# Patient Record
Sex: MALE | Race: BLACK OR AFRICAN AMERICAN | NOT HISPANIC OR LATINO | URBAN - METROPOLITAN AREA
[De-identification: names, ages, dates, MRNs, and addresses within clinical notes are randomized per-mention and may not be internally consistent; named-entity substitution may affect disease eponyms.]

---

## 2017-03-06 ENCOUNTER — EMERGENCY (EMERGENCY)
Facility: HOSPITAL | Age: 47
LOS: 0 days | Discharge: HOME | End: 2017-03-06
Admitting: GENERAL PRACTICE

## 2017-06-27 DIAGNOSIS — Z79.891 LONG TERM (CURRENT) USE OF OPIATE ANALGESIC: ICD-10-CM

## 2017-06-27 DIAGNOSIS — F10.129 ALCOHOL ABUSE WITH INTOXICATION, UNSPECIFIED: ICD-10-CM

## 2017-06-27 DIAGNOSIS — Z88.0 ALLERGY STATUS TO PENICILLIN: ICD-10-CM

## 2017-10-19 ENCOUNTER — EMERGENCY (EMERGENCY)
Facility: HOSPITAL | Age: 47
LOS: 0 days | Discharge: HOME | End: 2017-10-20
Admitting: GENERAL PRACTICE

## 2017-10-19 DIAGNOSIS — A41.9 SEPSIS, UNSPECIFIED ORGANISM: ICD-10-CM

## 2017-10-19 DIAGNOSIS — Z88.0 ALLERGY STATUS TO PENICILLIN: ICD-10-CM

## 2017-10-19 DIAGNOSIS — G35 MULTIPLE SCLEROSIS: ICD-10-CM

## 2017-10-19 DIAGNOSIS — Z79.899 OTHER LONG TERM (CURRENT) DRUG THERAPY: ICD-10-CM

## 2017-10-19 DIAGNOSIS — R53.1 WEAKNESS: ICD-10-CM

## 2019-08-01 ENCOUNTER — OUTPATIENT (OUTPATIENT)
Dept: OUTPATIENT SERVICES | Facility: HOSPITAL | Age: 49
LOS: 1 days | End: 2019-08-01
Payer: MEDICAID

## 2019-08-04 ENCOUNTER — INPATIENT (INPATIENT)
Facility: HOSPITAL | Age: 49
LOS: 2 days | Discharge: HOME | End: 2019-08-07
Attending: INTERNAL MEDICINE | Admitting: INTERNAL MEDICINE
Payer: MEDICAID

## 2019-08-04 VITALS
RESPIRATION RATE: 20 BRPM | HEART RATE: 82 BPM | WEIGHT: 160.06 LBS | HEIGHT: 77 IN | DIASTOLIC BLOOD PRESSURE: 61 MMHG | SYSTOLIC BLOOD PRESSURE: 100 MMHG | TEMPERATURE: 98 F | OXYGEN SATURATION: 98 %

## 2019-08-04 LAB
ANION GAP SERPL CALC-SCNC: 15 MMOL/L — HIGH (ref 7–14)
BASOPHILS # BLD AUTO: 0.04 K/UL — SIGNIFICANT CHANGE UP (ref 0–0.2)
BASOPHILS NFR BLD AUTO: 0.3 % — SIGNIFICANT CHANGE UP (ref 0–1)
BUN SERPL-MCNC: 19 MG/DL — SIGNIFICANT CHANGE UP (ref 10–20)
CALCIUM SERPL-MCNC: 9.8 MG/DL — SIGNIFICANT CHANGE UP (ref 8.5–10.1)
CHLORIDE SERPL-SCNC: 100 MMOL/L — SIGNIFICANT CHANGE UP (ref 98–110)
CO2 SERPL-SCNC: 22 MMOL/L — SIGNIFICANT CHANGE UP (ref 17–32)
CREAT SERPL-MCNC: 1.1 MG/DL — SIGNIFICANT CHANGE UP (ref 0.7–1.5)
EOSINOPHIL # BLD AUTO: 0.02 K/UL — SIGNIFICANT CHANGE UP (ref 0–0.7)
EOSINOPHIL NFR BLD AUTO: 0.2 % — SIGNIFICANT CHANGE UP (ref 0–8)
ETHANOL SERPL-MCNC: <10 MG/DL — SIGNIFICANT CHANGE UP
GLUCOSE SERPL-MCNC: 94 MG/DL — SIGNIFICANT CHANGE UP (ref 70–99)
HCT VFR BLD CALC: 43.6 % — SIGNIFICANT CHANGE UP (ref 42–52)
HGB BLD-MCNC: 14.9 G/DL — SIGNIFICANT CHANGE UP (ref 14–18)
IMM GRANULOCYTES NFR BLD AUTO: 0.3 % — SIGNIFICANT CHANGE UP (ref 0.1–0.3)
LYMPHOCYTES # BLD AUTO: 1.71 K/UL — SIGNIFICANT CHANGE UP (ref 1.2–3.4)
LYMPHOCYTES # BLD AUTO: 14.5 % — LOW (ref 20.5–51.1)
MCHC RBC-ENTMCNC: 29.4 PG — SIGNIFICANT CHANGE UP (ref 27–31)
MCHC RBC-ENTMCNC: 34.2 G/DL — SIGNIFICANT CHANGE UP (ref 32–37)
MCV RBC AUTO: 86.2 FL — SIGNIFICANT CHANGE UP (ref 80–94)
MONOCYTES # BLD AUTO: 1.11 K/UL — HIGH (ref 0.1–0.6)
MONOCYTES NFR BLD AUTO: 9.4 % — HIGH (ref 1.7–9.3)
NEUTROPHILS # BLD AUTO: 8.89 K/UL — HIGH (ref 1.4–6.5)
NEUTROPHILS NFR BLD AUTO: 75.3 % — HIGH (ref 42.2–75.2)
NRBC # BLD: 0 /100 WBCS — SIGNIFICANT CHANGE UP (ref 0–0)
PLATELET # BLD AUTO: 236 K/UL — SIGNIFICANT CHANGE UP (ref 130–400)
POTASSIUM SERPL-MCNC: 4.6 MMOL/L — SIGNIFICANT CHANGE UP (ref 3.5–5)
POTASSIUM SERPL-SCNC: 4.6 MMOL/L — SIGNIFICANT CHANGE UP (ref 3.5–5)
RBC # BLD: 5.06 M/UL — SIGNIFICANT CHANGE UP (ref 4.7–6.1)
RBC # FLD: 13.6 % — SIGNIFICANT CHANGE UP (ref 11.5–14.5)
SODIUM SERPL-SCNC: 137 MMOL/L — SIGNIFICANT CHANGE UP (ref 135–146)
TROPONIN T SERPL-MCNC: <0.01 NG/ML — SIGNIFICANT CHANGE UP
WBC # BLD: 11.8 K/UL — HIGH (ref 4.8–10.8)
WBC # FLD AUTO: 11.8 K/UL — HIGH (ref 4.8–10.8)

## 2019-08-04 PROCEDURE — 99285 EMERGENCY DEPT VISIT HI MDM: CPT

## 2019-08-04 PROCEDURE — 71045 X-RAY EXAM CHEST 1 VIEW: CPT | Mod: 26

## 2019-08-04 PROCEDURE — 99223 1ST HOSP IP/OBS HIGH 75: CPT

## 2019-08-04 PROCEDURE — 93010 ELECTROCARDIOGRAM REPORT: CPT

## 2019-08-04 RX ORDER — HYDRALAZINE HCL 50 MG
10 TABLET ORAL ONCE
Refills: 0 | Status: COMPLETED | OUTPATIENT
Start: 2019-08-04 | End: 2019-08-04

## 2019-08-04 RX ORDER — ZOLPIDEM TARTRATE 10 MG/1
5 TABLET ORAL AT BEDTIME
Refills: 0 | Status: DISCONTINUED | OUTPATIENT
Start: 2019-08-04 | End: 2019-08-07

## 2019-08-04 RX ORDER — OXYCODONE HYDROCHLORIDE 5 MG/1
80 TABLET ORAL EVERY 12 HOURS
Refills: 0 | Status: DISCONTINUED | OUTPATIENT
Start: 2019-08-04 | End: 2019-08-07

## 2019-08-04 RX ORDER — ROPINIROLE 8 MG/1
2 TABLET, FILM COATED, EXTENDED RELEASE ORAL DAILY
Refills: 0 | Status: DISCONTINUED | OUTPATIENT
Start: 2019-08-04 | End: 2019-08-07

## 2019-08-04 RX ORDER — ENOXAPARIN SODIUM 100 MG/ML
40 INJECTION SUBCUTANEOUS DAILY
Refills: 0 | Status: DISCONTINUED | OUTPATIENT
Start: 2019-08-04 | End: 2019-08-07

## 2019-08-04 RX ORDER — HYDRALAZINE HCL 50 MG
25 TABLET ORAL ONCE
Refills: 0 | Status: COMPLETED | OUTPATIENT
Start: 2019-08-04 | End: 2019-08-04

## 2019-08-04 RX ORDER — OXYCODONE HYDROCHLORIDE 5 MG/1
20 TABLET ORAL EVERY 6 HOURS
Refills: 0 | Status: DISCONTINUED | OUTPATIENT
Start: 2019-08-04 | End: 2019-08-07

## 2019-08-04 RX ORDER — CHLORHEXIDINE GLUCONATE 213 G/1000ML
1 SOLUTION TOPICAL
Refills: 0 | Status: DISCONTINUED | OUTPATIENT
Start: 2019-08-04 | End: 2019-08-07

## 2019-08-04 RX ORDER — NICOTINE POLACRILEX 2 MG
1 GUM BUCCAL DAILY
Refills: 0 | Status: DISCONTINUED | OUTPATIENT
Start: 2019-08-04 | End: 2019-08-07

## 2019-08-04 RX ADMIN — ZOLPIDEM TARTRATE 5 MILLIGRAM(S): 10 TABLET ORAL at 23:29

## 2019-08-04 RX ADMIN — ENOXAPARIN SODIUM 40 MILLIGRAM(S): 100 INJECTION SUBCUTANEOUS at 21:35

## 2019-08-04 RX ADMIN — OXYCODONE HYDROCHLORIDE 20 MILLIGRAM(S): 5 TABLET ORAL at 21:35

## 2019-08-04 RX ADMIN — ZOLPIDEM TARTRATE 5 MILLIGRAM(S): 10 TABLET ORAL at 21:34

## 2019-08-04 RX ADMIN — ROPINIROLE 2 MILLIGRAM(S): 8 TABLET, FILM COATED, EXTENDED RELEASE ORAL at 21:34

## 2019-08-04 RX ADMIN — OXYCODONE HYDROCHLORIDE 80 MILLIGRAM(S): 5 TABLET ORAL at 17:39

## 2019-08-04 RX ADMIN — Medication 10 MILLIGRAM(S): at 21:34

## 2019-08-04 RX ADMIN — Medication 25 MILLIGRAM(S): at 17:39

## 2019-08-04 NOTE — ED PROVIDER NOTE - PROGRESS NOTE DETAILS
Called on call service for neurology Dr. [facility] for consult, awaiting call back from on call doctor Called Dr. Arias of Dr. Bledso[facility], recommends admit patient for w/u of exacerbation with MRI with and w/o contrast, but r/o infection first before giving steroids Called Dr. Arias of Dr. Bledso[facility] group, recommends admit patient for w/u of exacerbation with MRI with and w/o contrast, but r/o infection first before giving steroids, recommends against steroids if Tysabri within 1 week, but patient says last dose of Tysabri was 4 weeks ago Called Dr. Arias of Dr. Bledso[facility] group, recommends admit patient for w/u of exacerbation with MRI with and w/o contrast, but r/o infection first before giving steroids, get utox, recommends against steroids if Tysabri within 1 week, but patient says last dose of Tysabri was 4 weeks ago Patient sleepy, mumblin, still unstable gain, will admit for w/u of MS exacerbation Patient sleepy, mumbling, still unstable gain, will admit for w/u of MS exacerbation

## 2019-08-04 NOTE — H&P ADULT - NSICDXPASTMEDICALHX_GEN_ALL_CORE_FT
PAST MEDICAL HISTORY:  Multiple sclerosis PAST MEDICAL HISTORY:  Chronic back pain     Hypertension     Multiple sclerosis

## 2019-08-04 NOTE — ED PROVIDER NOTE - DIAGNOSTIC INTERPRETATION
Adequate: hears normal conversation without difficulty Chest xray shows no focal consolidation, no effusion, no pneumothorax, normal heart size

## 2019-08-04 NOTE — ED PROVIDER NOTE - ATTENDING CONTRIBUTION TO CARE
50 yo male BIBA with a PMHx of progressive-relapsing multiple sclerosis complaining of weakness today. patient denies cp/sob, no n/v/d, no loc, no drug use. case discussed with neurologist Dr. bjorn gates, recommended to admit the patient for further eval and to not give steroids in the ER    CONSTITUTIONAL: Well-developed; well-nourished; in no acute distress. Sitting up and providing appropriate history and physical examination  SKIN: skin exam is warm and dry, no acute rash.  HEAD: Normocephalic; atraumatic.  EYES: PERRL, 3 mm bilateral, no nystagmus, EOM intact; conjunctiva and sclera clear.  ENT: No nasal discharge; airway clear.  NECK: Supple; non tender. + full passive ROM in all directions. No JVD  CARD: S1, S2 normal; no murmurs, gallops, or rubs. Regular rate and rhythm. + Symmetric Strong Pulses  RESP: No wheezes, rales or rhonchi. Good air movement bilaterally  ABD: soft; non-distended; non-tender. No Rebound, No Guarding, No signs of peritonitis, No CVA tenderness. No pulsatile abdominal mass. + Strong and Symmetric Pulses  EXT: Normal ROM. No clubbing, cyanosis or edema. Dp and Pt Pulses intact. Cap refill less than 3 seconds  NEURO: as per wife pateint is at his baseline neurologic status, patient walks with a cane and unsteady gait at baseline, CN 2-12 intact, normal finger to nose, + worsening LLE weakness over a few days, no other sensory or motor deficits, Alert, oriented, grossly unremarkable. GCS 15. NIH 0  PSYCH: Cooperative, appropriate.

## 2019-08-04 NOTE — H&P ADULT - NSHPPHYSICALEXAM_GEN_ALL_CORE
Vital Signs Last 24 Hrs  T(C): 35.8 (04 Aug 2019 08:31), Max: 36.6 (04 Aug 2019 01:53)  T(F): 96.4 (04 Aug 2019 08:31), Max: 97.8 (04 Aug 2019 01:53)  HR: 50 (04 Aug 2019 08:31) (50 - 82)  BP: 186/114 (04 Aug 2019 08:31) (100/61 - 186/114)  BP(mean): --  RR: 16 (04 Aug 2019 08:31) (16 - 20)  SpO2: 99% (04 Aug 2019 08:31) (98% - 99%)    POCT Blood Glucose.: 101 mg/dL (04 Aug 2019 02:57)    Physical Exam:    -     General :     -      HEENT:    -      Cardiac:    -      Pulm:    -      GI:    -      Musculoskeletal:    -      Neuro: Vital Signs Last 24 Hrs  T(C): 35.8 (04 Aug 2019 08:31), Max: 36.6 (04 Aug 2019 01:53)  T(F): 96.4 (04 Aug 2019 08:31), Max: 97.8 (04 Aug 2019 01:53)  HR: 50 (04 Aug 2019 08:31) (50 - 82)  BP: 186/114 (04 Aug 2019 08:31) (100/61 - 186/114)  BP(mean): --  RR: 16 (04 Aug 2019 08:31) (16 - 20)  SpO2: 99% (04 Aug 2019 08:31) (98% - 99%)    POCT Blood Glucose.: 101 mg/dL (04 Aug 2019 02:57)    Physical Exam:    -     General : alert, awake and in no acute distress    -      HEENT: normocephalic    -      Cardiac: RRR    -      Pulm: CTA B/L    -      GI: abdomen soft    -      Musculoskeletal: no lower extremity edema    -      Neuro: AAOx3; motor strength 5/5 in bilateral upper and lower extremity, sensory intact Vital Signs Last 24 Hrs  T(C): 35.8 (04 Aug 2019 08:31), Max: 36.6 (04 Aug 2019 01:53)  T(F): 96.4 (04 Aug 2019 08:31), Max: 97.8 (04 Aug 2019 01:53)  HR: 50 (04 Aug 2019 08:31) (50 - 82)  BP: 186/114 (04 Aug 2019 08:31) (100/61 - 186/114)  BP(mean): --  RR: 16 (04 Aug 2019 08:31) (16 - 20)  SpO2: 99% (04 Aug 2019 08:31) (98% - 99%)    POCT Blood Glucose.: 101 mg/dL (04 Aug 2019 02:57)    PHYSICAL EXAM:  GENERAL: NAD, speaks in full sentences, no signs of respiratory distress, thin  HEAD:  Atraumatic, Normocephalic  EYES: conjunctiva and sclera clear  NECK: Supple, No JVD  CHEST/LUNG: Clear to auscultation bilaterally; No wheeze; No crackles; No accessory muscles used  HEART: Regular rate and rhythm; No murmurs;   ABDOMEN: Soft, Nontender, Nondistended; Bowel sounds present; No guarding  EXTREMITIES: No cyanosis or edema  PSYCH: AAOx3  NEUROLOGY: strength 4/5 b/l LE, walks unsteadily with a cane  SKIN: No rashes or lesions

## 2019-08-04 NOTE — ED PROVIDER NOTE - PHYSICAL EXAMINATION
CONSTITUTIONAL: Well-developed; well-nourished; in no acute distress.   SKIN: warm, dry  HEAD: Normocephalic; atraumatic.  EYES: no conjunctival injection. PERRLA. Pupils mildly dilated.  ENT: No nasal discharge; airway clear.  NECK: Supple; non tender.  CARD: S1, S2 normal; no murmurs, gallops, or rubs. Regular rate and rhythm. 2+ radial and DP pulses b/l.  RESP: No wheezes, rales or rhonchi.  ABD: soft ntnd  EXT: Normal ROM.  No clubbing, cyanosis or edema.   LYMPH: No acute cervical adenopathy.  NEURO: Alert, oriented, grossly unremarkable. CN 2 to 12 intact. Dysmetria with right hand on finger to nose test. Rapid alternating movement normal, heel to shin test normal. Gait unstable with cane on right hand.  PSYCH: Cooperative, appropriate. CONSTITUTIONAL: Well-developed; well-nourished; in no acute distress.   SKIN: warm, dry  HEAD: Normocephalic; atraumatic.  EYES: no conjunctival injection. PERRLA. Pupils mildly dilated.  ENT: No nasal discharge; airway clear.  NECK: Supple; non tender.  CARD: S1, S2 normal; no murmurs, gallops, or rubs. Regular rate and rhythm. 2+ radial and DP pulses b/l.  RESP: No wheezes, rales or rhonchi.  ABD: soft ntnd  EXT: Normal ROM.  No clubbing, cyanosis or edema.   LYMPH: No acute cervical adenopathy.  NEURO: Alert, oriented, grossly unremarkable. CN 2 to 12 intact. Dysmetria with right hand on finger to nose test. Rapid alternating movement normal, heel to shin test normal. Gait unstable with cane on right hand. Normal sensation. 5/5 strength in upper extremities b/l. 3/5 strength in right hip flexion, 4/5 strength in right knee extension, 4/5 strength in right ankle dorsiflexion and plantarflexion. 5/5 strength in left hip flexion, 5/5 strength in left knee extension, 5/5 strength in left ankle dorsiflexion and plantarflexion  PSYCH: Cooperative, appropriate. CONSTITUTIONAL: Well-developed; well-nourished; in no acute distress.   SKIN: warm, dry  HEAD: Normocephalic; atraumatic.  EYES: no conjunctival injection. PERRLA. Pupils mildly dilated.  ENT: No nasal discharge; airway clear.  NECK: Supple; non tender.  CARD: S1, S2 normal; no murmurs, gallops, or rubs. Regular rate and rhythm. 2+ radial and DP pulses b/l.  RESP: No wheezes, rales or rhonchi.  ABD: soft ntnd  EXT: Normal ROM.  No clubbing, cyanosis or edema.   LYMPH: No acute cervical adenopathy.  NEURO: Alert, oriented, grossly unremarkable. CN 2 to 12 intact. Dysmetria with right hand on finger to nose test. Rapid alternating movement normal, heel to shin test normal. Gait unstable with cane on right hand. Normal sensation. 5/5 strength in upper extremities b/l. 3/5 strength in right hip flexion, 4/5 strength in right knee extension, 4/5 strength in right ankle dorsiflexion and plantarflexion. 5/5 strength in left hip flexion, 5/5 strength in left knee extension, 5/5 strength in left ankle dorsiflexion and plantarflexion  PSYCH: Cooperative. Persevering speech about wife and kids and about life being a struggle with MS. CONSTITUTIONAL: Well-developed; well-nourished; in no acute distress.   SKIN: warm, dry  HEAD: Normocephalic; atraumatic.  EYES: no conjunctival injection. PERRLA. Pupils mildly dilated.  ENT: No nasal discharge; airway clear.  NECK: Supple; non tender.  CARD: S1, S2 normal; no murmurs, gallops, or rubs. Regular rate and rhythm. 2+ radial and DP pulses b/l.  RESP: No wheezes, rales or rhonchi.  ABD: soft ntnd  EXT: Normal ROM.  No clubbing, cyanosis or edema.   LYMPH: No acute cervical adenopathy.  NEURO: Alert, oriented, but at times sleepy and mumbling speech. CN 2 to 12 intact. Dysmetria with right hand on finger to nose test. Rapid alternating movement normal, heel to shin test normal. Gait unstable with cane on right hand. Normal sensation. 5/5 strength in upper extremities b/l. 3/5 strength in right hip flexion, 4/5 strength in right knee extension, 4/5 strength in right ankle dorsiflexion and plantarflexion. 5/5 strength in left hip flexion, 5/5 strength in left knee extension, 5/5 strength in left ankle dorsiflexion and plantarflexion  PSYCH: Cooperative. Persevering speech about wife and kids and about life being a struggle with MS. CONSTITUTIONAL: Well-developed; well-nourished; in no acute distress.   SKIN: warm, dry  HEAD: Normocephalic; atraumatic.  EYES: no conjunctival injection. PERRLA. Pupils mildly dilated.  ENT: No nasal discharge; airway clear. Oral mucosa dry.  NECK: Supple; non tender.  CARD: S1, S2 normal; no murmurs, gallops, or rubs. Regular rate and rhythm. 2+ radial and DP pulses b/l.  RESP: No wheezes, rales or rhonchi.  ABD: soft ntnd  EXT: Normal ROM.  No clubbing, cyanosis or edema.   LYMPH: No acute cervical adenopathy.  NEURO: Alert, oriented, but at times sleepy and mumbling speech. CN 2 to 12 intact. Dysmetria with right hand on finger to nose test. Rapid alternating movement normal, heel to shin test normal. Gait unstable with cane on right hand. Normal sensation. 5/5 strength in upper extremities b/l. 3/5 strength in right hip flexion, 4/5 strength in right knee extension, 4/5 strength in right ankle dorsiflexion and plantarflexion. 5/5 strength in left hip flexion, 5/5 strength in left knee extension, 5/5 strength in left ankle dorsiflexion and plantarflexion  PSYCH: Cooperative. Persevering speech about wife and kids and about life being a struggle with MS. CONSTITUTIONAL: Well-developed; well-nourished; in no acute distress.   SKIN: warm, dry  HEAD: Normocephalic; atraumatic.  EYES: no conjunctival injection. PERRLA. Pupils mildly dilated.  ENT: No nasal discharge; airway clear. Oral mucosa dry.  NECK: Supple; non tender.  CARD: S1, S2 normal; no murmurs, gallops, or rubs. Regular rate and rhythm. 2+ radial and DP pulses b/l.  RESP: No wheezes, rales or rhonchi.  ABD: soft ntnd  EXT: Normal ROM.  No clubbing, cyanosis or edema.   LYMPH: No acute cervical adenopathy.  NEURO: Alert, oriented, but at times sleepy and mumbling speech. CN 2 to 12 intact. Dysmetria with right hand on finger to nose test. Rapid alternating movement normal, heel to shin test normal. Gait unstable with cane on right hand. Normal sensation. 5/5 strength in upper extremities b/l. 3/5 strength in left hip flexion, 4/5 strength in left knee extension, 4/5 strength in left ankle dorsiflexion and plantarflexion. 5/5 strength in right hip flexion, 5/5 strength in right knee extension, 5/5 strength in right ankle dorsiflexion and plantarflexion  PSYCH: Cooperative. Persevering speech about wife and kids and about life being a struggle with MS.

## 2019-08-04 NOTE — ED PROVIDER NOTE - CLINICAL SUMMARY MEDICAL DECISION MAKING FREE TEXT BOX
I personally evaluated the patient. I reviewed the Resident’s or Physician Assistant’s note (as assigned above), and agree with the findings and plan except as documented in my note. patient neurologist will come to see him in the hospital. Patient well appearing on admission

## 2019-08-04 NOTE — CONSULT NOTE ADULT - SUBJECTIVE AND OBJECTIVE BOX
Neurology consult    KEVIN [id number]    HPI:  The patient is a 49-year-old male with a PMH of relapse-remitting multiple sclerosis and chronic back pain who presented with a 1-day history of lower extremity weakness and gait imbalance.  He reports having a history of lower extremity weakness and he uses a cane to ambulate at baseline.  However, he noticed increased weakness yesterday.  Otherwise, he denied having fever, chills, headache, dizziness, lightheadedness, blurry vision, eye pain, sensory changes, bowel or bladder dysfunction.  The patient states his last MS flare was about 4-5 years ago.  He reports not routinely following-up with (04 Aug 2019 14:38)  Pt was seeing Dr. Bledsoe but for the past 1-2 yrs is non-compliant with f/u visits. Was on Tysabri, last treatment 1-2 yrs ago. Also is followed by pain mngnt, get pain killers, now is concerned about his pain meds - ran out.  Also has RLS -on Neurontin and Requip.    MEDICATIONS    chlorhexidine 4% Liquid 1 Application(s) Topical <User Schedule>      PAST MEDICAL & SURGICAL HISTORY:  Multiple sclerosis       Family history: No history of dementia, strokes, or seizures   FAMILY HISTORY:  non-contributory    SOCIAL HISTORY:  denies IVDA, ETOH, admits smoking cigarets.     Allergies    penicillin (Unknown)            Height (cm): 195.58 (08-04 @ 01:53)  Weight (kg): 72.6 (08-04 @ 01:53)  BMI (kg/m2): 19 (08-04 @ 01:53)    Vital Signs Last 24 Hrs  T(C): 35.8 (04 Aug 2019 08:31), Max: 36.6 (04 Aug 2019 01:53)  T(F): 96.4 (04 Aug 2019 08:31), Max: 97.8 (04 Aug 2019 01:53)  HR: 50 (04 Aug 2019 08:31) (50 - 82)  BP: 186/114 (04 Aug 2019 08:31) (100/61 - 186/114)  BP(mean): --  RR: 16 (04 Aug 2019 08:31) (16 - 20)  SpO2: 99% (04 Aug 2019 08:31) (98% - 99%)    WEAKNESS  ^WEAKNESS  MEWS Score  Multiple sclerosis  Weakness  WEAKNESS  90+  Multiple sclerosis      REVIEW OF SYSTEMS:    Constitutional: No fever, chills, fatigue, weakness  Eyes: no eye pain, visual disturbances, or discharge  ENT:  No difficulty hearing, tinnitus, vertigo; No sinus or throat pain  Neck: No pain or stiffness  Respiratory: No cough, dyspnea, wheezing   Cardiovascular: No chest pain, palpitations,   Gastrointestinal: No abdominal or epigastric pain. No nausea, vomiting  No diarrhea or constipation.   Genitourinary: No dysuria, frequency, hematuria or incontinence  Neurological: as per HPI  Psychiatric: No depression, anxiety, mood swings or difficulty sleeping  Musculoskeletal: as per HPI  Skin: No itching, burning, rashes or lesions   Lymph Nodes: No enlarged glands  Endocrine: No heat or cold intolerance; No hair loss, No h/o diabetes or thyroid dysfunction  Allergy and Immunologic: No hives or eczema    PHYSICAL EXAMINATION:  General:  Well-developed, well nourished, in no acute distress.  Eyes: Conjunctiva and sclera clear.  Cardiovascular: Regular rate and rhythm; S1 and S2 Normal; No murmurs, gallops or rubs.  Neurologic:  - Mental Status:  Alert, awake, oriented to person, place, and time; Speech is fluent with intact naming, repetition, and comprehension.  - Cranial Nerves II-XII:  PERRLA, EOMI, no nystagmus, face simmetrical, hearing intact to finger rub bl, tongue midline  - Motor:  Strength is 5/5 both arms, 4/5 RT leg, 4-/5 lt leg, DTRs 3+ all, toes equivocal bl.  There is no pronator drift.  Normal muscle bulk and  spastic tone in legs.  - Sensory:  Intact to light touch  sense throughout.  - Coordination:  Finger-nose-finger  with dysmetria bl, and heel-knee-shin ataxia bl.    - Gait:  ataxic, spastic          LABS:  CBC Full  -  ( 04 Aug 2019 02:36 )  WBC Count : 11.80 K/uL  RBC Count : 5.06 M/uL  Hemoglobin : 14.9 g/dL  Hematocrit : 43.6 %  Platelet Count - Automated : 236 K/uL  Mean Cell Volume : 86.2 fL  Mean Cell Hemoglobin : 29.4 pg  Mean Cell Hemoglobin Concentration : 34.2 g/dL  Auto Neutrophil # : 8.89 K/uL  Auto Lymphocyte # : 1.71 K/uL  Auto Monocyte # : 1.11 K/uL  Auto Eosinophil # : 0.02 K/uL  Auto Basophil # : 0.04 K/uL  Auto Neutrophil % : 75.3 %  Auto Lymphocyte % : 14.5 %  Auto Monocyte % : 9.4 %  Auto Eosinophil % : 0.2 %  Auto Basophil % : 0.3 %      08-04    137  |  100  |  19  ----------------------------<  94  4.6   |  22  |  1.1    Ca    9.8      04 Aug 2019 02:36

## 2019-08-04 NOTE — H&P ADULT - ASSESSMENT
A 49-year-old male with a PMH of HTN, relapse-remitting multiple sclerosis, and chronic back pain who presented with a 1-day history of lower extremity weakness and gait imbalance.     1. Lower extremity weakness and gait imbalance  - r/o MS flare  - check MRI brain and L-spine w/wo contrast  - neurology consult pending  - ambulate with assistance  - PT/rehab consult pending    2. Stage II HTN  - monitor BP and start anti-hypertensive medications if SBP remains >/= 140 mm Hg or DBP >/= 90 mm Hg    3. Chronic back pain  - patient's opioid medications verified with I-STOP  - continue Oxycodone IR 20 mg PO QID and Oxycontin ER 80 mg PO q12H    4. DVT prophylaxis  - LMWH daily    5. Disposition  - anticipate discharge home once medically stable A 49-year-old male with a PMH of HTN, relapse-remitting multiple sclerosis, and chronic back pain who presented with a 1-day history of lower extremity weakness and gait imbalance.     1. Lower extremity weakness and gait imbalance  - r/o MS flare  - check MRI brain and L-spine w/wo contrast  - neurology consult pending  - continue Ropinirole 2 mg daily  - ambulate with assistance  - PT/rehab consult pending    2. Stage II HTN  - monitor BP and start anti-hypertensive medications if SBP remains >/= 140 mm Hg or DBP >/= 90 mm Hg    3. Chronic back pain  - patient's opioid medications were verified with I-STOP  - continue Oxycodone IR 20 mg PO q6H PRN and Oxycontin ER 80 mg PO q12H    4. DVT prophylaxis  - LMWH daily    5. Disposition  - anticipate discharge home once medically stable    **The patient cannot recall all of his home medications; his pharmacy is currently closed; please call in AM to verify his medications to complete med. rec.** A 49-year-old male with a PMH of HTN, relapse-remitting multiple sclerosis, and chronic back pain who presented with a 1-day history of lower extremity weakness and gait imbalance.     1. Lower extremity weakness and gait imbalance  - r/o MS flare  - MRI brain, C-spine. and T-spine w/wo contrast ordered  - neurology consult pending  - continue Ropinirole 2 mg daily  - ambulate with assistance  - PT/rehab consult pending    2. Stage II HTN  - monitor BP and start anti-hypertensive medications if SBP remains >/= 140 mm Hg or DBP >/= 90 mm Hg    3. Chronic back pain  - patient's opioid medications were verified with I-STOP  - continue Oxycodone IR 20 mg PO q6H PRN and Oxycontin ER 80 mg PO q12H    4. DVT prophylaxis  - LMWH daily    5. Disposition  - anticipate discharge home once medically stable    **The patient cannot recall all of his home medications; his pharmacy is currently closed; please call in AM to verify his medications to complete med. rec.** A 49-year-old male with a PMH of HTN, relapse-remitting multiple sclerosis, and chronic back pain who presented with a 1-day history of lower extremity weakness and gait imbalance.     1. Lower extremity weakness and gait imbalance  - r/o MS flare versus worsening sciatica  - MRI brain, C-spine. and T-spine w/wo contrast ordered  - neurology consult appreciated - if MRI c/w MS flare, consider solumedrol x5 days  - continue Ropinirole 2 mg daily  - ambulate with assistance  - PT/rehab consult pending    2. Stage II HTN  - monitor BP and start anti-hypertensive medications if SBP remains >/= 140 mm Hg or DBP >/= 90 mm Hg    3. Chronic back pain  - patient's opioid medications were verified with I-STOP  - continue Oxycodone IR 20 mg PO q6H PRN and Oxycontin ER 80 mg PO q12H    4. DVT prophylaxis  - LMWH daily    5. Disposition  - anticipate discharge home once medically stable    **The patient cannot recall all of his home medications; his pharmacy is currently closed; please call in AM to verify his medications to complete med. rec.**

## 2019-08-04 NOTE — ED ADULT NURSE NOTE - NSIMPLEMENTINTERV_GEN_ALL_ED
Implemented All Fall with Harm Risk Interventions:  [place] to call system. Call bell, personal items and telephone within reach. Instruct patient to call for assistance. Room bathroom lighting operational. Non-slip footwear when patient is off stretcher. Physically safe environment: no spills, clutter or unnecessary equipment. Stretcher in lowest position, wheels locked, appropriate side rails in place. Provide visual cue, wrist band, yellow gown, etc. Monitor gait and stability. Monitor for mental status changes and reorient to person, place, and time. Review medications for side effects contributing to fall risk. Reinforce activity limits and safety measures with patient and family. Provide visual clues: red socks.

## 2019-08-04 NOTE — H&P ADULT - ATTENDING COMMENTS
Patient is seen and examined independently at bedside. I agree with the resident's note, history and plan as above. Corrections made where appropriate    All labs, radiologic studies, vitals, orders and medications list reviewed.     #Progress Note Handoff  Pending (specify):   MRI, PT, symptomatic improvement  Family discussion: Plan of care discussed with patient, aware and agreeable   Disposition:  home

## 2019-08-04 NOTE — ED ADULT NURSE NOTE - CHPI ED NUR SYMPTOMS NEG
no fever/no tingling/no decreased eating/drinking/no dizziness/no nausea/no pain/no chills/no vomiting

## 2019-08-04 NOTE — H&P ADULT - NSHPOUTPATIENTPROVIDERS_GEN_ALL_CORE
Neurologist: Dr. Bledsoe Neurologist: Dr. Bledsoe  Pain Management: [facility]  Pharmacy: [facility] ([phone])

## 2019-08-04 NOTE — ED ADULT NURSE NOTE - OBJECTIVE STATEMENT
patient complaints of weakness with a PMH of MS. Patient ambulates with an unsteady gait at baseline.

## 2019-08-04 NOTE — ED ADULT NURSE REASSESSMENT NOTE - NS ED NURSE REASSESS COMMENT FT1
pt sleeping, responds to verbal stimuli but goes back to sleep. VS obtained. Awaiting orders from admitting MD.

## 2019-08-04 NOTE — H&P ADULT - NSHPLABSRESULTS_GEN_ALL_CORE
Labs:                        14.9   11.80 )-----------( 236      ( 04 Aug 2019 02:36 )             43.6             08-04    137  |  100  |  19  ----------------------------<  94  4.6   |  22  |  1.1    Ca    9.8      04 Aug 2019 02:36    CARDIAC MARKERS ( 04 Aug 2019 02:36 )  x     / <0.01 ng/mL / x     / x     / x

## 2019-08-04 NOTE — CONSULT NOTE ADULT - ASSESSMENT
A 48 yo man with h/o MS for many yrs, RLS, non-compliant with f/u neuro visits and treatment.  - worsening og pain and gait - r/o exacerbation of SPMS  - h/o RLS    Plan:  - MRI brain C/T spine w/o and with gado  - UDS  - cont Requip and Gabapentin - please verify the dose with pt's pharmacy  - if MRI is c/w acute MS lesions - consider course of IV Solumedrol 1gm q24hrs x 5 days  - please call us for any questions  Please change the service to medicine - hospitalist

## 2019-08-04 NOTE — ED PROVIDER NOTE - OBJECTIVE STATEMENT
The patient is a 50 yo male with a PMHx of progressive-relapsing multiple sclerosis complaining of weakness today. The patient is a 50 yo male BIBA with a PMHx of progressive-relapsing multiple sclerosis complaining of weakness today. Chronically, patient has weakness in both legs which make walking difficult, but he is able to ambulate with a cane. He came today because his friends thought that he was walking more off-balanced than normal. He called to go to ED to be evaluated. He is now controlling his MS with monthly doses of Tysabri. He sees neurologist Dr. Bledsoe. He denies any change in sensation, facial droop, change in vision, trouble speaking, or trouble swallowing. He denies fever, chills, chest pain, palpitations, shortness of breath, abdominal, nausea, or vomiting. The patient is a 50 yo male BIBA with a PMHx of progressive-relapsing multiple sclerosis complaining of weakness today. Chronically, patient has weakness in both legs which make walking difficult, but he is able to ambulate with a cane. He came today because his friends thought that he was walking more off-balanced than normal. He called to go to ED to be evaluated. He is now controlling his MS with monthly doses of Tysabri. He sees neurologist Dr. Bledsoe. He denies any change in sensation, facial droop, change in vision, trouble speaking, or trouble swallowing. He denies fever, chills, chest pain, palpitations, shortness of breath, abdominal, nausea, or vomiting. He has had hospitalizations before for multiple sclerosis exacerbations. The patient is a 48 yo male BIBA with a PMHx of progressive-relapsing multiple sclerosis complaining of weakness today. Chronically, patient has weakness in both legs which make walking difficult, but he is able to ambulate with a cane. He came today because his friends thought that he was walking more off-balanced than normal. He called to go to ED to be evaluated. He is now controlling his MS with monthly doses of Tysabri. He sees neurologist Dr. Bledsoe. He denies any change in sensation, facial droop, change in vision, trouble speaking, or trouble swallowing. He denies fever, chills, chest pain, palpitations, shortness of breath, abdominal pain, nausea, or vomiting. He has had hospitalizations before for multiple sclerosis exacerbations.

## 2019-08-04 NOTE — ED PROVIDER NOTE - NS ED ROS FT
Review of Systems:  •	CONSTITUTIONAL - No fever, No diaphoresis, No weight change  •	SKIN - No rash  •	HEMATOLOGIC - No abnormal bleeding or bruising  •	EYES - No eye pain, No blurred vision  •	ENT - No change in hearing, No sore throat, No neck pain, No rhinorrhea, No ear pain  •	RESPIRATORY - No shortness of breath, No cough  •	CARDIAC -No chest pain, No palpitations  •	GI - No abdominal pain, No nausea, No vomiting, No diarrhea, No constipation, No bright red blood per rectum or melena. No flank pain  •                 - No dysuria, frequency, hematuria.   •	ENDO - No polydypsia, No polyuria, No heat/cold intolerance  •	MUSCULOSKELETAL - No joint paint, No swelling, No back pain  •	NEUROLOGIC - No numbness, No headache, No dizziness, + leg weakness  All other systems negative, unless specified in HPI

## 2019-08-04 NOTE — H&P ADULT - HISTORY OF PRESENT ILLNESS
The patient is a 49-year-old male with a PMH of multiple sclerosis The patient is a 49-year-old male with a PMH of relapse-remitting multiple sclerosis and chronic back pain who presented with a 1-day history of lower extremity weakness and gait imbalance.  He reports having a history of lower extremity weakness and he uses a cane to ambulate at baseline.  However, he noticed increased weakness yesterday.  Otherwise, he denied having fever, chills, headache, dizziness, lightheadedness, blurry vision, eye pain, sensory changes, bowel or bladder dysfunction.  The patient states his last MS flare was about 4-5 years ago.  He reports not routinely following-up with The patient is a 49-year-old male with a PMH of HTN, relapse-remitting multiple sclerosis, and chronic back pain who presented with a 1-day history of lower extremity weakness and gait imbalance.  He reports having a history of lower extremity weakness and he uses a cane to ambulate at baseline.  However, he noticed increased weakness yesterday.  Otherwise, he denied having fever, chills, headache, dizziness, lightheadedness, blurry vision, eye pain, sensory changes, bowel or bladder dysfunction.  The patient states his last MS flare was about 4-5 years ago.  He reports not routinely followed-up with neurology and has not been taking Natalizumab in several years. The patient is a 49-year-old male with a PMH of HTN, relapse-remitting multiple sclerosis, and chronic back pain who presented with a 1-day history of lower extremity weakness and gait imbalance.  He reports having a history of lower extremity weakness and he uses a cane to ambulate at baseline.  However, he noticed increased weakness yesterday.  Otherwise, he denied having fever, chills, headache, dizziness, lightheadedness, blurry vision, eye pain, sensory changes, bowel or bladder dysfunction.  The patient states his last MS flare was about 4-5 years ago.  He reports not routinely followed-up with neurology and has not been taking Natalizumab in several years.    Last seen by Dr Bledsoe 2-3 years ago at which time he got the Natalizumab.

## 2019-08-05 LAB
ALBUMIN SERPL ELPH-MCNC: 4.8 G/DL — SIGNIFICANT CHANGE UP (ref 3.5–5.2)
ALP SERPL-CCNC: 78 U/L — SIGNIFICANT CHANGE UP (ref 30–115)
ALT FLD-CCNC: 13 U/L — SIGNIFICANT CHANGE UP (ref 0–41)
ANION GAP SERPL CALC-SCNC: 8 MMOL/L — SIGNIFICANT CHANGE UP (ref 7–14)
AST SERPL-CCNC: 18 U/L — SIGNIFICANT CHANGE UP (ref 0–41)
BASOPHILS # BLD AUTO: 0.04 K/UL — SIGNIFICANT CHANGE UP (ref 0–0.2)
BASOPHILS NFR BLD AUTO: 0.5 % — SIGNIFICANT CHANGE UP (ref 0–1)
BILIRUB SERPL-MCNC: 0.4 MG/DL — SIGNIFICANT CHANGE UP (ref 0.2–1.2)
BUN SERPL-MCNC: 29 MG/DL — HIGH (ref 10–20)
CALCIUM SERPL-MCNC: 9.6 MG/DL — SIGNIFICANT CHANGE UP (ref 8.5–10.1)
CHLORIDE SERPL-SCNC: 101 MMOL/L — SIGNIFICANT CHANGE UP (ref 98–110)
CO2 SERPL-SCNC: 32 MMOL/L — SIGNIFICANT CHANGE UP (ref 17–32)
CREAT SERPL-MCNC: 0.8 MG/DL — SIGNIFICANT CHANGE UP (ref 0.7–1.5)
EOSINOPHIL # BLD AUTO: 0.1 K/UL — SIGNIFICANT CHANGE UP (ref 0–0.7)
EOSINOPHIL NFR BLD AUTO: 1.1 % — SIGNIFICANT CHANGE UP (ref 0–8)
GLUCOSE SERPL-MCNC: 78 MG/DL — SIGNIFICANT CHANGE UP (ref 70–99)
HCT VFR BLD CALC: 44.8 % — SIGNIFICANT CHANGE UP (ref 42–52)
HGB BLD-MCNC: 15.1 G/DL — SIGNIFICANT CHANGE UP (ref 14–18)
IMM GRANULOCYTES NFR BLD AUTO: 0.5 % — HIGH (ref 0.1–0.3)
LACTATE SERPL-SCNC: 0.8 MMOL/L — SIGNIFICANT CHANGE UP (ref 0.5–2.2)
LYMPHOCYTES # BLD AUTO: 2.63 K/UL — SIGNIFICANT CHANGE UP (ref 1.2–3.4)
LYMPHOCYTES # BLD AUTO: 30.1 % — SIGNIFICANT CHANGE UP (ref 20.5–51.1)
MCHC RBC-ENTMCNC: 29.3 PG — SIGNIFICANT CHANGE UP (ref 27–31)
MCHC RBC-ENTMCNC: 33.7 G/DL — SIGNIFICANT CHANGE UP (ref 32–37)
MCV RBC AUTO: 86.8 FL — SIGNIFICANT CHANGE UP (ref 80–94)
MONOCYTES # BLD AUTO: 0.8 K/UL — HIGH (ref 0.1–0.6)
MONOCYTES NFR BLD AUTO: 9.2 % — SIGNIFICANT CHANGE UP (ref 1.7–9.3)
NEUTROPHILS # BLD AUTO: 5.12 K/UL — SIGNIFICANT CHANGE UP (ref 1.4–6.5)
NEUTROPHILS NFR BLD AUTO: 58.6 % — SIGNIFICANT CHANGE UP (ref 42.2–75.2)
NRBC # BLD: 0 /100 WBCS — SIGNIFICANT CHANGE UP (ref 0–0)
PHOSPHATE SERPL-MCNC: 3.6 MG/DL — SIGNIFICANT CHANGE UP (ref 2.1–4.9)
PLATELET # BLD AUTO: 263 K/UL — SIGNIFICANT CHANGE UP (ref 130–400)
POTASSIUM SERPL-MCNC: 4.1 MMOL/L — SIGNIFICANT CHANGE UP (ref 3.5–5)
POTASSIUM SERPL-SCNC: 4.1 MMOL/L — SIGNIFICANT CHANGE UP (ref 3.5–5)
PROT SERPL-MCNC: 7.3 G/DL — SIGNIFICANT CHANGE UP (ref 6–8)
RBC # BLD: 5.16 M/UL — SIGNIFICANT CHANGE UP (ref 4.7–6.1)
RBC # FLD: 13.7 % — SIGNIFICANT CHANGE UP (ref 11.5–14.5)
SODIUM SERPL-SCNC: 141 MMOL/L — SIGNIFICANT CHANGE UP (ref 135–146)
WBC # BLD: 8.73 K/UL — SIGNIFICANT CHANGE UP (ref 4.8–10.8)
WBC # FLD AUTO: 8.73 K/UL — SIGNIFICANT CHANGE UP (ref 4.8–10.8)

## 2019-08-05 PROCEDURE — 99233 SBSQ HOSP IP/OBS HIGH 50: CPT

## 2019-08-05 PROCEDURE — 70553 MRI BRAIN STEM W/O & W/DYE: CPT | Mod: 26

## 2019-08-05 RX ORDER — AMLODIPINE BESYLATE 2.5 MG/1
5 TABLET ORAL DAILY
Refills: 0 | Status: DISCONTINUED | OUTPATIENT
Start: 2019-08-05 | End: 2019-08-06

## 2019-08-05 RX ORDER — HYDRALAZINE HCL 50 MG
10 TABLET ORAL EVERY 8 HOURS
Refills: 0 | Status: DISCONTINUED | OUTPATIENT
Start: 2019-08-05 | End: 2019-08-06

## 2019-08-05 RX ORDER — HYDRALAZINE HCL 50 MG
25 TABLET ORAL ONCE
Refills: 0 | Status: COMPLETED | OUTPATIENT
Start: 2019-08-05 | End: 2019-08-05

## 2019-08-05 RX ORDER — DOCUSATE SODIUM 100 MG
100 CAPSULE ORAL
Refills: 0 | Status: DISCONTINUED | OUTPATIENT
Start: 2019-08-05 | End: 2019-08-07

## 2019-08-05 RX ORDER — SENNA PLUS 8.6 MG/1
2 TABLET ORAL AT BEDTIME
Refills: 0 | Status: DISCONTINUED | OUTPATIENT
Start: 2019-08-05 | End: 2019-08-07

## 2019-08-05 RX ORDER — LISINOPRIL 2.5 MG/1
10 TABLET ORAL
Refills: 0 | Status: DISCONTINUED | OUTPATIENT
Start: 2019-08-05 | End: 2019-08-07

## 2019-08-05 RX ORDER — HYDRALAZINE HCL 50 MG
25 TABLET ORAL DAILY
Refills: 0 | Status: DISCONTINUED | OUTPATIENT
Start: 2019-08-05 | End: 2019-08-05

## 2019-08-05 RX ADMIN — SENNA PLUS 2 TABLET(S): 8.6 TABLET ORAL at 21:10

## 2019-08-05 RX ADMIN — OXYCODONE HYDROCHLORIDE 80 MILLIGRAM(S): 5 TABLET ORAL at 20:14

## 2019-08-05 RX ADMIN — Medication 1 PATCH: at 06:36

## 2019-08-05 RX ADMIN — OXYCODONE HYDROCHLORIDE 20 MILLIGRAM(S): 5 TABLET ORAL at 11:40

## 2019-08-05 RX ADMIN — Medication 100 MILLIGRAM(S): at 18:25

## 2019-08-05 RX ADMIN — Medication 25 MILLIGRAM(S): at 18:25

## 2019-08-05 RX ADMIN — OXYCODONE HYDROCHLORIDE 20 MILLIGRAM(S): 5 TABLET ORAL at 21:11

## 2019-08-05 RX ADMIN — Medication 25 MILLIGRAM(S): at 23:30

## 2019-08-05 RX ADMIN — ROPINIROLE 2 MILLIGRAM(S): 8 TABLET, FILM COATED, EXTENDED RELEASE ORAL at 11:37

## 2019-08-05 RX ADMIN — OXYCODONE HYDROCHLORIDE 20 MILLIGRAM(S): 5 TABLET ORAL at 04:19

## 2019-08-05 RX ADMIN — Medication 10 MILLIGRAM(S): at 23:28

## 2019-08-05 RX ADMIN — AMLODIPINE BESYLATE 5 MILLIGRAM(S): 2.5 TABLET ORAL at 14:04

## 2019-08-05 RX ADMIN — CHLORHEXIDINE GLUCONATE 1 APPLICATION(S): 213 SOLUTION TOPICAL at 05:44

## 2019-08-05 RX ADMIN — Medication 25 MILLIGRAM(S): at 11:53

## 2019-08-05 RX ADMIN — Medication 25 MILLIGRAM(S): at 23:16

## 2019-08-05 RX ADMIN — ZOLPIDEM TARTRATE 5 MILLIGRAM(S): 10 TABLET ORAL at 21:39

## 2019-08-05 RX ADMIN — OXYCODONE HYDROCHLORIDE 80 MILLIGRAM(S): 5 TABLET ORAL at 05:44

## 2019-08-05 NOTE — CONSULT NOTE ADULT - ASSESSMENT
IMPRESSION: Rehab of ms    PRECAUTIONS: [  ] Cardiac  [  ] Respiratory  [  ] Seizures [  ] Contact Isolation  [  ] Droplet Isolation  [  ] Other    Weight Bearing Status:     RECOMMENDATION:    Out of Bed to Chair     DVT/Decubiti Prophylaxis    REHAB PLAN:     [ x  ] Bedside P/T 3-5 times a week   [   ]   Bedside O/T  2-3 times a week             [   ] No Rehab Therapy Indicated                   [   ]  Speech Therapy   Conditioning/ROM                                    ADL  Bed Mobility                                               Conditioning/ROM  Transfers                                                     Bed Mobility  Sitting /Standing Balance                         Transfers                                        Gait Training                                               Sitting/Standing Balance  Stair Training [   ]Applicable                    Home equipment Eval                                                                        Splinting  [   ] Only      GOALS:   ADL   [   ]   Independent                    Transfers  [ x  ] Independent                          Ambulation  [ x  ] Independent     [  x  ] With device                            [   ]  CG                                                         [   ]  CG                                                                  [   ] CG                            [    ] Min A                                                   [   ] Min A                                                              [   ] Min  A          DISCHARGE PLAN:   [   ]  Good candidate for Intensive Rehabilitation/Hospital based                                             Will tolerate 3hrs Intensive Rehab Daily                                       [ x   ]  Short Term Rehab in Skilled Nursing Facility                          vs             [ x   ]  Home with Outpatient or VN services                                         [    ]  Possible Candidate for Intensive Hospital based Rehab

## 2019-08-05 NOTE — MEDICAL STUDENT PROGRESS NOTE(EDUCATION) - NS MD HP STUD ASPLAN ASSES FT
A 49-year-old male with a PMH of HTN, multiple sclerosis, and chronic back pain who presented to ED after a fall due to 1-day history of lower extremity weakness and gait imbalance. Patient was admitted for lower extremity weakness.

## 2019-08-05 NOTE — PHYSICAL THERAPY INITIAL EVALUATION ADULT - SPECIFY REASON(S)
[id number] pm. patient received sitting at EOB, NAD. BP RT UE = 189/115, BP LT UE = 184/114. RN made aware. will follow up for PT IE when BP is optimal.

## 2019-08-05 NOTE — MEDICAL STUDENT PROGRESS NOTE(EDUCATION) - NS MD HP STUD ASPLAN PLAN FT
#Lower extremity weakness and gait imbalance  - r/o MS flare versus worsening sciatica  - Neuro consult: MRI brain, C-spine. and T-spine w/wo contrast: pending (to exclude new enhancing demyelinating lesions  - Missed Tysabri (natalizumab) - last one in May 2019 (suppose to be qmonth).     If there are new lesions - start 1 g IV Solumedrol qd x 5 days for MS exacerbation     Otherwise - neurologically OK to d/c w/ PT recommendations to follow w/ Dr. Bledsoe as outpt for Tysabri  - follow w/ pain management as outpt  - continue Ropinirole 2 mg daily  - ambulate with assistance  - PT/rehab consult pending      #Stage II HTN  - monitor BP and start anti-hypertensive medications if SBP remains >/= 140 mm Hg or DBP >/= 90 mm Hg    # Chronic back pain  - patient's opioid medications were verified with [facility]  - continue Oxycodone IR 20 mg PO q6H PRN and Oxycontin ER 80 mg PO q12H    # DVT prophylaxis  - LMWH daily    # Disposition  - anticipate discharge home once medically stable    **The patient cannot recall all of his home medications; his pharmacy is currently closed; please call in AM to verify his medications to complete med. rec.** #Lower extremity weakness and gait imbalance  - r/o MS flare versus worsening sciatica  - Neuro consult: MRI brain, C-spine. and T-spine w/wo contrast: pending (to exclude new enhancing demyelinating lesions  - Missed Tysabri (natalizumab) - last one in May 2019 (suppose to be qmonth).     If there are new lesions - start 1 g IV Solumedrol qd x 5 days for MS exacerbation     Otherwise - neurologically OK to d/c w/ PT recommendations to follow w/ Dr. Bldesoe as outpt for Tysabri  - follow w/ pain management as outpt  - continue Ropinirole 2 mg daily  - ambulate with assistance  - PT/rehab consult pending      #Stage II HTN  -Blood pressure was higher today with low heart rates, pt started on norvasc and hydralazine  - monitor BP and start anti-hypertensive medications if SBP remains >/= 140 mm Hg or DBP >/= 90 mm Hg    # Chronic back pain  - patient's opioid medications were verified with [facility]  - continue Oxycodone IR 20 mg PO q6H PRN and Oxycontin ER 80 mg PO q12H    # DVT prophylaxis  - LMWH daily    # Disposition  - anticipate discharge home once medically stable    **The patient cannot recall all of his home medications; his pharmacy is currently closed; please call in AM to verify his medications to complete med. rec.**

## 2019-08-05 NOTE — PROGRESS NOTE ADULT - ASSESSMENT
Pt seen, examined.  States that LE weakness is better as well as LBP.  Awake, alert, speech fluent, follows commands. EOMI. Motor 5-/5 x 2 UE, 4/5 x 2 LE. DTR brisk. Gait - unsteady, cautious. Uses a cane .    A/P.   Hx SPMS w/ LE weakness. On Tysabri   Hx chronic back pain/ on painkillers. Sees pain management      Worsening of LE weakness/gait unsteadiness x 1-2 days.  Missed Tysabri - last one in May 2019 (suppose to be qmonth.  - follow Brain MRI, C/T spine MRI (all w/ and w/o patrice) to exclude new (enhancing) demyelinating lesions     If there are new lesions - start 1 g IV Solumedrol qd x 5 days for MS exacerbation     Otherwise - neurologically OK to d/c w/ PT recommendations to follow w/ Dr. Bledsoe as outpt for Tysabri  - follow w/ pain management as outpt  Please, call if any questions

## 2019-08-05 NOTE — PROGRESS NOTE ADULT - SUBJECTIVE AND OBJECTIVE BOX
Patient is a 49y old  Male who presents with a chief complaint of Lower extremity weakness (05 Aug 2019 16:33)    INTERVAL HPI/OVERNIGHT EVENTS: no complaints  ROS: Denies CP, SOB, AP, new weakness  All other systems reviewed and are within normal limits except for the complaints above.  HPI:  The patient is a 49-year-old male with a PMH of HTN, relapse-remitting multiple sclerosis, and chronic back pain who presented with a 1-day history of lower extremity weakness and gait imbalance.  He reports having a history of lower extremity weakness and he uses a cane to ambulate at baseline.  However, he noticed increased weakness yesterday.  Otherwise, he denied having fever, chills, headache, dizziness, lightheadedness, blurry vision, eye pain, sensory changes, bowel or bladder dysfunction.  The patient states his last MS flare was about 4-5 years ago.  He reports not routinely followed-up with neurology and has not been taking Natalizumab in several years.    Last seen by Dr Bledsoe 2-3 years ago at which time he got the Natalizumab. (04 Aug 2019 14:38)    WEAKNESS  ^WEAKNESS  No pertinent family history in first degree relatives  Handoff  MEWS Score  Hypertension  Chronic back pain  Multiple sclerosis  Weakness  WEAKNESS  90+  Multiple sclerosis    MEDICATIONS  (STANDING):  amLODIPine   Tablet 5 milliGRAM(s) Oral daily  chlorhexidine 4% Liquid 1 Application(s) Topical <User Schedule>  docusate sodium 100 milliGRAM(s) Oral two times a day  enoxaparin Injectable 40 milliGRAM(s) SubCutaneous daily  hydrALAZINE 25 milliGRAM(s) Oral once  nicotine -  14 mG/24Hr(s) Patch 1 patch Transdermal daily  oxyCODONE  ER Tablet 80 milliGRAM(s) Oral every 12 hours  rOPINIRole 2 milliGRAM(s) Oral daily  senna 2 Tablet(s) Oral at bedtime    MEDICATIONS  (PRN):  hydrALAZINE 10 milliGRAM(s) Oral every 8 hours PRN Systolic blood pressure > 170  oxyCODONE    IR 20 milliGRAM(s) Oral every 6 hours PRN Severe Pain (7 - 10)  zolpidem 5 milliGRAM(s) Oral at bedtime PRN Insomnia  zolpidem 5 milliGRAM(s) Oral at bedtime PRN Insomnia    Home Medications:  oxyCODONE 20 mg oral tablet: 1 tab(s) orally every 6 hours, As Needed (04 Aug 2019 15:34)  OxyCONTIN 80 mg oral tablet, extended release: 1 tab(s) orally every 12 hours (04 Aug 2019 15:34)  zolpidem 10 mg oral tablet: 1 tab(s) orally once a day (at bedtime) (04 Aug 2019 15:34)    Vital Signs Last 24 Hrs  T(C): 36.1 (05 Aug 2019 14:19), Max: 36.4 (04 Aug 2019 19:08)  T(F): 96.9 (05 Aug 2019 14:19), Max: 97.5 (04 Aug 2019 19:08)  HR: 60 (05 Aug 2019 14:24) (45 - 72)  BP: 173/108 (05 Aug 2019 14:24) (158/112 - 192/105)  BP(mean): --  RR: 18 (05 Aug 2019 14:24) (18 - 19)  SpO2: 95% (04 Aug 2019 19:08) (95% - 95%)  CAPILLARY BLOOD GLUCOSE        General: NAD, AAO3, tall, thin male  HEENT: PERRLA, EOM  CV: S1 S2  Resp: decreased breath sounds at bases  GI: NT/ND/S +BS  MS: no clubbing/cyanosis/edema, 2+ pulses b/l  Neuro: 4/5 LE, 5/5 UE 2+reflexes thruout, wide based gait    LABS:                        15.1   8.73  )-----------( 263      ( 05 Aug 2019 05:34 )             44.8     08-05    141  |  101  |  29<H>  ----------------------------<  78  4.1   |  32  |  0.8    Ca    9.6      05 Aug 2019 05:34  Phos  3.6     08-05    TPro  7.3  /  Alb  4.8  /  TBili  0.4  /  DBili  x   /  AST  18  /  ALT  13  /  AlkPhos  78  08-05    LIVER FUNCTIONS - ( 05 Aug 2019 05:34 )  Alb: 4.8 g/dL / Pro: 7.3 g/dL / ALK PHOS: 78 U/L / ALT: 13 U/L / AST: 18 U/L / GGT: x           CARDIAC MARKERS ( 04 Aug 2019 02:36 )  x     / <0.01 ng/mL / x     / x     / x                      Consultant(s) Notes Reviewed:  [x ] YES  [ ] NO  Care Discussed with Consultants/Other Providers/ Housestaff [ x] YES  [ ] NO  Radiology personally reviewed.

## 2019-08-05 NOTE — PROGRESS NOTE ADULT - SUBJECTIVE AND OBJECTIVE BOX
Neurology consult    KEVIN MEDRANO49yMale    HPI:  The patient is a 49-year-old male with a PMH of HTN, relapse-remitting multiple sclerosis, and chronic back pain who presented with a 1-day history of lower extremity weakness and gait imbalance.  He reports having a history of lower extremity weakness and he uses a cane to ambulate at baseline.  However, he noticed increased weakness yesterday.  Otherwise, he denied having fever, chills, headache, dizziness, lightheadedness, blurry vision, eye pain, sensory changes, bowel or bladder dysfunction.  The patient states his last MS flare was about 4-5 years ago.  He reports not routinely followed-up with neurology and has not been taking Natalizumab in several years.    Last seen by Dr Bledsoe 2-3 years ago at which time he got the Natalizumab. (04 Aug 2019 14:38)          MEDICATIONS    chlorhexidine 4% Liquid 1 Application(s) Topical <User Schedule>  enoxaparin Injectable 40 milliGRAM(s) SubCutaneous daily  nicotine -  14 mG/24Hr(s) Patch 1 patch Transdermal daily  oxyCODONE    IR 20 milliGRAM(s) Oral every 6 hours PRN  oxyCODONE  ER Tablet 80 milliGRAM(s) Oral every 12 hours  rOPINIRole 2 milliGRAM(s) Oral daily  zolpidem 5 milliGRAM(s) Oral at bedtime PRN  zolpidem 5 milliGRAM(s) Oral at bedtime PRN      PAST MEDICAL & SURGICAL HISTORY:  Hypertension  Chronic back pain  Multiple sclerosis       Family history: No history of dementia, strokes, or seizures   FAMILY HISTORY:  No pertinent family history in first degree relatives    SOCIAL HISTORY --     Allergies    penicillin (Unknown)    Intolerances        Height (cm): 195.6 (08-04 @ 19:08)  Weight (kg): 77.111 (08-04 @ 19:08)  BMI (kg/m2): 20.2 (08-04 @ 19:08)    Vital Signs Last 24 Hrs  T(C): 35.9 (05 Aug 2019 06:17), Max: 36.4 (04 Aug 2019 16:07)  T(F): 96.6 (05 Aug 2019 06:17), Max: 97.6 (04 Aug 2019 16:07)  HR: 54 (05 Aug 2019 06:17) (54 - 72)  BP: 160/90 (05 Aug 2019 06:17) (158/112 - 176/109)  BP(mean): --  RR: 18 (05 Aug 2019 06:17) (18 - 19)  SpO2: 95% (04 Aug 2019 19:08) (95% - 99%)    WEAKNESS  ^WEAKNESS  No pertinent family history in first degree relatives  Handoff  MEWS Score  Hypertension  Chronic back pain  Multiple sclerosis  Weakness  WEAKNESS  90+  Multiple sclerosis      LABS:  CBC Full  -  ( 05 Aug 2019 05:34 )  WBC Count : 8.73 K/uL  RBC Count : 5.16 M/uL  Hemoglobin : 15.1 g/dL  Hematocrit : 44.8 %  Platelet Count - Automated : 263 K/uL      08-05    141  |  101  |  29<H>  ----------------------------<  78  4.1   |  32  |  0.8    Ca    9.6      05 Aug 2019 05:34  Phos  3.6     08-05    TPro  7.3  /  Alb  4.8  /  TBili  0.4  /  DBili  x   /  AST  18  /  ALT  13  /  AlkPhos  78  08-05

## 2019-08-05 NOTE — MEDICAL STUDENT PROGRESS NOTE(EDUCATION) - SUBJECTIVE AND OBJECTIVE BOX
KEVIN MEDRANO  49y  Male      Patient is a 49y old male with a PMH of HTN, multiple sclerosis, and chronic back pain who presented to the ED after a fall due to lower extremity weakness and gait imbalance (05 Aug 2019 09:35). He reports having a history of lower extremity weakness and uses a cane to ambulate. He noticed increased weakness yesterday after a long walk (longer walk than normal).  Patient states that when he got to his apartment building after the walk he fell to his knees. His neighbor insisted on calling 911 to take patient to ED and patient agreed because he and his wife were arguing and needed space from her.  Patient stated this has happened to him before when he over-exerts himself. He admits to back pain and an episode of vomiting last night after being admitted. Patient also admits to bowel and bladder dysfunction but the severity of dysfunction has not changed.  He denies weight loss, fever, chills, headaches, eye pain, and sensory changes. The patient states his last MS flare was about 4-5 years ago.  He reports not routinely followed-up with neurology and has not been taking Natalizumab in several years.    Last seen by Dr Bledsoe 2-3 years ago at which time he got the Natalizumab. (04 Aug 2019 14:38)      INTERVAL HPI/OVERNIGHT EVENTS:    This morning, patient stated he did not have lower extremity weakness (more than usual) and that his main complaint is lower back pain. L3-L4 spinous processes were tender to touch and did not radiate. Patient was able to get up from bed and walk to bathroom and back without any issues.  Lower extremity exam was normal, however patient said it was a little harder to extend lower leg against resistance.    REVIEW OF SYSTEMS:  CONSTITUTIONAL: No fever, weight loss, or fatigue  EYES: No eye pain, visual disturbances, or discharge  ENMT:  No difficulty hearing, tinnitus; No sinus or throat pain  NECK: No pain or stiffness  RESPIRATORY: No cough, wheezing, chills; No shortness of breath  CARDIOVASCULAR: No chest pain, palpitations, dizziness, or leg swelling  GASTROINTESTINAL: No abdominal or epigastric pain. Admits to one episode of vomiting   GENITOURINARY: admits to difficulty urinating  NEUROLOGICAL: No headaches, loss of strength, numbness  MUSCULOSKELETAL: Admits to back pain; denies extremity pain,     Vital Signs Last 24 Hrs  T(C): 35.9 (05 Aug 2019 06:17), Max: 36.4 (04 Aug 2019 16:07)  T(F): 96.6 (05 Aug 2019 06:17), Max: 97.6 (04 Aug 2019 16:07)  HR: 54 (05 Aug 2019 06:17) (54 - 72)  BP: 160/90 (05 Aug 2019 06:17) (158/112 - 176/109)  BP(mean): --  RR: 18 (05 Aug 2019 06:17) (18 - 19)  SpO2: 95% (04 Aug 2019 19:08) (95% - 99%)    PHYSICAL EXAM:  GENERAL: NAD, well-groomed, well-developed  HEAD:  Atraumatic, Normocephalic  EYES: conjunctiva and sclera clear  ENMT: Moist mucous membranes, Good dentition, No lesions  NECK: Supple, No JVD, Normal thyroid  NERVOUS SYSTEM:  Alert & Oriented X3, Good concentration; Motor Strength 5/5 B/L upper and lower extremities; DTRs 2+ intact and symmetric  CHEST/LUNG: Clear to auscultation bilaterally; No rales, rhonchi, wheezing, or rubs  HEART: Regular rate and rhythm; No murmurs, rubs, or gallops  ABDOMEN: Soft, Nontender, Nondistended; Bowel sounds present  EXTREMITIES:  2+ Peripheral Pulses, No clubbing, cyanosis, or edema    Consultant(s) Notes Reviewed:  [x ] YES  [ ] NO  Care Discussed with Consultants/Other Providers [ x] YES  [ ] NO    LABS:                        15.1   8.73  )-----------( 263      ( 05 Aug 2019 05:34 )             44.8     08-05    141  |  101  |  29<H>  ----------------------------<  78  4.1   |  32  |  0.8    Ca    9.6      05 Aug 2019 05:34  Phos  3.6     08-05    TPro  7.3  /  Alb  4.8  /  TBili  0.4  /  DBili  x   /  AST  18  /  ALT  13  /  AlkPhos  78  08-05          CAPILLARY BLOOD GLUCOSE          RADIOLOGY & ADDITIONAL TESTS:    Imaging Personally Reviewed:  [ ] YES  [ ] NO KEVIN MEDRANO  49y  Male      Patient is a 49y old male with a PMH of HTN, multiple sclerosis, and chronic back pain who presented to the ED after a fall due to lower extremity weakness and gait imbalance (05 Aug 2019 09:35). He reports having a history of lower extremity weakness and uses a cane to ambulate. He noticed increased weakness yesterday after a long walk (longer walk than normal).  Patient states that when he got to his apartment building after the walk he fell to his knees. His neighbor insisted on calling 911 to take patient to ED and patient agreed because he and his wife were arguing and needed space from her.  Patient stated that he's fallen due to LE weakness before when he over-exerts himself. He admits to back pain and an episode of vomiting last night after being admitted. Patient also admits to bowel and bladder dysfunction but the severity of dysfunction has not changed.  He denies weight loss, fever, chills, headaches, eye pain, and sensory changes. The patient states his last MS flare was about 4-5 years ago.  He reports not routinely followed-up with neurology and has not been taking Natalizumab in several years.    Last seen by Dr Bledsoe 2-3 years ago at which time he got the Natalizumab. (04 Aug 2019 14:38)      INTERVAL HPI/OVERNIGHT EVENTS:    This morning, patient stated he did not have lower extremity weakness (more than usual) and that his main complaint is lower back pain. L3-L4 spinous processes were tender to touch and did not radiate. Patient was able to get up from bed and walk to bathroom and back without any issues.  Lower extremity exam was normal, however patient said it was a little harder to extend lower leg against resistance.    REVIEW OF SYSTEMS:  CONSTITUTIONAL: No fever, weight loss, or fatigue  EYES: No eye pain, visual disturbances, or discharge  ENMT:  No difficulty hearing, tinnitus; No sinus or throat pain  NECK: No pain or stiffness  RESPIRATORY: No cough, wheezing, chills; No shortness of breath  CARDIOVASCULAR: No chest pain, palpitations, dizziness, or leg swelling  GASTROINTESTINAL: No abdominal or epigastric pain. Admits to one episode of vomiting   GENITOURINARY: admits to difficulty urinating  NEUROLOGICAL: No headaches, loss of strength, numbness  MUSCULOSKELETAL: Admits to back pain; denies extremity pain,     Vital Signs Last 24 Hrs  T(C): 35.9 (05 Aug 2019 06:17), Max: 36.4 (04 Aug 2019 16:07)  T(F): 96.6 (05 Aug 2019 06:17), Max: 97.6 (04 Aug 2019 16:07)  HR: 54 (05 Aug 2019 06:17) (54 - 72)  BP: 160/90 (05 Aug 2019 06:17) (158/112 - 176/109)  BP(mean): --  RR: 18 (05 Aug 2019 06:17) (18 - 19)  SpO2: 95% (04 Aug 2019 19:08) (95% - 99%)    PHYSICAL EXAM:  GENERAL: NAD, well-groomed, well-developed  HEAD:  Atraumatic, Normocephalic  EYES: conjunctiva and sclera clear  ENMT: Moist mucous membranes, Good dentition, No lesions  NECK: Supple, No JVD, Normal thyroid  NERVOUS SYSTEM:  Alert & Oriented X3, Good concentration; Motor Strength 5/5 B/L upper and lower extremities; DTRs 2+ intact and symmetric  CHEST/LUNG: Clear to auscultation bilaterally; No rales, rhonchi, wheezing, or rubs  HEART: Regular rate and rhythm; No murmurs, rubs, or gallops  ABDOMEN: Soft, Nontender, Nondistended; Bowel sounds present  EXTREMITIES:  2+ Peripheral Pulses, No clubbing, cyanosis, or edema    Consultant(s) Notes Reviewed:  [x ] YES  [ ] NO  Care Discussed with Consultants/Other Providers [ x] YES  [ ] NO    LABS:                        15.1   8.73  )-----------( 263      ( 05 Aug 2019 05:34 )             44.8     08-05    141  |  101  |  29<H>  ----------------------------<  78  4.1   |  32  |  0.8    Ca    9.6      05 Aug 2019 05:34  Phos  3.6     08-05    TPro  7.3  /  Alb  4.8  /  TBili  0.4  /  DBili  x   /  AST  18  /  ALT  13  /  AlkPhos  78  08-05          CAPILLARY BLOOD GLUCOSE          RADIOLOGY & ADDITIONAL TESTS:    Imaging Personally Reviewed:  [ ] YES  [ ] NO KEVIN MEDRANO  49y  Male      Patient is a 49y old male with a PMH of HTN, multiple sclerosis, and chronic back pain who presented to the ED after a fall due to lower extremity weakness and gait imbalance (05 Aug 2019 09:35). He reports having a history of lower extremity weakness and uses a cane to ambulate. He noticed increased weakness yesterday after a long walk (longer walk than normal).  Patient states that when he got to his apartment building after the walk he fell to his knees. His neighbor insisted on calling 911 to take patient to ED and patient agreed because he and his wife were arguing and needed space from her.  Patient stated that he's fallen due to LE weakness before when he over-exerts himself. He admits to back pain and an episode of vomiting last night after being admitted. Patient also admits to bowel and bladder dysfunction but the severity of dysfunction has not changed.  He denies weight loss, fever, chills, headaches, eye pain, and sensory changes. The patient states his last MS flare was about 4-5 years ago.  He reports not routinely followed-up with neurology and has not been taking Natalizumab in several years.    Last seen by Dr Bledsoe 2-3 years ago at which time he got the Natalizumab. (04 Aug 2019 14:38)      INTERVAL HPI/OVERNIGHT EVENTS:    This morning, patient stated he did not have lower extremity weakness (more than usual) and that his main complaint is lower back pain. L3-L4 spinous processes were tender to touch and did not radiate. Patient was able to get up from bed and walk to bathroom and back without any issues.  Lower extremity exam was normal, however patient said it was a little harder to extend lower leg against resistance.    REVIEW OF SYSTEMS:  CONSTITUTIONAL: No fever, weight loss, or fatigue  EYES: No eye pain, visual disturbances, or discharge  ENMT:  No difficulty hearing, tinnitus; No sinus or throat pain  NECK: No pain or stiffness  RESPIRATORY: No cough, wheezing, chills; No shortness of breath  CARDIOVASCULAR: No chest pain, palpitations, dizziness, or leg swelling  GASTROINTESTINAL: No abdominal or epigastric pain. Admits to one episode of vomiting   GENITOURINARY: admits to difficulty urinating  NEUROLOGICAL: No headaches, loss of strength, numbness  MUSCULOSKELETAL: Admits to back pain; denies extremity pain,     Past Medical History  HTN  Multiple Sclerosis 2000    Hospitalizations  Tonsillectomy  MS flare up 4-5 years ago    Surgeries  Tonsillectomy      Family history  no significant family history    Medications  Gabapentin  Ambien  Ropinerole  Tizanidine    Allergies  penicillin- unsure of what happens    Social history  Diet: no restrictions; avoids eating seafood  Exercise: walks   Drugs: occasional marijuana  Alcohol: none  Tobacco: 33 pack year history   Occupation: none  Sexual: , two children    Vital Signs Last 24 Hrs  T(C): 35.9 (05 Aug 2019 06:17), Max: 36.4 (04 Aug 2019 16:07)  T(F): 96.6 (05 Aug 2019 06:17), Max: 97.6 (04 Aug 2019 16:07)  HR: 54 (05 Aug 2019 06:17) (54 - 72)  BP: 160/90 (05 Aug 2019 06:17) (158/112 - 176/109)  BP(mean): --  RR: 18 (05 Aug 2019 06:17) (18 - 19)  SpO2: 95% (04 Aug 2019 19:08) (95% - 99%)    PHYSICAL EXAM:  GENERAL: NAD, well-groomed, well-developed  HEAD:  Atraumatic, Normocephalic  EYES: conjunctiva and sclera clear  ENMT: Moist mucous membranes, Good dentition, No lesions  NECK: Supple, No JVD, Normal thyroid  NERVOUS SYSTEM:  Alert & Oriented X3, Good concentration; Motor Strength 5/5 B/L upper and lower extremities; DTRs 2+ intact and symmetric  CHEST/LUNG: Clear to auscultation bilaterally; No rales, rhonchi, wheezing, or rubs  HEART: Regular rate and rhythm; No murmurs, rubs, or gallops  ABDOMEN: Soft, Nontender, Nondistended; Bowel sounds present  EXTREMITIES:  2+ Peripheral Pulses, No clubbing, cyanosis, or edema    Consultant(s) Notes Reviewed:  [x ] YES  [ ] NO  Care Discussed with Consultants/Other Providers [ x] YES  [ ] NO    LABS:                        15.1   8.73  )-----------( 263      ( 05 Aug 2019 05:34 )             44.8     08-05    141  |  101  |  29<H>  ----------------------------<  78  4.1   |  32  |  0.8    Ca    9.6      05 Aug 2019 05:34  Phos  3.6     08-05    TPro  7.3  /  Alb  4.8  /  TBili  0.4  /  DBili  x   /  AST  18  /  ALT  13  /  AlkPhos  78  08-05          CAPILLARY BLOOD GLUCOSE          RADIOLOGY & ADDITIONAL TESTS:    MRI of head with and without contrast: pending  MRI of cervical and thoracic with and without contrast: pending  Imaging Personally Reviewed:  [ ] YES  [ ] NO KEVIN MEDRANO  49y  Male      Patient is a 49y old male with a PMH of HTN, multiple sclerosis, and chronic back pain who presented to the ED after a fall due to lower extremity weakness and gait imbalance (05 Aug 2019 09:35). He reports having a history of lower extremity weakness and uses a cane to ambulate. He noticed increased weakness yesterday after a long walk (longer walk than normal).  Patient states that when he got to his apartment building after the walk he fell to his knees. His neighbor insisted on calling 911 to take patient to ED and patient agreed because he and his wife were arguing and needed space from her.  Patient stated that he's fallen due to LE weakness before when he over-exerts himself. He admits to back pain and an episode of vomiting last night after being admitted. Patient also admits to bowel and bladder dysfunction but the severity of dysfunction has not changed.  He denies weight loss, fever, chills, headaches, eye pain, and sensory changes. The patient states his last MS flare was about 4-5 years ago.  He reports his last dose of Natalizumab was May 2019.           INTERVAL HPI/OVERNIGHT EVENTS:    This morning, patient stated he did not have lower extremity weakness (more than usual) and that his main complaint is lower back pain. L3-L4 spinous processes were tender to touch and did not radiate. Patient was able to get up from bed and walk to bathroom and back without any issues.  Lower extremity exam was normal, however patient said it was a little harder to extend lower leg against resistance.    REVIEW OF SYSTEMS:  CONSTITUTIONAL: No fever, weight loss, or fatigue  EYES: No eye pain, visual disturbances, or discharge  ENMT:  No difficulty hearing, tinnitus; No sinus or throat pain  NECK: No pain or stiffness  RESPIRATORY: No cough, wheezing, chills; No shortness of breath  CARDIOVASCULAR: No chest pain, palpitations, dizziness, or leg swelling  GASTROINTESTINAL: No abdominal or epigastric pain. Admits to one episode of vomiting   GENITOURINARY: admits to difficulty urinating  NEUROLOGICAL: No headaches, loss of strength, numbness  MUSCULOSKELETAL: Admits to back pain; denies extremity pain,     Past Medical History  HTN  Multiple Sclerosis 2000    Hospitalizations  Tonsillectomy  MS flare up 4-5 years ago    Surgeries  Tonsillectomy    Family history  no significant family history    Medications  doxapin: 10 mg, 1 at bedtime  oxycontin: 80 mg, 2x day  Oxycodon: 20 mg q6 PRN  tizanidine 4 mg 3x day  Ambien 10 mg, 1 at bedtime  Ropinerole 2 mg, 2x day  Gabapentin 600 mg 3xday    Allergies  penicillin- unsure of what happens    Social history  Diet: no restrictions; avoids eating seafood  Exercise: walks   Drugs: occasional marijuana  Alcohol: none  Tobacco: 33 pack year history   Occupation: none  Sexual: , two children    Vital Signs Last 24 Hrs  T(C): 35.9 (05 Aug 2019 06:17), Max: 36.4 (04 Aug 2019 16:07)  T(F): 96.6 (05 Aug 2019 06:17), Max: 97.6 (04 Aug 2019 16:07)  HR: 54 (05 Aug 2019 06:17) (54 - 72)  BP: 160/90 (05 Aug 2019 06:17) (158/112 - 176/109)  BP(mean): --  RR: 18 (05 Aug 2019 06:17) (18 - 19)  SpO2: 95% (04 Aug 2019 19:08) (95% - 99%)    PHYSICAL EXAM:  GENERAL: NAD, well-groomed, well-developed  HEAD:  Atraumatic, Normocephalic  EYES: conjunctiva and sclera clear  ENMT: Moist mucous membranes, Good dentition, No lesions  NECK: Supple, No JVD, Normal thyroid  NERVOUS SYSTEM:  Alert & Oriented X3, Good concentration; Motor Strength 5/5 B/L upper and lower extremities; DTRs 2+ intact and symmetric  CHEST/LUNG: Clear to auscultation bilaterally; No rales, rhonchi, wheezing, or rubs  HEART: Regular rate and rhythm; No murmurs, rubs, or gallops  ABDOMEN: Soft, Nontender, Nondistended; Bowel sounds present  EXTREMITIES:  2+ Peripheral Pulses, No clubbing, cyanosis, or edema    Consultant(s) Notes Reviewed:  [x ] YES  [ ] NO  Care Discussed with Consultants/Other Providers [ x] YES  [ ] NO    LABS:                        15.1   8.73  )-----------( 263      ( 05 Aug 2019 05:34 )             44.8     08-05    141  |  101  |  29<H>  ----------------------------<  78  4.1   |  32  |  0.8    Ca    9.6      05 Aug 2019 05:34  Phos  3.6     08-05    TPro  7.3  /  Alb  4.8  /  TBili  0.4  /  DBili  x   /  AST  18  /  ALT  13  /  AlkPhos  78  08-05          CAPILLARY BLOOD GLUCOSE          RADIOLOGY & ADDITIONAL TESTS:    MRI of head with and without contrast: pending  MRI of cervical and thoracic with and without contrast: pending  Imaging Personally Reviewed:  [ ] YES  [ ] NO KEVIN MEDRANO  49y  Male      Patient is a 49y old male with a PMH of HTN, multiple sclerosis, and chronic back pain who presented to the ED after a fall due to lower extremity weakness and gait imbalance (05 Aug 2019 09:35). He reports having a history of lower extremity weakness and uses a cane to ambulate. He noticed increased weakness yesterday after a long walk (longer walk than normal).  Patient states that when he got to his apartment building after the walk he fell to his knees. His neighbor insisted on calling 911 to take patient to ED and patient agreed because he and his wife were arguing and needed space from her.  Patient stated that he's fallen due to LE weakness before when he over-exerts himself. He admits to back pain and an episode of vomiting last night after being admitted. Patient also admits to bowel and bladder dysfunction but the severity of dysfunction has not changed.  He denies weight loss, fever, chills, headaches, eye pain, and sensory changes. The patient states his last MS flare was about 4-5 years ago.  He reports his last dose of Natalizumab was May 2019.           INTERVAL HPI/OVERNIGHT EVENTS:    This morning, patient stated he did not have lower extremity weakness (more than usual) and that his main complaint is lower back pain. L3-L4 spinous processes were tender to touch and did not radiate. Patient was able to get up from bed and walk to bathroom and back without any issues.  Lower extremity exam was normal, however patient said it was a little harder to extend lower leg against resistance.    REVIEW OF SYSTEMS:  CONSTITUTIONAL: No fever, weight loss, or fatigue  EYES: No eye pain, visual disturbances, or discharge  ENMT:  No difficulty hearing, tinnitus; No sinus or throat pain  NECK: No pain or stiffness  RESPIRATORY: No cough, wheezing, chills; No shortness of breath  CARDIOVASCULAR: No chest pain, palpitations, dizziness, or leg swelling  GASTROINTESTINAL: No abdominal or epigastric pain. Admits to one episode of vomiting   GENITOURINARY: admits to difficulty urinating  NEUROLOGICAL: No headaches, loss of strength, numbness  MUSCULOSKELETAL: Admits to back pain; denies extremity pain,     Past Medical History  HTN  Multiple Sclerosis 2000    Hospitalizations  Tonsillectomy  MS flare up 4-5 years ago    Surgeries  Tonsillectomy    Family history  no significant family history    Medications  doxepin: 10 mg, 1 at bedtime  oxycontin: 80 mg, 2x day  Oxycodon: 20 mg q6 PRN  tizanidine 4 mg 3x day  Ambien 10 mg, 1 at bedtime  Ropinerole 2 mg, 2x day  Gabapentin 600 mg 3xday    Allergies  penicillin- unsure of what happens    Social history  Diet: no restrictions; avoids eating seafood  Exercise: walks   Drugs: occasional marijuana  Alcohol: none  Tobacco: 33 pack year history   Occupation: none  Sexual: , two children    Vital Signs Last 24 Hrs  T(C): 35.9 (05 Aug 2019 06:17), Max: 36.4 (04 Aug 2019 16:07)  T(F): 96.6 (05 Aug 2019 06:17), Max: 97.6 (04 Aug 2019 16:07)  HR: 54 (05 Aug 2019 06:17) (54 - 72)  BP: 160/90 (05 Aug 2019 06:17) (158/112 - 176/109)  BP(mean): --  RR: 18 (05 Aug 2019 06:17) (18 - 19)  SpO2: 95% (04 Aug 2019 19:08) (95% - 99%)    PHYSICAL EXAM:  GENERAL: NAD, well-groomed, well-developed  HEAD:  Atraumatic, Normocephalic  EYES: conjunctiva and sclera clear  ENMT: Moist mucous membranes, Good dentition, No lesions  NECK: Supple, No JVD, Normal thyroid  NERVOUS SYSTEM:  Alert & Oriented X3, Good concentration; Motor Strength 5/5 B/L upper and lower extremities; DTRs 2+ intact and symmetric  CHEST/LUNG: Clear to auscultation bilaterally; No rales, rhonchi, wheezing, or rubs  HEART: Regular rate and rhythm; No murmurs, rubs, or gallops  ABDOMEN: Soft, Nontender, Nondistended; Bowel sounds present  EXTREMITIES:  2+ Peripheral Pulses, No clubbing, cyanosis, or edema    Consultant(s) Notes Reviewed:  [x ] YES  [ ] NO  Care Discussed with Consultants/Other Providers [ x] YES  [ ] NO    LABS:                        15.1   8.73  )-----------( 263      ( 05 Aug 2019 05:34 )             44.8     08-05    141  |  101  |  29<H>  ----------------------------<  78  4.1   |  32  |  0.8    Ca    9.6      05 Aug 2019 05:34  Phos  3.6     08-05    TPro  7.3  /  Alb  4.8  /  TBili  0.4  /  DBili  x   /  AST  18  /  ALT  13  /  AlkPhos  78  08-05          CAPILLARY BLOOD GLUCOSE          RADIOLOGY & ADDITIONAL TESTS:    MRI of head with and without contrast: pending  MRI of cervical and thoracic with and without contrast: pending  Imaging Personally Reviewed:  [ ] YES  [ ] NO

## 2019-08-05 NOTE — PROGRESS NOTE ADULT - ASSESSMENT
A 49-year-old male with a PMH of HTN, relapse-remitting multiple sclerosis, and chronic back pain who presented with a 1-day history of lower extremity weakness and gait imbalance.     1. Lower extremity weakness and gait imbalance  - r/o MS flare versus worsening sciatica  - MRI brain, C-spine. and T-spine w/wo contrast ordered  - neurology consult appreciated - if MRI c/w MS flare, consider solumedrol x5 days  - continue Ropinirole 2 mg daily  - ambulate with assistance  - PT/rehab consult pending    2. Stage II HTN  - monitor BP and start anti-hypertensive medications     3. Chronic back pain  - patient's opioid medications were verified with [facility]  - continue Oxycodone IR 20 mg PO q6H PRN and Oxycontin ER 80 mg PO q12H    4. DVT prophylaxis  - LMWH daily      #Progress Note Handoff  Pending (specify):   MRI, PT, symptomatic improvement  Family discussion: Plan of care discussed with patient, aware and agreeable   Disposition:  home .

## 2019-08-05 NOTE — CONSULT NOTE ADULT - SUBJECTIVE AND OBJECTIVE BOX
HPI:  The patient is a 49-year-old male with a PMH of HTN, relapse-remitting multiple sclerosis, and chronic back pain who presented with a 1-day history of lower extremity weakness and gait imbalance.  He reports having a history of lower extremity weakness and he uses a cane to ambulate at baseline.  However, he noticed increased weakness yesterday.  Otherwise, he denied having fever, chills, headache, dizziness, lightheadedness, blurry vision, eye pain, sensory changes, bowel or bladder dysfunction.  The patient states his last MS flare was about 4-5 years ago.  He reports not routinely followed-up with neurology and has not been taking Natalizumab in several years.    Last seen by Dr Bledsoe 2-3 years ago at which time he got the Natalizumab. (04 Aug 2019 14:38)      PAST MEDICAL & SURGICAL HISTORY:  Hypertension  Chronic back pain  Multiple sclerosis      Hospital Course:    TODAY'S SUBJECTIVE & REVIEW OF SYMPTOMS:     Constitutional WNL   Cardio WNL   Resp WNL   GI WNL  Heme WNL  Endo WNL  Skin WNL  MSK Weakness  Neuro WNL  Cognitive WNL  Psych WNL      MEDICATIONS  (STANDING):  amLODIPine   Tablet 5 milliGRAM(s) Oral daily  chlorhexidine 4% Liquid 1 Application(s) Topical <User Schedule>  docusate sodium 100 milliGRAM(s) Oral two times a day  enoxaparin Injectable 40 milliGRAM(s) SubCutaneous daily  nicotine -  14 mG/24Hr(s) Patch 1 patch Transdermal daily  oxyCODONE  ER Tablet 80 milliGRAM(s) Oral every 12 hours  rOPINIRole 2 milliGRAM(s) Oral daily  senna 2 Tablet(s) Oral at bedtime    MEDICATIONS  (PRN):  hydrALAZINE 10 milliGRAM(s) Oral every 8 hours PRN Systolic blood pressure > 170  oxyCODONE    IR 20 milliGRAM(s) Oral every 6 hours PRN Severe Pain (7 - 10)  zolpidem 5 milliGRAM(s) Oral at bedtime PRN Insomnia  zolpidem 5 milliGRAM(s) Oral at bedtime PRN Insomnia      FAMILY HISTORY:  No pertinent family history in first degree relatives      Allergies    penicillin (Unknown)    Intolerances        SOCIAL HISTORY:    [  ] Etoh  [  ] Smoking  [  ] Substance abuse     Home Environment:  [  ] Home Alone  [x  ] Lives with Family  [  ] Home Health Aid    Dwelling:  [  ] Apartment  [x  ] Private House  [  ] Adult Home  [  ] Skilled Nursing Facility      [  ] Short Term  [  ] Long Term  [  ] Stairs       Elevator [  ]    FUNCTIONAL STATUS PTA: (Check all that apply)  Ambulation: [x   ]Independent    [  ] Dependent     [  ] Non-Ambulatory  Assistive Device: [ x ] SA Cane  [  ]  Q Cane  [  ] Walker  [  ]  Wheelchair  ADL : [x  ] Independent  [  ]  Dependent       Vital Signs Last 24 Hrs  T(C): 36.1 (05 Aug 2019 14:19), Max: 36.4 (04 Aug 2019 19:08)  T(F): 96.9 (05 Aug 2019 14:19), Max: 97.5 (04 Aug 2019 19:08)  HR: 60 (05 Aug 2019 14:24) (45 - 72)  BP: 173/108 (05 Aug 2019 14:24) (158/112 - 192/105)  BP(mean): --  RR: 18 (05 Aug 2019 14:24) (18 - 19)  SpO2: 95% (04 Aug 2019 19:08) (95% - 95%)      PHYSICAL EXAM: Alert & Oriented X3  GENERAL: NAD, well-groomed, well-developed  HEAD:  Atraumatic, Normocephalic  CHEST/LUNG: Clear   HEART: S1S2+  ABDOMEN: Soft, Nontender  EXTREMITIES:  no calf tenderness    NERVOUS SYSTEM:  Cranial Nerves 2-12 intact [  ] Abnormal  [  ]  ROM: WFL all extremities [x  ]  Abnormal [  ]  Motor Strength: WFL all extremities  [  ]  Abnormal [ x ]4/5 aixa le  Sensation: intact to light touch [  ] Abnormal [  ]  Reflexes: Symmetric [  ]  Abnormal [  ]    FUNCTIONAL STATUS:  Bed Mobility: Independent [  ]  Supervision [ x ]  Needs Assistance [  ]  N/A [  ]  Transfers: Independent [  ]  Supervision [x  ]  Needs Assistance [  ]  N/A [  ]   Ambulation: Independent [  ]  Supervision [  ]  Needs Assistance [  ]  N/A [  ]  ADL: Independent [  ] Requires Assistance [  ] N/A [  ]      LABS:                        15.1   8.73  )-----------( 263      ( 05 Aug 2019 05:34 )             44.8     08-05    141  |  101  |  29<H>  ----------------------------<  78  4.1   |  32  |  0.8    Ca    9.6      05 Aug 2019 05:34  Phos  3.6     08-05    TPro  7.3  /  Alb  4.8  /  TBili  0.4  /  DBili  x   /  AST  18  /  ALT  13  /  AlkPhos  78  08-05          RADIOLOGY & ADDITIONAL STUDIES:    Assesment:

## 2019-08-06 LAB
AMPHET UR-MCNC: NEGATIVE — SIGNIFICANT CHANGE UP
ANION GAP SERPL CALC-SCNC: 6 MMOL/L — LOW (ref 7–14)
APPEARANCE UR: ABNORMAL
BARBITURATES UR SCN-MCNC: NEGATIVE — SIGNIFICANT CHANGE UP
BASOPHILS # BLD AUTO: 0.03 K/UL — SIGNIFICANT CHANGE UP (ref 0–0.2)
BASOPHILS NFR BLD AUTO: 0.4 % — SIGNIFICANT CHANGE UP (ref 0–1)
BENZODIAZ UR-MCNC: NEGATIVE — SIGNIFICANT CHANGE UP
BILIRUB UR-MCNC: NEGATIVE — SIGNIFICANT CHANGE UP
BUN SERPL-MCNC: 16 MG/DL — SIGNIFICANT CHANGE UP (ref 10–20)
CALCIUM SERPL-MCNC: 9.1 MG/DL — SIGNIFICANT CHANGE UP (ref 8.5–10.1)
CHLORIDE SERPL-SCNC: 100 MMOL/L — SIGNIFICANT CHANGE UP (ref 98–110)
CO2 SERPL-SCNC: 33 MMOL/L — HIGH (ref 17–32)
COCAINE METAB.OTHER UR-MCNC: POSITIVE
COLOR SPEC: YELLOW — SIGNIFICANT CHANGE UP
CREAT SERPL-MCNC: 0.9 MG/DL — SIGNIFICANT CHANGE UP (ref 0.7–1.5)
DIFF PNL FLD: NEGATIVE — SIGNIFICANT CHANGE UP
EOSINOPHIL # BLD AUTO: 0.1 K/UL — SIGNIFICANT CHANGE UP (ref 0–0.7)
EOSINOPHIL NFR BLD AUTO: 1.2 % — SIGNIFICANT CHANGE UP (ref 0–8)
GLUCOSE SERPL-MCNC: 112 MG/DL — HIGH (ref 70–99)
GLUCOSE UR QL: NEGATIVE — SIGNIFICANT CHANGE UP
HCT VFR BLD CALC: 41.4 % — LOW (ref 42–52)
HGB BLD-MCNC: 14 G/DL — SIGNIFICANT CHANGE UP (ref 14–18)
IMM GRANULOCYTES NFR BLD AUTO: 0.4 % — HIGH (ref 0.1–0.3)
KETONES UR-MCNC: NEGATIVE — SIGNIFICANT CHANGE UP
LEUKOCYTE ESTERASE UR-ACNC: NEGATIVE — SIGNIFICANT CHANGE UP
LYMPHOCYTES # BLD AUTO: 1.91 K/UL — SIGNIFICANT CHANGE UP (ref 1.2–3.4)
LYMPHOCYTES # BLD AUTO: 22.4 % — SIGNIFICANT CHANGE UP (ref 20.5–51.1)
MCHC RBC-ENTMCNC: 29.4 PG — SIGNIFICANT CHANGE UP (ref 27–31)
MCHC RBC-ENTMCNC: 33.8 G/DL — SIGNIFICANT CHANGE UP (ref 32–37)
MCV RBC AUTO: 87 FL — SIGNIFICANT CHANGE UP (ref 80–94)
METHADONE UR-MCNC: NEGATIVE — SIGNIFICANT CHANGE UP
MONOCYTES # BLD AUTO: 0.96 K/UL — HIGH (ref 0.1–0.6)
MONOCYTES NFR BLD AUTO: 11.3 % — HIGH (ref 1.7–9.3)
NEUTROPHILS # BLD AUTO: 5.48 K/UL — SIGNIFICANT CHANGE UP (ref 1.4–6.5)
NEUTROPHILS NFR BLD AUTO: 64.3 % — SIGNIFICANT CHANGE UP (ref 42.2–75.2)
NITRITE UR-MCNC: NEGATIVE — SIGNIFICANT CHANGE UP
NRBC # BLD: 0 /100 WBCS — SIGNIFICANT CHANGE UP (ref 0–0)
OPIATES UR-MCNC: NEGATIVE — SIGNIFICANT CHANGE UP
PCP SPEC-MCNC: SIGNIFICANT CHANGE UP
PH UR: 7.5 — SIGNIFICANT CHANGE UP (ref 5–8)
PLATELET # BLD AUTO: 227 K/UL — SIGNIFICANT CHANGE UP (ref 130–400)
POTASSIUM SERPL-MCNC: 4.4 MMOL/L — SIGNIFICANT CHANGE UP (ref 3.5–5)
POTASSIUM SERPL-SCNC: 4.4 MMOL/L — SIGNIFICANT CHANGE UP (ref 3.5–5)
PROPOXYPHENE QUALITATIVE URINE RESULT: NEGATIVE — SIGNIFICANT CHANGE UP
PROT UR-MCNC: 30
RBC # BLD: 4.76 M/UL — SIGNIFICANT CHANGE UP (ref 4.7–6.1)
RBC # FLD: 13.7 % — SIGNIFICANT CHANGE UP (ref 11.5–14.5)
SODIUM SERPL-SCNC: 139 MMOL/L — SIGNIFICANT CHANGE UP (ref 135–146)
SP GR SPEC: >=1.03 — SIGNIFICANT CHANGE UP (ref 1.01–1.03)
UROBILINOGEN FLD QL: 1 (ref 0.2–0.2)
WBC # BLD: 8.51 K/UL — SIGNIFICANT CHANGE UP (ref 4.8–10.8)
WBC # FLD AUTO: 8.51 K/UL — SIGNIFICANT CHANGE UP (ref 4.8–10.8)

## 2019-08-06 PROCEDURE — 99233 SBSQ HOSP IP/OBS HIGH 50: CPT

## 2019-08-06 PROCEDURE — 72156 MRI NECK SPINE W/O & W/DYE: CPT | Mod: 26

## 2019-08-06 PROCEDURE — 72157 MRI CHEST SPINE W/O & W/DYE: CPT | Mod: 26

## 2019-08-06 RX ORDER — HYDRALAZINE HCL 50 MG
25 TABLET ORAL EVERY 8 HOURS
Refills: 0 | Status: DISCONTINUED | OUTPATIENT
Start: 2019-08-06 | End: 2019-08-07

## 2019-08-06 RX ORDER — NIFEDIPINE 30 MG
30 TABLET, EXTENDED RELEASE 24 HR ORAL DAILY
Refills: 0 | Status: DISCONTINUED | OUTPATIENT
Start: 2019-08-06 | End: 2019-08-07

## 2019-08-06 RX ORDER — KETOROLAC TROMETHAMINE 30 MG/ML
15 SYRINGE (ML) INJECTION ONCE
Refills: 0 | Status: DISCONTINUED | OUTPATIENT
Start: 2019-08-06 | End: 2019-08-06

## 2019-08-06 RX ORDER — AMLODIPINE BESYLATE 2.5 MG/1
5 TABLET ORAL ONCE
Refills: 0 | Status: COMPLETED | OUTPATIENT
Start: 2019-08-06 | End: 2019-08-06

## 2019-08-06 RX ORDER — AMLODIPINE BESYLATE 2.5 MG/1
10 TABLET ORAL DAILY
Refills: 0 | Status: DISCONTINUED | OUTPATIENT
Start: 2019-08-06 | End: 2019-08-06

## 2019-08-06 RX ADMIN — OXYCODONE HYDROCHLORIDE 20 MILLIGRAM(S): 5 TABLET ORAL at 08:08

## 2019-08-06 RX ADMIN — Medication 25 MILLIGRAM(S): at 05:22

## 2019-08-06 RX ADMIN — Medication 30 MILLIGRAM(S): at 11:19

## 2019-08-06 RX ADMIN — Medication 1 PATCH: at 11:20

## 2019-08-06 RX ADMIN — AMLODIPINE BESYLATE 5 MILLIGRAM(S): 2.5 TABLET ORAL at 01:12

## 2019-08-06 RX ADMIN — Medication 25 MILLIGRAM(S): at 13:52

## 2019-08-06 RX ADMIN — OXYCODONE HYDROCHLORIDE 80 MILLIGRAM(S): 5 TABLET ORAL at 05:22

## 2019-08-06 RX ADMIN — OXYCODONE HYDROCHLORIDE 80 MILLIGRAM(S): 5 TABLET ORAL at 17:37

## 2019-08-06 RX ADMIN — ZOLPIDEM TARTRATE 5 MILLIGRAM(S): 10 TABLET ORAL at 21:17

## 2019-08-06 RX ADMIN — OXYCODONE HYDROCHLORIDE 80 MILLIGRAM(S): 5 TABLET ORAL at 18:27

## 2019-08-06 RX ADMIN — SENNA PLUS 2 TABLET(S): 8.6 TABLET ORAL at 21:18

## 2019-08-06 RX ADMIN — ROPINIROLE 2 MILLIGRAM(S): 8 TABLET, FILM COATED, EXTENDED RELEASE ORAL at 11:20

## 2019-08-06 RX ADMIN — LISINOPRIL 10 MILLIGRAM(S): 2.5 TABLET ORAL at 03:36

## 2019-08-06 RX ADMIN — Medication 15 MILLIGRAM(S): at 15:33

## 2019-08-06 RX ADMIN — AMLODIPINE BESYLATE 10 MILLIGRAM(S): 2.5 TABLET ORAL at 05:21

## 2019-08-06 RX ADMIN — OXYCODONE HYDROCHLORIDE 80 MILLIGRAM(S): 5 TABLET ORAL at 06:29

## 2019-08-06 RX ADMIN — OXYCODONE HYDROCHLORIDE 20 MILLIGRAM(S): 5 TABLET ORAL at 21:24

## 2019-08-06 RX ADMIN — Medication 15 MILLIGRAM(S): at 17:00

## 2019-08-06 RX ADMIN — ENOXAPARIN SODIUM 40 MILLIGRAM(S): 100 INJECTION SUBCUTANEOUS at 11:19

## 2019-08-06 RX ADMIN — OXYCODONE HYDROCHLORIDE 20 MILLIGRAM(S): 5 TABLET ORAL at 08:55

## 2019-08-06 RX ADMIN — Medication 100 MILLIGRAM(S): at 17:37

## 2019-08-06 RX ADMIN — Medication 1 PATCH: at 06:28

## 2019-08-06 RX ADMIN — LISINOPRIL 10 MILLIGRAM(S): 2.5 TABLET ORAL at 17:37

## 2019-08-06 RX ADMIN — Medication 25 MILLIGRAM(S): at 21:18

## 2019-08-06 RX ADMIN — OXYCODONE HYDROCHLORIDE 20 MILLIGRAM(S): 5 TABLET ORAL at 13:52

## 2019-08-06 RX ADMIN — Medication 100 MILLIGRAM(S): at 05:22

## 2019-08-06 RX ADMIN — Medication 1 PATCH: at 18:26

## 2019-08-06 RX ADMIN — CHLORHEXIDINE GLUCONATE 1 APPLICATION(S): 213 SOLUTION TOPICAL at 05:21

## 2019-08-06 RX ADMIN — OXYCODONE HYDROCHLORIDE 20 MILLIGRAM(S): 5 TABLET ORAL at 23:19

## 2019-08-06 RX ADMIN — OXYCODONE HYDROCHLORIDE 20 MILLIGRAM(S): 5 TABLET ORAL at 15:27

## 2019-08-06 NOTE — PHYSICAL THERAPY INITIAL EVALUATION ADULT - GENERAL OBSERVATIONS, REHAB EVAL
11:10-11:31 Pt encountered standing at in hallway in NAD with st. cane Pt requesting to ambulate outside to smoke.

## 2019-08-06 NOTE — PHYSICAL THERAPY INITIAL EVALUATION ADULT - GAIT DEVIATIONS NOTED, PT EVAL
unsteady at times varying step lengths, B knees flexed (pt reports baseline for knee flexion )   PT offered pt a RW for improved safety, but pt declined  reporting it does not allow him to use his BUE in case of fall

## 2019-08-06 NOTE — MEDICAL STUDENT PROGRESS NOTE(EDUCATION) - NS MD HP STUD ASPLAN PLAN FT
#Lower extremity weakness and gait imbalance  - r/o MS flare versus worsening sciatica  - Neuro consult: MRI brain: No enhancing lesions to suggest active demyelination.  - C-spine. and T-spine w/wo contrast: pending   - Missed Tysabri (natalizumab) - last one in May 2019 (suppose to be qmonth).     If there are new lesions - start 1 g IV Solumedrol qd x 5 days for MS exacerbation     Otherwise - neurologically OK to d/c w/ PT recommendations to follow w/ Dr. Bledsoe as outpt for Tysabri  - follow w/ pain management as outpt  - continue Ropinirole 2 mg daily  - ambulate with assistance  - f/u on PT/rehab consult:     #Stage II HTN  -Blood pressure was higher today with low heart rates, pt started on norvasc and hydralazine  - started patient on procardia XL, 30 mg  - monitor BP and start anti-hypertensive medications if SBP remains >/= 140 mm Hg or DBP >/= 90 mm Hg    # Chronic back pain  - patient's opioid medications were verified with I-STOP  - continue Oxycodone IR 20 mg PO q6H PRN and Oxycontin ER 80 mg PO q12H    # DVT prophylaxis  - LMWH daily    # Disposition  - anticipate discharge home once medically stable    Pharmacy: [facility]:  [phone] #Lower extremity weakness and gait imbalance  - r/o MS flare versus worsening sciatica  - Neuro consult: MRI brain: No enhancing lesions to suggest active demyelination.  - C-spine. and T-spine w/wo contrast: pending   - Missed Tysabri (natalizumab) - last one in May 2019 (suppose to be qmonth).     If there are new lesions - start 1 g IV Solumedrol qd x 5 days for MS exacerbation     Otherwise - neurologically OK to d/c w/ PT recommendations to follow w/ Dr. Bledsoe as outpt for Tysabri  - follow w/ pain management as outpt  - continue Ropinirole 2 mg daily  - ambulate with assistance  - f/u on PT/rehab consult:     #Stage II HTN  -Blood pressure was higher today with low heart rates, pt started on norvasc and hydralazine  - d/c amlodipine and started patient on procardia XL, 30 mg  - monitor BP and start anti-hypertensive medications if SBP remains >/= 140 mm Hg or DBP >/= 90 mm Hg    # Chronic back pain  - patient's opioid medications were verified with I-STOP  - continue Oxycodone IR 20 mg PO q6H PRN and Oxycontin ER 80 mg PO q12H    # DVT prophylaxis  - LMWH daily    # Disposition  - anticipate discharge home once medically stable    Pharmacy: [facility]:  [phone] #Lower extremity weakness and gait imbalance  - r/o MS flare versus worsening sciatica  - Neuro consult: MRI brain: No enhancing lesions to suggest active demyelination.  - C-spine. and T-spine w/wo contrast: pending   - Missed Tysabri (natalizumab) - last one in May 2019 (suppose to be qmonth).     If there are new lesions - start 1 g IV Solumedrol qd x 5 days for MS exacerbation     Otherwise - neurologically OK to d/c w/ PT recommendations to follow w/ Dr. Bledsoe as outpt for Tysabri  - follow w/ pain management as outpt  - continue Ropinirole 2 mg daily  - ambulate with assistance  - f/u on PT/rehab consult:     #Stage II HTN  -Blood pressure was higher today with low heart rates, pt started on norvasc and hydralazine  - d/c amlodipine and started patient on procardia XL (nifedipine), 30 mg  - monitor BP and start anti-hypertensive medications if SBP remains >/= 140 mm Hg or DBP >/= 90 mm Hg    # Chronic back pain  - patient's opioid medications were verified with I-STOP  - continue Oxycodone IR 20 mg PO q6H PRN and Oxycontin ER 80 mg PO q12H    # DVT prophylaxis  - LMWH daily    # Disposition  - anticipate discharge home once medically stable    Pharmacy: [facility]:  [phone]

## 2019-08-06 NOTE — MEDICAL STUDENT PROGRESS NOTE(EDUCATION) - SUBJECTIVE AND OBJECTIVE BOX
KEVIN MEDRANO  49y  Male      Patient is a 49y old male with a PMH of HTN, multiple sclerosis, and chronic back pain who presented to the ED after a fall due to lower extremity weakness and gait imbalance (05 Aug 2019 09:35). He reports having a history of lower extremity weakness and uses a cane to ambulate. He noticed increased weakness yesterday after a long walk (longer walk than normal).  Patient states that when he got to his apartment building after the walk he fell to his knees. His neighbor insisted on calling 911 to take patient to ED and patient agreed because he and his wife were arguing and needed space from her.  Patient stated that he's fallen due to LE weakness before when he over-exerts himself. He admits to back pain and an episode of vomiting last night after being admitted. Patient also admits to bowel and bladder dysfunction but the severity of dysfunction has not changed.  He denies weight loss, fever, chills, headaches, eye pain, and sensory changes. The patient states his last MS flare was about 4-5 years ago.  He reports his last dose of Natalizumab was May 2019.         INTERVAL HPI/OVERNIGHT EVENTS:  none    REVIEW OF SYSTEMS:  CONSTITUTIONAL: No fever, weight loss, or fatigue  EYES: No eye pain, visual disturbances, or discharge  ENMT:  No difficulty hearing, tinnitus; No sinus or throat pain  NECK: No pain or stiffness  RESPIRATORY: No cough, wheezing, chills; No shortness of breath  CARDIOVASCULAR: No chest pain, palpitations, dizziness, or leg swelling  GASTROINTESTINAL: No abdominal or epigastric pain. Admits to one episode of vomiting   GENITOURINARY: admits to difficulty urinating  NEUROLOGICAL: No headaches, loss of strength, numbness  MUSCULOSKELETAL: Admits to back pain; denies extremity pain,     Past Medical History  HTN  Multiple Sclerosis 2000    Hospitalizations  Tonsillectomy  MS flare up 4-5 years ago ([id number])    Surgeries  Tonsillectomy    Family history  no significant family history    Medications  doxepin: 10 mg, 1 at bedtime  oxycontin: 80 mg, 2x day  Oxycodon: 20 mg q6 PRN  tizanidine 4 mg 3x day  Ambien 10 mg, 1 at bedtime  Ropinerole 2 mg, 2x day  Gabapentin 600 mg 3xday    Allergies  penicillin- unsure of what happens    Social history  Diet: no restrictions; avoids eating seafood  Exercise: walks   Drugs: occasional marijuana  Alcohol: none  Tobacco: 33 pack year history   Occupation: none  Sexual: , two children      Vital Signs Last 24 Hrs  T(C): 36.1 (06 Aug 2019 05:07), Max: 36.1 (05 Aug 2019 14:19)  T(F): 96.9 (06 Aug 2019 05:07), Max: 96.9 (05 Aug 2019 14:19)  HR: 60 (06 Aug 2019 05:07) (45 - 65)  BP: 173/93 (06 Aug 2019 05:07) (173/93 - 195/110)  BP(mean): --  RR: 18 (06 Aug 2019 05:07) (18 - 18)  SpO2: --    PHYSICAL EXAM:  GENERAL: NAD, well-groomed, well-developed  HEAD:  Atraumatic, Normocephalic  EYES: conjunctiva and sclera clear  ENMT: Moist mucous membranes, Good dentition, No lesions  NECK: Supple, No JVD, Normal thyroid  NERVOUS SYSTEM:  Alert & Oriented X3, Good concentration; Motor Strength 5/5 B/L upper and lower extremities; DTRs 2+ intact and symmetric  CHEST/LUNG: Clear to auscultation bilaterally; No rales, rhonchi, wheezing, or rubs  HEART: Regular rate and rhythm; No murmurs, rubs, or gallops  ABDOMEN: Soft, Nontender, Nondistended; Bowel sounds present  EXTREMITIES:  2+ Peripheral Pulses, No clubbing, cyanosis, or edema    Consultant(s) Notes Reviewed:  [x ] YES  [ ] NO  Care Discussed with Consultants/Other Providers [ x] YES  [ ] NO    LABS:                        14.0   8.51  )-----------( 227      ( 06 Aug 2019 06:26 )             41.4     08-06    139  |  100  |  16  ----------------------------<  112<H>  4.4   |  33<H>  |  0.9    Ca    9.1      06 Aug 2019 06:26  Phos  3.6     08-05    TPro  7.3  /  Alb  4.8  /  TBili  0.4  /  DBili  x   /  AST  18  /  ALT  13  /  AlkPhos  78  08-05      Urinalysis Basic - ( 05 Aug 2019 22:40 )    Color: Yellow / Appearance: Cloudy / SG: >=1.030 / pH: x  Gluc: x / Ketone: Negative  / Bili: Negative / Urobili: 1.0   Blood: x / Protein: 30 / Nitrite: Negative   Leuk Esterase: Negative / RBC: x / WBC x   Sq Epi: x / Non Sq Epi: x / Bacteria: x        CAPILLARY BLOOD GLUCOSE          RADIOLOGY & ADDITIONAL TESTS:    MRI of head with and without contrast:   -Extensive white matter signal abnormality which is more pronounced than  on the prior study of 2016 compatible with given history of multiple sclerosis.  -No enhancing lesions to suggest active demyelination.  -Mild to moderate parenchymal volume loss which is not significantly changed.    MRI of cervical and thoracic with and without contrast: pending  Imaging Personally Reviewed:  [ ] YES  [ ] NO

## 2019-08-06 NOTE — PROGRESS NOTE ADULT - ASSESSMENT
A 49-year-old male with a PMH of HTN, relapse-remitting multiple sclerosis, and chronic back pain who presented with a 1-day history of lower extremity weakness and gait imbalance.     1. Lower extremity weakness and gait imbalance  - r/o MS flare versus worsening sciatica  - MRI brain done - no active lesions  -MRI C-spine. and T-spine w/wo contrast ordered (pt did not tolerate MRIs in 1 sitting)  - neurology consult appreciated - if MRI c/w MS flare, consider solumedrol x5 days  - continue Ropinirole 2 mg daily  - ambulate with assistance  - PT/rehab consult pending    2. Uncontrolled HTN  - BP meds adjusted    3. Chronic back pain  - patient's opioid medications were verified with I-STOP  - continue Oxycodone IR 20 mg PO q6H PRN and Oxycontin ER 80 mg PO q12H    -UTS with cocaine  -pt counselled    4. DVT prophylaxis  - LMWH daily      #Progress Note Handoff  Pending (specify):   MRIs, PT, symptomatic improvement  Family discussion: Plan of care discussed with patient, aware and agreeable   Disposition:  home .

## 2019-08-06 NOTE — PROGRESS NOTE ADULT - SUBJECTIVE AND OBJECTIVE BOX
Pt seen, examined.  Legs weakness, pain improving.  Awake, alert, speech fluent, follows commands. Motor 4/5 x 2 UE, 4-/5 x 2 LE. Positive Babinski b/l. Gait - ataxic    A/P. Worsening of LE pain, weakness. Hx SPMS. On Tysabri - missed in June and July 2019.  - follow Brain MRI, C/T spine MRIs - all w/ and w/o patrice r/o new demyelin. lesions  - if yes - get 1 g IV Solumedrol qd x 5 days for MS exacerbation  - otherwise - neurologically OK to be d/c if cleared by rehab for gait safety  - follow w/ Dr. Bledsoe (Neuro ) as outpt  Please, call if any questuions

## 2019-08-06 NOTE — PROGRESS NOTE ADULT - SUBJECTIVE AND OBJECTIVE BOX
Patient is a 49y old  Male who presents with a chief complaint of Lower extremity weakness (05 Aug 2019 16:33)    INTERVAL HPI/OVERNIGHT EVENTS: no complaints    HPI:  The patient is a 49-year-old male with a PMH of HTN, relapse-remitting multiple sclerosis, and chronic back pain who presented with a 1-day history of lower extremity weakness and gait imbalance.  He reports having a history of lower extremity weakness and he uses a cane to ambulate at baseline.  However, he noticed increased weakness yesterday.  Otherwise, he denied having fever, chills, headache, dizziness, lightheadedness, blurry vision, eye pain, sensory changes, bowel or bladder dysfunction.  The patient states his last MS flare was about 4-5 years ago.  He reports not routinely followed-up with neurology and has not been taking Natalizumab in several years.    Last seen by Dr Bledsoe 2-3 years ago at which time he got the Natalizumab. (04 Aug 2019 14:38)    WEAKNESS  ^WEAKNESS  No pertinent family history in first degree relatives  Handoff  MEWS Score  Hypertension  Chronic back pain  Multiple sclerosis  Weakness  WEAKNESS  90+  Multiple sclerosis    General: NAD, AAO3, tall, thin male  HEENT: PERRLA, EOM  CV: S1 S2  Resp: decreased breath sounds at bases  GI: NT/ND/S +BS  MS: no clubbing/cyanosis/edema, 2+ pulses b/l  Neuro: 4/5 LE, 5/5 UE 2+reflexes thruout, wide based gait        ROS: Denies CP, SOB, AP  All other systems reviewed and are within normal limits except for the complaints above.    MEDICATIONS  (STANDING):  chlorhexidine 4% Liquid 1 Application(s) Topical <User Schedule>  docusate sodium 100 milliGRAM(s) Oral two times a day  enoxaparin Injectable 40 milliGRAM(s) SubCutaneous daily  hydrALAZINE 25 milliGRAM(s) Oral every 8 hours  lisinopril 10 milliGRAM(s) Oral two times a day  nicotine -  14 mG/24Hr(s) Patch 1 patch Transdermal daily  NIFEdipine XL 30 milliGRAM(s) Oral daily  oxyCODONE  ER Tablet 80 milliGRAM(s) Oral every 12 hours  rOPINIRole 2 milliGRAM(s) Oral daily  senna 2 Tablet(s) Oral at bedtime    MEDICATIONS  (PRN):  oxyCODONE    IR 20 milliGRAM(s) Oral every 6 hours PRN Severe Pain (7 - 10)  zolpidem 5 milliGRAM(s) Oral at bedtime PRN Insomnia  zolpidem 5 milliGRAM(s) Oral at bedtime PRN Insomnia    Home Medications:  oxyCODONE 20 mg oral tablet: 1 tab(s) orally every 6 hours, As Needed (04 Aug 2019 15:34)  OxyCONTIN 80 mg oral tablet, extended release: 1 tab(s) orally every 12 hours (04 Aug 2019 15:34)  zolpidem 10 mg oral tablet: 1 tab(s) orally once a day (at bedtime) (04 Aug 2019 15:34)    Vital Signs Last 24 Hrs  T(C): 36.1 (06 Aug 2019 05:07), Max: 36.1 (05 Aug 2019 14:19)  T(F): 96.9 (06 Aug 2019 05:07), Max: 96.9 (05 Aug 2019 14:19)  HR: 60 (06 Aug 2019 05:07) (56 - 65)  BP: 173/93 (06 Aug 2019 05:07) (173/93 - 195/110)  BP(mean): --  RR: 18 (06 Aug 2019 05:07) (18 - 18)  SpO2: --  CAPILLARY BLOOD GLUCOSE        LABS:                        14.0   8.51  )-----------( 227      ( 06 Aug 2019 06:26 )             41.4     08-06    139  |  100  |  16  ----------------------------<  112<H>  4.4   |  33<H>  |  0.9    Ca    9.1      06 Aug 2019 06:26  Phos  3.6     08-05    TPro  7.3  /  Alb  4.8  /  TBili  0.4  /  DBili  x   /  AST  18  /  ALT  13  /  AlkPhos  78  08-05    LIVER FUNCTIONS - ( 05 Aug 2019 05:34 )  Alb: 4.8 g/dL / Pro: 7.3 g/dL / ALK PHOS: 78 U/L / ALT: 13 U/L / AST: 18 U/L / GGT: x                 Urinalysis Basic - ( 05 Aug 2019 22:40 )    Color: Yellow / Appearance: Cloudy / SG: >=1.030 / pH: x  Gluc: x / Ketone: Negative  / Bili: Negative / Urobili: 1.0   Blood: x / Protein: 30 / Nitrite: Negative   Leuk Esterase: Negative / RBC: x / WBC x   Sq Epi: x / Non Sq Epi: x / Bacteria: x              Consultant(s) Notes Reviewed:  [x ] YES  [ ] NO  Care Discussed with Consultants/Other Providers/ Housestaff [ x] YES  [ ] NO  Radiology personally reviewed.

## 2019-08-07 VITALS
SYSTOLIC BLOOD PRESSURE: 149 MMHG | RESPIRATION RATE: 18 BRPM | DIASTOLIC BLOOD PRESSURE: 101 MMHG | HEART RATE: 78 BPM | TEMPERATURE: 97 F

## 2019-08-07 LAB
CULTURE RESULTS: SIGNIFICANT CHANGE UP
SPECIMEN SOURCE: SIGNIFICANT CHANGE UP

## 2019-08-07 PROCEDURE — 99238 HOSP IP/OBS DSCHRG MGMT 30/<: CPT

## 2019-08-07 RX ORDER — LISINOPRIL 2.5 MG/1
1 TABLET ORAL
Qty: 60 | Refills: 0
Start: 2019-08-07 | End: 2019-09-05

## 2019-08-07 RX ORDER — NIFEDIPINE 30 MG
1 TABLET, EXTENDED RELEASE 24 HR ORAL
Qty: 30 | Refills: 0
Start: 2019-08-07 | End: 2019-09-05

## 2019-08-07 RX ORDER — HYDRALAZINE HCL 50 MG
1 TABLET ORAL
Qty: 90 | Refills: 0
Start: 2019-08-07 | End: 2019-09-05

## 2019-08-07 RX ORDER — ROPINIROLE 8 MG/1
1 TABLET, FILM COATED, EXTENDED RELEASE ORAL
Qty: 30 | Refills: 0
Start: 2019-08-07 | End: 2019-09-05

## 2019-08-07 RX ADMIN — OXYCODONE HYDROCHLORIDE 80 MILLIGRAM(S): 5 TABLET ORAL at 06:48

## 2019-08-07 RX ADMIN — LISINOPRIL 10 MILLIGRAM(S): 2.5 TABLET ORAL at 17:36

## 2019-08-07 RX ADMIN — OXYCODONE HYDROCHLORIDE 80 MILLIGRAM(S): 5 TABLET ORAL at 17:36

## 2019-08-07 RX ADMIN — Medication 30 MILLIGRAM(S): at 06:42

## 2019-08-07 RX ADMIN — Medication 1 PATCH: at 05:51

## 2019-08-07 RX ADMIN — LISINOPRIL 10 MILLIGRAM(S): 2.5 TABLET ORAL at 05:54

## 2019-08-07 RX ADMIN — OXYCODONE HYDROCHLORIDE 20 MILLIGRAM(S): 5 TABLET ORAL at 14:12

## 2019-08-07 RX ADMIN — Medication 25 MILLIGRAM(S): at 14:12

## 2019-08-07 RX ADMIN — OXYCODONE HYDROCHLORIDE 80 MILLIGRAM(S): 5 TABLET ORAL at 05:54

## 2019-08-07 RX ADMIN — Medication 100 MILLIGRAM(S): at 05:54

## 2019-08-07 RX ADMIN — OXYCODONE HYDROCHLORIDE 20 MILLIGRAM(S): 5 TABLET ORAL at 14:30

## 2019-08-07 RX ADMIN — CHLORHEXIDINE GLUCONATE 1 APPLICATION(S): 213 SOLUTION TOPICAL at 05:55

## 2019-08-07 RX ADMIN — OXYCODONE HYDROCHLORIDE 20 MILLIGRAM(S): 5 TABLET ORAL at 08:20

## 2019-08-07 RX ADMIN — Medication 25 MILLIGRAM(S): at 05:54

## 2019-08-07 RX ADMIN — OXYCODONE HYDROCHLORIDE 20 MILLIGRAM(S): 5 TABLET ORAL at 20:12

## 2019-08-07 RX ADMIN — OXYCODONE HYDROCHLORIDE 20 MILLIGRAM(S): 5 TABLET ORAL at 07:57

## 2019-08-07 RX ADMIN — ENOXAPARIN SODIUM 40 MILLIGRAM(S): 100 INJECTION SUBCUTANEOUS at 11:41

## 2019-08-07 RX ADMIN — ROPINIROLE 2 MILLIGRAM(S): 8 TABLET, FILM COATED, EXTENDED RELEASE ORAL at 11:39

## 2019-08-07 NOTE — DISCHARGE NOTE PROVIDER - HOSPITAL COURSE
The patient is a 49-year-old male with a PMH of HTN, relapse-remitting multiple sclerosis, and chronic back pain who presented with a 1-day history of lower extremity weakness and gait imbalance.  He reported having a history of lower extremity weakness and he uses a cane to ambulate at baseline.  However, he noticed increased weakness yesterday.  Otherwise, he denied having fever, chills, headache, dizziness, lightheadedness, blurry vision, eye pain, sensory changes, bowel or bladder dysfunction.  The patient states his last MS flare was about 4-5 years ago.  He reports not routinely followed-up with neurology and has not been taking Natalizumab in several years. Last seen by Dr Bledsoe 2-3 years ago at which time he got the Natalizumab. Workup was done to rule out MS flare but no new enhancing lesions were seen on MR head and spine. Patient is to be discharged medically and needs to follow up with neurology outpatient to manage his MS.

## 2019-08-07 NOTE — DISCHARGE NOTE NURSING/CASE MANAGEMENT/SOCIAL WORK - NSDCDPATPORTLINK_GEN_ALL_CORE
You can access the [facility], offered by [facility], by registering with the following website: [email or web address]

## 2019-08-07 NOTE — DISCHARGE NOTE PROVIDER - NSDCFUADDAPPT_GEN_ALL_CORE_FT
Please call and make an appointment with Dr. Bledsoe within a week and follow up with her regarding management of your MS.

## 2019-08-07 NOTE — DISCHARGE NOTE PROVIDER - NSDCCPCAREPLAN_GEN_ALL_CORE_FT
PRINCIPAL DISCHARGE DIAGNOSIS  Diagnosis: Weakness  Assessment and Plan of Treatment: You presented with lower extremity weakness and instability for one day. You were admitted to rule out flare of MS. The imaging done for brain and spinal cord did not reveal any new flare of MS. Please follow up with a neurologist in clinic to manage your Multiple sclerosis.      SECONDARY DISCHARGE DIAGNOSES  Diagnosis: HTN (hypertension)  Assessment and Plan of Treatment: Your blood pressures have been higher on this admission. Please monitor your blood pressures and log them and follow up with your PMD.

## 2019-08-07 NOTE — DISCHARGE NOTE PROVIDER - CARE PROVIDER_API CALL
Larry Bledsoe)  Neurology  [address] [place] [address]  Phone: [phone]  Fax: [phone]  Follow Up Time: 2 weeks

## 2019-08-07 NOTE — CHART NOTE - NSCHARTNOTEFT_GEN_A_CORE
<<<RESIDENT DISCHARGE NOTE>>>     KEVIN MEDRANO  MRN-[id number]    VITAL SIGNS:  T(F): 97.1 (08-07-19 @ 14:00), Max: 97.1 (08-07-19 @ 14:00)  HR: 78 (08-07-19 @ 14:00)  BP: 149/101 (08-07-19 @ 14:00)  SpO2: --      PHYSICAL EXAMINATION:  General: NAD  Head & Neck: NCAT  Pulmonary: CTA b/l  Cardiovascular: +s1s2 RRR  Gastrointestinal/Abdomen & Pelvis: soft, NT/ND (+) bs  Neurologic/Motor: FROM x 4 5/5    TEST RESULTS:                        14.0   8.51  )-----------( 227      ( 06 Aug 2019 06:26 )             41.4       08-06    139  |  100  |  16  ----------------------------<  112<H>  4.4   |  33<H>  |  0.9    Ca    9.1      06 Aug 2019 06:26        FINAL DISCHARGE INTERVIEW:  Resident(s) Present: (Name: Amilcar RN Present: (Name: MICA)    DISCHARGE MEDICATION RECONCILIATION  reviewed with Attending (Name: Madeleine)    DISPOSITION:   [ x ] Home,    [  ] Home with Visiting Nursing Services,   [    ]  SNF/ NH,    [   ] Acute Rehab (4A),   [   ] Other (Specify:)

## 2019-08-10 LAB
CULTURE RESULTS: SIGNIFICANT CHANGE UP
SPECIMEN SOURCE: SIGNIFICANT CHANGE UP

## 2019-08-13 DIAGNOSIS — M54.9 DORSALGIA, UNSPECIFIED: ICD-10-CM

## 2019-08-13 DIAGNOSIS — Z88.0 ALLERGY STATUS TO PENICILLIN: ICD-10-CM

## 2019-08-13 DIAGNOSIS — R29.898 OTHER SYMPTOMS AND SIGNS INVOLVING THE MUSCULOSKELETAL SYSTEM: ICD-10-CM

## 2019-08-13 DIAGNOSIS — I10 ESSENTIAL (PRIMARY) HYPERTENSION: ICD-10-CM

## 2019-08-13 DIAGNOSIS — G35 MULTIPLE SCLEROSIS: ICD-10-CM

## 2019-08-20 DIAGNOSIS — Z71.89 OTHER SPECIFIED COUNSELING: ICD-10-CM

## 2019-09-27 ENCOUNTER — INPATIENT (INPATIENT)
Facility: HOSPITAL | Age: 49
LOS: 5 days | Discharge: HOME HEALTH | End: 2019-10-03
Attending: STUDENT IN AN ORGANIZED HEALTH CARE EDUCATION/TRAINING PROGRAM | Admitting: STUDENT IN AN ORGANIZED HEALTH CARE EDUCATION/TRAINING PROGRAM
Payer: MEDICAID

## 2019-09-27 VITALS
SYSTOLIC BLOOD PRESSURE: 136 MMHG | RESPIRATION RATE: 18 BRPM | OXYGEN SATURATION: 95 % | HEART RATE: 67 BPM | WEIGHT: 199.96 LBS | TEMPERATURE: 99 F | DIASTOLIC BLOOD PRESSURE: 89 MMHG

## 2019-09-27 LAB
ALBUMIN SERPL ELPH-MCNC: 4.8 G/DL — SIGNIFICANT CHANGE UP (ref 3.5–5.2)
ALP SERPL-CCNC: 106 U/L — SIGNIFICANT CHANGE UP (ref 30–115)
ALT FLD-CCNC: 15 U/L — SIGNIFICANT CHANGE UP (ref 0–41)
ANION GAP SERPL CALC-SCNC: 16 MMOL/L — HIGH (ref 7–14)
AST SERPL-CCNC: 14 U/L — SIGNIFICANT CHANGE UP (ref 0–41)
BASOPHILS # BLD AUTO: 0.04 K/UL — SIGNIFICANT CHANGE UP (ref 0–0.2)
BASOPHILS NFR BLD AUTO: 0.2 % — SIGNIFICANT CHANGE UP (ref 0–1)
BILIRUB SERPL-MCNC: 0.4 MG/DL — SIGNIFICANT CHANGE UP (ref 0.2–1.2)
BUN SERPL-MCNC: 18 MG/DL — SIGNIFICANT CHANGE UP (ref 10–20)
CALCIUM SERPL-MCNC: 9.9 MG/DL — SIGNIFICANT CHANGE UP (ref 8.5–10.1)
CHLORIDE SERPL-SCNC: 97 MMOL/L — LOW (ref 98–110)
CO2 SERPL-SCNC: 25 MMOL/L — SIGNIFICANT CHANGE UP (ref 17–32)
CREAT SERPL-MCNC: 1 MG/DL — SIGNIFICANT CHANGE UP (ref 0.7–1.5)
EOSINOPHIL # BLD AUTO: 0.04 K/UL — SIGNIFICANT CHANGE UP (ref 0–0.7)
EOSINOPHIL NFR BLD AUTO: 0.2 % — SIGNIFICANT CHANGE UP (ref 0–8)
GLUCOSE SERPL-MCNC: 78 MG/DL — SIGNIFICANT CHANGE UP (ref 70–99)
HCT VFR BLD CALC: 47.8 % — SIGNIFICANT CHANGE UP (ref 42–52)
HGB BLD-MCNC: 16.6 G/DL — SIGNIFICANT CHANGE UP (ref 14–18)
IMM GRANULOCYTES NFR BLD AUTO: 0.5 % — HIGH (ref 0.1–0.3)
LYMPHOCYTES # BLD AUTO: 12.3 % — LOW (ref 20.5–51.1)
LYMPHOCYTES # BLD AUTO: 2.25 K/UL — SIGNIFICANT CHANGE UP (ref 1.2–3.4)
MAGNESIUM SERPL-MCNC: 2.2 MG/DL — SIGNIFICANT CHANGE UP (ref 1.8–2.4)
MCHC RBC-ENTMCNC: 30 PG — SIGNIFICANT CHANGE UP (ref 27–31)
MCHC RBC-ENTMCNC: 34.7 G/DL — SIGNIFICANT CHANGE UP (ref 32–37)
MCV RBC AUTO: 86.4 FL — SIGNIFICANT CHANGE UP (ref 80–94)
MONOCYTES # BLD AUTO: 1.23 K/UL — HIGH (ref 0.1–0.6)
MONOCYTES NFR BLD AUTO: 6.7 % — SIGNIFICANT CHANGE UP (ref 1.7–9.3)
NEUTROPHILS # BLD AUTO: 14.65 K/UL — HIGH (ref 1.4–6.5)
NEUTROPHILS NFR BLD AUTO: 80.1 % — HIGH (ref 42.2–75.2)
NRBC # BLD: 0 /100 WBCS — SIGNIFICANT CHANGE UP (ref 0–0)
PLATELET # BLD AUTO: 325 K/UL — SIGNIFICANT CHANGE UP (ref 130–400)
POTASSIUM SERPL-MCNC: 3.8 MMOL/L — SIGNIFICANT CHANGE UP (ref 3.5–5)
POTASSIUM SERPL-SCNC: 3.8 MMOL/L — SIGNIFICANT CHANGE UP (ref 3.5–5)
PROT SERPL-MCNC: 7.8 G/DL — SIGNIFICANT CHANGE UP (ref 6–8)
RBC # BLD: 5.53 M/UL — SIGNIFICANT CHANGE UP (ref 4.7–6.1)
RBC # FLD: 13.2 % — SIGNIFICANT CHANGE UP (ref 11.5–14.5)
SODIUM SERPL-SCNC: 138 MMOL/L — SIGNIFICANT CHANGE UP (ref 135–146)
WBC # BLD: 18.31 K/UL — HIGH (ref 4.8–10.8)
WBC # FLD AUTO: 18.31 K/UL — HIGH (ref 4.8–10.8)

## 2019-09-27 PROCEDURE — 99285 EMERGENCY DEPT VISIT HI MDM: CPT

## 2019-09-27 PROCEDURE — 93010 ELECTROCARDIOGRAM REPORT: CPT

## 2019-09-27 NOTE — ED PROVIDER NOTE - OBJECTIVE STATEMENT
49y M pmh MS presenting with worsening weakness in his lower extremities. Pt says acute on chronic worsening weakness x2 weeks. Pt is supposed to follow care with Dr. Ladi Marroquin. Supposed to see him once a month for natalizumab treatment. Has not seen doctor or received treatment in the past 4 months. Pt endorsing that weakness is so bad he is unable to walk. No back pain. Chronic urinary incontinence. No fecal incontinence. No saddle anesthesia. No numbness/tingling. No difficulty breathing. No headache. 49y M pmh MS presenting with worsening weakness in his lower extremities. Pt says acute on chronic worsening weakness x2 weeks. Pt is supposed to follow care with Dr. Ladi Marroquin. Supposed to see him once a month for natalizumab treatment. Has not seen doctor or received treatment in the past 4 months. Pt endorsing that weakness is so bad he is unable to walk. No back pain. Chronic urinary incontinence. No fecal incontinence. No saddle anesthesia. No numbness/tingling. No difficulty breathing. No headache. No chest pain/SON. No abdominal pain. No IV drug use. No hx of malignancy.

## 2019-09-27 NOTE — ED PROVIDER NOTE - ATTENDING CONTRIBUTION TO CARE
pt with hx of MS, non complaint with meds and tx, c/o progressively worsening LE weakness, bl.  unable to walk at this point.  no incont, numbness, ha, neck pain. chornic back pain. no ab pain, cp sob. no fever.   pt in nad, comfortable, ent nml, neck sup, ctab, rrr, ab soft, tn, nd. spine nt. b/l LE weakness, can minimally resist gravity for a very brief moment. nml pulses, cap refill, snsation.  will get labs, dw neuro.

## 2019-09-27 NOTE — ED ADULT NURSE NOTE - OBJECTIVE STATEMENT
PT BIBA. pt c/o weakness and unable to ambulate. pt verbalized he has a hx of MS and has not been able to ambulate. also states he has missed a few of his neuro appts.

## 2019-09-27 NOTE — ED PROVIDER NOTE - PHYSICAL EXAMINATION
CONSTITUTIONAL: Well-developed; well-nourished; in no acute distress.   SKIN: warm, dry  HEAD: Normocephalic; atraumatic.  EYES: no conjunctival injection.   ENT: No nasal discharge; airway clear.  NECK: Supple; non tender.  CARD: RRR  RESP: CTA  ABD: soft ntnd  LYMPH: No acute cervical adenopathy.  NEURO: Strength is 5/5 both arms, 4-/5 RT leg, 3/5 lt leg, DTRs 3+ all, toes equivocal bl.  There is no pronator drift.  Normal muscle bulk and  spastic tone in legs.  PSYCH: Cooperative, appropriate.

## 2019-09-27 NOTE — ED PROVIDER NOTE - NS ED ROS FT
Eyes:  No visual changes, eye pain or discharge.  ENMT:  No hearing changes, pain, no sore throat or runny nose, no difficulty swallowing  Cardiac:  No chest pain, SOB or edema. No chest pain with exertion.  Respiratory:  No cough or respiratory distress. No hemoptysis. No history of asthma or RAD.  GI:  No nausea, vomiting, diarrhea or abdominal pain.  :  No dysuria, frequency or burning.  MS:  No myalgia, muscle weakness, joint pain or back pain.  Neuro:  +LE weakness  Skin:  No skin rash.   Endocrine: No history of thyroid disease or diabetes.

## 2019-09-28 PROBLEM — I10 ESSENTIAL (PRIMARY) HYPERTENSION: Chronic | Status: ACTIVE | Noted: 2019-08-04

## 2019-09-28 PROBLEM — M54.9 DORSALGIA, UNSPECIFIED: Chronic | Status: ACTIVE | Noted: 2019-08-04

## 2019-09-28 PROBLEM — G35 MULTIPLE SCLEROSIS: Chronic | Status: ACTIVE | Noted: 2019-08-04

## 2019-09-28 LAB
ALBUMIN SERPL ELPH-MCNC: 4.2 G/DL — SIGNIFICANT CHANGE UP (ref 3.5–5.2)
ALP SERPL-CCNC: 91 U/L — SIGNIFICANT CHANGE UP (ref 30–115)
ALT FLD-CCNC: 12 U/L — SIGNIFICANT CHANGE UP (ref 0–41)
ANION GAP SERPL CALC-SCNC: 14 MMOL/L — SIGNIFICANT CHANGE UP (ref 7–14)
APPEARANCE UR: ABNORMAL
AST SERPL-CCNC: 11 U/L — SIGNIFICANT CHANGE UP (ref 0–41)
BACTERIA # UR AUTO: ABNORMAL
BASOPHILS # BLD AUTO: 0.03 K/UL — SIGNIFICANT CHANGE UP (ref 0–0.2)
BASOPHILS NFR BLD AUTO: 0.2 % — SIGNIFICANT CHANGE UP (ref 0–1)
BILIRUB SERPL-MCNC: 0.8 MG/DL — SIGNIFICANT CHANGE UP (ref 0.2–1.2)
BILIRUB UR-MCNC: NEGATIVE — SIGNIFICANT CHANGE UP
BUN SERPL-MCNC: 18 MG/DL — SIGNIFICANT CHANGE UP (ref 10–20)
CALCIUM SERPL-MCNC: 9.2 MG/DL — SIGNIFICANT CHANGE UP (ref 8.5–10.1)
CHLORIDE SERPL-SCNC: 102 MMOL/L — SIGNIFICANT CHANGE UP (ref 98–110)
CK SERPL-CCNC: 62 U/L — SIGNIFICANT CHANGE UP (ref 0–225)
CO2 SERPL-SCNC: 21 MMOL/L — SIGNIFICANT CHANGE UP (ref 17–32)
COLOR SPEC: YELLOW — SIGNIFICANT CHANGE UP
CREAT SERPL-MCNC: 0.9 MG/DL — SIGNIFICANT CHANGE UP (ref 0.7–1.5)
DIFF PNL FLD: ABNORMAL
EOSINOPHIL # BLD AUTO: 0.04 K/UL — SIGNIFICANT CHANGE UP (ref 0–0.7)
EOSINOPHIL NFR BLD AUTO: 0.3 % — SIGNIFICANT CHANGE UP (ref 0–8)
EPI CELLS # UR: 3 /HPF — SIGNIFICANT CHANGE UP (ref 0–5)
GLUCOSE SERPL-MCNC: 107 MG/DL — HIGH (ref 70–99)
GLUCOSE UR QL: ABNORMAL
HCT VFR BLD CALC: 44.4 % — SIGNIFICANT CHANGE UP (ref 42–52)
HGB BLD-MCNC: 15.4 G/DL — SIGNIFICANT CHANGE UP (ref 14–18)
HYALINE CASTS # UR AUTO: 0 /LPF — SIGNIFICANT CHANGE UP (ref 0–7)
IMM GRANULOCYTES NFR BLD AUTO: 0.4 % — HIGH (ref 0.1–0.3)
KETONES UR-MCNC: SIGNIFICANT CHANGE UP
LEUKOCYTE ESTERASE UR-ACNC: NEGATIVE — SIGNIFICANT CHANGE UP
LYMPHOCYTES # BLD AUTO: 1.48 K/UL — SIGNIFICANT CHANGE UP (ref 1.2–3.4)
LYMPHOCYTES # BLD AUTO: 11.4 % — LOW (ref 20.5–51.1)
MAGNESIUM SERPL-MCNC: 2 MG/DL — SIGNIFICANT CHANGE UP (ref 1.8–2.4)
MCHC RBC-ENTMCNC: 29.5 PG — SIGNIFICANT CHANGE UP (ref 27–31)
MCHC RBC-ENTMCNC: 34.7 G/DL — SIGNIFICANT CHANGE UP (ref 32–37)
MCV RBC AUTO: 85.1 FL — SIGNIFICANT CHANGE UP (ref 80–94)
MONOCYTES # BLD AUTO: 1.06 K/UL — HIGH (ref 0.1–0.6)
MONOCYTES NFR BLD AUTO: 8.1 % — SIGNIFICANT CHANGE UP (ref 1.7–9.3)
NEUTROPHILS # BLD AUTO: 10.35 K/UL — HIGH (ref 1.4–6.5)
NEUTROPHILS NFR BLD AUTO: 79.6 % — HIGH (ref 42.2–75.2)
NITRITE UR-MCNC: NEGATIVE — SIGNIFICANT CHANGE UP
NRBC # BLD: 0 /100 WBCS — SIGNIFICANT CHANGE UP (ref 0–0)
PH UR: 6.5 — SIGNIFICANT CHANGE UP (ref 5–8)
PLATELET # BLD AUTO: 284 K/UL — SIGNIFICANT CHANGE UP (ref 130–400)
POTASSIUM SERPL-MCNC: 3.7 MMOL/L — SIGNIFICANT CHANGE UP (ref 3.5–5)
POTASSIUM SERPL-SCNC: 3.7 MMOL/L — SIGNIFICANT CHANGE UP (ref 3.5–5)
PROT SERPL-MCNC: 6.9 G/DL — SIGNIFICANT CHANGE UP (ref 6–8)
PROT UR-MCNC: ABNORMAL
RBC # BLD: 5.22 M/UL — SIGNIFICANT CHANGE UP (ref 4.7–6.1)
RBC # FLD: 13.2 % — SIGNIFICANT CHANGE UP (ref 11.5–14.5)
RBC CASTS # UR COMP ASSIST: 448 /HPF — HIGH (ref 0–4)
SODIUM SERPL-SCNC: 137 MMOL/L — SIGNIFICANT CHANGE UP (ref 135–146)
SP GR SPEC: 1.04 — HIGH (ref 1.01–1.02)
UROBILINOGEN FLD QL: ABNORMAL
WBC # BLD: 13.01 K/UL — HIGH (ref 4.8–10.8)
WBC # FLD AUTO: 13.01 K/UL — HIGH (ref 4.8–10.8)
WBC UR QL: 3 /HPF — SIGNIFICANT CHANGE UP (ref 0–5)

## 2019-09-28 PROCEDURE — 99223 1ST HOSP IP/OBS HIGH 75: CPT

## 2019-09-28 PROCEDURE — 71045 X-RAY EXAM CHEST 1 VIEW: CPT | Mod: 26

## 2019-09-28 RX ORDER — ZOLPIDEM TARTRATE 10 MG/1
5 TABLET ORAL AT BEDTIME
Refills: 0 | Status: DISCONTINUED | OUTPATIENT
Start: 2019-09-28 | End: 2019-10-03

## 2019-09-28 RX ORDER — ENOXAPARIN SODIUM 100 MG/ML
40 INJECTION SUBCUTANEOUS DAILY
Refills: 0 | Status: DISCONTINUED | OUTPATIENT
Start: 2019-09-28 | End: 2019-10-03

## 2019-09-28 RX ORDER — OXYCODONE HYDROCHLORIDE 5 MG/1
20 TABLET ORAL EVERY 6 HOURS
Refills: 0 | Status: DISCONTINUED | OUTPATIENT
Start: 2019-09-28 | End: 2019-10-03

## 2019-09-28 RX ORDER — LISINOPRIL 2.5 MG/1
10 TABLET ORAL DAILY
Refills: 0 | Status: DISCONTINUED | OUTPATIENT
Start: 2019-09-28 | End: 2019-10-03

## 2019-09-28 RX ORDER — GABAPENTIN 400 MG/1
600 CAPSULE ORAL EVERY 8 HOURS
Refills: 0 | Status: DISCONTINUED | OUTPATIENT
Start: 2019-09-28 | End: 2019-10-03

## 2019-09-28 RX ORDER — NIFEDIPINE 30 MG
30 TABLET, EXTENDED RELEASE 24 HR ORAL DAILY
Refills: 0 | Status: DISCONTINUED | OUTPATIENT
Start: 2019-09-28 | End: 2019-10-03

## 2019-09-28 RX ORDER — ROPINIROLE 8 MG/1
2 TABLET, FILM COATED, EXTENDED RELEASE ORAL DAILY
Refills: 0 | Status: DISCONTINUED | OUTPATIENT
Start: 2019-09-28 | End: 2019-09-28

## 2019-09-28 RX ORDER — PANTOPRAZOLE SODIUM 20 MG/1
40 TABLET, DELAYED RELEASE ORAL
Refills: 0 | Status: DISCONTINUED | OUTPATIENT
Start: 2019-09-28 | End: 2019-10-03

## 2019-09-28 RX ORDER — ROPINIROLE 8 MG/1
2 TABLET, FILM COATED, EXTENDED RELEASE ORAL
Refills: 0 | Status: DISCONTINUED | OUTPATIENT
Start: 2019-09-28 | End: 2019-10-03

## 2019-09-28 RX ORDER — OXYCODONE HYDROCHLORIDE 5 MG/1
80 TABLET ORAL EVERY 12 HOURS
Refills: 0 | Status: DISCONTINUED | OUTPATIENT
Start: 2019-09-28 | End: 2019-10-03

## 2019-09-28 RX ADMIN — OXYCODONE HYDROCHLORIDE 80 MILLIGRAM(S): 5 TABLET ORAL at 18:15

## 2019-09-28 RX ADMIN — Medication 50 MILLIGRAM(S): at 06:01

## 2019-09-28 RX ADMIN — ENOXAPARIN SODIUM 40 MILLIGRAM(S): 100 INJECTION SUBCUTANEOUS at 11:58

## 2019-09-28 RX ADMIN — Medication 30 MILLIGRAM(S): at 06:01

## 2019-09-28 RX ADMIN — OXYCODONE HYDROCHLORIDE 20 MILLIGRAM(S): 5 TABLET ORAL at 14:13

## 2019-09-28 RX ADMIN — GABAPENTIN 600 MILLIGRAM(S): 400 CAPSULE ORAL at 22:23

## 2019-09-28 RX ADMIN — ROPINIROLE 2 MILLIGRAM(S): 8 TABLET, FILM COATED, EXTENDED RELEASE ORAL at 11:58

## 2019-09-28 RX ADMIN — LISINOPRIL 10 MILLIGRAM(S): 2.5 TABLET ORAL at 06:01

## 2019-09-28 RX ADMIN — GABAPENTIN 600 MILLIGRAM(S): 400 CAPSULE ORAL at 14:12

## 2019-09-28 RX ADMIN — ROPINIROLE 2 MILLIGRAM(S): 8 TABLET, FILM COATED, EXTENDED RELEASE ORAL at 18:15

## 2019-09-28 RX ADMIN — PANTOPRAZOLE SODIUM 40 MILLIGRAM(S): 20 TABLET, DELAYED RELEASE ORAL at 06:01

## 2019-09-28 RX ADMIN — ZOLPIDEM TARTRATE 5 MILLIGRAM(S): 10 TABLET ORAL at 22:24

## 2019-09-28 NOTE — CONSULT NOTE ADULT - SUBJECTIVE AND OBJECTIVE BOX
Neurology consult    KEVIN MEDRANO49yMale    HPI:  49 year old male patient with past medical history of relapsing remitting MS, states that his last flare was around 2 months ago, HTN and chronic lower back pain  presented for lower extremity weakness.   The patient states that his weakness started 3 days ago and has been getting worse and has been having increasing trouble ambulating, he also notes some numbness of the lower extremities, he denies fever / chills, urinary incontinence or retention, denies symptoms at the upper extremities, headache, vision changes or diplopia.   The patient follows with Dr. Bledsoe and per the records was on Natalizumab in the past. The patient was admitted in August for lower extremity weakness and gait imbalance, MRI of the head, cervical and thoracic spine was done in August and showed extensive white matter signal abnormality, no active demyelination. The patient was discharged and told to follow up with Dr. Bledsoe   The patient is being admitted to rule out MS flare (28 Sep 2019 02:41)  Pt was seeing Dr. Bledsoe but for the past 1-2 yrs is non-compliant with f/u visits. Was on Tysabri, last treatment 1-2 yrs ago. Also is followed by pain mgt.  Also has RLS -on Neurontin and Requip  Last adm for to r/o MS exacerbation was in Aug 2019, MRI did not show acute demyelinating lesions last adm.      MEDICATIONS    enoxaparin Injectable 40 milliGRAM(s) SubCutaneous daily  gabapentin 600 milliGRAM(s) Oral every 8 hours  lisinopril 10 milliGRAM(s) Oral daily  methylPREDNISolone sodium succinate IVPB 1000 milliGRAM(s) IV Intermittent daily  NIFEdipine XL 30 milliGRAM(s) Oral daily  oxyCODONE    IR 20 milliGRAM(s) Oral every 6 hours PRN  oxyCODONE  ER Tablet 80 milliGRAM(s) Oral every 12 hours  pantoprazole    Tablet 40 milliGRAM(s) Oral before breakfast  rOPINIRole 2 milliGRAM(s) Oral two times a day  zolpidem 5 milliGRAM(s) Oral at bedtime PRN  zolpidem 5 milliGRAM(s) Oral at bedtime PRN      PAST MEDICAL & SURGICAL HISTORY:  Hypertension  Chronic back pain  Multiple sclerosis  No significant past surgical history       Family history: No history of dementia, strokes, or seizures   FAMILY HISTORY:  No pertinent family history in first degree relatives    SOCIAL HISTORY --     Allergies    penicillin (Unknown)    Intolerances        Height (cm): 195.6 ( @ 17:06)  Weight (kg): 90.7 ( @ 21:16)  BMI (kg/m2): 23.7 ( @ 17:06)    Vital Signs Last 24 Hrs  T(C): 35.4 (28 Sep 2019 17:06), Max: 37.2 (27 Sep 2019 21:16)  T(F): 95.7 (28 Sep 2019 17:06), Max: 98.9 (27 Sep 2019 21:16)  HR: 86 (28 Sep 2019 17:06) (67 - 86)  BP: 127/86 (28 Sep 2019 17:06) (127/86 - 165/99)  BP(mean): --  RR: 18 (28 Sep 2019 17:06) (18 - 18)  SpO2: 98% (28 Sep 2019 15:26) (95% - 98%)    MULTIPLE SCLEROSIS  ^MULTIPLE SCLEROSIS  No pertinent family history in first degree relatives  MEWS Score  Hypertension  Chronic back pain  Multiple sclerosis  Multiple sclerosis  No significant past surgical history  PAIN AND WEAKNESS FROM MS  51      REVIEW OF SYSTEMS:    Constitutional: No fever, chills, fatigue, weakness  Eyes: no eye pain, visual disturbances, or discharge  ENT:  No difficulty hearing, tinnitus, vertigo; No sinus or throat pain  Neck: No pain or stiffness  Respiratory: No cough, dyspnea, wheezing   Cardiovascular: No chest pain, palpitations,   Gastrointestinal: No abdominal or epigastric pain. No nausea, vomiting  No diarrhea or constipation.   Genitourinary: No dysuria, frequency, hematuria or incontinence  Neurological: as per HPI  Psychiatric: No depression, anxiety, mood swings or difficulty sleeping  Musculoskeletal: No joint pain or swelling; No muscle, back or extremity pain  Skin: No itching, burning, rashes or lesions   Lymph Nodes: No enlarged glands  Endocrine: No heat or cold intolerance; No hair loss, No h/o diabetes or thyroid dysfunction  Allergy and Immunologic: No hives or eczema     PHYSICAL EXAMINATION:  General:  Well-developed, well nourished, in no acute distress.  Eyes: Conjunctiva and sclera clear.  Cardiovascular: Regular rate and rhythm; S1 and S2 Normal; No murmurs, gallops or rubs.  Neurologic:  - Mental Status:  Alert, awake, oriented to person, place, and time; Speech is fluent with intact naming, repetition, and comprehension.  - Cranial Nerves II-XII:  PERRLA, EOMI, no nystagmus, face simmetrical, hearing intact to finger rub bl, tongue midline  - Motor:  Strength is 5/5 both arms, 4-/5 RT leg, 3/5 lt leg, DTRs 3+ all, toes equivocal bl.  There is no pronator drift.  Normal muscle bulk and  spastic tone in legs.  - Sensory:  Intact to light touch  sense throughout.  - Coordination:  Finger-nose-finger  with dysmetria bl, and heel-knee-shin ataxia bl.    - Gait:  very unsteady         LABS:  CBC Full  -  ( 28 Sep 2019 07:12 )  WBC Count : 13.01 K/uL  RBC Count : 5.22 M/uL  Hemoglobin : 15.4 g/dL  Hematocrit : 44.4 %  Platelet Count - Automated : 284 K/uL  Mean Cell Volume : 85.1 fL  Mean Cell Hemoglobin : 29.5 pg  Mean Cell Hemoglobin Concentration : 34.7 g/dL  Auto Neutrophil # : 10.35 K/uL  Auto Lymphocyte # : 1.48 K/uL  Auto Monocyte # : 1.06 K/uL  Auto Eosinophil # : 0.04 K/uL  Auto Basophil # : 0.03 K/uL  Auto Neutrophil % : 79.6 %  Auto Lymphocyte % : 11.4 %  Auto Monocyte % : 8.1 %  Auto Eosinophil % : 0.3 %  Auto Basophil % : 0.2 %    Urinalysis Basic - ( 28 Sep 2019 13:14 )    Color: Yellow / Appearance: Slightly Turbid / S.040 / pH: x  Gluc: x / Ketone: Trace  / Bili: Negative / Urobili: >12 mg/dL   Blood: x / Protein: 100 mg/dL / Nitrite: Negative   Leuk Esterase: Negative / RBC: 448 /HPF / WBC 3 /HPF   Sq Epi: x / Non Sq Epi: 3 /HPF / Bacteria: Occasional          137  |  102  |  18  ----------------------------<  107<H>  3.7   |  21  |  0.9    Ca    9.2      28 Sep 2019 07:12  Mg     2.0         TPro  6.9  /  Alb  4.2  /  TBili  0.8  /  DBili  x   /  AST  11  /  ALT  12  /  AlkPhos  91      Hemoglobin A1C:     LIVER FUNCTIONS - ( 28 Sep 2019 07:12 )  Alb: 4.2 g/dL / Pro: 6.9 g/dL / ALK PHOS: 91 U/L / ALT: 12 U/L / AST: 11 U/L / GGT: x

## 2019-09-28 NOTE — H&P ADULT - ASSESSMENT
49 year old male patient with past medical history of relapsing remitting MS, states that his last flare was around 2 months ago, HTN and chronic lower back pain  presented for lower extremity weakness.     # Bilateral lower extremity weakness in the setting of relapsing remitting MS   - Recent admission in August for same complaints, MR head cervical and thoracic spine showed white matter signal abnormalities but no findings of active demyelination   - Will repeat MR head cervical thoracic and lumbar spine to rule out active flare   - Will start patient on Solumedrol 1g daily for 3 days pending neuro recommendations   - F/U with neuro Dr. Bledsoe   - PT rehab evaluation, ambulate with assistance, fall precautions     # HTN   - Continue Nifedipine and Lisinopril     # Chronic lower back pain   - Percocet q6h PRN     # Leucocytosis   - No evidence of active infection   - Recent steroid use ? patient unable to verify his home medication     # Miscellaneous   - DVT prophylaxis : Lovenox   - GI prophylaxis : Protonix   - Ambulate with assistance   - DASH TLC diet   - Full code   - Disposition unknown at this time     Patient was unable to verify his home medication regimen. Please verify with [facility] in AM 49 year old male patient with past medical history of relapsing remitting MS, states that his last flare was around 2 months ago, HTN and chronic lower back pain  presented for lower extremity weakness.     # Bilateral lower extremity weakness in the setting of relapsing remitting MS   - Recent admission in August for same complaints, MR head cervical and thoracic spine showed white matter signal abnormalities but no findings of active demyelination   - Will repeat MR head cervical thoracic and lumbar spine to rule out active flare   - Will start patient on Solumedrol 1g daily for 3 days pending neuro recommendations   - F/U with neuro Dr. Bledsoe   - PT rehab evaluation, ambulate with assistance, fall precautions     # HTN   - Continue Nifedipine and Lisinopril     # Chronic lower back pain   -oxycodone/oxycontin prn - home doses.     # Leucocytosis likely  2/2 steroids.   - No evidence of active infection   - Recent steroid use ? patient unable to verify his home medication     # Miscellaneous   - DVT prophylaxis : Lovenox   - GI prophylaxis : Protonix   - Ambulate with assistance   - DASH TLC diet   - Full code   - Disposition unknown at this time     Patient was unable to verify his home medication regimen. Please verify with [facility] in AM

## 2019-09-28 NOTE — H&P ADULT - NSHPREVIEWOFSYSTEMS_GEN_ALL_CORE
CONSTITUTIONAL: No weakness, no fevers or chills, no recent weight changes, no loss of appetite   EYES/ENT: No visual changes;  No diplopia   RESPIRATORY: No cough, no dyspnea   CARDIOVASCULAR: No chest pain no palpitations  GASTROINTESTINAL: No abdominal pain no vomiting no diarrhea   GENITOURINARY: No dysuria no hematuria no incontinence   NEUROLOGICAL: See HPI

## 2019-09-28 NOTE — H&P ADULT - HISTORY OF PRESENT ILLNESS
49 year old male patient with past medical history of relapsing remitting MS, states that his last flare was around 2 months ago, HTN and chronic lower back pain  presented for lower extremity weakness.   The patient states that his weakness started 3 days ago and has been getting worse and has been having increasing trouble ambulating, he also notes some numbness of the lower extremities, he denies fever / chills, urinary incontinence or retention, denies symptoms at the upper extremities, headache, vision changes or diplopia.   The patient follows with Dr. Bledsoe and per the records was on Natalizumab in the past. The patient was admitted in August for lower extremity weakness and gait imbalance, MRI of the head, cervical and thoracic spine was done in August and showed extensive white matter signal abnormality, no active demyelination. The patient was discharged and told to follow up with Dr. Bledsoe   The patient is being admitted to rule out MS flare

## 2019-09-28 NOTE — PROVIDER CONTACT NOTE (OTHER) - ASSESSMENT
PT assessed A&ox3, pt states he started urinating blood. pt denies any pain or burning upon urination. PT asymptomatic.

## 2019-09-28 NOTE — CONSULT NOTE ADULT - ASSESSMENT
49 year old male patient with past medical history of relapsing remitting MS, states that his last flare was around 2 months ago, HTN and chronic lower back pain  presented for worsening of lower extremities weakness.   - possible SPMS exacerbation  - chr LBP  - h/o RLS, HTN    Plan:  - please change service to IM/hospitalist  - cont Solumedrol 1000mg IVPB q24hrs x 3 days  - MRI brain w/o/w gado  - cont gabapentin at current dose  - cont Requip at current dose  - PT/rehab eval  - soc services eval  - please call us for any questions 49 year old male patient with past medical history of relapsing remitting MS, states that his last flare was around 2 months ago, HTN and chronic lower back pain  presented for worsening of lower extremities weakness.   - possible SPMS exacerbation, r/o infectio (e.g. UTI) - has elevated WBC - 13  - chr LBP  - h/o RLS, HTN    Plan:  - UA+UCx  - hold IV Solumedrol until infection is r/o  - MRI brain w/o/w gado  - cont gabapentin at current dose  - cont Requip at current dose  - PT/rehab eval  - soc services eval  - please call us for any questions

## 2019-09-28 NOTE — H&P ADULT - NSHPPHYSICALEXAM_GEN_ALL_CORE
GENERAL: NAD, lying in bed comfortably  HEAD:  Atraumatic, Normocephalic  CHEST/LUNG: Clear to auscultation bilaterally; No rales, rhonchi, wheezing, or rubs. Unlabored respirations  HEART: Regular rate and rhythm  ABDOMEN: Bowel sounds present; Soft, Nontender, Nondistended.   EXTREMITIES:  + Peripheral Pulses, no edema  NERVOUS SYSTEM:  Alert & Oriented X3, speech clear. Vision intact, CN intact bilaterally, tactile sensation and motor strength intact at the extremities, mild weakness of the bilateral lower extremities

## 2019-09-28 NOTE — H&P ADULT - NSHPSOCIALHISTORY_GEN_ALL_CORE
Patient denies smoking alcohol use and illicit substance use   He lives at home and uses a cane for ambulation

## 2019-09-28 NOTE — H&P ADULT - ATTENDING COMMENTS
I saw and examined the patient independently. I agree with above history, physical exam and plan of care which I have reviewed and edited where appropriate with following additions.    patient c/o back pain and requesting his oxycodone/oxycontin pain meds. reports he is unable to ambulate due to LE weakness.     LE weakness possible MS flare up:   continue IV solumedrol.   fu neurology for further recommendations.   fu MRI  spine ordered.   PT eval   fall risk precautions.     chronic back pain :  c/w oxycodone/oxycontin - home doses/ confirmed with pharmacy.    HTN:   c/w current meds.     GI/DVT ppx.

## 2019-09-29 LAB
ANION GAP SERPL CALC-SCNC: 11 MMOL/L — SIGNIFICANT CHANGE UP (ref 7–14)
BASOPHILS # BLD AUTO: 0.02 K/UL — SIGNIFICANT CHANGE UP (ref 0–0.2)
BASOPHILS NFR BLD AUTO: 0.1 % — SIGNIFICANT CHANGE UP (ref 0–1)
BUN SERPL-MCNC: 24 MG/DL — HIGH (ref 10–20)
CALCIUM SERPL-MCNC: 8.7 MG/DL — SIGNIFICANT CHANGE UP (ref 8.5–10.1)
CHLORIDE SERPL-SCNC: 105 MMOL/L — SIGNIFICANT CHANGE UP (ref 98–110)
CO2 SERPL-SCNC: 23 MMOL/L — SIGNIFICANT CHANGE UP (ref 17–32)
CREAT SERPL-MCNC: 0.7 MG/DL — SIGNIFICANT CHANGE UP (ref 0.7–1.5)
EOSINOPHIL # BLD AUTO: 0.04 K/UL — SIGNIFICANT CHANGE UP (ref 0–0.7)
EOSINOPHIL NFR BLD AUTO: 0.3 % — SIGNIFICANT CHANGE UP (ref 0–8)
GLUCOSE SERPL-MCNC: 115 MG/DL — HIGH (ref 70–99)
HCT VFR BLD CALC: 39.9 % — LOW (ref 42–52)
HGB BLD-MCNC: 13.7 G/DL — LOW (ref 14–18)
IMM GRANULOCYTES NFR BLD AUTO: 0.5 % — HIGH (ref 0.1–0.3)
LYMPHOCYTES # BLD AUTO: 1.86 K/UL — SIGNIFICANT CHANGE UP (ref 1.2–3.4)
LYMPHOCYTES # BLD AUTO: 12.4 % — LOW (ref 20.5–51.1)
MCHC RBC-ENTMCNC: 29.8 PG — SIGNIFICANT CHANGE UP (ref 27–31)
MCHC RBC-ENTMCNC: 34.3 G/DL — SIGNIFICANT CHANGE UP (ref 32–37)
MCV RBC AUTO: 86.7 FL — SIGNIFICANT CHANGE UP (ref 80–94)
MONOCYTES # BLD AUTO: 0.97 K/UL — HIGH (ref 0.1–0.6)
MONOCYTES NFR BLD AUTO: 6.5 % — SIGNIFICANT CHANGE UP (ref 1.7–9.3)
NEUTROPHILS # BLD AUTO: 12.06 K/UL — HIGH (ref 1.4–6.5)
NEUTROPHILS NFR BLD AUTO: 80.2 % — HIGH (ref 42.2–75.2)
NRBC # BLD: 0 /100 WBCS — SIGNIFICANT CHANGE UP (ref 0–0)
PLATELET # BLD AUTO: 264 K/UL — SIGNIFICANT CHANGE UP (ref 130–400)
POTASSIUM SERPL-MCNC: 4.2 MMOL/L — SIGNIFICANT CHANGE UP (ref 3.5–5)
POTASSIUM SERPL-SCNC: 4.2 MMOL/L — SIGNIFICANT CHANGE UP (ref 3.5–5)
RBC # BLD: 4.6 M/UL — LOW (ref 4.7–6.1)
RBC # FLD: 13.4 % — SIGNIFICANT CHANGE UP (ref 11.5–14.5)
SODIUM SERPL-SCNC: 139 MMOL/L — SIGNIFICANT CHANGE UP (ref 135–146)
WBC # BLD: 15.03 K/UL — HIGH (ref 4.8–10.8)
WBC # FLD AUTO: 15.03 K/UL — HIGH (ref 4.8–10.8)

## 2019-09-29 PROCEDURE — 72156 MRI NECK SPINE W/O & W/DYE: CPT | Mod: 26

## 2019-09-29 PROCEDURE — 99233 SBSQ HOSP IP/OBS HIGH 50: CPT

## 2019-09-29 PROCEDURE — 70553 MRI BRAIN STEM W/O & W/DYE: CPT | Mod: 26

## 2019-09-29 RX ADMIN — OXYCODONE HYDROCHLORIDE 20 MILLIGRAM(S): 5 TABLET ORAL at 14:54

## 2019-09-29 RX ADMIN — GABAPENTIN 600 MILLIGRAM(S): 400 CAPSULE ORAL at 13:34

## 2019-09-29 RX ADMIN — GABAPENTIN 600 MILLIGRAM(S): 400 CAPSULE ORAL at 06:30

## 2019-09-29 RX ADMIN — OXYCODONE HYDROCHLORIDE 80 MILLIGRAM(S): 5 TABLET ORAL at 19:20

## 2019-09-29 RX ADMIN — ENOXAPARIN SODIUM 40 MILLIGRAM(S): 100 INJECTION SUBCUTANEOUS at 13:35

## 2019-09-29 RX ADMIN — ROPINIROLE 2 MILLIGRAM(S): 8 TABLET, FILM COATED, EXTENDED RELEASE ORAL at 18:25

## 2019-09-29 RX ADMIN — PANTOPRAZOLE SODIUM 40 MILLIGRAM(S): 20 TABLET, DELAYED RELEASE ORAL at 06:31

## 2019-09-29 RX ADMIN — ROPINIROLE 2 MILLIGRAM(S): 8 TABLET, FILM COATED, EXTENDED RELEASE ORAL at 06:30

## 2019-09-29 RX ADMIN — Medication 30 MILLIGRAM(S): at 06:31

## 2019-09-29 RX ADMIN — OXYCODONE HYDROCHLORIDE 80 MILLIGRAM(S): 5 TABLET ORAL at 18:24

## 2019-09-29 RX ADMIN — OXYCODONE HYDROCHLORIDE 20 MILLIGRAM(S): 5 TABLET ORAL at 07:18

## 2019-09-29 RX ADMIN — OXYCODONE HYDROCHLORIDE 20 MILLIGRAM(S): 5 TABLET ORAL at 06:30

## 2019-09-29 RX ADMIN — GABAPENTIN 600 MILLIGRAM(S): 400 CAPSULE ORAL at 21:15

## 2019-09-29 RX ADMIN — OXYCODONE HYDROCHLORIDE 20 MILLIGRAM(S): 5 TABLET ORAL at 13:30

## 2019-09-29 RX ADMIN — LISINOPRIL 10 MILLIGRAM(S): 2.5 TABLET ORAL at 06:31

## 2019-09-29 RX ADMIN — ZOLPIDEM TARTRATE 5 MILLIGRAM(S): 10 TABLET ORAL at 00:49

## 2019-09-29 NOTE — PROGRESS NOTE ADULT - SUBJECTIVE AND OBJECTIVE BOX
Progress Note:  Provider Speciality                            Hospitalist      KEVIN MEDRANO [id number] 49y Male     CHIEF PRESENTING COMPLAINT:  Patient is a 49y old  Male who presents with a chief complaint of Bilateral lower extremity weakness (28 Sep 2019 17:50)        SUBJECTIVE:  Patient was seen and examined at bedside.   Patient seen after coming back from MRI , reports no new complaints/ has been asking for ambien in the morning as well as night as per nursing.   No significant overnight events reported.     HISTORY OF PRESENTING ILLNESS:  HPI:  49 year old male patient with past medical history of relapsing remitting MS, states that his last flare was around 2 months ago, HTN and chronic lower back pain  presented for lower extremity weakness.   The patient states that his weakness started 3 days ago and has been getting worse and has been having increasing trouble ambulating, he also notes some numbness of the lower extremities, he denies fever / chills, urinary incontinence or retention, denies symptoms at the upper extremities, headache, vision changes or diplopia.   The patient follows with Dr. Bledsoe and per the records was on Natalizumab in the past. The patient was admitted in August for lower extremity weakness and gait imbalance, MRI of the head, cervical and thoracic spine was done in August and showed extensive white matter signal abnormality, no active demyelination. The patient was discharged and told to follow up with Dr. Bledsoe   The patient is being admitted to rule out MS flare (28 Sep 2019 02:41)        REVIEW OF SYSTEMS:    At least 10 systems were reviewed in ROS. All systems reviewed  are within normal limits except for the complaints as described in Subjective.    PAST MEDICAL & SURGICAL HISTORY:  PAST MEDICAL & SURGICAL HISTORY:  Hypertension  Chronic back pain  Multiple sclerosis  No significant past surgical history          VITAL SIGNS:  Vital Signs Last 24 Hrs  T(C): 35.9 (29 Sep 2019 07:24), Max: 37 (28 Sep 2019 15:26)  T(F): 96.6 (29 Sep 2019 07:24), Max: 98.6 (28 Sep 2019 15:26)  HR: 64 (29 Sep 2019 07:24) (64 - 97)  BP: 118/76 (29 Sep 2019 07:24) (118/76 - 132/81)  BP(mean): --  RR: 18 (29 Sep 2019 07:24) (18 - 18)  SpO2: 98% (28 Sep 2019 15:26) (98% - 98%)          PHYSICAL EXAMINATION:    General: AAOx 3 young male, Not in acute distress  HEENT:   EOMI, NO JVD, atraumatic  Heart: S1+S2 audible, no murmur  Lungs: bilateral  fair air entry, no wheezing, no crepitations.  Abdomen: Soft, non-tender, non-distended   CNS: AAOx3, no no focal deficits noted.   Extremities:  No edema            CONSULTS:  Consultant(s) Notes Reviewed by me.   Care Discussed with Consultants/Other Providers where required.        MEDICATIONS:  MEDICATIONS  (STANDING):  enoxaparin Injectable 40 milliGRAM(s) SubCutaneous daily  gabapentin 600 milliGRAM(s) Oral every 8 hours  lisinopril 10 milliGRAM(s) Oral daily  NIFEdipine XL 30 milliGRAM(s) Oral daily  oxyCODONE  ER Tablet 80 milliGRAM(s) Oral every 12 hours  pantoprazole    Tablet 40 milliGRAM(s) Oral before breakfast  rOPINIRole 2 milliGRAM(s) Oral two times a day    MEDICATIONS  (PRN):  oxyCODONE    IR 20 milliGRAM(s) Oral every 6 hours PRN Severe Pain (7 - 10)  zolpidem 5 milliGRAM(s) Oral at bedtime PRN Insomnia  zolpidem 5 milliGRAM(s) Oral at bedtime PRN Insomnia      LABOROTORY DATA/MICROBIOLOGY/I & O's:                        15.4   13.01 )-----------( 284      ( 28 Sep 2019 07:12 )             44.4         137  |  102  |  18  ----------------------------<  107<H>  3.7   |  21  |  0.9    Ca    9.2      28 Sep 2019 07:12  Mg     2.0         TPro  6.9  /  Alb  4.2  /  TBili  0.8  /  DBili  x   /  AST  11  /  ALT  12  /  AlkPhos  91        Urinalysis Basic - ( 28 Sep 2019 13:14 )    Color: Yellow / Appearance: Slightly Turbid / S.040 / pH: x  Gluc: x / Ketone: Trace  / Bili: Negative / Urobili: >12 mg/dL   Blood: x / Protein: 100 mg/dL / Nitrite: Negative   Leuk Esterase: Negative / RBC: 448 /HPF / WBC 3 /HPF   Sq Epi: x / Non Sq Epi: 3 /HPF / Bacteria: Occasional      CAPILLARY BLOOD GLUCOSE            Urinalysis Basic - ( 28 Sep 2019 13:14 )    Color: Yellow / Appearance: Slightly Turbid / S.040 / pH: x  Gluc: x / Ketone: Trace  / Bili: Negative / Urobili: >12 mg/dL   Blood: x / Protein: 100 mg/dL / Nitrite: Negative   Leuk Esterase: Negative / RBC: 448 /HPF / WBC 3 /HPF   Sq Epi: x / Non Sq Epi: 3 /HPF / Bacteria: Occasional            19 @ 07:01  -  19 @ 07:00  --------------------------------------------------------  IN: 360 mL / OUT: 0 mL / NET: 360 mL    19 @ 07:19 @ 14:54  --------------------------------------------------------  IN: 420 mL / OUT: 0 mL / NET: 420 mL      Assessment/ Plan:  49 year old male patient with past medical history of relapsing remitting MS, states that his last flare was around 2 months ago, HTN and chronic lower back pain  presented for lower extremity weakness.     # Bilateral lower extremity weakness unclear etiology  possibly MS flare up /rule out infectious etiology:   - Recent admission in August for same complaints, MR head cervical and thoracic spine showed white matter signal abnormalities but no findings of active demyelination   -neuro consult appreciated.   -steroids dced by neuro until infectious etiology ruled out.   - fu spinal MRI/ MRI brain compatible with demyelination consistent with MS.   -fu neurology today to restart steroids based on MRI brain results.   -wbc trending down/ fu today's labs and am labs.   - PT rehab evaluation, ambulate with assistance, fall precautions     Leukocytosis could be due to steroids/ rule out infectious etiology:   fu wbc with today's labs and am labs.   trend daily.   send BCX.     # HTN   - Continue Nifedipine and Lisinopril     # Chronic lower back pain   -oxycodone/oxycontin prn - home doses.     dvt ppx.    PT eval         Progress note handoff:   pending: MRI spine/ neuro fu/ PT eval   disposition: unknown at this time   discussion: patient.

## 2019-09-30 LAB
ANION GAP SERPL CALC-SCNC: 12 MMOL/L — SIGNIFICANT CHANGE UP (ref 7–14)
BASOPHILS # BLD AUTO: 0.04 K/UL — SIGNIFICANT CHANGE UP (ref 0–0.2)
BASOPHILS NFR BLD AUTO: 0.3 % — SIGNIFICANT CHANGE UP (ref 0–1)
BUN SERPL-MCNC: 21 MG/DL — HIGH (ref 10–20)
CALCIUM SERPL-MCNC: 8.8 MG/DL — SIGNIFICANT CHANGE UP (ref 8.5–10.1)
CHLORIDE SERPL-SCNC: 98 MMOL/L — SIGNIFICANT CHANGE UP (ref 98–110)
CO2 SERPL-SCNC: 27 MMOL/L — SIGNIFICANT CHANGE UP (ref 17–32)
CREAT SERPL-MCNC: 0.9 MG/DL — SIGNIFICANT CHANGE UP (ref 0.7–1.5)
EOSINOPHIL # BLD AUTO: 0.04 K/UL — SIGNIFICANT CHANGE UP (ref 0–0.7)
EOSINOPHIL NFR BLD AUTO: 0.3 % — SIGNIFICANT CHANGE UP (ref 0–8)
GLUCOSE BLDC GLUCOMTR-MCNC: 125 MG/DL — HIGH (ref 70–99)
GLUCOSE BLDC GLUCOMTR-MCNC: 210 MG/DL — HIGH (ref 70–99)
GLUCOSE SERPL-MCNC: 83 MG/DL — SIGNIFICANT CHANGE UP (ref 70–99)
HCT VFR BLD CALC: 43.2 % — SIGNIFICANT CHANGE UP (ref 42–52)
HGB BLD-MCNC: 13.7 G/DL — LOW (ref 14–18)
IMM GRANULOCYTES NFR BLD AUTO: 0.6 % — HIGH (ref 0.1–0.3)
LYMPHOCYTES # BLD AUTO: 17.3 % — LOW (ref 20.5–51.1)
LYMPHOCYTES # BLD AUTO: 2.43 K/UL — SIGNIFICANT CHANGE UP (ref 1.2–3.4)
MCHC RBC-ENTMCNC: 28.5 PG — SIGNIFICANT CHANGE UP (ref 27–31)
MCHC RBC-ENTMCNC: 31.7 G/DL — LOW (ref 32–37)
MCV RBC AUTO: 89.8 FL — SIGNIFICANT CHANGE UP (ref 80–94)
MONOCYTES # BLD AUTO: 1.05 K/UL — HIGH (ref 0.1–0.6)
MONOCYTES NFR BLD AUTO: 7.5 % — SIGNIFICANT CHANGE UP (ref 1.7–9.3)
NEUTROPHILS # BLD AUTO: 10.42 K/UL — HIGH (ref 1.4–6.5)
NEUTROPHILS NFR BLD AUTO: 74 % — SIGNIFICANT CHANGE UP (ref 42.2–75.2)
NRBC # BLD: 0 /100 WBCS — SIGNIFICANT CHANGE UP (ref 0–0)
PLATELET # BLD AUTO: 311 K/UL — SIGNIFICANT CHANGE UP (ref 130–400)
POTASSIUM SERPL-MCNC: 4 MMOL/L — SIGNIFICANT CHANGE UP (ref 3.5–5)
POTASSIUM SERPL-SCNC: 4 MMOL/L — SIGNIFICANT CHANGE UP (ref 3.5–5)
RBC # BLD: 4.81 M/UL — SIGNIFICANT CHANGE UP (ref 4.7–6.1)
RBC # FLD: 13.7 % — SIGNIFICANT CHANGE UP (ref 11.5–14.5)
SODIUM SERPL-SCNC: 137 MMOL/L — SIGNIFICANT CHANGE UP (ref 135–146)
WBC # BLD: 14.06 K/UL — HIGH (ref 4.8–10.8)
WBC # FLD AUTO: 14.06 K/UL — HIGH (ref 4.8–10.8)

## 2019-09-30 PROCEDURE — 72157 MRI CHEST SPINE W/O & W/DYE: CPT | Mod: 26

## 2019-09-30 PROCEDURE — 99233 SBSQ HOSP IP/OBS HIGH 50: CPT

## 2019-09-30 PROCEDURE — 72158 MRI LUMBAR SPINE W/O & W/DYE: CPT | Mod: 26

## 2019-09-30 RX ADMIN — OXYCODONE HYDROCHLORIDE 80 MILLIGRAM(S): 5 TABLET ORAL at 17:28

## 2019-09-30 RX ADMIN — ZOLPIDEM TARTRATE 5 MILLIGRAM(S): 10 TABLET ORAL at 22:57

## 2019-09-30 RX ADMIN — Medication 30 MILLIGRAM(S): at 05:23

## 2019-09-30 RX ADMIN — OXYCODONE HYDROCHLORIDE 80 MILLIGRAM(S): 5 TABLET ORAL at 05:48

## 2019-09-30 RX ADMIN — GABAPENTIN 600 MILLIGRAM(S): 400 CAPSULE ORAL at 21:40

## 2019-09-30 RX ADMIN — LISINOPRIL 10 MILLIGRAM(S): 2.5 TABLET ORAL at 05:23

## 2019-09-30 RX ADMIN — PANTOPRAZOLE SODIUM 40 MILLIGRAM(S): 20 TABLET, DELAYED RELEASE ORAL at 05:23

## 2019-09-30 RX ADMIN — OXYCODONE HYDROCHLORIDE 80 MILLIGRAM(S): 5 TABLET ORAL at 05:47

## 2019-09-30 RX ADMIN — OXYCODONE HYDROCHLORIDE 20 MILLIGRAM(S): 5 TABLET ORAL at 18:51

## 2019-09-30 RX ADMIN — Medication 50 MILLIGRAM(S): at 13:16

## 2019-09-30 RX ADMIN — ENOXAPARIN SODIUM 40 MILLIGRAM(S): 100 INJECTION SUBCUTANEOUS at 13:17

## 2019-09-30 RX ADMIN — GABAPENTIN 600 MILLIGRAM(S): 400 CAPSULE ORAL at 13:17

## 2019-09-30 RX ADMIN — GABAPENTIN 600 MILLIGRAM(S): 400 CAPSULE ORAL at 05:23

## 2019-09-30 RX ADMIN — ROPINIROLE 2 MILLIGRAM(S): 8 TABLET, FILM COATED, EXTENDED RELEASE ORAL at 05:23

## 2019-09-30 RX ADMIN — ROPINIROLE 2 MILLIGRAM(S): 8 TABLET, FILM COATED, EXTENDED RELEASE ORAL at 17:57

## 2019-09-30 NOTE — PHYSICAL THERAPY INITIAL EVALUATION ADULT - MANUAL MUSCLE TESTING RESULTS, REHAB EVAL
Bilateral hip flexion 3-/5. RLE knee extension 3/5, LLE knee extension 3-/5, bilateral knee flexion 3+/5, bilateral ankle dorsiflexion and plantarflexion 3/5.

## 2019-09-30 NOTE — PHYSICAL THERAPY INITIAL EVALUATION ADULT - PHYSICAL ASSIST/NONPHYSICAL ASSIST: GAIT, REHAB EVAL
Cues for safety, posture, RW use, and proper gait mechanics./1 person assist/verbal cues/nonverbal cues (demo/gestures)

## 2019-09-30 NOTE — CONSULT NOTE ADULT - SUBJECTIVE AND OBJECTIVE BOX
HPI:  49 year old male patient with past medical history of relapsing remitting MS, states that his last flare was around 2 months ago, HTN and chronic lower back pain  presented for lower extremity weakness.   The patient states that his weakness started 3 days ago and has been getting worse and has been having increasing trouble ambulating, he also notes some numbness of the lower extremities, he denies fever / chills, urinary incontinence or retention, denies symptoms at the upper extremities, headache, vision changes or diplopia.   The patient follows with Dr. Bledsoe and per the records was on Natalizumab in the past. The patient was admitted in August for lower extremity weakness and gait imbalance, MRI of the head, cervical and thoracic spine was done in August and showed extensive white matter signal abnormality, no active demyelination. The patient was discharged and told to follow up with Dr. Bledsoe   The patient is being admitted to rule out MS flare (28 Sep 2019 02:41)      PAST MEDICAL & SURGICAL HISTORY:  Hypertension  Chronic back pain  Multiple sclerosis  No significant past surgical history      Hospital Course:  He is being treated for an MS exac. he is making some partial improvement with corticosteroids. he amb 10 ft with a walker with min assist.  TODAY'S SUBJECTIVE & REVIEW OF SYMPTOMS:     Constitutional WNL   Cardio WNL   Resp WNL   GI WNL  Heme WNL  Endo WNL  Skin WNL  MSK WNL  Neuro WNL  Cognitive WNL  Psych WNL      MEDICATIONS  (STANDING):  enoxaparin Injectable 40 milliGRAM(s) SubCutaneous daily  gabapentin 600 milliGRAM(s) Oral every 8 hours  lisinopril 10 milliGRAM(s) Oral daily  methylPREDNISolone sodium succinate IVPB 1000 milliGRAM(s) IV Intermittent daily  NIFEdipine XL 30 milliGRAM(s) Oral daily  oxyCODONE  ER Tablet 80 milliGRAM(s) Oral every 12 hours  pantoprazole    Tablet 40 milliGRAM(s) Oral before breakfast  rOPINIRole 2 milliGRAM(s) Oral two times a day    MEDICATIONS  (PRN):  oxyCODONE    IR 20 milliGRAM(s) Oral every 6 hours PRN Severe Pain (7 - 10)  zolpidem 5 milliGRAM(s) Oral at bedtime PRN Insomnia  zolpidem 5 milliGRAM(s) Oral at bedtime PRN Insomnia      FAMILY HISTORY:  No pertinent family history in first degree relatives      Allergies    penicillin (Unknown)    Intolerances        SOCIAL HISTORY:    [  ] Etoh  [  ] Smoking  [  ] Substance abuse     Home Environment:  [  ] Home Alone  [ x ] Lives with Family  [  ] Home Health Aid    Dwelling:  [  ] Apartment  [  ] Private House  [  ] Adult Home  [  ] Skilled Nursing Facility      [  ] Short Term  [  ] Long Term  [  ] Stairs       Elevator [  ]    FUNCTIONAL STATUS PTA: (Check all that apply)  Ambulation: [x   ]Independent    [  ] Dependent     [  ] Non-Ambulatory  Assistive Device: [x  ] SA Cane  [  ]  Q Cane  [  ] Walker  [  ]  Wheelchair  ADL : [  ] Independent  [  ]  Dependent       Vital Signs Last 24 Hrs  T(C): 36.2 (30 Sep 2019 07:28), Max: 37.4 (29 Sep 2019 23:00)  T(F): 97.2 (30 Sep 2019 07:28), Max: 99.4 (29 Sep 2019 23:00)  HR: 80 (30 Sep 2019 07:28) (80 - 95)  BP: 143/96 (30 Sep 2019 07:28) (130/75 - 151/90)  BP(mean): --  RR: 18 (30 Sep 2019 07:28) (18 - 18)  SpO2: --      PHYSICAL EXAM: Alert & Oriented X3  GENERAL: NAD, well-groomed, well-developed  HEAD:  Atraumatic, Normocephalic  EYES: EOMI, PERRLA, conjunctiva and sclera clear  NECK: Supple, No JVD, Normal thyroid  CHEST/LUNG: Clear to percussion bilaterally; No rales, rhonchi, wheezing, or rubs  HEART: Regular rate and rhythm; No murmurs, rubs, or gallops  ABDOMEN: Soft, Nontender, Nondistended; Bowel sounds present  EXTREMITIES:  2+ Peripheral Pulses, No clubbing, cyanosis, or edema    NERVOUS SYSTEM:  Cranial Nerves 2-12 intact [  ] Abnormal  [  ]  ROM: WFL all extremities [  ]  Abnormal [  ]  Motor Strength: WFL all extremities  [  ]  Abnormal [x  ]  5-/5 LE's  Sensation: intact to light touch [  ] Abnormal [  ]  Reflexes: Symmetric [  ]  Abnormal [  ]    FUNCTIONAL STATUS:  Bed Mobility: Independent [  ]  Supervision [  ]  Needs Assistance [  ]  N/A [  ]  Transfers: Independent [  ]  Supervision [  ]  Needs Assistance [  ]  N/A [  ]   Ambulation: Independent [  ]  Supervision [  ]  Needs Assistance [  ]  N/A [  ]  ADL: Independent [  ] Requires Assistance [  ] N/A [  ]      LABS:                        13.7   14.06 )-----------( 311      ( 30 Sep 2019 07:54 )             43.2     09-30    137  |  98  |  21<H>  ----------------------------<  83  4.0   |  27  |  0.9    Ca    8.8      30 Sep 2019 07:54            RADIOLOGY & ADDITIONAL STUDIES:    Assesment:

## 2019-09-30 NOTE — PROGRESS NOTE ADULT - ASSESSMENT
Pt seen, examined.  C/o LE weakness w/ difficulty ambulating x about 1 week. Denies legs pain, denies LBP.    Awake, alert, speech fluent, follows commands. EOMI. Motor 4/5 x 2 UE, 4-/5 RLE, 3-/5 LLE.   DTR +2. Right toe - mute, left toe - questionable upgoing.    Brain MRI - new (enhancing ) WM lesions in right frontal, left postcentral gyrus, right ant. temporal, b/l corona radiata  C-spine MRI - no new lesions  UDS - positive for cocaine  U/C - neg.    A/P. 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   Non compliant w/ treatment - last Tysabri dose 1-2 years ago  Worsening of legs weakness/ difficulty ambulating.  New lesions on Brain MRI     MS exacerbation  - 1 g IV Solumedrol qd x 5 days;      check Glu, give PPI while on IV steroids  - follow B/c but unlikely infection  - awaiting MRI T/L spine  - follow PT recommendations - ? candidate for acute rehab  - follow w/ his Neurologist Dr. Bledsoe after d/c for outpatient treatment of MS (possibly resuming of Tysabri or up to Dr. Bledsoe)

## 2019-09-30 NOTE — PROGRESS NOTE ADULT - ASSESSMENT
49 year old male patient with past medical history of relapsing remitting MS, states that his last flare was around 2 months ago, HTN and chronic lower back pain  presented for lower extremity weakness and admitted 3 days ago for ruling out MS flare.     # Bilateral lower extremity weakness in the setting of relapsing remitting MS   - Recent admission in August for same complaints, MR head cervical and thoracic spine showed white matter signal abnormalities but no findings of active demyelination   - Will repeat MR head cervical thoracic and lumbar spine to rule out active flare   - UA showing +RBCS+ UCx pemding  - hold IV Solumedrol until infection is r/o    - MRI brain w/o/w gado  1.  Extensive white matter signal abnormality compatible the patient's   clinical history of multiple sclerosis. Interval development of multiple   new enhancing and nonenhancing lesions since the prior exam dated   8/5/2019.   2.  New enhancing lesions noted in the right posterior frontal   subcortical region, the right anterior frontal region, the left   postcentral gyrus, the right anterior temporal lobe, and small lesions in   bilateral corona radiata. These are compatible with active demyelination.  3.  New nonenhancing lesions noted within the left dorsal carlene and the   right basal ganglia.  4.  Redemonstrated parenchymal atrophy, and corpus callosum signal   abnormality and volume loss.  - cont gabapentin and requip at current dose as per neuro  - F/U with neuro Dr. Bledsoe     # HTN   - Continue Nifedipine and Lisinopril     # Chronic lower back pain   -oxycodone/oxycontin prn   - home doses.     # Leucocytosis likely  2/2 steroids.   - No evidence of active infection, f/u blood culture  - Recent steroid use   -f/u am labs    # Miscellaneous   - DVT prophylaxis : Lovenox   - GI prophylaxis : Protonix   - Ambulate with assistance   - DASH TLC diet   - Full code 49 year old male patient with past medical history of relapsing remitting MS, states that his last flare was around 2 months ago, HTN and chronic lower back pain  presented for lower extremity weakness and admitted 3 days ago for ruling out MS flare. Patient seen and examined today, he is mildly alert and oriented to time, place and person. He does not have any active complains other than lower extremity weakness  motor in origin.    # Bilateral lower extremity weakness in the setting of relapsing remitting MS   - Recent admission in August for same complaints, MR head cervical and thoracic spine showed white matter signal abnormalities but no findings of active demyelination   - UA showing +RBCS+ UCx pending  - holding IV Solumedrol until infection is r/o    - MRI brain w/o/w gado  1.  Extensive white matter signal abnormality compatible the patient's   clinical history of multiple sclerosis. Interval development of multiple   new enhancing and nonenhancing lesions since the prior exam dated   8/5/2019.   2.  New enhancing lesions noted in the right posterior frontal   subcortical region, the right anterior frontal region, the left   postcentral gyrus, the right anterior temporal lobe, and small lesions in   bilateral corona radiata. These are compatible with active demyelination.  3.  New nonenhancing lesions noted within the left dorsal carlene and the   right basal ganglia.  4.  Redemonstrated parenchymal atrophy, and corpus callosum signal   abnormality and volume loss.    - cont gabapentin and requip at current dose as per neuro  - F/U with neuro Dr. Bledsoe     # HTN   - Continue Nifedipine and Lisinopril     # Chronic lower back pain   -oxycodone/oxycontin prn   - home doses.     # Leucocytosis likely  2/2 steroids.   - No evidence of active infection, f/u blood culture  - Recent steroid use   -14k from 15k    # Miscellaneous   - DVT prophylaxis : Lovenox   - GI prophylaxis : Protonix   - Ambulate with assistance   - DASH TLC diet   - Full code 49 year old male patient with past medical history of relapsing remitting MS, states that his last flare was around 2 months ago, HTN and chronic lower back pain  presented for lower extremity weakness and admitted 3 days ago for ruling out MS flare. Patient seen and examined today, he is mildly alert and oriented to time, place and person. He does not have any active complains other than lower extremity weakness  motor in origin.    # Bilateral lower extremity weakness in the setting of relapsing remitting MS   - Recent admission in August for same complaints, MR head cervical and thoracic spine showed white matter signal abnormalities but no findings of active demyelination   - UA showing +RBCS+ UCx pending  - started IV Solumedrol for 4 days 1g IVPB with PPI and FS monitoring    - MRI brain w/o/w gado  1.  Extensive white matter signal abnormality compatible the patient's   clinical history of multiple sclerosis. Interval development of multiple   new enhancing and nonenhancing lesions since the prior exam dated   8/5/2019.   2.  New enhancing lesions noted in the right posterior frontal   subcortical region, the right anterior frontal region, the left   postcentral gyrus, the right anterior temporal lobe, and small lesions in   bilateral corona radiata. These are compatible with active demyelination.  3.  New nonenhancing lesions noted within the left dorsal carlene and the   right basal ganglia.  4.  Redemonstrated parenchymal atrophy, and corpus callosum signal   abnormality and volume loss.  - cont gabapentin and requip at current dose as per neuro  - F/U with neuro Dr. Bledsoe     # HTN   - Continue Nifedipine and Lisinopril     # Chronic lower back pain   -oxycodone/oxycontin prn   - home doses.     # Leucocytosis likely  2/2 steroids.   - No evidence of active infection, f/u blood culture  - Recent steroid use   -14k from 15k    # Miscellaneous   - DVT prophylaxis : Lovenox   - GI prophylaxis : Protonix   - Ambulate with assistance   - DASH TLC diet   - Full code

## 2019-09-30 NOTE — PROGRESS NOTE ADULT - SUBJECTIVE AND OBJECTIVE BOX
Neurology consult    KEVIN [id number]    HPI:  49 year old male patient with past medical history of relapsing remitting MS, states that his last flare was around 2 months ago, HTN and chronic lower back pain  presented for lower extremity weakness.   The patient states that his weakness started 3 days ago and has been getting worse and has been having increasing trouble ambulating, he also notes some numbness of the lower extremities, he denies fever / chills, urinary incontinence or retention, denies symptoms at the upper extremities, headache, vision changes or diplopia.   The patient follows with Dr. Bledsoe and per the records was on Natalizumab in the past. The patient was admitted in August for lower extremity weakness and gait imbalance, MRI of the head, cervical and thoracic spine was done in August and showed extensive white matter signal abnormality, no active demyelination. The patient was discharged and told to follow up with Dr. Bledsoe   The patient is being admitted to rule out MS flare (28 Sep 2019 02:41)          MEDICATIONS    enoxaparin Injectable 40 milliGRAM(s) SubCutaneous daily  gabapentin 600 milliGRAM(s) Oral every 8 hours  lisinopril 10 milliGRAM(s) Oral daily  NIFEdipine XL 30 milliGRAM(s) Oral daily  oxyCODONE    IR 20 milliGRAM(s) Oral every 6 hours PRN  oxyCODONE  ER Tablet 80 milliGRAM(s) Oral every 12 hours  pantoprazole    Tablet 40 milliGRAM(s) Oral before breakfast  rOPINIRole 2 milliGRAM(s) Oral two times a day  zolpidem 5 milliGRAM(s) Oral at bedtime PRN  zolpidem 5 milliGRAM(s) Oral at bedtime PRN      PAST MEDICAL & SURGICAL HISTORY:  Hypertension  Chronic back pain  Multiple sclerosis  No significant past surgical history       Family history: No history of dementia, strokes, or seizures   FAMILY HISTORY:  No pertinent family history in first degree relatives    SOCIAL HISTORY --     Allergies    penicillin (Unknown)    Intolerances            Vital Signs Last 24 Hrs  T(C): 36.2 (30 Sep 2019 07:28), Max: 37.4 (29 Sep 2019 23:00)  T(F): 97.2 (30 Sep 2019 07:28), Max: 99.4 (29 Sep 2019 23:00)  HR: 80 (30 Sep 2019 07:28) (80 - 95)  BP: 143/96 (30 Sep 2019 07:28) (130/75 - 151/90)  BP(mean): --  RR: 18 (30 Sep 2019 07:28) (18 - 18)  SpO2: --    MULTIPLE SCLEROSIS  ^MULTIPLE SCLEROSIS  No pertinent family history in first degree relatives  Handoff  MEWS Score  Hypertension  Chronic back pain  Multiple sclerosis  Multiple sclerosis  No significant past surgical history  PAIN AND WEAKNESS FROM MS  51      LABS:  CBC Full  -  ( 30 Sep 2019 07:54 )  WBC Count : 14.06 K/uL  RBC Count : 4.81 M/uL  Hemoglobin : 13.7 g/dL  Hematocrit : 43.2 %  Platelet Count - Automated : 311 K/uL      Urinalysis Basic - ( 28 Sep 2019 13:14 )    Color: Yellow / Appearance: Slightly Turbid / S.040 / pH: x  Gluc: x / Ketone: Trace  / Bili: Negative / Urobili: >12 mg/dL   Blood: x / Protein: 100 mg/dL / Nitrite: Negative   Leuk Esterase: Negative / RBC: 448 /HPF / WBC 3 /HPF   Sq Epi: x / Non Sq Epi: 3 /HPF / Bacteria: Occasional          137  |  98  |  21<H>  ----------------------------<  83  4.0   |  27  |  0.9    Ca    8.8      30 Sep 2019 07:54

## 2019-09-30 NOTE — PHYSICAL THERAPY INITIAL EVALUATION ADULT - IMPAIRMENTS CONTRIBUTING TO GAIT DEVIATIONS, PT EVAL
Pt with flexed forward gait and increased hip and knee flexion bilaterally, increased VCs provided to improve posture. Pt educated on RW use and safety., pt able to return demonstration./decreased strength/impaired postural control/impaired balance

## 2019-09-30 NOTE — PROGRESS NOTE ADULT - SUBJECTIVE AND OBJECTIVE BOX
49 year old male patient with past medical history of relapsing remitting MS, is last flare was around 2 months ago, HTN and chronic lower back pain  presented for lower extremity weakness.   The patient states that his weakness started 3 days ago and has been getting worse and has been having increasing trouble ambulating, he also notes some numbness of the lower extremities, he denies fever / chills, urinary incontinence or retention, denies symptoms at the upper extremities, headache, vision changes or diplopia.   The patient follows with Dr. Bledsoe and per the records was on Natalizumab in the past. The patient was admitted in August for lower extremity weakness and gait imbalance, MRI of the head, cervical and thoracic spine was done in August and showed extensive white matter signal abnormality, no active demyelination. The patient was discharged and told to follow up with Dr. Bledsoe   The patient is being admitted to rule out MS flare.    PAST MEDICAL & SURGICAL HISTORY:  Hypertension  Chronic back pain  Multiple sclerosis  No significant past surgical history        OVERNIGHT EVENTS: none reported    SUBJECTIVE / INTERVAL HPI: Patient seen and examined at bedside.     VITAL SIGNS:  Vital Signs Last 24 Hrs  T(C): 37.4 (29 Sep 2019 23:00), Max: 37.4 (29 Sep 2019 23:00)  T(F): 99.4 (29 Sep 2019 23:00), Max: 99.4 (29 Sep 2019 23:00)  HR: 87 (30 Sep 2019 05:00) (64 - 95)  BP: 143/96 (30 Sep 2019 05:00) (118/76 - 151/90)  BP(mean): --  RR: 18 (29 Sep 2019 23:00) (18 - 18)  SpO2: --    PHYSICAL EXAM:    General: WDWN  HEENT: NC/AT; PERRL, anicteric sclera; MMM  Neck: supple  Cardiovascular: +S1/S2, RRR  Respiratory: CTA B/L; no W/R/R  Gastrointestinal: soft, NT/ND; +BSx4  Extremities: WWP; no edema, clubbing or cyanosis  Vascular: 2+ radial, DP/PT pulses B/L  Neurological: AAOx3; no focal deficits    MEDICATIONS:  MEDICATIONS  (STANDING):  enoxaparin Injectable 40 milliGRAM(s) SubCutaneous daily  gabapentin 600 milliGRAM(s) Oral every 8 hours  lisinopril 10 milliGRAM(s) Oral daily  NIFEdipine XL 30 milliGRAM(s) Oral daily  oxyCODONE  ER Tablet 80 milliGRAM(s) Oral every 12 hours  pantoprazole    Tablet 40 milliGRAM(s) Oral before breakfast  rOPINIRole 2 milliGRAM(s) Oral two times a day    MEDICATIONS  (PRN):  oxyCODONE    IR 20 milliGRAM(s) Oral every 6 hours PRN Severe Pain (7 - 10)  zolpidem 5 milliGRAM(s) Oral at bedtime PRN Insomnia  zolpidem 5 milliGRAM(s) Oral at bedtime PRN Insomnia      ALLERGIES:  Allergies    penicillin (Unknown)    Intolerances        LABS:                        13.7   15.03 )-----------( 264      ( 29 Sep 2019 18:40 )             39.9         139  |  105  |  24<H>  ----------------------------<  115<H>  4.2   |  23  |  0.7    Ca    8.7      29 Sep 2019 18:40  Mg     2.0         TPro  6.9  /  Alb  4.2  /  TBili  0.8  /  DBili  x   /  AST  11  /  ALT  12  /  AlkPhos  91        Urinalysis Basic - ( 28 Sep 2019 13:14 )    Color: Yellow / Appearance: Slightly Turbid / S.040 / pH: x  Gluc: x / Ketone: Trace  / Bili: Negative / Urobili: >12 mg/dL   Blood: x / Protein: 100 mg/dL / Nitrite: Negative   Leuk Esterase: Negative / RBC: 448 /HPF / WBC 3 /HPF   Sq Epi: x / Non Sq Epi: 3 /HPF / Bacteria: Occasional      CAPILLARY BLOOD GLUCOSE          RADIOLOGY & ADDITIONAL TESTS: Reviewed. 49 year old male patient with past medical history of relapsing remitting MS, is last flare was around 2 months ago, HTN and chronic lower back pain  presented for lower extremity weakness.   The patient states that his weakness started 3 days ago and has been getting worse and has been having increasing trouble ambulating, he also notes some numbness of the lower extremities, he denies fever / chills, urinary incontinence or retention, denies symptoms at the upper extremities, headache, vision changes or diplopia.   The patient follows with Dr. lBedsoe and per the records was on Natalizumab in the past. The patient was admitted in August for lower extremity weakness and gait imbalance, MRI of the head, cervical and thoracic spine was done in August and showed extensive white matter signal abnormality, no active demyelination. The patient was discharged and told to follow up with Dr. Bledsoe   The patient is being admitted to rule out MS flare.    PAST MEDICAL & SURGICAL HISTORY:  Hypertension  Chronic back pain  Multiple sclerosis  No significant past surgical history        OVERNIGHT EVENTS: none reported    SUBJECTIVE / INTERVAL HPI: Patient seen and examined at bedside.     VITAL SIGNS:  Vital Signs Last 24 Hrs  T(C): 37.4 (29 Sep 2019 23:00), Max: 37.4 (29 Sep 2019 23:00)  T(F): 99.4 (29 Sep 2019 23:00), Max: 99.4 (29 Sep 2019 23:00)  HR: 87 (30 Sep 2019 05:00) (64 - 95)  BP: 143/96 (30 Sep 2019 05:00) (118/76 - 151/90)  BP(mean): --  RR: 18 (29 Sep 2019 23:00) (18 - 18)  SpO2: --    PHYSICAL EXAM:    General: WDWN  HEENT: NC/AT; PERRL, anicteric sclera; MMM  Neck: supple  Cardiovascular: +S1/S2, RRR  Respiratory: CTA B/L; no W/R/R  Gastrointestinal: soft, NT/ND; +BSx4  Extremities: Weakness b/l LE; no edema, clubbing or cyanosis  Vascular: 2+ radial, DP/PT pulses B/L  Neurological: AAOx3; no focal deficits    MEDICATIONS:  MEDICATIONS  (STANDING):  enoxaparin Injectable 40 milliGRAM(s) SubCutaneous daily  gabapentin 600 milliGRAM(s) Oral every 8 hours  lisinopril 10 milliGRAM(s) Oral daily  NIFEdipine XL 30 milliGRAM(s) Oral daily  oxyCODONE  ER Tablet 80 milliGRAM(s) Oral every 12 hours  pantoprazole    Tablet 40 milliGRAM(s) Oral before breakfast  rOPINIRole 2 milliGRAM(s) Oral two times a day    MEDICATIONS  (PRN):  oxyCODONE    IR 20 milliGRAM(s) Oral every 6 hours PRN Severe Pain (7 - 10)  zolpidem 5 milliGRAM(s) Oral at bedtime PRN Insomnia  zolpidem 5 milliGRAM(s) Oral at bedtime PRN Insomnia      ALLERGIES:  Allergies    penicillin (Unknown)    Intolerances        LABS:                        13.7   15.03 )-----------( 264      ( 29 Sep 2019 18:40 )             39.9         139  |  105  |  24<H>  ----------------------------<  115<H>  4.2   |  23  |  0.7    Ca    8.7      29 Sep 2019 18:40  Mg     2.0         TPro  6.9  /  Alb  4.2  /  TBili  0.8  /  DBili  x   /  AST  11  /  ALT  12  /  AlkPhos  91        Urinalysis Basic - ( 28 Sep 2019 13:14 )    Color: Yellow / Appearance: Slightly Turbid / S.040 / pH: x  Gluc: x / Ketone: Trace  / Bili: Negative / Urobili: >12 mg/dL   Blood: x / Protein: 100 mg/dL / Nitrite: Negative   Leuk Esterase: Negative / RBC: 448 /HPF / WBC 3 /HPF   Sq Epi: x / Non Sq Epi: 3 /HPF / Bacteria: Occasional      CAPILLARY BLOOD GLUCOSE          RADIOLOGY & ADDITIONAL TESTS: Reviewed.

## 2019-09-30 NOTE — PHYSICAL THERAPY INITIAL EVALUATION ADULT - PHYSICAL ASSIST/NONPHYSICAL ASSIST: STAND/SIT, REHAB EVAL
1 person assist/verbal cues/Chair. Cues for safety, sequencing, and posture./nonverbal cues (demo/gestures)

## 2019-09-30 NOTE — PHYSICAL THERAPY INITIAL EVALUATION ADULT - PLANNED THERAPY INTERVENTIONS, PT EVAL
balance training/bed mobility training/strengthening/transfer training/postural re-education/gait training

## 2019-09-30 NOTE — PROGRESS NOTE ADULT - ATTENDING COMMENTS
I saw and examined the patient independently. I agree with above history, physical exam and plan of care which I have reviewed and edited where appropriate with following additions.     patient sitting up in chair, denies new complaints today.     B/L LE weakness likely MS flare up(secondary progressive  MS)  MRI brain noted with new demylenation lesions.   Neuro fu appreciated.   started back on IV steroids- continue for 4 days.   PPI with steroids.   PT / physiatry eval     leukocytosis likely steroid induced/ low suspicion for infectious etiology:   trend wbc daily.   fu BCX.   c/w steroids for MS flare up.    HTN:   c/w current meds.   IF BP elevated with steroids/ titrate up procardia as needed.     dvt ppx.     Progress note Handoff:   Pending: IV steroids completion/ physiatry consult for discharge disposition  Discussion: patient   Disposition: unknown at this time.

## 2019-09-30 NOTE — PHYSICAL THERAPY INITIAL EVALUATION ADULT - IMPAIRMENTS FOUND, PT EVAL
gait, locomotion, and balance/aerobic capacity/endurance/muscle strength/poor safety awareness/posture

## 2019-09-30 NOTE — CONSULT NOTE ADULT - ASSESSMENT
IMPRESSION: Rehab of    MS exac    PRECAUTIONS: [  ] Cardiac  [  ] Respiratory  [  ] Seizures [  ] Contact Isolation  [  ] Droplet Isolation  [  ] Other    Weight Bearing Status:     RECOMMENDATION:    Out of Bed to Chair     DVT/Decubiti Prophylaxis    REHAB PLAN:     [ xx  ] Bedside P/T 3-5 times a week   [xx   ]   Bedside O/T  2-3 times a week             [   ] No Rehab Therapy Indicated                   [   ]  Speech Therapy   Conditioning/ROM                                    ADL  Bed Mobility                                               Conditioning/ROM  Transfers                                                     Bed Mobility  Sitting /Standing Balance                         Transfers                                        Gait Training                                               Sitting/Standing Balance  Stair Training [   ]Applicable                    Home equipment Eval                                                                        Splinting  [   ] Only      GOALS:   ADL   [ x  ]   Independent                    Transfers  [ x  ] Independent                          Ambulation  [x   ] Independent     [  x ] With device                            [   ]  CG                                                         [   ]  CG                                                                  [   ] CG                            [    ] Min A                                                   [   ] Min A                                                              [   ] Min  A          DISCHARGE PLAN:   [ xx  ]  Good candidate for Intensive Rehabilitation/Hospital based-4A [facility]                                             Will tolerate 3hrs Intensive Rehab Daily                                       [    ]  Short Term Rehab in Skilled Nursing Facility                                       [    ]  Home with Outpatient or VN services                                         [    ]  Possible Candidate for Intensive Hospital based Rehab

## 2019-09-30 NOTE — PHYSICAL THERAPY INITIAL EVALUATION ADULT - GAIT DEVIATIONS NOTED, PT EVAL
decreased zoila/decreased step length/increased time in double stance/foot drag/decreased stride length

## 2019-10-01 LAB
ANION GAP SERPL CALC-SCNC: 13 MMOL/L — SIGNIFICANT CHANGE UP (ref 7–14)
ANION GAP SERPL CALC-SCNC: 7 MMOL/L — SIGNIFICANT CHANGE UP (ref 7–14)
BASOPHILS # BLD AUTO: 0.02 K/UL — SIGNIFICANT CHANGE UP (ref 0–0.2)
BASOPHILS NFR BLD AUTO: 0.2 % — SIGNIFICANT CHANGE UP (ref 0–1)
BUN SERPL-MCNC: 14 MG/DL — SIGNIFICANT CHANGE UP (ref 10–20)
BUN SERPL-MCNC: 16 MG/DL — SIGNIFICANT CHANGE UP (ref 10–20)
CALCIUM SERPL-MCNC: 9.3 MG/DL — SIGNIFICANT CHANGE UP (ref 8.5–10.1)
CALCIUM SERPL-MCNC: 9.7 MG/DL — SIGNIFICANT CHANGE UP (ref 8.5–10.1)
CHLORIDE SERPL-SCNC: 97 MMOL/L — LOW (ref 98–110)
CHLORIDE SERPL-SCNC: 99 MMOL/L — SIGNIFICANT CHANGE UP (ref 98–110)
CO2 SERPL-SCNC: 27 MMOL/L — SIGNIFICANT CHANGE UP (ref 17–32)
CO2 SERPL-SCNC: 30 MMOL/L — SIGNIFICANT CHANGE UP (ref 17–32)
CREAT SERPL-MCNC: 0.7 MG/DL — SIGNIFICANT CHANGE UP (ref 0.7–1.5)
CREAT SERPL-MCNC: 0.7 MG/DL — SIGNIFICANT CHANGE UP (ref 0.7–1.5)
EOSINOPHIL # BLD AUTO: 0 K/UL — SIGNIFICANT CHANGE UP (ref 0–0.7)
EOSINOPHIL NFR BLD AUTO: 0 % — SIGNIFICANT CHANGE UP (ref 0–8)
GLUCOSE BLDC GLUCOMTR-MCNC: 125 MG/DL — HIGH (ref 70–99)
GLUCOSE BLDC GLUCOMTR-MCNC: 144 MG/DL — HIGH (ref 70–99)
GLUCOSE BLDC GLUCOMTR-MCNC: 184 MG/DL — HIGH (ref 70–99)
GLUCOSE BLDC GLUCOMTR-MCNC: 193 MG/DL — HIGH (ref 70–99)
GLUCOSE SERPL-MCNC: 149 MG/DL — HIGH (ref 70–99)
GLUCOSE SERPL-MCNC: 211 MG/DL — HIGH (ref 70–99)
HCT VFR BLD CALC: 37.1 % — LOW (ref 42–52)
HGB BLD-MCNC: 12.5 G/DL — LOW (ref 14–18)
IMM GRANULOCYTES NFR BLD AUTO: 0.6 % — HIGH (ref 0.1–0.3)
LYMPHOCYTES # BLD AUTO: 0.78 K/UL — LOW (ref 1.2–3.4)
LYMPHOCYTES # BLD AUTO: 6.7 % — LOW (ref 20.5–51.1)
MCHC RBC-ENTMCNC: 29.3 PG — SIGNIFICANT CHANGE UP (ref 27–31)
MCHC RBC-ENTMCNC: 33.7 G/DL — SIGNIFICANT CHANGE UP (ref 32–37)
MCV RBC AUTO: 87.1 FL — SIGNIFICANT CHANGE UP (ref 80–94)
MONOCYTES # BLD AUTO: 0.74 K/UL — HIGH (ref 0.1–0.6)
MONOCYTES NFR BLD AUTO: 6.4 % — SIGNIFICANT CHANGE UP (ref 1.7–9.3)
NEUTROPHILS # BLD AUTO: 9.96 K/UL — HIGH (ref 1.4–6.5)
NEUTROPHILS NFR BLD AUTO: 86.1 % — HIGH (ref 42.2–75.2)
NRBC # BLD: 0 /100 WBCS — SIGNIFICANT CHANGE UP (ref 0–0)
PLATELET # BLD AUTO: 303 K/UL — SIGNIFICANT CHANGE UP (ref 130–400)
POTASSIUM SERPL-MCNC: 5.2 MMOL/L — HIGH (ref 3.5–5)
POTASSIUM SERPL-MCNC: 5.3 MMOL/L — HIGH (ref 3.5–5)
POTASSIUM SERPL-SCNC: 5.2 MMOL/L — HIGH (ref 3.5–5)
POTASSIUM SERPL-SCNC: 5.3 MMOL/L — HIGH (ref 3.5–5)
RBC # BLD: 4.26 M/UL — LOW (ref 4.7–6.1)
RBC # FLD: 13 % — SIGNIFICANT CHANGE UP (ref 11.5–14.5)
SODIUM SERPL-SCNC: 136 MMOL/L — SIGNIFICANT CHANGE UP (ref 135–146)
SODIUM SERPL-SCNC: 137 MMOL/L — SIGNIFICANT CHANGE UP (ref 135–146)
WBC # BLD: 11.57 K/UL — HIGH (ref 4.8–10.8)
WBC # FLD AUTO: 11.57 K/UL — HIGH (ref 4.8–10.8)

## 2019-10-01 PROCEDURE — 99233 SBSQ HOSP IP/OBS HIGH 50: CPT

## 2019-10-01 RX ADMIN — Medication 12.5 MILLIGRAM(S): at 06:33

## 2019-10-01 RX ADMIN — PANTOPRAZOLE SODIUM 40 MILLIGRAM(S): 20 TABLET, DELAYED RELEASE ORAL at 06:34

## 2019-10-01 RX ADMIN — ROPINIROLE 2 MILLIGRAM(S): 8 TABLET, FILM COATED, EXTENDED RELEASE ORAL at 17:39

## 2019-10-01 RX ADMIN — OXYCODONE HYDROCHLORIDE 20 MILLIGRAM(S): 5 TABLET ORAL at 22:53

## 2019-10-01 RX ADMIN — OXYCODONE HYDROCHLORIDE 20 MILLIGRAM(S): 5 TABLET ORAL at 22:03

## 2019-10-01 RX ADMIN — Medication 30 MILLIGRAM(S): at 06:34

## 2019-10-01 RX ADMIN — LISINOPRIL 10 MILLIGRAM(S): 2.5 TABLET ORAL at 06:34

## 2019-10-01 RX ADMIN — ENOXAPARIN SODIUM 40 MILLIGRAM(S): 100 INJECTION SUBCUTANEOUS at 11:53

## 2019-10-01 RX ADMIN — OXYCODONE HYDROCHLORIDE 80 MILLIGRAM(S): 5 TABLET ORAL at 06:33

## 2019-10-01 RX ADMIN — OXYCODONE HYDROCHLORIDE 80 MILLIGRAM(S): 5 TABLET ORAL at 17:39

## 2019-10-01 RX ADMIN — GABAPENTIN 600 MILLIGRAM(S): 400 CAPSULE ORAL at 06:34

## 2019-10-01 RX ADMIN — OXYCODONE HYDROCHLORIDE 80 MILLIGRAM(S): 5 TABLET ORAL at 06:35

## 2019-10-01 RX ADMIN — GABAPENTIN 600 MILLIGRAM(S): 400 CAPSULE ORAL at 21:41

## 2019-10-01 RX ADMIN — ROPINIROLE 2 MILLIGRAM(S): 8 TABLET, FILM COATED, EXTENDED RELEASE ORAL at 06:34

## 2019-10-01 NOTE — OCCUPATIONAL THERAPY INITIAL EVALUATION ADULT - PLANNED THERAPY INTERVENTIONS, OT EVAL
ADL retraining/balance training/IADL retraining/bed mobility training/parent/caregiver training.../strengthening/transfer training/motor coordination training

## 2019-10-01 NOTE — PROGRESS NOTE ADULT - SUBJECTIVE AND OBJECTIVE BOX
49 year old male patient with past medical history of relapsing remitting MS, is last flare was around 2 months ago, HTN and chronic lower back pain  presented for lower extremity weakness.   The patient states that his weakness started 3 days ago and has been getting worse and has been having increasing trouble ambulating, he also notes some numbness of the lower extremities, he denies fever / chills, urinary incontinence or retention, denies symptoms at the upper extremities, headache, vision changes or diplopia.   The patient follows with Dr. Bledsoe and per the records was on Natalizumab in the past. The patient was admitted in August for lower extremity weakness and gait imbalance, MRI of the head, cervical and thoracic spine was done in August and showed extensive white matter signal abnormality, no active demyelination. The patient was discharged and told to follow up with Dr. Bledsoe   The patient is being admitted to rule out MS flare.    PAST MEDICAL & SURGICAL HISTORY:  Hypertension  Chronic back pain  Multiple sclerosis  No significant past surgical history      OVERNIGHT EVENTS: Patient exhibited violent behavior, at risk of self harm. Patient tried to chew an ambien to powder and then snort it. Patient was admitted last time for cocaine overdose and urine cocaine was positive. Repeat UDS is sent.    SUBJECTIVE / INTERVAL HPI: Patient seen and examined at bedside.     VITAL SIGNS:  Vital Signs Last 24 Hrs  T(C): 35.6 (01 Oct 2019 07:33), Max: 37.8 (30 Sep 2019 15:35)  T(F): 96.1 (01 Oct 2019 07:33), Max: 100.1 (30 Sep 2019 15:35)  HR: 68 (01 Oct 2019 07:33) (68 - 81)  BP: 135/83 (01 Oct 2019 07:33) (108/61 - 135/83)  BP(mean): --  RR: 16 (01 Oct 2019 07:33) (16 - 18)  SpO2: --    PHYSICAL EXAM:    General: WDWN  HEENT: NC/AT; PERRL, anicteric sclera; MMM  Neck: supple  Cardiovascular: +S1/S2, RRR  Respiratory: CTA B/L; no W/R/R  Gastrointestinal: soft, NT/ND; +BSx4  Extremities:lower extremity weakness; no edema, clubbing or cyanosis  Vascular: 2+ radial, DP/PT pulses B/L  Neurological: AAOx3; no focal deficits    MEDICATIONS:  MEDICATIONS  (STANDING):  enoxaparin Injectable 40 milliGRAM(s) SubCutaneous daily  gabapentin 600 milliGRAM(s) Oral every 8 hours  lisinopril 10 milliGRAM(s) Oral daily  methylPREDNISolone sodium succinate IVPB 1000 milliGRAM(s) IV Intermittent daily  NIFEdipine XL 30 milliGRAM(s) Oral daily  oxyCODONE  ER Tablet 80 milliGRAM(s) Oral every 12 hours  pantoprazole    Tablet 40 milliGRAM(s) Oral before breakfast  rOPINIRole 2 milliGRAM(s) Oral two times a day    MEDICATIONS  (PRN):  oxyCODONE    IR 20 milliGRAM(s) Oral every 6 hours PRN Severe Pain (7 - 10)  zolpidem 5 milliGRAM(s) Oral at bedtime PRN Insomnia  zolpidem 5 milliGRAM(s) Oral at bedtime PRN Insomnia      ALLERGIES:  Allergies    penicillin (Unknown)    Intolerances        LABS:                        12.5   11.57 )-----------( 303      ( 01 Oct 2019 05:47 )             37.1     10-01    136  |  99  |  14  ----------------------------<  211<H>  5.3<H>   |  30  |  0.7    Ca    9.3      01 Oct 2019 05:47          CAPILLARY BLOOD GLUCOSE      POCT Blood Glucose.: 125 mg/dL (01 Oct 2019 08:06)      RADIOLOGY & ADDITIONAL TESTS: Reviewed.

## 2019-10-01 NOTE — PROGRESS NOTE ADULT - ASSESSMENT
49 year old male patient with past medical history of relapsing remitting MS, states that his last flare was around 2 months ago, HTN and chronic lower back pain  presented for lower extremity weakness and admitted 3 days ago for ruling out MS flare. Patient seen and examined today, he is mildly alert and oriented to time, place and person. He does not have any active complains other than lower extremity weakness  motor in origin. Patient exhibited violent behavior, at risk of self harm last night. Patient tried to chew an ambien to powder and then snort it. Patient was admitted last time for cocaine overdose and urine cocaine was positive. He is placed on one to one observation. Repeat UDS is sent.      # Bilateral lower extremity weakness in the setting of relapsing remitting MS   - Recent admission in August for same complaints, MR head cervical and thoracic spine showed white matter signal abnormalities but no findings of active demyelination   - UA showing +RBCS+ UCx pending  - started IV Solumedrol for 4 days 1g IVPB with PPI and FS monitoring, 3 more doses to go  - MRI brain w/o/w gado  1.  Extensive white matter signal abnormality compatible the patient's   clinical history of multiple sclerosis. Interval development of multiple   new enhancing and nonenhancing lesions since the prior exam dated   8/5/2019.   2.  New enhancing lesions noted in the right posterior frontal   subcortical region, the right anterior frontal region, the left   postcentral gyrus, the right anterior temporal lobe, and small lesions in   bilateral corona radiata. These are compatible with active demyelination.  3.  New nonenhancing lesions noted within the left dorsal carlene and the   right basal ganglia.  4.  Redemonstrated parenchymal atrophy, and corpus callosum signal   abnormality and volume loss.  - cont gabapentin and requip at current dose as per neuro  - F/U with neuro Dr. Bledsoe     #Possible cocaine abuse  -self harm risk behavior displayed  -UDS sent  -Positive cocaine abuse on last visit too    # HTN   - Continue Nifedipine and Lisinopril     # Chronic lower back pain   -oxycodone/oxycontin prn   - home doses.     # Leucocytosis likely  2/2 steroids.   - No evidence of active infection, f/u blood culture  - Recent steroid use   -11 k today, 14k yesterday from 15k    # Miscellaneous   - DVT prophylaxis : Lovenox   - GI prophylaxis : Protonix   - Ambulate with assistance   - DASH TLC diet   - Full code

## 2019-10-01 NOTE — PROGRESS NOTE ADULT - ASSESSMENT
Pt seen. No new c/o.  Had 1st IV Solumedrol yesterday - 9/30/19 - tolerated well.  Awake, alert, speech fluent, followed commands. Motor 4/5 x 2 UE, 4-/5 RLE, 3/5 LLE. Gait - deferred.  Had MRI T/L spine - reports are pend.    A/P. Worsening of legs weakness, difficulty walking.  Hx SPMS. Non compliant w/ treatment - Tysabri     MS exacerbation (new lesions on Brain MRI  - cont. IV Solumedrol (#2/5 today)  - follow MRI T/L spine reports - p.  - PT Pt seen. No new c/o.  Had 2st IV Solumedrol yesterday - 9/30/19 - tolerated well. Of note pt got 1st dose on Sat 9/28/19  Awake, alert, speech fluent, followed commands. Motor 4/5 x 2 UE, 4-/5 RLE, 3/5 LLE. Gait - deferred.  Had MRI T/L spine - reports are pend.    A/P. Worsening of legs weakness, difficulty walking.  Hx SPMS. Non compliant w/ treatment - Tysabri     MS exacerbation (new lesions on Brain MRI) MRI C-spine - no new lesions  - cont. IV Solumedrol (#3/5 today)  - follow MRI T/L spine reports - p.  - PT

## 2019-10-01 NOTE — PROGRESS NOTE ADULT - SUBJECTIVE AND OBJECTIVE BOX
Neurology consult    KEVIN [id number]    HPI:  49 year old male patient with past medical history of relapsing remitting MS, states that his last flare was around 2 months ago, HTN and chronic lower back pain  presented for lower extremity weakness.   The patient states that his weakness started 3 days ago and has been getting worse and has been having increasing trouble ambulating, he also notes some numbness of the lower extremities, he denies fever / chills, urinary incontinence or retention, denies symptoms at the upper extremities, headache, vision changes or diplopia.   The patient follows with Dr. Bledsoe and per the records was on Natalizumab in the past. The patient was admitted in August for lower extremity weakness and gait imbalance, MRI of the head, cervical and thoracic spine was done in August and showed extensive white matter signal abnormality, no active demyelination. The patient was discharged and told to follow up with Dr. Bledsoe   The patient is being admitted to rule out MS flare (28 Sep 2019 02:41)          MEDICATIONS    enoxaparin Injectable 40 milliGRAM(s) SubCutaneous daily  gabapentin 600 milliGRAM(s) Oral every 8 hours  lisinopril 10 milliGRAM(s) Oral daily  methylPREDNISolone sodium succinate IVPB 1000 milliGRAM(s) IV Intermittent daily  NIFEdipine XL 30 milliGRAM(s) Oral daily  oxyCODONE    IR 20 milliGRAM(s) Oral every 6 hours PRN  oxyCODONE  ER Tablet 80 milliGRAM(s) Oral every 12 hours  pantoprazole    Tablet 40 milliGRAM(s) Oral before breakfast  rOPINIRole 2 milliGRAM(s) Oral two times a day  zolpidem 5 milliGRAM(s) Oral at bedtime PRN  zolpidem 5 milliGRAM(s) Oral at bedtime PRN      PAST MEDICAL & SURGICAL HISTORY:  Hypertension  Chronic back pain  Multiple sclerosis  No significant past surgical history       Family history: No history of dementia, strokes, or seizures   FAMILY HISTORY:  No pertinent family history in first degree relatives    SOCIAL HISTORY --     Allergies    penicillin (Unknown)    Intolerances            Vital Signs Last 24 Hrs  T(C): 35.6 (01 Oct 2019 07:33), Max: 37.8 (30 Sep 2019 15:35)  T(F): 96.1 (01 Oct 2019 07:33), Max: 100.1 (30 Sep 2019 15:35)  HR: 68 (01 Oct 2019 07:33) (68 - 81)  BP: 135/83 (01 Oct 2019 07:33) (108/61 - 135/83)  BP(mean): --  RR: 16 (01 Oct 2019 07:33) (16 - 18)  SpO2: --    MULTIPLE SCLEROSIS  ^PAIN AND WEAKNESS FROM MS  No pertinent family history in first degree relatives  Handoff  MEWS Score  Hypertension  Chronic back pain  Multiple sclerosis  Multiple sclerosis  No significant past surgical history  PAIN AND WEAKNESS FROM MS  51                    LABS:  CBC Full  -  ( 01 Oct 2019 05:47 )  WBC Count : 11.57 K/uL  RBC Count : 4.26 M/uL  Hemoglobin : 12.5 g/dL  Hematocrit : 37.1 %  Platelet Count - Automated : 303 K/uL      10-01    136  |  99  |  14  ----------------------------<  211<H>  5.3<H>   |  30  |  0.7    Ca    9.3      01 Oct 2019 05:47

## 2019-10-01 NOTE — PROGRESS NOTE ADULT - ATTENDING COMMENTS
I saw and examined the patient independently. I agree with above history, physical exam and plan of care which I have reviewed and edited where appropriate with following additions. I saw and examined the patient independently. I agree with above history, physical exam and plan of care which I have reviewed and edited where appropriate with following additions.    has no new complaints/ was aggressive last night/ on 1:1 observation now.     MS flare up with new lesions on brain MRI:   c/w IV steroids/3 more doses left   c/w ppi with steroids  neurology following  physiatry - needs 4A rehab.     h/o drug /cocaine use with behavioral disturbances:   on 1: 1 observation now   send utox.     chronic back pain:   follows with pain management as OP   on narcotics chronically     dvt ppx.     Progress note Handoff:   Pending: completion of IV steroids.   Discussion: patient   Disposition: 4A likely

## 2019-10-02 LAB
ANION GAP SERPL CALC-SCNC: 9 MMOL/L — SIGNIFICANT CHANGE UP (ref 7–14)
BASOPHILS # BLD AUTO: 0.02 K/UL — SIGNIFICANT CHANGE UP (ref 0–0.2)
BASOPHILS NFR BLD AUTO: 0.1 % — SIGNIFICANT CHANGE UP (ref 0–1)
BUN SERPL-MCNC: 20 MG/DL — SIGNIFICANT CHANGE UP (ref 10–20)
CALCIUM SERPL-MCNC: 9 MG/DL — SIGNIFICANT CHANGE UP (ref 8.5–10.1)
CHLORIDE SERPL-SCNC: 100 MMOL/L — SIGNIFICANT CHANGE UP (ref 98–110)
CO2 SERPL-SCNC: 27 MMOL/L — SIGNIFICANT CHANGE UP (ref 17–32)
CREAT SERPL-MCNC: 0.6 MG/DL — LOW (ref 0.7–1.5)
EOSINOPHIL # BLD AUTO: 0.01 K/UL — SIGNIFICANT CHANGE UP (ref 0–0.7)
EOSINOPHIL NFR BLD AUTO: 0.1 % — SIGNIFICANT CHANGE UP (ref 0–8)
GLUCOSE BLDC GLUCOMTR-MCNC: 100 MG/DL — HIGH (ref 70–99)
GLUCOSE BLDC GLUCOMTR-MCNC: 125 MG/DL — HIGH (ref 70–99)
GLUCOSE BLDC GLUCOMTR-MCNC: 165 MG/DL — HIGH (ref 70–99)
GLUCOSE BLDC GLUCOMTR-MCNC: 173 MG/DL — HIGH (ref 70–99)
GLUCOSE SERPL-MCNC: 112 MG/DL — HIGH (ref 70–99)
HCT VFR BLD CALC: 35.5 % — LOW (ref 42–52)
HGB BLD-MCNC: 12 G/DL — LOW (ref 14–18)
IMM GRANULOCYTES NFR BLD AUTO: 0.8 % — HIGH (ref 0.1–0.3)
LYMPHOCYTES # BLD AUTO: 1.88 K/UL — SIGNIFICANT CHANGE UP (ref 1.2–3.4)
LYMPHOCYTES # BLD AUTO: 10.9 % — LOW (ref 20.5–51.1)
MCHC RBC-ENTMCNC: 29.3 PG — SIGNIFICANT CHANGE UP (ref 27–31)
MCHC RBC-ENTMCNC: 33.8 G/DL — SIGNIFICANT CHANGE UP (ref 32–37)
MCV RBC AUTO: 86.8 FL — SIGNIFICANT CHANGE UP (ref 80–94)
MONOCYTES # BLD AUTO: 1.04 K/UL — HIGH (ref 0.1–0.6)
MONOCYTES NFR BLD AUTO: 6 % — SIGNIFICANT CHANGE UP (ref 1.7–9.3)
NEUTROPHILS # BLD AUTO: 14.23 K/UL — HIGH (ref 1.4–6.5)
NEUTROPHILS NFR BLD AUTO: 82.1 % — HIGH (ref 42.2–75.2)
NRBC # BLD: 0 /100 WBCS — SIGNIFICANT CHANGE UP (ref 0–0)
PLATELET # BLD AUTO: 281 K/UL — SIGNIFICANT CHANGE UP (ref 130–400)
POTASSIUM SERPL-MCNC: 4.5 MMOL/L — SIGNIFICANT CHANGE UP (ref 3.5–5)
POTASSIUM SERPL-SCNC: 4.5 MMOL/L — SIGNIFICANT CHANGE UP (ref 3.5–5)
RBC # BLD: 4.09 M/UL — LOW (ref 4.7–6.1)
RBC # FLD: 13 % — SIGNIFICANT CHANGE UP (ref 11.5–14.5)
SODIUM SERPL-SCNC: 136 MMOL/L — SIGNIFICANT CHANGE UP (ref 135–146)
WBC # BLD: 17.31 K/UL — HIGH (ref 4.8–10.8)
WBC # FLD AUTO: 17.31 K/UL — HIGH (ref 4.8–10.8)

## 2019-10-02 PROCEDURE — 99233 SBSQ HOSP IP/OBS HIGH 50: CPT

## 2019-10-02 RX ADMIN — Medication 12.5 MILLIGRAM(S): at 06:27

## 2019-10-02 RX ADMIN — Medication 30 MILLIGRAM(S): at 06:28

## 2019-10-02 RX ADMIN — OXYCODONE HYDROCHLORIDE 80 MILLIGRAM(S): 5 TABLET ORAL at 06:28

## 2019-10-02 RX ADMIN — PANTOPRAZOLE SODIUM 40 MILLIGRAM(S): 20 TABLET, DELAYED RELEASE ORAL at 06:28

## 2019-10-02 RX ADMIN — LISINOPRIL 10 MILLIGRAM(S): 2.5 TABLET ORAL at 06:28

## 2019-10-02 RX ADMIN — GABAPENTIN 600 MILLIGRAM(S): 400 CAPSULE ORAL at 14:42

## 2019-10-02 RX ADMIN — GABAPENTIN 600 MILLIGRAM(S): 400 CAPSULE ORAL at 21:11

## 2019-10-02 RX ADMIN — ROPINIROLE 2 MILLIGRAM(S): 8 TABLET, FILM COATED, EXTENDED RELEASE ORAL at 18:25

## 2019-10-02 RX ADMIN — OXYCODONE HYDROCHLORIDE 80 MILLIGRAM(S): 5 TABLET ORAL at 06:41

## 2019-10-02 RX ADMIN — OXYCODONE HYDROCHLORIDE 80 MILLIGRAM(S): 5 TABLET ORAL at 18:25

## 2019-10-02 RX ADMIN — ENOXAPARIN SODIUM 40 MILLIGRAM(S): 100 INJECTION SUBCUTANEOUS at 14:42

## 2019-10-02 RX ADMIN — OXYCODONE HYDROCHLORIDE 80 MILLIGRAM(S): 5 TABLET ORAL at 19:15

## 2019-10-02 RX ADMIN — GABAPENTIN 600 MILLIGRAM(S): 400 CAPSULE ORAL at 06:28

## 2019-10-02 RX ADMIN — ROPINIROLE 2 MILLIGRAM(S): 8 TABLET, FILM COATED, EXTENDED RELEASE ORAL at 06:28

## 2019-10-02 NOTE — PROGRESS NOTE ADULT - SUBJECTIVE AND OBJECTIVE BOX
CHIEF COMPLAINT:    MS exacerbation, legs weakness    INTERVAL HISTORY:    No new events. Legs' weakness improving, on IV Solumedrol now, today is day#4/5.  Chart reviewed.        REVIEW OF SYSTEMS:  As per HPI, otherwise negative for Constitutional, Eyes, Ears/Nose/Mouth/Throat, Neck, Cardiovascular, Respiratory, Gastrointestinal, Genitourinary, Skin, Endocrine, Musculoskeletal, Psychiatric, and Hematologic/Lymphatic.    VITAL SIGNS:  Vital Signs Last 24 Hrs  T(C): 35.9 (02 Oct 2019 07:26), Max: 36.2 (01 Oct 2019 16:45)  T(F): 96.6 (02 Oct 2019 07:26), Max: 97.2 (01 Oct 2019 16:45)  HR: 65 (02 Oct 2019 07:26) (65 - 82)  BP: 139/86 (02 Oct 2019 07:26) (128/81 - 153/93)  BP(mean): --  RR: 18 (02 Oct 2019 07:26) (17 - 18)  SpO2: --    MEDICATIONS  (STANDING):  enoxaparin Injectable 40 milliGRAM(s) SubCutaneous daily  gabapentin 600 milliGRAM(s) Oral every 8 hours  lisinopril 10 milliGRAM(s) Oral daily  methylPREDNISolone sodium succinate IVPB 1000 milliGRAM(s) IV Intermittent daily  NIFEdipine XL 30 milliGRAM(s) Oral daily  oxyCODONE  ER Tablet 80 milliGRAM(s) Oral every 12 hours  pantoprazole    Tablet 40 milliGRAM(s) Oral before breakfast  rOPINIRole 2 milliGRAM(s) Oral two times a day    MEDICATIONS  (PRN):  oxyCODONE    IR 20 milliGRAM(s) Oral every 6 hours PRN Severe Pain (7 - 10)  zolpidem 5 milliGRAM(s) Oral at bedtime PRN Insomnia  zolpidem 5 milliGRAM(s) Oral at bedtime PRN Insomnia    PHYSICAL EXAMINATION:  General:  Well-developed, well nourished, in no acute distress.  Eyes: Conjunctiva and sclera clear.  Cardiovascular: Regular rate and rhythm; S1 and S2 Normal; No murmurs, gallops or rubs.  Neurologic:  - Mental Status:  Alert, awake, oriented to person, place, and time; Speech is fluent with intact naming, repetition, and comprehension.  - Cranial Nerves II-XII:  PERRLA, EOMI, no nystagmus, face simmetrical, hearing intact to finger rub bl, tongue midline  - Motor:  Strength is 5/5 both arms, 4-/5 both legs, DTRs 3+ all, toes equivocal bl.  There is no pronator drift.  Normal muscle bulk and  spastic tone in legs.  - Sensory:  Intact to light touch  sense throughout.  - Coordination:  Finger-nose-finger  with dysmetria bl, and heel-knee-shin ataxia bl.    - Gait:  very unsteady      LABS:                         12.0   17.31 )-----------( 281      ( 02 Oct 2019 05:53 )             35.5     10-02    136  |  100  |  20  ----------------------------<  112<H>  4.5   |  27  |  0.6<L>    Ca    9.0      02 Oct 2019 05:53    Red Blood Cell - Urine: 448 /HPF (09.28.19 @ 13:14)    Urine Microscopic-Add On (NC) (09.28.19 @ 13:14)    Red Blood Cell - Urine: 448 /HPF    White Blood Cell - Urine: 3 /HPF    Hyaline Casts: 0 /LPF    Bacteria: Occasional    Epithelial Cells: 3 /HPF      RADIOLOGY & ADDITIONAL STUDIES:      < from: MR Lumbar Spine w/wo IV Cont (09.30.19 @ 13:15) >  1.  No evidence of abnormal signal or enhancement within the distal   spinal cord.  2.  Mild lumbar spine degenerative changes, essentially stable since the   prior exam.  < end of copied text >    < from: MR Thoracic Spine w/wo IV Cont (09.30.19 @ 13:15) >  Multiple patchy foci of signal abnormality within the thoracic spinal   cord, likely stable since the prior exam given motion artifact on the   previous study. No enhancing lesions.  < end of copied text >    < from: MR Cervical Spine w/wo IV Cont (09.29.19 @ 11:57) >  Motion limited study. No definite evidence of abnormal signal or   enhancement within the cervical spinal cord. The previously described   foci of spinal cord signal abnormality are not visualized likely due to   the motion artifact.  < end of copied text >      IMPRESSION & PLAN:    49 year old male patient with past medical history of relapsing remitting MS, states that his last flare was around 2 months ago, HTN and chronic lower back pain  presented for worsening of lower extremities weakness.   - possible SPMS exacerbation, r/o infectio (e.g. UTI) - has elevated WBC - 13  - chr LBP  - h/o RLS, HTN    Plan:  - cont  IV Solumedrol for 5 days total  - cont gabapentin at current dose  - cont Requip at current dose  - PT/rehab eval  - soc services eval  - pt is neurologically stable for d/c after the last dose of Solumedrol - to rehab or home on Prednisone taper - 60mg po q24x3 ds, 40mg po q24x3 ds, 20mg po q24x3 days, 10mg po q24 x3 days - stop.  - f/u with neurology as OP after d/c  - please call us for any questions

## 2019-10-02 NOTE — PROGRESS NOTE ADULT - SUBJECTIVE AND OBJECTIVE BOX
49 year old male patient with past medical history of relapsing remitting MS, is last flare was around 2 months ago, HTN and chronic lower back pain  presented for lower extremity weakness.   The patient states that his weakness started 3 days ago and has been getting worse and has been having increasing trouble ambulating, he also notes some numbness of the lower extremities, he denies fever / chills, urinary incontinence or retention, denies symptoms at the upper extremities, headache, vision changes or diplopia.   The patient follows with Dr. Bledsoe and per the records was on Natalizumab in the past. The patient was admitted in August for lower extremity weakness and gait imbalance, MRI of the head, cervical and thoracic spine was done in August and showed extensive white matter signal abnormality, no active demyelination. The patient was discharged and told to follow up with Dr. Bledsoe   The patient is being admitted to rule out MS flare.    PAST MEDICAL & SURGICAL HISTORY:  Hypertension  Chronic back pain  Multiple sclerosis  No significant past surgical history    OVERNIGHT EVENTS: ns    SUBJECTIVE / INTERVAL HPI: Patient seen and examined at bedside.     VITAL SIGNS:  Vital Signs Last 24 Hrs  T(C): 35.9 (02 Oct 2019 00:31), Max: 36.2 (01 Oct 2019 16:45)  T(F): 96.7 (02 Oct 2019 00:31), Max: 97.2 (01 Oct 2019 16:45)  HR: 67 (02 Oct 2019 06:28) (67 - 82)  BP: 128/81 (02 Oct 2019 06:28) (128/81 - 153/93)  BP(mean): --  RR: 18 (02 Oct 2019 00:31) (16 - 18)      PHYSICAL EXAM:    General: WDWN  HEENT: NC/AT; PERRL, anicteric sclera; MMM  Neck: supple  Cardiovascular: +S1/S2, RRR  Respiratory: CTA B/L; no W/R/R  Gastrointestinal: soft, NT/ND; +BSx4  Extremities: lower extremities weakening; no edema, clubbing or cyanosis  Vascular: 2+ radial, DP/PT pulses B/L  Neurological: AAOx3; no focal deficits    MEDICATIONS:  MEDICATIONS  (STANDING):  enoxaparin Injectable 40 milliGRAM(s) SubCutaneous daily  gabapentin 600 milliGRAM(s) Oral every 8 hours  lisinopril 10 milliGRAM(s) Oral daily  methylPREDNISolone sodium succinate IVPB 1000 milliGRAM(s) IV Intermittent daily  NIFEdipine XL 30 milliGRAM(s) Oral daily  oxyCODONE  ER Tablet 80 milliGRAM(s) Oral every 12 hours  pantoprazole    Tablet 40 milliGRAM(s) Oral before breakfast  rOPINIRole 2 milliGRAM(s) Oral two times a day    MEDICATIONS  (PRN):  oxyCODONE    IR 20 milliGRAM(s) Oral every 6 hours PRN Severe Pain (7 - 10)  zolpidem 5 milliGRAM(s) Oral at bedtime PRN Insomnia  zolpidem 5 milliGRAM(s) Oral at bedtime PRN Insomnia      ALLERGIES:  Allergies    penicillin (Unknown)    Intolerances        LABS:                        12.0   17.31 )-----------( 281      ( 02 Oct 2019 05:53 )             35.5     10-02    136  |  100  |  20  ----------------------------<  112<H>  4.5   |  27  |  0.6<L>    Ca    9.0      02 Oct 2019 05:53          CAPILLARY BLOOD GLUCOSE      POCT Blood Glucose.: 184 mg/dL (01 Oct 2019 20:52)      RADIOLOGY & ADDITIONAL TESTS: Reviewed.

## 2019-10-02 NOTE — PROGRESS NOTE ADULT - ASSESSMENT
49 year old male patient with past medical history of relapsing remitting MS, states that his last flare was around 2 months ago, HTN and chronic lower back pain  presented for lower extremity weakness and admitted 3 days ago for ruling out MS flare. Patient seen and examined today, he is mildly alert and oriented to time, place and person. He does not have any active complains other than lower extremity weakness which is motor in origin.     # Bilateral lower extremity weakness in the setting of relapsing remitting MS   - Recent admission in August for same complaints, MR head cervical and thoracic spine showed white matter signal abnormalities but no findings of active demyelination   - UA showing +RBCS+ UCx pending, samples contaminated  - started IV Solumedrol for 4 days 1g IVPB with PPI and FS monitoring, 2 more doses to go  - MRI brain w/o/w gado  1.  Extensive white matter signal abnormality compatible the patient's   clinical history of multiple sclerosis. Interval development of multiple   new enhancing and nonenhancing lesions since the prior exam dated   8/5/2019.   2.  New enhancing lesions noted in the right posterior frontal   subcortical region, the right anterior frontal region, the left   postcentral gyrus, the right anterior temporal lobe, and small lesions in   bilateral corona radiata. These are compatible with active demyelination.  3.  New nonenhancing lesions noted within the left dorsal carlene and the   right basal ganglia.  4.  Redemonstrated parenchymal atrophy, and corpus callosum signal   abnormality and volume loss.  - cont gabapentin and requip at current dose as per neuro  - F/U with neuro Dr. Bledsoe     #Possible cocaine abuse  -self harm risk behavior displayed  -UDS could not be collected  -Positive cocaine abuse on last visit too    # HTN   - Continue Nifedipine and Lisinopril     # Chronic lower back pain   -oxycodone/oxycontin prn   - home doses.     # Leucocytosis likely  2/2 steroids.   - No evidence of active infection, f/u blood culture  - Recent steroid use   -17 k today, afebrile    # Miscellaneous   - DVT prophylaxis : Lovenox   - GI prophylaxis : Protonix   - Ambulate with assistance   - DASH TLC diet   - Full code

## 2019-10-02 NOTE — PROGRESS NOTE ADULT - SUBJECTIVE AND OBJECTIVE BOX
Progress Note:  Provider Speciality                            Hospitalist      KEVIN MEDRANO [id number] 49y Male     CHIEF PRESENTING COMPLAINT:  Patient is a 49y old  Male who presents with a chief complaint of Bilateral lower extremity weakness (02 Oct 2019 10:01)        SUBJECTIVE:  Patient was seen and examined at bedside. Reports improvement in  presenting complaint. No significant overnight events reported.     HISTORY OF PRESENTING ILLNESS:  HPI:  49 year old male patient with past medical history of relapsing remitting MS, states that his last flare was around 2 months ago, HTN and chronic lower back pain  presented for lower extremity weakness.   The patient states that his weakness started 3 days ago and has been getting worse and has been having increasing trouble ambulating, he also notes some numbness of the lower extremities, he denies fever / chills, urinary incontinence or retention, denies symptoms at the upper extremities, headache, vision changes or diplopia.   The patient follows with Dr. Bledsoe and per the records was on Natalizumab in the past. The patient was admitted in August for lower extremity weakness and gait imbalance, MRI of the head, cervical and thoracic spine was done in August and showed extensive white matter signal abnormality, no active demyelination. The patient was discharged and told to follow up with Dr. Bledsoe   The patient is being admitted to rule out MS flare (28 Sep 2019 02:41)        REVIEW OF SYSTEMS:  Patient denies any headache, any vision complaints, runny nose, fever, chills, sore throat. Denies chest pain, shortness of breath, palpitation. Denies nausea, vomiting, abdominal pain, diarrhoea, Denies urinary burning, urgency, frequency, dysuria. Denies weakness in any part of the body or numbness.   At least 10 systems were reviewed in ROS. All systems reviewed  are within normal limits except for the complaints as described in Subjective.    PAST MEDICAL & SURGICAL HISTORY:  PAST MEDICAL & SURGICAL HISTORY:  Hypertension  Chronic back pain  Multiple sclerosis  No significant past surgical history          VITAL SIGNS:  Vital Signs Last 24 Hrs  T(C): 35.9 (02 Oct 2019 07:26), Max: 36.2 (01 Oct 2019 16:45)  T(F): 96.6 (02 Oct 2019 07:26), Max: 97.2 (01 Oct 2019 16:45)  HR: 65 (02 Oct 2019 07:26) (65 - 82)  BP: 139/86 (02 Oct 2019 07:26) (128/81 - 153/93)  BP(mean): --  RR: 18 (02 Oct 2019 07:26) (17 - 18)  SpO2: --          PHYSICAL EXAMINATION:  Not in acute distress  General: No pallor, or icterus, afebrile  HEENT:   EOMI, no JVD, no Bruit.  Heart: S1+S2 audible, no murmur  Lungs: bilateral  fair air entry, no wheezing, no crepitations.  Abdomen: Soft, non-tender, non-distended , no  rigidity or guarding.  CNS: AAOx3, CN  grossly intact.  Extremities:  No edema            CONSULTS:  Consultant(s) Notes Reviewed by me.   Care Discussed with Consultants/Other Providers where required.        MEDICATIONS:  MEDICATIONS  (STANDING):  enoxaparin Injectable 40 milliGRAM(s) SubCutaneous daily  gabapentin 600 milliGRAM(s) Oral every 8 hours  lisinopril 10 milliGRAM(s) Oral daily  methylPREDNISolone sodium succinate IVPB 1000 milliGRAM(s) IV Intermittent daily  NIFEdipine XL 30 milliGRAM(s) Oral daily  oxyCODONE  ER Tablet 80 milliGRAM(s) Oral every 12 hours  pantoprazole    Tablet 40 milliGRAM(s) Oral before breakfast  rOPINIRole 2 milliGRAM(s) Oral two times a day    MEDICATIONS  (PRN):  oxyCODONE    IR 20 milliGRAM(s) Oral every 6 hours PRN Severe Pain (7 - 10)  zolpidem 5 milliGRAM(s) Oral at bedtime PRN Insomnia  zolpidem 5 milliGRAM(s) Oral at bedtime PRN Insomnia      LABOROTORY DATA/MICROBIOLOGY/I & O's:                        12.0   17.31 )-----------( 281      ( 02 Oct 2019 05:53 )             35.5     10-02    136  |  100  |  20  ----------------------------<  112<H>  4.5   |  27  |  0.6<L>    Ca    9.0      02 Oct 2019 05:53          CAPILLARY BLOOD GLUCOSE      POCT Blood Glucose.: 100 mg/dL (02 Oct 2019 08:38)  POCT Blood Glucose.: 184 mg/dL (01 Oct 2019 20:52)  POCT Blood Glucose.: 193 mg/dL (01 Oct 2019 16:42)                10-01-19 @ 07:01  -  10-02-19 @ 07:00  --------------------------------------------------------  IN: 1200 mL / OUT: 0 mL / NET: 1200 mL    10-02-19 @ 07:01  -  10-02-19 @ 13:05  --------------------------------------------------------  IN: 236 mL / OUT: 0 mL / NET: 236 mL              ASSESSMENT:    49 year old male patient with past medical history of relapsing remitting MS, states that his last flare was around 2 months ago, HTN and chronic lower back     pain  presented for lower extremity weakness and admitted 3 days ago for ruling out MS flare.       ASSESSMENT:  Principal Diagnosis:  Acute Ms flare with history of relapsing remitting MS  leucocytosis -reactive    Associated Active Comorbid Conditions:  Hypertension   Suspected cocaine abuse  Ch pain syndrome        PLAN:  - complete IV steroids , as per neuro  - MRI brain noted- new enhancing and nonenhancing lesions since the prior exam   - - cont gabapentin and requip at current dose as per neuro  - F/U with neuro Dr. Bledsoe after discharge  -Suspected cocaine abuse- Avoid BB  -Hypertension - Continue Nifedipine and Lisinopril   -Ch pain syndrome-oxycodone/oxycontin prn  -leucocytosis -recative- no ifectious etiology  -GI Prophylaxis: Protonix 40mg PO QQdaily ( On high dose steroids)  DVT Prophylaxis: Heparin 5000 units sc q8hrs   Discharge Disposition: pending completion of IV steroids for MS flare, possibly 4A

## 2019-10-03 VITALS
RESPIRATION RATE: 18 BRPM | DIASTOLIC BLOOD PRESSURE: 76 MMHG | HEART RATE: 80 BPM | SYSTOLIC BLOOD PRESSURE: 145 MMHG | TEMPERATURE: 97 F

## 2019-10-03 LAB
ANION GAP SERPL CALC-SCNC: 12 MMOL/L — SIGNIFICANT CHANGE UP (ref 7–14)
BASOPHILS # BLD AUTO: 0.04 K/UL — SIGNIFICANT CHANGE UP (ref 0–0.2)
BASOPHILS NFR BLD AUTO: 0.2 % — SIGNIFICANT CHANGE UP (ref 0–1)
BUN SERPL-MCNC: 21 MG/DL — HIGH (ref 10–20)
CALCIUM SERPL-MCNC: 9.1 MG/DL — SIGNIFICANT CHANGE UP (ref 8.5–10.1)
CHLORIDE SERPL-SCNC: 96 MMOL/L — LOW (ref 98–110)
CO2 SERPL-SCNC: 28 MMOL/L — SIGNIFICANT CHANGE UP (ref 17–32)
CREAT SERPL-MCNC: 0.8 MG/DL — SIGNIFICANT CHANGE UP (ref 0.7–1.5)
EOSINOPHIL # BLD AUTO: 0 K/UL — SIGNIFICANT CHANGE UP (ref 0–0.7)
EOSINOPHIL NFR BLD AUTO: 0 % — SIGNIFICANT CHANGE UP (ref 0–8)
GLUCOSE BLDC GLUCOMTR-MCNC: 133 MG/DL — HIGH (ref 70–99)
GLUCOSE BLDC GLUCOMTR-MCNC: 140 MG/DL — HIGH (ref 70–99)
GLUCOSE BLDC GLUCOMTR-MCNC: 175 MG/DL — HIGH (ref 70–99)
GLUCOSE SERPL-MCNC: 114 MG/DL — HIGH (ref 70–99)
HCT VFR BLD CALC: 39 % — LOW (ref 42–52)
HGB BLD-MCNC: 13.3 G/DL — LOW (ref 14–18)
IMM GRANULOCYTES NFR BLD AUTO: 1.9 % — HIGH (ref 0.1–0.3)
LYMPHOCYTES # BLD AUTO: 14.1 % — LOW (ref 20.5–51.1)
LYMPHOCYTES # BLD AUTO: 2.28 K/UL — SIGNIFICANT CHANGE UP (ref 1.2–3.4)
MCHC RBC-ENTMCNC: 29.5 PG — SIGNIFICANT CHANGE UP (ref 27–31)
MCHC RBC-ENTMCNC: 34.1 G/DL — SIGNIFICANT CHANGE UP (ref 32–37)
MCV RBC AUTO: 86.5 FL — SIGNIFICANT CHANGE UP (ref 80–94)
MONOCYTES # BLD AUTO: 1.38 K/UL — HIGH (ref 0.1–0.6)
MONOCYTES NFR BLD AUTO: 8.5 % — SIGNIFICANT CHANGE UP (ref 1.7–9.3)
NEUTROPHILS # BLD AUTO: 12.17 K/UL — HIGH (ref 1.4–6.5)
NEUTROPHILS NFR BLD AUTO: 75.3 % — HIGH (ref 42.2–75.2)
NRBC # BLD: 0 /100 WBCS — SIGNIFICANT CHANGE UP (ref 0–0)
PLATELET # BLD AUTO: 314 K/UL — SIGNIFICANT CHANGE UP (ref 130–400)
POTASSIUM SERPL-MCNC: 4.5 MMOL/L — SIGNIFICANT CHANGE UP (ref 3.5–5)
POTASSIUM SERPL-SCNC: 4.5 MMOL/L — SIGNIFICANT CHANGE UP (ref 3.5–5)
RBC # BLD: 4.51 M/UL — LOW (ref 4.7–6.1)
RBC # FLD: 13 % — SIGNIFICANT CHANGE UP (ref 11.5–14.5)
SODIUM SERPL-SCNC: 136 MMOL/L — SIGNIFICANT CHANGE UP (ref 135–146)
WBC # BLD: 16.17 K/UL — HIGH (ref 4.8–10.8)
WBC # FLD AUTO: 16.17 K/UL — HIGH (ref 4.8–10.8)

## 2019-10-03 PROCEDURE — 99239 HOSP IP/OBS DSCHRG MGMT >30: CPT

## 2019-10-03 RX ORDER — GABAPENTIN 400 MG/1
1 CAPSULE ORAL
Qty: 90 | Refills: 0
Start: 2019-10-03

## 2019-10-03 RX ORDER — NIFEDIPINE 30 MG
1 TABLET, EXTENDED RELEASE 24 HR ORAL
Qty: 0 | Refills: 0 | DISCHARGE
Start: 2019-10-03

## 2019-10-03 RX ADMIN — OXYCODONE HYDROCHLORIDE 20 MILLIGRAM(S): 5 TABLET ORAL at 13:48

## 2019-10-03 RX ADMIN — PANTOPRAZOLE SODIUM 40 MILLIGRAM(S): 20 TABLET, DELAYED RELEASE ORAL at 05:36

## 2019-10-03 RX ADMIN — OXYCODONE HYDROCHLORIDE 80 MILLIGRAM(S): 5 TABLET ORAL at 05:35

## 2019-10-03 RX ADMIN — Medication 30 MILLIGRAM(S): at 05:36

## 2019-10-03 RX ADMIN — ROPINIROLE 2 MILLIGRAM(S): 8 TABLET, FILM COATED, EXTENDED RELEASE ORAL at 05:35

## 2019-10-03 RX ADMIN — GABAPENTIN 600 MILLIGRAM(S): 400 CAPSULE ORAL at 05:36

## 2019-10-03 RX ADMIN — ENOXAPARIN SODIUM 40 MILLIGRAM(S): 100 INJECTION SUBCUTANEOUS at 13:35

## 2019-10-03 RX ADMIN — Medication 12.5 MILLIGRAM(S): at 05:35

## 2019-10-03 RX ADMIN — LISINOPRIL 10 MILLIGRAM(S): 2.5 TABLET ORAL at 05:36

## 2019-10-03 RX ADMIN — GABAPENTIN 600 MILLIGRAM(S): 400 CAPSULE ORAL at 13:34

## 2019-10-03 RX ADMIN — OXYCODONE HYDROCHLORIDE 20 MILLIGRAM(S): 5 TABLET ORAL at 14:27

## 2019-10-03 NOTE — DISCHARGE NOTE PROVIDER - NSDCFUADDAPPT_GEN_ALL_CORE_FT
Please call and make an appointment within a week after discharge to follow up with your PMD and neurologist for further management of Multiple sclerosis and routine health care assessments.

## 2019-10-03 NOTE — DISCHARGE NOTE PROVIDER - NSDCCPCAREPLAN_GEN_ALL_CORE_FT
PRINCIPAL DISCHARGE DIAGNOSIS  Diagnosis: Relapsing-remitting multiple sclerosis  Assessment and Plan of Treatment: He presented for lower extremity weakness. The patient follows with Dr. Bledsoe for MS and per the records was on Natalizumab in the past. The patient was admitted in August for lower extremity weakness and gait imbalance, MRI of the head, cervical and thoracic spine was done in August and showed extensive white matter signal abnormality, no active demyelination. The patient was discharged and told to follow up with Dr. Bledsoe. MRI brain w/o/w gado showed new enhancing and non enhancing lesions. Hew PRINCIPAL DISCHARGE DIAGNOSIS  Diagnosis: Relapsing-remitting multiple sclerosis  Assessment and Plan of Treatment: He presented for lower extremity weakness. The patient follows with Dr. Bledsoe for MS and per the records was on Natalizumab in the past. The patient was admitted in August for lower extremity weakness and gait imbalance, MRI of the head, cervical and thoracic spine was done in August and showed extensive white matter signal abnormality, no active demyelination. The patient was discharged and told to follow up with Dr. Bledsoe. MRI brain w/o/w gado showed new enhancing and non enhancing lesions. Continue with steroid taper as indicated and f/u with neurologist dr. Bledsoe outpatient.

## 2019-10-03 NOTE — PROGRESS NOTE ADULT - SUBJECTIVE AND OBJECTIVE BOX
49 year old male patient with past medical history of relapsing remitting MS, is last flare was around 2 months ago, HTN and chronic lower back pain  presented for lower extremity weakness.   The patient states that his weakness started 3 days ago and has been getting worse and has been having increasing trouble ambulating, he also notes some numbness of the lower extremities, he denies fever / chills, urinary incontinence or retention, denies symptoms at the upper extremities, headache, vision changes or diplopia.   The patient follows with Dr. Bledsoe and per the records was on Natalizumab in the past. The patient was admitted in August for lower extremity weakness and gait imbalance, MRI of the head, cervical and thoracic spine was done in August and showed extensive white matter signal abnormality, no active demyelination. The patient was discharged and told to follow up with Dr. Bledsoe   The patient is being admitted to rule out MS flare.    PAST MEDICAL & SURGICAL HISTORY:  Hypertension  Chronic back pain  Multiple sclerosis  No significant past surgical history    OVERNIGHT EVENTS:    SUBJECTIVE / INTERVAL HPI: Patient seen and examined at bedside.     VITAL SIGNS:  Vital Signs Last 24 Hrs  T(C): 36.2 (02 Oct 2019 23:45), Max: 36.3 (02 Oct 2019 15:18)  T(F): 97.1 (02 Oct 2019 23:45), Max: 97.3 (02 Oct 2019 15:18)  HR: 75 (02 Oct 2019 23:45) (75 - 75)  BP: 130/73 (02 Oct 2019 23:45) (124/84 - 130/73)  BP(mean): --  RR: 18 (02 Oct 2019 23:45) (18 - 18)      PHYSICAL EXAM:    General: WDWN  HEENT: NC/AT; PERRL, anicteric sclera; MMM  Neck: supple  Cardiovascular: +S1/S2, RRR  Respiratory: CTA B/L; no W/R/R  Gastrointestinal: soft, NT/ND; +BSx4  Extremities: Weakness in LE; no edema, clubbing or cyanosis  Vascular: 2+ radial, DP/PT pulses B/L  Neurological: AAOx3; no focal deficits    MEDICATIONS:  MEDICATIONS  (STANDING):  enoxaparin Injectable 40 milliGRAM(s) SubCutaneous daily  gabapentin 600 milliGRAM(s) Oral every 8 hours  lisinopril 10 milliGRAM(s) Oral daily  NIFEdipine XL 30 milliGRAM(s) Oral daily  oxyCODONE  ER Tablet 80 milliGRAM(s) Oral every 12 hours  pantoprazole    Tablet 40 milliGRAM(s) Oral before breakfast  rOPINIRole 2 milliGRAM(s) Oral two times a day    MEDICATIONS  (PRN):  oxyCODONE    IR 20 milliGRAM(s) Oral every 6 hours PRN Severe Pain (7 - 10)  zolpidem 5 milliGRAM(s) Oral at bedtime PRN Insomnia  zolpidem 5 milliGRAM(s) Oral at bedtime PRN Insomnia      ALLERGIES:  Allergies    penicillin (Unknown)    Intolerances        LABS:                        13.3   16.17 )-----------( 314      ( 03 Oct 2019 05:20 )             39.0     10-03    136  |  96<L>  |  21<H>  ----------------------------<  114<H>  4.5   |  28  |  0.8    Ca    9.1      03 Oct 2019 05:20          CAPILLARY BLOOD GLUCOSE      POCT Blood Glucose.: 140 mg/dL (03 Oct 2019 09:26)      RADIOLOGY & ADDITIONAL TESTS: Reviewed. 49 year old male patient with past medical history of relapsing remitting MS, is last flare was around 2 months ago, HTN and chronic lower back pain  presented for lower extremity weakness.   The patient states that his weakness started 3 days ago and has been getting worse and has been having increasing trouble ambulating, he also notes some numbness of the lower extremities, he denies fever / chills, urinary incontinence or retention, denies symptoms at the upper extremities, headache, vision changes or diplopia.   The patient follows with Dr. Bledsoe and per the records was on Natalizumab in the past. The patient was admitted in August for lower extremity weakness and gait imbalance, MRI of the head, cervical and thoracic spine was done in August and showed extensive white matter signal abnormality, no active demyelination. The patient was discharged and told to follow up with Dr. Bledsoe   The patient is being admitted to rule out MS flare.  no cp, sob, abd pain ,fever    PAST MEDICAL & SURGICAL HISTORY:  Hypertension  Chronic back pain  Multiple sclerosis  No significant past surgical history    OVERNIGHT EVENTS:    SUBJECTIVE / INTERVAL HPI: Patient seen and examined at bedside.     VITAL SIGNS:  Vital Signs Last 24 Hrs  T(C): 36.2 (02 Oct 2019 23:45), Max: 36.3 (02 Oct 2019 15:18)  T(F): 97.1 (02 Oct 2019 23:45), Max: 97.3 (02 Oct 2019 15:18)  HR: 75 (02 Oct 2019 23:45) (75 - 75)  BP: 130/73 (02 Oct 2019 23:45) (124/84 - 130/73)  BP(mean): --  RR: 18 (02 Oct 2019 23:45) (18 - 18)      PHYSICAL EXAM:    General: WDWN  HEENT: NC/AT; PERRL, anicteric sclera; MMM  Neck: supple  Cardiovascular: +S1/S2, RRR  Respiratory: CTA B/L; no W/R/R  Gastrointestinal: soft, NT/ND; +BSx4  Extremities: Weakness in LE; no edema, clubbing or cyanosis  Vascular: 2+ radial, DP/PT pulses B/L  Neurological: AAOx3; no focal deficits    MEDICATIONS:  MEDICATIONS  (STANDING):  enoxaparin Injectable 40 milliGRAM(s) SubCutaneous daily  gabapentin 600 milliGRAM(s) Oral every 8 hours  lisinopril 10 milliGRAM(s) Oral daily  NIFEdipine XL 30 milliGRAM(s) Oral daily  oxyCODONE  ER Tablet 80 milliGRAM(s) Oral every 12 hours  pantoprazole    Tablet 40 milliGRAM(s) Oral before breakfast  rOPINIRole 2 milliGRAM(s) Oral two times a day    MEDICATIONS  (PRN):  oxyCODONE    IR 20 milliGRAM(s) Oral every 6 hours PRN Severe Pain (7 - 10)  zolpidem 5 milliGRAM(s) Oral at bedtime PRN Insomnia  zolpidem 5 milliGRAM(s) Oral at bedtime PRN Insomnia      ALLERGIES:  Allergies    penicillin (Unknown)    Intolerances        LABS:                        13.3   16.17 )-----------( 314      ( 03 Oct 2019 05:20 )             39.0     10-03    136  |  96<L>  |  21<H>  ----------------------------<  114<H>  4.5   |  28  |  0.8    Ca    9.1      03 Oct 2019 05:20          CAPILLARY BLOOD GLUCOSE      POCT Blood Glucose.: 140 mg/dL (03 Oct 2019 09:26)      RADIOLOGY & ADDITIONAL TESTS: Reviewed.

## 2019-10-03 NOTE — PROGRESS NOTE ADULT - PROVIDER SPECIALTY LIST ADULT
Internal Medicine
Neurology
Hospitalist

## 2019-10-03 NOTE — DISCHARGE NOTE PROVIDER - CARE PROVIDER_API CALL
Larry Bledsoe)  Neurology  [address] [place] [address]  Phone: [phone]  Fax: [phone]  Follow Up Time: 2 weeks

## 2019-10-03 NOTE — DISCHARGE NOTE PROVIDER - HOSPITAL COURSE
49 year old male patient with past medical history of relapsing remitting MS, is last flare was around 2 months ago, HTN and chronic lower back pain  presented for lower extremity weakness. The patient follows with Dr. Bledsoe and per the records was on Natalizumab in the past. The patient was admitted in August for lower extremity weakness and gait imbalance, MRI of the head, cervical and thoracic spine was done in August and showed extensive white matter signal abnormality, no active demyelination. The patient was discharged and told to follow up with Dr. Bledsoe.      MRI brain w/o/w gado    1.  Extensive white matter signal abnormality compatible the patient's     clinical history of multiple sclerosis. Interval development of multiple     new enhancing and nonenhancing lesions since the prior exam dated     8/5/2019.     2.  New enhancing lesions noted in the right posterior frontal     subcortical region, the right anterior frontal region, the left     postcentral gyrus, the right anterior temporal lobe, and small lesions in     bilateral corona radiata. These are compatible with active demyelination.    3.  New nonenhancing lesions noted within the left dorsal carlene and the     right basal ganglia.    4.  Redemonstrated parenchymal atrophy, and corpus callosum signal     abnormality and volume loss.        Patient was given IV solumedrol for 5 days and is discharged on steroid taper. 49 year old male patient with past medical history of relapsing remitting MS, is last flare was around 2 months ago, HTN and chronic lower back pain  presented for lower extremity weakness. The patient follows with Dr. Bledsoe and per the records was on Natalizumab in the past. The patient was admitted in August for lower extremity weakness and gait imbalance, MRI of the head, cervical and thoracic spine was done in August and showed extensive white matter signal abnormality, no active demyelination. The patient was discharged and told to follow up with Dr. Bledsoe.      MRI brain w/o/w gado    1.  Extensive white matter signal abnormality compatible the patient's     clinical history of multiple sclerosis. Interval development of multiple     new enhancing and nonenhancing lesions since the prior exam dated     8/5/2019.     2.  New enhancing lesions noted in the right posterior frontal     subcortical region, the right anterior frontal region, the left     postcentral gyrus, the right anterior temporal lobe, and small lesions in     bilateral corona radiata. These are compatible with active demyelination.    3.  New nonenhancing lesions noted within the left dorsal carlene and the     right basal ganglia.    4.  Redemonstrated parenchymal atrophy, and corpus callosum signal     abnormality and volume loss.        Patient was given IV solumedrol for 5 days and is discharged on steroid taper.         35 minutes spent on discharge planning

## 2019-10-03 NOTE — DISCHARGE NOTE NURSING/CASE MANAGEMENT/SOCIAL WORK - NSSCNAMETXT_GEN_ALL_CORE
home care services [facility] [phone] [facility] [phone]

## 2019-10-03 NOTE — DIETITIAN INITIAL EVALUATION ADULT. - NAME AND PHONE
Pt to maintain 100% po intake of meals and snacks over the next 7 days. RD to monitor energy intake, glucose profile, nutrition focused physical findings

## 2019-10-03 NOTE — DIETITIAN INITIAL EVALUATION ADULT. - PHYSICAL APPEARANCE
well nourished/alert and oriented. BMI: 23.7, IBW: 208#, UBW: ~175# (been this weight most of his life), denies any recent wt loss, no edema noted, skin intact.

## 2019-10-03 NOTE — PROGRESS NOTE ADULT - ASSESSMENT
49 year old male patient with past medical history of relapsing remitting MS, states that his last flare was around 2 months ago, HTN and chronic lower back pain  presented for lower extremity weakness and admitted 3 days ago for ruling out MS flare. Patient seen and examined today, he is mildly alert and oriented to time, place and person. He does not have any active complains other than lower extremity weakness which is motor in origin.     # Bilateral lower extremity weakness in the setting of relapsing remitting MS   - Recent admission in August for same complaints, MR head cervical and thoracic spine showed white matter signal abnormalities but no findings of active demyelination   - UA showing +RBCS+ UCx pending, samples contaminated  - MRI brain w/o/w gado showed new enhancing and non enhancing lesions  - cont gabapentin and requip at current dose as per neuro  - F/U with neuro Dr. Bledsoe   *Prednisone taper - 60mg po q24x3 ds, 40mg po q24x3 ds, 20mg po q24x3 days, 10mg po q24 x3 days - stop.    #Possible cocaine abuse  -self harm risk behavior displayed  -UDS could not be collected  -Positive cocaine abuse on last visit too    # HTN   - Continue Nifedipine and Lisinopril     # Chronic lower back pain   -oxycodone/oxycontin prn   - home doses.     # Leucocytosis likely  2/2 steroids.   - No evidence of active infection, f/u blood culture  - Recent steroid use   -16 k today, afebrile    # Miscellaneous   - DVT prophylaxis : Lovenox   - GI prophylaxis : Protonix   - Ambulate with assistance   - DASH TLC diet   - Full code 49 year old male patient with past medical history of relapsing remitting MS, states that his last flare was around 2 months ago, HTN and chronic lower back pain  presented for lower extremity weakness and admitted 3 days ago for ruling out MS flare. Patient seen and examined today, he is mildly alert and oriented to time, place and person. He does not have any active complains other than lower extremity weakness which is motor in origin.     # Bilateral lower extremity weakness in the setting of relapsing remitting MS   - Recent admission in August for same complaints, MR head cervical and thoracic spine showed white matter signal abnormalities but no findings of active demyelination   - UA showing +RBCS+ UCx pending, samples contaminated  - MRI brain w/o/w gado showed new enhancing and non enhancing lesions  - cont gabapentin and requip at current dose as per neuro  - F/U with neuro Dr. Bledsoe   *Prednisone taper - 60mg po q24x3 ds, 40mg po q24x3 ds, 20mg po q24x3 days, 10mg po q24 x3 days - stop.    #Possible cocaine abuse  -UDS could not be collected  -Positive cocaine abuse on last visit too  -Patient stable in last 48 hours, 1:1 observation discontinued    # HTN   - Continue Nifedipine and Lisinopril     # Chronic lower back pain   -oxycodone/oxycontin prn   - home doses.     # Leucocytosis likely  2/2 steroids.   - No evidence of active infection, f/u blood culture  - Recent steroid use   -16 k today, afebrile    # Miscellaneous   - DVT prophylaxis : Lovenox   - GI prophylaxis : Protonix   - Ambulate with assistance   - DASH TLC diet   - Full code   dispo: 4a

## 2019-10-03 NOTE — DISCHARGE NOTE NURSING/CASE MANAGEMENT/SOCIAL WORK - PATIENT PORTAL LINK FT
You can access the FollowMyHealth Patient Portal offered by [facility] by registering at the following website: [email or web address]. By joining [employer]’s FollowMyHealth portal, you will also be able to view your health information using other applications (apps) compatible with our system.

## 2019-10-03 NOTE — PROGRESS NOTE ADULT - ATTENDING COMMENTS
#MS flare, new enhancing lesions on MRI head  s/p solumedrol 1g x5 days  prednisone taper 60 x3 days, 40 x 3days, 20 x 3 days, 10 x3 days per neuro  stable for d/c today needs neuro f/u

## 2019-10-03 NOTE — PROGRESS NOTE ADULT - REASON FOR ADMISSION
Bilateral lower extremity weakness

## 2019-10-03 NOTE — DIETITIAN INITIAL EVALUATION ADULT. - OTHER INFO
Nutrition Hx PTA: Pt with excellent appetite/po intake, consumes 3 meals + snacks daily and denies use of oral nutrition supplements. Doesn't follow a heart healthy diet at home, states he eats what he likes. Not interested in further nutrition education. NKFA.     Pt admitted 3 days ago for ruling out MS flare. Bilateral lower extremity weakness in the setting of relapsing remitting MS--MR head cervical and thoracic spine showed white matter signal abnormalities but no findings of active demyelination. F/U with neuro Dr. Bledsoe. Leucocytosis likely  2/2 steroids--no evidence of active infection, f/u blood culture.

## 2019-10-03 NOTE — DIETITIAN INITIAL EVALUATION ADULT. - ENERGY NEEDS
Calories: 6704-4310 kcal/day (MSJ x 1.2-1.3 AF)  Protein:  gm/day (1-1.2 gm/kg CBW)  Fluid: 1 ml/kcal

## 2019-10-04 LAB
CULTURE RESULTS: SIGNIFICANT CHANGE UP
SPECIMEN SOURCE: SIGNIFICANT CHANGE UP

## 2019-10-11 DIAGNOSIS — G25.81 RESTLESS LEGS SYNDROME: ICD-10-CM

## 2019-10-11 DIAGNOSIS — Z79.891 LONG TERM (CURRENT) USE OF OPIATE ANALGESIC: ICD-10-CM

## 2019-10-11 DIAGNOSIS — F17.210 NICOTINE DEPENDENCE, CIGARETTES, UNCOMPLICATED: ICD-10-CM

## 2019-10-11 DIAGNOSIS — G89.29 OTHER CHRONIC PAIN: ICD-10-CM

## 2019-10-11 DIAGNOSIS — M54.5 LOW BACK PAIN: ICD-10-CM

## 2019-10-11 DIAGNOSIS — Z91.14 PATIENT'S OTHER NONCOMPLIANCE WITH MEDICATION REGIMEN: ICD-10-CM

## 2019-10-11 DIAGNOSIS — T38.0X5A ADVERSE EFFECT OF GLUCOCORTICOIDS AND SYNTHETIC ANALOGUES, INITIAL ENCOUNTER: ICD-10-CM

## 2019-10-11 DIAGNOSIS — I10 ESSENTIAL (PRIMARY) HYPERTENSION: ICD-10-CM

## 2019-10-11 DIAGNOSIS — Z88.0 ALLERGY STATUS TO PENICILLIN: ICD-10-CM

## 2019-10-11 DIAGNOSIS — G35 MULTIPLE SCLEROSIS: ICD-10-CM

## 2019-10-11 DIAGNOSIS — M54.9 DORSALGIA, UNSPECIFIED: ICD-10-CM

## 2019-10-11 DIAGNOSIS — D72.828 OTHER ELEVATED WHITE BLOOD CELL COUNT: ICD-10-CM

## 2019-10-11 DIAGNOSIS — Z79.899 OTHER LONG TERM (CURRENT) DRUG THERAPY: ICD-10-CM

## 2019-10-11 DIAGNOSIS — F14.10 COCAINE ABUSE, UNCOMPLICATED: ICD-10-CM

## 2019-10-30 ENCOUNTER — EMERGENCY (EMERGENCY)
Facility: HOSPITAL | Age: 49
LOS: 0 days | Discharge: HOME | End: 2019-10-31
Attending: EMERGENCY MEDICINE | Admitting: EMERGENCY MEDICINE
Payer: MEDICAID

## 2019-10-30 VITALS
OXYGEN SATURATION: 100 % | TEMPERATURE: 97 F | DIASTOLIC BLOOD PRESSURE: 113 MMHG | SYSTOLIC BLOOD PRESSURE: 173 MMHG | RESPIRATION RATE: 17 BRPM | HEART RATE: 59 BPM

## 2019-10-30 DIAGNOSIS — S71.111A LACERATION WITHOUT FOREIGN BODY, RIGHT THIGH, INITIAL ENCOUNTER: ICD-10-CM

## 2019-10-30 DIAGNOSIS — M54.9 DORSALGIA, UNSPECIFIED: ICD-10-CM

## 2019-10-30 DIAGNOSIS — Z88.0 ALLERGY STATUS TO PENICILLIN: ICD-10-CM

## 2019-10-30 DIAGNOSIS — G89.29 OTHER CHRONIC PAIN: ICD-10-CM

## 2019-10-30 DIAGNOSIS — Z23 ENCOUNTER FOR IMMUNIZATION: ICD-10-CM

## 2019-10-30 DIAGNOSIS — F17.210 NICOTINE DEPENDENCE, CIGARETTES, UNCOMPLICATED: ICD-10-CM
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
## 2019-10-30 DIAGNOSIS — F41.9 ANXIETY DISORDER, UNSPECIFIED: ICD-10-CM

## 2019-10-30 PROCEDURE — 99284 EMERGENCY DEPT VISIT MOD MDM: CPT | Mod: 25

## 2019-10-30 PROCEDURE — 12001 RPR S/N/AX/GEN/TRNK 2.5CM/<: CPT

## 2019-10-30 RX ORDER — IBUPROFEN 200 MG
600 TABLET ORAL ONCE
Refills: 0 | Status: COMPLETED | OUTPATIENT
Start: 2019-10-30 | End: 2019-10-30

## 2019-10-30 RX ORDER — TETANUS TOXOID, REDUCED DIPHTHERIA TOXOID AND ACELLULAR PERTUSSIS VACCINE, ADSORBED 5; 2.5; 8; 8; 2.5 [IU]/.5ML; [IU]/.5ML; UG/.5ML; UG/.5ML; UG/.5ML
0.5 SUSPENSION INTRAMUSCULAR ONCE
Refills: 0 | Status: COMPLETED | OUTPATIENT
Start: 2019-10-30 | End: 2019-10-30

## 2019-10-30 RX ADMIN — TETANUS TOXOID, REDUCED DIPHTHERIA TOXOID AND ACELLULAR PERTUSSIS VACCINE, ADSORBED 0.5 MILLILITER(S): 5; 2.5; 8; 8; 2.5 SUSPENSION INTRAMUSCULAR at 23:17

## 2019-10-30 NOTE — ED ADULT TRIAGE NOTE - ESI TRIAGE ACUITY LEVEL, MLM
Problem: Communication  Goal: The ability to communicate needs accurately and effectively will improve  Pt oriented to room and call light.  POC discussed with pt.  Pt has no further questions at this time      Problem: Safety  Goal: Will remain free from injury  Bed alarm in place.  Bed in lowest and  Locked position.  Call light within reach. Pt able to make needs known       4

## 2019-10-30 NOTE — ED ADULT TRIAGE NOTE - CHIEF COMPLAINT QUOTE
Pt states he has MS lost his balance,  and fell into the glass table. Pt states he has laceration on buttock. denies head trauma. denies blood thinners.

## 2019-10-31 PROCEDURE — 73502 X-RAY EXAM HIP UNI 2-3 VIEWS: CPT | Mod: 26,RT

## 2019-10-31 RX ORDER — ZOLPIDEM TARTRATE 10 MG/1
1 TABLET ORAL
Qty: 5 | Refills: 0
Start: 2019-10-31

## 2019-10-31 RX ORDER — TIZANIDINE 4 MG/1
1 TABLET ORAL
Qty: 15 | Refills: 0
Start: 2019-10-31 | End: 2019-11-04

## 2019-10-31 RX ORDER — CEPHALEXIN 500 MG
1 CAPSULE ORAL
Qty: 20 | Refills: 0
Start: 2019-10-31 | End: 2019-11-04

## 2019-10-31 RX ADMIN — Medication 600 MILLIGRAM(S): at 00:17

## 2019-10-31 NOTE — ED PROVIDER NOTE - CARE PROVIDER_API CALL
Larry Bledsoe)  Neurology  [address] [place] [address]  Phone: [phone]  Fax: [phone]  Follow Up Time:     YOUR PMD,   Phone: (   )    -  Fax: (   )    -  Follow Up Time:

## 2019-10-31 NOTE — ED PROVIDER NOTE - NSFOLLOWUPCLINICS_GEN_ALL_ED_FT
Neurosurgery at [place]  Neurosurgery  [address] [place] [address], [address]  [place] [address]  Phone: [phone]  Fax:     [facility]  Medicine  [address] [place] [address]   Phone: [phone]  Fax:   Follow Up Time:

## 2019-10-31 NOTE — ED PROVIDER NOTE - PROGRESS NOTE DETAILS
DAVID: Offered patient admission for rehab and placement. Refusing admission. Persistently requesting ambien stating he ran out at home.  Told him he cannot get it in the ED because of being discharged. Will send two days worth to pharmacy and told to follow up with neuro as outpatient if wanting to discharge.  Wound care precautions given. Agreeable to discharge. DAVID: Patient states that he was told he had pcn allergy as a child, but states he believes he has taken it later in life and had no reaction. DAVID: Offered patient admission for rehab and placement. Refusing admission. States he needs to get home for his wife.  Persistently requesting ambien stating he ran out at home.  Told him he cannot get it in the ED because of being discharged. Will send two days worth to pharmacy and told to follow up with neuro as outpatient if wanting to discharge.  Wound care precautions given. Agreeable to discharge.

## 2019-10-31 NOTE — ED PROVIDER NOTE - ATTENDING CONTRIBUTION TO CARE
48 yo m hx o f MS, sp mech fall onto glass, c/o lac to [facility]. able to walk. no head inj. no anti coag. no neck pain , cp , sob, ab pain, n, v. no fever.  pt in nad, ncat, perrl, eomi, neck sup, ctab, rrr, ab soft, nt ,nd. FROM. lac to right buttock region with hematoma.   will get labs and imaging.

## 2019-10-31 NOTE — ED PROVIDER NOTE - NSFOLLOWUPINSTRUCTIONS_ED_ALL_ED_FT
Keep wound clean and dry for 48 hours then can rinse gently with soap and water. RETURN TO THE ED IN 8-10 TO HAVE SUTURES REMOVED.      Laceration    A laceration is a cut that goes through all of the layers of the skin and into the tissue that is right under the skin. Some lacerations heal on their own. Others need to be closed with skin adhesive strips, skin glue, stitches (sutures), or staples. Proper laceration care minimizes the risk of infection and helps the laceration to heal better.  If non-absorbable stitches or staples have been placed, they must be taken out within the time frame instructed by your healthcare provider.    For face laceration sutures must be removed in five days  for extermity laceration sutures must be removed in 7-10 days    SEEK IMMEDIATE MEDICAL CARE IF YOU HAVE ANY OF THE FOLLOWING SYMPTOMS: swelling around the wound, worsening pain, drainage from the wound, red streaking going away from your wound, inability to move finger or toe near the laceration, or discoloration of skin near the laceration.      Sutured Wound Care  Sutures are stitches that can be used to close wounds. Taking care of your wound properly can help to prevent pain and infection. It can also help your wound to heal more quickly. Follow instructions from your health care provider about how to care for your sutured wound.    Supplies needed:  Soap and water.  A clean bandage (dressing), if needed.  Antibiotic ointment.  A clean towel.  How to care for your sutured wound  Keep the wound completely dry for the first 24 hours, or for as long as directed by your health care provider. After 24–48 hours, you may shower or bathe as directed by your health care provider. Do not soak or submerge the wound in water until the sutures have been removed.  After the first 24 hours, clean the wound once a day, or as often as directed by your health care provider, using the following steps:    Wash the wound with soap and water.  Rinse the wound with water to remove all soap.  Pat the wound dry with a clean towel. Do not rub the wound.    After cleaning the wound, apply a thin layer of antibiotic ointment as directed by your health care provider. This will prevent infection and keep the dressing from sticking to the wound.  Follow instructions from your health care provider about how to change your dressing:    Wash your hands with soap and water. If soap and water are not available, use hand .  Change your dressing at least once a day, or as often as told by your health care provider. If your dressing gets wet or dirty, change it.  Leave sutures and other skin closures, such as adhesive tape or skin glue, in place. These skin closures may need to stay in place for 2 weeks or longer. If adhesive strip edges start to loosen and curl up, you may trim the loose edges. Do not remove adhesive strips completely unless your health care provider tells you to do that.    Check your wound every day for signs of infection. Watch for:    Redness, swelling, or pain.  Fluid or blood.  Warmth.  Pus or a bad smell.    ImageHave the sutures removed as directed by your health care provider.  Follow these instructions at home:  Medicines     Take or apply over-the-counter and prescription medicines only as told by your health care provider.  If you were prescribed an antibiotic medicine or ointment, take or apply it as told by your health care provider. Do not stop using the antibiotic even if your condition improves.  General instructions     To help reduce scarring after your wound heals, cover your wound with clothing or apply sunscreen of at least 30 SPF whenever you are outside.  Do not scratch or pick at your wound.  Avoid stretching your wound.  Raise (elevate) the injured area above the level of your heart while you are sitting or lying down, if possible.  Drink enough fluids to keep your urine clear or pale yellow.  Keep all follow-up visits as told by your health care provider. This is important.  Contact a health care provider if:  You received a tetanus shot and you have swelling, severe pain, redness, or bleeding at the injection site.  Your wound breaks open.  You have redness, swelling, or pain around your wound.  You have fluid or blood coming from your wound.  Your wound feels warm to the touch.  You have a fever.  You notice something coming out of your wound, such as wood or glass.  You have pain that does not get better with medicine.  The skin near your wound changes color.  You need to change your dressing very frequently due to a lot of fluid, blood, or pus draining from the wound.  You develop a new rash.  You develop numbness around the wound.  Get help right away if:  You develop severe swelling around your wound.  You have pus or a bad smell coming from your wound.  Your pain suddenly gets worse and is severe.  You develop painful lumps near your wound or anywhere on your body.  You have a red streak going away from your wound.  The wound is on your hand or foot and:    You cannot properly move a finger or toe.  Your fingers or toes look pale or bluish.  You have numbness that is spreading down your hand, foot, fingers, or toes.    Summary  Sutures are stitches that can be used to close wounds.  Taking care of your wound properly can help to prevent pain and infection.  Keep the wound completely dry for the first 24 hours, or for as long as directed by your health care provider. After 24–48 hours, you may shower or bathe as directed by your health care provider.  This information is not intended to replace advice given to you by your health care provider. Make sure you discuss any questions you have with your health care provider.

## 2019-10-31 NOTE — ED PROVIDER NOTE - OBJECTIVE STATEMENT
50 yo male with PMH of MS 48 yo male smoker with PMH of MS presents to the ED c/o laceration to right thigh s/p trip and fall at home and landing on glass table ~ 2 hours PTA.  Patient has had frequent falls lately 2/2 weakness  to legs B/L 2/2 MS flare ups and has not been able to follow up with his neurologist, Dr. Rojas.  Patient denies fever, chills, use of anticoagulants or antiplatelets, head injury, LOC, other injuries, alcohol or illicit drug use, dizziness, headache, neck pain, or vision changes. Last Tdap unknown.

## 2019-10-31 NOTE — ED PROVIDER NOTE - PATIENT PORTAL LINK FT
You can access the FollowMyHealth Patient Portal offered by [facility] by registering at the following website: [email or web address]. By joining [employer]’s FollowMyHealth portal, you will also be able to view your health information using other applications (apps) compatible with our system.

## 2019-10-31 NOTE — ED PROVIDER NOTE - NS ED ROS FT
Constitutional: (-) fever (-) chills  Eyes: (-) visual changes   ENMT: (-) recent illness (-) neck pain (-) neck stiffness  Cardiac: (-) chest pain (-) syncope  Respiratory: (-) SOB   GI: (-) nausea (-) vomiting (-) diarrhea   : (-) dysuria (-) hematuria (-) incontinence  MS: (+) chronic back pain  Neuro: (+) leg weakness (-) headache (-) dizziness (-) numbness/tingling to extremities B/L (-) LOC (-) head injury  Skin: (+) laceration (-) rash   Except as documented in the HPI, all other systems are negative.

## 2019-10-31 NOTE — ED PROVIDER NOTE - PHYSICAL EXAMINATION
GENERAL: Well-developed. Unkept appearance. NAD.  HEAD: No visible or palpable bumps or hematomas. No ecchymosis behind ears B/L.  Eyes: PERRLA, EOMI.   ENMT: MMM. No pharyngeal erythema or exudates. Uvula midline. No broken teeth.   Neck: Supple. No cervical midline TTP. No paravertebral TTP to traps. FROM  CVS:  Normal S1,S2. No murmurs appreciated on auscultation   RESP: No use of accessory muscles. Chest rise symmetrical with good expansion. Lungs clear to auscultation B/L. No wheezing, rales, or rhonchi auscultated.  GI: Normal auscultation of bowel sounds in all 4 quadrants. Soft, Nontender, Nondistended. No guarding   MSK: FROM of upper and lower extremities B/L (upper > lower). No midline spinal TTP.   Skin: + 2.5 cm lac to proximal right thigh just below right buttock region with palpable hematoma. No FB. No active bleeding. No signs of infection.    EXT: Radial and pedal pulses present B/L. No calf tenderness or swelling B/L. No pedal edema B/L.  Neuro: AA&O x 3. Mini mental status exam intact. CNs II-XII grossly intact. Speaking in full sentences. No slurring of speech. No facial droop. No tremors. Sensation grossly intact. Strength 5/5 B/L. Normal Finger to nose exam. + slow, shuffle like gait.  Psych:  Cooperative. Anxious.

## 2019-12-01 PROCEDURE — G9005: CPT

## 2019-12-22 ENCOUNTER — OUTPATIENT (OUTPATIENT)
Dept: OUTPATIENT SERVICES | Facility: HOSPITAL | Age: 49
LOS: 1 days | Discharge: HOME | End: 2019-12-22
Payer: MEDICAID

## 2019-12-22 DIAGNOSIS — G35 MULTIPLE SCLEROSIS: ICD-10-CM

## 2019-12-22 PROCEDURE — 70553 MRI BRAIN STEM W/O & W/DYE: CPT | Mod: 26

## 2019-12-27 ENCOUNTER — INPATIENT (INPATIENT)
Facility: HOSPITAL | Age: 49
LOS: 9 days | Discharge: HOME | End: 2020-01-06
Attending: INTERNAL MEDICINE | Admitting: INTERNAL MEDICINE
Payer: MEDICAID

## 2019-12-27 VITALS
OXYGEN SATURATION: 100 % | TEMPERATURE: 97 F | SYSTOLIC BLOOD PRESSURE: 171 MMHG | HEART RATE: 69 BPM | DIASTOLIC BLOOD PRESSURE: 99 MMHG | RESPIRATION RATE: 16 BRPM

## 2019-12-27 LAB
ALBUMIN SERPL ELPH-MCNC: 4.7 G/DL — SIGNIFICANT CHANGE UP (ref 3.5–5.2)
ALP SERPL-CCNC: 87 U/L — SIGNIFICANT CHANGE UP (ref 30–115)
ALT FLD-CCNC: 15 U/L — SIGNIFICANT CHANGE UP (ref 0–41)
ANION GAP SERPL CALC-SCNC: 18 MMOL/L — HIGH (ref 7–14)
APTT BLD: 36.7 SEC — SIGNIFICANT CHANGE UP (ref 27–39.2)
AST SERPL-CCNC: 14 U/L — SIGNIFICANT CHANGE UP (ref 0–41)
BASOPHILS # BLD AUTO: 0.02 K/UL — SIGNIFICANT CHANGE UP (ref 0–0.2)
BASOPHILS NFR BLD AUTO: 0.2 % — SIGNIFICANT CHANGE UP (ref 0–1)
BILIRUB SERPL-MCNC: 0.3 MG/DL — SIGNIFICANT CHANGE UP (ref 0.2–1.2)
BUN SERPL-MCNC: 30 MG/DL — HIGH (ref 10–20)
CALCIUM SERPL-MCNC: 9.8 MG/DL — SIGNIFICANT CHANGE UP (ref 8.5–10.1)
CHLORIDE SERPL-SCNC: 99 MMOL/L — SIGNIFICANT CHANGE UP (ref 98–110)
CO2 SERPL-SCNC: 23 MMOL/L — SIGNIFICANT CHANGE UP (ref 17–32)
CREAT SERPL-MCNC: 0.9 MG/DL — SIGNIFICANT CHANGE UP (ref 0.7–1.5)
EOSINOPHIL # BLD AUTO: 0.01 K/UL — SIGNIFICANT CHANGE UP (ref 0–0.7)
EOSINOPHIL NFR BLD AUTO: 0.1 % — SIGNIFICANT CHANGE UP (ref 0–8)
GLUCOSE SERPL-MCNC: 135 MG/DL — HIGH (ref 70–99)
HCT VFR BLD CALC: 47.7 % — SIGNIFICANT CHANGE UP (ref 42–52)
HGB BLD-MCNC: 16.1 G/DL — SIGNIFICANT CHANGE UP (ref 14–18)
IMM GRANULOCYTES NFR BLD AUTO: 0.6 % — HIGH (ref 0.1–0.3)
INR BLD: 1.08 RATIO — SIGNIFICANT CHANGE UP (ref 0.65–1.3)
LACTATE SERPL-SCNC: 1.9 MMOL/L — SIGNIFICANT CHANGE UP (ref 0.7–2)
LYMPHOCYTES # BLD AUTO: 1.19 K/UL — LOW (ref 1.2–3.4)
LYMPHOCYTES # BLD AUTO: 11.1 % — LOW (ref 20.5–51.1)
MCHC RBC-ENTMCNC: 30.4 PG — SIGNIFICANT CHANGE UP (ref 27–31)
MCHC RBC-ENTMCNC: 33.8 G/DL — SIGNIFICANT CHANGE UP (ref 32–37)
MCV RBC AUTO: 90.2 FL — SIGNIFICANT CHANGE UP (ref 80–94)
MONOCYTES # BLD AUTO: 0.68 K/UL — HIGH (ref 0.1–0.6)
MONOCYTES NFR BLD AUTO: 6.3 % — SIGNIFICANT CHANGE UP (ref 1.7–9.3)
NEUTROPHILS # BLD AUTO: 8.78 K/UL — HIGH (ref 1.4–6.5)
NEUTROPHILS NFR BLD AUTO: 81.7 % — HIGH (ref 42.2–75.2)
NRBC # BLD: 0 /100 WBCS — SIGNIFICANT CHANGE UP (ref 0–0)
PLATELET # BLD AUTO: 275 K/UL — SIGNIFICANT CHANGE UP (ref 130–400)
POTASSIUM SERPL-MCNC: 4 MMOL/L — SIGNIFICANT CHANGE UP (ref 3.5–5)
POTASSIUM SERPL-SCNC: 4 MMOL/L — SIGNIFICANT CHANGE UP (ref 3.5–5)
PROT SERPL-MCNC: 7.2 G/DL — SIGNIFICANT CHANGE UP (ref 6–8)
PROTHROM AB SERPL-ACNC: 12.4 SEC — SIGNIFICANT CHANGE UP (ref 9.95–12.87)
RBC # BLD: 5.29 M/UL — SIGNIFICANT CHANGE UP (ref 4.7–6.1)
RBC # FLD: 12.9 % — SIGNIFICANT CHANGE UP (ref 11.5–14.5)
SODIUM SERPL-SCNC: 140 MMOL/L — SIGNIFICANT CHANGE UP (ref 135–146)
WBC # BLD: 10.74 K/UL — SIGNIFICANT CHANGE UP (ref 4.8–10.8)
WBC # FLD AUTO: 10.74 K/UL — SIGNIFICANT CHANGE UP (ref 4.8–10.8)

## 2019-12-27 PROCEDURE — 72142 MRI NECK SPINE W/DYE: CPT | Mod: 26

## 2019-12-27 PROCEDURE — 99285 EMERGENCY DEPT VISIT HI MDM: CPT

## 2019-12-27 PROCEDURE — 93010 ELECTROCARDIOGRAM REPORT: CPT

## 2019-12-27 PROCEDURE — 72147 MRI CHEST SPINE W/DYE: CPT | Mod: 26

## 2019-12-27 RX ORDER — NYSTATIN CREAM 100000 [USP'U]/G
1 CREAM TOPICAL THREE TIMES A DAY
Refills: 0 | Status: DISCONTINUED | OUTPATIENT
Start: 2019-12-27 | End: 2020-01-06

## 2019-12-27 RX ORDER — ENOXAPARIN SODIUM 100 MG/ML
40 INJECTION SUBCUTANEOUS AT BEDTIME
Refills: 0 | Status: DISCONTINUED | OUTPATIENT
Start: 2019-12-27 | End: 2020-01-06

## 2019-12-27 RX ORDER — ZOLPIDEM TARTRATE 10 MG/1
10 TABLET ORAL AT BEDTIME
Refills: 0 | Status: DISCONTINUED | OUTPATIENT
Start: 2019-12-27 | End: 2020-01-02

## 2019-12-27 RX ORDER — TIZANIDINE 4 MG/1
4 TABLET ORAL EVERY 8 HOURS
Refills: 0 | Status: DISCONTINUED | OUTPATIENT
Start: 2019-12-27 | End: 2020-01-02

## 2019-12-27 RX ORDER — GABAPENTIN 400 MG/1
600 CAPSULE ORAL EVERY 8 HOURS
Refills: 0 | Status: DISCONTINUED | OUTPATIENT
Start: 2019-12-27 | End: 2019-12-27

## 2019-12-27 RX ORDER — NIFEDIPINE 30 MG
30 TABLET, EXTENDED RELEASE 24 HR ORAL DAILY
Refills: 0 | Status: DISCONTINUED | OUTPATIENT
Start: 2019-12-27 | End: 2020-01-03

## 2019-12-27 RX ORDER — OXYCODONE HYDROCHLORIDE 5 MG/1
1 TABLET ORAL
Qty: 0 | Refills: 0 | DISCHARGE

## 2019-12-27 RX ORDER — ROPINIROLE 8 MG/1
2 TABLET, FILM COATED, EXTENDED RELEASE ORAL AT BEDTIME
Refills: 0 | Status: DISCONTINUED | OUTPATIENT
Start: 2019-12-27 | End: 2019-12-27

## 2019-12-27 RX ORDER — ZOLPIDEM TARTRATE 10 MG/1
1 TABLET ORAL
Qty: 0 | Refills: 0 | DISCHARGE

## 2019-12-27 RX ORDER — OXYCODONE AND ACETAMINOPHEN 5; 325 MG/1; MG/1
1 TABLET ORAL ONCE
Refills: 0 | Status: DISCONTINUED | OUTPATIENT
Start: 2019-12-27 | End: 2019-12-27

## 2019-12-27 RX ORDER — OXYCODONE HYDROCHLORIDE 5 MG/1
10 TABLET ORAL EVERY 6 HOURS
Refills: 0 | Status: DISCONTINUED | OUTPATIENT
Start: 2019-12-27 | End: 2020-01-02

## 2019-12-27 RX ORDER — CHLORHEXIDINE GLUCONATE 213 G/1000ML
1 SOLUTION TOPICAL
Refills: 0 | Status: DISCONTINUED | OUTPATIENT
Start: 2019-12-27 | End: 2020-01-06

## 2019-12-27 RX ORDER — MICONAZOLE NITRATE 2 %
1 CREAM (GRAM) TOPICAL ONCE
Refills: 0 | Status: COMPLETED | OUTPATIENT
Start: 2019-12-27 | End: 2019-12-27

## 2019-12-27 RX ADMIN — NYSTATIN CREAM 1 APPLICATION(S): 100000 CREAM TOPICAL at 21:48

## 2019-12-27 RX ADMIN — OXYCODONE AND ACETAMINOPHEN 1 TABLET(S): 5; 325 TABLET ORAL at 17:28

## 2019-12-27 RX ADMIN — OXYCODONE AND ACETAMINOPHEN 1 TABLET(S): 5; 325 TABLET ORAL at 15:48

## 2019-12-27 RX ADMIN — TIZANIDINE 4 MILLIGRAM(S): 4 TABLET ORAL at 21:46

## 2019-12-27 RX ADMIN — Medication 1 APPLICATION(S): at 15:00

## 2019-12-27 RX ADMIN — ENOXAPARIN SODIUM 40 MILLIGRAM(S): 100 INJECTION SUBCUTANEOUS at 21:47

## 2019-12-27 NOTE — ED PROVIDER NOTE - ATTENDING CONTRIBUTION TO CARE
49 y.o.  male, PMH of MS- pt is bed-bound, chronic lower back pain, comes in c/o rash to genital area, which started about 4-5 days ago, described as burning. Pt is in a diaper. Family also reports that they are no longer able to care for him and request placement in long term care facility. On exam, pt in NAD, AAOx3, head NC/AT, lungs CTA B/L, CV S1S2 regular, abdomen soft/NT/ND/(+)BS,  (+) candidal rash to penis/scrotum/inguinal area and between buttocks. Will do labs and admit. Will treat for fungal infection.

## 2019-12-27 NOTE — ED PROVIDER NOTE - CLINICAL SUMMARY MEDICAL DECISION MAKING FREE TEXT BOX
49 y.o.  male, PMH of MS- pt is bed-bound, chronic lower back pain, comes in c/o rash to genital area, which started about 4-5 days ago, described as burning. Pt is in a diaper. Family also reports that they are no longer able to care for him and request placement in long term care facility. On exam, pt in NAD, AAOx3, head NC/AT, lungs CTA B/L, CV S1S2 regular, abdomen soft/NT/ND/(+)BS,  (+) candidal rash to penis/scrotum/inguinal area and between buttocks. Will admit. Will treat for fungal infection.

## 2019-12-27 NOTE — ED PROVIDER NOTE - GENITOURINARY, MLM
Chaperone: Ibeth MENDES, there are white patches all throughout the penis and testicles with satellite lesions with pain

## 2019-12-27 NOTE — ED PROVIDER NOTE - PROGRESS NOTE DETAILS
discussed with patient admission, pt understands and agrees spoke to neurology who would like MR brain and cervical with and without galsadafunarnold

## 2019-12-27 NOTE — H&P ADULT - NSHPLABSRESULTS_GEN_ALL_CORE
Complete Blood Count + Automated Diff (12.27.19 @ 12:38)    WBC Count: 10.74 K/uL    RBC Count: 5.29 M/uL    Hemoglobin: 16.1 g/dL    Hematocrit: 47.7 %    Mean Cell Volume: 90.2 fL    Mean Cell Hemoglobin: 30.4 pg    Mean Cell Hemoglobin Conc: 33.8 g/dL    Red Cell Distrib Width: 12.9 %    Platelet Count - Automated: 275 K/uL    Auto Neutrophil #: 8.78 K/uL    Auto Lymphocyte #: 1.19 K/uL    Auto Monocyte #: 0.68 K/uL    Auto Eosinophil #: 0.01 K/uL    Auto Basophil #: 0.02 K/uL    Auto Neutrophil %: 81.7: Differential percentages must be correlated with absolute numbers for  clinical significance. %    Auto Lymphocyte %: 11.1 %    Auto Monocyte %: 6.3 %    Auto Eosinophil %: 0.1 %    Auto Basophil %: 0.2 %    Auto Immature Granulocyte %: 0.6 %    Nucleated RBC: 0 /100 WBCs  Comprehensive Metabolic Panel (12.27.19 @ 12:38)    Sodium, Serum: 140 mmol/L    Potassium, Serum: 4.0 mmol/L    Chloride, Serum: 99 mmol/L    Carbon Dioxide, Serum: 23 mmol/L    Anion Gap, Serum: 18 mmol/L    Blood Urea Nitrogen, Serum: 30 mg/dL    Creatinine, Serum: 0.9 mg/dL    Glucose, Serum: 135 mg/dL    Calcium, Total Serum: 9.8 mg/dL    Protein Total, Serum: 7.2 g/dL    Albumin, Serum: 4.7 g/dL    Bilirubin Total, Serum: 0.3 mg/dL    Alkaline Phosphatase, Serum: 87 U/L    Aspartate Aminotransferase (AST/SGOT): 14 U/L    Alanine Aminotransferase (ALT/SGPT): 15 U/L    eGFR if Non : 101: Interpretative comment  The units for eGFR are mL/min/1.73M2 (normalized body surface area). The  eGFR is calculated from a serum creatinine using the CKD-EPI equation.  Other variables required for calculation are race, age and sex. Among  patients with chronic kidney disease (CKD), the eGFR is useful in  determining the stage of disease according to KDOQI CKD classification.  All eGFR results are reported numerically with the following  interpretation.          GFR                    With                 Without     (ml/min/1.73 m2)    Kidney Damage       Kidney Damage        >= 90                    Stage 1                     Normal        60-89                    Stage 2                     Decreased GFR        30-59     Stage 3                     Stage 3        15-29                    Stage 4                     Stage 4        < 15                      Stage 5                     Stage 5  Each stage of CKD assumes that the associated GFR level has been in  effect for at least 3 months. Determination of stages one and two (with  eGFR > 59 ml/min/m2) requires estimation of kidney damage for at least 3  months as defined by structural or functional abnormalities.  Limitations: All estimates of GFR will be less accurate for patients at  extremes of muscle mass (including but not limited to frail elderly,  critically ill, or cancer patients), those with unusual diets, and those  with conditions associated with reduced secretion or extrarenal  elimination of creatinine. The eGFR equation is not recommended for use  in patients with unstable creatinine levels. mL/min/1.73M2    eGFR if African American: 117 mL/min/1.73M2

## 2019-12-27 NOTE — ED ADULT TRIAGE NOTE - CHIEF COMPLAINT QUOTE
Pt has history of Multiple Sclerosis, c/o of difficulty standing up starting 2 days, c/o new onset of skin excoriation to testicles and backside

## 2019-12-27 NOTE — PATIENT PROFILE ADULT - ABUSE/NEGLECT DETAILS
patient is bedridden/chairfast. Girlfriend does not cool for him or clean him. He has a dirty matress on the floor. he has a bad fungal infection on testicles and groin and papular rash on hip and buttocks,

## 2019-12-27 NOTE — H&P ADULT - ASSESSMENT
# Candidal Rash  -Use Nystatin powder in inguinal area  -Proper hygiene with frequent cleaning and keeping the area dry recommended.   -Frequent change of diaper if incontinent.    # Multiple sclerosis  -Will order MRI with IV contrast for cervical and thoracic spine  -Follow up with Neurology for further recommendations.     # DVT prophylaxis  -On lovenox    PT/Physiatry for long term placement

## 2019-12-27 NOTE — H&P ADULT - HISTORY OF PRESENT ILLNESS
49 year old male patient with past medical history of bed bound relapsing remitting MS and history chronic lower back pain presented because of inguinal and buttock rash. As per the family member for the past 4-5 days, they noted worsening of the rash on his buttocks specially on the right side, and in his inguinal and scrotum, whitish in color and it was burning.The family members have been trying to frequently change his diapers but he still has been frequently incontinent and wet. It been difficult for the family to take care of his hygiene hence looking for long term placement. He was being worked up for his MS outpatient by Dr. Bledsoe. He was scheduled for repeat MRI spine outpatient soon, but because of this rash he presented here to the hospital.   Currently as per the patient and the family member he is not in active MS flare, this is his baseline. He has twitching of the lower extremiteis more on the left side. No new weakness or numbness, change in vision or swallowing difficulty.

## 2019-12-27 NOTE — ED ADULT NURSE NOTE - OBJECTIVE STATEMENT
Patient states he developed rash over the past three weeks and pain. Denies any urinary symptoms at this time. Patient is sexually active with wife. Also complaining of inability to stand up. Denies any fever or chills. Hx of MS
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
## 2019-12-27 NOTE — H&P ADULT - NSHPPHYSICALEXAM_GEN_ALL_CORE
GENERAL: NAD, alert oriented x3  HEENT: Atraumatic normocephalic  CHEST: clear to auscultate bilaterally   HEART: Normal S1 and S2  ABDOMEN: inguinal extensive whitish candidal rash involving the scrotum and penis, diaper rash on the buttocks  EXTREMITIES: left lower extremity twitching, unable to get out of bed.

## 2019-12-27 NOTE — H&P ADULT - ATTENDING COMMENTS
Patient seen and examined independently. Agree with resident note/ history / physical exam and plan of care with following exceptions/additions/updates. Case discussed with house-staff, nursing and patient/pt decision maker.   Vital Signs Last 24 Hrs  T(C): 36.3 (28 Dec 2019 07:24), Max: 36.3 (28 Dec 2019 07:24)  T(F): 97.4 (28 Dec 2019 07:24), Max: 97.4 (28 Dec 2019 07:24)  HR: 67 (28 Dec 2019 07:24) (67 - 98)  BP: 136/91 (28 Dec 2019 07:24) (136/80 - 144/87)  BP(mean): --  RR: 18 (28 Dec 2019 07:24) (18 - 18)  SpO2: 97% (28 Dec 2019 07:24) (97% - 97%)      has a very non specific rash on the right side of his hip, dw ID, rec topical steroids and antifungal.   case also discussed with neurology, fu mri spine results  has twitching, poss partial complex sz, but as per my discussion with neuro, does not need any workup or change in treatment.   #Progress Note Handoff  Pending (specify):  MRI report  Family discussion: david pt , full code.   Disposition: Home

## 2019-12-28 LAB
ALBUMIN SERPL ELPH-MCNC: 4.4 G/DL — SIGNIFICANT CHANGE UP (ref 3.5–5.2)
ALP SERPL-CCNC: 86 U/L — SIGNIFICANT CHANGE UP (ref 30–115)
ALT FLD-CCNC: 14 U/L — SIGNIFICANT CHANGE UP (ref 0–41)
ANION GAP SERPL CALC-SCNC: 12 MMOL/L — SIGNIFICANT CHANGE UP (ref 7–14)
AST SERPL-CCNC: 12 U/L — SIGNIFICANT CHANGE UP (ref 0–41)
BILIRUB SERPL-MCNC: 0.3 MG/DL — SIGNIFICANT CHANGE UP (ref 0.2–1.2)
BUN SERPL-MCNC: 29 MG/DL — HIGH (ref 10–20)
CALCIUM SERPL-MCNC: 9.3 MG/DL — SIGNIFICANT CHANGE UP (ref 8.5–10.1)
CHLORIDE SERPL-SCNC: 102 MMOL/L — SIGNIFICANT CHANGE UP (ref 98–110)
CO2 SERPL-SCNC: 27 MMOL/L — SIGNIFICANT CHANGE UP (ref 17–32)
CREAT SERPL-MCNC: 1 MG/DL — SIGNIFICANT CHANGE UP (ref 0.7–1.5)
GLUCOSE SERPL-MCNC: 102 MG/DL — HIGH (ref 70–99)
HCT VFR BLD CALC: 46.6 % — SIGNIFICANT CHANGE UP (ref 42–52)
HGB BLD-MCNC: 15.2 G/DL — SIGNIFICANT CHANGE UP (ref 14–18)
HIV 1+2 AB+HIV1 P24 AG SERPL QL IA: SIGNIFICANT CHANGE UP
MCHC RBC-ENTMCNC: 29.9 PG — SIGNIFICANT CHANGE UP (ref 27–31)
MCHC RBC-ENTMCNC: 32.6 G/DL — SIGNIFICANT CHANGE UP (ref 32–37)
MCV RBC AUTO: 91.6 FL — SIGNIFICANT CHANGE UP (ref 80–94)
NRBC # BLD: 0 /100 WBCS — SIGNIFICANT CHANGE UP (ref 0–0)
PLATELET # BLD AUTO: 255 K/UL — SIGNIFICANT CHANGE UP (ref 130–400)
POTASSIUM SERPL-MCNC: 4.5 MMOL/L — SIGNIFICANT CHANGE UP (ref 3.5–5)
POTASSIUM SERPL-SCNC: 4.5 MMOL/L — SIGNIFICANT CHANGE UP (ref 3.5–5)
PROT SERPL-MCNC: 6.9 G/DL — SIGNIFICANT CHANGE UP (ref 6–8)
RBC # BLD: 5.09 M/UL — SIGNIFICANT CHANGE UP (ref 4.7–6.1)
RBC # FLD: 12.9 % — SIGNIFICANT CHANGE UP (ref 11.5–14.5)
SODIUM SERPL-SCNC: 141 MMOL/L — SIGNIFICANT CHANGE UP (ref 135–146)
WBC # BLD: 6.81 K/UL — SIGNIFICANT CHANGE UP (ref 4.8–10.8)
WBC # FLD AUTO: 6.81 K/UL — SIGNIFICANT CHANGE UP (ref 4.8–10.8)

## 2019-12-28 PROCEDURE — 99223 1ST HOSP IP/OBS HIGH 75: CPT

## 2019-12-28 RX ORDER — NICOTINE POLACRILEX 2 MG
1 GUM BUCCAL DAILY
Refills: 0 | Status: DISCONTINUED | OUTPATIENT
Start: 2019-12-28 | End: 2019-12-30

## 2019-12-28 RX ORDER — GABAPENTIN 400 MG/1
600 CAPSULE ORAL EVERY 8 HOURS
Refills: 0 | Status: DISCONTINUED | OUTPATIENT
Start: 2019-12-28 | End: 2020-01-06

## 2019-12-28 RX ADMIN — TIZANIDINE 4 MILLIGRAM(S): 4 TABLET ORAL at 13:48

## 2019-12-28 RX ADMIN — Medication 1 APPLICATION(S): at 06:16

## 2019-12-28 RX ADMIN — ENOXAPARIN SODIUM 40 MILLIGRAM(S): 100 INJECTION SUBCUTANEOUS at 21:58

## 2019-12-28 RX ADMIN — OXYCODONE HYDROCHLORIDE 10 MILLIGRAM(S): 5 TABLET ORAL at 06:50

## 2019-12-28 RX ADMIN — NYSTATIN CREAM 1 APPLICATION(S): 100000 CREAM TOPICAL at 21:58

## 2019-12-28 RX ADMIN — NYSTATIN CREAM 1 APPLICATION(S): 100000 CREAM TOPICAL at 13:48

## 2019-12-28 RX ADMIN — OXYCODONE HYDROCHLORIDE 10 MILLIGRAM(S): 5 TABLET ORAL at 06:18

## 2019-12-28 RX ADMIN — GABAPENTIN 600 MILLIGRAM(S): 400 CAPSULE ORAL at 21:58

## 2019-12-28 RX ADMIN — NYSTATIN CREAM 1 APPLICATION(S): 100000 CREAM TOPICAL at 06:17

## 2019-12-28 RX ADMIN — Medication 1 APPLICATION(S): at 18:19

## 2019-12-28 RX ADMIN — TIZANIDINE 4 MILLIGRAM(S): 4 TABLET ORAL at 06:18

## 2019-12-28 RX ADMIN — Medication 30 MILLIGRAM(S): at 06:17

## 2019-12-28 NOTE — CONSULT NOTE ADULT - SUBJECTIVE AND OBJECTIVE BOX
Neurology consult    KEVIN [id number]    HPI:  49 year old male patient with past medical history of bed bound relapsing remitting MS and history chronic lower back pain presented because of inguinal and buttock rash. As per the family member for the past 4-5 days, they noted worsening of the rash on his buttocks specially on the right side, and in his inguinal and scrotum, whitish in color and it was burning.The family members have been trying to frequently change his diapers but he still has been frequently incontinent and wet. It been difficult for the family to take care of his hygiene hence looking for long term placement. He was being worked up for his MS outpatient by Dr. Bledsoe. He was scheduled for repeat MRI spine outpatient soon, but because of this rash he presented here to the hospital.   Currently as per the patient and the family member he is not in active MS flare, this is his baseline. He has twitching of the lower extremiteis more on the left side. No new weakness or numbness, change in vision or swallowing difficulty. (27 Dec 2019 16:34)          MEDICATIONS    chlorhexidine 4% Liquid 1 Application(s) Topical <User Schedule>  clotrimazole 1% Cream 1 Application(s) Topical two times a day  enoxaparin Injectable 40 milliGRAM(s) SubCutaneous at bedtime  NIFEdipine XL 30 milliGRAM(s) Oral daily  nystatin Powder 1 Application(s) Topical three times a day  oxyCODONE   IR Oral Tab/Cap - Peds 10 milliGRAM(s) Oral every 6 hours PRN  tiZANidine 4 milliGRAM(s) Oral every 8 hours  zolpidem 10 milliGRAM(s) Oral at bedtime PRN      PAST MEDICAL & SURGICAL HISTORY:  Hypertension  Chronic back pain  Multiple sclerosis  No significant past surgical history       Family history: No history of dementia, strokes, or seizures   FAMILY HISTORY:  No pertinent family history in first degree relatives    SOCIAL HISTORY --     Allergies    penicillin (Unknown)    Intolerances            Vital Signs Last 24 Hrs  T(C): 36.3 (28 Dec 2019 07:24), Max: 36.3 (27 Dec 2019 11:23)  T(F): 97.4 (28 Dec 2019 07:24), Max: 97.4 (27 Dec 2019 11:23)  HR: 67 (28 Dec 2019 07:24) (67 - 98)  BP: 136/91 (28 Dec 2019 07:24) (136/80 - 171/99)  BP(mean): --  RR: 18 (28 Dec 2019 07:24) (16 - 18)  SpO2: 97% (28 Dec 2019 07:24) (97% - 100%)    MULTIPLE SCLEROSIS  ^MULTIPLE SCLEROSIS  No pertinent family history in first degree relatives  MEWS Score  Hypertension  Chronic back pain  Multiple sclerosis  Candidal balanitis  No significant past surgical history  LEG WEAKNESS/  57  Multiple sclerosis    LABS:  CBC Full  -  ( 28 Dec 2019 07:00 )  WBC Count : 6.81 K/uL  RBC Count : 5.09 M/uL  Hemoglobin : 15.2 g/dL  Hematocrit : 46.6 %  Platelet Count - Automated : 255 K/uL        12-28    141  |  102  |  29<H>  ----------------------------<  102<H>  4.5   |  27  |  1.0    Ca    9.3      28 Dec 2019 07:00    TPro  6.9  /  Alb  4.4  /  TBili  0.3  /  DBili  x   /  AST  12  /  ALT  14  /  AlkPhos  86  12-28    Hemoglobin A1C:     LIVER FUNCTIONS - ( 28 Dec 2019 07:00 )  Alb: 4.4 g/dL / Pro: 6.9 g/dL / ALK PHOS: 86 U/L / ALT: 14 U/L / AST: 12 U/L / GGT: x

## 2019-12-28 NOTE — CONSULT NOTE ADULT - ASSESSMENT
Pt seen, examined.  Poor historian - c/o legs twitching on and off. Poor historian - did not mention rash (the reason he was reportedly admitted).    Awake, alert, speech fluent, follows simple commands. EOMI. No facial droop.   Motor 5/5 x 2 UE, 4/5 right LE, 3/5 LLE. DTR Brisk b/l.   Toes - left - upgoing; right - withdrawal. No focal sensory loss appreciated.   Gait - deferred.  There was occasional right leg twitching observed.  There is pelvic area skin discoloration (redness) seen.    CBC, CMP - unremark  Brain MRI 12/22/19 - c/w demyelinating disease, but no new (enhancing) lesions.    A/P. 1)Skin rash - presumably candida  - suggest dermatology eval    2) Hx SPMS w/ legs weakness, left more than right. Intermittent legs twitching     Pt of Dr. Bledsoe - ?on Tysabri - was non compliant in the recent past (Sept. 2019); got IV Solumedrol in Sept. 2019 for MS exacerbation (had new lesions on Brain MRI)      now saying he is on Tysabri, last dose 2 weeks ago - I cannot check - office is closed      current Brain MRI (12/22/19) - no new lesions   - doubt MS exacerbation but follow C/T spine MRI w/ patrice - reports pend.      If positive for new demyelinating lesions - suggest 1 g IV Solumedrol  q24hr x 5 doses   - suggest U/A r/o UTI; UDS (in Sept. 2019 was positive for cocaine); Ammonia level r/o hyperammonemia   - rehab eval  - follow w/ Dr. Bledsoe (Neuro) as outpt  Please, call if any questions

## 2019-12-29 LAB
APPEARANCE UR: CLEAR — SIGNIFICANT CHANGE UP
BACTERIA # UR AUTO: NEGATIVE — SIGNIFICANT CHANGE UP
BILIRUB UR-MCNC: NEGATIVE — SIGNIFICANT CHANGE UP
COLOR SPEC: YELLOW — SIGNIFICANT CHANGE UP
DIFF PNL FLD: ABNORMAL
EPI CELLS # UR: 3 /HPF — SIGNIFICANT CHANGE UP (ref 0–5)
GAMMA INTERFERON BACKGROUND BLD IA-ACNC: 0.01 IU/ML — SIGNIFICANT CHANGE UP
GLUCOSE UR QL: NEGATIVE — SIGNIFICANT CHANGE UP
HYALINE CASTS # UR AUTO: 0 /LPF — SIGNIFICANT CHANGE UP (ref 0–7)
KETONES UR-MCNC: NEGATIVE — SIGNIFICANT CHANGE UP
LEUKOCYTE ESTERASE UR-ACNC: NEGATIVE — SIGNIFICANT CHANGE UP
M TB IFN-G BLD-IMP: NEGATIVE — SIGNIFICANT CHANGE UP
M TB IFN-G CD4+ BCKGRND COR BLD-ACNC: 0 IU/ML — SIGNIFICANT CHANGE UP
M TB IFN-G CD4+CD8+ BCKGRND COR BLD-ACNC: 0 IU/ML — SIGNIFICANT CHANGE UP
NITRITE UR-MCNC: NEGATIVE — SIGNIFICANT CHANGE UP
PH UR: 7 — SIGNIFICANT CHANGE UP (ref 5–8)
PROT UR-MCNC: SIGNIFICANT CHANGE UP
QUANT TB PLUS MITOGEN MINUS NIL: 1.67 IU/ML — SIGNIFICANT CHANGE UP
RBC CASTS # UR COMP ASSIST: 4 /HPF — SIGNIFICANT CHANGE UP (ref 0–4)
SP GR SPEC: 1.03 — HIGH (ref 1.01–1.02)
UROBILINOGEN FLD QL: ABNORMAL
WBC UR QL: 9 /HPF — HIGH (ref 0–5)

## 2019-12-29 PROCEDURE — 99233 SBSQ HOSP IP/OBS HIGH 50: CPT

## 2019-12-29 RX ORDER — NICOTINE POLACRILEX 2 MG
1 GUM BUCCAL ONCE
Refills: 0 | Status: COMPLETED | OUTPATIENT
Start: 2019-12-29 | End: 2019-12-29

## 2019-12-29 RX ADMIN — Medication 1 APPLICATION(S): at 06:00

## 2019-12-29 RX ADMIN — Medication 1 PATCH: at 01:20

## 2019-12-29 RX ADMIN — TIZANIDINE 4 MILLIGRAM(S): 4 TABLET ORAL at 00:26

## 2019-12-29 RX ADMIN — Medication 1 PATCH: at 19:45

## 2019-12-29 RX ADMIN — NYSTATIN CREAM 1 APPLICATION(S): 100000 CREAM TOPICAL at 21:38

## 2019-12-29 RX ADMIN — NYSTATIN CREAM 1 APPLICATION(S): 100000 CREAM TOPICAL at 12:18

## 2019-12-29 RX ADMIN — GABAPENTIN 600 MILLIGRAM(S): 400 CAPSULE ORAL at 12:15

## 2019-12-29 RX ADMIN — Medication 1 PATCH: at 12:15

## 2019-12-29 RX ADMIN — ENOXAPARIN SODIUM 40 MILLIGRAM(S): 100 INJECTION SUBCUTANEOUS at 21:37

## 2019-12-29 RX ADMIN — ZOLPIDEM TARTRATE 10 MILLIGRAM(S): 10 TABLET ORAL at 21:39

## 2019-12-29 RX ADMIN — TIZANIDINE 4 MILLIGRAM(S): 4 TABLET ORAL at 21:38

## 2019-12-29 RX ADMIN — GABAPENTIN 600 MILLIGRAM(S): 400 CAPSULE ORAL at 21:38

## 2019-12-29 RX ADMIN — CHLORHEXIDINE GLUCONATE 1 APPLICATION(S): 213 SOLUTION TOPICAL at 05:59

## 2019-12-29 RX ADMIN — TIZANIDINE 4 MILLIGRAM(S): 4 TABLET ORAL at 12:13

## 2019-12-29 RX ADMIN — Medication 30 MILLIGRAM(S): at 06:00

## 2019-12-29 RX ADMIN — Medication 1 APPLICATION(S): at 17:13

## 2019-12-29 RX ADMIN — GABAPENTIN 600 MILLIGRAM(S): 400 CAPSULE ORAL at 06:00

## 2019-12-29 RX ADMIN — NYSTATIN CREAM 1 APPLICATION(S): 100000 CREAM TOPICAL at 06:00

## 2019-12-29 RX ADMIN — TIZANIDINE 4 MILLIGRAM(S): 4 TABLET ORAL at 06:01

## 2019-12-29 NOTE — CONSULT NOTE ADULT - SUBJECTIVE AND OBJECTIVE BOX
Patient is a 49y old  Male who presents with a chief complaint of Pelvic area rash (28 Dec 2019 09:22)    HPI:  49 year old male patient with past medical history of bed bound relapsing remitting MS and history chronic lower back pain presented because of inguinal and buttock rash. As per the family member for the past 4-5 days, they noted worsening of the rash on his buttocks specially on the right side, and in his inguinal and scrotum, whitish in color and it was burning.The family members have been trying to frequently change his diapers but he still has been frequently incontinent and wet. It been difficult for the family to take care of his hygiene hence looking for long term placement. He was being worked up for his MS outpatient by Dr. Bledsoe. He was scheduled for repeat MRI spine outpatient soon, but because of this rash he presented here to the hospital.   Currently as per the patient and the family member he is not in active MS flare, this is his baseline. He has twitching of the lower extremiteis more on the left side. No new weakness or numbness, change in vision or swallowing difficulty. (27 Dec 2019 16:34)      PAST MEDICAL & SURGICAL HISTORY:  Hypertension  Chronic back pain  Multiple sclerosis  No significant past surgical history      Hospital Course: seen by neuro-Hx SPMS w/ legs weakness, left more than right. Intermittent legs twitching     Pt of Dr. Bledsoe - ?on Tysabri - was non compliant in the recent past (Sept. 2019); got IV Solumedrol in Sept. 2019 for MS exacerbation (had new lesions on Brain MRI)      now saying he is on Tysabri, last dose 2 weeks ago - I cannot check - office is closed      current Brain MRI (12/22/19) - no new lesions   - doubt MS exacerbation but follow C/T spine MRI w/ patrice - reports pend.      If positive for new demyelinating lesions - suggest 1 g IV Solumedrol  q24hr x 5 doses   - suggest U/A r/o UTI; UDS (in Sept. 2019 was positive for cocaine); Ammonia level r/o hyperammonemia   - rehab eval  - follow w/ Dr. Bledsoe (Neuro) as outpt  Skin rash - presumably candida  - suggest dermatology eval  TODAY'S SUBJECTIVE & REVIEW OF SYMPTOMS:     Constitutional Weakness   Cardio WNL   Resp WNL   GI WNL  Heme WNL  Endo WNL  Skin WNL  MSK WNL  Neuro WNL  Cognitive WNL  Psych WNL      MEDICATIONS  (STANDING):  chlorhexidine 4% Liquid 1 Application(s) Topical <User Schedule>  clotrimazole 1% Cream 1 Application(s) Topical two times a day  enoxaparin Injectable 40 milliGRAM(s) SubCutaneous at bedtime  gabapentin 600 milliGRAM(s) Oral every 8 hours  nicotine - 21 mG/24Hr(s) Patch 1 patch Transdermal daily  NIFEdipine XL 30 milliGRAM(s) Oral daily  nystatin Powder 1 Application(s) Topical three times a day  tiZANidine 4 milliGRAM(s) Oral every 8 hours    MEDICATIONS  (PRN):  oxyCODONE   IR Oral Tab/Cap - Peds 10 milliGRAM(s) Oral every 6 hours PRN Severe Pain (7 - 10)  zolpidem 10 milliGRAM(s) Oral at bedtime PRN Insomnia      FAMILY HISTORY:  No pertinent family history in first degree relatives      Allergies    penicillin (Unknown)    Intolerances        SOCIAL HISTORY:    [    ] Etoh  [ x   ] Smoking  [    ] Substance abuse     Home Environment:  [    ] Home Alone  [ x   ] Lives with Family WIFE, SON  [    ] Home Health Aid    Dwelling:  [  x  ] Apartment  [    ] Private House  [    ] Adult Home  [    ] Skilled Nursing Facility      [    ] Short Term  [    ] Long Term  [    ] Stairs                           [    x] Elevator     FUNCTIONAL STATUS PTA: (Check all that apply)  Ambulation: [     ]Independent    [  x  ] Dependent     [    ] Non-Ambulatory  Assistive Device: [   x ] SA Cane  [    ]  Q Cane  [    ] Walker  [    x]  Wheelchair  ADL : [    ] Independent  [   x ]  Dependent   CLAIMS TO WALK WITH CANE- CHART INDICATES WC DEPENDENT    Vital Signs Last 24 Hrs  T(C): 35.7 (29 Dec 2019 04:48), Max: 36.6 (28 Dec 2019 15:29)  T(F): 96.3 (29 Dec 2019 04:48), Max: 97.9 (28 Dec 2019 15:29)  HR: 76 (29 Dec 2019 04:48) (76 - 84)  BP: 131/68 (29 Dec 2019 04:48) (123/80 - 140/87)  BP(mean): --  RR: 18 (29 Dec 2019 04:48) (18 - 19)  SpO2: 97% (28 Dec 2019 20:01) (97% - 97%)      PHYSICAL EXAM: Alert & Oriented X2 ? year/month- Poor historian  GENERAL: NAD, well-groomed, well-developed  HEAD:  Atraumatic, Normocephalic  EYES: EOMI, PERRLA, conjunctiva and sclera clear  NECK: Supple  CHEST/LUNG: Clear bilaterally  HEART: Regular rate and rhythm  ABDOMEN: Soft, Nontender, Nondistended; Bowel sounds present  EXTREMITIES:  no calf tenderness,no edema BLES+ atrothy BLEs    NERVOUS SYSTEM:  Cranial Nerves 2-12 intact [ x   ] Abnormal  [    ]  ROM: WFL all extremities [ x   ]  Abnormal [     ]  Motor Strength: WFL all extremities  [    ]  Abnormal [  x  ]BUES 5/5 RLE 2-3/5 extensors LLE 2/5 extensors  Sensation: intact to light touch [ x   ] Abnormal [    ]      FUNCTIONAL STATUS:  Bed Mobility: [   ]  Independent [    ]  Supervision [  x  ]  Needs Assistance [  ]  N/A  Transfers: [    ]  Independent [    ]  Supervision [ x   ]  Needs Assistance [    ]  N/A    Ambulation:  [    ]  Independent [    ]  Supervision [    ]  Needs Assistance [x    ]  N/A   ADL:  [    ]   Independent [  x  ] Requires Assistance [    ] N/A       LABS:                        15.2   6.81  )-----------( 255      ( 28 Dec 2019 07:00 )             46.6     12-28    141  |  102  |  29<H>  ----------------------------<  102<H>  4.5   |  27  |  1.0    Ca    9.3      28 Dec 2019 07:00    TPro  6.9  /  Alb  4.4  /  TBili  0.3  /  DBili  x   /  AST  12  /  ALT  14  /  AlkPhos  86  12-28    PT/INR - ( 27 Dec 2019 12:38 )   PT: 12.40 sec;   INR: 1.08 ratio         PTT - ( 27 Dec 2019 12:38 )  PTT:36.7 sec      RADIOLOGY & ADDITIONAL STUDIES:

## 2019-12-29 NOTE — PROGRESS NOTE ADULT - ASSESSMENT
# Cutaneous Candidiasis   -c/w antifungals   -keep area clean and dry      # Multiple sclerosis  -MRI negative   -neuro f/u     # DVT prophylaxis  -On lovenox    #Progress Note Handoff  Pending (specify):  pending placement   Family discussion: dw pt plan of care for placement   Disposition: STR vs 4A

## 2019-12-29 NOTE — CONSULT NOTE ADULT - ASSESSMENT
IMPRESSION: Rehab of multiple sclerosis    PRECAUTIONS: [    ] Cardiac  [    ] Respiratory  [    ] Seizures [    ] Contact Isolation  [    ] Droplet Isolation  [    ] Other    Weight Bearing Status:     RECOMMENDATION:    Out of Bed to Chair     DVT/Decubiti Prophylaxis    REHAB PLAN:     [   x  ] Bedside P/T 3-5 times a week   [   x  ] Bedside O/T  2-3 times a week   [     ] No Rehab Therapy Indicated   [     ]  Speech Therapy   Conditioning/ROM                                 ADL  Bed Mobility                                            Conditioning/ROM  Transfers                                                  Bed Mobility  Sitting /Standing Balance                      Transfers                                        Gait Training                                            Sitting/Standing Balance  Stair Training [   ]Applicable                 Home equipment Eval                                                                     Splinting  [   ] Only      GOALS:   ADL   [  x  ]   Independent         Transfers  [  x  ] Independent            Ambulation  [ x    ] Independent     [    x ] With device                            [    ]  CG                                               [    ]  CG                                                    [     ] CG                            [    ] Min A                                          [    ] Min A                                                [     ] Min  A          DISCHARGE PLAN:   [   x  ]   candidate for Intensive Rehabilitation/Hospital based                                             Will tolerate 3hrs Intensive Rehab Daily- NEED TO CLARIFY FUNCTIONAL STATUS WITH FAMILY                             VS                                     [  x    ]  Short Term Rehab in Skilled Nursing Facility                                       [      ]  Home with Outpatient or VN services                                         [      ]  Possible Candidate for Intensive Hospital based Rehab

## 2019-12-29 NOTE — PROGRESS NOTE ADULT - SUBJECTIVE AND OBJECTIVE BOX
Pt seen and examined at bedside. Patient denies any complaints. Rash has improved. No events overnight.     VITAL SIGNS (Last 24 hrs):  T(C): 35.6 (12-29-19 @ 14:36), Max: 36.6 (12-28-19 @ 15:29)  HR: 81 (12-29-19 @ 14:36) (76 - 84)  BP: 137/89 (12-29-19 @ 14:36) (123/80 - 140/87)  RR: 20 (12-29-19 @ 14:36) (18 - 20)  SpO2: 97% (12-28-19 @ 20:01) (97% - 97%)  Wt(kg): --  Daily Height in cm: 195.58 (28 Dec 2019 15:29)    Daily     I&O's Summary      PHYSICAL EXAM:  GENERAL: NAD, thin AA male   HEAD:  Atraumatic, Normocephalic  EYES:   conjunctiva and sclera clear  NECK: Supple, No JVD  CHEST/LUNG: Clear to auscultation bilaterally; No wheeze  HEART: Regular rate and rhythm; No murmurs, rubs, or gallops  ABDOMEN: Soft, Nontender, Nondistended; Bowel sounds present  EXTREMITIES:  2+ Peripheral Pulses, No clubbing, cyanosis, or edema  PSYCH: AAOx3  NEUROLOGY: Motor strength 5/5 UE bilaterally, 4/5 right LE, 3/5 LLE  SKIN: No rashes or lesions    Labs Reviewed  Spoke to patient in regards to abnormal labs.    CBC Full  -  ( 28 Dec 2019 07:00 )  WBC Count : 6.81 K/uL  Hemoglobin : 15.2 g/dL  Hematocrit : 46.6 %  Platelet Count - Automated : 255 K/uL  Mean Cell Volume : 91.6 fL  Mean Cell Hemoglobin : 29.9 pg  Mean Cell Hemoglobin Concentration : 32.6 g/dL  Auto Neutrophil # : x  Auto Lymphocyte # : x  Auto Monocyte # : x  Auto Eosinophil # : x  Auto Basophil # : x  Auto Neutrophil % : x  Auto Lymphocyte % : x  Auto Monocyte % : x  Auto Eosinophil % : x  Auto Basophil % : x    BMP:    12-28 @ 07:00    Blood Urea Nitrogen - 29  Calcium - 9.3  Carbon Dioxide - 27  Chloride - 102  Creatinine - 1.0  Glucose - 102  Potassium - 4.5  Sodium - 141      Hemoglobin A1c -   PT/INR - ( 27 Dec 2019 12:38 )   PT: 12.40 sec;   INR: 1.08 ratio         PTT - ( 27 Dec 2019 12:38 )  PTT:36.7 sec  Urine Culture:  12-27 @ 12:38 Urine culture: --    Culture Results:   No growth to date.  Method Type: --  Organism: --  Organism Identification: --  Specimen Source: .Blood Blood-Peripheral       MEDICATIONS  (STANDING):  chlorhexidine 4% Liquid 1 Application(s) Topical <User Schedule>  clotrimazole 1% Cream 1 Application(s) Topical two times a day  enoxaparin Injectable 40 milliGRAM(s) SubCutaneous at bedtime  gabapentin 600 milliGRAM(s) Oral every 8 hours  nicotine - 21 mG/24Hr(s) Patch 1 patch Transdermal daily  NIFEdipine XL 30 milliGRAM(s) Oral daily  nystatin Powder 1 Application(s) Topical three times a day  tiZANidine 4 milliGRAM(s) Oral every 8 hours    MEDICATIONS  (PRN):  oxyCODONE   IR Oral Tab/Cap - Peds 10 milliGRAM(s) Oral every 6 hours PRN Severe Pain (7 - 10)  zolpidem 10 milliGRAM(s) Oral at bedtime PRN Insomnia

## 2019-12-30 PROCEDURE — 99232 SBSQ HOSP IP/OBS MODERATE 35: CPT

## 2019-12-30 RX ORDER — NICOTINE POLACRILEX 2 MG
1 GUM BUCCAL EVERY 24 HOURS
Refills: 0 | Status: DISCONTINUED | OUTPATIENT
Start: 2019-12-30 | End: 2020-01-06

## 2019-12-30 RX ADMIN — TIZANIDINE 4 MILLIGRAM(S): 4 TABLET ORAL at 14:22

## 2019-12-30 RX ADMIN — TIZANIDINE 4 MILLIGRAM(S): 4 TABLET ORAL at 06:24

## 2019-12-30 RX ADMIN — GABAPENTIN 600 MILLIGRAM(S): 400 CAPSULE ORAL at 22:01

## 2019-12-30 RX ADMIN — NYSTATIN CREAM 1 APPLICATION(S): 100000 CREAM TOPICAL at 06:23

## 2019-12-30 RX ADMIN — Medication 1 APPLICATION(S): at 17:45

## 2019-12-30 RX ADMIN — NYSTATIN CREAM 1 APPLICATION(S): 100000 CREAM TOPICAL at 22:04

## 2019-12-30 RX ADMIN — ZOLPIDEM TARTRATE 10 MILLIGRAM(S): 10 TABLET ORAL at 22:02

## 2019-12-30 RX ADMIN — CHLORHEXIDINE GLUCONATE 1 APPLICATION(S): 213 SOLUTION TOPICAL at 06:23

## 2019-12-30 RX ADMIN — Medication 30 MILLIGRAM(S): at 06:24

## 2019-12-30 RX ADMIN — GABAPENTIN 600 MILLIGRAM(S): 400 CAPSULE ORAL at 14:24

## 2019-12-30 RX ADMIN — Medication 1 PATCH: at 01:18

## 2019-12-30 RX ADMIN — NYSTATIN CREAM 1 APPLICATION(S): 100000 CREAM TOPICAL at 14:22

## 2019-12-30 RX ADMIN — Medication 1 APPLICATION(S): at 06:24

## 2019-12-30 RX ADMIN — ENOXAPARIN SODIUM 40 MILLIGRAM(S): 100 INJECTION SUBCUTANEOUS at 22:03

## 2019-12-30 RX ADMIN — TIZANIDINE 4 MILLIGRAM(S): 4 TABLET ORAL at 22:04

## 2019-12-30 RX ADMIN — Medication 1 PATCH: at 22:04

## 2019-12-30 RX ADMIN — GABAPENTIN 600 MILLIGRAM(S): 400 CAPSULE ORAL at 06:24

## 2019-12-30 NOTE — DIETITIAN INITIAL EVALUATION ADULT. - OTHER INFO
Pt adm for rash. Hx SPMS w/ lower spastic paraparesis, left leg worse than right. Pelvic area rash - likely candida, improving. Multiple sclerosis. Family requesting SNF.

## 2019-12-30 NOTE — DIETITIAN INITIAL EVALUATION ADULT. - ENERGY NEEDS
estimated calorie needs: 2168 - 2529 kcals/day (MSJ x 1.2 - 1.4 AF for PU)  estimated protein needs: 97 - 113 gms/day (1.2 - 1.4 gm/kg for PU)  estimated fluid needs: 1ml/kcal or per LIP

## 2019-12-30 NOTE — PROGRESS NOTE ADULT - SUBJECTIVE AND OBJECTIVE BOX
Neurology consult    KEVIN [id number]    HPI:  49 year old male patient with past medical history of bed bound relapsing remitting MS and history chronic lower back pain presented because of inguinal and buttock rash. As per the family member for the past 4-5 days, they noted worsening of the rash on his buttocks specially on the right side, and in his inguinal and scrotum, whitish in color and it was burning.The family members have been trying to frequently change his diapers but he still has been frequently incontinent and wet. It been difficult for the family to take care of his hygiene hence looking for long term placement. He was being worked up for his MS outpatient by Dr. Bledsoe. He was scheduled for repeat MRI spine outpatient soon, but because of this rash he presented here to the hospital.   Currently as per the patient and the family member he is not in active MS flare, this is his baseline. He has twitching of the lower extremiteis more on the left side. No new weakness or numbness, change in vision or swallowing difficulty. (27 Dec 2019 16:34)          MEDICATIONS    chlorhexidine 4% Liquid 1 Application(s) Topical <User Schedule>  clotrimazole 1% Cream 1 Application(s) Topical two times a day  enoxaparin Injectable 40 milliGRAM(s) SubCutaneous at bedtime  gabapentin 600 milliGRAM(s) Oral every 8 hours  nicotine - 21 mG/24Hr(s) Patch 1 patch Transdermal every 24 hours  NIFEdipine XL 30 milliGRAM(s) Oral daily  nystatin Powder 1 Application(s) Topical three times a day  oxyCODONE   IR Oral Tab/Cap - Peds 10 milliGRAM(s) Oral every 6 hours PRN  tiZANidine 4 milliGRAM(s) Oral every 8 hours  zolpidem 10 milliGRAM(s) Oral at bedtime PRN      PAST MEDICAL & SURGICAL HISTORY:  Hypertension  Chronic back pain  Multiple sclerosis  No significant past surgical history       Family history: No history of dementia, strokes, or seizures   FAMILY HISTORY:  No pertinent family history in first degree relatives    SOCIAL HISTORY --     Allergies    penicillin (Unknown)    Intolerances            Vital Signs Last 24 Hrs  T(C): 36.6 (30 Dec 2019 05:02), Max: 36.6 (30 Dec 2019 05:02)  T(F): 97.9 (30 Dec 2019 05:02), Max: 97.9 (30 Dec 2019 05:02)  HR: 83 (30 Dec 2019 05:02) (81 - 95)  BP: 136/72 (30 Dec 2019 05:02) (136/72 - 145/91)  BP(mean): --  RR: 18 (30 Dec 2019 05:02) (18 - 20)  SpO2: 96% (29 Dec 2019 19:40) (96% - 96%)    MULTIPLE SCLEROSIS  ^MULTIPLE SCLEROSIS  No pertinent family history in first degree relatives  Handoff  MEWS Score  Hypertension  Chronic back pain  Multiple sclerosis  Candidal balanitis  No significant past surgical history  LEG WEAKNESS/  57  Multiple sclerosis      LABS:    Urinalysis Basic - ( 29 Dec 2019 16:35 )    Color: Yellow / Appearance: Clear / S.032 / pH: x  Gluc: x / Ketone: Negative  / Bili: Negative / Urobili: 3 mg/dL   Blood: x / Protein: Trace / Nitrite: Negative   Leuk Esterase: Negative / RBC: 4 /HPF / WBC 9 /HPF   Sq Epi: x / Non Sq Epi: 3 /HPF / Bacteria: Negative

## 2019-12-30 NOTE — DIETITIAN INITIAL EVALUATION ADULT. - FACTORS AFF FOOD INTAKE
Pt reports eating great. Finishing 100% of meal trays so far. Pt says he has no issues with eating. Denies chewing/swallowing problems, LBM 12/29 No GI distress. NKFA. Did not take nutrition supplements at home. Typically has 3 meals/day usually cooks for himself - his wife isnt the best  according to him.

## 2019-12-30 NOTE — OCCUPATIONAL THERAPY INITIAL EVALUATION ADULT - PLANNED THERAPY INTERVENTIONS, OT EVAL
ADL retraining/balance training/bed mobility training/strengthening/stretching/parent/caregiver training.../transfer training/IADL retraining

## 2019-12-30 NOTE — PROGRESS NOTE ADULT - SUBJECTIVE AND OBJECTIVE BOX
KEVIN MEDRANO 49y Male  MRN#: [id number]   Hospital Day: 3d    SUBJECTIVE  Patient is a 49y old Male who presents with a chief complaint of rash (30 Dec 2019 09:10)  Currently admitted to medicine with the primary diagnosis of Candidal balanitis    INTERVAL HPI AND OVERNIGHT EVENTS:  Patient was examined and seen at bedside. This morning he is resting comfortably in bed and reports no issues or overnight events.    REVIEW OF SYMPTOMS:  CONSTITUTIONAL: No weakness, fevers or chills; No headaches  EYES: No visual changes, eye pain, or discharge  ENT: No vertigo; No ear pain or change in hearing; No sore throat or difficulty swallowing  NECK: No pain or stiffness  RESPIRATORY: No cough, wheezing, or hemoptysis; No shortness of breath  CARDIOVASCULAR: No chest pain or palpitations  GASTROINTESTINAL: No abdominal or epigastric pain; No nausea, vomiting, or hematemesis; No diarrhea or constipation; No melena or hematochezia  GENITOURINARY: No dysuria, frequency or hematuria  MUSCULOSKELETAL: No joint pain, no muscle pain, no weakness  NEUROLOGICAL: No numbness or weakness  SKIN: No itching or rashes    OBJECTIVE  PAST MEDICAL & SURGICAL HISTORY  Hypertension  Chronic back pain  Multiple sclerosis  No significant past surgical history    ALLERGIES:  penicillin (Unknown)    MEDICATIONS:  STANDING MEDICATIONS  chlorhexidine 4% Liquid 1 Application(s) Topical <User Schedule>  clotrimazole 1% Cream 1 Application(s) Topical two times a day  enoxaparin Injectable 40 milliGRAM(s) SubCutaneous at bedtime  gabapentin 600 milliGRAM(s) Oral every 8 hours  nicotine - 21 mG/24Hr(s) Patch 1 patch Transdermal every 24 hours  NIFEdipine XL 30 milliGRAM(s) Oral daily  nystatin Powder 1 Application(s) Topical three times a day  tiZANidine 4 milliGRAM(s) Oral every 8 hours    PRN MEDICATIONS  oxyCODONE   IR Oral Tab/Cap - Peds 10 milliGRAM(s) Oral every 6 hours PRN  zolpidem 10 milliGRAM(s) Oral at bedtime PRN      VITAL SIGNS: Last 24 Hours  T(C): 36.4 (30 Dec 2019 14:01), Max: 36.6 (30 Dec 2019 05:02)  T(F): 97.6 (30 Dec 2019 14:01), Max: 97.9 (30 Dec 2019 05:02)  HR: 98 (30 Dec 2019 14:01) (83 - 98)  BP: 135/92 (30 Dec 2019 14:01) (135/92 - 145/91)  BP(mean): --  RR: 18 (30 Dec 2019 14:01) (18 - 18)  SpO2: 96% (29 Dec 2019 19:40) (96% - 96%)    LABS:  Urinalysis Basic - ( 29 Dec 2019 16:35 )    Color: Yellow / Appearance: Clear / S.032 / pH: x  Gluc: x / Ketone: Negative  / Bili: Negative / Urobili: 3 mg/dL   Blood: x / Protein: Trace / Nitrite: Negative   Leuk Esterase: Negative / RBC: 4 /HPF / WBC 9 /HPF   Sq Epi: x / Non Sq Epi: 3 /HPF / Bacteria: Negative    RADIOLOGY:  < from: MR Head w/wo IV Cont (.19 @ 12:18) >  IMPRESSION:    1.  Redemonstrated extensive white matter signal abnormality with callosal and posterior fossa involvement consistent with the patient's clinical history of multiple sclerosis.    2.  The previously seen enhancement within multiple lesions has resolved.    3.  No new or enhancing lesions are demonstrated on the current exam.    4.  Stable cerebral atrophy.    < end of copied text >  < from: MR Cervical Spine w/ IV Cont (19 @ 19:49) >  IMPRESSION:    1.  There is probably mild patchy signal in the dorsal cord at about C2and possibly C7-T1 consistent with demyelination    2.  Findings are likely stable since the prior study. Both studies are degraded by motion.    3.  No new lesions. No enhancing lesions.    < end of copied text >  < from: MR Thoracic Spine w/ IV Cont (. @ 19:49) >  IMPRESSION:    1.  Study degraded by motion    2.  Patchy thoracic cord signal abnormalities on the study of  are likely stable. Not as well-seen on the current study due to motion    3.  Likely, No new or enhancing lesions.    < end of copied text >      PHYSICAL EXAM:  CONSTITUTIONAL: No acute distress, well-developed, well-groomed, AAOx3  HEAD: Atraumatic, normocephalic  EYES: EOM intact, PERRLA, conjunctiva and sclera clear  ENT: Supple, no masses, no thyromegaly, no bruits, no JVD; moist mucous membranes  PULMONARY: Clear to auscultation bilaterally; no wheezes, rales, or rhonchi  CARDIOVASCULAR: Regular rate and rhythm; no murmurs, rubs, or gallops  GASTROINTESTINAL: Soft, non-tender, non-distended; bowel sounds present  MUSCULOSKELETAL: 2+ peripheral pulses; no clubbing, no cyanosis, no edema  NEUROLOGY: non-focal  SKIN: No rashes or lesions; warm and dry    ASSESSMENT & PLAN  #    PAST MEDICAL & SURGICAL HISTORY:  Hypertension  Chronic back pain  Multiple sclerosis  No significant past surgical history      #Misc  - DVT Prophylaxis:  - Diet:  - GI Prophylaxis:  - Activity:  - IV Fluids:  - Code Status:    Dispo: KEVIN MEDRANO 49y Male  MRN#: [id number]   Hospital Day: 3d    SUBJECTIVE  Patient is a 49y old Male who presents with a chief complaint of rash (30 Dec 2019 09:10)  Currently admitted to medicine with the primary diagnosis of Candidal balanitis    INTERVAL HPI AND OVERNIGHT EVENTS:  Patient was examined and seen at bedside. This morning he is resting comfortably in bed and reports no issues or overnight events.    REVIEW OF SYMPTOMS:  CONSTITUTIONAL: No weakness, fevers or chills; No headaches  EYES: No visual changes, eye pain, or discharge  ENT: No vertigo; No ear pain or change in hearing; No sore throat or difficulty swallowing  NECK: No pain or stiffness  RESPIRATORY: No cough, wheezing, or hemoptysis; No shortness of breath  CARDIOVASCULAR: No chest pain or palpitations  GASTROINTESTINAL: No abdominal or epigastric pain; No nausea, vomiting, or hematemesis; No diarrhea or constipation; No melena or hematochezia  GENITOURINARY: No dysuria, frequency or hematuria  MUSCULOSKELETAL: No joint pain, no muscle pain, no weakness  NEUROLOGICAL: No numbness or weakness  SKIN: No itching or rashes    OBJECTIVE  PAST MEDICAL & SURGICAL HISTORY  Hypertension  Chronic back pain  Multiple sclerosis  No significant past surgical history    ALLERGIES:  penicillin (Unknown)    MEDICATIONS:  STANDING MEDICATIONS  chlorhexidine 4% Liquid 1 Application(s) Topical <User Schedule>  clotrimazole 1% Cream 1 Application(s) Topical two times a day  enoxaparin Injectable 40 milliGRAM(s) SubCutaneous at bedtime  gabapentin 600 milliGRAM(s) Oral every 8 hours  nicotine - 21 mG/24Hr(s) Patch 1 patch Transdermal every 24 hours  NIFEdipine XL 30 milliGRAM(s) Oral daily  nystatin Powder 1 Application(s) Topical three times a day  tiZANidine 4 milliGRAM(s) Oral every 8 hours    PRN MEDICATIONS  oxyCODONE   IR Oral Tab/Cap - Peds 10 milliGRAM(s) Oral every 6 hours PRN  zolpidem 10 milliGRAM(s) Oral at bedtime PRN      VITAL SIGNS: Last 24 Hours  T(C): 36.4 (30 Dec 2019 14:01), Max: 36.6 (30 Dec 2019 05:02)  T(F): 97.6 (30 Dec 2019 14:01), Max: 97.9 (30 Dec 2019 05:02)  HR: 98 (30 Dec 2019 14:01) (83 - 98)  BP: 135/92 (30 Dec 2019 14:01) (135/92 - 145/91)  BP(mean): --  RR: 18 (30 Dec 2019 14:01) (18 - 18)  SpO2: 96% (29 Dec 2019 19:40) (96% - 96%)    LABS:  Urinalysis Basic - ( 29 Dec 2019 16:35 )    Color: Yellow / Appearance: Clear / S.032 / pH: x  Gluc: x / Ketone: Negative  / Bili: Negative / Urobili: 3 mg/dL   Blood: x / Protein: Trace / Nitrite: Negative   Leuk Esterase: Negative / RBC: 4 /HPF / WBC 9 /HPF   Sq Epi: x / Non Sq Epi: 3 /HPF / Bacteria: Negative    RADIOLOGY:  < from: MR Head w/wo IV Cont (.19 @ 12:18) >  IMPRESSION:    1.  Redemonstrated extensive white matter signal abnormality with callosal and posterior fossa involvement consistent with the patient's clinical history of multiple sclerosis.    2.  The previously seen enhancement within multiple lesions has resolved.    3.  No new or enhancing lesions are demonstrated on the current exam.    4.  Stable cerebral atrophy.    < end of copied text >  < from: MR Cervical Spine w/ IV Cont (19 @ 19:49) >  IMPRESSION:    1.  There is probably mild patchy signal in the dorsal cord at about C2and possibly C7-T1 consistent with demyelination    2.  Findings are likely stable since the prior study. Both studies are degraded by motion.    3.  No new lesions. No enhancing lesions.    < end of copied text >  < from: MR Thoracic Spine w/ IV Cont (. @ 19:49) >  IMPRESSION:    1.  Study degraded by motion    2.  Patchy thoracic cord signal abnormalities on the study of  are likely stable. Not as well-seen on the current study due to motion    3.  Likely, No new or enhancing lesions.    < end of copied text >      PHYSICAL EXAM:  CONSTITUTIONAL: No acute distress, well-developed, well-groomed, AAOx3  HEAD: Atraumatic, normocephalic  EYES: EOM intact, PERRLA, conjunctiva and sclera clear  ENT: Supple, no masses, no thyromegaly, no bruits, no JVD; moist mucous membranes  PULMONARY: Clear to auscultation bilaterally; no wheezes, rales, or rhonchi  CARDIOVASCULAR: Regular rate and rhythm; no murmurs, rubs, or gallops  GASTROINTESTINAL: Soft, non-tender, non-distended; bowel sounds present  MUSCULOSKELETAL: 2+ peripheral pulses; no clubbing, no cyanosis, no edema  NEUROLOGY: non-focal; twitching present in bilateral lower extremities  SKIN: improving groin and thigh fungal rash    ASSESSMENT & PLAN  Patient is a 48yo male with PMH of bed-bound relapsing-remitting multiple sclerosis, hypertension, and chronic low back pain who presented to the ED complaining of inguinal and buttock rash. Per family members, they are no longer able to take care of the patient and would like to pursue long term placement upon discharge.    #Cutaneous candidiasis  - Continue with clotrimazole 1% cream applied topically BID  - Continue with nystatin powder TID  - Keep affected area dry and clean    #Multiple sclerosis, stable  - Neurology consult appreciated  - No indication for steroids at this time  - Patient states he was on Tysabri outpatient, but this was not verified  - MR head shows no acute lesions    #Lower extremity twitching  - Patient states that he was taking ropinirole and gabapentin outpatient for twitching  - Per patient's pharmacy, ropinirole was last prescribed in 2019  - Continue with gabapentin 600mg PO Q8H    #Hypertension  - Continue with nifedipine XL 30mg PO QD    #Nicotine dependence  - Counseled patient on smoking cessation  - Continue with nicotine 21mg transdermal patch Q24H    #Chronic low back pain  - Continue with oxycodone IR 10mg PO Q6H PRN    #Insomnia  - Continue with zolpidem 10mg PO QHS PRN    #Misc  - DVT Prophylaxis: Lovenox 40mg SQ QHS  - Diet: Regular  - GI Prophylaxis: not indicated  - Activity: out of bed to chair  - IV Fluids: encourage PO intake  - Code Status: Full code    Dispo: Awaiting placement

## 2019-12-30 NOTE — DIETITIAN INITIAL EVALUATION ADULT. - PHYSICAL APPEARANCE
well nourished/BMI 21.2 IBW: 94.5 kg UBW: 175# says he probably weighs more now from eating well. Pt says he has always been thin. Observed mild muscle wasting to temporalis region. Unstageable pressure ulcer to R Hip. Rash to sacrum. BS 15 surgical incision

## 2019-12-30 NOTE — PHYSICAL THERAPY INITIAL EVALUATION ADULT - ACTIVE RANGE OF MOTION EXAMINATION, REHAB EVAL
RT hip flex 10 deg, knee ext 45 deg, DF neutral lacks 20 deg; LT hip flex - unable in sitting, knee ext lacks 60 deg in sitting, DF lacks 30 deg to neutral/deficits as listed below

## 2019-12-30 NOTE — PHYSICAL THERAPY INITIAL EVALUATION ADULT - GENERAL OBSERVATIONS, REHAB EVAL
[id number] am. 49/M rec'd/left in b/s chair, nad, + chair alarm. patient presents with worsened weakness in bilat LE's and decreased functional mobility. patient is pleasant and motivated. [id number] am. 49/M rec'd/left in b/s chair, nad, + chair alarm. patient presents with worsened weakness in bilat LE's and decreased functional mobility. patient is pleasant and motivated. patient is 6'5".

## 2019-12-30 NOTE — PROGRESS NOTE ADULT - ASSESSMENT
Pt seen, examined. No new complaints.  Rash - reportedly improving.  Awake, alert, speech fluent, follows commands. EOMI. No facial droop. Motor 5-/5 x 2 UE, 4/5 right LE, 3/5 left LE.   Mildly increased muscle tone, FTNT - int. tremor. Toes - mute.  Gait - deferred.    U/A - neg. for UTI  Brain MRI, MRI of C/T-spine - negative for new demyelinating lesions.    A/P.  1)Hx SPMS w/ lower spastic paraparesis, left leg worse than right  - stable  - no new lesions on Brain MRI, C- and T-spine MRI - I don't think patient needs IV Solumedrol at present  - follow rehab recommendations  - follow w/ his Neurologist Dr. Bledsoe as outpatient      (was on Tysabri, but was non compliant; plan - resume Tysabri;      if pt goes to rehab - they should call for Neuro consult, so visit to the neuro office for Tysabri can be arranged)  2) Pelvic area rash - likely candida  - improving reportedly  - follow medical team recommendations  Please call if any questions

## 2019-12-30 NOTE — PHYSICAL THERAPY INITIAL EVALUATION ADULT - LEVEL OF INDEPENDENCE: SIT/STAND, REHAB EVAL
maximum assist (25% patients effort)/*** on 1st attempt, knees buckled and patient was lowered back into the chair

## 2019-12-31 LAB
CULTURE RESULTS: SIGNIFICANT CHANGE UP
SPECIMEN SOURCE: SIGNIFICANT CHANGE UP

## 2019-12-31 PROCEDURE — 99232 SBSQ HOSP IP/OBS MODERATE 35: CPT

## 2019-12-31 RX ORDER — SENNA PLUS 8.6 MG/1
2 TABLET ORAL AT BEDTIME
Refills: 0 | Status: DISCONTINUED | OUTPATIENT
Start: 2019-12-31 | End: 2020-01-06

## 2019-12-31 RX ADMIN — NYSTATIN CREAM 1 APPLICATION(S): 100000 CREAM TOPICAL at 21:05

## 2019-12-31 RX ADMIN — Medication 30 MILLIGRAM(S): at 06:22

## 2019-12-31 RX ADMIN — GABAPENTIN 600 MILLIGRAM(S): 400 CAPSULE ORAL at 13:07

## 2019-12-31 RX ADMIN — TIZANIDINE 4 MILLIGRAM(S): 4 TABLET ORAL at 06:22

## 2019-12-31 RX ADMIN — Medication 1 APPLICATION(S): at 17:08

## 2019-12-31 RX ADMIN — ENOXAPARIN SODIUM 40 MILLIGRAM(S): 100 INJECTION SUBCUTANEOUS at 21:02

## 2019-12-31 RX ADMIN — SENNA PLUS 2 TABLET(S): 8.6 TABLET ORAL at 21:03

## 2019-12-31 RX ADMIN — TIZANIDINE 4 MILLIGRAM(S): 4 TABLET ORAL at 13:08

## 2019-12-31 RX ADMIN — GABAPENTIN 600 MILLIGRAM(S): 400 CAPSULE ORAL at 06:22

## 2019-12-31 RX ADMIN — GABAPENTIN 600 MILLIGRAM(S): 400 CAPSULE ORAL at 21:02

## 2019-12-31 RX ADMIN — CHLORHEXIDINE GLUCONATE 1 APPLICATION(S): 213 SOLUTION TOPICAL at 06:22

## 2019-12-31 RX ADMIN — NYSTATIN CREAM 1 APPLICATION(S): 100000 CREAM TOPICAL at 06:23

## 2019-12-31 RX ADMIN — NYSTATIN CREAM 1 APPLICATION(S): 100000 CREAM TOPICAL at 13:03

## 2019-12-31 RX ADMIN — TIZANIDINE 4 MILLIGRAM(S): 4 TABLET ORAL at 21:05

## 2019-12-31 RX ADMIN — Medication 1 PATCH: at 21:05

## 2019-12-31 RX ADMIN — Medication 1 APPLICATION(S): at 06:23

## 2019-12-31 RX ADMIN — OXYCODONE HYDROCHLORIDE 10 MILLIGRAM(S): 5 TABLET ORAL at 12:41

## 2019-12-31 NOTE — PROGRESS NOTE ADULT - SUBJECTIVE AND OBJECTIVE BOX
KEVIN MEDRANO 49y Male  MRN#: [id number]   Hospital Day: 4d    SUBJECTIVE  Patient is a 49y old Male who presents with a chief complaint of rash (30 Dec 2019 09:10)  Currently admitted to medicine with the primary diagnosis of Candidal balanitis    INTERVAL HPI AND OVERNIGHT EVENTS:  Patient was examined and seen at bedside. This morning he is resting comfortably in bed and reports no issues or overnight events.    REVIEW OF SYMPTOMS:  CONSTITUTIONAL: No weakness, fevers or chills; No headaches  EYES: No visual changes, eye pain, or discharge  ENT: No vertigo; No ear pain or change in hearing; No sore throat or difficulty swallowing  NECK: No pain or stiffness  RESPIRATORY: No cough, wheezing, or hemoptysis; No shortness of breath  CARDIOVASCULAR: No chest pain or palpitations  GASTROINTESTINAL: No abdominal or epigastric pain; No nausea, vomiting, or hematemesis; No diarrhea or constipation; No melena or hematochezia  GENITOURINARY: No dysuria, frequency or hematuria  MUSCULOSKELETAL: No joint pain, no muscle pain, no weakness  NEUROLOGICAL: No numbness or weakness  SKIN: No itching or rashes    OBJECTIVE  PAST MEDICAL & SURGICAL HISTORY  Hypertension  Chronic back pain  Multiple sclerosis  No significant past surgical history    ALLERGIES:  penicillin (Unknown)    MEDICATIONS:  STANDING MEDICATIONS  chlorhexidine 4% Liquid 1 Application(s) Topical <User Schedule>  clotrimazole 1% Cream 1 Application(s) Topical two times a day  enoxaparin Injectable 40 milliGRAM(s) SubCutaneous at bedtime  gabapentin 600 milliGRAM(s) Oral every 8 hours  nicotine - 21 mG/24Hr(s) Patch 1 patch Transdermal every 24 hours  NIFEdipine XL 30 milliGRAM(s) Oral daily  nystatin Powder 1 Application(s) Topical three times a day  senna 2 Tablet(s) Oral at bedtime  tiZANidine 4 milliGRAM(s) Oral every 8 hours    PRN MEDICATIONS  oxyCODONE   IR Oral Tab/Cap - Peds 10 milliGRAM(s) Oral every 6 hours PRN  zolpidem 10 milliGRAM(s) Oral at bedtime PRN      VITAL SIGNS: Last 24 Hours  T(C): 35.9 (31 Dec 2019 14:26), Max: 36 (30 Dec 2019 21:35)  T(F): 96.7 (31 Dec 2019 14:26), Max: 96.8 (30 Dec 2019 21:35)  HR: 85 (31 Dec 2019 14:26) (75 - 85)  BP: 136/89 (31 Dec 2019 14:26) (129/83 - 137/87)  BP(mean): --  RR: 18 (31 Dec 2019 06:58) (18 - 18)  SpO2: 97% (30 Dec 2019 19:56) (97% - 97%)    LABS:    Urinalysis Basic - ( 29 Dec 2019 16:35 )    Color: Yellow / Appearance: Clear / S.032 / pH: x  Gluc: x / Ketone: Negative  / Bili: Negative / Urobili: 3 mg/dL   Blood: x / Protein: Trace / Nitrite: Negative   Leuk Esterase: Negative / RBC: 4 /HPF / WBC 9 /HPF   Sq Epi: x / Non Sq Epi: 3 /HPF / Bacteria: Negative    Culture - Urine (collected 29 Dec 2019 16:35)  Source: .Urine Clean Catch (Midstream)  Preliminary Report (30 Dec 2019 21:52):    Culture in progress    RADIOLOGY:  Pending VA Duplex of right upper extremity    PHYSICAL EXAM:  CONSTITUTIONAL: No acute distress, well-developed, well-groomed, AAOx3  HEAD: Atraumatic, normocephalic  EYES: EOM intact, PERRLA, conjunctiva and sclera clear  ENT: Supple, no masses, no thyromegaly, no bruits, no JVD; moist mucous membranes  PULMONARY: Clear to auscultation bilaterally; no wheezes, rales, or rhonchi  CARDIOVASCULAR: Regular rate and rhythm; no murmurs, rubs, or gallops  GASTROINTESTINAL: Soft, non-tender, non-distended; bowel sounds present  MUSCULOSKELETAL: 2+ peripheral pulses; erythema with induration of right medial elbow at IV site  NEUROLOGY: non-focal; twitching present in bilateral lower extremities  SKIN: improving groin and thigh fungal rash    ASSESSMENT & PLAN  Patient is a 50yo male with PMH of bed-bound relapsing-remitting multiple sclerosis, hypertension, and chronic low back pain who presented to the ED complaining of inguinal and buttock rash. Per family members, they are no longer able to take care of the patient and would like to pursue long term placement upon discharge.    #Cutaneous candidiasis  - Continue with clotrimazole 1% cream applied topically BID  - Continue with nystatin powder TID  - Keep affected area dry and clean    #Infiltrated IV with erythema and induration  - Likely hematoma underlying IV site, less likely to be site of infection  - Removed IV  - Apply warm compresses to affected area  - Pending VA Duplex right upper extremity    #Multiple sclerosis, stable  - Neurology consult appreciated  - No indication for steroids at this time  - Patient states he was on Tysabri outpatient, but this was not verified  - MR head shows no acute lesions    #Lower extremity twitching  - Patient states that he was taking ropinirole and gabapentin outpatient for twitching  - Per patient's pharmacy, ropinirole was last prescribed in 2019  - Continue with gabapentin 600mg PO Q8H    #Hypertension  - Continue with nifedipine XL 30mg PO QD    #Nicotine dependence  - Counseled patient on smoking cessation  - Continue with nicotine 21mg transdermal patch Q24H    #Chronic low back pain  - Continue with oxycodone IR 10mg PO Q6H PRN    #Insomnia  - Continue with zolpidem 10mg PO QHS PRN    #Misc  - DVT Prophylaxis: Lovenox 40mg SQ QHS  - Diet: Regular  - GI Prophylaxis: not indicated  - Activity: out of bed to chair  - IV Fluids: encourage PO intake  - Code Status: Full code    Dispo: Awaiting placement KEVIN MEDRANO 49y Male  MRN#: [id number]   Hospital Day: 4d    SUBJECTIVE  Patient is a 49y old Male who presents with a chief complaint of rash (30 Dec 2019 09:10)  Currently admitted to medicine with the primary diagnosis of Candidal balanitis    INTERVAL HPI AND OVERNIGHT EVENTS:  Patient was examined and seen at bedside. This morning he is resting comfortably in bed and reports no issues or overnight events.    REVIEW OF SYMPTOMS:  CONSTITUTIONAL: No weakness, fevers or chills; No headaches  EYES: No visual changes, eye pain, or discharge  ENT: No vertigo; No ear pain or change in hearing; No sore throat or difficulty swallowing  NECK: No pain or stiffness  RESPIRATORY: No cough, wheezing, or hemoptysis; No shortness of breath  CARDIOVASCULAR: No chest pain or palpitations  GASTROINTESTINAL: No abdominal or epigastric pain; No nausea, vomiting, or hematemesis; No diarrhea or constipation; No melena or hematochezia  GENITOURINARY: No dysuria, frequency or hematuria  MUSCULOSKELETAL: No joint pain, no muscle pain, no weakness  NEUROLOGICAL: No numbness or weakness  SKIN: No itching or rashes    OBJECTIVE  PAST MEDICAL & SURGICAL HISTORY  Hypertension  Chronic back pain  Multiple sclerosis  No significant past surgical history    ALLERGIES:  penicillin (Unknown)    MEDICATIONS:  STANDING MEDICATIONS  chlorhexidine 4% Liquid 1 Application(s) Topical <User Schedule>  clotrimazole 1% Cream 1 Application(s) Topical two times a day  enoxaparin Injectable 40 milliGRAM(s) SubCutaneous at bedtime  gabapentin 600 milliGRAM(s) Oral every 8 hours  nicotine - 21 mG/24Hr(s) Patch 1 patch Transdermal every 24 hours  NIFEdipine XL 30 milliGRAM(s) Oral daily  nystatin Powder 1 Application(s) Topical three times a day  senna 2 Tablet(s) Oral at bedtime  tiZANidine 4 milliGRAM(s) Oral every 8 hours    PRN MEDICATIONS  oxyCODONE   IR Oral Tab/Cap - Peds 10 milliGRAM(s) Oral every 6 hours PRN  zolpidem 10 milliGRAM(s) Oral at bedtime PRN      VITAL SIGNS: Last 24 Hours  T(C): 35.9 (31 Dec 2019 14:26), Max: 36 (30 Dec 2019 21:35)  T(F): 96.7 (31 Dec 2019 14:26), Max: 96.8 (30 Dec 2019 21:35)  HR: 85 (31 Dec 2019 14:26) (75 - 85)  BP: 136/89 (31 Dec 2019 14:26) (129/83 - 137/87)  BP(mean): --  RR: 18 (31 Dec 2019 06:58) (18 - 18)  SpO2: 97% (30 Dec 2019 19:56) (97% - 97%)    LABS:    Urinalysis Basic - ( 29 Dec 2019 16:35 )    Color: Yellow / Appearance: Clear / S.032 / pH: x  Gluc: x / Ketone: Negative  / Bili: Negative / Urobili: 3 mg/dL   Blood: x / Protein: Trace / Nitrite: Negative   Leuk Esterase: Negative / RBC: 4 /HPF / WBC 9 /HPF   Sq Epi: x / Non Sq Epi: 3 /HPF / Bacteria: Negative    Culture - Urine (collected 29 Dec 2019 16:35)  Source: .Urine Clean Catch (Midstream)  Preliminary Report (30 Dec 2019 21:52):    Culture in progress    RADIOLOGY:  Pending VA Duplex of right upper extremity    PHYSICAL EXAM:  CONSTITUTIONAL: No acute distress, well-developed, well-groomed, AAOx3  HEAD: Atraumatic, normocephalic  EYES: EOM intact, PERRLA, conjunctiva and sclera clear  ENT: Supple, no masses, no thyromegaly, no bruits, no JVD; moist mucous membranes  PULMONARY: Clear to auscultation bilaterally; no wheezes, rales, or rhonchi  CARDIOVASCULAR: Regular rate and rhythm; no murmurs, rubs, or gallops  GASTROINTESTINAL: Soft, non-tender, non-distended; bowel sounds present  MUSCULOSKELETAL: 2+ peripheral pulses; erythema with induration of right medial elbow at IV site  NEUROLOGY: non-focal; twitching present in bilateral lower extremities  SKIN: improving groin and thigh fungal rash    ASSESSMENT & PLAN  Patient is a 50yo male with PMH of bed-bound relapsing-remitting multiple sclerosis, hypertension, and chronic low back pain who presented to the ED complaining of inguinal and buttock rash. Per family members, they are no longer able to take care of the patient and would like to pursue long term placement upon discharge.    #Cutaneous candidiasis  - Continue with clotrimazole 1% cream applied topically BID  - Continue with nystatin powder TID  - Keep affected area dry and clean    #Infiltrated IV with erythema and induration  - Likely hematoma underlying IV site, less likely to be site of infection  - Removed IV  - Apply warm compresses to affected area  - Pending VA Duplex right upper extremity    #Multiple sclerosis, stable  - Neurology consult appreciated  - No indication for steroids at this time  - Patient states he was on Tysabri outpatient, but this was not verified  - MR head shows no acute lesions  - Patient is requesting long term placement because he is unable to care for himself in the community    #Lower extremity twitching  - Patient states that he was taking ropinirole and gabapentin outpatient for twitching  - Per patient's pharmacy, ropinirole was last prescribed in 2019  - Continue with gabapentin 600mg PO Q8H    #Hypertension  - Continue with nifedipine XL 30mg PO QD    #Nicotine dependence  - Counseled patient on smoking cessation  - Continue with nicotine 21mg transdermal patch Q24H    #Chronic low back pain  - Continue with oxycodone IR 10mg PO Q6H PRN    #Insomnia  - Continue with zolpidem 10mg PO QHS PRN    #Misc  - DVT Prophylaxis: Lovenox 40mg SQ QHS  - Diet: Regular  - GI Prophylaxis: not indicated  - Activity: out of bed to chair  - IV Fluids: encourage PO intake  - Code Status: Full code    Dispo: Awaiting placement

## 2019-12-31 NOTE — PROGRESS NOTE ADULT - ASSESSMENT
Patient is a 48yo male with PMH of bed-bound relapsing-remitting multiple sclerosis, hypertension, and chronic low back pain who presented to the ED complaining of inguinal and buttock rash. Per family members, they are no longer able to take care of the patient and would like to pursue long term placement upon discharge.    1. Cutaneous candidiasis: Cont clotrimazole BID, nystatin TID  2. MS, stable: Neuro appreciated. Cont Tysabri. Outpatient f/u  3. LE twitching: Cont gabapentin 300 TID  4. HTN: Cont nifedipine 30  5. Nicotine dependence: Nicotine patch. Counselled on smoking cessation  6. Chronic low back pain: Pain control prn, PT/OT  7. Insomnia: Zolpidem qHs     GI/DVT PPX    #Progress Note Handoff  Pending (specify): Placement  Family discussion: d/w pt regarding palcement  Disposition: [facility]

## 2019-12-31 NOTE — PROGRESS NOTE ADULT - SUBJECTIVE AND OBJECTIVE BOX
KEVIN MEDRANO  49y, Male  Allergy: penicillin (Unknown)    Hospital Day: 4d    Patient seen and examined earlier today. No acute events overnight.    PMH/PSH:  PAST MEDICAL & SURGICAL HISTORY:  Hypertension  Chronic back pain  Multiple sclerosis  No significant past surgical history    VITALS:  T(F): 96.5 (19 @ 06:58), Max: 97.6 (19 @ 14:01)  HR: 75 (19 @ 06:58)  BP: 129/83 (19 @ 06:58) (129/83 - 137/87)  RR: 18 (19 @ 06:58)  SpO2: 97% (19 @ 19:56)    TESTS & MEASUREMENTS:  Weight (Kg):   BMI (kg/m2): 21.2 ()    19 @ 07:01  -  19 @ 11:28  --------------------------------------------------------  IN: 0 mL / OUT: 100 mL / NET: -100 mL    Culture - Urine (collected 19 @ 16:35)  Source: .Urine Clean Catch (Midstream)  Preliminary Report (19 @ 21:52):    Culture in progress    Culture - Blood (collected 19 @ 12:38)  Source: .Blood Blood-Peripheral  Preliminary Report (19 @ 01:02):    No growth to date.    Culture - Blood (collected 19 @ 12:38)  Source: .Blood Blood-Peripheral  Preliminary Report (19 @ 01:02):    No growth to date.      Urinalysis Basic - ( 29 Dec 2019 16:35 )    Color: Yellow / Appearance: Clear / S.032 / pH: x  Gluc: x / Ketone: Negative  / Bili: Negative / Urobili: 3 mg/dL   Blood: x / Protein: Trace / Nitrite: Negative   Leuk Esterase: Negative / RBC: 4 /HPF / WBC 9 /HPF   Sq Epi: x / Non Sq Epi: 3 /HPF / Bacteria: Negative    MEDICATIONS:  MEDICATIONS  (STANDING):  chlorhexidine 4% Liquid 1 Application(s) Topical <User Schedule>  clotrimazole 1% Cream 1 Application(s) Topical two times a day  enoxaparin Injectable 40 milliGRAM(s) SubCutaneous at bedtime  gabapentin 600 milliGRAM(s) Oral every 8 hours  nicotine - 21 mG/24Hr(s) Patch 1 patch Transdermal every 24 hours  NIFEdipine XL 30 milliGRAM(s) Oral daily  nystatin Powder 1 Application(s) Topical three times a day  senna 2 Tablet(s) Oral at bedtime  tiZANidine 4 milliGRAM(s) Oral every 8 hours    MEDICATIONS  (PRN):  oxyCODONE   IR Oral Tab/Cap - Peds 10 milliGRAM(s) Oral every 6 hours PRN Severe Pain (7 - 10)  zolpidem 10 milliGRAM(s) Oral at bedtime PRN Insomnia    HOME MEDICATIONS:  oxyCODONE 20 mg oral tablet ()    REVIEW OF SYSTEMS:  All other review of systems is negative unless indicated above.     PHYSICAL EXAM:  GENERAL: NAD  HEENT: No Swelling  CHEST/LUNG: Good air entry, No wheezing  HEART: RRR, No murmurs  ABDOMEN: Soft, Bowel sounds present  EXTREMITIES:  No clubbing, LLE weakness

## 2020-01-01 PROCEDURE — 99233 SBSQ HOSP IP/OBS HIGH 50: CPT

## 2020-01-01 RX ADMIN — OXYCODONE HYDROCHLORIDE 10 MILLIGRAM(S): 5 TABLET ORAL at 13:16

## 2020-01-01 RX ADMIN — Medication 1 APPLICATION(S): at 16:55

## 2020-01-01 RX ADMIN — NYSTATIN CREAM 1 APPLICATION(S): 100000 CREAM TOPICAL at 05:49

## 2020-01-01 RX ADMIN — Medication 1 APPLICATION(S): at 05:49

## 2020-01-01 RX ADMIN — CHLORHEXIDINE GLUCONATE 1 APPLICATION(S): 213 SOLUTION TOPICAL at 05:48

## 2020-01-01 RX ADMIN — NYSTATIN CREAM 1 APPLICATION(S): 100000 CREAM TOPICAL at 13:13

## 2020-01-01 RX ADMIN — TIZANIDINE 4 MILLIGRAM(S): 4 TABLET ORAL at 05:50

## 2020-01-01 RX ADMIN — SENNA PLUS 2 TABLET(S): 8.6 TABLET ORAL at 21:09

## 2020-01-01 RX ADMIN — NYSTATIN CREAM 1 APPLICATION(S): 100000 CREAM TOPICAL at 21:12

## 2020-01-01 RX ADMIN — TIZANIDINE 4 MILLIGRAM(S): 4 TABLET ORAL at 13:13

## 2020-01-01 RX ADMIN — Medication 1 PATCH: at 05:49

## 2020-01-01 RX ADMIN — Medication 30 MILLIGRAM(S): at 05:49

## 2020-01-01 RX ADMIN — Medication 1 PATCH: at 21:10

## 2020-01-01 RX ADMIN — ENOXAPARIN SODIUM 40 MILLIGRAM(S): 100 INJECTION SUBCUTANEOUS at 21:10

## 2020-01-01 RX ADMIN — GABAPENTIN 600 MILLIGRAM(S): 400 CAPSULE ORAL at 05:48

## 2020-01-01 RX ADMIN — TIZANIDINE 4 MILLIGRAM(S): 4 TABLET ORAL at 21:12

## 2020-01-01 RX ADMIN — GABAPENTIN 600 MILLIGRAM(S): 400 CAPSULE ORAL at 13:10

## 2020-01-01 RX ADMIN — ZOLPIDEM TARTRATE 10 MILLIGRAM(S): 10 TABLET ORAL at 21:11

## 2020-01-01 RX ADMIN — GABAPENTIN 600 MILLIGRAM(S): 400 CAPSULE ORAL at 21:09

## 2020-01-01 NOTE — PROGRESS NOTE ADULT - SUBJECTIVE AND OBJECTIVE BOX
KEVIN MEDRANO 49y Male  MRN#: [id number]   Hospital Day: 5d    SUBJECTIVE  Patient is a 49y old Male who presents with a chief complaint of rash (30 Dec 2019 09:10)  Currently admitted to medicine with the primary diagnosis of Candidal balanitis    INTERVAL HPI AND OVERNIGHT EVENTS:  Patient was examined and seen at bedside. This morning he is resting comfortably in bed and reports no issues or overnight events.    REVIEW OF SYMPTOMS:  CONSTITUTIONAL: No weakness, fevers or chills; No headaches  EYES: No visual changes, eye pain, or discharge  ENT: No vertigo; No ear pain or change in hearing; No sore throat or difficulty swallowing  NECK: No pain or stiffness  RESPIRATORY: No cough, wheezing, or hemoptysis; No shortness of breath  CARDIOVASCULAR: No chest pain or palpitations  GASTROINTESTINAL: No abdominal or epigastric pain; No nausea, vomiting, or hematemesis; No diarrhea or constipation; No melena or hematochezia  GENITOURINARY: No dysuria, frequency or hematuria  MUSCULOSKELETAL: No joint pain, no muscle pain, no weakness  NEUROLOGICAL: No numbness or weakness  SKIN: No itching or rashes    OBJECTIVE  PAST MEDICAL & SURGICAL HISTORY  Hypertension  Chronic back pain  Multiple sclerosis  No significant past surgical history    ALLERGIES:  penicillin (Unknown)    MEDICATIONS:  STANDING MEDICATIONS  chlorhexidine 4% Liquid 1 Application(s) Topical <User Schedule>  clotrimazole 1% Cream 1 Application(s) Topical two times a day  enoxaparin Injectable 40 milliGRAM(s) SubCutaneous at bedtime  gabapentin 600 milliGRAM(s) Oral every 8 hours  nicotine - 21 mG/24Hr(s) Patch 1 patch Transdermal every 24 hours  NIFEdipine XL 30 milliGRAM(s) Oral daily  nystatin Powder 1 Application(s) Topical three times a day  senna 2 Tablet(s) Oral at bedtime  tiZANidine 4 milliGRAM(s) Oral every 8 hours    PRN MEDICATIONS  oxyCODONE   IR Oral Tab/Cap - Peds 10 milliGRAM(s) Oral every 6 hours PRN  zolpidem 10 milliGRAM(s) Oral at bedtime PRN      VITAL SIGNS: Last 24 Hours  T(C): 36.7 (01 Jan 2020 06:13), Max: 36.7 (01 Jan 2020 06:13)  T(F): 98 (01 Jan 2020 06:13), Max: 98 (01 Jan 2020 06:13)  HR: 80 (01 Jan 2020 06:13) (78 - 85)  BP: 142/90 (01 Jan 2020 06:18) (136/89 - 142/90)  BP(mean): --  RR: 18 (01 Jan 2020 06:13) (18 - 18)  SpO2: --    LABS:  Culture - Urine (collected 29 Dec 2019 16:35)  Source: .Urine Clean Catch (Midstream)  Final Report (31 Dec 2019 16:42):    <10,000 CFU/ml Normal Urogenital nadya present    RADIOLOGY:  No interval imaging performed     PHYSICAL EXAM:  CONSTITUTIONAL: No acute distress, well-developed, well-groomed, AAOx3  HEAD: Atraumatic, normocephalic  EYES: EOM intact, PERRLA, conjunctiva and sclera clear  ENT: Supple, no masses, no thyromegaly, no bruits, no JVD; moist mucous membranes  PULMONARY: Clear to auscultation bilaterally; no wheezes, rales, or rhonchi  CARDIOVASCULAR: Regular rate and rhythm; no murmurs, rubs, or gallops  GASTROINTESTINAL: Soft, non-tender, non-distended; bowel sounds present  MUSCULOSKELETAL: 2+ peripheral pulses; no clubbing, no cyanosis, no edema  NEUROLOGY: non-focal  SKIN: No rashes or lesions; warm and dry    ASSESSMENT & PLAN  Patient is a 50yo male with PMH of bed-bound relapsing-remitting multiple sclerosis, hypertension, and chronic low back pain who presented to the ED complaining of inguinal and buttock rash. Per family members, they are no longer able to take care of the patient and would like to pursue long term placement upon discharge.    #Cutaneous candidiasis  - Continue with clotrimazole 1% cream applied topically BID  - Continue with nystatin powder TID  - Keep affected area dry and clean    #Infiltrated IV with erythema and induration  - Likely hematoma underlying IV site, less likely to be site of infection  - Removed IV  - Apply warm compresses to affected area  - Pending [facility] Duplex right upper extremity    #History of right hip laceration s/p stitches  - Patient had right hip laceration that had 6 stitches placed in the ED at [facility] on 10/31/2019  - Sutures used were nylon  - On physical exam, there are no visible sutures but can barely be palpated through skin  - Patient feels the sutures under the skin  - Follow outpatient with surgery for removal    #Multiple sclerosis, stable  - Neurology consult appreciated  - No indication for steroids at this time  - Patient states he was on Tysabri outpatient, but this was not verified  - MR head shows no acute lesions  - Patient is requesting long term placement because he is unable to care for himself in the community    #Lower extremity twitching  - Patient states that he was taking ropinirole and gabapentin outpatient for twitching  - Per patient's pharmacy, ropinirole was last prescribed in 9/2019  - Continue with gabapentin 600mg PO Q8H    #Hypertension  - Continue with nifedipine XL 30mg PO QD    #Nicotine dependence  - Counseled patient on smoking cessation  - Continue with nicotine 21mg transdermal patch Q24H    #Chronic low back pain  - Continue with oxycodone IR 10mg PO Q6H PRN    #Insomnia  - Continue with zolpidem 10mg PO QHS PRN    #Misc  - DVT Prophylaxis: Lovenox 40mg SQ QHS  - Diet: Regular  - GI Prophylaxis: not indicated  - Activity: out of bed to chair  - IV Fluids: encourage PO intake  - Code Status: Full code    Dispo: Awaiting placement KEVIN MEDRANO 49y Male  MRN#: [id number]   Hospital Day: 5d    SUBJECTIVE  Patient is a 49y old Male who presents with a chief complaint of rash (30 Dec 2019 09:10)  Currently admitted to medicine with the primary diagnosis of Candidal balanitis    INTERVAL HPI AND OVERNIGHT EVENTS:  Patient was examined and seen at bedside. This morning he is resting comfortably in bed and reports no issues or overnight events. He denies any pain in the right elbow.    REVIEW OF SYMPTOMS:  CONSTITUTIONAL: No weakness, fevers or chills; No headaches  EYES: No visual changes, eye pain, or discharge  ENT: No vertigo; No ear pain or change in hearing; No sore throat or difficulty swallowing  NECK: No pain or stiffness  RESPIRATORY: No cough, wheezing, or hemoptysis; No shortness of breath  CARDIOVASCULAR: No chest pain or palpitations  GASTROINTESTINAL: No abdominal or epigastric pain; No nausea, vomiting, or hematemesis; No diarrhea or constipation; No melena or hematochezia  GENITOURINARY: No dysuria, frequency or hematuria  MUSCULOSKELETAL: No joint pain, no muscle pain, no weakness  NEUROLOGICAL: No numbness or weakness  SKIN: redness and swelling of right elbow near previous IV site    OBJECTIVE  PAST MEDICAL & SURGICAL HISTORY  Hypertension  Chronic back pain  Multiple sclerosis  No significant past surgical history    ALLERGIES:  penicillin (Unknown)    MEDICATIONS:  STANDING MEDICATIONS  chlorhexidine 4% Liquid 1 Application(s) Topical <User Schedule>  clotrimazole 1% Cream 1 Application(s) Topical two times a day  enoxaparin Injectable 40 milliGRAM(s) SubCutaneous at bedtime  gabapentin 600 milliGRAM(s) Oral every 8 hours  nicotine - 21 mG/24Hr(s) Patch 1 patch Transdermal every 24 hours  NIFEdipine XL 30 milliGRAM(s) Oral daily  nystatin Powder 1 Application(s) Topical three times a day  senna 2 Tablet(s) Oral at bedtime  tiZANidine 4 milliGRAM(s) Oral every 8 hours    PRN MEDICATIONS  oxyCODONE   IR Oral Tab/Cap - Peds 10 milliGRAM(s) Oral every 6 hours PRN  zolpidem 10 milliGRAM(s) Oral at bedtime PRN      VITAL SIGNS: Last 24 Hours  T(C): 36.7 (01 Jan 2020 06:13), Max: 36.7 (01 Jan 2020 06:13)  T(F): 98 (01 Jan 2020 06:13), Max: 98 (01 Jan 2020 06:13)  HR: 80 (01 Jan 2020 06:13) (78 - 85)  BP: 142/90 (01 Jan 2020 06:18) (136/89 - 142/90)  BP(mean): --  RR: 18 (01 Jan 2020 06:13) (18 - 18)  SpO2: --    LABS:  Culture - Urine (collected 29 Dec 2019 16:35)  Source: .Urine Clean Catch (Midstream)  Final Report (31 Dec 2019 16:42):    <10,000 CFU/ml Normal Urogenital andya present    RADIOLOGY:  No interval imaging performed     PHYSICAL EXAM:  CONSTITUTIONAL: No acute distress, well-developed, well-groomed, AAOx3  HEAD: Atraumatic, normocephalic  EYES: EOM intact, PERRLA, conjunctiva and sclera clear  ENT: Supple, no masses, no thyromegaly, no bruits, no JVD; moist mucous membranes  PULMONARY: Clear to auscultation bilaterally; no wheezes, rales, or rhonchi  CARDIOVASCULAR: Regular rate and rhythm; no murmurs, rubs, or gallops  GASTROINTESTINAL: Soft, non-tender, non-distended; bowel sounds present  MUSCULOSKELETAL: 2+ peripheral pulses; erythema with induration of right medial elbow at IV site; no tenderness to palpation of erythema or induration  NEUROLOGY: non-focal; twitching present in bilateral lower extremities  SKIN: improving groin and thigh fungal rash    ASSESSMENT & PLAN  Patient is a 48yo male with PMH of bed-bound relapsing-remitting multiple sclerosis, hypertension, and chronic low back pain who presented to the ED complaining of inguinal and buttock rash. Per family members, they are no longer able to take care of the patient and would like to pursue long term placement upon discharge.    #Cutaneous candidiasis  - Continue with clotrimazole 1% cream applied topically BID  - Continue with nystatin powder TID  - Keep affected area dry and clean    #Infiltrated IV with erythema and induration  - Likely hematoma underlying IV site, less likely to be site of infection  - Removed IV on 12/31/2019  - Apply warm compresses to affected area  - Pending [facility] Duplex right upper extremity    #History of right hip laceration s/p stitches  - Patient had right hip laceration that had 6 stitches placed in the ED at [facility] on 10/31/2019  - Sutures used were nylon  - On physical exam, there are no visible sutures but can barely be palpated through skin  - Patient feels the sutures under the skin  - Follow outpatient with surgery for removal if needed    #Multiple sclerosis, stable  - Neurology consult appreciated  - No indication for steroids at this time  - Patient states he was on Tysabri outpatient, but this was not verified  - MR head shows no acute lesions  - Patient is requesting long term placement because he is unable to care for himself in the community    #Lower extremity twitching  - Patient states that he was taking ropinirole and gabapentin outpatient for twitching  - Per patient's pharmacy, ropinirole was last prescribed in 9/2019  - Continue with gabapentin 600mg PO Q8H    #Hypertension  - Continue with nifedipine XL 30mg PO QD    #Nicotine dependence  - Counseled patient on smoking cessation  - Continue with nicotine 21mg transdermal patch Q24H    #Chronic low back pain  - Continue with oxycodone IR 10mg PO Q6H PRN    #Insomnia  - Continue with zolpidem 10mg PO QHS PRN    #Misc  - DVT Prophylaxis: Lovenox 40mg SQ QHS  - Diet: Regular  - GI Prophylaxis: not indicated  - Activity: out of bed to chair  - IV Fluids: encourage PO intake  - Code Status: Full code    Dispo: Awaiting placement

## 2020-01-02 LAB
CULTURE RESULTS: SIGNIFICANT CHANGE UP
CULTURE RESULTS: SIGNIFICANT CHANGE UP
SPECIMEN SOURCE: SIGNIFICANT CHANGE UP
SPECIMEN SOURCE: SIGNIFICANT CHANGE UP

## 2020-01-02 PROCEDURE — 99232 SBSQ HOSP IP/OBS MODERATE 35: CPT

## 2020-01-02 PROCEDURE — 93971 EXTREMITY STUDY: CPT | Mod: 26,RT

## 2020-01-02 RX ORDER — TIZANIDINE 4 MG/1
4 TABLET ORAL ONCE
Refills: 0 | Status: COMPLETED | OUTPATIENT
Start: 2020-01-02 | End: 2020-01-02

## 2020-01-02 RX ORDER — OXYCODONE HYDROCHLORIDE 5 MG/1
10 TABLET ORAL EVERY 6 HOURS
Refills: 0 | Status: DISCONTINUED | OUTPATIENT
Start: 2020-01-02 | End: 2020-01-06

## 2020-01-02 RX ORDER — ZOLPIDEM TARTRATE 10 MG/1
10 TABLET ORAL AT BEDTIME
Refills: 0 | Status: DISCONTINUED | OUTPATIENT
Start: 2020-01-02 | End: 2020-01-06

## 2020-01-02 RX ORDER — TIZANIDINE 4 MG/1
4 TABLET ORAL EVERY 6 HOURS
Refills: 0 | Status: DISCONTINUED | OUTPATIENT
Start: 2020-01-02 | End: 2020-01-06

## 2020-01-02 RX ADMIN — NYSTATIN CREAM 1 APPLICATION(S): 100000 CREAM TOPICAL at 13:38

## 2020-01-02 RX ADMIN — TIZANIDINE 4 MILLIGRAM(S): 4 TABLET ORAL at 06:14

## 2020-01-02 RX ADMIN — Medication 1 PATCH: at 23:01

## 2020-01-02 RX ADMIN — GABAPENTIN 600 MILLIGRAM(S): 400 CAPSULE ORAL at 13:38

## 2020-01-02 RX ADMIN — TIZANIDINE 4 MILLIGRAM(S): 4 TABLET ORAL at 17:04

## 2020-01-02 RX ADMIN — Medication 30 MILLIGRAM(S): at 06:13

## 2020-01-02 RX ADMIN — GABAPENTIN 600 MILLIGRAM(S): 400 CAPSULE ORAL at 06:13

## 2020-01-02 RX ADMIN — SENNA PLUS 2 TABLET(S): 8.6 TABLET ORAL at 23:01

## 2020-01-02 RX ADMIN — TIZANIDINE 4 MILLIGRAM(S): 4 TABLET ORAL at 23:02

## 2020-01-02 RX ADMIN — ENOXAPARIN SODIUM 40 MILLIGRAM(S): 100 INJECTION SUBCUTANEOUS at 23:02

## 2020-01-02 RX ADMIN — TIZANIDINE 4 MILLIGRAM(S): 4 TABLET ORAL at 13:30

## 2020-01-02 RX ADMIN — NYSTATIN CREAM 1 APPLICATION(S): 100000 CREAM TOPICAL at 23:02

## 2020-01-02 RX ADMIN — CHLORHEXIDINE GLUCONATE 1 APPLICATION(S): 213 SOLUTION TOPICAL at 06:13

## 2020-01-02 RX ADMIN — GABAPENTIN 600 MILLIGRAM(S): 400 CAPSULE ORAL at 23:01

## 2020-01-02 RX ADMIN — NYSTATIN CREAM 1 APPLICATION(S): 100000 CREAM TOPICAL at 06:13

## 2020-01-02 RX ADMIN — Medication 1 PATCH: at 12:05

## 2020-01-02 RX ADMIN — Medication 1 APPLICATION(S): at 17:05

## 2020-01-02 RX ADMIN — Medication 1 APPLICATION(S): at 06:13

## 2020-01-02 RX ADMIN — OXYCODONE HYDROCHLORIDE 10 MILLIGRAM(S): 5 TABLET ORAL at 19:38

## 2020-01-02 NOTE — PROGRESS NOTE ADULT - SUBJECTIVE AND OBJECTIVE BOX
KEVIN MEDRANO 49y Male  MRN#: [id number]   Hospital Day: 6d    SUBJECTIVE  Patient is a 49y old Male who presents with a chief complaint of rash (30 Dec 2019 09:10)  Currently admitted to medicine with the primary diagnosis of Candidal balanitis    INTERVAL HPI AND OVERNIGHT EVENTS:  Patient was examined and seen at bedside. This morning he is resting comfortably in bed and reports no issues or overnight events.    Patient states that his right elbow    REVIEW OF SYMPTOMS:  CONSTITUTIONAL: No weakness, fevers or chills; No headaches  EYES: No visual changes, eye pain, or discharge  ENT: No vertigo; No ear pain or change in hearing; No sore throat or difficulty swallowing  NECK: No pain or stiffness  RESPIRATORY: No cough, wheezing, or hemoptysis; No shortness of breath  CARDIOVASCULAR: No chest pain or palpitations  GASTROINTESTINAL: No abdominal or epigastric pain; No nausea, vomiting, or hematemesis; No diarrhea or constipation; No melena or hematochezia  GENITOURINARY: No dysuria, frequency or hematuria  MUSCULOSKELETAL: No joint pain, no muscle pain, no weakness  NEUROLOGICAL: No numbness or weakness  SKIN: No itching or rashes    OBJECTIVE  PAST MEDICAL & SURGICAL HISTORY  Hypertension  Chronic back pain  Multiple sclerosis  No significant past surgical history    ALLERGIES:  penicillin (Unknown)    MEDICATIONS:  STANDING MEDICATIONS  chlorhexidine 4% Liquid 1 Application(s) Topical <User Schedule>  clotrimazole 1% Cream 1 Application(s) Topical two times a day  enoxaparin Injectable 40 milliGRAM(s) SubCutaneous at bedtime  gabapentin 600 milliGRAM(s) Oral every 8 hours  nicotine - 21 mG/24Hr(s) Patch 1 patch Transdermal every 24 hours  NIFEdipine XL 30 milliGRAM(s) Oral daily  nystatin Powder 1 Application(s) Topical three times a day  senna 2 Tablet(s) Oral at bedtime  tiZANidine 4 milliGRAM(s) Oral every 6 hours  tiZANidine 4 milliGRAM(s) Oral once    PRN MEDICATIONS  oxyCODONE    IR 10 milliGRAM(s) Oral every 6 hours PRN  zolpidem 10 milliGRAM(s) Oral at bedtime PRN      VITAL SIGNS: Last 24 Hours  T(C): 36.9 (02 Jan 2020 07:04), Max: 36.9 (02 Jan 2020 07:04)  T(F): 98.4 (02 Jan 2020 07:04), Max: 98.4 (02 Jan 2020 07:04)  HR: 84 (02 Jan 2020 07:04) (81 - 95)  BP: 139/79 (02 Jan 2020 07:04) (108/67 - 143/95)  BP(mean): --  RR: 18 (02 Jan 2020 07:04) (18 - 18)  SpO2: --    LABS:  No interval testing performed     RADIOLOGY:  No interval imaging performed     PHYSICAL EXAM:  CONSTITUTIONAL: No acute distress, well-developed, well-groomed, AAOx3  HEAD: Atraumatic, normocephalic  EYES: EOM intact, PERRLA, conjunctiva and sclera clear  ENT: Supple, no masses, no thyromegaly, no bruits, no JVD; moist mucous membranes  PULMONARY: Clear to auscultation bilaterally; no wheezes, rales, or rhonchi  CARDIOVASCULAR: Regular rate and rhythm; no murmurs, rubs, or gallops  GASTROINTESTINAL: Soft, non-tender, non-distended; bowel sounds present  MUSCULOSKELETAL: 2+ peripheral pulses; no clubbing, no cyanosis, no edema  NEUROLOGY: non-focal  SKIN: No rashes or lesions; warm and dry    ASSESSMENT & PLAN  #    PAST MEDICAL & SURGICAL HISTORY:  Hypertension  Chronic back pain  Multiple sclerosis  No significant past surgical history      #Misc  - DVT Prophylaxis:  - Diet:  - GI Prophylaxis:  - Activity:  - IV Fluids:  - Code Status:    Dispo: KEVIN MEDRANO 49y Male  MRN#: [id number]   Hospital Day: 6d    SUBJECTIVE  Patient is a 49y old Male who presents with a chief complaint of rash (30 Dec 2019 09:10)  Currently admitted to medicine with the primary diagnosis of Candidal balanitis    INTERVAL HPI AND OVERNIGHT EVENTS:  Patient was examined and seen at bedside. This morning he is resting comfortably in bed and reports no issues or overnight events.    Patient states that his right elbow does not bother him. It is still red, but there is no pain or discomfort.    REVIEW OF SYMPTOMS:  CONSTITUTIONAL: No weakness, fevers or chills; No headaches  EYES: No visual changes, eye pain, or discharge  ENT: No vertigo; No ear pain or change in hearing; No sore throat or difficulty swallowing  NECK: No pain or stiffness  RESPIRATORY: No cough, wheezing, or hemoptysis; No shortness of breath  CARDIOVASCULAR: No chest pain or palpitations  GASTROINTESTINAL: No abdominal or epigastric pain; No nausea, vomiting, or hematemesis; No diarrhea or constipation; No melena or hematochezia  GENITOURINARY: No dysuria, frequency or hematuria  MUSCULOSKELETAL: No joint pain, no muscle pain, no weakness  NEUROLOGICAL: No numbness or weakness  SKIN: redness and swelling of right elbow near previous IV site    OBJECTIVE  PAST MEDICAL & SURGICAL HISTORY  Hypertension  Chronic back pain  Multiple sclerosis  No significant past surgical history    ALLERGIES:  penicillin (Unknown)    MEDICATIONS:  STANDING MEDICATIONS  chlorhexidine 4% Liquid 1 Application(s) Topical <User Schedule>  clotrimazole 1% Cream 1 Application(s) Topical two times a day  enoxaparin Injectable 40 milliGRAM(s) SubCutaneous at bedtime  gabapentin 600 milliGRAM(s) Oral every 8 hours  nicotine - 21 mG/24Hr(s) Patch 1 patch Transdermal every 24 hours  NIFEdipine XL 30 milliGRAM(s) Oral daily  nystatin Powder 1 Application(s) Topical three times a day  senna 2 Tablet(s) Oral at bedtime  tiZANidine 4 milliGRAM(s) Oral every 6 hours  tiZANidine 4 milliGRAM(s) Oral once    PRN MEDICATIONS  oxyCODONE    IR 10 milliGRAM(s) Oral every 6 hours PRN  zolpidem 10 milliGRAM(s) Oral at bedtime PRN      VITAL SIGNS: Last 24 Hours  T(C): 36.9 (02 Jan 2020 07:04), Max: 36.9 (02 Jan 2020 07:04)  T(F): 98.4 (02 Jan 2020 07:04), Max: 98.4 (02 Jan 2020 07:04)  HR: 84 (02 Jan 2020 07:04) (81 - 95)  BP: 139/79 (02 Jan 2020 07:04) (108/67 - 143/95)  BP(mean): --  RR: 18 (02 Jan 2020 07:04) (18 - 18)  SpO2: --    LABS:  No interval testing performed     RADIOLOGY:  Pending VA duplex final read    PHYSICAL EXAM:  CONSTITUTIONAL: No acute distress, well-developed, well-groomed, AAOx3  HEAD: Atraumatic, normocephalic  EYES: EOM intact, PERRLA, conjunctiva and sclera clear  ENT: Supple, no masses, no thyromegaly, no bruits, no JVD; moist mucous membranes  PULMONARY: Clear to auscultation bilaterally; no wheezes, rales, or rhonchi  CARDIOVASCULAR: Regular rate and rhythm; no murmurs, rubs, or gallops  GASTROINTESTINAL: Soft, non-tender, non-distended; bowel sounds present  MUSCULOSKELETAL: 2+ peripheral pulses; erythema with induration of right medial elbow at IV site; no tenderness to palpation of erythema or induration  NEUROLOGY: non-focal; twitching present in bilateral lower extremities  SKIN: improving groin and thigh fungal rash    ASSESSMENT & PLAN  Patient is a 50yo male with PMH of bed-bound relapsing-remitting multiple sclerosis, hypertension, and chronic low back pain who presented to the ED complaining of inguinal and buttock rash. Per family members, they are no longer able to take care of the patient and would like to pursue long term placement upon discharge.    #Cutaneous candidiasis, resolving  - Continue with clotrimazole 1% cream applied topically BID  - Continue with nystatin powder TID  - Keep affected area dry and clean    #Infiltrated IV with erythema and induration  - Removed IV on 12/31/2019  - Apply warm compresses to affected area  - Pending [facility] Duplex right upper extremity  - Technician called me and states that it appears to be superficial thrombophlebitis without signs of DVT    #History of right hip laceration s/p stitches  - Patient had right hip laceration that had 6 stitches placed in the ED at [facility] on 10/31/2019  - Sutures used were nylon  - On physical exam, there are no visible sutures but can barely be palpated through skin  - Patient feels the sutures under the skin  - Follow outpatient with surgery for removal if needed    #Multiple sclerosis, stable  - Neurology consult appreciated  - No indication for steroids at this time  - Patient states he was on Tysabri outpatient, but this was not verified  - MR head shows no acute lesions  - Patient is requesting long term placement because he is unable to care for himself in the community    #Lower extremity twitching  - Patient states that he was taking ropinirole and gabapentin outpatient for twitching  - Per patient's pharmacy, ropinirole was last prescribed in 9/2019  - Continue with gabapentin 600mg PO Q8H  - Discussed case with Dr. Bledsoe  - Increase tizanidine to 4mg Q6H PRN    #Hypertension  - Continue with nifedipine XL 30mg PO QD    #Nicotine dependence  - Counseled patient on smoking cessation  - Continue with nicotine 21mg transdermal patch Q24H    #Chronic low back pain  - Continue with oxycodone IR 10mg PO Q6H PRN    #Insomnia  - Continue with zolpidem 10mg PO QHS PRN    #Misc  - DVT Prophylaxis: Lovenox 40mg SQ QHS  - Diet: Regular  - GI Prophylaxis: not indicated  - Activity: out of bed to chair  - IV Fluids: encourage PO intake  - Code Status: Full code    Dispo: Awaiting placement

## 2020-01-03 PROCEDURE — 99232 SBSQ HOSP IP/OBS MODERATE 35: CPT

## 2020-01-03 RX ORDER — AMLODIPINE BESYLATE 2.5 MG/1
10 TABLET ORAL DAILY
Refills: 0 | Status: DISCONTINUED | OUTPATIENT
Start: 2020-01-03 | End: 2020-01-06

## 2020-01-03 RX ADMIN — NYSTATIN CREAM 1 APPLICATION(S): 100000 CREAM TOPICAL at 22:15

## 2020-01-03 RX ADMIN — AMLODIPINE BESYLATE 10 MILLIGRAM(S): 2.5 TABLET ORAL at 15:19

## 2020-01-03 RX ADMIN — NYSTATIN CREAM 1 APPLICATION(S): 100000 CREAM TOPICAL at 06:32

## 2020-01-03 RX ADMIN — GABAPENTIN 600 MILLIGRAM(S): 400 CAPSULE ORAL at 06:31

## 2020-01-03 RX ADMIN — TIZANIDINE 4 MILLIGRAM(S): 4 TABLET ORAL at 06:32

## 2020-01-03 RX ADMIN — Medication 1 PATCH: at 18:40

## 2020-01-03 RX ADMIN — Medication 1 PATCH: at 18:39

## 2020-01-03 RX ADMIN — Medication 1 APPLICATION(S): at 17:09

## 2020-01-03 RX ADMIN — GABAPENTIN 600 MILLIGRAM(S): 400 CAPSULE ORAL at 13:15

## 2020-01-03 RX ADMIN — ENOXAPARIN SODIUM 40 MILLIGRAM(S): 100 INJECTION SUBCUTANEOUS at 22:15

## 2020-01-03 RX ADMIN — SENNA PLUS 2 TABLET(S): 8.6 TABLET ORAL at 22:15

## 2020-01-03 RX ADMIN — ZOLPIDEM TARTRATE 10 MILLIGRAM(S): 10 TABLET ORAL at 22:15

## 2020-01-03 RX ADMIN — TIZANIDINE 4 MILLIGRAM(S): 4 TABLET ORAL at 11:33

## 2020-01-03 RX ADMIN — Medication 1 APPLICATION(S): at 06:31

## 2020-01-03 RX ADMIN — CHLORHEXIDINE GLUCONATE 1 APPLICATION(S): 213 SOLUTION TOPICAL at 06:31

## 2020-01-03 RX ADMIN — TIZANIDINE 4 MILLIGRAM(S): 4 TABLET ORAL at 17:09

## 2020-01-03 RX ADMIN — GABAPENTIN 600 MILLIGRAM(S): 400 CAPSULE ORAL at 22:15

## 2020-01-03 RX ADMIN — Medication 1 PATCH: at 22:15

## 2020-01-03 RX ADMIN — NYSTATIN CREAM 1 APPLICATION(S): 100000 CREAM TOPICAL at 13:15

## 2020-01-03 NOTE — PROGRESS NOTE ADULT - SUBJECTIVE AND OBJECTIVE BOX
KEVIN MEDRANO 49y Male  MRN#: [id number]   Hospital Day: 7d    SUBJECTIVE  Patient is a 49y old Male who presents with a chief complaint of rash (30 Dec 2019 09:10)  Currently admitted to medicine with the primary diagnosis of Candidal balanitis    INTERVAL HPI AND OVERNIGHT EVENTS:  Patient was examined and seen at bedside. This morning he is resting comfortably in bed and reports no issues or overnight events. He states that the elbow still does not bother him.    REVIEW OF SYMPTOMS:  CONSTITUTIONAL: No weakness, fevers or chills; No headaches  EYES: No visual changes, eye pain, or discharge  ENT: No vertigo; No ear pain or change in hearing; No sore throat or difficulty swallowing  NECK: No pain or stiffness  RESPIRATORY: No cough, wheezing, or hemoptysis; No shortness of breath  CARDIOVASCULAR: No chest pain or palpitations  GASTROINTESTINAL: No abdominal or epigastric pain; No nausea, vomiting, or hematemesis; No diarrhea or constipation; No melena or hematochezia  GENITOURINARY: No dysuria, frequency or hematuria  MUSCULOSKELETAL: No joint pain, no muscle pain, no weakness  NEUROLOGICAL: No numbness or weakness  SKIN: redness of right medial elbow    OBJECTIVE  PAST MEDICAL & SURGICAL HISTORY  Hypertension  Chronic back pain  Multiple sclerosis  No significant past surgical history    ALLERGIES:  penicillin (Unknown)    MEDICATIONS:  STANDING MEDICATIONS  amLODIPine   Tablet 10 milliGRAM(s) Oral daily  chlorhexidine 4% Liquid 1 Application(s) Topical <User Schedule>  clotrimazole 1% Cream 1 Application(s) Topical two times a day  enoxaparin Injectable 40 milliGRAM(s) SubCutaneous at bedtime  gabapentin 600 milliGRAM(s) Oral every 8 hours  nicotine - 21 mG/24Hr(s) Patch 1 patch Transdermal every 24 hours  nystatin Powder 1 Application(s) Topical three times a day  senna 2 Tablet(s) Oral at bedtime  tiZANidine 4 milliGRAM(s) Oral every 6 hours    PRN MEDICATIONS  oxyCODONE    IR 10 milliGRAM(s) Oral every 6 hours PRN  zolpidem 10 milliGRAM(s) Oral at bedtime PRN      VITAL SIGNS: Last 24 Hours  T(C): 37 (03 Jan 2020 14:20), Max: 37 (03 Jan 2020 14:20)  T(F): 98.6 (03 Jan 2020 14:20), Max: 98.6 (03 Jan 2020 14:20)  HR: 87 (03 Jan 2020 14:20) (81 - 98)  BP: 121/72 (03 Jan 2020 14:20) (100/66 - 121/72)  BP(mean): --  RR: 18 (03 Jan 2020 14:20) (18 - 18)  SpO2: --    LABS:  No interval testing performed     RADIOLOGY:  < from: [facility] Duplex Upper Ext Vein Scan, Right (01.02.20 @ 15:09) >  Impression:    No evidence of deep vein thrombosis in the right upper extremity. Right basilic vein superficial thrombophlebitis.    < end of copied text >      PHYSICAL EXAM:  CONSTITUTIONAL: No acute distress, well-developed, well-groomed, AAOx3  HEAD: Atraumatic, normocephalic  EYES: EOM intact, PERRLA, conjunctiva and sclera clear  ENT: Supple, no masses, no thyromegaly, no bruits, no JVD; moist mucous membranes  PULMONARY: Clear to auscultation bilaterally; no wheezes, rales, or rhonchi  CARDIOVASCULAR: Regular rate and rhythm; no murmurs, rubs, or gallops  GASTROINTESTINAL: Soft, non-tender, non-distended; bowel sounds present  MUSCULOSKELETAL: 2+ peripheral pulses; erythema with induration of right medial elbow at IV site; no tenderness to palpation of erythema or induration  NEUROLOGY: non-focal; twitching present in bilateral lower extremities  SKIN: improving groin and thigh fungal rash; 6 sutures noted on posterio-medial aspect of right thigh    ASSESSMENT & PLAN  Patient is a 50yo male with PMH of bed-bound relapsing-remitting multiple sclerosis, hypertension, and chronic low back pain who presented to the ED complaining of inguinal and buttock rash. Per family members, they are no longer able to take care of the patient and would like to pursue long term placement upon discharge.    #Cutaneous candidiasis, resolving  - Continue with clotrimazole 1% cream applied topically BID  - Continue with nystatin powder TID  - Keep affected area dry and clean    #Infiltrated IV with erythema and induration  - Removed IV on 12/31/2019  - Apply warm compresses to affected area  - Pending [facility] Duplex right upper extremity  - Technician called me and states that it appears to be superficial thrombophlebitis without signs of DVT    #History of right hip laceration s/p stitches  - Patient had right hip laceration that had 6 stitches placed in the ED at [facility] on 10/31/2019  - Sutures used were nylon  - On physical exam, there are no visible sutures but can barely be palpated through skin  - Patient feels the sutures under the skin  - Follow outpatient with surgery for removal if needed    #Multiple sclerosis, stable  - Neurology consult appreciated  - No indication for steroids at this time  - Patient states he was on Tysabri outpatient, but this was not verified  - MR head shows no acute lesions  - Patient is requesting long term placement because he is unable to care for himself in the community  - Patient is unable to bath himself, groom himself, toilet himself, dress himself  - He is a maximum assistance for bathing, toileting, transferring out of bed  - He is a two-person assistance with ambulation  - He was only able to ambulate 3 feet with physical therapy with 2-person assist  - Prior level of function was independent. He has experienced a major decline in level of function  - Patient has mild impairment with fine motor coordination and requires minimum to maximum assistance with IADLs, per occupational therapy  - Patient requires restorative therapy    #Lower extremity twitching  - Patient states that he was taking ropinirole and gabapentin outpatient for twitching  - Per patient's pharmacy, ropinirole was last prescribed in 9/2019  - Continue with gabapentin 600mg PO Q8H  - Discussed case with Dr. Bledsoe  - Increase tizanidine to 4mg Q6H PRN    #Hypertension  - Continue with nifedipine XL 30mg PO QD    #Nicotine dependence  - Counseled patient on smoking cessation  - Continue with nicotine 21mg transdermal patch Q24H    #Chronic low back pain  - Continue with oxycodone IR 10mg PO Q6H PRN    #Insomnia  - Continue with zolpidem 10mg PO QHS PRN    #Misc  - DVT Prophylaxis: Lovenox 40mg SQ QHS  - Diet: Regular  - GI Prophylaxis: not indicated  - Activity: out of bed to chair  - IV Fluids: encourage PO intake  - Code Status: Full code    Dispo: Awaiting placement KEVIN MEDRANO 49y Male  MRN#: [id number]   Hospital Day: 7d    SUBJECTIVE  Patient is a 49y old Male who presents with a chief complaint of rash (30 Dec 2019 09:10)  Currently admitted to medicine with the primary diagnosis of Candidal balanitis    INTERVAL HPI AND OVERNIGHT EVENTS:  Patient was examined and seen at bedside. This morning he is resting comfortably in bed and reports no issues or overnight events. He states that the elbow still does not bother him.    REVIEW OF SYMPTOMS:  CONSTITUTIONAL: No weakness, fevers or chills; No headaches  EYES: No visual changes, eye pain, or discharge  ENT: No vertigo; No ear pain or change in hearing; No sore throat or difficulty swallowing  NECK: No pain or stiffness  RESPIRATORY: No cough, wheezing, or hemoptysis; No shortness of breath  CARDIOVASCULAR: No chest pain or palpitations  GASTROINTESTINAL: No abdominal or epigastric pain; No nausea, vomiting, or hematemesis; No diarrhea or constipation; No melena or hematochezia  GENITOURINARY: No dysuria, frequency or hematuria  MUSCULOSKELETAL: No joint pain, no muscle pain, no weakness  NEUROLOGICAL: No numbness or weakness  SKIN: redness of right medial elbow    OBJECTIVE  PAST MEDICAL & SURGICAL HISTORY  Hypertension  Chronic back pain  Multiple sclerosis  No significant past surgical history    ALLERGIES:  penicillin (Unknown)    MEDICATIONS:  STANDING MEDICATIONS  amLODIPine   Tablet 10 milliGRAM(s) Oral daily  chlorhexidine 4% Liquid 1 Application(s) Topical <User Schedule>  clotrimazole 1% Cream 1 Application(s) Topical two times a day  enoxaparin Injectable 40 milliGRAM(s) SubCutaneous at bedtime  gabapentin 600 milliGRAM(s) Oral every 8 hours  nicotine - 21 mG/24Hr(s) Patch 1 patch Transdermal every 24 hours  nystatin Powder 1 Application(s) Topical three times a day  senna 2 Tablet(s) Oral at bedtime  tiZANidine 4 milliGRAM(s) Oral every 6 hours    PRN MEDICATIONS  oxyCODONE    IR 10 milliGRAM(s) Oral every 6 hours PRN  zolpidem 10 milliGRAM(s) Oral at bedtime PRN      VITAL SIGNS: Last 24 Hours  T(C): 37 (03 Jan 2020 14:20), Max: 37 (03 Jan 2020 14:20)  T(F): 98.6 (03 Jan 2020 14:20), Max: 98.6 (03 Jan 2020 14:20)  HR: 87 (03 Jan 2020 14:20) (81 - 98)  BP: 121/72 (03 Jan 2020 14:20) (100/66 - 121/72)  BP(mean): --  RR: 18 (03 Jan 2020 14:20) (18 - 18)  SpO2: --    LABS:  No interval testing performed     RADIOLOGY:  < from: [facility] Duplex Upper Ext Vein Scan, Right (01.02.20 @ 15:09) >  Impression:    No evidence of deep vein thrombosis in the right upper extremity. Right basilic vein superficial thrombophlebitis.    < end of copied text >      PHYSICAL EXAM:  CONSTITUTIONAL: No acute distress, well-developed, well-groomed, AAOx3  HEAD: Atraumatic, normocephalic  EYES: EOM intact, PERRLA, conjunctiva and sclera clear  ENT: Supple, no masses, no thyromegaly, no bruits, no JVD; moist mucous membranes  PULMONARY: Clear to auscultation bilaterally; no wheezes, rales, or rhonchi  CARDIOVASCULAR: Regular rate and rhythm; no murmurs, rubs, or gallops  GASTROINTESTINAL: Soft, non-tender, non-distended; bowel sounds present  MUSCULOSKELETAL: 2+ peripheral pulses; erythema with induration of right medial elbow at IV site; no tenderness to palpation of erythema or induration  NEUROLOGY: non-focal; twitching present in bilateral lower extremities  SKIN: improving groin and thigh fungal rash; 6 sutures noted on posterio-medial aspect of right thigh    ASSESSMENT & PLAN  Patient is a 50yo male with PMH of bed-bound relapsing-remitting multiple sclerosis, hypertension, and chronic low back pain who presented to the ED complaining of inguinal and buttock rash. Per family members, they are no longer able to take care of the patient and would like to pursue long term placement upon discharge.    #Cutaneous candidiasis, resolving  - Continue with clotrimazole 1% cream applied topically BID  - Continue with nystatin powder TID  - Keep affected area dry and clean    #Infiltrated IV with erythema and induration  - Removed IV on 12/31/2019  - Apply warm compresses to affected area  - Pending [facility] Duplex right upper extremity  - Technician called me and states that it appears to be superficial thrombophlebitis without signs of DVT    #History of right hip laceration s/p stitches  - Patient had right hip laceration that had 6 stitches placed in the ED at [facility] on 10/31/2019  - Sutures used were nylon  - On physical exam, there are no visible sutures on the lateral right thigh  - Six sutures found on the posteromedial aspect of the right thigh hidden by the scrotum  - Removed 6 sutures 1/3/2020    #Multiple sclerosis, stable  - Neurology consult appreciated  - No indication for steroids at this time  - Patient states he was on Tysabri outpatient, but this was not verified  - MR head shows no acute lesions  - Patient is requesting long term placement because he is unable to care for himself in the community  - Patient is unable to bath himself, groom himself, toilet himself, dress himself  - He is a maximum assistance for bathing, toileting, transferring out of bed  - He is a two-person assistance with ambulation  - He was only able to ambulate 3 feet with physical therapy with 2-person assist  - Prior level of function was independent. He has experienced a major decline in level of function  - Patient has mild impairment with fine motor coordination and requires minimum to maximum assistance with IADLs, per occupational therapy  - Patient requires restorative therapy    #Lower extremity twitching  - Patient states that he was taking ropinirole and gabapentin outpatient for twitching  - Per patient's pharmacy, ropinirole was last prescribed in 9/2019  - Continue with gabapentin 600mg PO Q8H  - Discussed case with Dr. Bledsoe  - Increase tizanidine to 4mg Q6H PRN    #Hypertension  - Continue with nifedipine XL 30mg PO QD    #Nicotine dependence  - Counseled patient on smoking cessation  - Continue with nicotine 21mg transdermal patch Q24H    #Chronic low back pain  - Continue with oxycodone IR 10mg PO Q6H PRN    #Insomnia  - Continue with zolpidem 10mg PO QHS PRN    #Misc  - DVT Prophylaxis: Lovenox 40mg SQ QHS  - Diet: Regular  - GI Prophylaxis: not indicated  - Activity: out of bed to chair  - IV Fluids: encourage PO intake  - Code Status: Full code    Dispo: Awaiting placement

## 2020-01-03 NOTE — PROGRESS NOTE ADULT - ATTENDING COMMENTS
Patient seen and examined independently earlier today. Case discussed with housestaff, nursing, social work, patient, neuro. I agree with most of the resident's note, physical exam, and plan except as below. Patient feels better. Improved groin and thigh fungal rash. Asks for higher dose of muscle relaxant. No new complaints. ROS unremarkable. Awaiting safe dispo.  #Miko MS - d/w neuro, will increase Tizanidine  #Fungal rash -resolving      #Progress Note Handoff  Pending (specify):  Safe dispo  Pt/Family discussion: Pt informed and agrees with the current plan  Disposition: Home______/SNF__x_____/4A________/To be determined________/Waiting for Auth_____.     25 minutes spent on total encounter; more than 50% of the visit was spent counseling and/or coordinating care by the attending physician.
Patient seen and examined independently earlier today. Case discussed with housestaff, nursing, social work, patient, oscar. I agree with most of the resident's note, physical exam, and plan except as below. Patient feels better. Improved groin and thigh fungal rash. No new complaints. ROS unremarkable. D/w neuro, no need for stroids. Awaiting safe dispo.  #Chonic MS   #Fungal rash      #Progress Note Handoff  Pending (specify):  Safe dispo  Pt/Family discussion: Pt informed and agrees with the current plan  Disposition: Home______/SNF__x_____/4A________/To be determined________/Waiting for Auth_____
Patient seen and examined independently earlier today. Case discussed with housestaff, nursing, social work, patient. I agree with most of the resident's note, physical exam, and plan except as below. Patient feels better. Improved groin and thigh fungal rash. Sutures removed from groin. No new complaints. ROS unremarkable. Awaiting safe dispo.  #Miko MS - d/w neuro, will increase Tizanidine  #Fungal rash -resolving    #Progress Note Handoff  Pending (specify):  Safe dispo  Pt/Family discussion: Pt informed and agrees with the current plan  Disposition: Home______/SNF__x_____/4A________/To be determined________/Waiting for Auth_____.     25 minutes spent on total encounter; more than 50% of the visit was spent counseling and/or coordinating care by the attending physician.
Patient seen and examined independently. Agree with resident note with no exceptions.   No new issues at this time--continue plan of care as stated above.  patient is awaiting auth for SNF

## 2020-01-04 PROCEDURE — 99232 SBSQ HOSP IP/OBS MODERATE 35: CPT

## 2020-01-04 RX ADMIN — TIZANIDINE 4 MILLIGRAM(S): 4 TABLET ORAL at 11:50

## 2020-01-04 RX ADMIN — NYSTATIN CREAM 1 APPLICATION(S): 100000 CREAM TOPICAL at 06:21

## 2020-01-04 RX ADMIN — Medication 1 APPLICATION(S): at 18:05

## 2020-01-04 RX ADMIN — Medication 1 PATCH: at 21:37

## 2020-01-04 RX ADMIN — GABAPENTIN 600 MILLIGRAM(S): 400 CAPSULE ORAL at 06:21

## 2020-01-04 RX ADMIN — GABAPENTIN 600 MILLIGRAM(S): 400 CAPSULE ORAL at 14:04

## 2020-01-04 RX ADMIN — ENOXAPARIN SODIUM 40 MILLIGRAM(S): 100 INJECTION SUBCUTANEOUS at 21:37

## 2020-01-04 RX ADMIN — NYSTATIN CREAM 1 APPLICATION(S): 100000 CREAM TOPICAL at 14:04

## 2020-01-04 RX ADMIN — TIZANIDINE 4 MILLIGRAM(S): 4 TABLET ORAL at 18:04

## 2020-01-04 RX ADMIN — CHLORHEXIDINE GLUCONATE 1 APPLICATION(S): 213 SOLUTION TOPICAL at 06:21

## 2020-01-04 RX ADMIN — Medication 1 APPLICATION(S): at 06:21

## 2020-01-04 RX ADMIN — OXYCODONE HYDROCHLORIDE 10 MILLIGRAM(S): 5 TABLET ORAL at 20:10

## 2020-01-04 RX ADMIN — AMLODIPINE BESYLATE 10 MILLIGRAM(S): 2.5 TABLET ORAL at 06:21

## 2020-01-04 RX ADMIN — GABAPENTIN 600 MILLIGRAM(S): 400 CAPSULE ORAL at 21:37

## 2020-01-04 RX ADMIN — NYSTATIN CREAM 1 APPLICATION(S): 100000 CREAM TOPICAL at 21:39

## 2020-01-04 RX ADMIN — TIZANIDINE 4 MILLIGRAM(S): 4 TABLET ORAL at 00:15

## 2020-01-04 RX ADMIN — TIZANIDINE 4 MILLIGRAM(S): 4 TABLET ORAL at 06:22

## 2020-01-04 RX ADMIN — SENNA PLUS 2 TABLET(S): 8.6 TABLET ORAL at 21:38

## 2020-01-04 NOTE — PROGRESS NOTE ADULT - SUBJECTIVE AND OBJECTIVE BOX
KEVIN MEDRANO 49y Male  MRN#: [id number]   Hospital Day: 7d    SUBJECTIVE  Patient is a 49y old Male who presents with a chief complaint of rash (30 Dec 2019 09:10)  Currently admitted to medicine with the primary diagnosis of fungal rash, debility    INTERVAL HPI AND OVERNIGHT EVENTS:  Patient was examined and seen at bedside. This morning he is resting comfortably in bed and reports no issues or overnight events.     OBJECTIVE  PAST MEDICAL & SURGICAL HISTORY  Hypertension  Chronic back pain  Multiple sclerosis  No significant past surgical history    ALLERGIES:  penicillin (Unknown)    PHYSICAL EXAM:  CONSTITUTIONAL: No acute distress, well-developed, well-groomed, AAOx3  HEAD: Atraumatic, normocephalic  EYES: EOM intact, PERRLA, conjunctiva and sclera clear  ENT: Supple, no masses, no thyromegaly, no bruits, no JVD; moist mucous membranes  PULMONARY: Clear to auscultation bilaterally; no wheezes, rales, or rhonchi  CARDIOVASCULAR: Regular rate and rhythm; no murmurs, rubs, or gallops  GASTROINTESTINAL: Soft, non-tender, non-distended; bowel sounds present  MUSCULOSKELETAL: 2+ peripheral pulses; erythema with mild induration of right medial elbow at IV site; no tenderness to palpation of erythema or induration  NEUROLOGY: LE 3/5, rest 5/5l; twitching present in bilateral lower extremities  SKIN: improving groin and thigh fungal rash;     ASSESSMENT & PLAN  Patient is a 50yo male with PMH of bed-bound relapsing-remitting multiple sclerosis, hypertension, and chronic low back pain who presented to the ED complaining of inguinal and buttock rash. Per family members, they are no longer able to take care of the patient and would like to pursue long term placement upon discharge.    #Cutaneous candidiasis, resolving  - Continue with clotrimazole 1% cream applied topically BID  - Continue with nystatin powder TID  - Keep affected area dry and clean    #Infiltrated IV with erythema and induration/RUE thrombophlebitis  - Removed IV on 12/31/2019  - Apply warm compresses to affected area    #History of right hip laceration s/p stitches  - Patient had right hip laceration that had 6 stitches placed in the ED at [facility] on 10/31/2019  - Sutures used were nylon  - On physical exam, there are no visible sutures on the lateral right thigh  - Six sutures found on the posteromedial aspect of the right thigh hidden by the scrotum  - Removed 6 sutures 1/3/2020    #Multiple sclerosis, stable  - Neurology consult appreciated  - No indication for steroids at this time  - Patient states he was on Tysabri outpatient, but this was not verified  - MR head shows no acute lesions    #Lower extremity twitching  - Patient states that he was taking ropinirole and gabapentin outpatient for twitching  - Per patient's pharmacy, ropinirole was last prescribed in 9/2019  - Continue with gabapentin 600mg PO Q8H  - Increased tizanidine to 4mg Q6H PRN    #Hypertension  - Continue with nifedipine XL 30mg PO QD    #Nicotine dependence  - Counseled patient on smoking cessation  - Continue with nicotine 21mg transdermal patch Q24H    #Chronic low back pain  - Continue with oxycodone IR 10mg PO Q6H PRN    #Insomnia  - Continue with zolpidem 10mg PO QHS PRN    #Misc  - DVT Prophylaxis: Lovenox 40mg SQ QHS  - Diet: Regular  - GI Prophylaxis: not indicated  - Activity: out of bed to chair  - IV Fluids: encourage PO intake  - Code Status: Full code    Dispo: Awaiting placement    #Progress Note Handoff  Pending (specify):  Safe dispo  Pt/Family discussion: Pt/family informed and agree with the current plan  Disposition: Home______/SNF__x_____/4A________/To be determined________/Waiting for Auth_____.

## 2020-01-05 PROCEDURE — 99232 SBSQ HOSP IP/OBS MODERATE 35: CPT

## 2020-01-05 RX ADMIN — Medication 1 APPLICATION(S): at 05:32

## 2020-01-05 RX ADMIN — TIZANIDINE 4 MILLIGRAM(S): 4 TABLET ORAL at 05:31

## 2020-01-05 RX ADMIN — TIZANIDINE 4 MILLIGRAM(S): 4 TABLET ORAL at 00:00

## 2020-01-05 RX ADMIN — OXYCODONE HYDROCHLORIDE 10 MILLIGRAM(S): 5 TABLET ORAL at 21:38

## 2020-01-05 RX ADMIN — TIZANIDINE 4 MILLIGRAM(S): 4 TABLET ORAL at 11:59

## 2020-01-05 RX ADMIN — GABAPENTIN 600 MILLIGRAM(S): 400 CAPSULE ORAL at 05:31

## 2020-01-05 RX ADMIN — NYSTATIN CREAM 1 APPLICATION(S): 100000 CREAM TOPICAL at 05:31

## 2020-01-05 RX ADMIN — CHLORHEXIDINE GLUCONATE 1 APPLICATION(S): 213 SOLUTION TOPICAL at 05:30

## 2020-01-05 RX ADMIN — Medication 1 PATCH: at 22:11

## 2020-01-05 RX ADMIN — Medication 1 APPLICATION(S): at 17:24

## 2020-01-05 RX ADMIN — GABAPENTIN 600 MILLIGRAM(S): 400 CAPSULE ORAL at 22:12

## 2020-01-05 RX ADMIN — NYSTATIN CREAM 1 APPLICATION(S): 100000 CREAM TOPICAL at 13:31

## 2020-01-05 RX ADMIN — ZOLPIDEM TARTRATE 10 MILLIGRAM(S): 10 TABLET ORAL at 22:11

## 2020-01-05 RX ADMIN — OXYCODONE HYDROCHLORIDE 10 MILLIGRAM(S): 5 TABLET ORAL at 13:37

## 2020-01-05 RX ADMIN — NYSTATIN CREAM 1 APPLICATION(S): 100000 CREAM TOPICAL at 22:13

## 2020-01-05 RX ADMIN — TIZANIDINE 4 MILLIGRAM(S): 4 TABLET ORAL at 17:24

## 2020-01-05 RX ADMIN — SENNA PLUS 2 TABLET(S): 8.6 TABLET ORAL at 22:12

## 2020-01-05 RX ADMIN — TIZANIDINE 4 MILLIGRAM(S): 4 TABLET ORAL at 23:38

## 2020-01-05 RX ADMIN — Medication 1 PATCH: at 20:12

## 2020-01-05 RX ADMIN — ENOXAPARIN SODIUM 40 MILLIGRAM(S): 100 INJECTION SUBCUTANEOUS at 22:12

## 2020-01-05 RX ADMIN — OXYCODONE HYDROCHLORIDE 10 MILLIGRAM(S): 5 TABLET ORAL at 19:36

## 2020-01-05 RX ADMIN — GABAPENTIN 600 MILLIGRAM(S): 400 CAPSULE ORAL at 13:30

## 2020-01-05 RX ADMIN — Medication 1 PATCH: at 22:13

## 2020-01-05 RX ADMIN — AMLODIPINE BESYLATE 10 MILLIGRAM(S): 2.5 TABLET ORAL at 05:30

## 2020-01-05 RX ADMIN — OXYCODONE HYDROCHLORIDE 10 MILLIGRAM(S): 5 TABLET ORAL at 05:35

## 2020-01-05 NOTE — PROGRESS NOTE ADULT - SUBJECTIVE AND OBJECTIVE BOX
KEVIN MEDRANO 49y Male  MRN#: [id number]   Hospital Day: 7d    SUBJECTIVE  Patient is a 49y old Male who presents with a chief complaint of rash (30 Dec 2019 09:10)  Currently admitted to medicine with the primary diagnosis of fungal rash, debility    INTERVAL HPI AND OVERNIGHT EVENTS:  Patient was examined and seen at bedside. This morning he is resting comfortably in bed and reports no issues or overnight events.     OBJECTIVE  PAST MEDICAL & SURGICAL HISTORY  Hypertension  Chronic back pain  Multiple sclerosis  No significant past surgical history    ALLERGIES:  penicillin (Unknown)    PHYSICAL EXAM:  CONSTITUTIONAL: No acute distress, well-developed, well-groomed, AAOx3  HEAD: Atraumatic, normocephalic  EYES: EOM intact, PERRLA, conjunctiva and sclera clear  ENT: Supple, no masses, no thyromegaly, no bruits, no JVD; moist mucous membranes  PULMONARY: Clear to auscultation bilaterally; no wheezes, rales, or rhonchi  CARDIOVASCULAR: Regular rate and rhythm; no murmurs, rubs, or gallops  GASTROINTESTINAL: Soft, non-tender, non-distended; bowel sounds present  MUSCULOSKELETAL: 2+ peripheral pulses; erythema with mild induration of right medial elbow at IV site; no tenderness to palpation of erythema or induration  NEUROLOGY: LE 3/5, rest 5/5l; twitching present in bilateral lower extremities  SKIN: improving groin and thigh fungal rash;     ASSESSMENT & PLAN  Patient is a 48yo male with PMH of bed-bound relapsing-remitting multiple sclerosis, hypertension, and chronic low back pain who presented to the ED complaining of inguinal and buttock rash. Per family members, they are no longer able to take care of the patient and would like to pursue long term placement upon discharge.    #Cutaneous candidiasis, resolving  - Continue with clotrimazole 1% cream applied topically BID  - Continue with nystatin powder TID  - Keep affected area dry and clean    #Infiltrated IV with erythema and induration/RUE thrombophlebitis  - Removed IV on 12/31/2019  - Apply warm compresses to affected area    #History of right hip laceration s/p stitches  - Patient had right hip laceration that had 6 stitches placed in the ED at [facility] on 10/31/2019  - Sutures used were nylon  - On physical exam, there are no visible sutures on the lateral right thigh  - Six sutures found on the posteromedial aspect of the right thigh hidden by the scrotum  - Removed 6 sutures 1/3/2020    #Multiple sclerosis, stable  - Neurology consult appreciated  - No indication for steroids at this time  - Patient states he was on Tysabri outpatient, but this was not verified  - MR head shows no acute lesions    #Lower extremity twitching  - Patient states that he was taking ropinirole and gabapentin outpatient for twitching  - Per patient's pharmacy, ropinirole was last prescribed in 9/2019  - Continue with gabapentin 600mg PO Q8H  - Increased tizanidine to 4mg Q6H PRN    #Hypertension  - Continue with nifedipine XL 30mg PO QD    #Nicotine dependence  - Counseled patient on smoking cessation  - Continue with nicotine 21mg transdermal patch Q24H    #Chronic low back pain  - Continue with oxycodone IR 10mg PO Q6H PRN    #Insomnia  - Continue with zolpidem 10mg PO QHS PRN    #Misc  - DVT Prophylaxis: Lovenox 40mg SQ QHS  - Diet: Regular  - GI Prophylaxis: not indicated  - Activity: out of bed to chair  - IV Fluids: encourage PO intake  - Code Status: Full code    Dispo: Awaiting placement    #Progress Note Handoff  Pending (specify):  Safe dispo  Pt/Family discussion: Pt/family informed and agree with the current plan  Disposition: Home______/SNF__x_____/4A________/To be determined________/Waiting for Auth_____.

## 2020-01-05 NOTE — PROGRESS NOTE ADULT - SUBJECTIVE AND OBJECTIVE BOX
KEVIN MEDRANO 49y Male  MRN#: [id number]   Hospital Day: 9d    SUBJECTIVE  Patient is a 49y old Male who presents with a chief complaint of rash (30 Dec 2019 09:10)  Currently admitted to medicine with the primary diagnosis of Candidal balanitis    INTERVAL HPI AND OVERNIGHT EVENTS:  Patient was examined and seen at bedside. This morning he is resting comfortably in bed and reports no issues or overnight events.    REVIEW OF SYMPTOMS:  CONSTITUTIONAL: No weakness, fevers or chills; No headaches  EYES: No visual changes, eye pain, or discharge  ENT: No vertigo; No ear pain or change in hearing; No sore throat or difficulty swallowing  NECK: No pain or stiffness  RESPIRATORY: No cough, wheezing, or hemoptysis; No shortness of breath  CARDIOVASCULAR: No chest pain or palpitations  GASTROINTESTINAL: No abdominal or epigastric pain; No nausea, vomiting, or hematemesis; No diarrhea or constipation; No melena or hematochezia  GENITOURINARY: No dysuria, frequency or hematuria  MUSCULOSKELETAL: No joint pain, no muscle pain, no weakness  NEUROLOGICAL: No numbness or weakness  SKIN: improving redness over right medial elbow    OBJECTIVE  PAST MEDICAL & SURGICAL HISTORY  Hypertension  Chronic back pain  Multiple sclerosis  No significant past surgical history    ALLERGIES:  penicillin (Unknown)    MEDICATIONS:  STANDING MEDICATIONS  amLODIPine   Tablet 10 milliGRAM(s) Oral daily  chlorhexidine 4% Liquid 1 Application(s) Topical <User Schedule>  clotrimazole 1% Cream 1 Application(s) Topical two times a day  enoxaparin Injectable 40 milliGRAM(s) SubCutaneous at bedtime  gabapentin 600 milliGRAM(s) Oral every 8 hours  nicotine - 21 mG/24Hr(s) Patch 1 patch Transdermal every 24 hours  nystatin Powder 1 Application(s) Topical three times a day  senna 2 Tablet(s) Oral at bedtime  tiZANidine 4 milliGRAM(s) Oral every 6 hours    PRN MEDICATIONS  oxyCODONE    IR 10 milliGRAM(s) Oral every 6 hours PRN  zolpidem 10 milliGRAM(s) Oral at bedtime PRN      VITAL SIGNS: Last 24 Hours  T(C): 35.7 (05 Jan 2020 05:29), Max: 36.2 (04 Jan 2020 14:11)  T(F): 96.3 (05 Jan 2020 05:29), Max: 97.2 (04 Jan 2020 14:11)  HR: 62 (05 Jan 2020 05:29) (62 - 78)  BP: 133/86 (05 Jan 2020 05:29) (118/86 - 133/86)  BP(mean): --  RR: 18 (05 Jan 2020 05:29) (16 - 19)  SpO2: --    LABS:  No interval testing performed     RADIOLOGY:  No interval imaging performed     PHYSICAL EXAM:  CONSTITUTIONAL: No acute distress, well-developed, well-groomed, AAOx3  HEAD: Atraumatic, normocephalic  EYES: EOM intact, PERRLA, conjunctiva and sclera clear  ENT: Supple, no masses, no thyromegaly, no bruits, no JVD; moist mucous membranes  PULMONARY: Clear to auscultation bilaterally; no wheezes, rales, or rhonchi  CARDIOVASCULAR: Regular rate and rhythm; no murmurs, rubs, or gallops  GASTROINTESTINAL: Soft, non-tender, non-distended; bowel sounds present  MUSCULOSKELETAL: 2+ peripheral pulses; erythema with mild induration of right medial elbow at IV site; no tenderness to palpation of erythema or induration  NEUROLOGY: LE 3/5, rest 5/5l; twitching present in bilateral lower extremities  SKIN: improving groin and thigh fungal rash;     ASSESSMENT & PLAN  Patient is a 50yo male with PMH of bed-bound relapsing-remitting multiple sclerosis, hypertension, and chronic low back pain who presented to the ED complaining of inguinal and buttock rash. Per family members, they are no longer able to take care of the patient and would like to pursue long term placement upon discharge.    #Cutaneous candidiasis, resolving  - Continue with clotrimazole 1% cream applied topically BID  - Continue with nystatin powder TID  - Keep affected area dry and clean    #Right basilic vein superficial thrombophlebitis  - Removed IV on 12/31/2019  - [facility] duplex 1/2/2020: no evidence of DVT. Right basilic vein superficial thrombophlebitis  - Apply warm compresses to affected area  - Follow blood culture    #History of right hip laceration s/p stitches, resolved  - Patient had right hip laceration that had 6 stitches placed in the ED at [facility] on 10/31/2019  - Sutures used were nylon  - On physical exam, there are no visible sutures on the lateral right thigh  - Six sutures found on the posteromedial aspect of the right thigh hidden by the scrotum  - Removed 6 sutures 1/3/2020    #Multiple sclerosis, stable  - Neurology consult appreciated  - No indication for steroids at this time  - Patient states he was on Tysabri outpatient, but this was not verified  - MR head shows no acute lesions  - Patient is requesting long term placement because he is unable to care for himself in the community  - Patient is unable to bath himself, groom himself, toilet himself, dress himself  - He is a maximum assistance for bathing, toileting, transferring out of bed  - He is a two-person assistance with ambulation  - He was only able to ambulate 3 feet with physical therapy with 2-person assist  - Prior level of function was independent. He has experienced a major decline in level of function  - Patient has mild impairment with fine motor coordination and requires minimum to maximum assistance with IADLs, per occupational therapy  - Patient requires restorative therapy    #Lower extremity twitching  - Patient states that he was taking ropinirole and gabapentin outpatient for twitching  - Per patient's pharmacy, ropinirole was last prescribed in 9/2019  - Continue with gabapentin 600mg PO Q8H  - Continue with tizanidine 4mg Q6H PRN    #Hypertension  - Continue with nifedipine XL 30mg PO QD    #Nicotine dependence  - Counseled patient on smoking cessation  - Continue with nicotine 21mg transdermal patch Q24H    #Chronic low back pain  - Continue with oxycodone IR 10mg PO Q6H PRN    #Insomnia  - Continue with zolpidem 10mg PO QHS PRN    #Misc  - DVT Prophylaxis: Lovenox 40mg SQ QHS  - Diet: Regular  - GI Prophylaxis: not indicated  - Activity: out of bed to chair  - IV Fluids: encourage PO intake  - Code Status: Full code    Dispo: Awaiting placement

## 2020-01-06 VITALS
TEMPERATURE: 98 F | HEART RATE: 94 BPM | RESPIRATION RATE: 20 BRPM | DIASTOLIC BLOOD PRESSURE: 77 MMHG | SYSTOLIC BLOOD PRESSURE: 120 MMHG

## 2020-01-06 PROCEDURE — 99239 HOSP IP/OBS DSCHRG MGMT >30: CPT

## 2020-01-06 RX ORDER — NYSTATIN CREAM 100000 [USP'U]/G
1 CREAM TOPICAL
Qty: 0 | Refills: 0 | DISCHARGE
Start: 2020-01-06

## 2020-01-06 RX ORDER — TIZANIDINE 4 MG/1
1 TABLET ORAL
Qty: 0 | Refills: 0 | DISCHARGE
Start: 2020-01-06

## 2020-01-06 RX ORDER — NICOTINE POLACRILEX 2 MG
0 GUM BUCCAL
Qty: 0 | Refills: 0 | DISCHARGE
Start: 2020-01-06

## 2020-01-06 RX ORDER — AMLODIPINE BESYLATE 2.5 MG/1
1 TABLET ORAL
Qty: 0 | Refills: 0 | DISCHARGE
Start: 2020-01-06

## 2020-01-06 RX ORDER — OXYCODONE HYDROCHLORIDE 5 MG/1
1 TABLET ORAL
Qty: 0 | Refills: 0 | DISCHARGE

## 2020-01-06 RX ORDER — OXYCODONE HYDROCHLORIDE 5 MG/1
1 TABLET ORAL
Qty: 0 | Refills: 0 | DISCHARGE
Start: 2020-01-06

## 2020-01-06 RX ORDER — SENNA PLUS 8.6 MG/1
2 TABLET ORAL
Qty: 0 | Refills: 0 | DISCHARGE
Start: 2020-01-06

## 2020-01-06 RX ADMIN — GABAPENTIN 600 MILLIGRAM(S): 400 CAPSULE ORAL at 05:05

## 2020-01-06 RX ADMIN — NYSTATIN CREAM 1 APPLICATION(S): 100000 CREAM TOPICAL at 05:06

## 2020-01-06 RX ADMIN — TIZANIDINE 4 MILLIGRAM(S): 4 TABLET ORAL at 14:00

## 2020-01-06 RX ADMIN — Medication 1 APPLICATION(S): at 05:06

## 2020-01-06 RX ADMIN — GABAPENTIN 600 MILLIGRAM(S): 400 CAPSULE ORAL at 14:01

## 2020-01-06 RX ADMIN — CHLORHEXIDINE GLUCONATE 1 APPLICATION(S): 213 SOLUTION TOPICAL at 05:05

## 2020-01-06 RX ADMIN — OXYCODONE HYDROCHLORIDE 10 MILLIGRAM(S): 5 TABLET ORAL at 14:22

## 2020-01-06 RX ADMIN — AMLODIPINE BESYLATE 10 MILLIGRAM(S): 2.5 TABLET ORAL at 05:05

## 2020-01-06 RX ADMIN — TIZANIDINE 4 MILLIGRAM(S): 4 TABLET ORAL at 18:17

## 2020-01-06 RX ADMIN — TIZANIDINE 4 MILLIGRAM(S): 4 TABLET ORAL at 05:06

## 2020-01-06 RX ADMIN — OXYCODONE HYDROCHLORIDE 10 MILLIGRAM(S): 5 TABLET ORAL at 18:19

## 2020-01-06 RX ADMIN — Medication 1 PATCH: at 05:08

## 2020-01-06 NOTE — DISCHARGE NOTE PROVIDER - NSDCMRMEDTOKEN_GEN_ALL_CORE_FT
Ambien 10 mg oral tablet: 1 tab(s) orally once a day (at bedtime) MDD:1  gabapentin 600 mg oral tablet: 1 tab(s) orally every 8 hours  oxyCODONE 20 mg oral tablet: 1 tab(s) orally every 6 hours, As Needed  tiZANidine 4 mg oral tablet: 1 tab(s) orally every 6 hours Ambien 10 mg oral tablet: 1 tab(s) orally once a day (at bedtime) MDD:1  amLODIPine 10 mg oral tablet: 1 tab(s) orally once a day  clotrimazole 1% topical cream: Apply topically to affected area 2 times a day, As Needed  gabapentin 600 mg oral tablet: 1 tab(s) orally every 8 hours  nicotine 21 mg/24 hr transdermal film, extended release: transdermal once a day  nystatin 100,000 units/g topical powder: 1 application topically 3 times a day  oxyCODONE 10 mg oral tablet: 1 tab(s) orally every 6 hours, As needed, Severe Pain (7 - 10)  senna oral tablet: 2 tab(s) orally once a day (at bedtime)  tiZANidine 4 mg oral tablet: 1 tab(s) orally every 6 hours

## 2020-01-06 NOTE — PROGRESS NOTE ADULT - SUBJECTIVE AND OBJECTIVE BOX
KEVIN MEDRANO 49y Male  MRN#: [id number]   Hospital Day: 7d    SUBJECTIVE  Patient is a 49y old Male who presents with a chief complaint of rash (30 Dec 2019 09:10)  Currently admitted to medicine with the primary diagnosis of fungal rash, debility    INTERVAL HPI AND OVERNIGHT EVENTS:  Patient was examined and seen at bedside. This morning he is resting comfortably in bed and reports no issues or overnight events.     OBJECTIVE  PAST MEDICAL & SURGICAL HISTORY  Hypertension  Chronic back pain  Multiple sclerosis  No significant past surgical history    ALLERGIES:  penicillin (Unknown)    PHYSICAL EXAM:  CONSTITUTIONAL: No acute distress, well-developed, well-groomed, AAOx3  HEAD: Atraumatic, normocephalic  EYES: EOM intact, PERRLA, conjunctiva and sclera clear  ENT: Supple, no masses, no thyromegaly, no bruits, no JVD; moist mucous membranes  PULMONARY: Clear to auscultation bilaterally; no wheezes, rales, or rhonchi  CARDIOVASCULAR: Regular rate and rhythm; no murmurs, rubs, or gallops  GASTROINTESTINAL: Soft, non-tender, non-distended; bowel sounds present  MUSCULOSKELETAL: 2+ peripheral pulses; erythema with mild induration of right medial elbow at IV site; no tenderness to palpation of erythema or induration  NEUROLOGY: LE 3/5, rest 5/5l; twitching present in bilateral lower extremities  SKIN: improving groin and thigh fungal rash;             MEDICATIONS  (STANDING):  amLODIPine   Tablet 10 milliGRAM(s) Oral daily  chlorhexidine 4% Liquid 1 Application(s) Topical <User Schedule>  clotrimazole 1% Cream 1 Application(s) Topical two times a day  enoxaparin Injectable 40 milliGRAM(s) SubCutaneous at bedtime  gabapentin 600 milliGRAM(s) Oral every 8 hours  nicotine - 21 mG/24Hr(s) Patch 1 patch Transdermal every 24 hours  nystatin Powder 1 Application(s) Topical three times a day  senna 2 Tablet(s) Oral at bedtime  tiZANidine 4 milliGRAM(s) Oral every 6 hours    MEDICATIONS  (PRN):  oxyCODONE    IR 10 milliGRAM(s) Oral every 6 hours PRN Severe Pain (7 - 10)  zolpidem 10 milliGRAM(s) Oral at bedtime PRN Insomnia    Home Medications:  oxyCODONE 20 mg oral tablet: 1 tab(s) orally every 6 hours, As Needed (28 Sep 2019 02:51)    Vital Signs Last 24 Hrs  T(C): 35.8 (06 Jan 2020 04:46), Max: 37.3 (05 Jan 2020 14:02)  T(F): 96.4 (06 Jan 2020 04:46), Max: 99.1 (05 Jan 2020 14:02)  HR: 70 (06 Jan 2020 04:46) (70 - 83)  BP: 132/78 (06 Jan 2020 04:46) (131/67 - 145/90)  BP(mean): --  RR: 18 (06 Jan 2020 04:46) (18 - 18)  SpO2: --  CAPILLARY BLOOD GLUCOSE        LABS:                            Consultant Notes Reviewed:  [x ] YES  [ ] NO  Care Discussed with Consultants/Other Providers/ Housestaff [ x] YES  [ ] NO  Radiology, labs, new studies personally reviewed.                          ASSESSMENT & PLAN  Patient is a 48yo male with PMH of bed-bound relapsing-remitting multiple sclerosis, hypertension, and chronic low back pain who presented to the ED complaining of inguinal and buttock rash. Per family members, they are no longer able to take care of the patient and would like to pursue long term placement upon discharge.    #Cutaneous candidiasis, resolving  - Continue with clotrimazole 1% cream applied topically BID  - Continue with nystatin powder TID  - Keep affected area dry and clean    #Infiltrated IV with erythema and induration/RUE thrombophlebitis  - Removed IV on 12/31/2019  - Apply warm compresses to affected area    #History of right hip laceration s/p stitches  - Patient had right hip laceration that had 6 stitches placed in the ED at [facility] on 10/31/2019  - Sutures used were nylon  - On physical exam, there are no visible sutures on the lateral right thigh  - Six sutures found on the posteromedial aspect of the right thigh hidden by the scrotum  - Removed 6 sutures 1/3/2020    #Multiple sclerosis, stable  - Neurology consult appreciated  - No indication for steroids at this time  - Patient states he was on Tysabri outpatient, but this was not verified  - MR head shows no acute lesions    #Lower extremity twitching  - Patient states that he was taking ropinirole and gabapentin outpatient for twitching  - Per patient's pharmacy, ropinirole was last prescribed in 9/2019  - Continue with gabapentin 600mg PO Q8H  - Increased tizanidine to 4mg Q6H PRN    #Hypertension  - Continue with nifedipine XL 30mg PO QD    #Nicotine dependence  - Counseled patient on smoking cessation  - Continue with nicotine 21mg transdermal patch Q24H    #Chronic low back pain  - Continue with oxycodone IR 10mg PO Q6H PRN    #Insomnia  - Continue with zolpidem 10mg PO QHS PRN    #Misc  - DVT Prophylaxis: Lovenox 40mg SQ QHS  - Diet: Regular  - GI Prophylaxis: not indicated  - Activity: out of bed to chair  - IV Fluids: encourage PO intake  - Code Status: Full code    Dispo: Awaiting placement    #Progress Note Handoff  Pending (specify):  Safe dispo  Pt/Family discussion: Pt/family informed and agree with the current plan  Disposition: Home______/SNF__x_____/4A________/To be determined________/Waiting for Auth_____.

## 2020-01-06 NOTE — DISCHARGE NOTE NURSING/CASE MANAGEMENT/SOCIAL WORK - NSDCPEEMAIL_GEN_ALL_CORE
[facility] for Tobacco Control email [email or web address]

## 2020-01-06 NOTE — PROGRESS NOTE ADULT - SUBJECTIVE AND OBJECTIVE BOX
KEVIN MEDRANO 49y Male  MRN#: [id number]   Hospital Day: 10d    SUBJECTIVE  Patient is a 49y old Male who presents with a chief complaint of rash (30 Dec 2019 09:10)  Currently admitted to medicine with the primary diagnosis of Candidal balanitis    INTERVAL HPI AND OVERNIGHT EVENTS:  Patient was examined and seen at bedside. This morning he is resting comfortably in bed and reports no issues or overnight events.    REVIEW OF SYMPTOMS:  CONSTITUTIONAL: No weakness, fevers or chills; No headaches  EYES: No visual changes, eye pain, or discharge  ENT: No vertigo; No ear pain or change in hearing; No sore throat or difficulty swallowing  NECK: No pain or stiffness  RESPIRATORY: No cough, wheezing, or hemoptysis; No shortness of breath  CARDIOVASCULAR: No chest pain or palpitations  GASTROINTESTINAL: No abdominal or epigastric pain; No nausea, vomiting, or hematemesis; No diarrhea or constipation; No melena or hematochezia  GENITOURINARY: No dysuria, frequency or hematuria  MUSCULOSKELETAL: No joint pain, no muscle pain, no weakness  NEUROLOGICAL: No numbness or weakness  SKIN: No itching or rashes    OBJECTIVE  PAST MEDICAL & SURGICAL HISTORY  Hypertension  Chronic back pain  Multiple sclerosis  No significant past surgical history    ALLERGIES:  penicillin (Unknown)    MEDICATIONS:  STANDING MEDICATIONS  amLODIPine   Tablet 10 milliGRAM(s) Oral daily  chlorhexidine 4% Liquid 1 Application(s) Topical <User Schedule>  clotrimazole 1% Cream 1 Application(s) Topical two times a day  enoxaparin Injectable 40 milliGRAM(s) SubCutaneous at bedtime  gabapentin 600 milliGRAM(s) Oral every 8 hours  nicotine - 21 mG/24Hr(s) Patch 1 patch Transdermal every 24 hours  nystatin Powder 1 Application(s) Topical three times a day  senna 2 Tablet(s) Oral at bedtime  tiZANidine 4 milliGRAM(s) Oral every 6 hours    PRN MEDICATIONS  oxyCODONE    IR 10 milliGRAM(s) Oral every 6 hours PRN  zolpidem 10 milliGRAM(s) Oral at bedtime PRN      VITAL SIGNS: Last 24 Hours  T(C): 35.8 (06 Jan 2020 04:46), Max: 37.3 (05 Jan 2020 14:02)  T(F): 96.4 (06 Jan 2020 04:46), Max: 99.1 (05 Jan 2020 14:02)  HR: 70 (06 Jan 2020 04:46) (70 - 83)  BP: 132/78 (06 Jan 2020 04:46) (131/67 - 145/90)  BP(mean): --  RR: 18 (06 Jan 2020 04:46) (18 - 18)  SpO2: --    LABS:  No interval testing performed     RADIOLOGY:  No interval imaging performed     PHYSICAL EXAM:  CONSTITUTIONAL: No acute distress, well-developed, well-groomed, AAOx3  HEAD: Atraumatic, normocephalic  EYES: EOM intact, PERRLA, conjunctiva and sclera clear  ENT: Supple, no masses, no thyromegaly, no bruits, no JVD; moist mucous membranes  PULMONARY: Clear to auscultation bilaterally; no wheezes, rales, or rhonchi  CARDIOVASCULAR: Regular rate and rhythm; no murmurs, rubs, or gallops  GASTROINTESTINAL: Soft, non-tender, non-distended; bowel sounds present  MUSCULOSKELETAL: 2+ peripheral pulses; erythema with mild induration of right medial elbow at IV site; no tenderness to palpation of erythema or induration  NEUROLOGY: LE 3/5, rest 5/5l; twitching present in bilateral lower extremities  SKIN: improving groin and thigh fungal rash    ASSESSMENT & PLAN  Patient is a 50yo male with PMH of bed-bound relapsing-remitting multiple sclerosis, hypertension, and chronic low back pain who presented to the ED complaining of inguinal and buttock rash. Per family members, they are no longer able to take care of the patient and would like to pursue long term placement upon discharge.    #Cutaneous candidiasis, resolving  - Continue with clotrimazole 1% cream applied topically BID  - Continue with nystatin powder TID  - Keep affected area dry and clean    #Right basilic vein superficial thrombophlebitis, improving  - Removed IV on 12/31/2019  - VA duplex 1/2/2020: no evidence of DVT. Right basilic vein superficial thrombophlebitis  - Apply warm compresses to affected area  - Follow blood culture    #History of right hip laceration s/p stitches, resolved  - Patient had right hip laceration that had 6 stitches placed in the ED at [facility] on 10/31/2019  - Sutures used were nylon  - On physical exam, there are no visible sutures on the lateral right thigh  - Six sutures found on the posteromedial aspect of the right thigh hidden by the scrotum  - Removed 6 sutures 1/3/2020    #Multiple sclerosis, stable  - Neurology consult appreciated  - No indication for steroids at this time  - Patient states he was on Tysabri outpatient, but this was not verified  - MR head shows no acute lesions  - Patient is requesting long term placement because he is unable to care for himself in the community  - Patient is unable to bath himself, groom himself, toilet himself, dress himself  - He is a maximum assistance for bathing, toileting, transferring out of bed  - He is a two-person assistance with ambulation  - He was only able to ambulate 3 feet with physical therapy with 2-person assist  - Prior level of function was independent. He has experienced a major decline in level of function  - Patient has mild impairment with fine motor coordination and requires minimum to maximum assistance with IADLs, per occupational therapy  - Patient requires restorative therapy    #Lower extremity twitching  - Patient states that he was taking ropinirole and gabapentin outpatient for twitching  - Per patient's pharmacy, ropinirole was last prescribed in 9/2019  - Continue with gabapentin 600mg PO Q8H  - Continue with tizanidine 4mg Q6H PRN    #Hypertension  - Continue with nifedipine XL 30mg PO QD    #Nicotine dependence  - Counseled patient on smoking cessation  - Continue with nicotine 21mg transdermal patch Q24H    #Chronic low back pain  - Continue with oxycodone IR 10mg PO Q6H PRN    #Insomnia  - Continue with zolpidem 10mg PO QHS PRN    #Misc  - DVT Prophylaxis: Lovenox 40mg SQ QHS  - Diet: Regular  - GI Prophylaxis: not indicated  - Activity: out of bed to chair  - IV Fluids: encourage PO intake  - Code Status: Full code    Dispo: Awaiting placement

## 2020-01-06 NOTE — CHART NOTE - NSCHARTNOTEFT_GEN_A_CORE
RD Limited Follow Up:    Pt cont on regular diet. Good appetite remains. Documented po intake 100%. Denies GI distress. LBM 1/9. No chewing/swallowing difficulty. NKFA. BS 15. R hip unstageable PU and ulceration to b/l hips noted. Labs and meds reviewed. Pt bed bound. Cutaneous candidiasis, resolving. Requesting LTC placement. No further nutrition interventions. F/u in 7 days .

## 2020-01-06 NOTE — DISCHARGE NOTE NURSING/CASE MANAGEMENT/SOCIAL WORK - NSDCPEWEB_GEN_ALL_CORE
[facility] for Tobacco Control website --- [email or web address] website --- [email or web address]

## 2020-01-06 NOTE — DISCHARGE NOTE NURSING/CASE MANAGEMENT/SOCIAL WORK - PATIENT PORTAL LINK FT
You can access the FollowMyHealth Patient Portal offered by [facility] by registering at the following website: [email or web address]. By joining [employer]’s FollowMyHealth portal, you will also be able to view your health information using other applications (apps) compatible with our system.

## 2020-01-06 NOTE — DISCHARGE NOTE PROVIDER - CARE PROVIDER_API CALL
Larry Bledsoe)  Neurology  [address] [place] [address]  Phone: [phone]  Fax: [phone]  Established Patient  Follow Up Time: 2 weeks    Primary care provider, Known to patient  Phone: (   )    -  Fax: (   )    -  Established Patient  Follow Up Time:

## 2020-01-06 NOTE — DISCHARGE NOTE PROVIDER - PROVIDER TOKENS
PROVIDER:[TOKEN:[[id number]],FOLLOWUP:[2 weeks],ESTABLISHEDPATIENT:[T]],FREE:[LAST:[Primary care provider],FIRST:[Known to patient],PHONE:[(   )    -],FAX:[(   )    -],ESTABLISHEDPATIENT:[T]]

## 2020-01-06 NOTE — DISCHARGE NOTE PROVIDER - NSDCCPCAREPLAN_GEN_ALL_CORE_FT
PRINCIPAL DISCHARGE DIAGNOSIS  Diagnosis: Candidal balanitis  Assessment and Plan of Treatment: You were found to have candida balanitis, which is a yeast infection involving your buttocks and genital region. You were treated with topical antifungal medication. Please follow up with your primary care provider in 1 week after being discharged.      SECONDARY DISCHARGE DIAGNOSES  Diagnosis: Multiple sclerosis  Assessment and Plan of Treatment: Due to the progression of your disease, you will be admitted to a long term placement facility to receive proper care and aide you with activities of daily living.  Please follow up with your neurologist, Dr. Bledsoe, in 2 weeks after being discharged. Per Dr. Bledsoe's office, they would like to restart you on a multiple sclerosis treatment known as Tysabri. PRINCIPAL DISCHARGE DIAGNOSIS  Diagnosis: Candidal balanitis  Assessment and Plan of Treatment: You were found to have candida balanitis, which is a yeast infection involving your buttocks and genital region. You were treated with topical antifungal medication. Please follow up with your primary care provider in 1 week after being discharged.      SECONDARY DISCHARGE DIAGNOSES  Diagnosis: Multiple sclerosis  Assessment and Plan of Treatment: Due to the progression of your disease, you will be admitted to a long term placement facility to receive proper care and aide you with activities of daily living.  Please follow up with your neurologist, Dr. Bledsoe, in 1-2 weeks after being discharged. Per Dr. Bledsoe's office, they would like to restart you on a multiple sclerosis treatment known as Tysabri.

## 2020-01-06 NOTE — DISCHARGE NOTE PROVIDER - HOSPITAL COURSE
Patient is a 50yo male with PMH of bed-bound relapsing-remitting multiple sclerosis, hypertension, and chronic low back pain who presented to the ED complaining of inguinal and buttock rash. Per family members, they are no longer able to take care of the patient and would like to pursue long term placement upon discharge. The buttock rash has since improved. The patient suffered from a hip laceration back in October 2019. The sutures were removed from the laceration this admission. Per neurology, the patient was non-compliant with his disease-modifying agent Tysabri outpatient.        Patient is stable and ready for discharge.

## 2020-01-10 LAB
CULTURE RESULTS: SIGNIFICANT CHANGE UP
SPECIMEN SOURCE: SIGNIFICANT CHANGE UP

## 2020-01-11 DIAGNOSIS — R25.3 FASCICULATION: ICD-10-CM

## 2020-01-11 DIAGNOSIS — B37.42 CANDIDAL BALANITIS: ICD-10-CM

## 2020-01-11 DIAGNOSIS — G47.00 INSOMNIA, UNSPECIFIED: ICD-10-CM

## 2020-01-11 DIAGNOSIS — F17.208 NICOTINE DEPENDENCE, UNSPECIFIED, WITH OTHER NICOTINE-INDUCED DISORDERS: ICD-10-CM

## 2020-01-11 DIAGNOSIS — G35 MULTIPLE SCLEROSIS: ICD-10-CM

## 2020-01-11 DIAGNOSIS — I10 ESSENTIAL (PRIMARY) HYPERTENSION: ICD-10-CM

## 2020-01-11 DIAGNOSIS — B37.2 CANDIDIASIS OF SKIN AND NAIL: ICD-10-CM

## 2021-10-19 NOTE — ED ADULT NURSE NOTE - CAS EDP DISCH DISPOSITION ADMI
[facility]/
Detail Level: Detailed
Quality 110: Preventive Care And Screening: Influenza Immunization: Influenza Immunization previously received during influenza season

## 2022-01-01 ENCOUNTER — OUTPATIENT (OUTPATIENT)
Dept: OUTPATIENT SERVICES | Facility: HOSPITAL | Age: 52
LOS: 1 days | Discharge: HOME | End: 2022-01-01

## 2022-01-01 ENCOUNTER — INPATIENT (INPATIENT)
Facility: HOSPITAL | Age: 52
LOS: 25 days | End: 2022-12-28
Attending: INTERNAL MEDICINE | Admitting: INTERNAL MEDICINE
Payer: MEDICAID

## 2022-01-01 ENCOUNTER — EMERGENCY (EMERGENCY)
Facility: HOSPITAL | Age: 52
LOS: 0 days | Discharge: HOME | End: 2022-11-29
Attending: EMERGENCY MEDICINE | Admitting: EMERGENCY MEDICINE

## 2022-01-01 VITALS
DIASTOLIC BLOOD PRESSURE: 84 MMHG | TEMPERATURE: 98 F | OXYGEN SATURATION: 96 % | RESPIRATION RATE: 18 BRPM | HEART RATE: 95 BPM | SYSTOLIC BLOOD PRESSURE: 126 MMHG

## 2022-01-01 VITALS
OXYGEN SATURATION: 93 % | DIASTOLIC BLOOD PRESSURE: 95 MMHG | TEMPERATURE: 99 F | SYSTOLIC BLOOD PRESSURE: 123 MMHG | RESPIRATION RATE: 56 BRPM | HEART RATE: 151 BPM

## 2022-01-01 VITALS
HEART RATE: 150 BPM | DIASTOLIC BLOOD PRESSURE: 92 MMHG | SYSTOLIC BLOOD PRESSURE: 138 MMHG | TEMPERATURE: 104 F | RESPIRATION RATE: 60 BRPM | OXYGEN SATURATION: 98 %

## 2022-01-01 VITALS
DIASTOLIC BLOOD PRESSURE: 93 MMHG | HEART RATE: 99 BPM | RESPIRATION RATE: 16 BRPM | SYSTOLIC BLOOD PRESSURE: 151 MMHG | OXYGEN SATURATION: 99 %

## 2022-01-01 VITALS — TEMPERATURE: 101 F

## 2022-01-01 VITALS — TEMPERATURE: 104 F | OXYGEN SATURATION: 96 %

## 2022-01-01 DIAGNOSIS — Z51.5 ENCOUNTER FOR PALLIATIVE CARE: ICD-10-CM

## 2022-01-01 DIAGNOSIS — G35 MULTIPLE SCLEROSIS: ICD-10-CM

## 2022-01-01 DIAGNOSIS — Z88.0 ALLERGY STATUS TO PENICILLIN: ICD-10-CM

## 2022-01-01 DIAGNOSIS — R53.2 FUNCTIONAL QUADRIPLEGIA: ICD-10-CM

## 2022-01-01 DIAGNOSIS — Z02.9 ENCOUNTER FOR ADMINISTRATIVE EXAMINATIONS, UNSPECIFIED: ICD-10-CM
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
## 2022-01-01 DIAGNOSIS — I10 ESSENTIAL (PRIMARY) HYPERTENSION: ICD-10-CM
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
## 2022-01-01 DIAGNOSIS — M54.50 LOW BACK PAIN, UNSPECIFIED: ICD-10-CM
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
## 2022-01-01 DIAGNOSIS — T83.091A OTHER MECHANICAL COMPLICATION OF INDWELLING URETHRAL CATHETER, INITIAL ENCOUNTER: ICD-10-CM

## 2022-01-01 DIAGNOSIS — A41.9 SEPSIS, UNSPECIFIED ORGANISM: ICD-10-CM

## 2022-01-01 DIAGNOSIS — G89.29 OTHER CHRONIC PAIN: ICD-10-CM

## 2022-01-01 DIAGNOSIS — Z20.822 CONTACT WITH AND (SUSPECTED) EXPOSURE TO COVID-19: ICD-10-CM

## 2022-01-01 DIAGNOSIS — Z71.89 OTHER SPECIFIED COUNSELING: ICD-10-CM

## 2022-01-01 LAB
-  AMIKACIN: SIGNIFICANT CHANGE UP
-  AMOXICILLIN/CLAVULANIC ACID: SIGNIFICANT CHANGE UP
-  AMPHOTERICIN B: SIGNIFICANT CHANGE UP
-  AMPICILLIN/SULBACTAM: SIGNIFICANT CHANGE UP
-  AMPICILLIN: SIGNIFICANT CHANGE UP
-  AMPICILLIN: SIGNIFICANT CHANGE UP
-  ANIDULAFUNGIN: SIGNIFICANT CHANGE UP
-  AZTREONAM: SIGNIFICANT CHANGE UP
-  AZTREONAM: SIGNIFICANT CHANGE UP
-  CASPOFUNGIN: SIGNIFICANT CHANGE UP
-  CEFAZOLIN: SIGNIFICANT CHANGE UP
-  CEFEPIME: SIGNIFICANT CHANGE UP
-  CEFTAZIDIME/AVIBACTAM: SIGNIFICANT CHANGE UP
-  CEFTAZIDIME: SIGNIFICANT CHANGE UP
-  CEFTAZIDIME: SIGNIFICANT CHANGE UP
-  CEFTOLOZANE/TAZOBACTAM: SIGNIFICANT CHANGE UP
-  CEFTRIAXONE: SIGNIFICANT CHANGE UP
-  CIPROFLOXACIN: SIGNIFICANT CHANGE UP
-  CLINDAMYCIN: SIGNIFICANT CHANGE UP
-  CLINDAMYCIN: SIGNIFICANT CHANGE UP
-  ERTAPENEM: SIGNIFICANT CHANGE UP
-  ERYTHROMYCIN: SIGNIFICANT CHANGE UP
-  ERYTHROMYCIN: SIGNIFICANT CHANGE UP
-  FLUCONAZOLE: SIGNIFICANT CHANGE UP
-  GENTAMICIN: SIGNIFICANT CHANGE UP
-  IMIPENEM: SIGNIFICANT CHANGE UP
-  IMIPENEM: SIGNIFICANT CHANGE UP
-  LEVOFLOXACIN: SIGNIFICANT CHANGE UP
-  MEROPENEM: SIGNIFICANT CHANGE UP
-  MICAFUNGIN: SIGNIFICANT CHANGE UP
-  NITROFURANTOIN: SIGNIFICANT CHANGE UP
-  NITROFURANTOIN: SIGNIFICANT CHANGE UP
-  OXACILLIN: SIGNIFICANT CHANGE UP
-  OXACILLIN: SIGNIFICANT CHANGE UP
-  PENICILLIN: SIGNIFICANT CHANGE UP
-  PENICILLIN: SIGNIFICANT CHANGE UP
-  PIPERACILLIN/TAZOBACTAM: SIGNIFICANT CHANGE UP
-  POLYMYXIN B: SIGNIFICANT CHANGE UP
-  POSACONAZOLE: SIGNIFICANT CHANGE UP
-  RIFAMPIN: SIGNIFICANT CHANGE UP
-  RIFAMPIN: SIGNIFICANT CHANGE UP
-  STAPHYLOCOCCUS EPIDERMIDIS, METHICILLIN RESISTANT: SIGNIFICANT CHANGE UP
-  TETRACYCLINE: SIGNIFICANT CHANGE UP
-  TOBRAMYCIN: SIGNIFICANT CHANGE UP
-  TRIMETHOPRIM/SULFAMETHOXAZOLE: SIGNIFICANT CHANGE UP
-  VANCOMYCIN: SIGNIFICANT CHANGE UP
-  VORICONAZOLE: SIGNIFICANT CHANGE UP
ALBUMIN SERPL ELPH-MCNC: 2.3 G/DL — LOW (ref 3.5–5.2)
ALBUMIN SERPL ELPH-MCNC: 2.4 G/DL — LOW (ref 3.5–5.2)
ALBUMIN SERPL ELPH-MCNC: 2.5 G/DL — LOW (ref 3.5–5.2)
ALBUMIN SERPL ELPH-MCNC: 2.6 G/DL — LOW (ref 3.5–5.2)
ALBUMIN SERPL ELPH-MCNC: 2.7 G/DL — LOW (ref 3.5–5.2)
ALBUMIN SERPL ELPH-MCNC: 2.9 G/DL — LOW (ref 3.5–5.2)
ALBUMIN SERPL ELPH-MCNC: 3 G/DL — LOW (ref 3.5–5.2)
ALBUMIN SERPL ELPH-MCNC: 3.1 G/DL — LOW (ref 3.5–5.2)
ALBUMIN SERPL ELPH-MCNC: 3.6 G/DL — SIGNIFICANT CHANGE UP (ref 3.5–5.2)
ALBUMIN SERPL ELPH-MCNC: 3.9 G/DL — SIGNIFICANT CHANGE UP (ref 3.5–5.2)
ALBUMIN SERPL ELPH-MCNC: 4.3 G/DL — SIGNIFICANT CHANGE UP (ref 3.5–5.2)
ALBUMIN SERPL ELPH-MCNC: 4.3 G/DL — SIGNIFICANT CHANGE UP (ref 3.5–5.2)
ALP SERPL-CCNC: 100 U/L — SIGNIFICANT CHANGE UP (ref 30–115)
ALP SERPL-CCNC: 106 U/L — SIGNIFICANT CHANGE UP (ref 30–115)
ALP SERPL-CCNC: 116 U/L — HIGH (ref 30–115)
ALP SERPL-CCNC: 142 U/L — HIGH (ref 30–115)
ALP SERPL-CCNC: 167 U/L — HIGH (ref 30–115)
ALP SERPL-CCNC: 174 U/L — HIGH (ref 30–115)
ALP SERPL-CCNC: 71 U/L — SIGNIFICANT CHANGE UP (ref 30–115)
ALP SERPL-CCNC: 73 U/L — SIGNIFICANT CHANGE UP (ref 30–115)
ALP SERPL-CCNC: 77 U/L — SIGNIFICANT CHANGE UP (ref 30–115)
ALP SERPL-CCNC: 81 U/L — SIGNIFICANT CHANGE UP (ref 30–115)
ALP SERPL-CCNC: 82 U/L — SIGNIFICANT CHANGE UP (ref 30–115)
ALP SERPL-CCNC: 84 U/L — SIGNIFICANT CHANGE UP (ref 30–115)
ALP SERPL-CCNC: 85 U/L — SIGNIFICANT CHANGE UP (ref 30–115)
ALP SERPL-CCNC: 86 U/L — SIGNIFICANT CHANGE UP (ref 30–115)
ALP SERPL-CCNC: 87 U/L — SIGNIFICANT CHANGE UP (ref 30–115)
ALP SERPL-CCNC: 93 U/L — SIGNIFICANT CHANGE UP (ref 30–115)
ALP SERPL-CCNC: 93 U/L — SIGNIFICANT CHANGE UP (ref 30–115)
ALP SERPL-CCNC: 94 U/L — SIGNIFICANT CHANGE UP (ref 30–115)
ALP SERPL-CCNC: 97 U/L — SIGNIFICANT CHANGE UP (ref 30–115)
ALP SERPL-CCNC: 98 U/L — SIGNIFICANT CHANGE UP (ref 30–115)
ALP SERPL-CCNC: 98 U/L — SIGNIFICANT CHANGE UP (ref 30–115)
ALT FLD-CCNC: 10 U/L — SIGNIFICANT CHANGE UP (ref 0–41)
ALT FLD-CCNC: 10 U/L — SIGNIFICANT CHANGE UP (ref 0–41)
ALT FLD-CCNC: 11 U/L — SIGNIFICANT CHANGE UP (ref 0–41)
ALT FLD-CCNC: 12 U/L — SIGNIFICANT CHANGE UP (ref 0–41)
ALT FLD-CCNC: 12 U/L — SIGNIFICANT CHANGE UP (ref 0–41)
ALT FLD-CCNC: 14 U/L — SIGNIFICANT CHANGE UP (ref 0–41)
ALT FLD-CCNC: 16 U/L — SIGNIFICANT CHANGE UP (ref 0–41)
ALT FLD-CCNC: 20 U/L — SIGNIFICANT CHANGE UP (ref 0–41)
ALT FLD-CCNC: 20 U/L — SIGNIFICANT CHANGE UP (ref 0–41)
ALT FLD-CCNC: 22 U/L — SIGNIFICANT CHANGE UP (ref 0–41)
ALT FLD-CCNC: 25 U/L — SIGNIFICANT CHANGE UP (ref 0–41)
ALT FLD-CCNC: 37 U/L — SIGNIFICANT CHANGE UP (ref 0–41)
ALT FLD-CCNC: 40 U/L — SIGNIFICANT CHANGE UP (ref 0–41)
ALT FLD-CCNC: 65 U/L — HIGH (ref 0–41)
ALT FLD-CCNC: 7 U/L — SIGNIFICANT CHANGE UP (ref 0–41)
ALT FLD-CCNC: 7 U/L — SIGNIFICANT CHANGE UP (ref 0–41)
ALT FLD-CCNC: 8 U/L — SIGNIFICANT CHANGE UP (ref 0–41)
ALT FLD-CCNC: 80 U/L — HIGH (ref 0–41)
ALT FLD-CCNC: 9 U/L — SIGNIFICANT CHANGE UP (ref 0–41)
ANION GAP SERPL CALC-SCNC: 10 MMOL/L — SIGNIFICANT CHANGE UP (ref 7–14)
ANION GAP SERPL CALC-SCNC: 11 MMOL/L — SIGNIFICANT CHANGE UP (ref 7–14)
ANION GAP SERPL CALC-SCNC: 12 MMOL/L — SIGNIFICANT CHANGE UP (ref 7–14)
ANION GAP SERPL CALC-SCNC: 13 MMOL/L — SIGNIFICANT CHANGE UP (ref 7–14)
ANION GAP SERPL CALC-SCNC: 13 MMOL/L — SIGNIFICANT CHANGE UP (ref 7–14)
ANION GAP SERPL CALC-SCNC: 14 MMOL/L — SIGNIFICANT CHANGE UP (ref 7–14)
ANION GAP SERPL CALC-SCNC: 15 MMOL/L — HIGH (ref 7–14)
ANION GAP SERPL CALC-SCNC: 16 MMOL/L — HIGH (ref 7–14)
ANION GAP SERPL CALC-SCNC: 16 MMOL/L — HIGH (ref 7–14)
ANION GAP SERPL CALC-SCNC: 17 MMOL/L — HIGH (ref 7–14)
ANION GAP SERPL CALC-SCNC: 5 MMOL/L — LOW (ref 7–14)
ANION GAP SERPL CALC-SCNC: 6 MMOL/L — LOW (ref 7–14)
ANION GAP SERPL CALC-SCNC: 6 MMOL/L — LOW (ref 7–14)
ANION GAP SERPL CALC-SCNC: 7 MMOL/L — SIGNIFICANT CHANGE UP (ref 7–14)
ANION GAP SERPL CALC-SCNC: 8 MMOL/L — SIGNIFICANT CHANGE UP (ref 7–14)
APPEARANCE UR: ABNORMAL
APPEARANCE UR: ABNORMAL
APTT BLD: 33.4 SEC — SIGNIFICANT CHANGE UP (ref 27–39.2)
APTT BLD: 36.5 SEC — SIGNIFICANT CHANGE UP (ref 27–39.2)
AST SERPL-CCNC: 10 U/L — SIGNIFICANT CHANGE UP (ref 0–41)
AST SERPL-CCNC: 11 U/L — SIGNIFICANT CHANGE UP (ref 0–41)
AST SERPL-CCNC: 12 U/L — SIGNIFICANT CHANGE UP (ref 0–41)
AST SERPL-CCNC: 13 U/L — SIGNIFICANT CHANGE UP (ref 0–41)
AST SERPL-CCNC: 15 U/L — SIGNIFICANT CHANGE UP (ref 0–41)
AST SERPL-CCNC: 15 U/L — SIGNIFICANT CHANGE UP (ref 0–41)
AST SERPL-CCNC: 17 U/L — SIGNIFICANT CHANGE UP (ref 0–41)
AST SERPL-CCNC: 25 U/L — SIGNIFICANT CHANGE UP (ref 0–41)
AST SERPL-CCNC: 32 U/L — SIGNIFICANT CHANGE UP (ref 0–41)
AST SERPL-CCNC: 45 U/L — HIGH (ref 0–41)
AST SERPL-CCNC: 8 U/L — SIGNIFICANT CHANGE UP (ref 0–41)
AST SERPL-CCNC: 9 U/L — SIGNIFICANT CHANGE UP (ref 0–41)
BACTERIA # UR AUTO: ABNORMAL
BASE EXCESS BLDA CALC-SCNC: -15.2 MMOL/L — LOW (ref -2–3)
BASE EXCESS BLDA CALC-SCNC: -3.3 MMOL/L — LOW (ref -2–3)
BASE EXCESS BLDA CALC-SCNC: 2.6 MMOL/L — SIGNIFICANT CHANGE UP (ref -2–3)
BASE EXCESS BLDA CALC-SCNC: 3.7 MMOL/L — HIGH (ref -2–3)
BASE EXCESS BLDA CALC-SCNC: 4.9 MMOL/L — HIGH (ref -2–3)
BASE EXCESS BLDA CALC-SCNC: 5.2 MMOL/L — HIGH (ref -2–3)
BASE EXCESS BLDA CALC-SCNC: 5.4 MMOL/L — HIGH (ref -2–3)
BASE EXCESS BLDA CALC-SCNC: 5.5 MMOL/L — HIGH (ref -2–3)
BASE EXCESS BLDA CALC-SCNC: 6.2 MMOL/L — HIGH (ref -2–3)
BASE EXCESS BLDA CALC-SCNC: 6.2 MMOL/L — HIGH (ref -2–3)
BASE EXCESS BLDA CALC-SCNC: 7.2 MMOL/L — HIGH (ref -2–3)
BASE EXCESS BLDV CALC-SCNC: -4.2 MMOL/L — LOW (ref -2–3)
BASE EXCESS BLDV CALC-SCNC: -5.5 MMOL/L — LOW (ref -2–3)
BASOPHILS # BLD AUTO: 0 K/UL — SIGNIFICANT CHANGE UP (ref 0–0.2)
BASOPHILS # BLD AUTO: 0.01 K/UL — SIGNIFICANT CHANGE UP (ref 0–0.2)
BASOPHILS # BLD AUTO: 0.01 K/UL — SIGNIFICANT CHANGE UP (ref 0–0.2)
BASOPHILS # BLD AUTO: 0.02 K/UL — SIGNIFICANT CHANGE UP (ref 0–0.2)
BASOPHILS # BLD AUTO: 0.03 K/UL — SIGNIFICANT CHANGE UP (ref 0–0.2)
BASOPHILS # BLD AUTO: 0.04 K/UL — SIGNIFICANT CHANGE UP (ref 0–0.2)
BASOPHILS # BLD AUTO: 0.05 K/UL — SIGNIFICANT CHANGE UP (ref 0–0.2)
BASOPHILS # BLD AUTO: 0.06 K/UL — SIGNIFICANT CHANGE UP (ref 0–0.2)
BASOPHILS # BLD AUTO: 0.06 K/UL — SIGNIFICANT CHANGE UP (ref 0–0.2)
BASOPHILS # BLD AUTO: 0.07 K/UL — SIGNIFICANT CHANGE UP (ref 0–0.2)
BASOPHILS # BLD AUTO: 0.07 K/UL — SIGNIFICANT CHANGE UP (ref 0–0.2)
BASOPHILS NFR BLD AUTO: 0 % — SIGNIFICANT CHANGE UP (ref 0–1)
BASOPHILS NFR BLD AUTO: 0.1 % — SIGNIFICANT CHANGE UP (ref 0–1)
BASOPHILS NFR BLD AUTO: 0.2 % — SIGNIFICANT CHANGE UP (ref 0–1)
BASOPHILS NFR BLD AUTO: 0.3 % — SIGNIFICANT CHANGE UP (ref 0–1)
BASOPHILS NFR BLD AUTO: 0.4 % — SIGNIFICANT CHANGE UP (ref 0–1)
BASOPHILS NFR BLD AUTO: 0.5 % — SIGNIFICANT CHANGE UP (ref 0–1)
BASOPHILS NFR BLD AUTO: 0.5 % — SIGNIFICANT CHANGE UP (ref 0–1)
BASOPHILS NFR BLD AUTO: 0.6 % — SIGNIFICANT CHANGE UP (ref 0–1)
BASOPHILS NFR BLD AUTO: 0.8 % — SIGNIFICANT CHANGE UP (ref 0–1)
BILIRUB SERPL-MCNC: 0.2 MG/DL — SIGNIFICANT CHANGE UP (ref 0.2–1.2)
BILIRUB SERPL-MCNC: 0.3 MG/DL — SIGNIFICANT CHANGE UP (ref 0.2–1.2)
BILIRUB SERPL-MCNC: 0.3 MG/DL — SIGNIFICANT CHANGE UP (ref 0.2–1.2)
BILIRUB SERPL-MCNC: 0.4 MG/DL — SIGNIFICANT CHANGE UP (ref 0.2–1.2)
BILIRUB SERPL-MCNC: 0.6 MG/DL — SIGNIFICANT CHANGE UP (ref 0.2–1.2)
BILIRUB SERPL-MCNC: 0.6 MG/DL — SIGNIFICANT CHANGE UP (ref 0.2–1.2)
BILIRUB SERPL-MCNC: 0.9 MG/DL — SIGNIFICANT CHANGE UP (ref 0.2–1.2)
BILIRUB SERPL-MCNC: 1 MG/DL — SIGNIFICANT CHANGE UP (ref 0.2–1.2)
BILIRUB SERPL-MCNC: 1.2 MG/DL — SIGNIFICANT CHANGE UP (ref 0.2–1.2)
BILIRUB SERPL-MCNC: <0.2 MG/DL — SIGNIFICANT CHANGE UP (ref 0.2–1.2)
BILIRUB UR-MCNC: NEGATIVE — SIGNIFICANT CHANGE UP
BILIRUB UR-MCNC: NEGATIVE — SIGNIFICANT CHANGE UP
BUN SERPL-MCNC: 10 MG/DL — SIGNIFICANT CHANGE UP (ref 10–20)
BUN SERPL-MCNC: 11 MG/DL — SIGNIFICANT CHANGE UP (ref 10–20)
BUN SERPL-MCNC: 12 MG/DL — SIGNIFICANT CHANGE UP (ref 10–20)
BUN SERPL-MCNC: 12 MG/DL — SIGNIFICANT CHANGE UP (ref 10–20)
BUN SERPL-MCNC: 13 MG/DL — SIGNIFICANT CHANGE UP (ref 10–20)
BUN SERPL-MCNC: 13 MG/DL — SIGNIFICANT CHANGE UP (ref 10–20)
BUN SERPL-MCNC: 14 MG/DL — SIGNIFICANT CHANGE UP (ref 10–20)
BUN SERPL-MCNC: 16 MG/DL — SIGNIFICANT CHANGE UP (ref 10–20)
BUN SERPL-MCNC: 19 MG/DL — SIGNIFICANT CHANGE UP (ref 10–20)
BUN SERPL-MCNC: 26 MG/DL — HIGH (ref 10–20)
BUN SERPL-MCNC: 29 MG/DL — HIGH (ref 10–20)
BUN SERPL-MCNC: 4 MG/DL — LOW (ref 10–20)
BUN SERPL-MCNC: 4 MG/DL — LOW (ref 10–20)
BUN SERPL-MCNC: 47 MG/DL — HIGH (ref 10–20)
BUN SERPL-MCNC: 5 MG/DL — LOW (ref 10–20)
BUN SERPL-MCNC: 5 MG/DL — LOW (ref 10–20)
BUN SERPL-MCNC: 51 MG/DL — HIGH (ref 10–20)
BUN SERPL-MCNC: 6 MG/DL — LOW (ref 10–20)
BUN SERPL-MCNC: 6 MG/DL — LOW (ref 10–20)
BUN SERPL-MCNC: 7 MG/DL — LOW (ref 10–20)
BUN SERPL-MCNC: 8 MG/DL — LOW (ref 10–20)
BUN SERPL-MCNC: 9 MG/DL — LOW (ref 10–20)
CA-I SERPL-SCNC: 1.03 MMOL/L — LOW (ref 1.15–1.33)
CA-I SERPL-SCNC: 1.15 MMOL/L — SIGNIFICANT CHANGE UP (ref 1.15–1.33)
CALCIUM SERPL-MCNC: 7.9 MG/DL — LOW (ref 8.4–10.5)
CALCIUM SERPL-MCNC: 8.1 MG/DL — LOW (ref 8.4–10.5)
CALCIUM SERPL-MCNC: 8.2 MG/DL — LOW (ref 8.4–10.4)
CALCIUM SERPL-MCNC: 8.2 MG/DL — LOW (ref 8.4–10.5)
CALCIUM SERPL-MCNC: 8.2 MG/DL — LOW (ref 8.4–10.5)
CALCIUM SERPL-MCNC: 8.3 MG/DL — LOW (ref 8.4–10.5)
CALCIUM SERPL-MCNC: 8.4 MG/DL — SIGNIFICANT CHANGE UP (ref 8.4–10.4)
CALCIUM SERPL-MCNC: 8.4 MG/DL — SIGNIFICANT CHANGE UP (ref 8.4–10.5)
CALCIUM SERPL-MCNC: 8.5 MG/DL — SIGNIFICANT CHANGE UP (ref 8.4–10.4)
CALCIUM SERPL-MCNC: 8.5 MG/DL — SIGNIFICANT CHANGE UP (ref 8.4–10.5)
CALCIUM SERPL-MCNC: 8.6 MG/DL — SIGNIFICANT CHANGE UP (ref 8.4–10.5)
CALCIUM SERPL-MCNC: 8.7 MG/DL — SIGNIFICANT CHANGE UP (ref 8.4–10.4)
CALCIUM SERPL-MCNC: 8.7 MG/DL — SIGNIFICANT CHANGE UP (ref 8.4–10.4)
CALCIUM SERPL-MCNC: 8.7 MG/DL — SIGNIFICANT CHANGE UP (ref 8.4–10.5)
CALCIUM SERPL-MCNC: 8.9 MG/DL — SIGNIFICANT CHANGE UP (ref 8.4–10.4)
CALCIUM SERPL-MCNC: 8.9 MG/DL — SIGNIFICANT CHANGE UP (ref 8.4–10.5)
CALCIUM SERPL-MCNC: 9.4 MG/DL — SIGNIFICANT CHANGE UP (ref 8.4–10.5)
CALCIUM SERPL-MCNC: 9.5 MG/DL — SIGNIFICANT CHANGE UP (ref 8.4–10.5)
CHLORIDE SERPL-SCNC: 101 MMOL/L — SIGNIFICANT CHANGE UP (ref 98–110)
CHLORIDE SERPL-SCNC: 102 MMOL/L — SIGNIFICANT CHANGE UP (ref 98–110)
CHLORIDE SERPL-SCNC: 104 MMOL/L — SIGNIFICANT CHANGE UP (ref 98–110)
CHLORIDE SERPL-SCNC: 104 MMOL/L — SIGNIFICANT CHANGE UP (ref 98–110)
CHLORIDE SERPL-SCNC: 105 MMOL/L — SIGNIFICANT CHANGE UP (ref 98–110)
CHLORIDE SERPL-SCNC: 106 MMOL/L — SIGNIFICANT CHANGE UP (ref 98–110)
CHLORIDE SERPL-SCNC: 107 MMOL/L — SIGNIFICANT CHANGE UP (ref 98–110)
CHLORIDE SERPL-SCNC: 108 MMOL/L — SIGNIFICANT CHANGE UP (ref 98–110)
CHLORIDE SERPL-SCNC: 108 MMOL/L — SIGNIFICANT CHANGE UP (ref 98–110)
CHLORIDE SERPL-SCNC: 110 MMOL/L — SIGNIFICANT CHANGE UP (ref 98–110)
CHLORIDE SERPL-SCNC: 111 MMOL/L — HIGH (ref 98–110)
CHLORIDE SERPL-SCNC: 112 MMOL/L — HIGH (ref 98–110)
CHLORIDE SERPL-SCNC: 117 MMOL/L — HIGH (ref 98–110)
CHLORIDE SERPL-SCNC: 95 MMOL/L — LOW (ref 98–110)
CHOLEST SERPL-MCNC: 135 MG/DL — SIGNIFICANT CHANGE UP
CO2 SERPL-SCNC: 18 MMOL/L — SIGNIFICANT CHANGE UP (ref 17–32)
CO2 SERPL-SCNC: 18 MMOL/L — SIGNIFICANT CHANGE UP (ref 17–32)
CO2 SERPL-SCNC: 19 MMOL/L — SIGNIFICANT CHANGE UP (ref 17–32)
CO2 SERPL-SCNC: 21 MMOL/L — SIGNIFICANT CHANGE UP (ref 17–32)
CO2 SERPL-SCNC: 22 MMOL/L — SIGNIFICANT CHANGE UP (ref 17–32)
CO2 SERPL-SCNC: 22 MMOL/L — SIGNIFICANT CHANGE UP (ref 17–32)
CO2 SERPL-SCNC: 23 MMOL/L — SIGNIFICANT CHANGE UP (ref 17–32)
CO2 SERPL-SCNC: 23 MMOL/L — SIGNIFICANT CHANGE UP (ref 17–32)
CO2 SERPL-SCNC: 24 MMOL/L — SIGNIFICANT CHANGE UP (ref 17–32)
CO2 SERPL-SCNC: 24 MMOL/L — SIGNIFICANT CHANGE UP (ref 17–32)
CO2 SERPL-SCNC: 25 MMOL/L — SIGNIFICANT CHANGE UP (ref 17–32)
CO2 SERPL-SCNC: 26 MMOL/L — SIGNIFICANT CHANGE UP (ref 17–32)
CO2 SERPL-SCNC: 27 MMOL/L — SIGNIFICANT CHANGE UP (ref 17–32)
CO2 SERPL-SCNC: 27 MMOL/L — SIGNIFICANT CHANGE UP (ref 17–32)
CO2 SERPL-SCNC: 28 MMOL/L — SIGNIFICANT CHANGE UP (ref 17–32)
CO2 SERPL-SCNC: 29 MMOL/L — SIGNIFICANT CHANGE UP (ref 17–32)
CO2 SERPL-SCNC: 30 MMOL/L — SIGNIFICANT CHANGE UP (ref 17–32)
COLOR SPEC: ABNORMAL
COLOR SPEC: YELLOW — SIGNIFICANT CHANGE UP
CREAT SERPL-MCNC: 0.5 MG/DL — LOW (ref 0.7–1.5)
CREAT SERPL-MCNC: 0.5 MG/DL — LOW (ref 0.7–1.5)
CREAT SERPL-MCNC: 0.7 MG/DL — SIGNIFICANT CHANGE UP (ref 0.7–1.5)
CREAT SERPL-MCNC: 1.3 MG/DL — SIGNIFICANT CHANGE UP (ref 0.7–1.5)
CREAT SERPL-MCNC: 1.5 MG/DL — SIGNIFICANT CHANGE UP (ref 0.7–1.5)
CREAT SERPL-MCNC: <0.5 MG/DL — LOW (ref 0.7–1.5)
CULTURE RESULTS: SIGNIFICANT CHANGE UP
D DIMER BLD IA.RAPID-MCNC: 1149 NG/ML DDU — HIGH (ref 0–230)
D DIMER BLD IA.RAPID-MCNC: 713 NG/ML DDU — HIGH
D DIMER BLD IA.RAPID-MCNC: 734 NG/ML DDU — HIGH (ref 0–230)
DIFF PNL FLD: ABNORMAL
DIFF PNL FLD: ABNORMAL
EGFR: 112 ML/MIN/1.73M2 — SIGNIFICANT CHANGE UP
EGFR: 124 ML/MIN/1.73M2 — SIGNIFICANT CHANGE UP
EGFR: 124 ML/MIN/1.73M2 — SIGNIFICANT CHANGE UP
EGFR: 133 ML/MIN/1.73M2 — SIGNIFICANT CHANGE UP
EGFR: 145 ML/MIN/1.73M2 — SIGNIFICANT CHANGE UP
EGFR: 164 ML/MIN/1.73M2 — SIGNIFICANT CHANGE UP
EGFR: 56 ML/MIN/1.73M2 — LOW
EGFR: 67 ML/MIN/1.73M2 — SIGNIFICANT CHANGE UP
EOSINOPHIL # BLD AUTO: 0 K/UL — SIGNIFICANT CHANGE UP (ref 0–0.7)
EOSINOPHIL # BLD AUTO: 0.01 K/UL — SIGNIFICANT CHANGE UP (ref 0–0.7)
EOSINOPHIL # BLD AUTO: 0.01 K/UL — SIGNIFICANT CHANGE UP (ref 0–0.7)
EOSINOPHIL # BLD AUTO: 0.09 K/UL — SIGNIFICANT CHANGE UP (ref 0–0.7)
EOSINOPHIL # BLD AUTO: 0.1 K/UL — SIGNIFICANT CHANGE UP (ref 0–0.7)
EOSINOPHIL # BLD AUTO: 0.13 K/UL — SIGNIFICANT CHANGE UP (ref 0–0.7)
EOSINOPHIL # BLD AUTO: 0.13 K/UL — SIGNIFICANT CHANGE UP (ref 0–0.7)
EOSINOPHIL # BLD AUTO: 0.16 K/UL — SIGNIFICANT CHANGE UP (ref 0–0.7)
EOSINOPHIL # BLD AUTO: 0.17 K/UL — SIGNIFICANT CHANGE UP (ref 0–0.7)
EOSINOPHIL # BLD AUTO: 0.19 K/UL — SIGNIFICANT CHANGE UP (ref 0–0.7)
EOSINOPHIL # BLD AUTO: 0.2 K/UL — SIGNIFICANT CHANGE UP (ref 0–0.7)
EOSINOPHIL # BLD AUTO: 0.2 K/UL — SIGNIFICANT CHANGE UP (ref 0–0.7)
EOSINOPHIL # BLD AUTO: 0.21 K/UL — SIGNIFICANT CHANGE UP (ref 0–0.7)
EOSINOPHIL # BLD AUTO: 0.23 K/UL — SIGNIFICANT CHANGE UP (ref 0–0.7)
EOSINOPHIL # BLD AUTO: 0.24 K/UL — SIGNIFICANT CHANGE UP (ref 0–0.7)
EOSINOPHIL # BLD AUTO: 0.25 K/UL — SIGNIFICANT CHANGE UP (ref 0–0.7)
EOSINOPHIL # BLD AUTO: 0.28 K/UL — SIGNIFICANT CHANGE UP (ref 0–0.7)
EOSINOPHIL # BLD AUTO: 0.3 K/UL — SIGNIFICANT CHANGE UP (ref 0–0.7)
EOSINOPHIL # BLD AUTO: 0.32 K/UL — SIGNIFICANT CHANGE UP (ref 0–0.7)
EOSINOPHIL # BLD AUTO: 0.32 K/UL — SIGNIFICANT CHANGE UP (ref 0–0.7)
EOSINOPHIL # BLD AUTO: 0.34 K/UL — SIGNIFICANT CHANGE UP (ref 0–0.7)
EOSINOPHIL NFR BLD AUTO: 0 % — SIGNIFICANT CHANGE UP (ref 0–8)
EOSINOPHIL NFR BLD AUTO: 0.1 % — SIGNIFICANT CHANGE UP (ref 0–8)
EOSINOPHIL NFR BLD AUTO: 0.1 % — SIGNIFICANT CHANGE UP (ref 0–8)
EOSINOPHIL NFR BLD AUTO: 0.5 % — SIGNIFICANT CHANGE UP (ref 0–8)
EOSINOPHIL NFR BLD AUTO: 0.7 % — SIGNIFICANT CHANGE UP (ref 0–8)
EOSINOPHIL NFR BLD AUTO: 0.7 % — SIGNIFICANT CHANGE UP (ref 0–8)
EOSINOPHIL NFR BLD AUTO: 1.3 % — SIGNIFICANT CHANGE UP (ref 0–8)
EOSINOPHIL NFR BLD AUTO: 1.3 % — SIGNIFICANT CHANGE UP (ref 0–8)
EOSINOPHIL NFR BLD AUTO: 1.4 % — SIGNIFICANT CHANGE UP (ref 0–8)
EOSINOPHIL NFR BLD AUTO: 1.4 % — SIGNIFICANT CHANGE UP (ref 0–8)
EOSINOPHIL NFR BLD AUTO: 1.7 % — SIGNIFICANT CHANGE UP (ref 0–8)
EOSINOPHIL NFR BLD AUTO: 2.1 % — SIGNIFICANT CHANGE UP (ref 0–8)
EOSINOPHIL NFR BLD AUTO: 2.1 % — SIGNIFICANT CHANGE UP (ref 0–8)
EOSINOPHIL NFR BLD AUTO: 2.3 % — SIGNIFICANT CHANGE UP (ref 0–8)
EOSINOPHIL NFR BLD AUTO: 2.5 % — SIGNIFICANT CHANGE UP (ref 0–8)
EOSINOPHIL NFR BLD AUTO: 2.6 % — SIGNIFICANT CHANGE UP (ref 0–8)
EOSINOPHIL NFR BLD AUTO: 3.4 % — SIGNIFICANT CHANGE UP (ref 0–8)
EOSINOPHIL NFR BLD AUTO: 3.4 % — SIGNIFICANT CHANGE UP (ref 0–8)
EOSINOPHIL NFR BLD AUTO: 3.6 % — SIGNIFICANT CHANGE UP (ref 0–8)
EOSINOPHIL NFR BLD AUTO: 3.8 % — SIGNIFICANT CHANGE UP (ref 0–8)
EOSINOPHIL NFR BLD AUTO: 4 % — SIGNIFICANT CHANGE UP (ref 0–8)
EPI CELLS # UR: 2 /HPF — SIGNIFICANT CHANGE UP (ref 0–5)
FLUAV AG NPH QL: SIGNIFICANT CHANGE UP
FLUBV AG NPH QL: SIGNIFICANT CHANGE UP
GAS PNL BLDA: SIGNIFICANT CHANGE UP
GAS PNL BLDV: 138 MMOL/L — SIGNIFICANT CHANGE UP (ref 136–145)
GAS PNL BLDV: 141 MMOL/L — SIGNIFICANT CHANGE UP (ref 136–145)
GAS PNL BLDV: SIGNIFICANT CHANGE UP
GIANT PLATELETS BLD QL SMEAR: PRESENT — SIGNIFICANT CHANGE UP
GLUCOSE BLDC GLUCOMTR-MCNC: 103 MG/DL — HIGH (ref 70–99)
GLUCOSE BLDC GLUCOMTR-MCNC: 103 MG/DL — HIGH (ref 70–99)
GLUCOSE BLDC GLUCOMTR-MCNC: 104 MG/DL — HIGH (ref 70–99)
GLUCOSE BLDC GLUCOMTR-MCNC: 105 MG/DL — HIGH (ref 70–99)
GLUCOSE BLDC GLUCOMTR-MCNC: 105 MG/DL — HIGH (ref 70–99)
GLUCOSE BLDC GLUCOMTR-MCNC: 109 MG/DL — HIGH (ref 70–99)
GLUCOSE BLDC GLUCOMTR-MCNC: 111 MG/DL — HIGH (ref 70–99)
GLUCOSE BLDC GLUCOMTR-MCNC: 115 MG/DL — HIGH (ref 70–99)
GLUCOSE BLDC GLUCOMTR-MCNC: 119 MG/DL — HIGH (ref 70–99)
GLUCOSE BLDC GLUCOMTR-MCNC: 132 MG/DL — HIGH (ref 70–99)
GLUCOSE BLDC GLUCOMTR-MCNC: 133 MG/DL — HIGH (ref 70–99)
GLUCOSE BLDC GLUCOMTR-MCNC: 138 MG/DL — HIGH (ref 70–99)
GLUCOSE BLDC GLUCOMTR-MCNC: 139 MG/DL — HIGH (ref 70–99)
GLUCOSE BLDC GLUCOMTR-MCNC: 147 MG/DL — HIGH (ref 70–99)
GLUCOSE BLDC GLUCOMTR-MCNC: 150 MG/DL — HIGH (ref 70–99)
GLUCOSE BLDC GLUCOMTR-MCNC: 173 MG/DL — HIGH (ref 70–99)
GLUCOSE BLDC GLUCOMTR-MCNC: 192 MG/DL — HIGH (ref 70–99)
GLUCOSE BLDC GLUCOMTR-MCNC: 204 MG/DL — HIGH (ref 70–99)
GLUCOSE BLDC GLUCOMTR-MCNC: 90 MG/DL — SIGNIFICANT CHANGE UP (ref 70–99)
GLUCOSE BLDC GLUCOMTR-MCNC: 90 MG/DL — SIGNIFICANT CHANGE UP (ref 70–99)
GLUCOSE BLDC GLUCOMTR-MCNC: 91 MG/DL — SIGNIFICANT CHANGE UP (ref 70–99)
GLUCOSE BLDC GLUCOMTR-MCNC: 94 MG/DL — SIGNIFICANT CHANGE UP (ref 70–99)
GLUCOSE BLDC GLUCOMTR-MCNC: 95 MG/DL — SIGNIFICANT CHANGE UP (ref 70–99)
GLUCOSE BLDC GLUCOMTR-MCNC: 96 MG/DL — SIGNIFICANT CHANGE UP (ref 70–99)
GLUCOSE BLDC GLUCOMTR-MCNC: 98 MG/DL — SIGNIFICANT CHANGE UP (ref 70–99)
GLUCOSE BLDC GLUCOMTR-MCNC: 99 MG/DL — SIGNIFICANT CHANGE UP (ref 70–99)
GLUCOSE BLDC GLUCOMTR-MCNC: 99 MG/DL — SIGNIFICANT CHANGE UP (ref 70–99)
GLUCOSE SERPL-MCNC: 101 MG/DL — HIGH (ref 70–99)
GLUCOSE SERPL-MCNC: 101 MG/DL — HIGH (ref 70–99)
GLUCOSE SERPL-MCNC: 103 MG/DL — HIGH (ref 70–99)
GLUCOSE SERPL-MCNC: 107 MG/DL — HIGH (ref 70–99)
GLUCOSE SERPL-MCNC: 110 MG/DL — HIGH (ref 70–99)
GLUCOSE SERPL-MCNC: 113 MG/DL — HIGH (ref 70–99)
GLUCOSE SERPL-MCNC: 116 MG/DL — HIGH (ref 70–99)
GLUCOSE SERPL-MCNC: 124 MG/DL — HIGH (ref 70–99)
GLUCOSE SERPL-MCNC: 125 MG/DL — HIGH (ref 70–99)
GLUCOSE SERPL-MCNC: 135 MG/DL — HIGH (ref 70–99)
GLUCOSE SERPL-MCNC: 165 MG/DL — HIGH (ref 70–99)
GLUCOSE SERPL-MCNC: 171 MG/DL — HIGH (ref 70–99)
GLUCOSE SERPL-MCNC: 185 MG/DL — HIGH (ref 70–99)
GLUCOSE SERPL-MCNC: 191 MG/DL — HIGH (ref 70–99)
GLUCOSE SERPL-MCNC: 196 MG/DL — HIGH (ref 70–99)
GLUCOSE SERPL-MCNC: 204 MG/DL — HIGH (ref 70–99)
GLUCOSE SERPL-MCNC: 213 MG/DL — HIGH (ref 70–99)
GLUCOSE SERPL-MCNC: 76 MG/DL — SIGNIFICANT CHANGE UP (ref 70–99)
GLUCOSE SERPL-MCNC: 78 MG/DL — SIGNIFICANT CHANGE UP (ref 70–99)
GLUCOSE SERPL-MCNC: 78 MG/DL — SIGNIFICANT CHANGE UP (ref 70–99)
GLUCOSE SERPL-MCNC: 82 MG/DL — SIGNIFICANT CHANGE UP (ref 70–99)
GLUCOSE SERPL-MCNC: 90 MG/DL — SIGNIFICANT CHANGE UP (ref 70–99)
GLUCOSE SERPL-MCNC: 94 MG/DL — SIGNIFICANT CHANGE UP (ref 70–99)
GLUCOSE SERPL-MCNC: 94 MG/DL — SIGNIFICANT CHANGE UP (ref 70–99)
GLUCOSE SERPL-MCNC: 97 MG/DL — SIGNIFICANT CHANGE UP (ref 70–99)
GLUCOSE SERPL-MCNC: 97 MG/DL — SIGNIFICANT CHANGE UP (ref 70–99)
GLUCOSE SERPL-MCNC: 99 MG/DL — SIGNIFICANT CHANGE UP (ref 70–99)
GLUCOSE SERPL-MCNC: 99 MG/DL — SIGNIFICANT CHANGE UP (ref 70–99)
GLUCOSE UR QL: NEGATIVE — SIGNIFICANT CHANGE UP
GLUCOSE UR QL: NEGATIVE — SIGNIFICANT CHANGE UP
GRAM STN FLD: SIGNIFICANT CHANGE UP
HCO3 BLDA-SCNC: 22 MMOL/L — SIGNIFICANT CHANGE UP (ref 21–28)
HCO3 BLDA-SCNC: 27 MMOL/L — SIGNIFICANT CHANGE UP (ref 21–28)
HCO3 BLDA-SCNC: 28 MMOL/L — SIGNIFICANT CHANGE UP (ref 21–28)
HCO3 BLDA-SCNC: 29 MMOL/L — HIGH (ref 21–28)
HCO3 BLDA-SCNC: 29 MMOL/L — HIGH (ref 21–28)
HCO3 BLDA-SCNC: 30 MMOL/L — HIGH (ref 21–28)
HCO3 BLDA-SCNC: 31 MMOL/L — HIGH (ref 21–28)
HCO3 BLDA-SCNC: 8 MMOL/L — CRITICAL LOW (ref 21–28)
HCO3 BLDV-SCNC: 19 MMOL/L — LOW (ref 22–29)
HCO3 BLDV-SCNC: 20 MMOL/L — LOW (ref 22–29)
HCT VFR BLD CALC: 24.5 % — LOW (ref 42–52)
HCT VFR BLD CALC: 25.3 % — LOW (ref 42–52)
HCT VFR BLD CALC: 26.3 % — LOW (ref 42–52)
HCT VFR BLD CALC: 26.8 % — LOW (ref 42–52)
HCT VFR BLD CALC: 26.9 % — LOW (ref 42–52)
HCT VFR BLD CALC: 27 % — LOW (ref 42–52)
HCT VFR BLD CALC: 27.2 % — LOW (ref 42–52)
HCT VFR BLD CALC: 28 % — LOW (ref 42–52)
HCT VFR BLD CALC: 28.1 % — LOW (ref 42–52)
HCT VFR BLD CALC: 28.4 % — LOW (ref 42–52)
HCT VFR BLD CALC: 28.5 % — LOW (ref 42–52)
HCT VFR BLD CALC: 28.7 % — LOW (ref 42–52)
HCT VFR BLD CALC: 29.9 % — LOW (ref 42–52)
HCT VFR BLD CALC: 30.1 % — LOW (ref 42–52)
HCT VFR BLD CALC: 30.7 % — LOW (ref 42–52)
HCT VFR BLD CALC: 31.4 % — LOW (ref 42–52)
HCT VFR BLD CALC: 31.6 % — LOW (ref 42–52)
HCT VFR BLD CALC: 31.7 % — LOW (ref 42–52)
HCT VFR BLD CALC: 32.2 % — LOW (ref 42–52)
HCT VFR BLD CALC: 33 % — LOW (ref 42–52)
HCT VFR BLD CALC: 35.9 % — LOW (ref 42–52)
HCT VFR BLD CALC: 36.8 % — LOW (ref 42–52)
HCT VFR BLD CALC: 41.3 % — LOW (ref 42–52)
HCT VFR BLD CALC: 42.8 % — SIGNIFICANT CHANGE UP (ref 42–52)
HCT VFR BLD CALC: 49.3 % — SIGNIFICANT CHANGE UP (ref 42–52)
HCT VFR BLDA CALC: 42 % — SIGNIFICANT CHANGE UP (ref 39–51)
HCT VFR BLDA CALC: 42 % — SIGNIFICANT CHANGE UP (ref 39–51)
HDLC SERPL-MCNC: 27 MG/DL — LOW
HGB BLD CALC-MCNC: 14 G/DL — SIGNIFICANT CHANGE UP (ref 12.6–17.4)
HGB BLD CALC-MCNC: 14.1 G/DL — SIGNIFICANT CHANGE UP (ref 12.6–17.4)
HGB BLD-MCNC: 10 G/DL — LOW (ref 14–18)
HGB BLD-MCNC: 10 G/DL — LOW (ref 14–18)
HGB BLD-MCNC: 10.3 G/DL — LOW (ref 14–18)
HGB BLD-MCNC: 11.2 G/DL — LOW (ref 14–18)
HGB BLD-MCNC: 11.5 G/DL — LOW (ref 14–18)
HGB BLD-MCNC: 13.4 G/DL — LOW (ref 14–18)
HGB BLD-MCNC: 13.8 G/DL — LOW (ref 14–18)
HGB BLD-MCNC: 15.5 G/DL — SIGNIFICANT CHANGE UP (ref 14–18)
HGB BLD-MCNC: 7.5 G/DL — LOW (ref 14–18)
HGB BLD-MCNC: 8 G/DL — LOW (ref 14–18)
HGB BLD-MCNC: 8.2 G/DL — LOW (ref 14–18)
HGB BLD-MCNC: 8.4 G/DL — LOW (ref 14–18)
HGB BLD-MCNC: 8.4 G/DL — LOW (ref 14–18)
HGB BLD-MCNC: 8.5 G/DL — LOW (ref 14–18)
HGB BLD-MCNC: 8.6 G/DL — LOW (ref 14–18)
HGB BLD-MCNC: 9 G/DL — LOW (ref 14–18)
HGB BLD-MCNC: 9.2 G/DL — LOW (ref 14–18)
HGB BLD-MCNC: 9.4 G/DL — LOW (ref 14–18)
HGB BLD-MCNC: 9.4 G/DL — LOW (ref 14–18)
HGB BLD-MCNC: 9.6 G/DL — LOW (ref 14–18)
HGB BLD-MCNC: 9.8 G/DL — LOW (ref 14–18)
HGB BLD-MCNC: 9.9 G/DL — LOW (ref 14–18)
HOROWITZ INDEX BLDA+IHG-RTO: 40 — SIGNIFICANT CHANGE UP
HOROWITZ INDEX BLDA+IHG-RTO: 50 — SIGNIFICANT CHANGE UP
HOROWITZ INDEX BLDA+IHG-RTO: 50 — SIGNIFICANT CHANGE UP
HOROWITZ INDEX BLDA+IHG-RTO: 60 — SIGNIFICANT CHANGE UP
HYALINE CASTS # UR AUTO: 22 /LPF — HIGH (ref 0–7)
HYPOCHROMIA BLD QL: SLIGHT — SIGNIFICANT CHANGE UP
IMM GRANULOCYTES NFR BLD AUTO: 0.5 % — HIGH (ref 0.1–0.3)
IMM GRANULOCYTES NFR BLD AUTO: 0.6 % — HIGH (ref 0.1–0.3)
IMM GRANULOCYTES NFR BLD AUTO: 0.7 % — HIGH (ref 0.1–0.3)
IMM GRANULOCYTES NFR BLD AUTO: 0.8 % — HIGH (ref 0.1–0.3)
IMM GRANULOCYTES NFR BLD AUTO: 0.9 % — HIGH (ref 0.1–0.3)
IMM GRANULOCYTES NFR BLD AUTO: 0.9 % — HIGH (ref 0.1–0.3)
IMM GRANULOCYTES NFR BLD AUTO: 1 % — HIGH (ref 0.1–0.3)
IMM GRANULOCYTES NFR BLD AUTO: 1.1 % — HIGH (ref 0.1–0.3)
IMM GRANULOCYTES NFR BLD AUTO: 1.1 % — HIGH (ref 0.1–0.3)
IMM GRANULOCYTES NFR BLD AUTO: 1.2 % — HIGH (ref 0.1–0.3)
IMM GRANULOCYTES NFR BLD AUTO: 1.4 % — HIGH (ref 0.1–0.3)
IMM GRANULOCYTES NFR BLD AUTO: 1.4 % — HIGH (ref 0.1–0.3)
IMM GRANULOCYTES NFR BLD AUTO: 1.6 % — HIGH (ref 0.1–0.3)
IMM GRANULOCYTES NFR BLD AUTO: 2.2 % — HIGH (ref 0.1–0.3)
IMM GRANULOCYTES NFR BLD AUTO: 2.4 % — HIGH (ref 0.1–0.3)
IMM GRANULOCYTES NFR BLD AUTO: 2.6 % — HIGH (ref 0.1–0.3)
IMM GRANULOCYTES NFR BLD AUTO: 2.6 % — HIGH (ref 0.1–0.3)
IMM GRANULOCYTES NFR BLD AUTO: 5.3 % — HIGH (ref 0.1–0.3)
INR BLD: 1.2 RATIO — SIGNIFICANT CHANGE UP (ref 0.65–1.3)
INR BLD: 1.31 RATIO — HIGH (ref 0.65–1.3)
KETONES UR-MCNC: NEGATIVE — SIGNIFICANT CHANGE UP
KETONES UR-MCNC: SIGNIFICANT CHANGE UP
LACTATE BLDA-MCNC: 0.6 MMOL/L — SIGNIFICANT CHANGE UP (ref 0.5–2)
LACTATE BLDV-MCNC: 2.6 MMOL/L — HIGH (ref 0.5–2)
LACTATE BLDV-MCNC: 3.6 MMOL/L — HIGH (ref 0.5–2)
LACTATE SERPL-SCNC: 1.1 MMOL/L — SIGNIFICANT CHANGE UP (ref 0.7–2)
LACTATE SERPL-SCNC: 2.4 MMOL/L — HIGH (ref 0.7–2)
LEGIONELLA AG UR QL: NEGATIVE — SIGNIFICANT CHANGE UP
LEUKOCYTE ESTERASE UR-ACNC: ABNORMAL
LEUKOCYTE ESTERASE UR-ACNC: ABNORMAL
LIPID PNL WITH DIRECT LDL SERPL: 80 MG/DL — SIGNIFICANT CHANGE UP
LYMPHOCYTES # BLD AUTO: 0.52 K/UL — LOW (ref 1.2–3.4)
LYMPHOCYTES # BLD AUTO: 0.52 K/UL — LOW (ref 1.2–3.4)
LYMPHOCYTES # BLD AUTO: 0.57 K/UL — LOW (ref 1.2–3.4)
LYMPHOCYTES # BLD AUTO: 0.64 K/UL — LOW (ref 1.2–3.4)
LYMPHOCYTES # BLD AUTO: 0.74 K/UL — LOW (ref 1.2–3.4)
LYMPHOCYTES # BLD AUTO: 1.13 K/UL — LOW (ref 1.2–3.4)
LYMPHOCYTES # BLD AUTO: 1.14 K/UL — LOW (ref 1.2–3.4)
LYMPHOCYTES # BLD AUTO: 1.18 K/UL — LOW (ref 1.2–3.4)
LYMPHOCYTES # BLD AUTO: 1.23 K/UL — SIGNIFICANT CHANGE UP (ref 1.2–3.4)
LYMPHOCYTES # BLD AUTO: 1.23 K/UL — SIGNIFICANT CHANGE UP (ref 1.2–3.4)
LYMPHOCYTES # BLD AUTO: 1.31 K/UL — SIGNIFICANT CHANGE UP (ref 1.2–3.4)
LYMPHOCYTES # BLD AUTO: 1.32 K/UL — SIGNIFICANT CHANGE UP (ref 1.2–3.4)
LYMPHOCYTES # BLD AUTO: 1.33 K/UL — SIGNIFICANT CHANGE UP (ref 1.2–3.4)
LYMPHOCYTES # BLD AUTO: 1.48 K/UL — SIGNIFICANT CHANGE UP (ref 1.2–3.4)
LYMPHOCYTES # BLD AUTO: 1.53 K/UL — SIGNIFICANT CHANGE UP (ref 1.2–3.4)
LYMPHOCYTES # BLD AUTO: 1.54 K/UL — SIGNIFICANT CHANGE UP (ref 1.2–3.4)
LYMPHOCYTES # BLD AUTO: 1.59 K/UL — SIGNIFICANT CHANGE UP (ref 1.2–3.4)
LYMPHOCYTES # BLD AUTO: 1.6 % — LOW (ref 20.5–51.1)
LYMPHOCYTES # BLD AUTO: 1.6 % — LOW (ref 20.5–51.1)
LYMPHOCYTES # BLD AUTO: 1.68 K/UL — SIGNIFICANT CHANGE UP (ref 1.2–3.4)
LYMPHOCYTES # BLD AUTO: 1.74 K/UL — SIGNIFICANT CHANGE UP (ref 1.2–3.4)
LYMPHOCYTES # BLD AUTO: 1.86 K/UL — SIGNIFICANT CHANGE UP (ref 1.2–3.4)
LYMPHOCYTES # BLD AUTO: 1.9 K/UL — SIGNIFICANT CHANGE UP (ref 1.2–3.4)
LYMPHOCYTES # BLD AUTO: 1.96 K/UL — SIGNIFICANT CHANGE UP (ref 1.2–3.4)
LYMPHOCYTES # BLD AUTO: 10.5 % — LOW (ref 20.5–51.1)
LYMPHOCYTES # BLD AUTO: 10.6 % — LOW (ref 20.5–51.1)
LYMPHOCYTES # BLD AUTO: 11 % — LOW (ref 20.5–51.1)
LYMPHOCYTES # BLD AUTO: 13.1 % — LOW (ref 20.5–51.1)
LYMPHOCYTES # BLD AUTO: 14 % — LOW (ref 20.5–51.1)
LYMPHOCYTES # BLD AUTO: 14.2 % — LOW (ref 20.5–51.1)
LYMPHOCYTES # BLD AUTO: 14.3 % — LOW (ref 20.5–51.1)
LYMPHOCYTES # BLD AUTO: 14.9 % — LOW (ref 20.5–51.1)
LYMPHOCYTES # BLD AUTO: 16.4 % — LOW (ref 20.5–51.1)
LYMPHOCYTES # BLD AUTO: 17.3 % — LOW (ref 20.5–51.1)
LYMPHOCYTES # BLD AUTO: 19.4 % — LOW (ref 20.5–51.1)
LYMPHOCYTES # BLD AUTO: 19.6 % — LOW (ref 20.5–51.1)
LYMPHOCYTES # BLD AUTO: 2.05 K/UL — SIGNIFICANT CHANGE UP (ref 1.2–3.4)
LYMPHOCYTES # BLD AUTO: 2.06 K/UL — SIGNIFICANT CHANGE UP (ref 1.2–3.4)
LYMPHOCYTES # BLD AUTO: 2.26 K/UL — SIGNIFICANT CHANGE UP (ref 1.2–3.4)
LYMPHOCYTES # BLD AUTO: 20 % — LOW (ref 20.5–51.1)
LYMPHOCYTES # BLD AUTO: 20.2 % — LOW (ref 20.5–51.1)
LYMPHOCYTES # BLD AUTO: 20.6 % — SIGNIFICANT CHANGE UP (ref 20.5–51.1)
LYMPHOCYTES # BLD AUTO: 21 % — SIGNIFICANT CHANGE UP (ref 20.5–51.1)
LYMPHOCYTES # BLD AUTO: 21.8 % — SIGNIFICANT CHANGE UP (ref 20.5–51.1)
LYMPHOCYTES # BLD AUTO: 4.3 % — LOW (ref 20.5–51.1)
LYMPHOCYTES # BLD AUTO: 4.4 % — LOW (ref 20.5–51.1)
LYMPHOCYTES # BLD AUTO: 4.4 % — LOW (ref 20.5–51.1)
LYMPHOCYTES # BLD AUTO: 5.4 % — LOW (ref 20.5–51.1)
LYMPHOCYTES # BLD AUTO: 5.6 % — LOW (ref 20.5–51.1)
LYMPHOCYTES # BLD AUTO: 9.7 % — LOW (ref 20.5–51.1)
MAGNESIUM SERPL-MCNC: 1.4 MG/DL — LOW (ref 1.8–2.4)
MAGNESIUM SERPL-MCNC: 1.5 MG/DL — LOW (ref 1.8–2.4)
MAGNESIUM SERPL-MCNC: 1.6 MG/DL — LOW (ref 1.8–2.4)
MAGNESIUM SERPL-MCNC: 1.7 MG/DL — LOW (ref 1.8–2.4)
MAGNESIUM SERPL-MCNC: 1.8 MG/DL — SIGNIFICANT CHANGE UP (ref 1.8–2.4)
MAGNESIUM SERPL-MCNC: 1.9 MG/DL — SIGNIFICANT CHANGE UP (ref 1.8–2.4)
MAGNESIUM SERPL-MCNC: 2.2 MG/DL — SIGNIFICANT CHANGE UP (ref 1.8–2.4)
MANUAL SMEAR VERIFICATION: SIGNIFICANT CHANGE UP
MCHC RBC-ENTMCNC: 24 PG — LOW (ref 27–31)
MCHC RBC-ENTMCNC: 24 PG — LOW (ref 27–31)
MCHC RBC-ENTMCNC: 24.2 PG — LOW (ref 27–31)
MCHC RBC-ENTMCNC: 24.3 PG — LOW (ref 27–31)
MCHC RBC-ENTMCNC: 24.4 PG — LOW (ref 27–31)
MCHC RBC-ENTMCNC: 24.5 PG — LOW (ref 27–31)
MCHC RBC-ENTMCNC: 24.5 PG — LOW (ref 27–31)
MCHC RBC-ENTMCNC: 24.6 PG — LOW (ref 27–31)
MCHC RBC-ENTMCNC: 24.8 PG — LOW (ref 27–31)
MCHC RBC-ENTMCNC: 24.9 PG — LOW (ref 27–31)
MCHC RBC-ENTMCNC: 24.9 PG — LOW (ref 27–31)
MCHC RBC-ENTMCNC: 25 PG — LOW (ref 27–31)
MCHC RBC-ENTMCNC: 25.1 PG — LOW (ref 27–31)
MCHC RBC-ENTMCNC: 25.2 PG — LOW (ref 27–31)
MCHC RBC-ENTMCNC: 25.3 PG — LOW (ref 27–31)
MCHC RBC-ENTMCNC: 25.4 PG — LOW (ref 27–31)
MCHC RBC-ENTMCNC: 25.5 PG — LOW (ref 27–31)
MCHC RBC-ENTMCNC: 25.6 PG — LOW (ref 27–31)
MCHC RBC-ENTMCNC: 25.8 PG — LOW (ref 27–31)
MCHC RBC-ENTMCNC: 29.7 G/DL — LOW (ref 32–37)
MCHC RBC-ENTMCNC: 29.9 G/DL — LOW (ref 32–37)
MCHC RBC-ENTMCNC: 30.2 G/DL — LOW (ref 32–37)
MCHC RBC-ENTMCNC: 30.3 G/DL — LOW (ref 32–37)
MCHC RBC-ENTMCNC: 30.3 G/DL — LOW (ref 32–37)
MCHC RBC-ENTMCNC: 30.5 G/DL — LOW (ref 32–37)
MCHC RBC-ENTMCNC: 30.6 G/DL — LOW (ref 32–37)
MCHC RBC-ENTMCNC: 30.6 G/DL — LOW (ref 32–37)
MCHC RBC-ENTMCNC: 30.9 G/DL — LOW (ref 32–37)
MCHC RBC-ENTMCNC: 31.1 G/DL — LOW (ref 32–37)
MCHC RBC-ENTMCNC: 31.2 G/DL — LOW (ref 32–37)
MCHC RBC-ENTMCNC: 31.2 G/DL — LOW (ref 32–37)
MCHC RBC-ENTMCNC: 31.3 G/DL — LOW (ref 32–37)
MCHC RBC-ENTMCNC: 31.4 G/DL — LOW (ref 32–37)
MCHC RBC-ENTMCNC: 31.4 G/DL — LOW (ref 32–37)
MCHC RBC-ENTMCNC: 31.6 G/DL — LOW (ref 32–37)
MCHC RBC-ENTMCNC: 31.9 G/DL — LOW (ref 32–37)
MCHC RBC-ENTMCNC: 32 G/DL — SIGNIFICANT CHANGE UP (ref 32–37)
MCHC RBC-ENTMCNC: 32.1 G/DL — SIGNIFICANT CHANGE UP (ref 32–37)
MCHC RBC-ENTMCNC: 32.2 G/DL — SIGNIFICANT CHANGE UP (ref 32–37)
MCHC RBC-ENTMCNC: 32.3 G/DL — SIGNIFICANT CHANGE UP (ref 32–37)
MCHC RBC-ENTMCNC: 32.4 G/DL — SIGNIFICANT CHANGE UP (ref 32–37)
MCHC RBC-ENTMCNC: 32.6 G/DL — SIGNIFICANT CHANGE UP (ref 32–37)
MCHC RBC-ENTMCNC: 32.8 G/DL — SIGNIFICANT CHANGE UP (ref 32–37)
MCHC RBC-ENTMCNC: 32.9 G/DL — SIGNIFICANT CHANGE UP (ref 32–37)
MCV RBC AUTO: 77.1 FL — LOW (ref 80–94)
MCV RBC AUTO: 77.3 FL — LOW (ref 80–94)
MCV RBC AUTO: 77.8 FL — LOW (ref 80–94)
MCV RBC AUTO: 78 FL — LOW (ref 80–94)
MCV RBC AUTO: 78 FL — LOW (ref 80–94)
MCV RBC AUTO: 78.3 FL — LOW (ref 80–94)
MCV RBC AUTO: 78.4 FL — LOW (ref 80–94)
MCV RBC AUTO: 78.4 FL — LOW (ref 80–94)
MCV RBC AUTO: 78.8 FL — LOW (ref 80–94)
MCV RBC AUTO: 79.3 FL — LOW (ref 80–94)
MCV RBC AUTO: 79.5 FL — LOW (ref 80–94)
MCV RBC AUTO: 79.6 FL — LOW (ref 80–94)
MCV RBC AUTO: 79.8 FL — LOW (ref 80–94)
MCV RBC AUTO: 80.1 FL — SIGNIFICANT CHANGE UP (ref 80–94)
MCV RBC AUTO: 80.1 FL — SIGNIFICANT CHANGE UP (ref 80–94)
MCV RBC AUTO: 80.3 FL — SIGNIFICANT CHANGE UP (ref 80–94)
MCV RBC AUTO: 80.4 FL — SIGNIFICANT CHANGE UP (ref 80–94)
MCV RBC AUTO: 80.4 FL — SIGNIFICANT CHANGE UP (ref 80–94)
MCV RBC AUTO: 80.5 FL — SIGNIFICANT CHANGE UP (ref 80–94)
MCV RBC AUTO: 80.5 FL — SIGNIFICANT CHANGE UP (ref 80–94)
MCV RBC AUTO: 80.8 FL — SIGNIFICANT CHANGE UP (ref 80–94)
MCV RBC AUTO: 80.9 FL — SIGNIFICANT CHANGE UP (ref 80–94)
MCV RBC AUTO: 81.4 FL — SIGNIFICANT CHANGE UP (ref 80–94)
METHOD TYPE: SIGNIFICANT CHANGE UP
MONOCYTES # BLD AUTO: 0.51 K/UL — SIGNIFICANT CHANGE UP (ref 0.1–0.6)
MONOCYTES # BLD AUTO: 0.58 K/UL — SIGNIFICANT CHANGE UP (ref 0.1–0.6)
MONOCYTES # BLD AUTO: 0.64 K/UL — HIGH (ref 0.1–0.6)
MONOCYTES # BLD AUTO: 0.65 K/UL — HIGH (ref 0.1–0.6)
MONOCYTES # BLD AUTO: 0.68 K/UL — HIGH (ref 0.1–0.6)
MONOCYTES # BLD AUTO: 0.69 K/UL — HIGH (ref 0.1–0.6)
MONOCYTES # BLD AUTO: 0.71 K/UL — HIGH (ref 0.1–0.6)
MONOCYTES # BLD AUTO: 0.72 K/UL — HIGH (ref 0.1–0.6)
MONOCYTES # BLD AUTO: 0.77 K/UL — HIGH (ref 0.1–0.6)
MONOCYTES # BLD AUTO: 0.77 K/UL — HIGH (ref 0.1–0.6)
MONOCYTES # BLD AUTO: 0.78 K/UL — HIGH (ref 0.1–0.6)
MONOCYTES # BLD AUTO: 0.8 K/UL — HIGH (ref 0.1–0.6)
MONOCYTES # BLD AUTO: 0.83 K/UL — HIGH (ref 0.1–0.6)
MONOCYTES # BLD AUTO: 0.87 K/UL — HIGH (ref 0.1–0.6)
MONOCYTES # BLD AUTO: 0.91 K/UL — HIGH (ref 0.1–0.6)
MONOCYTES # BLD AUTO: 0.92 K/UL — HIGH (ref 0.1–0.6)
MONOCYTES # BLD AUTO: 1.02 K/UL — HIGH (ref 0.1–0.6)
MONOCYTES # BLD AUTO: 1.15 K/UL — HIGH (ref 0.1–0.6)
MONOCYTES # BLD AUTO: 1.16 K/UL — HIGH (ref 0.1–0.6)
MONOCYTES # BLD AUTO: 1.6 K/UL — HIGH (ref 0.1–0.6)
MONOCYTES NFR BLD AUTO: 1.7 % — SIGNIFICANT CHANGE UP (ref 1.7–9.3)
MONOCYTES NFR BLD AUTO: 10.1 % — HIGH (ref 1.7–9.3)
MONOCYTES NFR BLD AUTO: 11.3 % — HIGH (ref 1.7–9.3)
MONOCYTES NFR BLD AUTO: 11.6 % — HIGH (ref 1.7–9.3)
MONOCYTES NFR BLD AUTO: 12 % — HIGH (ref 1.7–9.3)
MONOCYTES NFR BLD AUTO: 13.6 % — HIGH (ref 1.7–9.3)
MONOCYTES NFR BLD AUTO: 2.4 % — SIGNIFICANT CHANGE UP (ref 1.7–9.3)
MONOCYTES NFR BLD AUTO: 2.5 % — SIGNIFICANT CHANGE UP (ref 1.7–9.3)
MONOCYTES NFR BLD AUTO: 3.9 % — SIGNIFICANT CHANGE UP (ref 1.7–9.3)
MONOCYTES NFR BLD AUTO: 4.6 % — SIGNIFICANT CHANGE UP (ref 1.7–9.3)
MONOCYTES NFR BLD AUTO: 4.7 % — SIGNIFICANT CHANGE UP (ref 1.7–9.3)
MONOCYTES NFR BLD AUTO: 5 % — SIGNIFICANT CHANGE UP (ref 1.7–9.3)
MONOCYTES NFR BLD AUTO: 5.3 % — SIGNIFICANT CHANGE UP (ref 1.7–9.3)
MONOCYTES NFR BLD AUTO: 5.9 % — SIGNIFICANT CHANGE UP (ref 1.7–9.3)
MONOCYTES NFR BLD AUTO: 6 % — SIGNIFICANT CHANGE UP (ref 1.7–9.3)
MONOCYTES NFR BLD AUTO: 6.4 % — SIGNIFICANT CHANGE UP (ref 1.7–9.3)
MONOCYTES NFR BLD AUTO: 7 % — SIGNIFICANT CHANGE UP (ref 1.7–9.3)
MONOCYTES NFR BLD AUTO: 7.1 % — SIGNIFICANT CHANGE UP (ref 1.7–9.3)
MONOCYTES NFR BLD AUTO: 7.5 % — SIGNIFICANT CHANGE UP (ref 1.7–9.3)
MONOCYTES NFR BLD AUTO: 7.7 % — SIGNIFICANT CHANGE UP (ref 1.7–9.3)
MONOCYTES NFR BLD AUTO: 8.2 % — SIGNIFICANT CHANGE UP (ref 1.7–9.3)
MONOCYTES NFR BLD AUTO: 8.3 % — SIGNIFICANT CHANGE UP (ref 1.7–9.3)
MONOCYTES NFR BLD AUTO: 8.9 % — SIGNIFICANT CHANGE UP (ref 1.7–9.3)
MONOCYTES NFR BLD AUTO: 9 % — SIGNIFICANT CHANGE UP (ref 1.7–9.3)
MONOCYTES NFR BLD AUTO: 9.7 % — HIGH (ref 1.7–9.3)
MRSA PCR RESULT.: NEGATIVE — SIGNIFICANT CHANGE UP
NEUTROPHILS # BLD AUTO: 11.41 K/UL — HIGH (ref 1.4–6.5)
NEUTROPHILS # BLD AUTO: 11.58 K/UL — HIGH (ref 1.4–6.5)
NEUTROPHILS # BLD AUTO: 12.11 K/UL — HIGH (ref 1.4–6.5)
NEUTROPHILS # BLD AUTO: 13.55 K/UL — HIGH (ref 1.4–6.5)
NEUTROPHILS # BLD AUTO: 14.94 K/UL — HIGH (ref 1.4–6.5)
NEUTROPHILS # BLD AUTO: 18.97 K/UL — HIGH (ref 1.4–6.5)
NEUTROPHILS # BLD AUTO: 28.01 K/UL — HIGH (ref 1.4–6.5)
NEUTROPHILS # BLD AUTO: 3.65 K/UL — SIGNIFICANT CHANGE UP (ref 1.4–6.5)
NEUTROPHILS # BLD AUTO: 30.97 K/UL — HIGH (ref 1.4–6.5)
NEUTROPHILS # BLD AUTO: 31.68 K/UL — HIGH (ref 1.4–6.5)
NEUTROPHILS # BLD AUTO: 4.1 K/UL — SIGNIFICANT CHANGE UP (ref 1.4–6.5)
NEUTROPHILS # BLD AUTO: 5.13 K/UL — SIGNIFICANT CHANGE UP (ref 1.4–6.5)
NEUTROPHILS # BLD AUTO: 5.13 K/UL — SIGNIFICANT CHANGE UP (ref 1.4–6.5)
NEUTROPHILS # BLD AUTO: 5.66 K/UL — SIGNIFICANT CHANGE UP (ref 1.4–6.5)
NEUTROPHILS # BLD AUTO: 5.83 K/UL — SIGNIFICANT CHANGE UP (ref 1.4–6.5)
NEUTROPHILS # BLD AUTO: 6.25 K/UL — SIGNIFICANT CHANGE UP (ref 1.4–6.5)
NEUTROPHILS # BLD AUTO: 6.38 K/UL — SIGNIFICANT CHANGE UP (ref 1.4–6.5)
NEUTROPHILS # BLD AUTO: 6.67 K/UL — HIGH (ref 1.4–6.5)
NEUTROPHILS # BLD AUTO: 7.8 K/UL — HIGH (ref 1.4–6.5)
NEUTROPHILS # BLD AUTO: 8.04 K/UL — HIGH (ref 1.4–6.5)
NEUTROPHILS # BLD AUTO: 9.13 K/UL — HIGH (ref 1.4–6.5)
NEUTROPHILS # BLD AUTO: 9.26 K/UL — HIGH (ref 1.4–6.5)
NEUTROPHILS # BLD AUTO: 9.44 K/UL — HIGH (ref 1.4–6.5)
NEUTROPHILS # BLD AUTO: 9.51 K/UL — HIGH (ref 1.4–6.5)
NEUTROPHILS # BLD AUTO: 9.54 K/UL — HIGH (ref 1.4–6.5)
NEUTROPHILS NFR BLD AUTO: 59.8 % — SIGNIFICANT CHANGE UP (ref 42.2–75.2)
NEUTROPHILS NFR BLD AUTO: 60.7 % — SIGNIFICANT CHANGE UP (ref 42.2–75.2)
NEUTROPHILS NFR BLD AUTO: 64.2 % — SIGNIFICANT CHANGE UP (ref 42.2–75.2)
NEUTROPHILS NFR BLD AUTO: 64.7 % — SIGNIFICANT CHANGE UP (ref 42.2–75.2)
NEUTROPHILS NFR BLD AUTO: 65 % — SIGNIFICANT CHANGE UP (ref 42.2–75.2)
NEUTROPHILS NFR BLD AUTO: 68.3 % — SIGNIFICANT CHANGE UP (ref 42.2–75.2)
NEUTROPHILS NFR BLD AUTO: 68.9 % — SIGNIFICANT CHANGE UP (ref 42.2–75.2)
NEUTROPHILS NFR BLD AUTO: 69.2 % — SIGNIFICANT CHANGE UP (ref 42.2–75.2)
NEUTROPHILS NFR BLD AUTO: 70.4 % — SIGNIFICANT CHANGE UP (ref 42.2–75.2)
NEUTROPHILS NFR BLD AUTO: 71.3 % — SIGNIFICANT CHANGE UP (ref 42.2–75.2)
NEUTROPHILS NFR BLD AUTO: 74.1 % — SIGNIFICANT CHANGE UP (ref 42.2–75.2)
NEUTROPHILS NFR BLD AUTO: 74.2 % — SIGNIFICANT CHANGE UP (ref 42.2–75.2)
NEUTROPHILS NFR BLD AUTO: 75 % — SIGNIFICANT CHANGE UP (ref 42.2–75.2)
NEUTROPHILS NFR BLD AUTO: 77.5 % — HIGH (ref 42.2–75.2)
NEUTROPHILS NFR BLD AUTO: 78.4 % — HIGH (ref 42.2–75.2)
NEUTROPHILS NFR BLD AUTO: 78.4 % — HIGH (ref 42.2–75.2)
NEUTROPHILS NFR BLD AUTO: 78.7 % — HIGH (ref 42.2–75.2)
NEUTROPHILS NFR BLD AUTO: 81.2 % — HIGH (ref 42.2–75.2)
NEUTROPHILS NFR BLD AUTO: 87.8 % — HIGH (ref 42.2–75.2)
NEUTROPHILS NFR BLD AUTO: 89 % — HIGH (ref 42.2–75.2)
NEUTROPHILS NFR BLD AUTO: 89.2 % — HIGH (ref 42.2–75.2)
NEUTROPHILS NFR BLD AUTO: 90.3 % — HIGH (ref 42.2–75.2)
NEUTROPHILS NFR BLD AUTO: 93.9 % — HIGH (ref 42.2–75.2)
NEUTROPHILS NFR BLD AUTO: 94.6 % — HIGH (ref 42.2–75.2)
NEUTROPHILS NFR BLD AUTO: 94.8 % — HIGH (ref 42.2–75.2)
NITRITE UR-MCNC: NEGATIVE — SIGNIFICANT CHANGE UP
NITRITE UR-MCNC: NEGATIVE — SIGNIFICANT CHANGE UP
NON HDL CHOLESTEROL: 108 MG/DL — SIGNIFICANT CHANGE UP
NON-GYNECOLOGICAL CYTOLOGY STUDY: SIGNIFICANT CHANGE UP
NRBC # BLD: 0 /100 WBCS — SIGNIFICANT CHANGE UP (ref 0–0)
NT-PROBNP SERPL-SCNC: 434 PG/ML — HIGH (ref 0–300)
ORGANISM # SPEC MICROSCOPIC CNT: SIGNIFICANT CHANGE UP
PCO2 BLDA: 14 MMHG — LOW (ref 35–48)
PCO2 BLDA: 38 MMHG — SIGNIFICANT CHANGE UP (ref 35–48)
PCO2 BLDA: 39 MMHG — SIGNIFICANT CHANGE UP (ref 35–48)
PCO2 BLDA: 40 MMHG — SIGNIFICANT CHANGE UP (ref 35–48)
PCO2 BLDA: 41 MMHG — SIGNIFICANT CHANGE UP (ref 35–48)
PCO2 BLDA: 42 MMHG — SIGNIFICANT CHANGE UP (ref 35–48)
PCO2 BLDA: 42 MMHG — SIGNIFICANT CHANGE UP (ref 35–48)
PCO2 BLDA: 43 MMHG — SIGNIFICANT CHANGE UP (ref 35–48)
PCO2 BLDA: 48 MMHG — SIGNIFICANT CHANGE UP (ref 35–48)
PCO2 BLDV: 34 MMHG — LOW (ref 42–55)
PCO2 BLDV: 35 MMHG — LOW (ref 42–55)
PH BLDA: 7.34 — LOW (ref 7.35–7.45)
PH BLDA: 7.35 — SIGNIFICANT CHANGE UP (ref 7.35–7.45)
PH BLDA: 7.42 — SIGNIFICANT CHANGE UP (ref 7.35–7.45)
PH BLDA: 7.43 — SIGNIFICANT CHANGE UP (ref 7.35–7.45)
PH BLDA: 7.45 — SIGNIFICANT CHANGE UP (ref 7.35–7.45)
PH BLDA: 7.46 — HIGH (ref 7.35–7.45)
PH BLDA: 7.46 — HIGH (ref 7.35–7.45)
PH BLDA: 7.47 — HIGH (ref 7.35–7.45)
PH BLDA: 7.47 — HIGH (ref 7.35–7.45)
PH BLDA: 7.49 — HIGH (ref 7.35–7.45)
PH BLDA: 7.49 — HIGH (ref 7.35–7.45)
PH BLDV: 7.35 — SIGNIFICANT CHANGE UP (ref 7.32–7.43)
PH BLDV: 7.38 — SIGNIFICANT CHANGE UP (ref 7.32–7.43)
PH UR: 6.5 — SIGNIFICANT CHANGE UP (ref 5–8)
PH UR: 7 — SIGNIFICANT CHANGE UP (ref 5–8)
PHOSPHATE SERPL-MCNC: 1.8 MG/DL — LOW (ref 2.1–4.9)
PHOSPHATE SERPL-MCNC: 2.7 MG/DL — SIGNIFICANT CHANGE UP (ref 2.1–4.9)
PHOSPHATE SERPL-MCNC: 3 MG/DL — SIGNIFICANT CHANGE UP (ref 2.1–4.9)
PHOSPHATE SERPL-MCNC: 3.1 MG/DL — SIGNIFICANT CHANGE UP (ref 2.1–4.9)
PHOSPHATE SERPL-MCNC: 3.3 MG/DL — SIGNIFICANT CHANGE UP (ref 2.1–4.9)
PHOSPHATE SERPL-MCNC: 3.4 MG/DL — SIGNIFICANT CHANGE UP (ref 2.1–4.9)
PHOSPHATE SERPL-MCNC: 3.5 MG/DL — SIGNIFICANT CHANGE UP (ref 2.1–4.9)
PHOSPHATE SERPL-MCNC: 4 MG/DL — SIGNIFICANT CHANGE UP (ref 2.1–4.9)
PHOSPHATE SERPL-MCNC: 4.2 MG/DL — SIGNIFICANT CHANGE UP (ref 2.1–4.9)
PHOSPHATE SERPL-MCNC: 4.6 MG/DL — SIGNIFICANT CHANGE UP (ref 2.1–4.9)
PLAT MORPH BLD: NORMAL — SIGNIFICANT CHANGE UP
PLATELET # BLD AUTO: 260 K/UL — SIGNIFICANT CHANGE UP (ref 130–400)
PLATELET # BLD AUTO: 261 K/UL — SIGNIFICANT CHANGE UP (ref 130–400)
PLATELET # BLD AUTO: 264 K/UL — SIGNIFICANT CHANGE UP (ref 130–400)
PLATELET # BLD AUTO: 302 K/UL — SIGNIFICANT CHANGE UP (ref 130–400)
PLATELET # BLD AUTO: 319 K/UL — SIGNIFICANT CHANGE UP (ref 130–400)
PLATELET # BLD AUTO: 333 K/UL — SIGNIFICANT CHANGE UP (ref 130–400)
PLATELET # BLD AUTO: 393 K/UL — SIGNIFICANT CHANGE UP (ref 130–400)
PLATELET # BLD AUTO: 411 K/UL — HIGH (ref 130–400)
PLATELET # BLD AUTO: 423 K/UL — HIGH (ref 130–400)
PLATELET # BLD AUTO: 462 K/UL — HIGH (ref 130–400)
PLATELET # BLD AUTO: 482 K/UL — HIGH (ref 130–400)
PLATELET # BLD AUTO: 508 K/UL — HIGH (ref 130–400)
PLATELET # BLD AUTO: 527 K/UL — HIGH (ref 130–400)
PLATELET # BLD AUTO: 532 K/UL — HIGH (ref 130–400)
PLATELET # BLD AUTO: 541 K/UL — HIGH (ref 130–400)
PLATELET # BLD AUTO: 557 K/UL — HIGH (ref 130–400)
PLATELET # BLD AUTO: 559 K/UL — HIGH (ref 130–400)
PLATELET # BLD AUTO: 560 K/UL — HIGH (ref 130–400)
PLATELET # BLD AUTO: 563 K/UL — HIGH (ref 130–400)
PLATELET # BLD AUTO: 567 K/UL — HIGH (ref 130–400)
PLATELET # BLD AUTO: 576 K/UL — HIGH (ref 130–400)
PLATELET # BLD AUTO: 580 K/UL — HIGH (ref 130–400)
PLATELET # BLD AUTO: 600 K/UL — HIGH (ref 130–400)
PLATELET # BLD AUTO: 608 K/UL — HIGH (ref 130–400)
PLATELET # BLD AUTO: 634 K/UL — HIGH (ref 130–400)
PO2 BLDA: 112 MMHG — HIGH (ref 83–108)
PO2 BLDA: 115 MMHG — HIGH (ref 83–108)
PO2 BLDA: 115 MMHG — HIGH (ref 83–108)
PO2 BLDA: 119 MMHG — HIGH (ref 83–108)
PO2 BLDA: 173 MMHG — HIGH (ref 83–108)
PO2 BLDA: 183 MMHG — HIGH (ref 83–108)
PO2 BLDA: 53 MMHG — LOW (ref 83–108)
PO2 BLDA: 83 MMHG — SIGNIFICANT CHANGE UP (ref 83–108)
PO2 BLDA: 83 MMHG — SIGNIFICANT CHANGE UP (ref 83–108)
PO2 BLDA: 91 MMHG — SIGNIFICANT CHANGE UP (ref 83–108)
PO2 BLDA: 99 MMHG — SIGNIFICANT CHANGE UP (ref 83–108)
PO2 BLDV: 35 MMHG — SIGNIFICANT CHANGE UP
PO2 BLDV: 51 MMHG — SIGNIFICANT CHANGE UP
POLYCHROMASIA BLD QL SMEAR: SLIGHT — SIGNIFICANT CHANGE UP
POTASSIUM BLDV-SCNC: 3.9 MMOL/L — SIGNIFICANT CHANGE UP (ref 3.5–5.1)
POTASSIUM BLDV-SCNC: 8.2 MMOL/L — CRITICAL HIGH (ref 3.5–5.1)
POTASSIUM SERPL-MCNC: 3.1 MMOL/L — LOW (ref 3.5–5)
POTASSIUM SERPL-MCNC: 3.1 MMOL/L — LOW (ref 3.5–5)
POTASSIUM SERPL-MCNC: 3.2 MMOL/L — LOW (ref 3.5–5)
POTASSIUM SERPL-MCNC: 3.4 MMOL/L — LOW (ref 3.5–5)
POTASSIUM SERPL-MCNC: 3.4 MMOL/L — LOW (ref 3.5–5)
POTASSIUM SERPL-MCNC: 3.5 MMOL/L — SIGNIFICANT CHANGE UP (ref 3.5–5)
POTASSIUM SERPL-MCNC: 3.7 MMOL/L — SIGNIFICANT CHANGE UP (ref 3.5–5)
POTASSIUM SERPL-MCNC: 3.8 MMOL/L — SIGNIFICANT CHANGE UP (ref 3.5–5)
POTASSIUM SERPL-MCNC: 3.8 MMOL/L — SIGNIFICANT CHANGE UP (ref 3.5–5)
POTASSIUM SERPL-MCNC: 3.9 MMOL/L — SIGNIFICANT CHANGE UP (ref 3.5–5)
POTASSIUM SERPL-MCNC: 3.9 MMOL/L — SIGNIFICANT CHANGE UP (ref 3.5–5)
POTASSIUM SERPL-MCNC: 4 MMOL/L — SIGNIFICANT CHANGE UP (ref 3.5–5)
POTASSIUM SERPL-MCNC: 4.1 MMOL/L — SIGNIFICANT CHANGE UP (ref 3.5–5)
POTASSIUM SERPL-MCNC: 4.1 MMOL/L — SIGNIFICANT CHANGE UP (ref 3.5–5)
POTASSIUM SERPL-MCNC: 4.2 MMOL/L — SIGNIFICANT CHANGE UP (ref 3.5–5)
POTASSIUM SERPL-MCNC: 4.2 MMOL/L — SIGNIFICANT CHANGE UP (ref 3.5–5)
POTASSIUM SERPL-MCNC: 4.3 MMOL/L — SIGNIFICANT CHANGE UP (ref 3.5–5)
POTASSIUM SERPL-MCNC: 4.4 MMOL/L — SIGNIFICANT CHANGE UP (ref 3.5–5)
POTASSIUM SERPL-MCNC: 4.4 MMOL/L — SIGNIFICANT CHANGE UP (ref 3.5–5)
POTASSIUM SERPL-MCNC: 4.5 MMOL/L — SIGNIFICANT CHANGE UP (ref 3.5–5)
POTASSIUM SERPL-MCNC: 4.5 MMOL/L — SIGNIFICANT CHANGE UP (ref 3.5–5)
POTASSIUM SERPL-MCNC: 4.6 MMOL/L — SIGNIFICANT CHANGE UP (ref 3.5–5)
POTASSIUM SERPL-MCNC: 5.4 MMOL/L — HIGH (ref 3.5–5)
POTASSIUM SERPL-SCNC: 3.1 MMOL/L — LOW (ref 3.5–5)
POTASSIUM SERPL-SCNC: 3.1 MMOL/L — LOW (ref 3.5–5)
POTASSIUM SERPL-SCNC: 3.2 MMOL/L — LOW (ref 3.5–5)
POTASSIUM SERPL-SCNC: 3.4 MMOL/L — LOW (ref 3.5–5)
POTASSIUM SERPL-SCNC: 3.4 MMOL/L — LOW (ref 3.5–5)
POTASSIUM SERPL-SCNC: 3.5 MMOL/L — SIGNIFICANT CHANGE UP (ref 3.5–5)
POTASSIUM SERPL-SCNC: 3.7 MMOL/L — SIGNIFICANT CHANGE UP (ref 3.5–5)
POTASSIUM SERPL-SCNC: 3.8 MMOL/L — SIGNIFICANT CHANGE UP (ref 3.5–5)
POTASSIUM SERPL-SCNC: 3.8 MMOL/L — SIGNIFICANT CHANGE UP (ref 3.5–5)
POTASSIUM SERPL-SCNC: 3.9 MMOL/L — SIGNIFICANT CHANGE UP (ref 3.5–5)
POTASSIUM SERPL-SCNC: 3.9 MMOL/L — SIGNIFICANT CHANGE UP (ref 3.5–5)
POTASSIUM SERPL-SCNC: 4 MMOL/L — SIGNIFICANT CHANGE UP (ref 3.5–5)
POTASSIUM SERPL-SCNC: 4.1 MMOL/L — SIGNIFICANT CHANGE UP (ref 3.5–5)
POTASSIUM SERPL-SCNC: 4.1 MMOL/L — SIGNIFICANT CHANGE UP (ref 3.5–5)
POTASSIUM SERPL-SCNC: 4.2 MMOL/L — SIGNIFICANT CHANGE UP (ref 3.5–5)
POTASSIUM SERPL-SCNC: 4.2 MMOL/L — SIGNIFICANT CHANGE UP (ref 3.5–5)
POTASSIUM SERPL-SCNC: 4.3 MMOL/L — SIGNIFICANT CHANGE UP (ref 3.5–5)
POTASSIUM SERPL-SCNC: 4.4 MMOL/L — SIGNIFICANT CHANGE UP (ref 3.5–5)
POTASSIUM SERPL-SCNC: 4.4 MMOL/L — SIGNIFICANT CHANGE UP (ref 3.5–5)
POTASSIUM SERPL-SCNC: 4.5 MMOL/L — SIGNIFICANT CHANGE UP (ref 3.5–5)
POTASSIUM SERPL-SCNC: 4.5 MMOL/L — SIGNIFICANT CHANGE UP (ref 3.5–5)
POTASSIUM SERPL-SCNC: 4.6 MMOL/L — SIGNIFICANT CHANGE UP (ref 3.5–5)
POTASSIUM SERPL-SCNC: 5.4 MMOL/L — HIGH (ref 3.5–5)
PROCALCITONIN SERPL-MCNC: 0.53 NG/ML — HIGH (ref 0.02–0.1)
PROT SERPL-MCNC: 4.9 G/DL — LOW (ref 6–8)
PROT SERPL-MCNC: 5.2 G/DL — LOW (ref 6–8)
PROT SERPL-MCNC: 5.3 G/DL — LOW (ref 6–8)
PROT SERPL-MCNC: 5.4 G/DL — LOW (ref 6–8)
PROT SERPL-MCNC: 5.5 G/DL — LOW (ref 6–8)
PROT SERPL-MCNC: 5.6 G/DL — LOW (ref 6–8)
PROT SERPL-MCNC: 5.7 G/DL — LOW (ref 6–8)
PROT SERPL-MCNC: 5.8 G/DL — LOW (ref 6–8)
PROT SERPL-MCNC: 6.3 G/DL — SIGNIFICANT CHANGE UP (ref 6–8)
PROT SERPL-MCNC: 6.3 G/DL — SIGNIFICANT CHANGE UP (ref 6–8)
PROT SERPL-MCNC: 6.9 G/DL — SIGNIFICANT CHANGE UP (ref 6–8)
PROT SERPL-MCNC: 7.8 G/DL — SIGNIFICANT CHANGE UP (ref 6–8)
PROT SERPL-MCNC: 7.9 G/DL — SIGNIFICANT CHANGE UP (ref 6–8)
PROT UR-MCNC: ABNORMAL
PROT UR-MCNC: ABNORMAL
PROTHROM AB SERPL-ACNC: 13.7 SEC — HIGH (ref 9.95–12.87)
PROTHROM AB SERPL-ACNC: 15 SEC — HIGH (ref 9.95–12.87)
RAPID RVP RESULT: SIGNIFICANT CHANGE UP
RBC # BLD: 3.06 M/UL — LOW (ref 4.7–6.1)
RBC # BLD: 3.17 M/UL — LOW (ref 4.7–6.1)
RBC # BLD: 3.36 M/UL — LOW (ref 4.7–6.1)
RBC # BLD: 3.37 M/UL — LOW (ref 4.7–6.1)
RBC # BLD: 3.37 M/UL — LOW (ref 4.7–6.1)
RBC # BLD: 3.38 M/UL — LOW (ref 4.7–6.1)
RBC # BLD: 3.42 M/UL — LOW (ref 4.7–6.1)
RBC # BLD: 3.49 M/UL — LOW (ref 4.7–6.1)
RBC # BLD: 3.52 M/UL — LOW (ref 4.7–6.1)
RBC # BLD: 3.56 M/UL — LOW (ref 4.7–6.1)
RBC # BLD: 3.57 M/UL — LOW (ref 4.7–6.1)
RBC # BLD: 3.59 M/UL — LOW (ref 4.7–6.1)
RBC # BLD: 3.87 M/UL — LOW (ref 4.7–6.1)
RBC # BLD: 3.87 M/UL — LOW (ref 4.7–6.1)
RBC # BLD: 3.91 M/UL — LOW (ref 4.7–6.1)
RBC # BLD: 3.91 M/UL — LOW (ref 4.7–6.1)
RBC # BLD: 3.92 M/UL — LOW (ref 4.7–6.1)
RBC # BLD: 4 M/UL — LOW (ref 4.7–6.1)
RBC # BLD: 4 M/UL — LOW (ref 4.7–6.1)
RBC # BLD: 4.08 M/UL — LOW (ref 4.7–6.1)
RBC # BLD: 4.51 M/UL — LOW (ref 4.7–6.1)
RBC # BLD: 4.63 M/UL — LOW (ref 4.7–6.1)
RBC # BLD: 5.27 M/UL — SIGNIFICANT CHANGE UP (ref 4.7–6.1)
RBC # BLD: 5.55 M/UL — SIGNIFICANT CHANGE UP (ref 4.7–6.1)
RBC # BLD: 6.26 M/UL — HIGH (ref 4.7–6.1)
RBC # FLD: 15.7 % — HIGH (ref 11.5–14.5)
RBC # FLD: 15.7 % — HIGH (ref 11.5–14.5)
RBC # FLD: 15.8 % — HIGH (ref 11.5–14.5)
RBC # FLD: 15.9 % — HIGH (ref 11.5–14.5)
RBC # FLD: 16 % — HIGH (ref 11.5–14.5)
RBC # FLD: 16.1 % — HIGH (ref 11.5–14.5)
RBC # FLD: 16.3 % — HIGH (ref 11.5–14.5)
RBC # FLD: 16.7 % — HIGH (ref 11.5–14.5)
RBC # FLD: 17.6 % — HIGH (ref 11.5–14.5)
RBC # FLD: 17.7 % — HIGH (ref 11.5–14.5)
RBC # FLD: 18.2 % — HIGH (ref 11.5–14.5)
RBC BLD AUTO: ABNORMAL
RBC CASTS # UR COMP ASSIST: 236 /HPF — HIGH (ref 0–4)
RSV RNA NPH QL NAA+NON-PROBE: SIGNIFICANT CHANGE UP
S PNEUM AG UR QL: NEGATIVE — SIGNIFICANT CHANGE UP
SAO2 % BLDA: 100 % — HIGH (ref 94–98)
SAO2 % BLDA: 97.5 % — SIGNIFICANT CHANGE UP (ref 94–98)
SAO2 % BLDA: 98 % — SIGNIFICANT CHANGE UP (ref 94–98)
SAO2 % BLDA: 98.1 % — HIGH (ref 94–98)
SAO2 % BLDA: 98.4 % — HIGH (ref 94–98)
SAO2 % BLDA: 98.6 % — HIGH (ref 94–98)
SAO2 % BLDA: 99 % — HIGH (ref 94–98)
SAO2 % BLDA: 99 % — HIGH (ref 94–98)
SAO2 % BLDA: 99.4 % — HIGH (ref 94–98)
SAO2 % BLDA: 99.6 % — HIGH (ref 94–98)
SAO2 % BLDA: 99.8 % — HIGH (ref 94–98)
SAO2 % BLDV: 54.3 % — SIGNIFICANT CHANGE UP
SAO2 % BLDV: 80.9 % — SIGNIFICANT CHANGE UP
SARS-COV-2 RNA SPEC QL NAA+PROBE: SIGNIFICANT CHANGE UP
SODIUM SERPL-SCNC: 129 MMOL/L — LOW (ref 135–146)
SODIUM SERPL-SCNC: 134 MMOL/L — LOW (ref 135–146)
SODIUM SERPL-SCNC: 136 MMOL/L — SIGNIFICANT CHANGE UP (ref 135–146)
SODIUM SERPL-SCNC: 137 MMOL/L — SIGNIFICANT CHANGE UP (ref 135–146)
SODIUM SERPL-SCNC: 137 MMOL/L — SIGNIFICANT CHANGE UP (ref 135–146)
SODIUM SERPL-SCNC: 138 MMOL/L — SIGNIFICANT CHANGE UP (ref 135–146)
SODIUM SERPL-SCNC: 139 MMOL/L — SIGNIFICANT CHANGE UP (ref 135–146)
SODIUM SERPL-SCNC: 140 MMOL/L — SIGNIFICANT CHANGE UP (ref 135–146)
SODIUM SERPL-SCNC: 141 MMOL/L — SIGNIFICANT CHANGE UP (ref 135–146)
SODIUM SERPL-SCNC: 142 MMOL/L — SIGNIFICANT CHANGE UP (ref 135–146)
SODIUM SERPL-SCNC: 143 MMOL/L — SIGNIFICANT CHANGE UP (ref 135–146)
SODIUM SERPL-SCNC: 144 MMOL/L — SIGNIFICANT CHANGE UP (ref 135–146)
SODIUM SERPL-SCNC: 144 MMOL/L — SIGNIFICANT CHANGE UP (ref 135–146)
SODIUM SERPL-SCNC: 145 MMOL/L — SIGNIFICANT CHANGE UP (ref 135–146)
SODIUM SERPL-SCNC: 145 MMOL/L — SIGNIFICANT CHANGE UP (ref 135–146)
SODIUM SERPL-SCNC: 147 MMOL/L — HIGH (ref 135–146)
SODIUM SERPL-SCNC: 150 MMOL/L — HIGH (ref 135–146)
SP GR SPEC: 1.02 — SIGNIFICANT CHANGE UP (ref 1.01–1.03)
SP GR SPEC: 1.02 — SIGNIFICANT CHANGE UP (ref 1.01–1.03)
SPECIMEN SOURCE: SIGNIFICANT CHANGE UP
TRIGL SERPL-MCNC: 125 MG/DL — SIGNIFICANT CHANGE UP
TRIGL SERPL-MCNC: 149 MG/DL — SIGNIFICANT CHANGE UP
TROPONIN T SERPL-MCNC: 0.11 NG/ML — CRITICAL HIGH
TROPONIN T SERPL-MCNC: 0.12 NG/ML — CRITICAL HIGH
TROPONIN T SERPL-MCNC: 0.2 NG/ML — CRITICAL HIGH
UROBILINOGEN FLD QL: ABNORMAL
UROBILINOGEN FLD QL: SIGNIFICANT CHANGE UP
WBC # BLD: 10.26 K/UL — SIGNIFICANT CHANGE UP (ref 4.8–10.8)
WBC # BLD: 11.31 K/UL — HIGH (ref 4.8–10.8)
WBC # BLD: 11.7 K/UL — HIGH (ref 4.8–10.8)
WBC # BLD: 12.12 K/UL — HIGH (ref 4.8–10.8)
WBC # BLD: 12.19 K/UL — HIGH (ref 4.8–10.8)
WBC # BLD: 12.49 K/UL — HIGH (ref 4.8–10.8)
WBC # BLD: 12.73 K/UL — HIGH (ref 4.8–10.8)
WBC # BLD: 12.98 K/UL — HIGH (ref 4.8–10.8)
WBC # BLD: 13.61 K/UL — HIGH (ref 4.8–10.8)
WBC # BLD: 14.75 K/UL — HIGH (ref 4.8–10.8)
WBC # BLD: 15 K/UL — HIGH (ref 4.8–10.8)
WBC # BLD: 19.29 K/UL — HIGH (ref 4.8–10.8)
WBC # BLD: 21.26 K/UL — HIGH (ref 4.8–10.8)
WBC # BLD: 29.83 K/UL — HIGH (ref 4.8–10.8)
WBC # BLD: 32.65 K/UL — HIGH (ref 4.8–10.8)
WBC # BLD: 33.48 K/UL — HIGH (ref 4.8–10.8)
WBC # BLD: 6.11 K/UL — SIGNIFICANT CHANGE UP (ref 4.8–10.8)
WBC # BLD: 6.75 K/UL — SIGNIFICANT CHANGE UP (ref 4.8–10.8)
WBC # BLD: 7.9 K/UL — SIGNIFICANT CHANGE UP (ref 4.8–10.8)
WBC # BLD: 7.99 K/UL — SIGNIFICANT CHANGE UP (ref 4.8–10.8)
WBC # BLD: 8.04 K/UL — SIGNIFICANT CHANGE UP (ref 4.8–10.8)
WBC # BLD: 8.54 K/UL — SIGNIFICANT CHANGE UP (ref 4.8–10.8)
WBC # BLD: 9.22 K/UL — SIGNIFICANT CHANGE UP (ref 4.8–10.8)
WBC # BLD: 9.37 K/UL — SIGNIFICANT CHANGE UP (ref 4.8–10.8)
WBC # BLD: 9.68 K/UL — SIGNIFICANT CHANGE UP (ref 4.8–10.8)
WBC # FLD AUTO: 10.26 K/UL — SIGNIFICANT CHANGE UP (ref 4.8–10.8)
WBC # FLD AUTO: 11.31 K/UL — HIGH (ref 4.8–10.8)
WBC # FLD AUTO: 11.7 K/UL — HIGH (ref 4.8–10.8)
WBC # FLD AUTO: 12.12 K/UL — HIGH (ref 4.8–10.8)
WBC # FLD AUTO: 12.19 K/UL — HIGH (ref 4.8–10.8)
WBC # FLD AUTO: 12.49 K/UL — HIGH (ref 4.8–10.8)
WBC # FLD AUTO: 12.73 K/UL — HIGH (ref 4.8–10.8)
WBC # FLD AUTO: 12.98 K/UL — HIGH (ref 4.8–10.8)
WBC # FLD AUTO: 13.61 K/UL — HIGH (ref 4.8–10.8)
WBC # FLD AUTO: 14.75 K/UL — HIGH (ref 4.8–10.8)
WBC # FLD AUTO: 15 K/UL — HIGH (ref 4.8–10.8)
WBC # FLD AUTO: 19.29 K/UL — HIGH (ref 4.8–10.8)
WBC # FLD AUTO: 21.26 K/UL — HIGH (ref 4.8–10.8)
WBC # FLD AUTO: 29.83 K/UL — HIGH (ref 4.8–10.8)
WBC # FLD AUTO: 32.65 K/UL — HIGH (ref 4.8–10.8)
WBC # FLD AUTO: 33.48 K/UL — HIGH (ref 4.8–10.8)
WBC # FLD AUTO: 6.11 K/UL — SIGNIFICANT CHANGE UP (ref 4.8–10.8)
WBC # FLD AUTO: 6.75 K/UL — SIGNIFICANT CHANGE UP (ref 4.8–10.8)
WBC # FLD AUTO: 7.9 K/UL — SIGNIFICANT CHANGE UP (ref 4.8–10.8)
WBC # FLD AUTO: 7.99 K/UL — SIGNIFICANT CHANGE UP (ref 4.8–10.8)
WBC # FLD AUTO: 8.04 K/UL — SIGNIFICANT CHANGE UP (ref 4.8–10.8)
WBC # FLD AUTO: 8.54 K/UL — SIGNIFICANT CHANGE UP (ref 4.8–10.8)
WBC # FLD AUTO: 9.22 K/UL — SIGNIFICANT CHANGE UP (ref 4.8–10.8)
WBC # FLD AUTO: 9.37 K/UL — SIGNIFICANT CHANGE UP (ref 4.8–10.8)
WBC # FLD AUTO: 9.68 K/UL — SIGNIFICANT CHANGE UP (ref 4.8–10.8)
WBC UR QL: >720 /HPF — HIGH (ref 0–5)

## 2022-01-01 PROCEDURE — 99233 SBSQ HOSP IP/OBS HIGH 50: CPT

## 2022-01-01 PROCEDURE — 31624 DX BRONCHOSCOPE/LAVAGE: CPT

## 2022-01-01 PROCEDURE — 71045 X-RAY EXAM CHEST 1 VIEW: CPT | Mod: 26,77

## 2022-01-01 PROCEDURE — 71045 X-RAY EXAM CHEST 1 VIEW: CPT | Mod: 26

## 2022-01-01 PROCEDURE — 88305 TISSUE EXAM BY PATHOLOGIST: CPT | Mod: 26

## 2022-01-01 PROCEDURE — 93010 ELECTROCARDIOGRAM REPORT: CPT

## 2022-01-01 PROCEDURE — 71045 X-RAY EXAM CHEST 1 VIEW: CPT | Mod: 26,77,76

## 2022-01-01 PROCEDURE — 93010 ELECTROCARDIOGRAM REPORT: CPT | Mod: 77

## 2022-01-01 PROCEDURE — 71275 CT ANGIOGRAPHY CHEST: CPT | Mod: 26

## 2022-01-01 PROCEDURE — 99232 SBSQ HOSP IP/OBS MODERATE 35: CPT

## 2022-01-01 PROCEDURE — 99231 SBSQ HOSP IP/OBS SF/LOW 25: CPT

## 2022-01-01 PROCEDURE — 74018 RADEX ABDOMEN 1 VIEW: CPT | Mod: 26

## 2022-01-01 PROCEDURE — 99283 EMERGENCY DEPT VISIT LOW MDM: CPT | Mod: GC

## 2022-01-01 PROCEDURE — 99285 EMERGENCY DEPT VISIT HI MDM: CPT

## 2022-01-01 PROCEDURE — 99291 CRITICAL CARE FIRST HOUR: CPT

## 2022-01-01 PROCEDURE — 99223 1ST HOSP IP/OBS HIGH 75: CPT | Mod: GC

## 2022-01-01 PROCEDURE — 93306 TTE W/DOPPLER COMPLETE: CPT | Mod: 26

## 2022-01-01 PROCEDURE — 99291 CRITICAL CARE FIRST HOUR: CPT | Mod: 25

## 2022-01-01 PROCEDURE — 31615 TRCHEOBRNCHSC EST TRACHS INC: CPT

## 2022-01-01 PROCEDURE — 43763 RPLC GTUBE REVJ GSTRST TRC: CPT

## 2022-01-01 PROCEDURE — 88312 SPECIAL STAINS GROUP 1: CPT | Mod: 26

## 2022-01-01 PROCEDURE — 71045 X-RAY EXAM CHEST 1 VIEW: CPT | Mod: 26,76

## 2022-01-01 PROCEDURE — 99232 SBSQ HOSP IP/OBS MODERATE 35: CPT | Mod: 25

## 2022-01-01 PROCEDURE — 72192 CT PELVIS W/O DYE: CPT | Mod: 26,MA

## 2022-01-01 PROCEDURE — 88112 CYTOPATH CELL ENHANCE TECH: CPT | Mod: 26

## 2022-01-01 PROCEDURE — ZZZZZ: CPT | Mod: 1L

## 2022-01-01 PROCEDURE — 31500 INSERT EMERGENCY AIRWAY: CPT

## 2022-01-01 PROCEDURE — 99221 1ST HOSP IP/OBS SF/LOW 40: CPT

## 2022-01-01 PROCEDURE — 99252 IP/OBS CONSLTJ NEW/EST SF 35: CPT

## 2022-01-01 PROCEDURE — 99497 ADVNCD CARE PLAN 30 MIN: CPT | Mod: 25

## 2022-01-01 PROCEDURE — 99253 IP/OBS CNSLTJ NEW/EST LOW 45: CPT | Mod: GC

## 2022-01-01 RX ORDER — ACETAMINOPHEN 500 MG
1000 TABLET ORAL ONCE
Refills: 0 | Status: COMPLETED | OUTPATIENT
Start: 2022-01-01 | End: 2022-01-01

## 2022-01-01 RX ORDER — CEFEPIME 1 G/1
2000 INJECTION, POWDER, FOR SOLUTION INTRAMUSCULAR; INTRAVENOUS EVERY 8 HOURS
Refills: 0 | Status: DISCONTINUED | OUTPATIENT
Start: 2022-01-01 | End: 2022-01-01

## 2022-01-01 RX ORDER — LACTULOSE 10 G/15ML
10 SOLUTION ORAL DAILY
Refills: 0 | Status: DISCONTINUED | OUTPATIENT
Start: 2022-01-01 | End: 2022-01-01

## 2022-01-01 RX ORDER — MEROPENEM 1 G/30ML
1000 INJECTION INTRAVENOUS EVERY 8 HOURS
Refills: 0 | Status: COMPLETED | OUTPATIENT
Start: 2022-01-01 | End: 2022-01-01

## 2022-01-01 RX ORDER — PROPOFOL 10 MG/ML
10 INJECTION, EMULSION INTRAVENOUS
Qty: 1000 | Refills: 0 | Status: DISCONTINUED | OUTPATIENT
Start: 2022-01-01 | End: 2022-01-01

## 2022-01-01 RX ORDER — MAGNESIUM SULFATE 500 MG/ML
2 VIAL (ML) INJECTION ONCE
Refills: 0 | Status: COMPLETED | OUTPATIENT
Start: 2022-01-01 | End: 2022-01-01

## 2022-01-01 RX ORDER — SERTRALINE 25 MG/1
100 TABLET, FILM COATED ORAL DAILY
Refills: 0 | Status: DISCONTINUED | OUTPATIENT
Start: 2022-01-01 | End: 2022-01-01

## 2022-01-01 RX ORDER — ALBUTEROL 90 UG/1
2 AEROSOL, METERED ORAL EVERY 6 HOURS
Refills: 0 | Status: DISCONTINUED | OUTPATIENT
Start: 2022-01-01 | End: 2022-01-01

## 2022-01-01 RX ORDER — ELECTROLYTE SOLUTION,INJ
1 VIAL (ML) INTRAVENOUS
Refills: 0 | Status: DISCONTINUED | OUTPATIENT
Start: 2022-01-01 | End: 2022-01-01

## 2022-01-01 RX ORDER — MIDAZOLAM HYDROCHLORIDE 1 MG/ML
0.02 INJECTION, SOLUTION INTRAMUSCULAR; INTRAVENOUS
Qty: 100 | Refills: 0 | Status: DISCONTINUED | OUTPATIENT
Start: 2022-01-01 | End: 2022-01-01

## 2022-01-01 RX ORDER — BACLOFEN 100 %
20 POWDER (GRAM) MISCELLANEOUS EVERY 12 HOURS
Refills: 0 | Status: DISCONTINUED | OUTPATIENT
Start: 2022-01-01 | End: 2022-01-01

## 2022-01-01 RX ORDER — HYDROMORPHONE HYDROCHLORIDE 2 MG/ML
1 INJECTION INTRAMUSCULAR; INTRAVENOUS; SUBCUTANEOUS
Refills: 0 | Status: DISCONTINUED | OUTPATIENT
Start: 2022-01-01 | End: 2022-01-01

## 2022-01-01 RX ORDER — PROPOFOL 10 MG/ML
10 INJECTION, EMULSION INTRAVENOUS
Qty: 500 | Refills: 0 | Status: DISCONTINUED | OUTPATIENT
Start: 2022-01-01 | End: 2022-01-01

## 2022-01-01 RX ORDER — CEFIDEROCOL SULFATE TOSYLATE 1 G/10ML
2000 INJECTION, POWDER, FOR SOLUTION INTRAVENOUS EVERY 8 HOURS
Refills: 0 | Status: DISCONTINUED | OUTPATIENT
Start: 2022-01-01 | End: 2022-01-01

## 2022-01-01 RX ORDER — ACETYLCYSTEINE 200 MG/ML
4 VIAL (ML) MISCELLANEOUS THREE TIMES A DAY
Refills: 0 | Status: DISCONTINUED | OUTPATIENT
Start: 2022-01-01 | End: 2022-01-01

## 2022-01-01 RX ORDER — LACTULOSE 10 G/15ML
20 SOLUTION ORAL EVERY 6 HOURS
Refills: 0 | Status: DISCONTINUED | OUTPATIENT
Start: 2022-01-01 | End: 2022-01-01

## 2022-01-01 RX ORDER — PANTOPRAZOLE SODIUM 20 MG/1
40 TABLET, DELAYED RELEASE ORAL DAILY
Refills: 0 | Status: DISCONTINUED | OUTPATIENT
Start: 2022-01-01 | End: 2022-01-01

## 2022-01-01 RX ORDER — MEMANTINE HYDROCHLORIDE 10 MG/1
10 TABLET ORAL
Refills: 0 | Status: DISCONTINUED | OUTPATIENT
Start: 2022-01-01 | End: 2022-01-01

## 2022-01-01 RX ORDER — FENTANYL CITRATE 50 UG/ML
0.5 INJECTION INTRAVENOUS
Qty: 2500 | Refills: 0 | Status: DISCONTINUED | OUTPATIENT
Start: 2022-01-01 | End: 2022-01-01

## 2022-01-01 RX ORDER — BARRIER CREAM 200; 4.5 MG/G; MG/G
0 CREAM TOPICAL
Qty: 0 | Refills: 0 | DISCHARGE

## 2022-01-01 RX ORDER — CHLORHEXIDINE GLUCONATE 213 G/1000ML
15 SOLUTION TOPICAL EVERY 12 HOURS
Refills: 0 | Status: DISCONTINUED | OUTPATIENT
Start: 2022-01-01 | End: 2022-01-01

## 2022-01-01 RX ORDER — DIATRIZOATE MEGLUMINE 180 MG/ML
30 INJECTION, SOLUTION INTRAVESICAL ONCE
Refills: 0 | Status: COMPLETED | OUTPATIENT
Start: 2022-01-01 | End: 2022-01-01

## 2022-01-01 RX ORDER — LINEZOLID 600 MG/300ML
600 INJECTION, SOLUTION INTRAVENOUS EVERY 12 HOURS
Refills: 0 | Status: DISCONTINUED | OUTPATIENT
Start: 2022-01-01 | End: 2022-01-01

## 2022-01-01 RX ORDER — AZTREONAM 2 G
2000 VIAL (EA) INJECTION EVERY 8 HOURS
Refills: 0 | Status: DISCONTINUED | OUTPATIENT
Start: 2022-01-01 | End: 2022-01-01

## 2022-01-01 RX ORDER — SODIUM CHLORIDE 9 MG/ML
1000 INJECTION, SOLUTION INTRAVENOUS ONCE
Refills: 0 | Status: COMPLETED | OUTPATIENT
Start: 2022-01-01 | End: 2022-01-01

## 2022-01-01 RX ORDER — HYDROMORPHONE HYDROCHLORIDE 2 MG/ML
0.5 INJECTION INTRAMUSCULAR; INTRAVENOUS; SUBCUTANEOUS
Refills: 0 | Status: DISCONTINUED | OUTPATIENT
Start: 2022-01-01 | End: 2022-01-01

## 2022-01-01 RX ORDER — ALBUTEROL 90 UG/1
2.5 AEROSOL, METERED ORAL EVERY 4 HOURS
Refills: 0 | Status: DISCONTINUED | OUTPATIENT
Start: 2022-01-01 | End: 2022-01-01

## 2022-01-01 RX ORDER — ACETAMINOPHEN 500 MG
650 TABLET ORAL EVERY 6 HOURS
Refills: 0 | Status: DISCONTINUED | OUTPATIENT
Start: 2022-01-01 | End: 2022-01-01

## 2022-01-01 RX ORDER — MIDODRINE HYDROCHLORIDE 2.5 MG/1
10 TABLET ORAL EVERY 8 HOURS
Refills: 0 | Status: DISCONTINUED | OUTPATIENT
Start: 2022-01-01 | End: 2022-01-01

## 2022-01-01 RX ORDER — ACETYLCYSTEINE 200 MG/ML
4 VIAL (ML) MISCELLANEOUS ONCE
Refills: 0 | Status: DISCONTINUED | OUTPATIENT
Start: 2022-01-01 | End: 2022-01-01

## 2022-01-01 RX ORDER — MAGNESIUM SULFATE 500 MG/ML
2 VIAL (ML) INJECTION ONCE
Refills: 0 | Status: DISCONTINUED | OUTPATIENT
Start: 2022-01-01 | End: 2022-01-01

## 2022-01-01 RX ORDER — POTASSIUM CHLORIDE 20 MEQ
20 PACKET (EA) ORAL ONCE
Refills: 0 | Status: COMPLETED | OUTPATIENT
Start: 2022-01-01 | End: 2022-01-01

## 2022-01-01 RX ORDER — I.V. FAT EMULSION 20 G/100ML
1.47 EMULSION INTRAVENOUS
Qty: 100.03 | Refills: 0 | Status: DISCONTINUED | OUTPATIENT
Start: 2022-01-01 | End: 2022-01-01

## 2022-01-01 RX ORDER — PANTOPRAZOLE SODIUM 20 MG/1
40 TABLET, DELAYED RELEASE ORAL
Refills: 0 | Status: DISCONTINUED | OUTPATIENT
Start: 2022-01-01 | End: 2022-01-01

## 2022-01-01 RX ORDER — DEXMEDETOMIDINE HYDROCHLORIDE IN 0.9% SODIUM CHLORIDE 4 UG/ML
0.2 INJECTION INTRAVENOUS
Qty: 400 | Refills: 0 | Status: DISCONTINUED | OUTPATIENT
Start: 2022-01-01 | End: 2022-01-01

## 2022-01-01 RX ORDER — ACETAMINOPHEN 500 MG
2 TABLET ORAL
Qty: 0 | Refills: 0 | DISCHARGE

## 2022-01-01 RX ORDER — INTERFERON BETA-1A 22 UG/.5ML
30 INJECTION, SOLUTION SUBCUTANEOUS
Qty: 0 | Refills: 0 | DISCHARGE

## 2022-01-01 RX ORDER — CEFEPIME 1 G/1
2000 INJECTION, POWDER, FOR SOLUTION INTRAMUSCULAR; INTRAVENOUS ONCE
Refills: 0 | Status: DISCONTINUED | OUTPATIENT
Start: 2022-01-01 | End: 2022-01-01

## 2022-01-01 RX ORDER — SENNA PLUS 8.6 MG/1
2 TABLET ORAL AT BEDTIME
Refills: 0 | Status: DISCONTINUED | OUTPATIENT
Start: 2022-01-01 | End: 2022-01-01

## 2022-01-01 RX ORDER — MIDAZOLAM HYDROCHLORIDE 1 MG/ML
4 INJECTION, SOLUTION INTRAMUSCULAR; INTRAVENOUS ONCE
Refills: 0 | Status: DISCONTINUED | OUTPATIENT
Start: 2022-01-01 | End: 2022-01-01

## 2022-01-01 RX ORDER — FENTANYL CITRATE 50 UG/ML
100 INJECTION INTRAVENOUS ONCE
Refills: 0 | Status: DISCONTINUED | OUTPATIENT
Start: 2022-01-01 | End: 2022-01-01

## 2022-01-01 RX ORDER — AZTREONAM 2 G
2000 VIAL (EA) INJECTION ONCE
Refills: 0 | Status: COMPLETED | OUTPATIENT
Start: 2022-01-01 | End: 2022-01-01

## 2022-01-01 RX ORDER — ACETAMINOPHEN 500 MG
1000 TABLET ORAL ONCE
Refills: 0 | Status: DISCONTINUED | OUTPATIENT
Start: 2022-01-01 | End: 2022-01-01

## 2022-01-01 RX ORDER — ACETAMINOPHEN 500 MG
975 TABLET ORAL ONCE
Refills: 0 | Status: COMPLETED | OUTPATIENT
Start: 2022-01-01 | End: 2022-01-01

## 2022-01-01 RX ORDER — SODIUM CHLORIDE 9 MG/ML
1000 INJECTION, SOLUTION INTRAVENOUS
Refills: 0 | Status: DISCONTINUED | OUTPATIENT
Start: 2022-01-01 | End: 2022-01-01

## 2022-01-01 RX ORDER — OMEPRAZOLE 10 MG/1
1 CAPSULE, DELAYED RELEASE ORAL
Qty: 0 | Refills: 0 | DISCHARGE

## 2022-01-01 RX ORDER — ALBUTEROL 90 UG/1
1 AEROSOL, METERED ORAL EVERY 4 HOURS
Refills: 0 | Status: DISCONTINUED | OUTPATIENT
Start: 2022-01-01 | End: 2022-01-01

## 2022-01-01 RX ORDER — APIXABAN 2.5 MG/1
1 TABLET, FILM COATED ORAL
Qty: 0 | Refills: 0 | DISCHARGE

## 2022-01-01 RX ORDER — POLYETHYLENE GLYCOL 3350 17 G/17G
17 POWDER, FOR SOLUTION ORAL DAILY
Refills: 0 | Status: DISCONTINUED | OUTPATIENT
Start: 2022-01-01 | End: 2022-01-01

## 2022-01-01 RX ORDER — DONEPEZIL HYDROCHLORIDE 10 MG/1
10 TABLET, FILM COATED ORAL AT BEDTIME
Refills: 0 | Status: DISCONTINUED | OUTPATIENT
Start: 2022-01-01 | End: 2022-01-01

## 2022-01-01 RX ORDER — LANOLIN ALCOHOL/MO/W.PET/CERES
3 CREAM (GRAM) TOPICAL AT BEDTIME
Refills: 0 | Status: DISCONTINUED | OUTPATIENT
Start: 2022-01-01 | End: 2022-01-01

## 2022-01-01 RX ORDER — MIDODRINE HYDROCHLORIDE 2.5 MG/1
30 TABLET ORAL EVERY 8 HOURS
Refills: 0 | Status: DISCONTINUED | OUTPATIENT
Start: 2022-01-01 | End: 2022-01-01

## 2022-01-01 RX ORDER — LACTULOSE 10 G/15ML
20 SOLUTION ORAL EVERY 12 HOURS
Refills: 0 | Status: DISCONTINUED | OUTPATIENT
Start: 2022-01-01 | End: 2022-01-01

## 2022-01-01 RX ORDER — ZINC OXIDE 200 MG/G
1 OINTMENT TOPICAL THREE TIMES A DAY
Refills: 0 | Status: DISCONTINUED | OUTPATIENT
Start: 2022-01-01 | End: 2022-01-01

## 2022-01-01 RX ORDER — HALOPERIDOL DECANOATE 100 MG/ML
1 INJECTION INTRAMUSCULAR ONCE
Refills: 0 | Status: COMPLETED | OUTPATIENT
Start: 2022-01-01 | End: 2022-01-01

## 2022-01-01 RX ORDER — POTASSIUM CHLORIDE 20 MEQ
20 PACKET (EA) ORAL
Refills: 0 | Status: COMPLETED | OUTPATIENT
Start: 2022-01-01 | End: 2022-01-01

## 2022-01-01 RX ORDER — DEXMEDETOMIDINE HYDROCHLORIDE IN 0.9% SODIUM CHLORIDE 4 UG/ML
0.2 INJECTION INTRAVENOUS
Qty: 200 | Refills: 0 | Status: DISCONTINUED | OUTPATIENT
Start: 2022-01-01 | End: 2022-01-01

## 2022-01-01 RX ORDER — SILODOSIN 4 MG/1
1 CAPSULE ORAL
Qty: 0 | Refills: 0 | DISCHARGE

## 2022-01-01 RX ORDER — SCOPALAMINE 1 MG/3D
1 PATCH, EXTENDED RELEASE TRANSDERMAL
Refills: 0 | Status: DISCONTINUED | OUTPATIENT
Start: 2022-01-01 | End: 2022-01-01

## 2022-01-01 RX ORDER — CHLORHEXIDINE GLUCONATE 213 G/1000ML
1 SOLUTION TOPICAL DAILY
Refills: 0 | Status: DISCONTINUED | OUTPATIENT
Start: 2022-01-01 | End: 2022-01-01

## 2022-01-01 RX ORDER — TIZANIDINE 4 MG/1
1 TABLET ORAL
Qty: 0 | Refills: 0 | DISCHARGE

## 2022-01-01 RX ORDER — POLYMYXIN B SULFATE 500000 [USP'U]/1
850000 INJECTION, POWDER, LYOPHILIZED, FOR SOLUTION INTRAMUSCULAR; INTRATHECAL; INTRAVENOUS; OPHTHALMIC EVERY 12 HOURS
Refills: 0 | Status: DISCONTINUED | OUTPATIENT
Start: 2022-01-01 | End: 2022-01-01

## 2022-01-01 RX ORDER — FENTANYL CITRATE 50 UG/ML
50 INJECTION INTRAVENOUS ONCE
Refills: 0 | Status: DISCONTINUED | OUTPATIENT
Start: 2022-01-01 | End: 2022-01-01

## 2022-01-01 RX ORDER — LINEZOLID 600 MG/300ML
600 INJECTION, SOLUTION INTRAVENOUS ONCE
Refills: 0 | Status: COMPLETED | OUTPATIENT
Start: 2022-01-01 | End: 2022-01-01

## 2022-01-01 RX ORDER — SERTRALINE 25 MG/1
1 TABLET, FILM COATED ORAL
Qty: 0 | Refills: 0 | DISCHARGE

## 2022-01-01 RX ORDER — AMITRIPTYLINE HCL 25 MG
1 TABLET ORAL
Qty: 0 | Refills: 0 | DISCHARGE

## 2022-01-01 RX ORDER — MEMANTINE HYDROCHLORIDE 10 MG/1
1 TABLET ORAL
Qty: 0 | Refills: 0 | DISCHARGE

## 2022-01-01 RX ORDER — MIDODRINE HYDROCHLORIDE 2.5 MG/1
20 TABLET ORAL EVERY 8 HOURS
Refills: 0 | Status: DISCONTINUED | OUTPATIENT
Start: 2022-01-01 | End: 2022-01-01

## 2022-01-01 RX ORDER — AMITRIPTYLINE HCL 25 MG
25 TABLET ORAL AT BEDTIME
Refills: 0 | Status: DISCONTINUED | OUTPATIENT
Start: 2022-01-01 | End: 2022-01-01

## 2022-01-01 RX ORDER — HYDROMORPHONE HYDROCHLORIDE 2 MG/ML
0.5 INJECTION INTRAMUSCULAR; INTRAVENOUS; SUBCUTANEOUS EVERY 4 HOURS
Refills: 0 | Status: DISCONTINUED | OUTPATIENT
Start: 2022-01-01 | End: 2022-01-01

## 2022-01-01 RX ORDER — TAMSULOSIN HYDROCHLORIDE 0.4 MG/1
0.4 CAPSULE ORAL AT BEDTIME
Refills: 0 | Status: DISCONTINUED | OUTPATIENT
Start: 2022-01-01 | End: 2022-01-01

## 2022-01-01 RX ORDER — POTASSIUM CHLORIDE 20 MEQ
40 PACKET (EA) ORAL ONCE
Refills: 0 | Status: COMPLETED | OUTPATIENT
Start: 2022-01-01 | End: 2022-01-01

## 2022-01-01 RX ORDER — ALBUTEROL 90 UG/1
1 AEROSOL, METERED ORAL EVERY 4 HOURS
Refills: 0 | Status: COMPLETED | OUTPATIENT
Start: 2022-01-01 | End: 2023-11-09

## 2022-01-01 RX ORDER — DEXMEDETOMIDINE HYDROCHLORIDE IN 0.9% SODIUM CHLORIDE 4 UG/ML
0.05 INJECTION INTRAVENOUS
Qty: 200 | Refills: 0 | Status: DISCONTINUED | OUTPATIENT
Start: 2022-01-01 | End: 2022-01-01

## 2022-01-01 RX ORDER — ROCURONIUM BROMIDE 10 MG/ML
60 VIAL (ML) INTRAVENOUS ONCE
Refills: 0 | Status: COMPLETED | OUTPATIENT
Start: 2022-01-01 | End: 2022-01-01

## 2022-01-01 RX ORDER — NOREPINEPHRINE BITARTRATE/D5W 8 MG/250ML
0.05 PLASTIC BAG, INJECTION (ML) INTRAVENOUS
Qty: 8 | Refills: 0 | Status: DISCONTINUED | OUTPATIENT
Start: 2022-01-01 | End: 2022-01-01

## 2022-01-01 RX ORDER — BACITRACIN ZINC 500 UNIT/G
1 OINTMENT IN PACKET (EA) TOPICAL
Qty: 0 | Refills: 0 | DISCHARGE

## 2022-01-01 RX ORDER — PROPOFOL 10 MG/ML
20 INJECTION, EMULSION INTRAVENOUS ONCE
Refills: 0 | Status: COMPLETED | OUTPATIENT
Start: 2022-01-01 | End: 2022-01-01

## 2022-01-01 RX ORDER — ETOMIDATE 2 MG/ML
20 INJECTION INTRAVENOUS ONCE
Refills: 0 | Status: COMPLETED | OUTPATIENT
Start: 2022-01-01 | End: 2022-01-01

## 2022-01-01 RX ORDER — MORPHINE SULFATE 50 MG/1
7 CAPSULE, EXTENDED RELEASE ORAL
Qty: 100 | Refills: 0 | Status: DISCONTINUED | OUTPATIENT
Start: 2022-01-01 | End: 2022-01-01

## 2022-01-01 RX ORDER — FENTANYL CITRATE 50 UG/ML
25 INJECTION INTRAVENOUS ONCE
Refills: 0 | Status: DISCONTINUED | OUTPATIENT
Start: 2022-01-01 | End: 2022-01-01

## 2022-01-01 RX ORDER — LACTULOSE 10 G/15ML
15 SOLUTION ORAL
Qty: 0 | Refills: 0 | DISCHARGE

## 2022-01-01 RX ORDER — VANCOMYCIN HCL 1 G
1500 VIAL (EA) INTRAVENOUS ONCE
Refills: 0 | Status: DISCONTINUED | OUTPATIENT
Start: 2022-01-01 | End: 2022-01-01

## 2022-01-01 RX ORDER — LACTULOSE 10 G/15ML
15 SOLUTION ORAL DAILY
Refills: 0 | Status: DISCONTINUED | OUTPATIENT
Start: 2022-01-01 | End: 2022-01-01

## 2022-01-01 RX ORDER — MORPHINE SULFATE 50 MG/1
1 CAPSULE, EXTENDED RELEASE ORAL
Qty: 100 | Refills: 0 | Status: DISCONTINUED | OUTPATIENT
Start: 2022-01-01 | End: 2022-01-01

## 2022-01-01 RX ORDER — MAGNESIUM SULFATE 500 MG/ML
1 VIAL (ML) INJECTION ONCE
Refills: 0 | Status: DISCONTINUED | OUTPATIENT
Start: 2022-01-01 | End: 2022-01-01

## 2022-01-01 RX ORDER — MORPHINE SULFATE 50 MG/1
1 CAPSULE, EXTENDED RELEASE ORAL
Qty: 250 | Refills: 0 | Status: DISCONTINUED | OUTPATIENT
Start: 2022-01-01 | End: 2022-01-01

## 2022-01-01 RX ORDER — ENOXAPARIN SODIUM 100 MG/ML
70 INJECTION SUBCUTANEOUS EVERY 12 HOURS
Refills: 0 | Status: DISCONTINUED | OUTPATIENT
Start: 2022-01-01 | End: 2022-01-01

## 2022-01-01 RX ORDER — ACETYLCYSTEINE 200 MG/ML
4 VIAL (ML) MISCELLANEOUS EVERY 6 HOURS
Refills: 0 | Status: DISCONTINUED | OUTPATIENT
Start: 2022-01-01 | End: 2022-01-01

## 2022-01-01 RX ORDER — IPRATROPIUM/ALBUTEROL SULFATE 18-103MCG
3 AEROSOL WITH ADAPTER (GRAM) INHALATION EVERY 4 HOURS
Refills: 0 | Status: DISCONTINUED | OUTPATIENT
Start: 2022-01-01 | End: 2022-01-01

## 2022-01-01 RX ORDER — DOXAZOSIN MESYLATE 4 MG
2 TABLET ORAL AT BEDTIME
Refills: 0 | Status: DISCONTINUED | OUTPATIENT
Start: 2022-01-01 | End: 2022-01-01

## 2022-01-01 RX ORDER — LANOLIN ALCOHOL/MO/W.PET/CERES
5 CREAM (GRAM) TOPICAL ONCE
Refills: 0 | Status: COMPLETED | OUTPATIENT
Start: 2022-01-01 | End: 2022-01-01

## 2022-01-01 RX ORDER — ENOXAPARIN SODIUM 100 MG/ML
40 INJECTION SUBCUTANEOUS EVERY 24 HOURS
Refills: 0 | Status: DISCONTINUED | OUTPATIENT
Start: 2022-01-01 | End: 2022-01-01

## 2022-01-01 RX ORDER — MAGNESIUM SULFATE 500 MG/ML
2 VIAL (ML) INJECTION
Refills: 0 | Status: COMPLETED | OUTPATIENT
Start: 2022-01-01 | End: 2022-01-01

## 2022-01-01 RX ORDER — SODIUM CHLORIDE 9 MG/ML
2200 INJECTION, SOLUTION INTRAVENOUS ONCE
Refills: 0 | Status: COMPLETED | OUTPATIENT
Start: 2022-01-01 | End: 2022-01-01

## 2022-01-01 RX ORDER — VANCOMYCIN HCL 1 G
1000 VIAL (EA) INTRAVENOUS ONCE
Refills: 0 | Status: DISCONTINUED | OUTPATIENT
Start: 2022-01-01 | End: 2022-01-01

## 2022-01-01 RX ORDER — BACLOFEN 100 %
1 POWDER (GRAM) MISCELLANEOUS
Qty: 0 | Refills: 0 | DISCHARGE

## 2022-01-01 RX ORDER — POTASSIUM CHLORIDE 20 MEQ
20 PACKET (EA) ORAL
Refills: 0 | Status: DISCONTINUED | OUTPATIENT
Start: 2022-01-01 | End: 2022-01-01

## 2022-01-01 RX ORDER — PANTOPRAZOLE SODIUM 20 MG/1
40 TABLET, DELAYED RELEASE ORAL
Refills: 0 | Status: COMPLETED | OUTPATIENT
Start: 2022-01-01 | End: 2022-01-01

## 2022-01-01 RX ORDER — LACTULOSE 10 G/15ML
20 SOLUTION ORAL
Refills: 0 | Status: DISCONTINUED | OUTPATIENT
Start: 2022-01-01 | End: 2022-01-01

## 2022-01-01 RX ORDER — POTASSIUM PHOSPHATE, MONOBASIC POTASSIUM PHOSPHATE, DIBASIC 236; 224 MG/ML; MG/ML
15 INJECTION, SOLUTION INTRAVENOUS ONCE
Refills: 0 | Status: COMPLETED | OUTPATIENT
Start: 2022-01-01 | End: 2022-01-01

## 2022-01-01 RX ORDER — DONEPEZIL HYDROCHLORIDE 10 MG/1
1 TABLET, FILM COATED ORAL
Qty: 0 | Refills: 0 | DISCHARGE

## 2022-01-01 RX ORDER — MORPHINE SULFATE 50 MG/1
6 CAPSULE, EXTENDED RELEASE ORAL
Qty: 100 | Refills: 0 | Status: DISCONTINUED | OUTPATIENT
Start: 2022-01-01 | End: 2022-01-01

## 2022-01-01 RX ORDER — IBUPROFEN 200 MG
600 TABLET ORAL EVERY 8 HOURS
Refills: 0 | Status: DISCONTINUED | OUTPATIENT
Start: 2022-01-01 | End: 2022-01-01

## 2022-01-01 RX ORDER — APIXABAN 2.5 MG/1
5 TABLET, FILM COATED ORAL EVERY 12 HOURS
Refills: 0 | Status: DISCONTINUED | OUTPATIENT
Start: 2022-01-01 | End: 2022-01-01

## 2022-01-01 RX ORDER — ACETYLCYSTEINE 200 MG/ML
4 VIAL (ML) MISCELLANEOUS
Refills: 0 | Status: DISCONTINUED | OUTPATIENT
Start: 2022-01-01 | End: 2022-01-01

## 2022-01-01 RX ORDER — VANCOMYCIN HCL 1 G
1050 VIAL (EA) INTRAVENOUS ONCE
Refills: 0 | Status: DISCONTINUED | OUTPATIENT
Start: 2022-01-01 | End: 2022-01-01

## 2022-01-01 RX ORDER — ALBUTEROL 90 UG/1
2 AEROSOL, METERED ORAL
Qty: 0 | Refills: 0 | DISCHARGE

## 2022-01-01 RX ORDER — MIDAZOLAM HYDROCHLORIDE 1 MG/ML
2 INJECTION, SOLUTION INTRAMUSCULAR; INTRAVENOUS ONCE
Refills: 0 | Status: DISCONTINUED | OUTPATIENT
Start: 2022-01-01 | End: 2022-01-01

## 2022-01-01 RX ORDER — MEROPENEM 1 G/30ML
1000 INJECTION INTRAVENOUS EVERY 8 HOURS
Refills: 0 | Status: DISCONTINUED | OUTPATIENT
Start: 2022-01-01 | End: 2022-01-01

## 2022-01-01 RX ORDER — SODIUM CHLORIDE 9 MG/ML
500 INJECTION, SOLUTION INTRAVENOUS ONCE
Refills: 0 | Status: COMPLETED | OUTPATIENT
Start: 2022-01-01 | End: 2022-01-01

## 2022-01-01 RX ORDER — ROBINUL 0.2 MG/ML
0.4 INJECTION INTRAMUSCULAR; INTRAVENOUS EVERY 6 HOURS
Refills: 0 | Status: DISCONTINUED | OUTPATIENT
Start: 2022-01-01 | End: 2022-01-01

## 2022-01-01 RX ORDER — ATROPINE SULFATE 0.1 MG/ML
0.5 SYRINGE (ML) INJECTION ONCE
Refills: 0 | Status: COMPLETED | OUTPATIENT
Start: 2022-01-01 | End: 2022-01-01

## 2022-01-01 RX ORDER — KETOROLAC TROMETHAMINE 30 MG/ML
15 SYRINGE (ML) INJECTION ONCE
Refills: 0 | Status: DISCONTINUED | OUTPATIENT
Start: 2022-01-01 | End: 2022-01-01

## 2022-01-01 RX ORDER — THIAMINE MONONITRATE (VIT B1) 100 MG
100 TABLET ORAL DAILY
Refills: 0 | Status: DISCONTINUED | OUTPATIENT
Start: 2022-01-01 | End: 2022-01-01

## 2022-01-01 RX ADMIN — Medication 20 MILLIGRAM(S): at 05:02

## 2022-01-01 RX ADMIN — ENOXAPARIN SODIUM 70 MILLIGRAM(S): 100 INJECTION SUBCUTANEOUS at 00:41

## 2022-01-01 RX ADMIN — CEFIDEROCOL SULFATE TOSYLATE 33.33 MILLIGRAM(S): 1 INJECTION, POWDER, FOR SOLUTION INTRAVENOUS at 06:34

## 2022-01-01 RX ADMIN — Medication 50 MILLIEQUIVALENT(S): at 06:34

## 2022-01-01 RX ADMIN — ALBUTEROL 1 PUFF(S): 90 AEROSOL, METERED ORAL at 04:16

## 2022-01-01 RX ADMIN — HYDROMORPHONE HYDROCHLORIDE 0.5 MILLIGRAM(S): 2 INJECTION INTRAMUSCULAR; INTRAVENOUS; SUBCUTANEOUS at 12:26

## 2022-01-01 RX ADMIN — CEFIDEROCOL SULFATE TOSYLATE 33.33 MILLIGRAM(S): 1 INJECTION, POWDER, FOR SOLUTION INTRAVENOUS at 13:12

## 2022-01-01 RX ADMIN — HALOPERIDOL DECANOATE 1 MILLIGRAM(S): 100 INJECTION INTRAMUSCULAR at 23:00

## 2022-01-01 RX ADMIN — MORPHINE SULFATE 6 MG/HR: 50 CAPSULE, EXTENDED RELEASE ORAL at 06:22

## 2022-01-01 RX ADMIN — ENOXAPARIN SODIUM 70 MILLIGRAM(S): 100 INJECTION SUBCUTANEOUS at 11:12

## 2022-01-01 RX ADMIN — MORPHINE SULFATE 1 MG/HR: 50 CAPSULE, EXTENDED RELEASE ORAL at 09:54

## 2022-01-01 RX ADMIN — DONEPEZIL HYDROCHLORIDE 10 MILLIGRAM(S): 10 TABLET, FILM COATED ORAL at 21:59

## 2022-01-01 RX ADMIN — SODIUM CHLORIDE 100 MILLILITER(S): 9 INJECTION, SOLUTION INTRAVENOUS at 00:54

## 2022-01-01 RX ADMIN — CHLORHEXIDINE GLUCONATE 1 APPLICATION(S): 213 SOLUTION TOPICAL at 14:19

## 2022-01-01 RX ADMIN — SENNA PLUS 2 TABLET(S): 8.6 TABLET ORAL at 21:10

## 2022-01-01 RX ADMIN — HYDROMORPHONE HYDROCHLORIDE 0.5 MILLIGRAM(S): 2 INJECTION INTRAMUSCULAR; INTRAVENOUS; SUBCUTANEOUS at 05:46

## 2022-01-01 RX ADMIN — DEXMEDETOMIDINE HYDROCHLORIDE IN 0.9% SODIUM CHLORIDE 3.4 MICROGRAM(S)/KG/HR: 4 INJECTION INTRAVENOUS at 21:04

## 2022-01-01 RX ADMIN — Medication 4 MILLILITER(S): at 15:25

## 2022-01-01 RX ADMIN — SODIUM CHLORIDE 100 MILLILITER(S): 9 INJECTION, SOLUTION INTRAVENOUS at 16:38

## 2022-01-01 RX ADMIN — MEROPENEM 100 MILLIGRAM(S): 1 INJECTION INTRAVENOUS at 22:56

## 2022-01-01 RX ADMIN — DEXMEDETOMIDINE HYDROCHLORIDE IN 0.9% SODIUM CHLORIDE 3.4 MICROGRAM(S)/KG/HR: 4 INJECTION INTRAVENOUS at 22:53

## 2022-01-01 RX ADMIN — CHLORHEXIDINE GLUCONATE 15 MILLILITER(S): 213 SOLUTION TOPICAL at 18:06

## 2022-01-01 RX ADMIN — Medication 6.38 MICROGRAM(S)/KG/MIN: at 12:33

## 2022-01-01 RX ADMIN — SODIUM CHLORIDE 1000 MILLILITER(S): 9 INJECTION, SOLUTION INTRAVENOUS at 13:17

## 2022-01-01 RX ADMIN — HYDROMORPHONE HYDROCHLORIDE 0.5 MILLIGRAM(S): 2 INJECTION INTRAMUSCULAR; INTRAVENOUS; SUBCUTANEOUS at 14:49

## 2022-01-01 RX ADMIN — FENTANYL CITRATE 3.4 MICROGRAM(S)/KG/HR: 50 INJECTION INTRAVENOUS at 21:04

## 2022-01-01 RX ADMIN — CHLORHEXIDINE GLUCONATE 15 MILLILITER(S): 213 SOLUTION TOPICAL at 05:04

## 2022-01-01 RX ADMIN — PANTOPRAZOLE SODIUM 40 MILLIGRAM(S): 20 TABLET, DELAYED RELEASE ORAL at 13:09

## 2022-01-01 RX ADMIN — MEROPENEM 100 MILLIGRAM(S): 1 INJECTION INTRAVENOUS at 21:18

## 2022-01-01 RX ADMIN — Medication 20 MILLIGRAM(S): at 05:22

## 2022-01-01 RX ADMIN — ENOXAPARIN SODIUM 70 MILLIGRAM(S): 100 INJECTION SUBCUTANEOUS at 11:22

## 2022-01-01 RX ADMIN — Medication 15 MILLIGRAM(S): at 11:30

## 2022-01-01 RX ADMIN — Medication 650 MILLIGRAM(S): at 20:20

## 2022-01-01 RX ADMIN — ALBUTEROL 1 PUFF(S): 90 AEROSOL, METERED ORAL at 20:55

## 2022-01-01 RX ADMIN — DEXMEDETOMIDINE HYDROCHLORIDE IN 0.9% SODIUM CHLORIDE 3.4 MICROGRAM(S)/KG/HR: 4 INJECTION INTRAVENOUS at 12:18

## 2022-01-01 RX ADMIN — HYDROMORPHONE HYDROCHLORIDE 0.5 MILLIGRAM(S): 2 INJECTION INTRAMUSCULAR; INTRAVENOUS; SUBCUTANEOUS at 05:22

## 2022-01-01 RX ADMIN — ALBUTEROL 1 PUFF(S): 90 AEROSOL, METERED ORAL at 01:07

## 2022-01-01 RX ADMIN — ALBUTEROL 1 PUFF(S): 90 AEROSOL, METERED ORAL at 21:10

## 2022-01-01 RX ADMIN — Medication 50 MILLIEQUIVALENT(S): at 14:37

## 2022-01-01 RX ADMIN — ALBUTEROL 1 PUFF(S): 90 AEROSOL, METERED ORAL at 12:52

## 2022-01-01 RX ADMIN — Medication 20 MILLIGRAM(S): at 17:19

## 2022-01-01 RX ADMIN — LINEZOLID 300 MILLIGRAM(S): 600 INJECTION, SOLUTION INTRAVENOUS at 05:37

## 2022-01-01 RX ADMIN — MIDODRINE HYDROCHLORIDE 10 MILLIGRAM(S): 2.5 TABLET ORAL at 16:12

## 2022-01-01 RX ADMIN — CEFIDEROCOL SULFATE TOSYLATE 33.33 MILLIGRAM(S): 1 INJECTION, POWDER, FOR SOLUTION INTRAVENOUS at 21:11

## 2022-01-01 RX ADMIN — FENTANYL CITRATE 50 MICROGRAM(S): 50 INJECTION INTRAVENOUS at 11:39

## 2022-01-01 RX ADMIN — Medication 975 MILLIGRAM(S): at 08:45

## 2022-01-01 RX ADMIN — CEFIDEROCOL SULFATE TOSYLATE 33.33 MILLIGRAM(S): 1 INJECTION, POWDER, FOR SOLUTION INTRAVENOUS at 16:09

## 2022-01-01 RX ADMIN — Medication 650 MILLIGRAM(S): at 01:32

## 2022-01-01 RX ADMIN — CHLORHEXIDINE GLUCONATE 15 MILLILITER(S): 213 SOLUTION TOPICAL at 17:08

## 2022-01-01 RX ADMIN — HYDROMORPHONE HYDROCHLORIDE 0.5 MILLIGRAM(S): 2 INJECTION INTRAMUSCULAR; INTRAVENOUS; SUBCUTANEOUS at 13:27

## 2022-01-01 RX ADMIN — ZINC OXIDE 1 APPLICATION(S): 200 OINTMENT TOPICAL at 05:03

## 2022-01-01 RX ADMIN — DONEPEZIL HYDROCHLORIDE 10 MILLIGRAM(S): 10 TABLET, FILM COATED ORAL at 21:16

## 2022-01-01 RX ADMIN — Medication 4 MILLILITER(S): at 08:30

## 2022-01-01 RX ADMIN — DONEPEZIL HYDROCHLORIDE 10 MILLIGRAM(S): 10 TABLET, FILM COATED ORAL at 21:21

## 2022-01-01 RX ADMIN — MEROPENEM 100 MILLIGRAM(S): 1 INJECTION INTRAVENOUS at 22:05

## 2022-01-01 RX ADMIN — SENNA PLUS 2 TABLET(S): 8.6 TABLET ORAL at 21:45

## 2022-01-01 RX ADMIN — Medication 400 MILLIGRAM(S): at 11:57

## 2022-01-01 RX ADMIN — HYDROMORPHONE HYDROCHLORIDE 1 MILLIGRAM(S): 2 INJECTION INTRAMUSCULAR; INTRAVENOUS; SUBCUTANEOUS at 17:04

## 2022-01-01 RX ADMIN — HYDROMORPHONE HYDROCHLORIDE 0.5 MILLIGRAM(S): 2 INJECTION INTRAMUSCULAR; INTRAVENOUS; SUBCUTANEOUS at 06:03

## 2022-01-01 RX ADMIN — SENNA PLUS 2 TABLET(S): 8.6 TABLET ORAL at 21:06

## 2022-01-01 RX ADMIN — CEFIDEROCOL SULFATE TOSYLATE 33.33 MILLIGRAM(S): 1 INJECTION, POWDER, FOR SOLUTION INTRAVENOUS at 15:18

## 2022-01-01 RX ADMIN — Medication 20 MILLIGRAM(S): at 05:01

## 2022-01-01 RX ADMIN — FENTANYL CITRATE 50 MICROGRAM(S): 50 INJECTION INTRAVENOUS at 10:17

## 2022-01-01 RX ADMIN — DEXMEDETOMIDINE HYDROCHLORIDE IN 0.9% SODIUM CHLORIDE 3.4 MICROGRAM(S)/KG/HR: 4 INJECTION INTRAVENOUS at 20:48

## 2022-01-01 RX ADMIN — Medication 25 GRAM(S): at 14:14

## 2022-01-01 RX ADMIN — MEMANTINE HYDROCHLORIDE 10 MILLIGRAM(S): 10 TABLET ORAL at 17:15

## 2022-01-01 RX ADMIN — Medication 40 MILLIEQUIVALENT(S): at 16:42

## 2022-01-01 RX ADMIN — POLYMYXIN B SULFATE 1000 UNIT(S): 500000 INJECTION, POWDER, LYOPHILIZED, FOR SOLUTION INTRAMUSCULAR; INTRATHECAL; INTRAVENOUS; OPHTHALMIC at 05:02

## 2022-01-01 RX ADMIN — Medication 50 MILLIEQUIVALENT(S): at 11:48

## 2022-01-01 RX ADMIN — CHLORHEXIDINE GLUCONATE 1 APPLICATION(S): 213 SOLUTION TOPICAL at 06:03

## 2022-01-01 RX ADMIN — ZINC OXIDE 1 APPLICATION(S): 200 OINTMENT TOPICAL at 21:06

## 2022-01-01 RX ADMIN — Medication 25 GRAM(S): at 07:49

## 2022-01-01 RX ADMIN — CEFIDEROCOL SULFATE TOSYLATE 33.33 MILLIGRAM(S): 1 INJECTION, POWDER, FOR SOLUTION INTRAVENOUS at 17:22

## 2022-01-01 RX ADMIN — Medication 20 MILLIGRAM(S): at 05:27

## 2022-01-01 RX ADMIN — CEFIDEROCOL SULFATE TOSYLATE 33.33 MILLIGRAM(S): 1 INJECTION, POWDER, FOR SOLUTION INTRAVENOUS at 15:15

## 2022-01-01 RX ADMIN — SENNA PLUS 2 TABLET(S): 8.6 TABLET ORAL at 21:05

## 2022-01-01 RX ADMIN — ALBUTEROL 1 PUFF(S): 90 AEROSOL, METERED ORAL at 00:38

## 2022-01-01 RX ADMIN — HYDROMORPHONE HYDROCHLORIDE 0.5 MILLIGRAM(S): 2 INJECTION INTRAMUSCULAR; INTRAVENOUS; SUBCUTANEOUS at 13:36

## 2022-01-01 RX ADMIN — ALBUTEROL 2.5 MILLIGRAM(S): 90 AEROSOL, METERED ORAL at 12:00

## 2022-01-01 RX ADMIN — ZINC OXIDE 1 APPLICATION(S): 200 OINTMENT TOPICAL at 21:17

## 2022-01-01 RX ADMIN — Medication 2 MILLIGRAM(S): at 21:15

## 2022-01-01 RX ADMIN — SODIUM CHLORIDE 2200 MILLILITER(S): 9 INJECTION, SOLUTION INTRAVENOUS at 09:00

## 2022-01-01 RX ADMIN — HYDROMORPHONE HYDROCHLORIDE 0.5 MILLIGRAM(S): 2 INJECTION INTRAMUSCULAR; INTRAVENOUS; SUBCUTANEOUS at 22:12

## 2022-01-01 RX ADMIN — ENOXAPARIN SODIUM 70 MILLIGRAM(S): 100 INJECTION SUBCUTANEOUS at 00:00

## 2022-01-01 RX ADMIN — Medication 100 MILLIGRAM(S): at 13:52

## 2022-01-01 RX ADMIN — FENTANYL CITRATE 3.4 MICROGRAM(S)/KG/HR: 50 INJECTION INTRAVENOUS at 02:11

## 2022-01-01 RX ADMIN — FENTANYL CITRATE 50 MICROGRAM(S): 50 INJECTION INTRAVENOUS at 10:32

## 2022-01-01 RX ADMIN — CHLORHEXIDINE GLUCONATE 1 APPLICATION(S): 213 SOLUTION TOPICAL at 05:44

## 2022-01-01 RX ADMIN — ZINC OXIDE 1 APPLICATION(S): 200 OINTMENT TOPICAL at 13:29

## 2022-01-01 RX ADMIN — FENTANYL CITRATE 3.4 MICROGRAM(S)/KG/HR: 50 INJECTION INTRAVENOUS at 05:26

## 2022-01-01 RX ADMIN — Medication 0.5 MILLIGRAM(S): at 18:26

## 2022-01-01 RX ADMIN — PROPOFOL 4.08 MICROGRAM(S)/KG/MIN: 10 INJECTION, EMULSION INTRAVENOUS at 01:17

## 2022-01-01 RX ADMIN — Medication 6.38 MICROGRAM(S)/KG/MIN: at 05:07

## 2022-01-01 RX ADMIN — CHLORHEXIDINE GLUCONATE 15 MILLILITER(S): 213 SOLUTION TOPICAL at 17:07

## 2022-01-01 RX ADMIN — HYDROMORPHONE HYDROCHLORIDE 0.5 MILLIGRAM(S): 2 INJECTION INTRAMUSCULAR; INTRAVENOUS; SUBCUTANEOUS at 14:51

## 2022-01-01 RX ADMIN — ROBINUL 0.4 MILLIGRAM(S): 0.2 INJECTION INTRAMUSCULAR; INTRAVENOUS at 13:23

## 2022-01-01 RX ADMIN — ENOXAPARIN SODIUM 40 MILLIGRAM(S): 100 INJECTION SUBCUTANEOUS at 13:47

## 2022-01-01 RX ADMIN — HYDROMORPHONE HYDROCHLORIDE 0.5 MILLIGRAM(S): 2 INJECTION INTRAMUSCULAR; INTRAVENOUS; SUBCUTANEOUS at 10:26

## 2022-01-01 RX ADMIN — MEROPENEM 100 MILLIGRAM(S): 1 INJECTION INTRAVENOUS at 05:08

## 2022-01-01 RX ADMIN — TAMSULOSIN HYDROCHLORIDE 0.4 MILLIGRAM(S): 0.4 CAPSULE ORAL at 21:58

## 2022-01-01 RX ADMIN — Medication 50 MILLIEQUIVALENT(S): at 10:04

## 2022-01-01 RX ADMIN — PANTOPRAZOLE SODIUM 40 MILLIGRAM(S): 20 TABLET, DELAYED RELEASE ORAL at 17:19

## 2022-01-01 RX ADMIN — MIDODRINE HYDROCHLORIDE 10 MILLIGRAM(S): 2.5 TABLET ORAL at 22:48

## 2022-01-01 RX ADMIN — HYDROMORPHONE HYDROCHLORIDE 0.5 MILLIGRAM(S): 2 INJECTION INTRAMUSCULAR; INTRAVENOUS; SUBCUTANEOUS at 13:25

## 2022-01-01 RX ADMIN — CHLORHEXIDINE GLUCONATE 15 MILLILITER(S): 213 SOLUTION TOPICAL at 17:23

## 2022-01-01 RX ADMIN — ZINC OXIDE 1 APPLICATION(S): 200 OINTMENT TOPICAL at 13:49

## 2022-01-01 RX ADMIN — MEMANTINE HYDROCHLORIDE 10 MILLIGRAM(S): 10 TABLET ORAL at 17:53

## 2022-01-01 RX ADMIN — CHLORHEXIDINE GLUCONATE 1 APPLICATION(S): 213 SOLUTION TOPICAL at 11:49

## 2022-01-01 RX ADMIN — POLYMYXIN B SULFATE 1000 UNIT(S): 500000 INJECTION, POWDER, LYOPHILIZED, FOR SOLUTION INTRAMUSCULAR; INTRATHECAL; INTRAVENOUS; OPHTHALMIC at 18:24

## 2022-01-01 RX ADMIN — CHLORHEXIDINE GLUCONATE 1 APPLICATION(S): 213 SOLUTION TOPICAL at 05:22

## 2022-01-01 RX ADMIN — FENTANYL CITRATE 3.4 MICROGRAM(S)/KG/HR: 50 INJECTION INTRAVENOUS at 21:29

## 2022-01-01 RX ADMIN — Medication 25 GRAM(S): at 12:41

## 2022-01-01 RX ADMIN — ZINC OXIDE 1 APPLICATION(S): 200 OINTMENT TOPICAL at 13:05

## 2022-01-01 RX ADMIN — HYDROMORPHONE HYDROCHLORIDE 0.5 MILLIGRAM(S): 2 INJECTION INTRAMUSCULAR; INTRAVENOUS; SUBCUTANEOUS at 18:22

## 2022-01-01 RX ADMIN — Medication 650 MILLIGRAM(S): at 17:46

## 2022-01-01 RX ADMIN — POTASSIUM PHOSPHATE, MONOBASIC POTASSIUM PHOSPHATE, DIBASIC 41.67 MILLIMOLE(S): 236; 224 INJECTION, SOLUTION INTRAVENOUS at 15:49

## 2022-01-01 RX ADMIN — CHLORHEXIDINE GLUCONATE 15 MILLILITER(S): 213 SOLUTION TOPICAL at 06:34

## 2022-01-01 RX ADMIN — MEROPENEM 100 MILLIGRAM(S): 1 INJECTION INTRAVENOUS at 13:25

## 2022-01-01 RX ADMIN — FENTANYL CITRATE 3.4 MICROGRAM(S)/KG/HR: 50 INJECTION INTRAVENOUS at 21:58

## 2022-01-01 RX ADMIN — Medication 20 MILLIGRAM(S): at 17:14

## 2022-01-01 RX ADMIN — Medication 1 TABLET(S): at 12:45

## 2022-01-01 RX ADMIN — SENNA PLUS 2 TABLET(S): 8.6 TABLET ORAL at 21:11

## 2022-01-01 RX ADMIN — Medication 0.5 MILLIGRAM(S): at 11:15

## 2022-01-01 RX ADMIN — CHLORHEXIDINE GLUCONATE 1 APPLICATION(S): 213 SOLUTION TOPICAL at 16:14

## 2022-01-01 RX ADMIN — HYDROMORPHONE HYDROCHLORIDE 0.5 MILLIGRAM(S): 2 INJECTION INTRAMUSCULAR; INTRAVENOUS; SUBCUTANEOUS at 11:17

## 2022-01-01 RX ADMIN — Medication 15 MILLIGRAM(S): at 10:42

## 2022-01-01 RX ADMIN — FENTANYL CITRATE 3.4 MICROGRAM(S)/KG/HR: 50 INJECTION INTRAVENOUS at 18:45

## 2022-01-01 RX ADMIN — DIATRIZOATE MEGLUMINE 30 MILLILITER(S): 180 INJECTION, SOLUTION INTRAVESICAL at 13:06

## 2022-01-01 RX ADMIN — MIDODRINE HYDROCHLORIDE 20 MILLIGRAM(S): 2.5 TABLET ORAL at 21:21

## 2022-01-01 RX ADMIN — FENTANYL CITRATE 3.4 MICROGRAM(S)/KG/HR: 50 INJECTION INTRAVENOUS at 03:48

## 2022-01-01 RX ADMIN — MEMANTINE HYDROCHLORIDE 10 MILLIGRAM(S): 10 TABLET ORAL at 05:27

## 2022-01-01 RX ADMIN — Medication 1000 MILLIGRAM(S): at 12:57

## 2022-01-01 RX ADMIN — MEMANTINE HYDROCHLORIDE 10 MILLIGRAM(S): 10 TABLET ORAL at 05:25

## 2022-01-01 RX ADMIN — POLYMYXIN B SULFATE 1000 UNIT(S): 500000 INJECTION, POWDER, LYOPHILIZED, FOR SOLUTION INTRAMUSCULAR; INTRATHECAL; INTRAVENOUS; OPHTHALMIC at 05:03

## 2022-01-01 RX ADMIN — Medication 650 MILLIGRAM(S): at 15:46

## 2022-01-01 RX ADMIN — MIDODRINE HYDROCHLORIDE 20 MILLIGRAM(S): 2.5 TABLET ORAL at 14:20

## 2022-01-01 RX ADMIN — Medication 20 MILLIGRAM(S): at 18:03

## 2022-01-01 RX ADMIN — Medication 1000 MILLIGRAM(S): at 00:43

## 2022-01-01 RX ADMIN — ENOXAPARIN SODIUM 40 MILLIGRAM(S): 100 INJECTION SUBCUTANEOUS at 16:19

## 2022-01-01 RX ADMIN — FENTANYL CITRATE 50 MICROGRAM(S): 50 INJECTION INTRAVENOUS at 18:45

## 2022-01-01 RX ADMIN — HYDROMORPHONE HYDROCHLORIDE 0.5 MILLIGRAM(S): 2 INJECTION INTRAMUSCULAR; INTRAVENOUS; SUBCUTANEOUS at 13:05

## 2022-01-01 RX ADMIN — MEROPENEM 100 MILLIGRAM(S): 1 INJECTION INTRAVENOUS at 15:11

## 2022-01-01 RX ADMIN — FENTANYL CITRATE 25 MICROGRAM(S): 50 INJECTION INTRAVENOUS at 05:21

## 2022-01-01 RX ADMIN — MIDODRINE HYDROCHLORIDE 10 MILLIGRAM(S): 2.5 TABLET ORAL at 05:46

## 2022-01-01 RX ADMIN — MEROPENEM 100 MILLIGRAM(S): 1 INJECTION INTRAVENOUS at 14:53

## 2022-01-01 RX ADMIN — Medication 650 MILLIGRAM(S): at 21:05

## 2022-01-01 RX ADMIN — DEXMEDETOMIDINE HYDROCHLORIDE IN 0.9% SODIUM CHLORIDE 3.4 MICROGRAM(S)/KG/HR: 4 INJECTION INTRAVENOUS at 17:24

## 2022-01-01 RX ADMIN — ALBUTEROL 1 PUFF(S): 90 AEROSOL, METERED ORAL at 09:04

## 2022-01-01 RX ADMIN — Medication 20 MILLIGRAM(S): at 17:48

## 2022-01-01 RX ADMIN — ENOXAPARIN SODIUM 70 MILLIGRAM(S): 100 INJECTION SUBCUTANEOUS at 23:13

## 2022-01-01 RX ADMIN — PROPOFOL 4.08 MICROGRAM(S)/KG/MIN: 10 INJECTION, EMULSION INTRAVENOUS at 21:30

## 2022-01-01 RX ADMIN — APIXABAN 5 MILLIGRAM(S): 2.5 TABLET, FILM COATED ORAL at 05:38

## 2022-01-01 RX ADMIN — Medication 400 MILLIGRAM(S): at 11:18

## 2022-01-01 RX ADMIN — TAMSULOSIN HYDROCHLORIDE 0.4 MILLIGRAM(S): 0.4 CAPSULE ORAL at 21:16

## 2022-01-01 RX ADMIN — ALBUTEROL 1 PUFF(S): 90 AEROSOL, METERED ORAL at 12:21

## 2022-01-01 RX ADMIN — Medication 5 MILLIGRAM(S): at 23:54

## 2022-01-01 RX ADMIN — Medication 600 MILLIGRAM(S): at 09:19

## 2022-01-01 RX ADMIN — POLYMYXIN B SULFATE 1000 UNIT(S): 500000 INJECTION, POWDER, LYOPHILIZED, FOR SOLUTION INTRAMUSCULAR; INTRATHECAL; INTRAVENOUS; OPHTHALMIC at 05:44

## 2022-01-01 RX ADMIN — Medication 50 MILLIEQUIVALENT(S): at 15:08

## 2022-01-01 RX ADMIN — Medication 650 MILLIGRAM(S): at 23:55

## 2022-01-01 RX ADMIN — HYDROMORPHONE HYDROCHLORIDE 0.5 MILLIGRAM(S): 2 INJECTION INTRAMUSCULAR; INTRAVENOUS; SUBCUTANEOUS at 22:30

## 2022-01-01 RX ADMIN — Medication 600 MILLIGRAM(S): at 17:47

## 2022-01-01 RX ADMIN — Medication 650 MILLIGRAM(S): at 16:59

## 2022-01-01 RX ADMIN — DEXMEDETOMIDINE HYDROCHLORIDE IN 0.9% SODIUM CHLORIDE 3.4 MICROGRAM(S)/KG/HR: 4 INJECTION INTRAVENOUS at 23:23

## 2022-01-01 RX ADMIN — FENTANYL CITRATE 3.4 MICROGRAM(S)/KG/HR: 50 INJECTION INTRAVENOUS at 12:32

## 2022-01-01 RX ADMIN — Medication 20 MILLIGRAM(S): at 05:03

## 2022-01-01 RX ADMIN — Medication 25 GRAM(S): at 10:11

## 2022-01-01 RX ADMIN — APIXABAN 5 MILLIGRAM(S): 2.5 TABLET, FILM COATED ORAL at 17:03

## 2022-01-01 RX ADMIN — MEMANTINE HYDROCHLORIDE 10 MILLIGRAM(S): 10 TABLET ORAL at 05:03

## 2022-01-01 RX ADMIN — Medication 400 MILLIGRAM(S): at 13:20

## 2022-01-01 RX ADMIN — Medication 6.38 MICROGRAM(S)/KG/MIN: at 23:05

## 2022-01-01 RX ADMIN — Medication 0.5 MILLIGRAM(S): at 12:13

## 2022-01-01 RX ADMIN — ZINC OXIDE 1 APPLICATION(S): 200 OINTMENT TOPICAL at 22:04

## 2022-01-01 RX ADMIN — ZINC OXIDE 1 APPLICATION(S): 200 OINTMENT TOPICAL at 06:36

## 2022-01-01 RX ADMIN — SODIUM CHLORIDE 1000 MILLILITER(S): 9 INJECTION, SOLUTION INTRAVENOUS at 10:00

## 2022-01-01 RX ADMIN — HYDROMORPHONE HYDROCHLORIDE 0.5 MILLIGRAM(S): 2 INJECTION INTRAMUSCULAR; INTRAVENOUS; SUBCUTANEOUS at 17:54

## 2022-01-01 RX ADMIN — CEFIDEROCOL SULFATE TOSYLATE 33.33 MILLIGRAM(S): 1 INJECTION, POWDER, FOR SOLUTION INTRAVENOUS at 22:47

## 2022-01-01 RX ADMIN — CHLORHEXIDINE GLUCONATE 15 MILLILITER(S): 213 SOLUTION TOPICAL at 05:37

## 2022-01-01 RX ADMIN — DEXMEDETOMIDINE HYDROCHLORIDE IN 0.9% SODIUM CHLORIDE 3.4 MICROGRAM(S)/KG/HR: 4 INJECTION INTRAVENOUS at 22:45

## 2022-01-01 RX ADMIN — ZINC OXIDE 1 APPLICATION(S): 200 OINTMENT TOPICAL at 05:05

## 2022-01-01 RX ADMIN — HYDROMORPHONE HYDROCHLORIDE 0.5 MILLIGRAM(S): 2 INJECTION INTRAMUSCULAR; INTRAVENOUS; SUBCUTANEOUS at 13:58

## 2022-01-01 RX ADMIN — Medication 400 MILLIGRAM(S): at 17:14

## 2022-01-01 RX ADMIN — CHLORHEXIDINE GLUCONATE 15 MILLILITER(S): 213 SOLUTION TOPICAL at 17:09

## 2022-01-01 RX ADMIN — PANTOPRAZOLE SODIUM 40 MILLIGRAM(S): 20 TABLET, DELAYED RELEASE ORAL at 18:24

## 2022-01-01 RX ADMIN — HYDROMORPHONE HYDROCHLORIDE 0.5 MILLIGRAM(S): 2 INJECTION INTRAMUSCULAR; INTRAVENOUS; SUBCUTANEOUS at 10:14

## 2022-01-01 RX ADMIN — DEXMEDETOMIDINE HYDROCHLORIDE IN 0.9% SODIUM CHLORIDE 3.4 MICROGRAM(S)/KG/HR: 4 INJECTION INTRAVENOUS at 21:26

## 2022-01-01 RX ADMIN — HYDROMORPHONE HYDROCHLORIDE 0.5 MILLIGRAM(S): 2 INJECTION INTRAMUSCULAR; INTRAVENOUS; SUBCUTANEOUS at 17:35

## 2022-01-01 RX ADMIN — LACTULOSE 10 GRAM(S): 10 SOLUTION ORAL at 11:21

## 2022-01-01 RX ADMIN — ALBUTEROL 1 PUFF(S): 90 AEROSOL, METERED ORAL at 20:33

## 2022-01-01 RX ADMIN — Medication 650 MILLIGRAM(S): at 02:00

## 2022-01-01 RX ADMIN — Medication 20 MILLIGRAM(S): at 18:06

## 2022-01-01 RX ADMIN — ENOXAPARIN SODIUM 70 MILLIGRAM(S): 100 INJECTION SUBCUTANEOUS at 11:36

## 2022-01-01 RX ADMIN — MEROPENEM 100 MILLIGRAM(S): 1 INJECTION INTRAVENOUS at 21:19

## 2022-01-01 RX ADMIN — FENTANYL CITRATE 3.4 MICROGRAM(S)/KG/HR: 50 INJECTION INTRAVENOUS at 13:27

## 2022-01-01 RX ADMIN — MEMANTINE HYDROCHLORIDE 10 MILLIGRAM(S): 10 TABLET ORAL at 05:04

## 2022-01-01 RX ADMIN — ALBUTEROL 1 PUFF(S): 90 AEROSOL, METERED ORAL at 02:26

## 2022-01-01 RX ADMIN — CEFIDEROCOL SULFATE TOSYLATE 33.33 MILLIGRAM(S): 1 INJECTION, POWDER, FOR SOLUTION INTRAVENOUS at 05:27

## 2022-01-01 RX ADMIN — MORPHINE SULFATE 1 MG/HR: 50 CAPSULE, EXTENDED RELEASE ORAL at 03:03

## 2022-01-01 RX ADMIN — Medication 20 MILLIGRAM(S): at 17:17

## 2022-01-01 RX ADMIN — PROPOFOL 4.08 MICROGRAM(S)/KG/MIN: 10 INJECTION, EMULSION INTRAVENOUS at 15:23

## 2022-01-01 RX ADMIN — HYDROMORPHONE HYDROCHLORIDE 0.5 MILLIGRAM(S): 2 INJECTION INTRAMUSCULAR; INTRAVENOUS; SUBCUTANEOUS at 17:37

## 2022-01-01 RX ADMIN — FENTANYL CITRATE 50 MICROGRAM(S): 50 INJECTION INTRAVENOUS at 11:09

## 2022-01-01 RX ADMIN — LACTULOSE 20 GRAM(S): 10 SOLUTION ORAL at 05:02

## 2022-01-01 RX ADMIN — MEROPENEM 100 MILLIGRAM(S): 1 INJECTION INTRAVENOUS at 14:44

## 2022-01-01 RX ADMIN — FENTANYL CITRATE 25 MICROGRAM(S): 50 INJECTION INTRAVENOUS at 09:35

## 2022-01-01 RX ADMIN — Medication 20 MILLIGRAM(S): at 05:13

## 2022-01-01 RX ADMIN — HYDROMORPHONE HYDROCHLORIDE 0.5 MILLIGRAM(S): 2 INJECTION INTRAMUSCULAR; INTRAVENOUS; SUBCUTANEOUS at 13:46

## 2022-01-01 RX ADMIN — CHLORHEXIDINE GLUCONATE 1 APPLICATION(S): 213 SOLUTION TOPICAL at 11:36

## 2022-01-01 RX ADMIN — ZINC OXIDE 1 APPLICATION(S): 200 OINTMENT TOPICAL at 16:34

## 2022-01-01 RX ADMIN — MEROPENEM 100 MILLIGRAM(S): 1 INJECTION INTRAVENOUS at 13:03

## 2022-01-01 RX ADMIN — Medication 650 MILLIGRAM(S): at 08:42

## 2022-01-01 RX ADMIN — CHLORHEXIDINE GLUCONATE 1 APPLICATION(S): 213 SOLUTION TOPICAL at 05:26

## 2022-01-01 RX ADMIN — MEMANTINE HYDROCHLORIDE 10 MILLIGRAM(S): 10 TABLET ORAL at 17:19

## 2022-01-01 RX ADMIN — HYDROMORPHONE HYDROCHLORIDE 0.5 MILLIGRAM(S): 2 INJECTION INTRAMUSCULAR; INTRAVENOUS; SUBCUTANEOUS at 02:54

## 2022-01-01 RX ADMIN — Medication 3 MILLIGRAM(S): at 01:00

## 2022-01-01 RX ADMIN — Medication 2000 MILLIGRAM(S): at 08:53

## 2022-01-01 RX ADMIN — ALBUTEROL 1 PUFF(S): 90 AEROSOL, METERED ORAL at 05:06

## 2022-01-01 RX ADMIN — Medication 50 MILLIEQUIVALENT(S): at 17:58

## 2022-01-01 RX ADMIN — APIXABAN 5 MILLIGRAM(S): 2.5 TABLET, FILM COATED ORAL at 05:02

## 2022-01-01 RX ADMIN — ALBUTEROL 1 PUFF(S): 90 AEROSOL, METERED ORAL at 00:19

## 2022-01-01 RX ADMIN — HYDROMORPHONE HYDROCHLORIDE 0.5 MILLIGRAM(S): 2 INJECTION INTRAMUSCULAR; INTRAVENOUS; SUBCUTANEOUS at 21:07

## 2022-01-01 RX ADMIN — HYDROMORPHONE HYDROCHLORIDE 0.5 MILLIGRAM(S): 2 INJECTION INTRAMUSCULAR; INTRAVENOUS; SUBCUTANEOUS at 13:37

## 2022-01-01 RX ADMIN — ZINC OXIDE 1 APPLICATION(S): 200 OINTMENT TOPICAL at 15:22

## 2022-01-01 RX ADMIN — APIXABAN 5 MILLIGRAM(S): 2.5 TABLET, FILM COATED ORAL at 17:53

## 2022-01-01 RX ADMIN — CEFIDEROCOL SULFATE TOSYLATE 33.33 MILLIGRAM(S): 1 INJECTION, POWDER, FOR SOLUTION INTRAVENOUS at 05:02

## 2022-01-01 RX ADMIN — LACTULOSE 10 GRAM(S): 10 SOLUTION ORAL at 12:38

## 2022-01-01 RX ADMIN — ENOXAPARIN SODIUM 70 MILLIGRAM(S): 100 INJECTION SUBCUTANEOUS at 11:02

## 2022-01-01 RX ADMIN — ZINC OXIDE 1 APPLICATION(S): 200 OINTMENT TOPICAL at 22:01

## 2022-01-01 RX ADMIN — CHLORHEXIDINE GLUCONATE 1 APPLICATION(S): 213 SOLUTION TOPICAL at 11:05

## 2022-01-01 RX ADMIN — HYDROMORPHONE HYDROCHLORIDE 0.5 MILLIGRAM(S): 2 INJECTION INTRAMUSCULAR; INTRAVENOUS; SUBCUTANEOUS at 17:36

## 2022-01-01 RX ADMIN — HYDROMORPHONE HYDROCHLORIDE 0.5 MILLIGRAM(S): 2 INJECTION INTRAMUSCULAR; INTRAVENOUS; SUBCUTANEOUS at 02:00

## 2022-01-01 RX ADMIN — FENTANYL CITRATE 3.4 MICROGRAM(S)/KG/HR: 50 INJECTION INTRAVENOUS at 21:11

## 2022-01-01 RX ADMIN — ZINC OXIDE 1 APPLICATION(S): 200 OINTMENT TOPICAL at 05:25

## 2022-01-01 RX ADMIN — ALBUTEROL 1 PUFF(S): 90 AEROSOL, METERED ORAL at 04:45

## 2022-01-01 RX ADMIN — DEXMEDETOMIDINE HYDROCHLORIDE IN 0.9% SODIUM CHLORIDE 3.4 MICROGRAM(S)/KG/HR: 4 INJECTION INTRAVENOUS at 00:56

## 2022-01-01 RX ADMIN — MEMANTINE HYDROCHLORIDE 10 MILLIGRAM(S): 10 TABLET ORAL at 05:02

## 2022-01-01 RX ADMIN — HYDROMORPHONE HYDROCHLORIDE 0.5 MILLIGRAM(S): 2 INJECTION INTRAMUSCULAR; INTRAVENOUS; SUBCUTANEOUS at 01:30

## 2022-01-01 RX ADMIN — CHLORHEXIDINE GLUCONATE 15 MILLILITER(S): 213 SOLUTION TOPICAL at 05:47

## 2022-01-01 RX ADMIN — Medication 650 MILLIGRAM(S): at 13:08

## 2022-01-01 RX ADMIN — Medication 650 MILLIGRAM(S): at 15:44

## 2022-01-01 RX ADMIN — ZINC OXIDE 1 APPLICATION(S): 200 OINTMENT TOPICAL at 05:01

## 2022-01-01 RX ADMIN — ZINC OXIDE 1 APPLICATION(S): 200 OINTMENT TOPICAL at 05:14

## 2022-01-01 RX ADMIN — FENTANYL CITRATE 3.4 MICROGRAM(S)/KG/HR: 50 INJECTION INTRAVENOUS at 15:47

## 2022-01-01 RX ADMIN — Medication 20 MILLIGRAM(S): at 05:06

## 2022-01-01 RX ADMIN — HYDROMORPHONE HYDROCHLORIDE 0.5 MILLIGRAM(S): 2 INJECTION INTRAMUSCULAR; INTRAVENOUS; SUBCUTANEOUS at 06:40

## 2022-01-01 RX ADMIN — DONEPEZIL HYDROCHLORIDE 10 MILLIGRAM(S): 10 TABLET, FILM COATED ORAL at 21:06

## 2022-01-01 RX ADMIN — HYDROMORPHONE HYDROCHLORIDE 0.5 MILLIGRAM(S): 2 INJECTION INTRAMUSCULAR; INTRAVENOUS; SUBCUTANEOUS at 18:11

## 2022-01-01 RX ADMIN — MORPHINE SULFATE 1 MG/HR: 50 CAPSULE, EXTENDED RELEASE ORAL at 23:12

## 2022-01-01 RX ADMIN — CHLORHEXIDINE GLUCONATE 1 APPLICATION(S): 213 SOLUTION TOPICAL at 11:02

## 2022-01-01 RX ADMIN — Medication 20 MILLIGRAM(S): at 17:22

## 2022-01-01 RX ADMIN — MIDAZOLAM HYDROCHLORIDE 1.36 MG/KG/HR: 1 INJECTION, SOLUTION INTRAMUSCULAR; INTRAVENOUS at 08:59

## 2022-01-01 RX ADMIN — Medication 650 MILLIGRAM(S): at 08:10

## 2022-01-01 RX ADMIN — CHLORHEXIDINE GLUCONATE 15 MILLILITER(S): 213 SOLUTION TOPICAL at 17:05

## 2022-01-01 RX ADMIN — MIDODRINE HYDROCHLORIDE 10 MILLIGRAM(S): 2.5 TABLET ORAL at 05:01

## 2022-01-01 RX ADMIN — ALBUTEROL 1 PUFF(S): 90 AEROSOL, METERED ORAL at 16:57

## 2022-01-01 RX ADMIN — HYDROMORPHONE HYDROCHLORIDE 0.5 MILLIGRAM(S): 2 INJECTION INTRAMUSCULAR; INTRAVENOUS; SUBCUTANEOUS at 17:07

## 2022-01-01 RX ADMIN — HYDROMORPHONE HYDROCHLORIDE 0.5 MILLIGRAM(S): 2 INJECTION INTRAMUSCULAR; INTRAVENOUS; SUBCUTANEOUS at 01:40

## 2022-01-01 RX ADMIN — PROPOFOL 4.08 MICROGRAM(S)/KG/MIN: 10 INJECTION, EMULSION INTRAVENOUS at 22:44

## 2022-01-01 RX ADMIN — MEROPENEM 100 MILLIGRAM(S): 1 INJECTION INTRAVENOUS at 06:59

## 2022-01-01 RX ADMIN — LACTULOSE 20 GRAM(S): 10 SOLUTION ORAL at 17:23

## 2022-01-01 RX ADMIN — CHLORHEXIDINE GLUCONATE 1 APPLICATION(S): 213 SOLUTION TOPICAL at 11:13

## 2022-01-01 RX ADMIN — MORPHINE SULFATE 1 MG/HR: 50 CAPSULE, EXTENDED RELEASE ORAL at 13:41

## 2022-01-01 RX ADMIN — Medication 0.5 MILLIGRAM(S): at 13:08

## 2022-01-01 RX ADMIN — ROBINUL 0.4 MILLIGRAM(S): 0.2 INJECTION INTRAMUSCULAR; INTRAVENOUS at 14:30

## 2022-01-01 RX ADMIN — ZINC OXIDE 1 APPLICATION(S): 200 OINTMENT TOPICAL at 22:05

## 2022-01-01 RX ADMIN — LINEZOLID 300 MILLIGRAM(S): 600 INJECTION, SOLUTION INTRAVENOUS at 10:39

## 2022-01-01 RX ADMIN — Medication 650 MILLIGRAM(S): at 21:10

## 2022-01-01 RX ADMIN — ALBUTEROL 1 PUFF(S): 90 AEROSOL, METERED ORAL at 04:25

## 2022-01-01 RX ADMIN — MEROPENEM 100 MILLIGRAM(S): 1 INJECTION INTRAVENOUS at 06:11

## 2022-01-01 RX ADMIN — Medication 975 MILLIGRAM(S): at 07:45

## 2022-01-01 RX ADMIN — MEROPENEM 100 MILLIGRAM(S): 1 INJECTION INTRAVENOUS at 05:22

## 2022-01-01 RX ADMIN — CHLORHEXIDINE GLUCONATE 15 MILLILITER(S): 213 SOLUTION TOPICAL at 05:11

## 2022-01-01 RX ADMIN — LINEZOLID 300 MILLIGRAM(S): 600 INJECTION, SOLUTION INTRAVENOUS at 11:39

## 2022-01-01 RX ADMIN — ZINC OXIDE 1 APPLICATION(S): 200 OINTMENT TOPICAL at 06:59

## 2022-01-01 RX ADMIN — LACTULOSE 10 GRAM(S): 10 SOLUTION ORAL at 12:45

## 2022-01-01 RX ADMIN — CEFEPIME 100 MILLIGRAM(S): 1 INJECTION, POWDER, FOR SOLUTION INTRAMUSCULAR; INTRAVENOUS at 05:04

## 2022-01-01 RX ADMIN — HYDROMORPHONE HYDROCHLORIDE 0.5 MILLIGRAM(S): 2 INJECTION INTRAMUSCULAR; INTRAVENOUS; SUBCUTANEOUS at 16:54

## 2022-01-01 RX ADMIN — ALBUTEROL 1 PUFF(S): 90 AEROSOL, METERED ORAL at 00:06

## 2022-01-01 RX ADMIN — Medication 400 MILLIGRAM(S): at 06:35

## 2022-01-01 RX ADMIN — CHLORHEXIDINE GLUCONATE 15 MILLILITER(S): 213 SOLUTION TOPICAL at 17:19

## 2022-01-01 RX ADMIN — CHLORHEXIDINE GLUCONATE 15 MILLILITER(S): 213 SOLUTION TOPICAL at 17:18

## 2022-01-01 RX ADMIN — MEMANTINE HYDROCHLORIDE 10 MILLIGRAM(S): 10 TABLET ORAL at 05:38

## 2022-01-01 RX ADMIN — MEMANTINE HYDROCHLORIDE 10 MILLIGRAM(S): 10 TABLET ORAL at 06:25

## 2022-01-01 RX ADMIN — MORPHINE SULFATE 1 MG/HR: 50 CAPSULE, EXTENDED RELEASE ORAL at 22:34

## 2022-01-01 RX ADMIN — HYDROMORPHONE HYDROCHLORIDE 0.5 MILLIGRAM(S): 2 INJECTION INTRAMUSCULAR; INTRAVENOUS; SUBCUTANEOUS at 03:04

## 2022-01-01 RX ADMIN — HYDROMORPHONE HYDROCHLORIDE 0.5 MILLIGRAM(S): 2 INJECTION INTRAMUSCULAR; INTRAVENOUS; SUBCUTANEOUS at 17:55

## 2022-01-01 RX ADMIN — LACTULOSE 10 GRAM(S): 10 SOLUTION ORAL at 12:19

## 2022-01-01 RX ADMIN — ALBUTEROL 1 PUFF(S): 90 AEROSOL, METERED ORAL at 21:17

## 2022-01-01 RX ADMIN — MEROPENEM 100 MILLIGRAM(S): 1 INJECTION INTRAVENOUS at 06:34

## 2022-01-01 RX ADMIN — MEMANTINE HYDROCHLORIDE 10 MILLIGRAM(S): 10 TABLET ORAL at 05:48

## 2022-01-01 RX ADMIN — CHLORHEXIDINE GLUCONATE 15 MILLILITER(S): 213 SOLUTION TOPICAL at 05:05

## 2022-01-01 RX ADMIN — MORPHINE SULFATE 6 MG/HR: 50 CAPSULE, EXTENDED RELEASE ORAL at 16:10

## 2022-01-01 RX ADMIN — Medication 1000 MILLIGRAM(S): at 00:10

## 2022-01-01 RX ADMIN — Medication 2 MILLIGRAM(S): at 21:05

## 2022-01-01 RX ADMIN — Medication 650 MILLIGRAM(S): at 15:14

## 2022-01-01 RX ADMIN — Medication 4 MILLILITER(S): at 17:13

## 2022-01-01 RX ADMIN — I.V. FAT EMULSION 31.3 GM/KG/DAY: 20 EMULSION INTRAVENOUS at 22:05

## 2022-01-01 RX ADMIN — Medication 25 GRAM(S): at 08:51

## 2022-01-01 RX ADMIN — CHLORHEXIDINE GLUCONATE 15 MILLILITER(S): 213 SOLUTION TOPICAL at 05:03

## 2022-01-01 RX ADMIN — Medication 400 MILLIGRAM(S): at 02:59

## 2022-01-01 RX ADMIN — ZINC OXIDE 1 APPLICATION(S): 200 OINTMENT TOPICAL at 05:27

## 2022-01-01 RX ADMIN — ALBUTEROL 2.5 MILLIGRAM(S): 90 AEROSOL, METERED ORAL at 02:11

## 2022-01-01 RX ADMIN — ZINC OXIDE 1 APPLICATION(S): 200 OINTMENT TOPICAL at 05:24

## 2022-01-01 RX ADMIN — Medication 650 MILLIGRAM(S): at 22:26

## 2022-01-01 RX ADMIN — ZINC OXIDE 1 APPLICATION(S): 200 OINTMENT TOPICAL at 14:42

## 2022-01-01 RX ADMIN — MEMANTINE HYDROCHLORIDE 10 MILLIGRAM(S): 10 TABLET ORAL at 05:06

## 2022-01-01 RX ADMIN — Medication 100 MILLIGRAM(S): at 23:23

## 2022-01-01 RX ADMIN — CHLORHEXIDINE GLUCONATE 15 MILLILITER(S): 213 SOLUTION TOPICAL at 17:37

## 2022-01-01 RX ADMIN — ZINC OXIDE 1 APPLICATION(S): 200 OINTMENT TOPICAL at 05:49

## 2022-01-01 RX ADMIN — MEMANTINE HYDROCHLORIDE 10 MILLIGRAM(S): 10 TABLET ORAL at 17:17

## 2022-01-01 RX ADMIN — DONEPEZIL HYDROCHLORIDE 10 MILLIGRAM(S): 10 TABLET, FILM COATED ORAL at 21:10

## 2022-01-01 RX ADMIN — MEMANTINE HYDROCHLORIDE 10 MILLIGRAM(S): 10 TABLET ORAL at 17:03

## 2022-01-01 RX ADMIN — Medication 20 MILLIGRAM(S): at 17:23

## 2022-01-01 RX ADMIN — ZINC OXIDE 1 APPLICATION(S): 200 OINTMENT TOPICAL at 14:13

## 2022-01-01 RX ADMIN — DONEPEZIL HYDROCHLORIDE 10 MILLIGRAM(S): 10 TABLET, FILM COATED ORAL at 21:14

## 2022-01-01 RX ADMIN — DEXMEDETOMIDINE HYDROCHLORIDE IN 0.9% SODIUM CHLORIDE 3.4 MICROGRAM(S)/KG/HR: 4 INJECTION INTRAVENOUS at 10:20

## 2022-01-01 RX ADMIN — CHLORHEXIDINE GLUCONATE 15 MILLILITER(S): 213 SOLUTION TOPICAL at 05:24

## 2022-01-01 RX ADMIN — DEXMEDETOMIDINE HYDROCHLORIDE IN 0.9% SODIUM CHLORIDE 3.4 MICROGRAM(S)/KG/HR: 4 INJECTION INTRAVENOUS at 19:27

## 2022-01-01 RX ADMIN — ENOXAPARIN SODIUM 40 MILLIGRAM(S): 100 INJECTION SUBCUTANEOUS at 14:20

## 2022-01-01 RX ADMIN — Medication 650 MILLIGRAM(S): at 21:27

## 2022-01-01 RX ADMIN — HYDROMORPHONE HYDROCHLORIDE 0.5 MILLIGRAM(S): 2 INJECTION INTRAMUSCULAR; INTRAVENOUS; SUBCUTANEOUS at 17:25

## 2022-01-01 RX ADMIN — MORPHINE SULFATE 5 MG/HR: 50 CAPSULE, EXTENDED RELEASE ORAL at 10:37

## 2022-01-01 RX ADMIN — PANTOPRAZOLE SODIUM 40 MILLIGRAM(S): 20 TABLET, DELAYED RELEASE ORAL at 05:03

## 2022-01-01 RX ADMIN — Medication 20 MILLIGRAM(S): at 05:24

## 2022-01-01 RX ADMIN — HYDROMORPHONE HYDROCHLORIDE 0.5 MILLIGRAM(S): 2 INJECTION INTRAMUSCULAR; INTRAVENOUS; SUBCUTANEOUS at 15:24

## 2022-01-01 RX ADMIN — ETOMIDATE 20 MILLIGRAM(S): 2 INJECTION INTRAVENOUS at 13:16

## 2022-01-01 RX ADMIN — POLYMYXIN B SULFATE 1000 UNIT(S): 500000 INJECTION, POWDER, LYOPHILIZED, FOR SOLUTION INTRAMUSCULAR; INTRATHECAL; INTRAVENOUS; OPHTHALMIC at 17:10

## 2022-01-01 RX ADMIN — CHLORHEXIDINE GLUCONATE 15 MILLILITER(S): 213 SOLUTION TOPICAL at 17:54

## 2022-01-01 RX ADMIN — ALBUTEROL 1 PUFF(S): 90 AEROSOL, METERED ORAL at 11:35

## 2022-01-01 RX ADMIN — DEXMEDETOMIDINE HYDROCHLORIDE IN 0.9% SODIUM CHLORIDE 3.4 MICROGRAM(S)/KG/HR: 4 INJECTION INTRAVENOUS at 05:31

## 2022-01-01 RX ADMIN — ZINC OXIDE 1 APPLICATION(S): 200 OINTMENT TOPICAL at 15:49

## 2022-01-01 RX ADMIN — Medication 650 MILLIGRAM(S): at 16:24

## 2022-01-01 RX ADMIN — DEXMEDETOMIDINE HYDROCHLORIDE IN 0.9% SODIUM CHLORIDE 3.4 MICROGRAM(S)/KG/HR: 4 INJECTION INTRAVENOUS at 11:07

## 2022-01-01 RX ADMIN — CHLORHEXIDINE GLUCONATE 15 MILLILITER(S): 213 SOLUTION TOPICAL at 05:27

## 2022-01-01 RX ADMIN — DEXMEDETOMIDINE HYDROCHLORIDE IN 0.9% SODIUM CHLORIDE 3.4 MICROGRAM(S)/KG/HR: 4 INJECTION INTRAVENOUS at 10:04

## 2022-01-01 RX ADMIN — ZINC OXIDE 1 APPLICATION(S): 200 OINTMENT TOPICAL at 21:27

## 2022-01-01 RX ADMIN — ALBUTEROL 2.5 MILLIGRAM(S): 90 AEROSOL, METERED ORAL at 23:00

## 2022-01-01 RX ADMIN — Medication 1000 MILLIGRAM(S): at 13:00

## 2022-01-01 RX ADMIN — Medication 20 MILLIGRAM(S): at 05:38

## 2022-01-01 RX ADMIN — HYDROMORPHONE HYDROCHLORIDE 0.5 MILLIGRAM(S): 2 INJECTION INTRAMUSCULAR; INTRAVENOUS; SUBCUTANEOUS at 06:02

## 2022-01-01 RX ADMIN — HYDROMORPHONE HYDROCHLORIDE 0.5 MILLIGRAM(S): 2 INJECTION INTRAMUSCULAR; INTRAVENOUS; SUBCUTANEOUS at 19:30

## 2022-01-01 RX ADMIN — Medication 600 MILLIGRAM(S): at 10:20

## 2022-01-01 RX ADMIN — LACTULOSE 10 GRAM(S): 10 SOLUTION ORAL at 12:17

## 2022-01-01 RX ADMIN — ZINC OXIDE 1 APPLICATION(S): 200 OINTMENT TOPICAL at 23:02

## 2022-01-01 RX ADMIN — ALBUTEROL 1 PUFF(S): 90 AEROSOL, METERED ORAL at 07:55

## 2022-01-01 RX ADMIN — CHLORHEXIDINE GLUCONATE 15 MILLILITER(S): 213 SOLUTION TOPICAL at 05:13

## 2022-01-01 RX ADMIN — Medication 650 MILLIGRAM(S): at 10:00

## 2022-01-01 RX ADMIN — ALBUTEROL 1 PUFF(S): 90 AEROSOL, METERED ORAL at 08:52

## 2022-01-01 RX ADMIN — MORPHINE SULFATE 7 MG/HR: 50 CAPSULE, EXTENDED RELEASE ORAL at 16:54

## 2022-01-01 RX ADMIN — DONEPEZIL HYDROCHLORIDE 10 MILLIGRAM(S): 10 TABLET, FILM COATED ORAL at 21:15

## 2022-01-01 RX ADMIN — FENTANYL CITRATE 50 MICROGRAM(S): 50 INJECTION INTRAVENOUS at 19:00

## 2022-01-01 RX ADMIN — Medication 25 GRAM(S): at 12:42

## 2022-01-01 RX ADMIN — HYDROMORPHONE HYDROCHLORIDE 0.5 MILLIGRAM(S): 2 INJECTION INTRAMUSCULAR; INTRAVENOUS; SUBCUTANEOUS at 10:24

## 2022-01-01 RX ADMIN — MEROPENEM 100 MILLIGRAM(S): 1 INJECTION INTRAVENOUS at 05:23

## 2022-01-01 RX ADMIN — PROPOFOL 4.08 MICROGRAM(S)/KG/MIN: 10 INJECTION, EMULSION INTRAVENOUS at 05:26

## 2022-01-01 RX ADMIN — CEFIDEROCOL SULFATE TOSYLATE 33.33 MILLIGRAM(S): 1 INJECTION, POWDER, FOR SOLUTION INTRAVENOUS at 05:06

## 2022-01-01 RX ADMIN — MIDODRINE HYDROCHLORIDE 20 MILLIGRAM(S): 2.5 TABLET ORAL at 05:24

## 2022-01-01 RX ADMIN — MEROPENEM 100 MILLIGRAM(S): 1 INJECTION INTRAVENOUS at 06:26

## 2022-01-01 RX ADMIN — Medication 20 MILLIGRAM(S): at 17:01

## 2022-01-01 RX ADMIN — HYDROMORPHONE HYDROCHLORIDE 0.5 MILLIGRAM(S): 2 INJECTION INTRAMUSCULAR; INTRAVENOUS; SUBCUTANEOUS at 01:22

## 2022-01-01 RX ADMIN — Medication 400 MILLIGRAM(S): at 14:09

## 2022-01-01 RX ADMIN — CHLORHEXIDINE GLUCONATE 15 MILLILITER(S): 213 SOLUTION TOPICAL at 06:35

## 2022-01-01 RX ADMIN — PROPOFOL 4.08 MICROGRAM(S)/KG/MIN: 10 INJECTION, EMULSION INTRAVENOUS at 05:04

## 2022-01-01 RX ADMIN — FENTANYL CITRATE 3.4 MICROGRAM(S)/KG/HR: 50 INJECTION INTRAVENOUS at 05:07

## 2022-01-01 RX ADMIN — ENOXAPARIN SODIUM 70 MILLIGRAM(S): 100 INJECTION SUBCUTANEOUS at 12:46

## 2022-01-01 RX ADMIN — Medication 650 MILLIGRAM(S): at 18:53

## 2022-01-01 RX ADMIN — ALBUTEROL 1 PUFF(S): 90 AEROSOL, METERED ORAL at 21:00

## 2022-01-01 RX ADMIN — PANTOPRAZOLE SODIUM 40 MILLIGRAM(S): 20 TABLET, DELAYED RELEASE ORAL at 11:37

## 2022-01-01 RX ADMIN — MEROPENEM 100 MILLIGRAM(S): 1 INJECTION INTRAVENOUS at 13:06

## 2022-01-01 RX ADMIN — PANTOPRAZOLE SODIUM 40 MILLIGRAM(S): 20 TABLET, DELAYED RELEASE ORAL at 16:12

## 2022-01-01 RX ADMIN — FENTANYL CITRATE 3.4 MICROGRAM(S)/KG/HR: 50 INJECTION INTRAVENOUS at 22:56

## 2022-01-01 RX ADMIN — Medication 25 GRAM(S): at 08:00

## 2022-01-01 RX ADMIN — Medication 4 MILLILITER(S): at 09:44

## 2022-01-01 RX ADMIN — MEMANTINE HYDROCHLORIDE 10 MILLIGRAM(S): 10 TABLET ORAL at 18:02

## 2022-01-01 RX ADMIN — FENTANYL CITRATE 3.4 MICROGRAM(S)/KG/HR: 50 INJECTION INTRAVENOUS at 12:40

## 2022-01-01 RX ADMIN — LINEZOLID 300 MILLIGRAM(S): 600 INJECTION, SOLUTION INTRAVENOUS at 08:01

## 2022-01-01 RX ADMIN — CHLORHEXIDINE GLUCONATE 15 MILLILITER(S): 213 SOLUTION TOPICAL at 05:26

## 2022-01-01 RX ADMIN — Medication 2 MILLIGRAM(S): at 21:21

## 2022-01-01 RX ADMIN — PROPOFOL 4.08 MICROGRAM(S)/KG/MIN: 10 INJECTION, EMULSION INTRAVENOUS at 21:26

## 2022-01-01 RX ADMIN — HYDROMORPHONE HYDROCHLORIDE 0.5 MILLIGRAM(S): 2 INJECTION INTRAMUSCULAR; INTRAVENOUS; SUBCUTANEOUS at 17:09

## 2022-01-01 RX ADMIN — MEMANTINE HYDROCHLORIDE 10 MILLIGRAM(S): 10 TABLET ORAL at 17:11

## 2022-01-01 RX ADMIN — SODIUM CHLORIDE 100 MILLILITER(S): 9 INJECTION, SOLUTION INTRAVENOUS at 15:30

## 2022-01-01 RX ADMIN — MIDAZOLAM HYDROCHLORIDE 4 MILLIGRAM(S): 1 INJECTION, SOLUTION INTRAMUSCULAR; INTRAVENOUS at 09:55

## 2022-01-01 RX ADMIN — MORPHINE SULFATE 6 MG/HR: 50 CAPSULE, EXTENDED RELEASE ORAL at 00:41

## 2022-01-01 RX ADMIN — CHLORHEXIDINE GLUCONATE 1 APPLICATION(S): 213 SOLUTION TOPICAL at 12:46

## 2022-01-01 RX ADMIN — FENTANYL CITRATE 25 MICROGRAM(S): 50 INJECTION INTRAVENOUS at 11:38

## 2022-01-01 RX ADMIN — Medication 20 MILLIGRAM(S): at 17:53

## 2022-01-01 RX ADMIN — CEFIDEROCOL SULFATE TOSYLATE 33.33 MILLIGRAM(S): 1 INJECTION, POWDER, FOR SOLUTION INTRAVENOUS at 21:15

## 2022-01-01 RX ADMIN — Medication 6.38 MICROGRAM(S)/KG/MIN: at 18:45

## 2022-01-01 RX ADMIN — FENTANYL CITRATE 50 MICROGRAM(S): 50 INJECTION INTRAVENOUS at 18:36

## 2022-01-01 RX ADMIN — PROPOFOL 4.08 MICROGRAM(S)/KG/MIN: 10 INJECTION, EMULSION INTRAVENOUS at 23:00

## 2022-01-01 RX ADMIN — CHLORHEXIDINE GLUCONATE 15 MILLILITER(S): 213 SOLUTION TOPICAL at 05:25

## 2022-01-01 RX ADMIN — DEXMEDETOMIDINE HYDROCHLORIDE IN 0.9% SODIUM CHLORIDE 3.4 MICROGRAM(S)/KG/HR: 4 INJECTION INTRAVENOUS at 21:10

## 2022-01-01 RX ADMIN — CHLORHEXIDINE GLUCONATE 1 APPLICATION(S): 213 SOLUTION TOPICAL at 11:21

## 2022-01-01 RX ADMIN — Medication 650 MILLIGRAM(S): at 22:18

## 2022-01-01 RX ADMIN — MEMANTINE HYDROCHLORIDE 10 MILLIGRAM(S): 10 TABLET ORAL at 05:01

## 2022-01-01 RX ADMIN — LINEZOLID 300 MILLIGRAM(S): 600 INJECTION, SOLUTION INTRAVENOUS at 18:29

## 2022-01-01 RX ADMIN — ZINC OXIDE 1 APPLICATION(S): 200 OINTMENT TOPICAL at 21:30

## 2022-01-01 RX ADMIN — CHLORHEXIDINE GLUCONATE 1 APPLICATION(S): 213 SOLUTION TOPICAL at 06:35

## 2022-01-01 RX ADMIN — DEXMEDETOMIDINE HYDROCHLORIDE IN 0.9% SODIUM CHLORIDE 3.4 MICROGRAM(S)/KG/HR: 4 INJECTION INTRAVENOUS at 21:29

## 2022-01-01 RX ADMIN — ENOXAPARIN SODIUM 70 MILLIGRAM(S): 100 INJECTION SUBCUTANEOUS at 23:41

## 2022-01-01 RX ADMIN — ALBUTEROL 1 PUFF(S): 90 AEROSOL, METERED ORAL at 21:03

## 2022-01-01 RX ADMIN — ENOXAPARIN SODIUM 70 MILLIGRAM(S): 100 INJECTION SUBCUTANEOUS at 11:05

## 2022-01-01 RX ADMIN — Medication 650 MILLIGRAM(S): at 06:30

## 2022-01-01 RX ADMIN — FENTANYL CITRATE 3.4 MICROGRAM(S)/KG/HR: 50 INJECTION INTRAVENOUS at 06:41

## 2022-01-01 RX ADMIN — Medication 25 GRAM(S): at 06:25

## 2022-01-01 RX ADMIN — ENOXAPARIN SODIUM 40 MILLIGRAM(S): 100 INJECTION SUBCUTANEOUS at 13:51

## 2022-01-01 RX ADMIN — DEXMEDETOMIDINE HYDROCHLORIDE IN 0.9% SODIUM CHLORIDE 3.4 MICROGRAM(S)/KG/HR: 4 INJECTION INTRAVENOUS at 12:38

## 2022-01-01 RX ADMIN — MEMANTINE HYDROCHLORIDE 10 MILLIGRAM(S): 10 TABLET ORAL at 06:35

## 2022-01-01 RX ADMIN — FENTANYL CITRATE 100 MICROGRAM(S): 50 INJECTION INTRAVENOUS at 13:21

## 2022-01-01 RX ADMIN — ROBINUL 0.4 MILLIGRAM(S): 0.2 INJECTION INTRAMUSCULAR; INTRAVENOUS at 04:44

## 2022-01-01 RX ADMIN — Medication 100 MILLIGRAM(S): at 05:37

## 2022-01-01 RX ADMIN — Medication 20 MILLIGRAM(S): at 05:05

## 2022-01-01 RX ADMIN — CHLORHEXIDINE GLUCONATE 1 APPLICATION(S): 213 SOLUTION TOPICAL at 13:00

## 2022-01-01 RX ADMIN — SCOPALAMINE 1 PATCH: 1 PATCH, EXTENDED RELEASE TRANSDERMAL at 07:00

## 2022-01-01 RX ADMIN — ALBUTEROL 1 PUFF(S): 90 AEROSOL, METERED ORAL at 00:07

## 2022-01-01 RX ADMIN — Medication 2 MILLIGRAM(S): at 21:11

## 2022-01-01 RX ADMIN — POLYETHYLENE GLYCOL 3350 17 GRAM(S): 17 POWDER, FOR SOLUTION ORAL at 13:09

## 2022-01-01 RX ADMIN — ZINC OXIDE 1 APPLICATION(S): 200 OINTMENT TOPICAL at 05:22

## 2022-01-01 RX ADMIN — FENTANYL CITRATE 25 MICROGRAM(S): 50 INJECTION INTRAVENOUS at 06:47

## 2022-01-01 RX ADMIN — PANTOPRAZOLE SODIUM 40 MILLIGRAM(S): 20 TABLET, DELAYED RELEASE ORAL at 05:06

## 2022-01-01 RX ADMIN — ENOXAPARIN SODIUM 40 MILLIGRAM(S): 100 INJECTION SUBCUTANEOUS at 15:17

## 2022-01-01 RX ADMIN — ALBUTEROL 1 PUFF(S): 90 AEROSOL, METERED ORAL at 19:16

## 2022-01-01 RX ADMIN — SENNA PLUS 2 TABLET(S): 8.6 TABLET ORAL at 21:14

## 2022-01-01 RX ADMIN — PROPOFOL 4.08 MICROGRAM(S)/KG/MIN: 10 INJECTION, EMULSION INTRAVENOUS at 22:57

## 2022-01-01 RX ADMIN — DEXMEDETOMIDINE HYDROCHLORIDE IN 0.9% SODIUM CHLORIDE 0.85 MICROGRAM(S)/KG/HR: 4 INJECTION INTRAVENOUS at 12:10

## 2022-01-01 RX ADMIN — DEXMEDETOMIDINE HYDROCHLORIDE IN 0.9% SODIUM CHLORIDE 3.4 MICROGRAM(S)/KG/HR: 4 INJECTION INTRAVENOUS at 01:41

## 2022-01-01 RX ADMIN — CEFIDEROCOL SULFATE TOSYLATE 33.33 MILLIGRAM(S): 1 INJECTION, POWDER, FOR SOLUTION INTRAVENOUS at 14:14

## 2022-01-01 RX ADMIN — MORPHINE SULFATE 5 MG/HR: 50 CAPSULE, EXTENDED RELEASE ORAL at 16:19

## 2022-01-01 RX ADMIN — FENTANYL CITRATE 3.4 MICROGRAM(S)/KG/HR: 50 INJECTION INTRAVENOUS at 12:39

## 2022-01-01 RX ADMIN — MEROPENEM 100 MILLIGRAM(S): 1 INJECTION INTRAVENOUS at 21:44

## 2022-01-01 RX ADMIN — Medication 400 MILLIGRAM(S): at 22:19

## 2022-01-01 RX ADMIN — MEMANTINE HYDROCHLORIDE 10 MILLIGRAM(S): 10 TABLET ORAL at 17:46

## 2022-01-01 RX ADMIN — HYDROMORPHONE HYDROCHLORIDE 0.5 MILLIGRAM(S): 2 INJECTION INTRAMUSCULAR; INTRAVENOUS; SUBCUTANEOUS at 13:28

## 2022-01-01 RX ADMIN — Medication 0.5 MILLIGRAM(S): at 02:54

## 2022-01-01 RX ADMIN — CEFEPIME 100 MILLIGRAM(S): 1 INJECTION, POWDER, FOR SOLUTION INTRAMUSCULAR; INTRAVENOUS at 21:19

## 2022-01-01 RX ADMIN — ENOXAPARIN SODIUM 70 MILLIGRAM(S): 100 INJECTION SUBCUTANEOUS at 02:22

## 2022-01-01 RX ADMIN — MEMANTINE HYDROCHLORIDE 10 MILLIGRAM(S): 10 TABLET ORAL at 17:18

## 2022-01-01 RX ADMIN — HYDROMORPHONE HYDROCHLORIDE 0.5 MILLIGRAM(S): 2 INJECTION INTRAMUSCULAR; INTRAVENOUS; SUBCUTANEOUS at 02:43

## 2022-01-01 RX ADMIN — HYDROMORPHONE HYDROCHLORIDE 0.5 MILLIGRAM(S): 2 INJECTION INTRAMUSCULAR; INTRAVENOUS; SUBCUTANEOUS at 14:20

## 2022-01-01 RX ADMIN — ENOXAPARIN SODIUM 70 MILLIGRAM(S): 100 INJECTION SUBCUTANEOUS at 00:30

## 2022-01-01 RX ADMIN — ZINC OXIDE 1 APPLICATION(S): 200 OINTMENT TOPICAL at 05:04

## 2022-01-01 RX ADMIN — HYDROMORPHONE HYDROCHLORIDE 0.5 MILLIGRAM(S): 2 INJECTION INTRAMUSCULAR; INTRAVENOUS; SUBCUTANEOUS at 09:54

## 2022-01-01 RX ADMIN — LINEZOLID 300 MILLIGRAM(S): 600 INJECTION, SOLUTION INTRAVENOUS at 11:06

## 2022-01-01 RX ADMIN — LINEZOLID 300 MILLIGRAM(S): 600 INJECTION, SOLUTION INTRAVENOUS at 05:08

## 2022-01-01 RX ADMIN — MEROPENEM 100 MILLIGRAM(S): 1 INJECTION INTRAVENOUS at 14:09

## 2022-01-01 RX ADMIN — SENNA PLUS 2 TABLET(S): 8.6 TABLET ORAL at 21:18

## 2022-01-01 RX ADMIN — HYDROMORPHONE HYDROCHLORIDE 0.5 MILLIGRAM(S): 2 INJECTION INTRAMUSCULAR; INTRAVENOUS; SUBCUTANEOUS at 14:40

## 2022-01-01 RX ADMIN — FENTANYL CITRATE 3.4 MICROGRAM(S)/KG/HR: 50 INJECTION INTRAVENOUS at 04:44

## 2022-01-01 RX ADMIN — ENOXAPARIN SODIUM 70 MILLIGRAM(S): 100 INJECTION SUBCUTANEOUS at 01:27

## 2022-01-01 RX ADMIN — Medication 650 MILLIGRAM(S): at 05:38

## 2022-01-01 RX ADMIN — HYDROMORPHONE HYDROCHLORIDE 0.5 MILLIGRAM(S): 2 INJECTION INTRAMUSCULAR; INTRAVENOUS; SUBCUTANEOUS at 15:00

## 2022-01-01 RX ADMIN — CHLORHEXIDINE GLUCONATE 15 MILLILITER(S): 213 SOLUTION TOPICAL at 18:03

## 2022-01-01 RX ADMIN — Medication 60 MILLIGRAM(S): at 13:16

## 2022-01-01 RX ADMIN — CEFIDEROCOL SULFATE TOSYLATE 33.33 MILLIGRAM(S): 1 INJECTION, POWDER, FOR SOLUTION INTRAVENOUS at 21:22

## 2022-01-01 RX ADMIN — HYDROMORPHONE HYDROCHLORIDE 0.5 MILLIGRAM(S): 2 INJECTION INTRAMUSCULAR; INTRAVENOUS; SUBCUTANEOUS at 12:46

## 2022-01-01 RX ADMIN — Medication 6.38 MICROGRAM(S)/KG/MIN: at 21:29

## 2022-01-01 RX ADMIN — Medication 20 MILLIGRAM(S): at 17:02

## 2022-01-01 RX ADMIN — Medication 650 MILLIGRAM(S): at 13:00

## 2022-01-01 RX ADMIN — CHLORHEXIDINE GLUCONATE 1 APPLICATION(S): 213 SOLUTION TOPICAL at 05:38

## 2022-01-01 RX ADMIN — Medication 20 MILLIGRAM(S): at 17:10

## 2022-01-01 RX ADMIN — DEXMEDETOMIDINE HYDROCHLORIDE IN 0.9% SODIUM CHLORIDE 3.4 MICROGRAM(S)/KG/HR: 4 INJECTION INTRAVENOUS at 13:27

## 2022-01-01 RX ADMIN — TAMSULOSIN HYDROCHLORIDE 0.4 MILLIGRAM(S): 0.4 CAPSULE ORAL at 21:17

## 2022-01-01 RX ADMIN — CEFIDEROCOL SULFATE TOSYLATE 33.33 MILLIGRAM(S): 1 INJECTION, POWDER, FOR SOLUTION INTRAVENOUS at 21:07

## 2022-01-01 RX ADMIN — LACTULOSE 20 GRAM(S): 10 SOLUTION ORAL at 05:06

## 2022-01-01 RX ADMIN — CHLORHEXIDINE GLUCONATE 1 APPLICATION(S): 213 SOLUTION TOPICAL at 13:13

## 2022-01-01 RX ADMIN — MIDODRINE HYDROCHLORIDE 10 MILLIGRAM(S): 2.5 TABLET ORAL at 21:06

## 2022-01-01 RX ADMIN — HYDROMORPHONE HYDROCHLORIDE 0.5 MILLIGRAM(S): 2 INJECTION INTRAMUSCULAR; INTRAVENOUS; SUBCUTANEOUS at 13:44

## 2022-01-01 RX ADMIN — MIDODRINE HYDROCHLORIDE 10 MILLIGRAM(S): 2.5 TABLET ORAL at 05:03

## 2022-01-01 RX ADMIN — TAMSULOSIN HYDROCHLORIDE 0.4 MILLIGRAM(S): 0.4 CAPSULE ORAL at 21:19

## 2022-01-01 RX ADMIN — ALBUTEROL 1 PUFF(S): 90 AEROSOL, METERED ORAL at 00:20

## 2022-01-01 RX ADMIN — ZINC OXIDE 1 APPLICATION(S): 200 OINTMENT TOPICAL at 06:12

## 2022-01-01 RX ADMIN — ALBUTEROL 1 PUFF(S): 90 AEROSOL, METERED ORAL at 04:57

## 2022-01-01 RX ADMIN — ALBUTEROL 1 PUFF(S): 90 AEROSOL, METERED ORAL at 07:34

## 2022-01-01 RX ADMIN — PANTOPRAZOLE SODIUM 40 MILLIGRAM(S): 20 TABLET, DELAYED RELEASE ORAL at 12:18

## 2022-01-01 RX ADMIN — CHLORHEXIDINE GLUCONATE 15 MILLILITER(S): 213 SOLUTION TOPICAL at 06:59

## 2022-01-01 RX ADMIN — MIDODRINE HYDROCHLORIDE 30 MILLIGRAM(S): 2.5 TABLET ORAL at 21:15

## 2022-01-01 RX ADMIN — HYDROMORPHONE HYDROCHLORIDE 0.5 MILLIGRAM(S): 2 INJECTION INTRAMUSCULAR; INTRAVENOUS; SUBCUTANEOUS at 21:23

## 2022-01-01 RX ADMIN — HYDROMORPHONE HYDROCHLORIDE 0.5 MILLIGRAM(S): 2 INJECTION INTRAMUSCULAR; INTRAVENOUS; SUBCUTANEOUS at 17:24

## 2022-01-01 RX ADMIN — Medication 0.5 MILLIGRAM(S): at 13:45

## 2022-01-01 RX ADMIN — DEXMEDETOMIDINE HYDROCHLORIDE IN 0.9% SODIUM CHLORIDE 3.4 MICROGRAM(S)/KG/HR: 4 INJECTION INTRAVENOUS at 01:00

## 2022-01-01 RX ADMIN — LINEZOLID 300 MILLIGRAM(S): 600 INJECTION, SOLUTION INTRAVENOUS at 17:51

## 2022-01-01 RX ADMIN — CHLORHEXIDINE GLUCONATE 1 APPLICATION(S): 213 SOLUTION TOPICAL at 11:38

## 2022-01-01 RX ADMIN — SODIUM CHLORIDE 100 MILLILITER(S): 9 INJECTION, SOLUTION INTRAVENOUS at 22:27

## 2022-01-01 RX ADMIN — Medication 400 MILLIGRAM(S): at 23:12

## 2022-01-01 RX ADMIN — DONEPEZIL HYDROCHLORIDE 10 MILLIGRAM(S): 10 TABLET, FILM COATED ORAL at 21:45

## 2022-01-01 RX ADMIN — Medication 1000 MILLIGRAM(S): at 00:00

## 2022-01-01 RX ADMIN — DEXMEDETOMIDINE HYDROCHLORIDE IN 0.9% SODIUM CHLORIDE 3.4 MICROGRAM(S)/KG/HR: 4 INJECTION INTRAVENOUS at 13:59

## 2022-01-01 RX ADMIN — DEXMEDETOMIDINE HYDROCHLORIDE IN 0.9% SODIUM CHLORIDE 3.4 MICROGRAM(S)/KG/HR: 4 INJECTION INTRAVENOUS at 08:51

## 2022-01-01 RX ADMIN — Medication 600 MILLIGRAM(S): at 21:00

## 2022-01-01 RX ADMIN — HYDROMORPHONE HYDROCHLORIDE 0.5 MILLIGRAM(S): 2 INJECTION INTRAMUSCULAR; INTRAVENOUS; SUBCUTANEOUS at 08:26

## 2022-01-01 RX ADMIN — HYDROMORPHONE HYDROCHLORIDE 0.5 MILLIGRAM(S): 2 INJECTION INTRAMUSCULAR; INTRAVENOUS; SUBCUTANEOUS at 19:17

## 2022-01-01 RX ADMIN — Medication 25 GRAM(S): at 15:34

## 2022-01-01 RX ADMIN — MIDODRINE HYDROCHLORIDE 30 MILLIGRAM(S): 2.5 TABLET ORAL at 06:35

## 2022-01-01 RX ADMIN — HYDROMORPHONE HYDROCHLORIDE 0.5 MILLIGRAM(S): 2 INJECTION INTRAMUSCULAR; INTRAVENOUS; SUBCUTANEOUS at 14:29

## 2022-01-01 RX ADMIN — CHLORHEXIDINE GLUCONATE 15 MILLILITER(S): 213 SOLUTION TOPICAL at 06:02

## 2022-01-01 RX ADMIN — MEMANTINE HYDROCHLORIDE 10 MILLIGRAM(S): 10 TABLET ORAL at 17:24

## 2022-01-01 RX ADMIN — CHLORHEXIDINE GLUCONATE 1 APPLICATION(S): 213 SOLUTION TOPICAL at 12:19

## 2022-01-01 RX ADMIN — HYDROMORPHONE HYDROCHLORIDE 0.5 MILLIGRAM(S): 2 INJECTION INTRAMUSCULAR; INTRAVENOUS; SUBCUTANEOUS at 21:45

## 2022-01-01 RX ADMIN — Medication 0.5 MILLIGRAM(S): at 05:23

## 2022-01-01 RX ADMIN — POLYMYXIN B SULFATE 1000 UNIT(S): 500000 INJECTION, POWDER, LYOPHILIZED, FOR SOLUTION INTRAMUSCULAR; INTRATHECAL; INTRAVENOUS; OPHTHALMIC at 17:53

## 2022-01-01 RX ADMIN — Medication 650 MILLIGRAM(S): at 12:30

## 2022-01-01 RX ADMIN — DEXMEDETOMIDINE HYDROCHLORIDE IN 0.9% SODIUM CHLORIDE 3.4 MICROGRAM(S)/KG/HR: 4 INJECTION INTRAVENOUS at 18:45

## 2022-01-01 RX ADMIN — Medication 20 MILLIGRAM(S): at 06:35

## 2022-01-01 RX ADMIN — HYDROMORPHONE HYDROCHLORIDE 0.5 MILLIGRAM(S): 2 INJECTION INTRAMUSCULAR; INTRAVENOUS; SUBCUTANEOUS at 14:07

## 2022-01-01 RX ADMIN — CHLORHEXIDINE GLUCONATE 15 MILLILITER(S): 213 SOLUTION TOPICAL at 17:01

## 2022-01-01 RX ADMIN — ENOXAPARIN SODIUM 40 MILLIGRAM(S): 100 INJECTION SUBCUTANEOUS at 14:28

## 2022-01-01 RX ADMIN — Medication 650 MILLIGRAM(S): at 05:27

## 2022-01-01 RX ADMIN — SENNA PLUS 2 TABLET(S): 8.6 TABLET ORAL at 21:58

## 2022-01-01 RX ADMIN — SENNA PLUS 2 TABLET(S): 8.6 TABLET ORAL at 22:48

## 2022-01-01 RX ADMIN — HYDROMORPHONE HYDROCHLORIDE 0.5 MILLIGRAM(S): 2 INJECTION INTRAMUSCULAR; INTRAVENOUS; SUBCUTANEOUS at 13:41

## 2022-01-01 RX ADMIN — CHLORHEXIDINE GLUCONATE 1 APPLICATION(S): 213 SOLUTION TOPICAL at 12:38

## 2022-01-01 RX ADMIN — CHLORHEXIDINE GLUCONATE 15 MILLILITER(S): 213 SOLUTION TOPICAL at 06:25

## 2022-01-01 RX ADMIN — Medication 20 MILLIGRAM(S): at 05:46

## 2022-01-01 RX ADMIN — LACTULOSE 20 GRAM(S): 10 SOLUTION ORAL at 18:32

## 2022-01-01 RX ADMIN — ZINC OXIDE 1 APPLICATION(S): 200 OINTMENT TOPICAL at 13:14

## 2022-01-01 RX ADMIN — LACTULOSE 20 GRAM(S): 10 SOLUTION ORAL at 13:47

## 2022-01-01 RX ADMIN — HYDROMORPHONE HYDROCHLORIDE 0.5 MILLIGRAM(S): 2 INJECTION INTRAMUSCULAR; INTRAVENOUS; SUBCUTANEOUS at 11:30

## 2022-01-01 RX ADMIN — HYDROMORPHONE HYDROCHLORIDE 0.5 MILLIGRAM(S): 2 INJECTION INTRAMUSCULAR; INTRAVENOUS; SUBCUTANEOUS at 13:13

## 2022-01-01 RX ADMIN — PROPOFOL 4.08 MICROGRAM(S)/KG/MIN: 10 INJECTION, EMULSION INTRAVENOUS at 03:48

## 2022-01-01 RX ADMIN — ALBUTEROL 1 PUFF(S): 90 AEROSOL, METERED ORAL at 05:32

## 2022-01-01 RX ADMIN — Medication 1000 MILLIGRAM(S): at 13:37

## 2022-01-01 RX ADMIN — LACTULOSE 10 GRAM(S): 10 SOLUTION ORAL at 11:36

## 2022-01-01 RX ADMIN — ALBUTEROL 1 PUFF(S): 90 AEROSOL, METERED ORAL at 17:16

## 2022-01-01 RX ADMIN — ZINC OXIDE 1 APPLICATION(S): 200 OINTMENT TOPICAL at 13:39

## 2022-01-01 RX ADMIN — DEXMEDETOMIDINE HYDROCHLORIDE IN 0.9% SODIUM CHLORIDE 3.4 MICROGRAM(S)/KG/HR: 4 INJECTION INTRAVENOUS at 04:05

## 2022-01-01 RX ADMIN — MEROPENEM 100 MILLIGRAM(S): 1 INJECTION INTRAVENOUS at 05:13

## 2022-01-01 RX ADMIN — ROBINUL 0.4 MILLIGRAM(S): 0.2 INJECTION INTRAMUSCULAR; INTRAVENOUS at 11:15

## 2022-01-01 RX ADMIN — CEFIDEROCOL SULFATE TOSYLATE 33.33 MILLIGRAM(S): 1 INJECTION, POWDER, FOR SOLUTION INTRAVENOUS at 05:46

## 2022-01-01 RX ADMIN — MEMANTINE HYDROCHLORIDE 10 MILLIGRAM(S): 10 TABLET ORAL at 17:01

## 2022-01-01 RX ADMIN — LINEZOLID 300 MILLIGRAM(S): 600 INJECTION, SOLUTION INTRAVENOUS at 17:21

## 2022-01-01 RX ADMIN — CHLORHEXIDINE GLUCONATE 15 MILLILITER(S): 213 SOLUTION TOPICAL at 17:14

## 2022-01-01 RX ADMIN — ZINC OXIDE 1 APPLICATION(S): 200 OINTMENT TOPICAL at 21:44

## 2022-01-01 RX ADMIN — MEROPENEM 100 MILLIGRAM(S): 1 INJECTION INTRAVENOUS at 15:01

## 2022-01-01 RX ADMIN — ZINC OXIDE 1 APPLICATION(S): 200 OINTMENT TOPICAL at 13:26

## 2022-01-01 RX ADMIN — Medication 2 MILLIGRAM(S): at 22:48

## 2022-01-01 RX ADMIN — MIDODRINE HYDROCHLORIDE 10 MILLIGRAM(S): 2.5 TABLET ORAL at 21:11

## 2022-01-01 RX ADMIN — ZINC OXIDE 1 APPLICATION(S): 200 OINTMENT TOPICAL at 14:36

## 2022-01-01 RX ADMIN — ENOXAPARIN SODIUM 40 MILLIGRAM(S): 100 INJECTION SUBCUTANEOUS at 15:15

## 2022-01-01 RX ADMIN — MEMANTINE HYDROCHLORIDE 10 MILLIGRAM(S): 10 TABLET ORAL at 05:14

## 2022-01-01 RX ADMIN — MIDODRINE HYDROCHLORIDE 10 MILLIGRAM(S): 2.5 TABLET ORAL at 13:10

## 2022-01-01 RX ADMIN — CHLORHEXIDINE GLUCONATE 15 MILLILITER(S): 213 SOLUTION TOPICAL at 17:46

## 2022-01-01 RX ADMIN — HYDROMORPHONE HYDROCHLORIDE 0.5 MILLIGRAM(S): 2 INJECTION INTRAMUSCULAR; INTRAVENOUS; SUBCUTANEOUS at 15:14

## 2022-01-01 RX ADMIN — Medication 400 MILLIGRAM(S): at 22:26

## 2022-01-01 RX ADMIN — CHLORHEXIDINE GLUCONATE 15 MILLILITER(S): 213 SOLUTION TOPICAL at 17:17

## 2022-01-01 RX ADMIN — CHLORHEXIDINE GLUCONATE 15 MILLILITER(S): 213 SOLUTION TOPICAL at 05:46

## 2022-01-01 RX ADMIN — LINEZOLID 300 MILLIGRAM(S): 600 INJECTION, SOLUTION INTRAVENOUS at 21:43

## 2022-01-01 RX ADMIN — ENOXAPARIN SODIUM 40 MILLIGRAM(S): 100 INJECTION SUBCUTANEOUS at 13:29

## 2022-01-01 RX ADMIN — HYDROMORPHONE HYDROCHLORIDE 0.5 MILLIGRAM(S): 2 INJECTION INTRAMUSCULAR; INTRAVENOUS; SUBCUTANEOUS at 01:42

## 2022-01-01 RX ADMIN — MIDAZOLAM HYDROCHLORIDE 1.36 MG/KG/HR: 1 INJECTION, SOLUTION INTRAMUSCULAR; INTRAVENOUS at 14:40

## 2022-01-01 RX ADMIN — LINEZOLID 600 MILLIGRAM(S): 600 INJECTION, SOLUTION INTRAVENOUS at 11:39

## 2022-01-01 RX ADMIN — Medication 25 GRAM(S): at 07:58

## 2022-01-01 RX ADMIN — SENNA PLUS 2 TABLET(S): 8.6 TABLET ORAL at 21:19

## 2022-01-01 RX ADMIN — Medication 6.38 MICROGRAM(S)/KG/MIN: at 20:45

## 2022-01-01 RX ADMIN — TAMSULOSIN HYDROCHLORIDE 0.4 MILLIGRAM(S): 0.4 CAPSULE ORAL at 21:06

## 2022-01-01 RX ADMIN — ALBUTEROL 1 PUFF(S): 90 AEROSOL, METERED ORAL at 09:37

## 2022-01-01 RX ADMIN — FENTANYL CITRATE 3.4 MICROGRAM(S)/KG/HR: 50 INJECTION INTRAVENOUS at 23:00

## 2022-01-01 RX ADMIN — Medication 25 GRAM(S): at 11:05

## 2022-01-01 RX ADMIN — ENOXAPARIN SODIUM 70 MILLIGRAM(S): 100 INJECTION SUBCUTANEOUS at 01:00

## 2022-01-01 RX ADMIN — Medication 2 MILLIGRAM(S): at 16:09

## 2022-01-01 RX ADMIN — HYDROMORPHONE HYDROCHLORIDE 0.5 MILLIGRAM(S): 2 INJECTION INTRAMUSCULAR; INTRAVENOUS; SUBCUTANEOUS at 19:43

## 2022-01-01 RX ADMIN — DEXMEDETOMIDINE HYDROCHLORIDE IN 0.9% SODIUM CHLORIDE 3.4 MICROGRAM(S)/KG/HR: 4 INJECTION INTRAVENOUS at 08:37

## 2022-01-01 RX ADMIN — MIDAZOLAM HYDROCHLORIDE 2 MILLIGRAM(S): 1 INJECTION, SOLUTION INTRAMUSCULAR; INTRAVENOUS at 08:52

## 2022-01-01 RX ADMIN — ZINC OXIDE 1 APPLICATION(S): 200 OINTMENT TOPICAL at 13:10

## 2022-01-01 RX ADMIN — HYDROMORPHONE HYDROCHLORIDE 0.5 MILLIGRAM(S): 2 INJECTION INTRAMUSCULAR; INTRAVENOUS; SUBCUTANEOUS at 21:27

## 2022-01-01 RX ADMIN — Medication 25 GRAM(S): at 10:13

## 2022-01-01 RX ADMIN — FENTANYL CITRATE 50 MICROGRAM(S): 50 INJECTION INTRAVENOUS at 22:18

## 2022-01-01 RX ADMIN — SCOPALAMINE 1 PATCH: 1 PATCH, EXTENDED RELEASE TRANSDERMAL at 17:25

## 2022-01-01 RX ADMIN — CHLORHEXIDINE GLUCONATE 15 MILLILITER(S): 213 SOLUTION TOPICAL at 17:48

## 2022-01-01 RX ADMIN — Medication 400 MILLIGRAM(S): at 18:26

## 2022-01-01 RX ADMIN — HYDROMORPHONE HYDROCHLORIDE 0.5 MILLIGRAM(S): 2 INJECTION INTRAMUSCULAR; INTRAVENOUS; SUBCUTANEOUS at 11:14

## 2022-01-01 RX ADMIN — ALBUTEROL 1 PUFF(S): 90 AEROSOL, METERED ORAL at 12:00

## 2022-01-01 RX ADMIN — HYDROMORPHONE HYDROCHLORIDE 0.5 MILLIGRAM(S): 2 INJECTION INTRAMUSCULAR; INTRAVENOUS; SUBCUTANEOUS at 00:16

## 2022-01-01 RX ADMIN — ZINC OXIDE 1 APPLICATION(S): 200 OINTMENT TOPICAL at 21:12

## 2022-01-01 RX ADMIN — ZINC OXIDE 1 APPLICATION(S): 200 OINTMENT TOPICAL at 05:10

## 2022-01-01 RX ADMIN — ZINC OXIDE 1 APPLICATION(S): 200 OINTMENT TOPICAL at 22:54

## 2022-01-01 RX ADMIN — Medication 650 MILLIGRAM(S): at 08:12

## 2022-01-01 RX ADMIN — Medication 20 MILLIGRAM(S): at 17:46

## 2022-01-01 RX ADMIN — Medication 650 MILLIGRAM(S): at 02:26

## 2022-01-01 RX ADMIN — DEXMEDETOMIDINE HYDROCHLORIDE IN 0.9% SODIUM CHLORIDE 3.4 MICROGRAM(S)/KG/HR: 4 INJECTION INTRAVENOUS at 02:25

## 2022-01-01 RX ADMIN — Medication 650 MILLIGRAM(S): at 21:53

## 2022-01-01 RX ADMIN — ALBUTEROL 1 PUFF(S): 90 AEROSOL, METERED ORAL at 07:28

## 2022-01-01 RX ADMIN — CHLORHEXIDINE GLUCONATE 15 MILLILITER(S): 213 SOLUTION TOPICAL at 05:38

## 2022-01-01 RX ADMIN — TAMSULOSIN HYDROCHLORIDE 0.4 MILLIGRAM(S): 0.4 CAPSULE ORAL at 21:45

## 2022-01-01 RX ADMIN — MORPHINE SULFATE 1 MG/HR: 50 CAPSULE, EXTENDED RELEASE ORAL at 10:15

## 2022-01-01 RX ADMIN — Medication 400 MILLIGRAM(S): at 06:34

## 2022-01-01 RX ADMIN — MEMANTINE HYDROCHLORIDE 10 MILLIGRAM(S): 10 TABLET ORAL at 18:07

## 2022-01-01 RX ADMIN — Medication 1000 MILLIGRAM(S): at 19:33

## 2022-01-01 RX ADMIN — ALBUTEROL 1 PUFF(S): 90 AEROSOL, METERED ORAL at 21:13

## 2022-01-01 RX ADMIN — CEFEPIME 100 MILLIGRAM(S): 1 INJECTION, POWDER, FOR SOLUTION INTRAMUSCULAR; INTRAVENOUS at 17:02

## 2022-01-01 RX ADMIN — ZINC OXIDE 1 APPLICATION(S): 200 OINTMENT TOPICAL at 21:07

## 2022-01-01 RX ADMIN — HYDROMORPHONE HYDROCHLORIDE 0.5 MILLIGRAM(S): 2 INJECTION INTRAMUSCULAR; INTRAVENOUS; SUBCUTANEOUS at 13:00

## 2022-01-01 RX ADMIN — ZINC OXIDE 1 APPLICATION(S): 200 OINTMENT TOPICAL at 14:19

## 2022-01-01 RX ADMIN — ZINC OXIDE 1 APPLICATION(S): 200 OINTMENT TOPICAL at 22:48

## 2022-01-01 RX ADMIN — MEROPENEM 100 MILLIGRAM(S): 1 INJECTION INTRAVENOUS at 22:01

## 2022-01-01 RX ADMIN — ZINC OXIDE 1 APPLICATION(S): 200 OINTMENT TOPICAL at 21:14

## 2022-01-01 RX ADMIN — Medication 600 MILLIGRAM(S): at 17:17

## 2022-01-01 RX ADMIN — SODIUM CHLORIDE 100 MILLILITER(S): 9 INJECTION, SOLUTION INTRAVENOUS at 06:17

## 2022-01-01 RX ADMIN — DONEPEZIL HYDROCHLORIDE 10 MILLIGRAM(S): 10 TABLET, FILM COATED ORAL at 21:19

## 2022-01-01 RX ADMIN — DONEPEZIL HYDROCHLORIDE 10 MILLIGRAM(S): 10 TABLET, FILM COATED ORAL at 21:11

## 2022-01-01 RX ADMIN — Medication 50 MILLIEQUIVALENT(S): at 20:00

## 2022-01-01 RX ADMIN — Medication 100 MILLIGRAM(S): at 07:53

## 2022-01-01 RX ADMIN — LACTULOSE 10 GRAM(S): 10 SOLUTION ORAL at 11:12

## 2022-01-01 RX ADMIN — FENTANYL CITRATE 3.4 MICROGRAM(S)/KG/HR: 50 INJECTION INTRAVENOUS at 10:07

## 2022-01-01 RX ADMIN — CEFIDEROCOL SULFATE TOSYLATE 33.33 MILLIGRAM(S): 1 INJECTION, POWDER, FOR SOLUTION INTRAVENOUS at 21:29

## 2022-01-01 RX ADMIN — ALBUTEROL 2.5 MILLIGRAM(S): 90 AEROSOL, METERED ORAL at 15:26

## 2022-01-01 RX ADMIN — MEROPENEM 100 MILLIGRAM(S): 1 INJECTION INTRAVENOUS at 22:26

## 2022-01-01 RX ADMIN — CHLORHEXIDINE GLUCONATE 1 APPLICATION(S): 213 SOLUTION TOPICAL at 12:30

## 2022-01-01 RX ADMIN — SENNA PLUS 2 TABLET(S): 8.6 TABLET ORAL at 21:16

## 2022-01-01 RX ADMIN — PROPOFOL 4.08 MICROGRAM(S)/KG/MIN: 10 INJECTION, EMULSION INTRAVENOUS at 06:41

## 2022-01-01 RX ADMIN — Medication 400 MILLIGRAM(S): at 11:37

## 2022-01-01 RX ADMIN — ZINC OXIDE 1 APPLICATION(S): 200 OINTMENT TOPICAL at 16:14

## 2022-01-01 RX ADMIN — PANTOPRAZOLE SODIUM 40 MILLIGRAM(S): 20 TABLET, DELAYED RELEASE ORAL at 05:38

## 2022-01-01 RX ADMIN — HYDROMORPHONE HYDROCHLORIDE 0.5 MILLIGRAM(S): 2 INJECTION INTRAMUSCULAR; INTRAVENOUS; SUBCUTANEOUS at 05:39

## 2022-01-01 RX ADMIN — DONEPEZIL HYDROCHLORIDE 10 MILLIGRAM(S): 10 TABLET, FILM COATED ORAL at 21:05

## 2022-01-01 RX ADMIN — DEXMEDETOMIDINE HYDROCHLORIDE IN 0.9% SODIUM CHLORIDE 3.4 MICROGRAM(S)/KG/HR: 4 INJECTION INTRAVENOUS at 15:47

## 2022-01-01 RX ADMIN — Medication 100 MILLIGRAM(S): at 12:49

## 2022-01-01 RX ADMIN — Medication 1 EACH: at 22:05

## 2022-01-01 RX ADMIN — ALBUTEROL 1 PUFF(S): 90 AEROSOL, METERED ORAL at 04:28

## 2022-01-01 RX ADMIN — ZINC OXIDE 1 APPLICATION(S): 200 OINTMENT TOPICAL at 06:26

## 2022-01-01 RX ADMIN — CHLORHEXIDINE GLUCONATE 15 MILLILITER(S): 213 SOLUTION TOPICAL at 17:11

## 2022-01-01 RX ADMIN — DONEPEZIL HYDROCHLORIDE 10 MILLIGRAM(S): 10 TABLET, FILM COATED ORAL at 22:48

## 2022-01-01 RX ADMIN — MEMANTINE HYDROCHLORIDE 10 MILLIGRAM(S): 10 TABLET ORAL at 05:22

## 2022-01-01 RX ADMIN — MEMANTINE HYDROCHLORIDE 10 MILLIGRAM(S): 10 TABLET ORAL at 17:23

## 2022-01-01 RX ADMIN — CHLORHEXIDINE GLUCONATE 1 APPLICATION(S): 213 SOLUTION TOPICAL at 12:33

## 2022-01-01 RX ADMIN — PROPOFOL 20 MILLIGRAM(S): 10 INJECTION, EMULSION INTRAVENOUS at 13:40

## 2022-01-01 RX ADMIN — Medication 20 MILLIGRAM(S): at 06:25

## 2022-01-01 RX ADMIN — ENOXAPARIN SODIUM 70 MILLIGRAM(S): 100 INJECTION SUBCUTANEOUS at 12:38

## 2022-01-01 RX ADMIN — LINEZOLID 300 MILLIGRAM(S): 600 INJECTION, SOLUTION INTRAVENOUS at 05:03

## 2022-01-01 RX ADMIN — CHLORHEXIDINE GLUCONATE 15 MILLILITER(S): 213 SOLUTION TOPICAL at 17:15

## 2022-01-01 RX ADMIN — FENTANYL CITRATE 50 MICROGRAM(S): 50 INJECTION INTRAVENOUS at 22:03

## 2022-09-20 NOTE — PHYSICAL THERAPY INITIAL EVALUATION ADULT - LEVEL OF INDEPENDENCE: SUPINE/SIT, REHAB EVAL
9/20/2022       RE: Lashell Hogue  [id number]  [facility] [id number]     Dear Colleague,    Thank you for referring your patient, Lashell Hogue, to the [facility] DERMATOLOGY CLINIC [place] at [facility]. Please see a copy of my visit note below.    [facility] Dermatology Note  Encounter Date: Sep 20, 2022  Office Visit     Dermatology Problem List:  1. Photodamage, xerosis  - continue Amlactin  2. Digital mucous cyst  3. Hair thinning  - start minoxidil 5%  4. Rosacea  - Metrogel for 12 weeks, then referral to cosmetics for PDT.  5. Stasis dermatitis  6. Seborrheic keratosis    ____________________________________________    Assessment & Plan:    # Venous stasis dermatitis.   - Discussed with patient regarding nature and etiology of the disease  - Recommend compression stockings   - Will start fluocinonide ointment 0.05% at night to affected areas.   - Will follow up in 6-8 weeks    # Healing wound on left posterior calf.   - Likely slow healing wound of lower extremity.   - Recommend that we continue to monitor     #Seborrheic keratosis, right dorsal hand  - Discussed with patient regarding nature and etiology of the disease.   - Will continue to monitor    Procedures Performed:   None    Follow-up: 6 week(s) in-person, or earlier for new or changing lesions    Staff and Medical Student:     Patient seen and discussed with Dr. Ced Davis MS4  I was present with the medical student who participated in the service and in the documentation of the note. I have verified the history and personally performed the physical exam and medical decision making. I agree with the assessment and plan of care as documented in the note.  Lelo Coughlin MD    ____________________________________________    CC: Derm Problem (Pt reports L lower leg rash, dry lower lip, and bumps on neck, chest, arms, and  hands.)    HPI:  Ms. Lashell Hogue is a(n) 79 year old female who presents today as a return patient for an itchy rash of the left lower extremity. Patient was given triamcinolone 0.5% cream by PCP with little improvement.     Patient also concerned about a slow-healing wound on left posterior calf.     Patient is otherwise feeling well, without additional skin concerns.    Labs:  None reviewed.    Physical Exam:  Vitals: There were no vitals taken for this visit.  SKIN: Focused examination of bilateral lower and upper extremities was performed.  - left lower extremity with red-brown plaque on anterior shin  - Healing wound on left posterior calf  - Bilateral 1+ pedal edema  - 2+ peripheral pulses of bilateral dorsalis pedis  - Flesh-colored stuck on papule on medial right dorsal hand  - No other lesions of concern on areas examined.     Medications:  Current Outpatient Medications   Medication     atorvastatin (LIPITOR) 10 MG tablet     cholestyramine (QUESTRAN) 4 GM/DOSE powder     co-enzyme Q-10 100 MG CAPS capsule     esomeprazole (NEXIUM) 20 MG DR capsule     Loperamide HCl (IMODIUM OR)     losartan (COZAAR) 25 MG tablet     Lutein 6 MG CAPS     metoprolol succinate ER (TOPROL XL) 25 MG 24 hr tablet     vitamin B-12 (CYANOCOBALAMIN) 1000 MCG tablet     vitamin D3 (CHOLECALCIFEROL) 1000 units (25 mcg) tablet     metroNIDAZOLE (METROCREAM) 0.75 % external cream     No current facility-administered medications for this visit.      Past Medical History:   Patient Active Problem List   Diagnosis     Esophageal reflux     Bilateral sensorineural hearing loss     Chronic diarrhea     Christy's esophagus without dysplasia     Benign essential hypertension     Mixed hyperlipidemia     Pre-diabetes     Genital warts     Coronary artery disease involving native coronary artery of native heart without angina pectoris     Past Medical History:   Diagnosis Date     Chronic diarrhea      Hernia, abdominal 1950s      Hypertension      Spider veins 2000s        CC Referred Self, MD  No address on file on close of this encounter.       independent

## 2022-11-12 NOTE — PROGRESS NOTE ADULT - PROVIDER SPECIALTY LIST ADULT
Hospitalist
Internal Medicine
Neurology
Negative

## 2022-11-28 NOTE — ED PROVIDER NOTE - PATIENT PORTAL LINK FT
You can access the FollowMyHealth Patient Portal offered by [facility] by registering at the following website: [email or web address]. By joining [employer]’s FollowMyHealth portal, you will also be able to view your health information using other applications (apps) compatible with our system.

## 2022-11-28 NOTE — ED PROVIDER NOTE - PHYSICAL EXAMINATION
GENERAL: NAD   SKIN: warm, dry  HEAD: Normocephalic; atraumatic.  EYES: PERRLA, EOMI   CARD: S1, S2 normal; no murmurs, gallops, or rubs. Regular rate and rhythm.   RESP: LCTAB; No wheezes, rales, rhonchi, or stridor.  ABD: soft, nontender, and nondistended. Suprapubic catheter site noted in R suprapubic region.  No discharge, erythema, TTP noted at site.   : Pt has mansfield catheter in place, draining yellow urine.   EXT: Contracted extremities   NEURO: Limited neuro exam but no focal deficits

## 2022-11-28 NOTE — ED PROVIDER NOTE - PROVIDER TOKENS
PROVIDER:[TOKEN:[[id number]],FOLLOWUP:[1-3 Days]],PROVIDER:[TOKEN:[[id number]],FOLLOWUP:[1-3 Days],ESTABLISHEDPATIENT:[T]]

## 2022-11-28 NOTE — ED PROVIDER NOTE - OBJECTIVE STATEMENT
53 yo male w/ PMH of MS, chronic low back pain, neurogenic bladder? presents for suprapubic catheter dislodgement.  Catheter was placed in April this year, was dislodged a little over a week ago.  Pt had went to [facility] originally last week to have suprapubic catheter replaced but they placed a mansfield catheter instead.  Pt "does not do well" with Mansfield catheters per NH staff and family had requested suprapubic catheter be replaced so pt sent into ED.  No fevers.  HPI obtained from NH staff, pt nonverbal at baseline.

## 2022-11-28 NOTE — ED PROVIDER NOTE - NSFOLLOWUPINSTRUCTIONS_ED_ALL_ED_FT
If you are going home with a Campuzano catheter in place, follow the instructions below.    TAKING CARE OF THE CATHETER   Wash your hands with soap and water.   Using mild soap and warm water on a clean washcloth:    Clean the area on your body closest to the catheter insertion site using a circular motion, moving away from the catheter. Never wipe toward the catheter because this could sweep bacteria up into the urethra and cause infection.   Remove all traces of soap. Pat the area dry with a clean towel. For males, reposition the foreskin.    Attach the catheter to your leg so there is no tension on the catheter. Use adhesive tape or a leg strap. If you are using adhesive tape, remove any sticky residue left behind by the previous tape you used.   Keep the drainage bag below the level of the bladder, but keep it off the floor.   Check throughout the day to be sure the catheter is working and urine is draining freely. Make sure the tubing does not become kinked.  Do not pull on the catheter or try to remove it. Pulling could damage internal tissues.    TAKING CARE OF THE DRAINAGE BAGS  You will be given two drainage bags to take home. One is a large overnight drainage bag, and the other is a smaller leg bag that fits underneath clothing. You may wear the overnight bag at any time, but you should never wear the smaller leg bag at night. Follow the instructions below for how to empty, change, and clean your drainage bags.    Emptying the Drainage Bag    You must empty your drainage bag when it is ?–½ full or at least 2–3 times a day.     Wash your hands with soap and water.   Keep the drainage bag below your hips, below the level of your bladder. This stops urine from going back into the tubing and into your bladder.    Hold the dirty bag over the toilet or a clean container.   Open the pour spout at the bottom of the bag and empty the urine into the toilet or container. Do not let the pour spout touch the toilet, container, or any other surface. Doing so can place bacteria on the bag, which can cause an infection.   Clean the pour spout with a gauze pad or cotton ball that has rubbing alcohol on it.   Close the pour spout.   Attach the bag to your leg with adhesive tape or a leg strap.   Wash your hands well.    Changing the Drainage Bag    Change your drainage bag once a month or sooner if it starts to smell bad or look dirty. Below are steps to follow when changing the drainage bag.     Wash your hands with soap and water.   Pinch off the rubber catheter so that urine does not spill out.   Disconnect the catheter tube from the drainage tube at the connection valve. Do not let the tubes touch any surface.    Clean the end of the catheter tube with an alcohol wipe. Use a different alcohol wipe to clean the end of the drainage tube.   Connect the catheter tube to the drainage tube of the clean drainage bag.   Attach the new bag to the leg with adhesive tape or a leg strap. Avoid attaching the new bag too tightly.    Wash your hands well.    Cleaning the Drainage Bag     Wash your hands with soap and water.   Wash the bag in warm, soapy water.   Rinse the bag thoroughly with warm water.   Fill the bag with a solution of white vinegar and water (1 cup vinegar to 1 qt warm water [.2 L vinegar to 1 L warm water]). Close the bag and soak it for 30 minutes in the solution.    Rinse the bag with warm water.    Hang the bag to dry with the pour spout open and hanging downward.   Store the clean bag (once it is dry) in a clean plastic bag.   Wash your hands well.     PREVENTING INFECTION  Wash your hands before and after handling your catheter.  Take showers daily and wash the area where the catheter enters your body. Do not take baths. Replace wet leg straps with dry ones, if this applies.  Do not use powders, sprays, or lotions on the genital area. Only use creams, lotions, or ointments as directed by your caregiver.  For females, wipe from front to back after each bowel movement.   Drink enough fluids to keep your urine clear or pale yellow unless you have a fluid restriction.   Do not let the drainage bag or tubing touch or lie on the floor.   Wear cotton underwear to absorb moisture and to keep your .    SEEK MEDICAL CARE IF:  Your urine is cloudy or smells unusually bad.  Your catheter becomes clogged.  You are not draining urine into the bag or your bladder feels full.  Your catheter starts to leak.    SEEK IMMEDIATE MEDICAL CARE IF:  You have pain, swelling, redness, or pus where the catheter enters the body.  You have pain in the abdomen, legs, lower back, or bladder.  You have a fever.  You see blood fill the catheter, or your urine is pink or red.  You have nausea, vomiting, or chills.  Your catheter gets pulled out.    MAKE SURE YOU:  Understand these instructions.  Will watch your condition.  Will get help right away if you are not doing well or get worse.    ADDITIONAL NOTES AND INSTRUCTIONS    Please follow up with your Primary MD in 24-48 hr.  Seek immediate medical care for any new/worsening signs or symptoms.

## 2022-11-28 NOTE — ED PROVIDER NOTE - ATTENDING CONTRIBUTION TO CARE
52-year-old male past medical history of MS contracted nonverbal from nursing home history of chronic suprapubic catheter since to ED because suprapubic catheter dislodged last week.  Patient seen at [facility] when his catheter fell out and they placed Campuzano.  Campuzano is draining urine well however nursing home would like suprapubic catheter replaced.  Patient unable to provide history.    On exam, AFVSS, Well appearing, No acute distress, NCAT, EOMI, PERRLA, MMM, Neck supple, LCTAB, RRR nl s1s2 No mrg, Abdomen Soft NTND, difficult to visualize suprapubic catheter site in the pelvis, no surrounding erythema or warmth, no discharge or leakage of fluid, nontender, alert chronic contractures to extremities, No Focal Deficits, No LE edema or calf TTP, Campuzano draining yellow urine clear    A/P; suprapubic catheter dislodged, Campuzano in place draining urine, urology consulted states unable to replace suprapubic catheter due to lower extremity contractures, IR consulted who recommends labs and CT pelvis, they will arrange suprapubic catheter replacement as outpatient with patient's nursing home

## 2022-11-28 NOTE — ED PROVIDER NOTE - CLINICAL SUMMARY MEDICAL DECISION MAKING FREE TEXT BOX
57 y.o. M, no PMH, c/o chest discomfort for 3 days, getting worse. Pt states he has been having some chest pressure for 3 days, today was walking and symptoms got worse, now associated with radiation to left arm and neck. Pt rested and pain slightly resolved. Pt called EMS, was given nitro and ASA and brought to ED. No n/v/c/d, diaphoresis, SOB, abdominal pain, leg pain or swelling. (+) FHx of heart disease in father (50). Pt has never seen a cardiologist. On exam, pt in NAD, AAOx3, head NC/AT, CN II-XII intact, PEERL, EOMi, neck (-) midline tenderness, lungs CTA B/L, CV S1S2 regular, abdomen soft/NT/ND/(+)BS, ext (-) edema, motor 5/5x4, sensation intact, ambulating with steady gait. Labs/CXR/EKG done and reviewed. Pt placed in obs for further work up. Pt evaluated after sign out. Suprapubic catheter replaced. Will d/c.

## 2022-11-28 NOTE — ED PROVIDER NOTE - CARE PROVIDER_API CALL
Tito Rider (DO)  Radiology  [address] [place] [id number]  Phone: [phone]  Fax: [phone]  Follow Up Time: 1-3 Days    Larry Bledsoe  NEUROLOGY  [address] [place] [address]  Phone: [phone]  Fax: [phone]  Established Patient  Follow Up Time: 1-3 Days

## 2022-11-28 NOTE — CONSULT NOTE ADULT - ASSESSMENT
52 y.o M with dislodged SPT x 1 week, + Campuzano catheter in place draining cloudy urine.   MS  Chronic SPT     Plan:   Pt. is too contracted to replace SPT at bedside, difficult to access old insertion site + tract may already be closed. Pt. will need sedation for relaxation in order to replace SPT. Needs IR consult x SPT re-placement.   will d/w  attending.

## 2022-11-28 NOTE — ED PROVIDER NOTE - PROGRESS NOTE DETAILS
Brian: Discussed plan with [facility] RN Casimiro García.  Aware that pt will be DCed after labs and CT scan to have suprapubic catheter replaced outpt.  IR team will be in contact with [facility] to set up appointment. PS: CT shows catheter in bladder. Labs unremarkable. Will dc.

## 2022-11-28 NOTE — CONSULT NOTE ADULT - SUBJECTIVE AND OBJECTIVE BOX
INTERVENTIONAL RADIOLOGY CONSULT:     Procedure Requested: SPT placement     HPI: Pt is a 51yo Male with PMH of MS  h/o chronic SPT, Chronic back pain , HTN, sent from NH with dislodged  SPT happened about 1 week ago, Pt. was seen at [facility] where 16 Fr Campuzano catheter placed and Pt. was sent back to NH. Urology called for dislodged SPT. Pt. is poor historian, all medical history obtained from ED resident.      PAST MEDICAL & SURGICAL HISTORY:  Multiple sclerosis      Chronic back pain      Hypertension      No significant past surgical history          MEDICATIONS  (STANDING):    MEDICATIONS  (PRN):      Allergies    penicillin (Unknown)    Intolerances    FAMILY HISTORY:  No pertinent family history in first degree relatives      Physical Exam:   Vital Signs Last 24 Hrs  T(C): 36.6 (28 Nov 2022 13:26), Max: 36.6 (28 Nov 2022 13:26)  T(F): 97.8 (28 Nov 2022 13:26), Max: 97.8 (28 Nov 2022 13:26)  HR: 95 (28 Nov 2022 13:26) (95 - 95)  BP: 126/84 (28 Nov 2022 13:26) (126/84 - 126/84)  BP(mean): --  RR: 18 (28 Nov 2022 13:26) (18 - 18)  SpO2: 96% (28 Nov 2022 13:26) (96% - 96%)    Labs:   Pertinent labs:    Radiology & Additional Studies:   Radiology imaging reviewed.       ASSESSMENT/ PLAN:   Pt is a 51yo Male with PMH of MS  h/o chronic SPT, Chronic back pain , HTN, sent from NH with dislodged  SPT happened about 1 week ago, Pt. was seen at [facility] where 16 Fr Campuzano catheter placed and Pt. was sent back to NH. Urology called for dislodged SPT. Pt. is poor historian, all medical history obtained from ED resident. Urology consulted for evaluation, as per note, patient is too contracted to replace SPT at bedside, difficult to access old insertion site + tract may already be closed. Pt will need sedation for relaxation in order to replace SPT. IR consulted for SPT placement.   - will plan for outpatient SPT placement pending:  - CBC, CMP, PT/INR, COVID swab, and CT abdomen/pelvis for evaluation of safe window   - patient from [facility] - will coordinate scheduling with them once discharged   - will follow     Thank you for the courtesy of this consult, please call [phone] with any further questions.   
Pt is a 53yo Male with PMH of MS  h/o chronic SPT, Chronic back pain , HTN, sent from NH with dislodged  SPT happened about 1 week ago, Pt. was seen at [facility] where 16 Fr Campuzano catheter placed and Pt. was sent back to NH. Urology called for dislodged SPT. Pt. is poor historian, all medical history obtained from ED resident.    PAST MEDICAL & SURGICAL HISTORY:  Multiple sclerosis  Chronic back pain  Hypertension      No significant past surgical history  h/o SPT placement     Home Medications:  amLODIPine 10 mg oral tablet: 1 tab(s) orally once a day (06 Jan 2020 15:26)  clotrimazole 1% topical cream: Apply topically to affected area 2 times a day, As Needed (06 Jan 2020 15:26)  nicotine 21 mg/24 hr transdermal film, extended release: transdermal once a day (06 Jan 2020 15:26)  nystatin 100,000 units/g topical powder: 1 application topically 3 times a day (06 Jan 2020 15:26)  oxyCODONE 10 mg oral tablet: 1 tab(s) orally every 6 hours, As needed, Severe Pain (7 - 10) (06 Jan 2020 15:26)  senna oral tablet: 2 tab(s) orally once a day (at bedtime) (06 Jan 2020 15:26)  tiZANidine 4 mg oral tablet: 1 tab(s) orally every 6 hours (06 Jan 2020 15:12)      Allergies: penicillin (Unknown)      SOCIAL HISTORY: No illicit drug use    FAMILY HISTORY:  No pertinent family history in first degree relatives        REVIEW OF SYSTEMS   [x ] Pt. is non- verbal, subjective information were not able to be obtained from patient. History was obtained, to the extent possible, from review of the chart and collateral sources of information.    Vital Signs Last 24 Hrs  T(C): 36.6 (28 Nov 2022 13:26), Max: 36.6 (28 Nov 2022 13:26)  T(F): 97.8 (28 Nov 2022 13:26), Max: 97.8 (28 Nov 2022 13:26)  HR: 95 (28 Nov 2022 13:26) (95 - 95)  BP: 126/84 (28 Nov 2022 13:26) (126/84 - 126/84)  RR: 18 (28 Nov 2022 13:26) (18 - 18)  SpO2: 96% (28 Nov 2022 13:26) (96% - 96%)        PHYSICAL EXAM:  GEN: NAD, awake and alert, non-verbal  SKIN: non diaphoretic.  HEENT: NC/AT.  RESP: No dyspnea, non-labored breathing. No use of accessory muscles.  CARDIO: +S1/S2  ABDO: Soft, NT/ND, no palpable bladder, no suprapubic tenderness, difficult to access insertion site 2/2 body habitus/severely contracted B/L LE.  BACK: No CVAT B/L  : Normal male genitalia, + catheter associated hypospadias. + 16 Fr Indwelling Campuzano catheter in place, draining yellow cloudy urine.   Extremities: very contracted B/L LE.

## 2022-12-02 NOTE — ED ADULT TRIAGE NOTE - CHIEF COMPLAINT QUOTE
Pt from [facility], was sent to IR for insertion of suprapubic catheter, pt became tachycardic, febrile, and tachypneic in IR, rapid response was activated in IR, pt then brought to ED

## 2022-12-02 NOTE — H&P ADULT - ATTENDING COMMENTS
Pt seen and examined in the ED. History from the chart. Pt is nonverbal.  He was admitted to stepdown unit after having severe sepsis in IR suite while awaiting SPC replacement.    T(F): 99.4 (12-02-22 @ 15:10), Max: 103.7 (12-02-22 @ 07:05)  HR: 111 (12-02-22 @ 15:10) (111 - 151)  BP: 123/84 (12-02-22 @ 15:10) (113/80 - 141/96)  RR: 20 (12-02-22 @ 15:10) (20 - 60)  SpO2: 90% (12-02-22 @ 15:10) (90% - 99%)      PHYSICAL EXAM:  GENERAL: appeared to be shivering in the ED  HEAD:  Normocephalic  EYES:  conjunctiva and sclera clear  ENMT: Mouth closed  NECK: Supple, right neck IV catheter  NERVOUS SYSTEM:  awake, alert, nonverbal  CHEST/LUNG: shallow bS b/l  HEART: tachy  ABDOMEN: Soft, Nontender, Nondistended; G tube without erythema or discharge  EXTREMITIES: No edema, contracted  SKIN: cool left LE (covered with skin and reassess), DP pulse palpable  : mansfield with cloudy, thick urine    Telemetry reviewed by me    LABS:                        13.4   32.65 )-----------( 423      ( 02 Dec 2022 08:21 )             41.3     12-02    147<H>  |  112<H>  |  47<H>  ----------------------------<  185<H>  3.8   |  18  |  1.3    Ca    8.2<L>      02 Dec 2022 08:21  Phos  4.0     12-02  Mg     2.2     12-02    TPro  6.9  /  Alb  3.9  /  TBili  0.9  /  DBili  x   /  AST  25  /  ALT  65<H>  /  AlkPhos  142<H>  12-02  PT/INR - ( 02 Dec 2022 08:21 )   PT: 15.00 sec;   INR: 1.31 ratio  PTT - ( 02 Dec 2022 08:21 )  PTT:33.4 sec  CARDIAC MARKERS ( 02 Dec 2022 08:21 )  x     / 0.20 ng/mL / x     / x     / x        RADIOLOGY & ADDITIONAL TESTS:  Imaging and report Personally Reviewed:  [ x] YES  [ ] NO    51 y/o man with PMH of MS, nonverbal at baseline, chronic low back pain, ?neurogenic bladder s/p SPC placement and s/p dislodgement was at IR for replacement of SPC when he was found to be in severe sepsis.    1. Severe sepsis present on admission - admitted to stepdown unit  likely source is : cloudy, thick urine in mansfield - rule out bacteremia, aspiration  on aztreonam, s/p zyvox x 1 dose  f/u blood and urine cultures  ID consulted  IV hydration for hypernatremia and increase free water via PEG  trend WBC count and lactate  monitor pulse ox    2. ?Neurogenic bladder  had SPC that was dislodged - now with mansfield and SPC was going to be replaced when he became septic   and IR following  monitor urine output  on flomax    3. Dysphagia - on PEG feeds and monitor tolerance    4. MS, nonverbal, bedbound  repositioning per protocol  continue baclofen    5. SUKHDEV - likely prerenal - continue hydration and monitor     6. Elevated trop 0.20 - likely NSTEMI type 2 due to sepsis - trend and check ECHO - if rising, cardio eval    very guarded prognosis  full code    PROGRESS NOTE HANDOFF    Pending: blood and urine cultures, labs in AM, ID consult, ECHO,  eval    Disposition:  [facility]

## 2022-12-02 NOTE — ED PROVIDER NOTE - PHYSICAL EXAMINATION
GENERAL: Chronically ill appearing   SKIN: Suprapubic catheter tract noted in R pelvic region, no discharge, erythema, or TTP at site. Appears dry.   HEAD: Normocephalic; atraumatic.  EYES: PERRLA, EOMI, no conjunctival erythema  CARD: S1, S2 normal; no murmurs, gallops, or rubs. Regular rate and rhythm.   RESP: LCTAB; No wheezes, rales, rhonchi, or stridor.  ABD: soft, nontender, and nondistended  : mansfield catheter in place   EXT: Contracted UE and LEs.  No edema   NEURO: Limited neuro exam but no focal deficits

## 2022-12-02 NOTE — CONSULT NOTE ADULT - ASSESSMENT
IMPRESSION:  Sepsis POA  Probable UTI  non verbal at baseline        PLAN:      CNS: avoid sedation    HEENT: Oral care    PULMONARY:  HOB @ 45 degrees.  Aspiration precautions, target Spo2     CARDIOVASCULAR: avoid volume overload, MAP adequate, adequate fluid resuscitation, check BNP, ECHO    GI: GI prophylaxis. feeding as tolerated    RENAL:  Follow up lytes.  Correct as needed    INFECTIOUS DISEASE: Follow up cultures, procalcitonin, no positive culture in chart but given patient has h/o SPC now sepsis secondary to UTI, will continue broad spectrum abx    HEMATOLOGICAL:  DVT prophylaxis. LE duplex    ENDOCRINE:  Follow up FS.  Insulin protocol if needed.    MUSCULOSKELETAL: bedrest    Stepdown                 IMPRESSION:  Sepsis POA  Probable UTI  non verbal at baseline  NETEMI likely Type 2        PLAN:      CNS: avoid sedation    HEENT: Oral care    PULMONARY:  HOB @ 45 degrees.  Aspiration precautions, target Spo2     CARDIOVASCULAR: avoid volume overload, MAP adequate, adequate fluid resuscitation, check BNP, ECHO, trend troponin, repeat EKG in 6 hr    GI: GI prophylaxis. feeding as tolerated    RENAL:  Follow up lytes.  Correct as needed    INFECTIOUS DISEASE: Follow up cultures, procalcitonin, no positive culture in chart but given patient has h/o SPC now sepsis secondary to UTI, will continue broad spectrum abx    HEMATOLOGICAL:  DVT prophylaxis. LE duplex    ENDOCRINE:  Follow up FS.  Insulin protocol if needed.    MUSCULOSKELETAL: bedrest    Stepdown                 IMPRESSION:  Sepsis POA  Probable UTI  non verbal at baseline  NETEMI likely Type 2        PLAN:      CNS: avoid sedation    HEENT: Oral care    PULMONARY:  HOB @ 45 degrees.  Aspiration precautions, target Spo2     CARDIOVASCULAR: avoid volume overload, MAP adequate, adequate fluid resuscitation, check BNP, ECHO, trend troponin, repeat EKG in 6 hr    GI: GI prophylaxis. feeding as tolerated    RENAL:  Follow up lytes.  Correct as needed    INFECTIOUS DISEASE: Follow up cultures, procalcitonin, no positive culture in chart but given patient has h/o SPC now sepsis secondary to UTI, will continue broad spectrum abx    HEMATOLOGICAL:  DVT prophylaxis. LE duplex    ENDOCRINE:  Follow up FS.  Insulin protocol if needed.    MUSCULOSKELETAL: bedrest    Stepdown

## 2022-12-02 NOTE — ED ADULT NURSE NOTE - OBJECTIVE STATEMENT
Pt from [facility], was sent to IR for insertion of suprapubic catheter, pt became tachycardic, febrile, and tachypneic in IR, rapid response was activated in IR, pt then brought to ED

## 2022-12-02 NOTE — CHART NOTE - NSCHARTNOTEFT_GEN_A_CORE
RR called by nursing staff in IR suite for patient tachycardic tachypnic   here for elective suprapubic catheter replacement by IR. Patient assessed at bedside   Patient was tachycaric in the 150s, normotensive, tachypneic in the 40s, febrile in the 103.  Chest examination revealed CTAB, CV was RRR, tachycardic, abdomen examination revealed a soft abdomen with a dislodged catheter.    ABG drawn, blood cultures work drawn and sent to the lab.  EKG showed sinus tachycardia, CXR showed no acute pathology, KUB reading is pending.  Patient meets 3 criteria of sepsis till now, previous hospitalizations did not reveal any growth in blood/ urine cultures.    Patient will be given IV NaCl bolus, one time dose of cefepime and vancomycin, pending completion of the workup in the ED.  Urosepsis remains high in the differential.

## 2022-12-02 NOTE — H&P ADULT - NSHPPHYSICALEXAM_GEN_ALL_CORE
T(C): 37.4 (12-02-22 @ 15:10), Max: 39.8 (12-02-22 @ 07:05)  HR: 111 (12-02-22 @ 15:10) (111 - 151)  BP: 123/84 (12-02-22 @ 15:10) (113/80 - 141/96)  RR: 20 (12-02-22 @ 15:10) (20 - 60)  SpO2: 90% (12-02-22 @ 15:10) (90% - 99%)    PHYSICAL EXAM:  GENERAL: patient appears ill, uncomfortable appearing, diaphoretic  EYES: sclera clear, no exudates  LUNGS: good air entry bilaterally, clear to auscultation, symmetric breath sounds, no wheezing or rhonchi appreciated  HEART: soft S1/S2, regular rhythm, tachycardic, no LE edema  GASTROINTESTINAL: abdomen is soft, nontender, nondistended, normoactive bowel sounds, no palpable masses. G tube present  INTEGUMENT: good skin turgor, no lesions noted  : mansfield present  MUSCULOSKELETAL: no clubbing or cyanosis, contracted extremities  NEUROLOGIC: awake and alert, good muscle tone in 4 extremities  HEME/LYMPH: no palpable supraclavicular nodules, no obvious ecchymosis or petechiae

## 2022-12-02 NOTE — CONSULT NOTE ADULT - SUBJECTIVE AND OBJECTIVE BOX
Patient is a 52y old  Male who presents with a chief complaint of     HPI:  53 yo male w/ PMH of MS, chronic low back pain, neurogenic bladder? presents for fever.  Was in IR for suprapubic catheter placement, was dislodged a couple weeks ago, currently has a mansfield in.  Prior to placement, was noted to be febrile w/ tachycardia and tachypnea so brought into ED.  Pt nonverbal at baseline, unable to provide further HPI.      PAST MEDICAL & SURGICAL HISTORY:  Multiple sclerosis      Chronic back pain      Hypertension      No significant past surgical history          SOCIAL HX:   Smoking                             FAMILY HISTORY:  No pertinent family history in first degree relatives    :  No known cardiovacular family hisotry     Review Of Systems:     All ROS are negative except per HPI       Allergies    penicillin (Unknown)  vancomycin (Unknown)    Intolerances          PHYSICAL EXAM    ICU Vital Signs Last 24 Hrs  T(C): 39.8 (02 Dec 2022 07:25), Max: 39.8 (02 Dec 2022 07:05)  T(F): 103.7 (02 Dec 2022 07:25), Max: 103.7 (02 Dec 2022 07:05)  HR: 132 (02 Dec 2022 08:10) (132 - 151)  BP: 141/96 (02 Dec 2022 08:10) (123/95 - 141/96)  BP(mean): --  ABP: --  ABP(mean): --  RR: 22 (02 Dec 2022 08:10) (22 - 60)  SpO2: 99% (02 Dec 2022 08:10) (93% - 99%)    O2 Parameters below as of 02 Dec 2022 08:10  Patient On (Oxygen Delivery Method): nasal cannula  O2 Flow (L/min): 2          CONSTITUTIONAL:  ill appearing    ENT:   Airway patent,   Mouth with normal mucosa.     CARDIAC:   tachycardic      RESPIRATORY:   No wheezing  Bilateral BS   Not tachypneic,  No use of accessory muscles    GASTROINTESTINAL:  Abdomen soft,   Non-tender,   No guarding,   + BS      NEUROLOGICAL:   non verbal at baseline    SKIN:   Skin normal color for race,   No evidence of rash.                LABS:                          13.4   32.65 )-----------( 423      ( 02 Dec 2022 08:21 )             41.3                                               12-02    147<H>  |  112<H>  |  47<H>  ----------------------------<  185<H>  3.8   |  18  |  1.3    Ca    8.2<L>      02 Dec 2022 08:21  Phos  4.0     12-02  Mg     2.2     12-02    TPro  6.9  /  Alb  3.9  /  TBili  0.9  /  DBili  x   /  AST  25  /  ALT  65<H>  /  AlkPhos  142<H>  12-02      PT/INR - ( 02 Dec 2022 08:21 )   PT: 15.00 sec;   INR: 1.31 ratio         PTT - ( 02 Dec 2022 08:21 )  PTT:33.4 sec                                           CARDIAC MARKERS ( 02 Dec 2022 08:21 )  x     / 0.20 ng/mL / x     / x     / x                                                LIVER FUNCTIONS - ( 02 Dec 2022 08:21 )  Alb: 3.9 g/dL / Pro: 6.9 g/dL / ALK PHOS: 142 U/L / ALT: 65 U/L / AST: 25 U/L / GGT: x                                                                                                                                   ABG - ( 02 Dec 2022 06:43 )  pH, Arterial: 7.34  pH, Blood: x     /  pCO2: 14    /  pO2: 53    / HCO3: 8     / Base Excess: -15.2 /  SaO2: 98.1                    MEDICATIONS  (STANDING):  ketorolac   Injectable 15 milliGRAM(s) IV Push Once    MEDICATIONS  (PRN):

## 2022-12-02 NOTE — ED ADULT NURSE REASSESSMENT NOTE - NS ED NURSE REASSESS COMMENT FT1
Pt was turned and no pressure injuries were noted. Campuzano was replaced, 16FR was inserted in ED. Pt is afebrile with rectal temperature 101.6F and just given toradol.
None

## 2022-12-02 NOTE — ED PROVIDER NOTE - CLINICAL SUMMARY MEDICAL DECISION MAKING FREE TEXT BOX
I personally evaluated the patient. I reviewed the Resident´s or Physician Assistant´s note (as assigned above), and agree with the findings and plan except as documented in my note.  Patient treated for fever.  Labs, EKG, chest x-ray performed in the ED.  Patient started on fluids and antibiotics.  Patient given medication for fever with improvement in symptoms however still noted to be tachycardic.  Discussed with ICU team who recommended placement in stepdown unit.  Patient admitted to stepdown unit for further evaluation treatment.

## 2022-12-02 NOTE — H&P ADULT - ASSESSMENT
53 yo male w/ PMH of MS, chronic low back pain, neurogenic bladder? presents for fever.  Was in IR for suprapubic catheter placement, was dislodged a couple weeks ago, currently has a mansfield in.  Prior to placement, was noted to be febrile w/ tachycardia and tachypnea so brought into ED.  Pt nonverbal at baseline, unable to provide further HPI.    #Sepsis POA secondary to UTI  - given aztreonam, zyvox vancomycin in ED  - CXR showing worsening opacifications  - U/A positive  - trend WBC, fevers  - check blood cultures  - check procalc  - check urine cultures  - repeat lactate  - continue with broad spectrum abx    #NSTEMI likely Type 2  - trend troponins  - check bnp  - check echo  - repeat ECG    #hypernatremia  - gentle hydration with D5 1/2 NS    #neurogenic bladder?  - IR consult for replacement of SPC    #Multiple sclerosis  #chronic lower back pain    #Misc:  #DVT PPX: lovenox  #GI PPX: protonix  #Diet: soft and bite sized  #Activity: bedrest  #Dispo: admit to SDU       53 yo male w/ PMH of MS, chronic low back pain, neurogenic bladder? presents for fever.  Was in IR for suprapubic catheter placement, was dislodged a couple weeks ago, currently has a mansfield in.  Prior to placement, was noted to be febrile w/ tachycardia and tachypnea so brought into ED.  Pt nonverbal at baseline, unable to provide further HPI.    #Sepsis shock POA secondary to UTI  - given aztreonam, zyvox vancomycin in ED  - CXR showing worsening opacifications  - U/A positive  - trend WBC, fevers  - check blood cultures  - check procalc  - check urine cultures  - repeat lactate  - continue with LR @100cc/hr  - continue with broad spectrum abx    #NSTEMI likely Type 2  - trend troponins  - check bnp  - check echo  - repeat ECG    #neurogenic bladder?  - IR consult for replacement of SPC    #Multiple sclerosis  #chronic lower back pain    #Misc:  #DVT PPX: lovenox  #GI PPX: protonix  #Diet: soft and bite sized  #Activity: bedrest  #Dispo: admit to SDU       Pt is nonverbal at baseline. Hx obtained from chart.     53 yo male w/ PMH of MS, chronic low back pain, neurogenic bladder? s/p SPC placement and s/p dislodgement, presents for fever, found to be in septic shock secondary to urosepsis.    #Sepsis shock POA secondary to UTI  - given aztreonam, zyvox in ED  - CXR showing worsening opacifications  - U/A positive  - trend WBC, fevers  - check blood cultures  - check procalc  - check urine cultures  - repeat lactate  - continue with LR @100cc/hr  - monitor HR, if becomes hypoxic would r/o PE  - continue with broad spectrum abx (pt has unknown allergies to vancomycin and penicillins)- continue with aztreonam, zyvox  - ID consult    #NSTEMI likely Type 2  - trend troponins  - check bnp  - check echo  - repeat ECG  - despite fluid resuscitation, pt is still septic    #SUKHDEV on presentation  - improving, last baseline was 0.7  - continue with fluids    #BPH  #neurogenic bladder?  - IR consult for replacement of SPC  - urology consult    #DIC?  - pt on home Eliquis with indication for DIC as per [facility] medication list    #dysphagia  - continue with tube feeds as tolerated  - nutrition consult    #Multiple sclerosis  #chronic lower back pain  - continue with OP meds    #early onset alzheimers  - continue with OP meds    #MDD  - continue with home meds    #Misc:  #DVT PPX: Eliquis  #GI PPX: protonix  #Diet: tube feeds  #Activity: bedrest  #Dispo: admit to SDU       Pt is nonverbal at baseline. Hx obtained from chart.     51 yo male w/ PMH of MS, chronic low back pain, neurogenic bladder? s/p SPC placement and s/p dislodgement, presents for fever, found to be in septic shock secondary to urosepsis.    #severe sepsis POA likely urinary source  - given aztreonam, zyvox in ED  - CXR showing worsening opacifications  - U/A positive  - trend WBC, fevers  - check blood cultures  - check procalc  - check urine cultures  - repeat lactate  - continue with LR @100cc/hr  - monitor HR, if becomes hypoxic would r/o PE  - continue with broad spectrum abx (pt has unknown allergies to vancomycin and penicillins)- continue with aztreonam, zyvox  - ID consult    #NSTEMI likely Type 2  - trend troponins  - check bnp  - check echo  - repeat ECG  - despite fluid resuscitation, pt is still septic    #SUKHDEV on presentation  - improving, last baseline was 0.7  - continue with fluids    #BPH  #neurogenic bladder?  - IR consult for replacement of SPC  - urology consult    #DIC?  - pt on home Eliquis with indication for DIC as per [facility] medication list    #dysphagia  - continue with tube feeds as tolerated  - nutrition consult    #Multiple sclerosis  #chronic lower back pain  - continue with OP meds    #early onset alzheimers  - continue with OP meds    #MDD  - continue with home meds    #Misc:  #DVT PPX: Eliquis  #GI PPX: protonix  #Diet: tube feeds  #Activity: bedrest  #Dispo: admit to SDU       Pt is nonverbal at baseline. Hx obtained from chart.     53 yo male w/ PMH of MS, chronic low back pain, neurogenic bladder? s/p SPC placement and s/p dislodgement, presents for fever, found to be in septic shock secondary to urosepsis.    #severe sepsis POA likely urinary source  - given aztreonam, zyvox in ED  - CXR showing worsening opacifications  - U/A positive  - trend WBC, fevers  - check blood cultures  - check procalc  - check urine cultures  - repeat lactate  - continue with LR @100cc/hr  - monitor HR, if becomes hypoxic would r/o PE  - continue with broad spectrum abx (pt has unknown allergies to vancomycin and penicillins)  - continue with aztreonam, clindamycin (pt at risk for serotonin syndrome with zyvox as pt takes SSRIs and TCAs outpatient- would not stop these abruptly because of possible antidepressant discontinuation syndrome)  - ID consult    #NSTEMI likely Type 2  - trend troponins  - check bnp  - check echo  - repeat ECG  - despite fluid resuscitation, pt is still septic    #SUKHDEV on presentation  - improving, last baseline was 0.7  - continue with fluids    #BPH  #neurogenic bladder?  - IR consult for replacement of SPC  - urology consult    #DIC?  - pt on home Eliquis with indication for DIC as per [facility] medication list    #dysphagia  - continue with tube feeds as tolerated  - nutrition consult    #Multiple sclerosis  #chronic lower back pain  - continue with OP meds    #early onset alzheimers  - continue with OP meds    #MDD  - continue with home meds    #Misc:  #DVT PPX: Eliquis  #GI PPX: protonix  #Diet: tube feeds  #Activity: bedrest  #Dispo: admit to SDU       Pt is nonverbal at baseline. Hx obtained from chart.     53 yo male w/ PMH of MS, chronic low back pain, neurogenic bladder? s/p SPC placement and s/p dislodgement, presents for fever, found to be in septic shock secondary to urosepsis.    #severe sepsis POA, likely urinary source  - given aztreonam, zyvox in ED  - CXR showing worsening opacifications  - U/A positive  - trend WBC, fevers  - check blood cultures  - check procalc  - check urine cultures  - repeat lactate  - continue with LR @100cc/hr  - monitor HR, if becomes hypoxic would r/o PE  - continue with broad spectrum abx (pt has unknown allergies to vancomycin and penicillins)  - continue with aztreonam, zyvox for now   - ID consult    #NSTEMI likely Type 2 from sepsis  - trend troponins  - check bnp  - check echo  - repeat ECG  - despite fluid resuscitation, pt is still septic    #SUKHDEV on presentation  - improving, last baseline was 0.7  - continue with fluids    #BPH  #neurogenic bladder?  - IR consult for replacement of SPC  - urology consult    #DIC?  - pt on home Eliquis with indication for DIC as per [facility] medication list    #dysphagia  - continue with tube feeds as tolerated  - nutrition consult    #MDD  - holding home amitriptyline and sertraline for now as pt is on zyvox and want to avoid serotonin syndrome risk (restart if decide to dc zyvox)    #Multiple sclerosis  #chronic lower back pain  - continue with OP meds    #early onset alzheimers  - continue with OP meds    #Misc:  #DVT PPX: Eliquis  #GI PPX: protonix  #Diet: tube feeds  #Activity: bedrest  #Dispo: admit to SDU

## 2022-12-02 NOTE — ED ADULT NURSE NOTE - NSIMPLEMENTINTERV_GEN_ALL_ED
Implemented All Fall Risk Interventions:  [place] to call system. Call bell, personal items and telephone within reach. Instruct patient to call for assistance. Room bathroom lighting operational. Non-slip footwear when patient is off stretcher. Physically safe environment: no spills, clutter or unnecessary equipment. Stretcher in lowest position, wheels locked, appropriate side rails in place. Provide visual cue, wrist band, yellow gown, etc. Monitor gait and stability. Monitor for mental status changes and reorient to person, place, and time. Review medications for side effects contributing to fall risk. Reinforce activity limits and safety measures with patient and family.

## 2022-12-02 NOTE — ED ADULT NURSE NOTE - CHIEF COMPLAINT QUOTE
Pt from [facility], was sent to IR for insertion of suprapubic catheter, pt became tachycardic, febrile, and tachypneic in IR, rapid response was activated in IR, pt then brought to ED

## 2022-12-02 NOTE — H&P ADULT - HISTORY OF PRESENT ILLNESS
53 yo male w/ PMH of MS, chronic low back pain, neurogenic bladder? presents for fever.  Was in IR for suprapubic catheter placement, was dislodged a couple weeks ago, currently has a mansfield in.  Prior to placement, was noted to be febrile w/ tachycardia and tachypnea so brought into ED.  Pt nonverbal at baseline, unable to provide further HPI.  In the ED:  · BP Systolic	138 mm Hg · BP Diastolic	92 mm Hg · Heart Rate	  150 /min · Respiration Rate (breaths/min)	  60 /min · Temp (F)	  103.7 Degrees F · Temp (C)	  39.8 Degrees C · Temp site	rectal · SpO2 (%)	98 % · O2 Delivery/Oxygen Delivery Method	nasal cannula · Oxygen Therapy Flow (L/min)	2 L/min  Labs notable for WBC 32k, lactate 2.4, Na 147, AG 17, troponin 0.2 UA showing large leuks, WBC, bacteria, RBC CXR showing worsening opacifications ECG showing _______________ Given aztreonam, zyvox, vancomycin, fluid bolus Pt admitted to SDU.  51 yo male w/ PMH of MS, chronic low back pain, neurogenic bladder? presents for fever.  Was in IR for suprapubic catheter placement, was dislodged a couple weeks ago, currently has a mansfield in.  Prior to placement, was noted to be febrile w/ tachycardia and tachypnea so brought into ED.  Pt nonverbal at baseline, unable to provide further HPI.  In the ED:  · BP Systolic	138 mm Hg · BP Diastolic	92 mm Hg · Heart Rate	  150 /min · Respiration Rate (breaths/min)	  60 /min · Temp (F)	  103.7 Degrees F · Temp (C)	  39.8 Degrees C · Temp site	rectal · SpO2 (%)	98 % · O2 Delivery/Oxygen Delivery Method	nasal cannula · Oxygen Therapy Flow (L/min)	2 L/min  Labs notable for WBC 32k, lactate 2.4, Na 147, AG 17, troponin 0.2 UA showing large leuks, WBC, bacteria, RBC CXR showing worsening opacifications ECG showing sinus tachy, possible inferior infarct Given aztreonam, zyvox, vancomycin, fluid bolus Pt admitted to SDU.  Pt is nonverbal at baseline. Hx obtained from chart.   53 yo male w/ PMH of MS, chronic low back pain, neurogenic bladder? s/p SPC placement and s/p dislodgement, presents for fever. Presented to the ED for SPC dislodgement a few days ago. Was seen by IR and planned for outpatient SPC replacement. Campuzano was placed at [facility]. Today. prior to placement, he was noted to be febrile w/ tachycardia and tachypnea so brought into ED.  Pt nonverbal at baseline, unable to provide further HPI.  In the ED:  · BP Systolic	138 mm Hg · BP Diastolic	92 mm Hg · Heart Rate	  150 /min · Respiration Rate (breaths/min)	  60 /min · Temp (F)	  103.7 Degrees F · Temp (C)	  39.8 Degrees C · Temp site	rectal · SpO2 (%)	98 % · O2 Delivery/Oxygen Delivery Method	nasal cannula · Oxygen Therapy Flow (L/min)	2 L/min  Labs notable for WBC 32k, lactate 2.4, Na 147, AG 17, troponin 0.2 UA showing large leuks, WBC, bacteria, RBC CXR showing worsening opacifications ECG showing sinus tachy, possible inferior infarct Given aztreonam, zyvox, fluid bolus Pt admitted to SDU.

## 2022-12-02 NOTE — H&P ADULT - NSHPLABSRESULTS_GEN_ALL_CORE
13.4   32.65 )-----------( 423      ( 02 Dec 2022 08:21 )             41.3       12-    147<H>  |  112<H>  |  47<H>  ----------------------------<  185<H>  3.8   |  18  |  1.3    Ca    8.2<L>      02 Dec 2022 08:21  Phos  4.0     12  Mg     2.2     12    TPro  6.9  /  Alb  3.9  /  TBili  0.9  /  DBili  x   /  AST  25  /  ALT  65<H>  /  AlkPhos  142<H>  12          ABG - ( 02 Dec 2022 06:43 )  pH, Arterial: 7.34  pH, Blood: x     /  pCO2: 14    /  pO2: 53    / HCO3: 8     / Base Excess: -15.2 /  SaO2: 98.1                Urinalysis Basic - ( 02 Dec 2022 10:34 )    Color: [place] / Appearance: Turbid / S.021 / pH: x  Gluc: x / Ketone: Trace  / Bili: Negative / Urobili: 3 mg/dL   Blood: x / Protein: 100 mg/dL / Nitrite: Negative   Leuk Esterase: Large / RBC: 236 /HPF / WBC >720 /HPF   Sq Epi: x / Non Sq Epi: 2 /HPF / Bacteria: Many        PT/INR - ( 02 Dec 2022 08:21 )   PT: 15.00 sec;   INR: 1.31 ratio         PTT - ( 02 Dec 2022 08:21 )  PTT:33.4 sec    Lactate Trend   @ 13:41 Lactate:2.4       CARDIAC MARKERS ( 02 Dec 2022 08:21 )  x     / 0.20 ng/mL / x     / x     / x            < from: Xray Chest 1 View-PORTABLE IMMEDIATE (22 @ 07:51) >    INTERPRETATION:  Clinical History / Reason for exam: Sepsis    Comparison : Chest radiograph 1 hour prior.    Technique/Positioning: Frontal portable.    Findings:    Support devices: Telemetry leads overlie the thorax    Cardiac/mediastinum/hilum: Heart is enlarged    Lung parenchyma/Pleura: Bilateral opacities    Skeleton/soft tissues: Unchanged    Impression:    Worsening opacifications. Cardiomegaly.        --- End of Report ---    < end of copied text >

## 2022-12-02 NOTE — ED PROVIDER NOTE - OBJECTIVE STATEMENT
53 yo male w/ PMH of MS, chronic low back pain, neurogenic bladder? presents for fever.  Was in IR for suprapubic catheter placement, was dislodged a couple weeks ago, currently has a mansfield in.  Prior to placement, was noted to be febrile w/ tachycardia and tachypnea so brought into ED.  Pt nonverbal at baseline, unable to provide further HPI.

## 2022-12-02 NOTE — ED PROVIDER NOTE - ATTENDING CONTRIBUTION TO CARE
52-year-old male past medical history of MS, chronic back pain, neurogenic bladder presents with fever and tachycardia.  Patient was in IR receiving a suprapubic catheter that was dislodged a couple weeks ago.  During placement, noted to be febrile and tachycardic with mild tachypnea and brought to the ED for evaluation.  Patient nonverbal at baseline, unable to provide further HPI.    CONSTITUTIONAL: No acute distress  SKIN: skin exam is warm and dry, no acute rash.  HEAD: Normocephalic; atraumatic.  EYES: PERRL, 3 mm bilateral, no nystagmus, EOM intact; conjunctiva and sclera clear.  ENT: No nasal discharge; airway clear.  NECK: Supple; non tender. + full passive ROM in all directions. No JVD  CARD: S1, S2 normal; no murmurs, gallops, or rubs. Regular rate and rhythm. + Symmetric Strong Pulses  RESP: No wheezes, rales or rhonchi. Good air movement bilaterally  ABD: soft; non-distended; non-tender. No Rebound, No Guarding, No signs of peritonitis, No CVA tenderness. No pulsatile abdominal mass. + Strong and Symmetric Pulses  EXT: Extremities contracted.  No clubbing, cyanosis or edema. Dp and Pt Pulses intact. Cap refill less than 3 seconds  NEURO: No facial droop, moving extremities at baseline, patient awake and alert.

## 2022-12-03 NOTE — DIETITIAN INITIAL EVALUATION ADULT - PERTINENT MEDS FT
MEDICATIONS  (STANDING):  apixaban 5 milliGRAM(s) Oral every 12 hours  aztreonam  IVPB 2000 milliGRAM(s) IV Intermittent every 8 hours  baclofen 20 milliGRAM(s) Oral every 12 hours  donepezil 10 milliGRAM(s) Oral at bedtime  lactated ringers. 1000 milliLiter(s) (100 mL/Hr) IV Continuous <Continuous>  lactulose Syrup 10 Gram(s) Oral daily  linezolid  IVPB 600 milliGRAM(s) IV Intermittent every 12 hours  memantine 10 milliGRAM(s) Oral two times a day  multivitamin 1 Tablet(s) Oral daily  pantoprazole    Tablet 40 milliGRAM(s) Oral before breakfast  senna 2 Tablet(s) Oral at bedtime  tamsulosin 0.4 milliGRAM(s) Oral at bedtime    MEDICATIONS  (PRN):  acetaminophen     Tablet .. 650 milliGRAM(s) Oral every 6 hours PRN Temp greater or equal to 38C (100.4F), Mild Pain (1 - 3)  albuterol    90 MICROgram(s) HFA Inhaler 2 Puff(s) Inhalation every 6 hours PRN Shortness of Breath and/or Wheezing  ibuprofen  Suspension. 600 milliGRAM(s) Oral every 8 hours PRN Temp greater or equal to 38.5C (101.3F)  melatonin 3 milliGRAM(s) Oral at bedtime PRN Insomnia

## 2022-12-03 NOTE — CONSULT NOTE ADULT - ASSESSMENT
ASSESSMENT  53 yo male w/ PMH of MS, chronic low back pain, neurogenic bladder? s/p SPC placement and s/p dislodgement, presents for fever. Presented to the ED for SPC dislodgement a few days ago. Was seen by IR and planned for outpatient SPC replacement. Campuzano was placed at [facility]. Today. prior to placement, he was noted to be febrile w/ tachycardia and tachypnea so brought into ED.      IMPRESSION  #  #Severe Sepsis on admission T>101F, Pulse>90,WBC>12, lactic acidosis due to acute complicated cystitis    12/2 BCX  Staphylococcus epidermidis, Methicillin resistant: Detec 1/2 bottles - contaminant     UA Blood: x / Protein: 100 mg/dL / Nitrite: Negative   Leuk Esterase: Large / RBC: 236 /HPF / WBC >720 /HPF   Sq Epi: x / Non Sq Epi: 2 /HPF / Bacteria: Many  #CXR Small left pleural effusion and possible left basilar consolidation.  #MS with neurogenic bladder, dislodged SPC  #Lactic acidosis  #Hypernatremia   QTC Calculation(Bazett) 407 ms  #Severe protein calorie malnutrition  BMI (kg/m2): 17.8    #Abx allergy: penicillin (Unknown)  vancomycin (Unknown)    Creatinine, Serum: 0.5 (12-03-22 @ 08:22)    Weight (kg): 68 (12-02-22 @ 08:20)    RECOMMENDATIONS  This is an incomplete consult note. All final recommendations to follow after interview and examination of the patient. Please follow recommendations noted below.    If any questions, please call or send a message on [employer] Teams  Please continue to update ID with any pertinent new laboratory or radiographic findings  #[id number]     ASSESSMENT  53 yo male w/ PMH of MS, chronic low back pain, neurogenic bladder? s/p SPC placement and s/p dislodgement, presents for fever. Presented to the ED for SPC dislodgement a few days ago. Was seen by IR and planned for outpatient SPC replacement. Campuzano was placed at [facility]. Today. prior to placement, he was noted to be febrile w/ tachycardia and tachypnea so brought into ED.      IMPRESSION  #Severe Sepsis on admission T>101F, Pulse>90,WBC>12, lactic acidosis due to acute complicated cystitis    12/2 BCX  Staphylococcus epidermidis, Methicillin resistant: Detec 1/2 bottles - contaminant     UA Blood: x / Protein: 100 mg/dL / Nitrite: Negative   Leuk Esterase: Large / RBC: 236 /HPF / WBC >720 /HPF   Sq Epi: x / Non Sq Epi: 2 /HPF / Bacteria: Many  #CXR Small left pleural effusion and possible left basilar consolidation.  #MS with neurogenic bladder, dislodged SPC  #Lactic acidosis  #Hypernatremia   QTC Calculation(Bazett) 407 ms  #Severe protein calorie malnutrition  BMI (kg/m2): 17.8    #Abx allergy: penicillin (childhood)  vancomycin (Unknown)    Creatinine, Serum: 0.5 (12-03-22 @ 08:22)    Weight (kg): 68 (12-02-22 @ 08:20)    RECOMMENDATIONS  - Cefepime 2g q8h IV (PCN childhood allergy)  - linezolid  IVPB 600 milliGRAM(s) IV Intermittent every 12 hours (D/C if no GP in UCX)  - f/u UCX  - Repeat BCX- coNS likely contaminant   - if worsening hemodynamic compromise, dose gent 5mg/kg x1    If any questions, please call or send a message on [employer] Teams  Please continue to update ID with any pertinent new laboratory or radiographic findings  #[id number]

## 2022-12-03 NOTE — PROGRESS NOTE ADULT - SUBJECTIVE AND OBJECTIVE BOX
Patient is a 52y old  Male who presents with a chief complaint of sepsis (02 Dec 2022 15:53)      HPI:  Pt is nonverbal at baseline. Hx obtained from chart.     51 yo male w/ PMH of MS, chronic low back pain, neurogenic bladder? s/p SPC placement and s/p dislodgement, presents for fever. Presented to the ED for SPC dislodgement a few days ago. Was seen by IR and planned for outpatient SPC replacement. Campuzano was placed at [facility]. Today. prior to placement, he was noted to be febrile w/ tachycardia and tachypnea so brought into ED.  Pt nonverbal at baseline, unable to provide further HPI.    In the ED:    · BP Systolic	138 mm Hg  · BP Diastolic	92 mm Hg  · Heart Rate	  150 /min  · Respiration Rate (breaths/min)	  60 /min  · Temp (F)	  103.7 Degrees F  · Temp (C)	  39.8 Degrees C  · Temp site	rectal  · SpO2 (%)	98 %  · O2 Delivery/Oxygen Delivery Method	nasal cannula  · Oxygen Therapy Flow (L/min)	2 L/min    Labs notable for WBC 32k, lactate 2.4, Na 147, AG 17, troponin 0.2  UA showing large leuks, WBC, bacteria, RBC  CXR showing worsening opacifications  ECG showing sinus tachy, possible inferior infarct  Given aztreonam, zyvox, fluid bolus  Pt admitted to SDU.  (02 Dec 2022 15:53)      PAST MEDICAL & SURGICAL HISTORY:  Multiple sclerosis      Chronic back pain      Hypertension      No significant past surgical history          SOCIAL HX:  non Smoking                             FAMILY HISTORY:  No pertinent family history in first degree relatives    :  No known cardiovacular family hisotry     Review Of Systems:     All ROS are negative except per HPI       Allergies    penicillin (Unknown)  vancomycin (Unknown)    Intolerances          PHYSICAL EXAM    ICU Vital Signs Last 24 Hrs  T(C): 38.3 (03 Dec 2022 08:03), Max: 38.5 (03 Dec 2022 04:12)  T(F): 101 (03 Dec 2022 08:03), Max: 101.3 (03 Dec 2022 04:12)  HR: 122 (03 Dec 2022 08:03) (111 - 127)  BP: 126/76 (03 Dec 2022 08:03) (113/80 - 130/90)  BP(mean): --  ABP: --  ABP(mean): --  RR: 22 (03 Dec 2022 08:03) (20 - 22)  SpO2: 93% (03 Dec 2022 08:03) (90% - 96%)    O2 Parameters below as of 03 Dec 2022 09:25  Patient On (Oxygen Delivery Method): nasal cannula, high flow  O2 Flow (L/min): 60  O2 Concentration (%): 60        CONSTITUTIONAL:  not in distress    ENT:   Airway patent,   Mouth with normal mucosa.     CARDIAC:   slightly tachy     RESPIRATORY:   No wheezing  Bilateral BS   No use of accessory muscles    GASTROINTESTINAL:  Abdomen soft,   Non-tender,   No guarding,   + BS      NEUROLOGICAL:   contracted at baseline,   awake and alert  non vberbal at baseline    SKIN:   Skin normal color for race,   No evidence of rash.              22 @ 07:01  -  22 @ 07:00  --------------------------------------------------------  IN:    Enteral Tube Flush: 30 mL    Jevity 1.2: 200 mL  Total IN: 230 mL    OUT:    Indwelling Catheter - Urethral (mL): 1050 mL  Total OUT: 1050 mL    Total NET: -820 mL          LABS:                          11.5   15.00 )-----------( 302      ( 03 Dec 2022 08:22 )             36.8                                               12    147<H>  |  112<H>  |  47<H>  ----------------------------<  185<H>  3.8   |  18  |  1.3    Ca    8.2<L>      02 Dec 2022 08:21  Phos  4.0     12  Mg     2.2     12    TPro  6.9  /  Alb  3.9  /  TBili  0.9  /  DBili  x   /  AST  25  /  ALT  65<H>  /  AlkPhos  142<H>  12-02      PT/INR - ( 02 Dec 2022 08:21 )   PT: 15.00 sec;   INR: 1.31 ratio         PTT - ( 02 Dec 2022 08:21 )  PTT:33.4 sec                                       Urinalysis Basic - ( 02 Dec 2022 10:34 )    Color: Libby / Appearance: Turbid / S.021 / pH: x  Gluc: x / Ketone: Trace  / Bili: Negative / Urobili: 3 mg/dL   Blood: x / Protein: 100 mg/dL / Nitrite: Negative   Leuk Esterase: Large / RBC: 236 /HPF / WBC >720 /HPF   Sq Epi: x / Non Sq Epi: 2 /HPF / Bacteria: Many        CARDIAC MARKERS ( 03 Dec 2022 01:22 )  x     / 0.12 ng/mL / x     / x     / x      CARDIAC MARKERS ( 02 Dec 2022 08:21 )  x     / 0.20 ng/mL / x     / x     / x                                                LIVER FUNCTIONS - ( 02 Dec 2022 08:21 )  Alb: 3.9 g/dL / Pro: 6.9 g/dL / ALK PHOS: 142 U/L / ALT: 65 U/L / AST: 25 U/L / GGT: x                                                  Culture - Blood (collected 02 Dec 2022 08:21)  Source: .Blood Blood-Peripheral  Gram Stain (03 Dec 2022 09:03):    Growth in aerobic bottle: Gram Positive Cocci in Clusters  Preliminary Report (03 Dec 2022 09:03):    Growth in aerobic bottle: Gram Positive Cocci in Clusters    ***Blood Panel PCR results on this specimen are available    approximately 3 hours after the Gram stain result.***    Gram stain, PCR, and/or culture results may not always    correspond due to difference in methodologies.    ************************************************************    This PCR assay was performed by multiplex PCR. This    Assay tests for 66 bacterial and resistance gene targets.    Please refer to the [facility] Labs test directory    at [email or web address] for details.                                                                                       ABG - ( 02 Dec 2022 06:43 )  pH, Arterial: 7.34  pH, Blood: x     /  pCO2: 14    /  pO2: 53    / HCO3: 8     / Base Excess: -15.2 /  SaO2: 98.1                                                                                    ECHO not available      MEDICATIONS  (STANDING):  apixaban 5 milliGRAM(s) Oral every 12 hours  aztreonam  IVPB 2000 milliGRAM(s) IV Intermittent every 8 hours  baclofen 20 milliGRAM(s) Oral every 12 hours  donepezil 10 milliGRAM(s) Oral at bedtime  lactated ringers. 1000 milliLiter(s) (100 mL/Hr) IV Continuous <Continuous>  lactulose Syrup 10 Gram(s) Oral daily  linezolid  IVPB 600 milliGRAM(s) IV Intermittent every 12 hours  memantine 10 milliGRAM(s) Oral two times a day  multivitamin 1 Tablet(s) Oral daily  pantoprazole    Tablet 40 milliGRAM(s) Oral before breakfast  senna 2 Tablet(s) Oral at bedtime  tamsulosin 0.4 milliGRAM(s) Oral at bedtime    MEDICATIONS  (PRN):  acetaminophen     Tablet .. 650 milliGRAM(s) Oral every 6 hours PRN Temp greater or equal to 38C (100.4F), Mild Pain (1 - 3)  albuterol    90 MICROgram(s) HFA Inhaler 2 Puff(s) Inhalation every 6 hours PRN Shortness of Breath and/or Wheezing  ibuprofen  Suspension. 600 milliGRAM(s) Oral every 8 hours PRN Temp greater or equal to 38.5C (101.3F)  melatonin 3 milliGRAM(s) Oral at bedtime PRN Insomnia

## 2022-12-03 NOTE — DIETITIAN INITIAL EVALUATION ADULT - NSFNSGIIOFT_GEN_A_CORE
I&O's Detail    02 Dec 2022 07:01  -  03 Dec 2022 07:00  --------------------------------------------------------  IN:    Enteral Tube Flush: 30 mL    Jevity 1.2: 200 mL  Total IN: 230 mL    OUT:    Indwelling Catheter - Urethral (mL): 1050 mL  Total OUT: 1050 mL    Total NET: -820 mL

## 2022-12-03 NOTE — PROGRESS NOTE ADULT - ASSESSMENT
51 y/o man with PMH of MS, nonverbal at baseline, chronic low back pain, ?neurogenic bladder s/p SPC placement and s/p dislodgement was at IR for replacement of SPC when he was found to be in severe sepsis.    1. Severe sepsis present on admission 2/2 acute pyelonephritis  Staph epi Meth resistant BClx likley contaminant   on aztreonam, s/p zyvox x 1 dose  f/u blood and urine cultures  ID consulted follow up reccs  IV hydration for hypernatremia and increase free water via PEG  trend WBC count and lactate  monitor pulse ox  repeat blood cultures     2. Neurogenic bladder  had SPC that was dislodged - now with [facility] and SPC was going to be replaced when he became septic   and IR following  monitor urine output  on flomax    3. Dysphagia - on PEG feeds and monitor tolerance    4. MS, nonverbal, bedbound  repositioning per protocol  continue baclofen    5. SUKHDEV - likely prerenal - continue hydration and monitor     6. Elevated trop 0.20 - likely NSTEMI type 2 due to sepsis - trend and check ECHO - if rising, cardio eval 53 y/o man with PMH of MS, nonverbal at baseline, chronic low back pain, ?neurogenic bladder s/p SPC placement and s/p dislodgement was at IR for replacement of SPC when he was found to be in severe sepsis.    1. Severe sepsis present on admission 2/2 acute pyelonephritis  Staph epi Meth resistant BClx likley contaminant   on aztreonam, s/p zyvox x 1 dose  f/u blood and urine cultures  ID consulted follow up reccs  IV hydration for hypernatremia and increase free water via PEG  trend WBC count and lactate  monitor pulse ox  repeat blood cultures     2. Neurogenic bladder  had SPC that was dislodged - now with [facility] and SPC was going to be replaced when he became septic   and IR following  monitor urine output  on flomax    #elevated dimer  - duplex  - CTA     3. Dysphagia - on PEG feeds and monitor tolerance    4. MS, nonverbal, bedbound  repositioning per protocol  continue baclofen    5. SUKHDEV - likely prerenal - continue hydration and monitor     6. Elevated trop 0.20 - likely NSTEMI type 2 due to sepsis - trend and check ECHO    #hypokalemia- repleted    #Progress Note Handoff  Pending (specify):  follow up sepsis work up, cultures, ID, urology    Family discussion: house staff updated pt family  Disposition: snf  Anticipated date: >72 hrs

## 2022-12-03 NOTE — DIETITIAN INITIAL EVALUATION ADULT - OTHER INFO
Pt was here for elective suprapubic catheter replacement by IR, but found to be febrile w/ tachycardia and tachypnea so brought into ED. Found to have sepsis and respiratory failure. Pt noted to be non-verbal and have dysphagia. Family not reachable. TF started yesterday.  Pt was here for elective suprapubic catheter replacement by IR, but found to be febrile w/ tachycardia and tachypnea so brought into ED. Found to have sepsis and respiratory failure. TF started yesterday.

## 2022-12-03 NOTE — DIETITIAN INITIAL EVALUATION ADULT - ORAL INTAKE PTA/DIET HISTORY
Per chart review, pt last seen by RD in [facility] in Dec 2019. No swallowing difficulty then. NKFA. Anthropometric in Dec 2019: Height 6 feet 5 inches. BMI 21.2 # and was deemed well-nourished by RD. Pt noted to be non-verbal and have dysphagia. Family not reachable. Hx limited to chart review. Per EMR, pt last seen by RD in [facility] in Dec 2019. No swallowing difficulty then. NKFA. Anthropometric in Dec 2019: Height 6 feet 5 inches. BMI 21.2 # and was deemed well-nourished by [facility].

## 2022-12-03 NOTE — DIETITIAN INITIAL EVALUATION ADULT - PERTINENT LABORATORY DATA
12-03    144  |  110  |  29<H>  ----------------------------<  165<H>  3.2<L>   |  21  |  0.5<L>    Ca    8.9      03 Dec 2022 08:22  Phos  4.0     12-02  Mg     1.7     12-03    TPro  6.3  /  Alb  3.6  /  TBili  1.0  /  DBili  x   /  AST  15  /  ALT  40  /  AlkPhos  116<H>  12-03    K and mag dropped from yesterday -- refeeding? (phos WNL yesterday and no phos drawn today)

## 2022-12-03 NOTE — PROGRESS NOTE ADULT - SUBJECTIVE AND OBJECTIVE BOX
Pt seen and examined at bedside. Non verbal.       ABG - ( 02 Dec 2022 06:43 )  pH: 7.34  /  pCO2: 14    /  pO2: 53    / HCO3: 8     / Base Excess: -15.2 /  SaO2: 98.1        PAST MEDICAL & SURGICAL HISTORY:  Multiple sclerosis    Chronic back pain    Hypertension    No significant past surgical history        VITAL SIGNS (Last 24 hrs):  T(C): 38.1 (12-03-22 @ 10:00), Max: 38.5 (12-03-22 @ 04:12)  HR: 122 (12-03-22 @ 08:03) (111 - 127)  BP: 126/76 (12-03-22 @ 08:03) (116/82 - 126/76)  RR: 22 (12-03-22 @ 08:03) (20 - 22)  SpO2: 93% (12-03-22 @ 08:03) (90% - 96%)  Wt(kg): --  Daily Height in cm: 195.58 (03 Dec 2022 09:25)    Daily     I&O's Summary    02 Dec 2022 07:01  -  03 Dec 2022 07:00  --------------------------------------------------------  IN: 230 mL / OUT: 1050 mL / NET: -820 mL        PHYSICAL EXAM:  GENERAL: frail  HEAD:  Atraumatic, Normocephalic  EYES: EOMI, PERRLA, conjunctiva and sclera clear  NECK: Supple, No JVD  CHEST/LUNG: Clear to auscultation bilaterally; No wheeze  HEART: tachy rate and rhythm; No murmurs, rubs, or gallops  ABDOMEN: Soft, Nontender, Nondistended; Bowel sounds present, peg tube   EXTREMITIES:  2+ Peripheral Pulses, No clubbing, cyanosis, or edema  PSYCH: Alert  NEUROLOGY: non-focal  SKIN: No rashes or lesions  Fetal position     Labs Reviewed  Spoke to patient in regards to abnormal labs.    CBC Full  -  ( 03 Dec 2022 08:22 )  WBC Count : 15.00 K/uL  Hemoglobin : 11.5 g/dL  Hematocrit : 36.8 %  Platelet Count - Automated : 302 K/uL  Mean Cell Volume : 79.5 fL  Mean Cell Hemoglobin : 24.8 pg  Mean Cell Hemoglobin Concentration : 31.3 g/dL  Auto Neutrophil # : 13.55 K/uL  Auto Lymphocyte # : 0.64 K/uL  Auto Monocyte # : 0.69 K/uL  Auto Eosinophil # : 0.01 K/uL  Auto Basophil # : 0.02 K/uL  Auto Neutrophil % : 90.3 %  Auto Lymphocyte % : 4.3 %  Auto Monocyte % : 4.6 %  Auto Eosinophil % : 0.1 %  Auto Basophil % : 0.1 %    BMP:    12-03 @ 08:22    Blood Urea Nitrogen - 29  Calcium - 8.9  Carbond Dioxide - 21  Chloride - 110  Creatinine - 0.5  Glucose - 165  Potassium - 3.2  Sodium - 144      Hemoglobin A1c -   PT/INR - ( 02 Dec 2022 08:21 )   PT: 15.00 sec;   INR: 1.31 ratio         PTT - ( 02 Dec 2022 08:21 )  PTT:33.4 sec  Urine Culture:  12-02 @ 08:21 Urine culture: --    Culture Results:   Growth in aerobic bottle: Gram Positive Cocci in Clusters  ***Blood Panel PCR results on this specimen are available  approximately 3 hours after the Gram stain result.***  Gram stain, PCR, and/or culture results may not always  correspond due to difference in methodologies.  ************************************************************  This PCR assay was performed by multiplex PCR. This  Assay tests for 66 bacterial and resistance gene targets.  Please refer to the [facility] Labs test directory  at [email or web address] for details.  Method Type: PCR  Organism: Blood Culture PCR  Organism Identification: Blood Culture PCR  Specimen Source: .Blood Blood-Peripheral        COVID Labs  CRP:      D-Dimer:  1149 ng/mL DDU (12-03-22 @ 01:22)  734 ng/mL DDU (12-02-22 @ 06:59)        Imaging reviewed independently and reviewed read  < from: Xray Chest 1 View-PORTABLE IMMEDIATE (12.02.22 @ 07:51) >  Impression:    Worsening opacifications. Cardiomegaly.    < end of copied text >        MEDICATIONS  (STANDING):  apixaban 5 milliGRAM(s) Oral every 12 hours  baclofen 20 milliGRAM(s) Oral every 12 hours  cefepime   IVPB 2000 milliGRAM(s) IV Intermittent every 8 hours  donepezil 10 milliGRAM(s) Oral at bedtime  lactated ringers. 1000 milliLiter(s) (100 mL/Hr) IV Continuous <Continuous>  lactulose Syrup 10 Gram(s) Oral daily  linezolid  IVPB 600 milliGRAM(s) IV Intermittent every 12 hours  memantine 10 milliGRAM(s) Oral two times a day  multivitamin 1 Tablet(s) Oral daily  pantoprazole    Tablet 40 milliGRAM(s) Oral before breakfast  potassium chloride   Powder 40 milliEquivalent(s) Oral once  senna 2 Tablet(s) Oral at bedtime  tamsulosin 0.4 milliGRAM(s) Oral at bedtime  thiamine 100 milliGRAM(s) Oral daily    MEDICATIONS  (PRN):  acetaminophen     Tablet .. 650 milliGRAM(s) Oral every 6 hours PRN Temp greater or equal to 38C (100.4F), Mild Pain (1 - 3)  albuterol    90 MICROgram(s) HFA Inhaler 2 Puff(s) Inhalation every 6 hours PRN Shortness of Breath and/or Wheezing  ibuprofen  Suspension. 600 milliGRAM(s) Oral every 8 hours PRN Temp greater or equal to 38.5C (101.3F)  melatonin 3 milliGRAM(s) Oral at bedtime PRN Insomnia

## 2022-12-03 NOTE — CONSULT NOTE ADULT - SUBJECTIVE AND OBJECTIVE BOX
KEVIN MEDRANO  52y, Male  Allergy: penicillin (Unknown)  vancomycin (Unknown)      CHIEF COMPLAINT:   SEPSIS ELEVATED TROPONIN     (03 Dec 2022 10:36)      LOS  1d    HPI  HPI:  Pt is nonverbal at baseline. Hx obtained from chart.     51 yo male w/ PMH of MS, chronic low back pain, neurogenic bladder? s/p SPC placement and s/p dislodgement, presents for fever. Presented to the ED for SPC dislodgement a few days ago. Was seen by IR and planned for outpatient SPC replacement. Campuzano was placed at [facility]. Today. prior to placement, he was noted to be febrile w/ tachycardia and tachypnea so brought into ED.  Pt nonverbal at baseline, unable to provide further HPI.    In the ED:    · BP Systolic	138 mm Hg  · BP Diastolic	92 mm Hg  · Heart Rate	  150 /min  · Respiration Rate (breaths/min)	  60 /min  · Temp (F)	  103.7 Degrees F  · Temp (C)	  39.8 Degrees C  · Temp site	rectal  · SpO2 (%)	98 %  · O2 Delivery/Oxygen Delivery Method	nasal cannula  · Oxygen Therapy Flow (L/min)	2 L/min    Labs notable for WBC 32k, lactate 2.4, Na 147, AG 17, troponin 0.2  UA showing large leuks, WBC, bacteria, RBC  CXR showing worsening opacifications  ECG showing sinus tachy, possible inferior infarct  Given aztreonam, zyvox, fluid bolus  Pt admitted to SDU.  (02 Dec 2022 15:53)      INFECTIOUS DISEASE HISTORY:  ID consulted for UTI  Septic on admission  cannot obtain further history from the patient secondary to altered mental status or sedation     Currently ordered for:  aztreonam  IVPB 2000 milliGRAM(s) IV Intermittent every 8 hours  linezolid  IVPB 600 milliGRAM(s) IV Intermittent every 12 hours      PMH  PAST MEDICAL & SURGICAL HISTORY:  Multiple sclerosis      Chronic back pain      Hypertension      No significant past surgical history          FAMILY HISTORY  No pertinent family history in first degree relatives        SOCIAL HISTORY  Social History:        ROS  ***    VITALS:  T(F): 100.6, Max: 101.3 (22 @ 04:12)  HR: 122  BP: 126/76  RR: 22Vital Signs Last 24 Hrs  T(C): 38.1 (03 Dec 2022 10:00), Max: 38.5 (03 Dec 2022 04:12)  T(F): 100.6 (03 Dec 2022 10:00), Max: 101.3 (03 Dec 2022 04:12)  HR: 122 (03 Dec 2022 08:03) (111 - 127)  BP: 126/76 (03 Dec 2022 08:03) (116/82 - 130/90)  BP(mean): --  RR: 22 (03 Dec 2022 08:03) (20 - 22)  SpO2: 93% (03 Dec 2022 08:03) (90% - 96%)    Parameters below as of 03 Dec 2022 09:25  Patient On (Oxygen Delivery Method): nasal cannula, high flow  O2 Flow (L/min): 60  O2 Concentration (%): 60    PHYSICAL EXAM:  ***    TESTS & MEASUREMENTS:                        11.5   15.00 )-----------( 302      ( 03 Dec 2022 08:22 )             36.8     12    144  |  110  |  29<H>  ----------------------------<  165<H>  3.2<L>   |  21  |  0.5<L>    Ca    8.9      03 Dec 2022 08:22  Phos  4.0     12-  Mg     1.7     12    TPro  6.3  /  Alb  3.6  /  TBili  1.0  /  DBili  x   /  AST  15  /  ALT  40  /  AlkPhos  116<H>  12-03      LIVER FUNCTIONS - ( 03 Dec 2022 08:22 )  Alb: 3.6 g/dL / Pro: 6.3 g/dL / ALK PHOS: 116 U/L / ALT: 40 U/L / AST: 15 U/L / GGT: x           Urinalysis Basic - ( 02 Dec 2022 10:34 )    Color: Libby / Appearance: Turbid / S.021 / pH: x  Gluc: x / Ketone: Trace  / Bili: Negative / Urobili: 3 mg/dL   Blood: x / Protein: 100 mg/dL / Nitrite: Negative   Leuk Esterase: Large / RBC: 236 /HPF / WBC >720 /HPF   Sq Epi: x / Non Sq Epi: 2 /HPF / Bacteria: Many        Culture - Blood (collected 22 @ 08:21)  Source: .Blood Blood-Peripheral  Gram Stain (22 @ 09:03):    Growth in aerobic bottle: Gram Positive Cocci in Clusters  Preliminary Report (22 @ 09:03):    Growth in aerobic bottle: Gram Positive Cocci in Clusters    ***Blood Panel PCR results on this specimen are available    approximately 3 hours after the Gram stain result.***    Gram stain, PCR, and/or culture results may not always    correspond due to difference in methodologies.    ************************************************************    This PCR assay was performed by multiplex PCR. This    Assay tests for 66 bacterial and resistance gene targets.    Please refer to the [facility] test directory    at [email or web address] for details.  Organism: Blood Culture PCR (22 @ 10:39)  Organism: Blood Culture PCR (22 @ 10:39)      -  Staphylococcus epidermidis, Methicillin resistant: Detec      Method Type: PCR        Lactate, Blood: 1.1 mmol/L (22 @ 01:22)  Lactate, Blood: 2.4 mmol/L (22 @ 13:41)  Blood Gas Venous - Lactate: 2.60 mmol/L (22 @ 10:58)  Blood Gas Venous - Lactate: 3.60 mmol/L (22 @ 07:24)      INFECTIOUS DISEASES TESTING  COVID-19 PCR: NotDetec (22 @ 18:30)      INFLAMMATORY MARKERS      RADIOLOGY & ADDITIONAL TESTS:  I have personally reviewed the last Chest xray  CXR  Xray Chest 1 View AP/PA:   ACC: [id number] EXAM:  XR CHEST 1 VIEW                          PROCEDURE DATE:  2022          INTERPRETATION:  Clinical History / Reason for exam: Trauma.    Comparison : Chest radiograph dated 2019    Technique/Positioning: AP view of the chest.    Findings:    Support devices: A gastrostomy tube is noted.    Cardiac/mediastinum/hilum: Unremarkable.    Lung parenchyma/Pleura: There is a small left pleural effusion and   possible left basilar consolidation. No pneumothorax.    Skeleton/soft tissues: No acute abnormality seen.    Impression:    Small left pleural effusion and possible left basilar consolidation.    --- End of Report ---            YAIR BAILEY MD; Attending Radiologist  This document has been electronically signed. Dec  2 2022  2:17PM (22 @ 06:52)      CT      CARDIOLOGY TESTING  12 Lead ECG:   Ventricular Rate 120 BPM    Atrial Rate 120 BPM    P-R Interval 132 ms    QRS Duration 84 ms    Q-T Interval 288 ms    QTC Calculation(Bazett) 407 ms    P Axis 74 degrees    R Axis -69 degrees    T Axis -35 degrees    Diagnosis Line Sinus tachycardia  Left axis deviation  Poor R wave progression  Abnormal ECG    Confirmed by Basim Guadalupe ([id number]) on 2022 8:49:08 PM (22 @ 17:28)  12 Lead ECG:   Ventricular Rate 149 BPM    Atrial Rate 149 BPM    P-R Interval 118 ms    QRS Duration 76 ms    Q-T Interval 262 ms    QTC Calculation(Bazett) 412 ms    P Axis 27 degrees    R Axis 84 degrees    T Axis -51 degrees    Diagnosis Line Sinus tachycardia  Possible Inferior infarct , age undetermined  Abnormal ECG    Confirmed by ROWDY CHAO MD ([id number]) on 2022 7:08:50 AM (22 @ 06:44)      MEDICATIONS  apixaban 5 Oral every 12 hours  aztreonam  IVPB 2000 IV Intermittent every 8 hours  baclofen 20 Oral every 12 hours  donepezil 10 Oral at bedtime  lactated ringers. 1000 IV Continuous <Continuous>  lactulose Syrup 10 Oral daily  linezolid  IVPB 600 IV Intermittent every 12 hours  magnesium sulfate  IVPB 2 IV Intermittent once  memantine 10 Oral two times a day  multivitamin 1 Oral daily  pantoprazole    Tablet 40 Oral before breakfast  potassium chloride   Powder 40 Oral once  senna 2 Oral at bedtime  tamsulosin 0.4 Oral at bedtime  thiamine 100 Oral daily        ANTIBIOTICS:  aztreonam  IVPB 2000 milliGRAM(s) IV Intermittent every 8 hours  linezolid  IVPB 600 milliGRAM(s) IV Intermittent every 12 hours      ALLERGIES:  penicillin (Unknown)  vancomycin (Unknown)       KEVIN MEDRANO  52y, Male  Allergy: penicillin (Unknown)  vancomycin (Unknown)      CHIEF COMPLAINT:   SEPSIS ELEVATED TROPONIN     (03 Dec 2022 10:36)      LOS  1d    HPI  HPI:  Pt is nonverbal at baseline. Hx obtained from chart.     53 yo male w/ PMH of MS, chronic low back pain, neurogenic bladder? s/p SPC placement and s/p dislodgement, presents for fever. Presented to the ED for SPC dislodgement a few days ago. Was seen by IR and planned for outpatient SPC replacement. Mansfield was placed at [facility]. Today. prior to placement, he was noted to be febrile w/ tachycardia and tachypnea so brought into ED.  Pt nonverbal at baseline, unable to provide further HPI.    In the ED:    · BP Systolic	138 mm Hg  · BP Diastolic	92 mm Hg  · Heart Rate	  150 /min  · Respiration Rate (breaths/min)	  60 /min  · Temp (F)	  103.7 Degrees F  · Temp (C)	  39.8 Degrees C  · Temp site	rectal  · SpO2 (%)	98 %  · O2 Delivery/Oxygen Delivery Method	nasal cannula  · Oxygen Therapy Flow (L/min)	2 L/min    Labs notable for WBC 32k, lactate 2.4, Na 147, AG 17, troponin 0.2  UA showing large leuks, WBC, bacteria, RBC  CXR showing worsening opacifications  ECG showing sinus tachy, possible inferior infarct  Given aztreonam, zyvox, fluid bolus  Pt admitted to SDU.  (02 Dec 2022 15:53)      INFECTIOUS DISEASE HISTORY:  ID consulted for UTI  Partner at bedside  Septic on admission  cannot obtain further history from the patient secondary to altered mental status or sedation     Currently ordered for:  aztreonam  IVPB 2000 milliGRAM(s) IV Intermittent every 8 hours  linezolid  IVPB 600 milliGRAM(s) IV Intermittent every 12 hours      PMH  PAST MEDICAL & SURGICAL HISTORY:  Multiple sclerosis      Chronic back pain      Hypertension      No significant past surgical history          FAMILY HISTORY  No pertinent family history in first degree relatives        SOCIAL HISTORY  Social History:  unable to obtain history secondary to patient's mental status and/or sedation       ROS  unable to obtain history secondary to patient's mental status and/or sedation     VITALS:  T(F): 100.6, Max: 101.3 (22 @ 04:12)  HR: 122  BP: 126/76  RR: 22Vital Signs Last 24 Hrs  T(C): 38.1 (03 Dec 2022 10:00), Max: 38.5 (03 Dec 2022 04:12)  T(F): 100.6 (03 Dec 2022 10:00), Max: 101.3 (03 Dec 2022 04:12)  HR: 122 (03 Dec 2022 08:03) (111 - 127)  BP: 126/76 (03 Dec 2022 08:03) (116/82 - 130/90)  BP(mean): --  RR: 22 (03 Dec 2022 08:03) (20 - 22)  SpO2: 93% (03 Dec 2022 08:03) (90% - 96%)    Parameters below as of 03 Dec 2022 09:25  Patient On (Oxygen Delivery Method): nasal cannula, high flow  O2 Flow (L/min): 60  O2 Concentration (%): 60    PHYSICAL EXAM:  Gen: chronically ill appearing  contracted  HEENT: Normocephalic, atraumatic  Neck: supple, no lymphadenopathy  CV: Regular rate & regular rhythm  Lungs: decreased BS at bases, no fremitus  Abdomen: Soft, BS present  Ext: Warm, well perfused  Neuro: non focal, not following commands  Skin: no rash, no erythema  Lines: no phlebitis   [place]    TESTS & MEASUREMENTS:                        11.5   15.00 )-----------( 302      ( 03 Dec 2022 08:22 )             36.8     12-03    144  |  110  |  29<H>  ----------------------------<  165<H>  3.2<L>   |  21  |  0.5<L>    Ca    8.9      03 Dec 2022 08:22  Phos  4.0     12-02  Mg     1.7     12-03    TPro  6.3  /  Alb  3.6  /  TBili  1.0  /  DBili  x   /  AST  15  /  ALT  40  /  AlkPhos  116<H>  12-03      LIVER FUNCTIONS - ( 03 Dec 2022 08:22 )  Alb: 3.6 g/dL / Pro: 6.3 g/dL / ALK PHOS: 116 U/L / ALT: 40 U/L / AST: 15 U/L / GGT: x           Urinalysis Basic - ( 02 Dec 2022 10:34 )    Color: Libby / Appearance: Turbid / S.021 / pH: x  Gluc: x / Ketone: Trace  / Bili: Negative / Urobili: 3 mg/dL   Blood: x / Protein: 100 mg/dL / Nitrite: Negative   Leuk Esterase: Large / RBC: 236 /HPF / WBC >720 /HPF   Sq Epi: x / Non Sq Epi: 2 /HPF / Bacteria: Many        Culture - Blood (collected 22 @ 08:21)  Source: .Blood Blood-Peripheral  Gram Stain (22 @ 09:03):    Growth in aerobic bottle: Gram Positive Cocci in Clusters  Preliminary Report (22 @ 09:03):    Growth in aerobic bottle: Gram Positive Cocci in Clusters    ***Blood Panel PCR results on this specimen are available    approximately 3 hours after the Gram stain result.***    Gram stain, PCR, and/or culture results may not always    correspond due to difference in methodologies.    ************************************************************    This PCR assay was performed by multiplex PCR. This    Assay tests for 66 bacterial and resistance gene targets.    Please refer to the [facility] test directory    at [email or web address] for details.  Organism: Blood Culture PCR (22 @ 10:39)  Organism: Blood Culture PCR (22 @ 10:39)      -  Staphylococcus epidermidis, Methicillin resistant: Detec      Method Type: PCR        Lactate, Blood: 1.1 mmol/L (22 @ 01:22)  Lactate, Blood: 2.4 mmol/L (22 @ 13:41)  Blood Gas Venous - Lactate: 2.60 mmol/L (22 @ 10:58)  Blood Gas Venous - Lactate: 3.60 mmol/L (22 @ 07:24)      INFECTIOUS DISEASES TESTING  COVID-19 PCR: NotDetec (22 @ 18:30)      INFLAMMATORY MARKERS      RADIOLOGY & ADDITIONAL TESTS:  I have personally reviewed the last Chest xray  CXR  Xray Chest 1 View AP/PA:   ACC: [id number] EXAM:  XR CHEST 1 VIEW                          PROCEDURE DATE:  2022          INTERPRETATION:  Clinical History / Reason for exam: Trauma.    Comparison : Chest radiograph dated 2019    Technique/Positioning: AP view of the chest.    Findings:    Support devices: A gastrostomy tube is noted.    Cardiac/mediastinum/hilum: Unremarkable.    Lung parenchyma/Pleura: There is a small left pleural effusion and   possible left basilar consolidation. No pneumothorax.    Skeleton/soft tissues: No acute abnormality seen.    Impression:    Small left pleural effusion and possible left basilar consolidation.    --- End of Report ---            YAIR BAILEY MD; Attending Radiologist  This document has been electronically signed. Dec  2 2022  2:17PM (22 @ 06:52)      CT      CARDIOLOGY TESTING  12 Lead ECG:   Ventricular Rate 120 BPM    Atrial Rate 120 BPM    P-R Interval 132 ms    QRS Duration 84 ms    Q-T Interval 288 ms    QTC Calculation(Bazett) 407 ms    P Axis 74 degrees    R Axis -69 degrees    T Axis -35 degrees    Diagnosis Line Sinus tachycardia  Left axis deviation  Poor R wave progression  Abnormal ECG    Confirmed by Basim Guadalupe ([id number]) on 2022 8:49:08 PM (22 @ 17:28)  12 Lead ECG:   Ventricular Rate 149 BPM    Atrial Rate 149 BPM    P-R Interval 118 ms    QRS Duration 76 ms    Q-T Interval 262 ms    QTC Calculation(Bazett) 412 ms    P Axis 27 degrees    R Axis 84 degrees    T Axis -51 degrees    Diagnosis Line Sinus tachycardia  Possible Inferior infarct , age undetermined  Abnormal ECG    Confirmed by ROWDY CHAO MD ([id number]) on 2022 7:08:50 AM (22 @ 06:44)      MEDICATIONS  apixaban 5 Oral every 12 hours  aztreonam  IVPB 2000 IV Intermittent every 8 hours  baclofen 20 Oral every 12 hours  donepezil 10 Oral at bedtime  lactated ringers. 1000 IV Continuous <Continuous>  lactulose Syrup 10 Oral daily  linezolid  IVPB 600 IV Intermittent every 12 hours  magnesium sulfate  IVPB 2 IV Intermittent once  memantine 10 Oral two times a day  multivitamin 1 Oral daily  pantoprazole    Tablet 40 Oral before breakfast  potassium chloride   Powder 40 Oral once  senna 2 Oral at bedtime  tamsulosin 0.4 Oral at bedtime  thiamine 100 Oral daily        ANTIBIOTICS:  aztreonam  IVPB 2000 milliGRAM(s) IV Intermittent every 8 hours  linezolid  IVPB 600 milliGRAM(s) IV Intermittent every 12 hours      ALLERGIES:  penicillin (Unknown)  vancomycin (Unknown)

## 2022-12-03 NOTE — DIETITIAN INITIAL EVALUATION ADULT - NS FNS DIET ORDER
Diet, NPO with Tube Feed:   Tube Feeding Modality: Gastrostomy  Jevity 1.2 Bhargav  Total Volume for 24 Hours (mL): 160  Bolus  Total Volume of Bolus (mL):  40  Tube Feed Frequency: Every 6 hours   Tube Feed Start Time: 18:00  Bolus Feed Rate (mL per Hour): 80   Bolus Feed Duration (in Hours): 0.5  Free Water Flush  Bolus   Total Volume per Flush (mL): 200   Frequency: Every 4 Hours   Total Daily Volume of Flush (mL): 120    Start Time: 18:00 (12-02-22 @ 17:53)

## 2022-12-03 NOTE — PROGRESS NOTE ADULT - ASSESSMENT
IMPRESSION:  Sepsis POA  Gm positive bacteremia  Probable UTI  non verbal at baseline  NSTEMI likely Type 2        PLAN:      CNS: avoid sedation    HEENT: Oral care    PULMONARY:  HOB @ 45 degrees.  Aspiration precautions, target Spo2 90-94    CARDIOVASCULAR: avoid volume overload, MAP adequate, echo    GI: GI prophylaxis. feeding as tolerated    RENAL:  Follow up lytes.  Correct as needed    INFECTIOUS DISEASE: ID,     HEMATOLOGICAL:  DVT prophylaxis. LE duplex    ENDOCRINE:  Follow up FS.  Insulin protocol if needed.    MUSCULOSKELETAL: bedrest    Floor IMPRESSION:  Sepsis POA  Gm positive bacteremia  Acute hypoxemic respiratory failure   UTI  non verbal at baseline  NSTEMI likely Type 2        PLAN:      CNS: avoid sedation    HEENT: Oral care    PULMONARY:  HOB @ 45 degrees.  Aspiration precautions, target Spo2 90-94, HFNC for now    CARDIOVASCULAR: avoid volume overload, MAP adequate, echo    GI: GI prophylaxis. feeding as tolerated    RENAL:  Follow up lytes.  Correct as needed    INFECTIOUS DISEASE: ID, Zyvox, dc aztreonam, Bcx noted    HEMATOLOGICAL:  DVT prophylaxis. LE duplex    ENDOCRINE:  Follow up FS.  Insulin protocol if needed.    MUSCULOSKELETAL: bedrest    SDU IMPRESSION:    Sepsis POA  Acute hypoxemic respiratory failure  Urinary tract infection  non verbal at baseline  NSTEMI likely Type 2  Pulm edema ?   history of MS/neurogenic bladder        PLAN:      CNS: avoid sedation    HEENT: Oral care    PULMONARY:  HOB @ 45 degrees.  Aspiration precautions, target Spo2 90-94, HFNC for now. CXR with mild worsening of bilateral infiltrates. Wean down Fio2 as tolerated.     CARDIOVASCULAR: avoid volume overload, MAP adequate, echo. DC IV fluids. Lasix 20 mg IV once.     GI: GI prophylaxis.     RENAL:  Follow up lytes.  Correct as needed. IR for the SPC. Trend lactic acid    INFECTIOUS DISEASE: ID evaluation. Continue broad spectrum abx. Bcx positive for Staph epi which could be a contaminant. RVP is negative. F/U culture results and adjust abx accordingly.    HEMATOLOGICAL:  DVT prophylaxis. Check LE duplex. Doubt VTE while on therapeutic Eliquis. Elevated D-dimer likely due to sepsis.     ENDOCRINE:  Follow up FS.  Insulin protocol if needed.    MUSCULOSKELETAL: bedrest    Admit to SDU for now

## 2022-12-03 NOTE — DIETITIAN INITIAL EVALUATION ADULT - NUTRITIONGOAL OUTCOME1
Pt to meet >90% & <105% estimated needs via EN in 4 days. RD to follow as per high risk protocol.    Monitor diet order, kcal intake, body composition, NFPE, labs  (lytes, BG, renal indices)

## 2022-12-03 NOTE — DIETITIAN INITIAL EVALUATION ADULT - ENTERAL
- Fiber not warranted given pt now on antibiotics, increasing risk of loose BMs.  - Recommend Osmolite 1.5 formula, 120ml Q6H. Free water flushes 150ml before and after each bolus. -- provides 720kcal, 30g protein, 365+1200ml free water.   - thiamine 100mg daily  - obtain phos lab   - replace mag, phos and K - Fiber not warranted given pt now on antibiotics, increasing risk of loose BMs.  - Recommend Osmolite 1.5 formula, bolus feeds, 120ml Q6H @rate 120ml over 0.5 hr. Free water flushes 150ml before and after each bolus. -- provides 720kcal, 30g protein, 365+1200ml free water.   - thiamine 100mg daily  - obtain phos lab   - replace mag, phos and K - Fiber not warranted given pt now on antibiotics, increasing risk of loose BMs.  - Recommend Osmolite 1.5 formula, bolus feeds, 120ml Q6H @rate 240ml to run over 0.5 hr. Free water flushes 150ml before and after each bolus. -- provides 720kcal, 30g protein, 365+1200ml free water.   - thiamine 100mg daily  - obtain phos lab   - replace mag, phos and K

## 2022-12-03 NOTE — GOALS OF CARE CONVERSATION - ADVANCED CARE PLANNING - CONVERSATION DETAILS
Discussed with patient's mother who is his caregiver goals of care and confirmed patient is FULL CODE Discussed with patient's mother who is his caregiver goals of care and confirmed patient is FULL CODE    Attending note: Agree to above. DW Resident

## 2022-12-04 NOTE — PROGRESS NOTE ADULT - SUBJECTIVE AND OBJECTIVE BOX
SUBJECTIVE:  HPI:  Pt is nonverbal at baseline. Hx obtained from chart.     53 yo male w/ PMH of MS, chronic low back pain, neurogenic bladder? s/p SPC placement and s/p dislodgement, presents for fever. Presented to the ED for SPC dislodgement a few days ago. Was seen by IR and planned for outpatient SPC replacement. Campuzano was placed at [facility]. Today. prior to placement, he was noted to be febrile w/ tachycardia and tachypnea so brought into ED.  Pt nonverbal at baseline, unable to provide further HPI.    In the ED:    · BP Systolic	138 mm Hg  · BP Diastolic	92 mm Hg  · Heart Rate	  150 /min  · Respiration Rate (breaths/min)	  60 /min  · Temp (F)	  103.7 Degrees F  · Temp (C)	  39.8 Degrees C  · Temp site	rectal  · SpO2 (%)	98 %  · O2 Delivery/Oxygen Delivery Method	nasal cannula  · Oxygen Therapy Flow (L/min)	2 L/min    Labs notable for WBC 32k, lactate 2.4, Na 147, AG 17, troponin 0.2  UA showing large leuks, WBC, bacteria, RBC  CXR showing worsening opacifications  ECG showing sinus tachy, possible inferior infarct  Given aztreonam, zyvox, fluid bolus  Pt admitted to SDU.  (02 Dec 2022 15:53)      Patient is a 52y old Male who presents with a chief complaint of sepsis (04 Dec 2022 09:51)    Currently admitted to medicine with the primary diagnosis of Sepsis       Today is hospital day 2d.     PAST MEDICAL & SURGICAL HISTORY  Multiple sclerosis    Chronic back pain    Hypertension    No significant past surgical history        ALLERGIES:  penicillin (Unknown)  vancomycin (Unknown)    MEDICATIONS:  ACTIVE MEDICATIONS  acetaminophen     Tablet .. 650 milliGRAM(s) Oral every 6 hours PRN  acetylcysteine 10%  Inhalation 4 milliLiter(s) Inhalation three times a day  albuterol    0.083% 2.5 milliGRAM(s) Nebulizer every 4 hours PRN  albuterol    90 MICROgram(s) HFA Inhaler 2 Puff(s) Inhalation every 6 hours PRN  apixaban 5 milliGRAM(s) Oral every 12 hours  baclofen 20 milliGRAM(s) Oral every 12 hours  chlorhexidine 2% Cloths 1 Application(s) Topical daily  dextrose 5% + lactated ringers. 1000 milliLiter(s) IV Continuous <Continuous>  donepezil 10 milliGRAM(s) Oral at bedtime  ibuprofen  Suspension. 600 milliGRAM(s) Oral every 8 hours PRN  lactulose Syrup 10 Gram(s) Oral daily  linezolid  IVPB 600 milliGRAM(s) IV Intermittent every 12 hours  melatonin 3 milliGRAM(s) Oral at bedtime PRN  memantine 10 milliGRAM(s) Oral two times a day  meropenem  IVPB 1000 milliGRAM(s) IV Intermittent every 8 hours  multivitamin 1 Tablet(s) Oral daily  pantoprazole    Tablet 40 milliGRAM(s) Oral before breakfast  senna 2 Tablet(s) Oral at bedtime  tamsulosin 0.4 milliGRAM(s) Oral at bedtime  thiamine 100 milliGRAM(s) Oral daily      VITALS:   T(F): 100.1  HR: 117  BP: 135/76  RR: 20  SpO2: 98%    LABS:                        10.0   13.61 )-----------( 261      ( 04 Dec 2022 08:30 )             30.7     12-04    150<H>  |  117<H>  |  19  ----------------------------<  135<H>  3.4<L>   |  22  |  <0.5<L>    Ca    8.2<L>      04 Dec 2022 08:30  Phos  1.8     12-04  Mg     1.8     12-04    TPro  5.4<L>  /  Alb  3.1<L>  /  TBili  0.6  /  DBili  x   /  AST  9   /  ALT  25  /  AlkPhos  98  12-04              Culture - Urine (collected 02 Dec 2022 10:34)  Source: Clean Catch Clean Catch (Midstream)  Final Report (04 Dec 2022 09:29):    >=3 organisms. Probable collection contamination.    Culture - Blood (collected 02 Dec 2022 08:21)  Source: .Blood Blood-Peripheral  Gram Stain (04 Dec 2022 01:05):    Growth in aerobic bottle: Gram Positive Cocci in Clusters    Growth in anaerobic bottle: Gram Positive Cocci in Clusters  Preliminary Report (04 Dec 2022 17:50):    Growth in aerobic and anaerobic bottles: Staphylococcus hominis    Coag Negative Staphylococcus    Single set isolate, possible contaminant. Contact    Microbiology if susceptibility testing clinically    indicated.    ***Blood Panel PCR results on this specimen are available    approximately 3 hours after the Gram stain result.***    Gram stain, PCR, and/or culture results may not always    correspond due to difference in methodologies.    ************************************************************    This PCR assay was performed by multiplex PCR. This    Assay tests for 66 bacterial and resistance gene targets.    Please refer to the [facility] Labs test directory    at [email or web address] for details.  Organism: Blood Culture PCR (03 Dec 2022 10:39)  Organism: Blood Culture PCR (03 Dec 2022 10:39)    Culture - Blood (collected 02 Dec 2022 08:21)  Source: .Blood Blood-Peripheral  Gram Stain (03 Dec 2022 16:14):    Growth in aerobic bottle: Gram Positive Cocci in Clusters  Preliminary Report (04 Dec 2022 10:40):    Growth in aerobic bottle: Staphylococcus epidermidis    Culture - Urine (collected 02 Dec 2022 06:59)  Source: Suprapubic Suprapubic  Preliminary Report (04 Dec 2022 17:10):    Few Proteus mirabilis ESBL    Moderate Enterococcus faecalis  Organism: Proteus mirabilis ESBL (04 Dec 2022 17:10)  Organism: Proteus mirabilis ESBL (04 Dec 2022 17:10)    Culture - Blood (collected 02 Dec 2022 06:59)  Source: .Blood Blood  Gram Stain (03 Dec 2022 19:58):    Growth in aerobic bottle: Gram Positive Cocci in Clusters  Preliminary Report (04 Dec 2022 13:23):    Growth in aerobic bottle: Staphylococcus hominis      CARDIAC MARKERS ( 03 Dec 2022 08:22 )  x     / 0.11 ng/mL / x     / x     / x      CARDIAC MARKERS ( 03 Dec 2022 01:22 )  x     / 0.12 ng/mL / x     / x     / x              PHYSICAL EXAM:  GEN: Ill looking  LUNGS: Clear to auscultation bilaterally   HEART: S1/S2 present.    ABD: Soft, non-tender, non-distended.   EXT: Contracted  NEURO: Lethargic non-verbal

## 2022-12-04 NOTE — PROGRESS NOTE ADULT - SUBJECTIVE AND OBJECTIVE BOX
KEVIN MEDRANO  52y, Male  Allergy: penicillin (Unknown)  vancomycin (Unknown)      LOS  2d    CHIEF COMPLAINT: sepsis (04 Dec 2022 09:38)      INTERVAL EVENTS/HPI  - No acute events overnight  - T(F): , Max: 101.5 (22 @ 05:27)  - Tolerating medication  - WBC Count: 13.61 (22 @ 08:30)  WBC Count: 15.00 (22 @ 08:22)     - Creatinine, Serum: 0.5 (22 @ 08:22)       ROS  ***    VITALS:  T(F): 100.5, Max: 101.5 (22 @ 05:27)  HR: 114  BP: 118/74  RR: 20Vital Signs Last 24 Hrs  T(C): 38.1 (04 Dec 2022 08:11), Max: 38.6 (04 Dec 2022 05:27)  T(F): 100.5 (04 Dec 2022 08:11), Max: 101.5 (04 Dec 2022 05:27)  HR: 114 (04 Dec 2022 08:11) (114 - 132)  BP: 118/74 (04 Dec 2022 08:11) (111/63 - 125/71)  BP(mean): 89 (04 Dec 2022 08:11) (81 - 89)  RR: 20 (04 Dec 2022 08:11) (20 - 22)  SpO2: 99% (04 Dec 2022 08:11) (93% - 99%)    Parameters below as of 04 Dec 2022 08:11  Patient On (Oxygen Delivery Method): BiPAP/CPAP        PHYSICAL EXAM:  ***    FH: Non-contributory  Social Hx: Non-contributory    TESTS & MEASUREMENTS:                        10.0   13.61 )-----------( 261      ( 04 Dec 2022 08:30 )             30.7     12-03    144  |  110  |  29<H>  ----------------------------<  165<H>  3.2<L>   |  21  |  0.5<L>    Ca    8.9      03 Dec 2022 08:22  Mg     1.7     12-03    TPro  6.3  /  Alb  3.6  /  TBili  1.0  /  DBili  x   /  AST  15  /  ALT  40  /  AlkPhos  116<H>  12-03      LIVER FUNCTIONS - ( 03 Dec 2022 08:22 )  Alb: 3.6 g/dL / Pro: 6.3 g/dL / ALK PHOS: 116 U/L / ALT: 40 U/L / AST: 15 U/L / GGT: x           Urinalysis Basic - ( 02 Dec 2022 10:34 )    Color: Libby / Appearance: Turbid / S.021 / pH: x  Gluc: x / Ketone: Trace  / Bili: Negative / Urobili: 3 mg/dL   Blood: x / Protein: 100 mg/dL / Nitrite: Negative   Leuk Esterase: Large / RBC: 236 /HPF / WBC >720 /HPF   Sq Epi: x / Non Sq Epi: 2 /HPF / Bacteria: Many        Culture - Urine (collected 22 @ 10:34)  Source: Clean Catch Clean Catch (Midstream)  Final Report (22 @ 09:29):    >=3 organisms. Probable collection contamination.    Culture - Blood (collected 22 @ 08:21)  Source: .Blood Blood-Peripheral  Gram Stain (22 @ 01:05):    Growth in aerobic bottle: Gram Positive Cocci in Clusters    Growth in anaerobic bottle: Gram Positive Cocci in Clusters  Preliminary Report (22 @ 01:05):    Growth in aerobic bottle: Staphylococcus hominis    Coag Negative Staphylococcus    Single set isolate, possible contaminant. Contact    Microbiology if susceptibility testing clinically    indicated.    Growth in anaerobic bottle: Gram Positive Cocci in Clusters    ***Blood Panel PCR results on this specimen are available    approximately 3 hours after the Gram stain result.***    Gram stain, PCR, and/or culture results may not always    correspond due to difference in methodologies.    ************************************************************    This PCR assay was performed by multiplex PCR. This    Assay tests for 66 bacterial and resistance gene targets.    Please refer to the [facility] Labs test directory    at [email or web address] for details.  Organism: Blood Culture PCR (22 @ 10:39)  Organism: Blood Culture PCR (22 @ 10:39)      -  Staphylococcus epidermidis, Methicillin resistant: Detec      Method Type: PCR    Culture - Blood (collected 22 @ 08:21)  Source: .Blood Blood-Peripheral  Gram Stain (22 @ 16:14):    Growth in aerobic bottle: Gram Positive Cocci in Clusters  Preliminary Report (22 @ 16:14):    Growth in aerobic bottle: Gram Positive Cocci in Clusters    Culture - Urine (collected 22 @ 06:59)  Source: Suprapubic Suprapubic  Preliminary Report (22 @ 17:58):    Few Proteus mirabilis    Moderate Enterococcus faecalis    Culture - Blood (collected 22 @ 06:59)  Source: .Blood Blood  Gram Stain (22 @ 19:58):    Growth in aerobic bottle: Gram Positive Cocci in Clusters  Preliminary Report (22 @ 19:58):    Growth in aerobic bottle: Gram Positive Cocci in Clusters        Lactate, Blood: 1.1 mmol/L (22 @ 01:22)  Lactate, Blood: 2.4 mmol/L (22 @ 13:41)  Blood Gas Venous - Lactate: 2.60 mmol/L (22 @ 10:58)  Blood Gas Venous - Lactate: 3.60 mmol/L (22 @ 07:24)      INFECTIOUS DISEASES TESTING  Procalcitonin, Serum: 0.53 (22 @ 10:53)  COVID-19 PCR: NotDetec (22 @ 18:30)  strept    INFLAMMATORY MARKERS      RADIOLOGY & ADDITIONAL TESTS:  I have personally reviewed the last available Chest xray  CXR  Xray Chest 1 View AP/PA:   ACC: [id number] EXAM:  XR CHEST 1 VIEW                          PROCEDURE DATE:  2022          INTERPRETATION:  Clinical History / Reason for exam: Trauma.    Comparison : Chest radiograph dated 2019    Technique/Positioning: AP view of the chest.    Findings:    Support devices: A gastrostomy tube is noted.    Cardiac/mediastinum/hilum: Unremarkable.    Lung parenchyma/Pleura: There is a small left pleural effusion and   possible left basilar consolidation. No pneumothorax.    Skeleton/soft tissues: No acute abnormality seen.    Impression:    Small left pleural effusion and possible left basilar consolidation.    --- End of Report ---            YAIR BAILEY MD; Attending Radiologist  This document has been electronically signed. Dec  2 2022  2:17PM (22 @ 06:52)      CT  CT Angio Chest PE Protocol w/ IV Cont:   ACC: [id number] EXAM:  CT ANGIO CHEST PULM LIVIER CAMPBELL                          PROCEDURE DATE:  2022          INTERPRETATION:  CLINICAL HISTORY/REASON FOR EXAM: Tachycardia. Sepsis..    TECHNIQUE: Multislice helical sections were obtained from the thoracic   inlet to the lung bases during rapid administration of intravenous   contrast. Thin sections were reconstructed through the pulmonary   vasculature. 3D (MIP) reformats obtained.    COMPARISON: None.    FINDINGS:    PULMONARY EMBOLUS: No evidence of acute pulmonary embolism.    LUNGS, PLEURA, AIRWAYS: Occlusion of distal left main stem and left lobar   bronchi. Near-total collapse atelectasis of left lung, with small   residual left upper lobe aeration. Correlate for superimposed   consolidation/pneumonia in the appropriate clinical setting. Paraseptal   emphysema. Dependent right lung atelectasis. No pleural effusion or   pneumothorax.    THORACIC NODES: No mediastinal, hilar, supraclavicular, or axillary   lymphadenopathy.    MEDIASTINUM/GREAT VESSELS: No pericardial effusion. Heart size is within   normal limits. The aorta and main pulmonary artery are of normal caliber.    BONES/SOFT TISSUES: Bilateral gynecomastia. Degenerative change, thoracic   spine.    VISUALIZED UPPER ABDOMEN: Unremarkable.      IMPRESSION:    Occlusion of distal left main stem and left lobar bronchi. Near-total   collapse atelectasis of left lung, with small residual left upper lobe   aeration. Correlate for superimposed consolidation/pneumonia in the   appropriate clinical setting.    No evidence of acute pulmonary embolism.    --- End of Report ---            MINOR MEDINA MD; Attending Radiologist  This document has been electronically signed. Dec  3 2022  6:15PM (22 @ 17:50)      CARDIOLOGY TESTING  12 Lead ECG:   Ventricular Rate 120 BPM    Atrial Rate 120 BPM    P-R Interval 132 ms    QRS Duration 84 ms    Q-T Interval 288 ms    QTC Calculation(Bazett) 407 ms    P Axis 74 degrees    R Axis -69 degrees    T Axis -35 degrees    Diagnosis Line Sinus tachycardia  Left axis deviation  Poor R wave progression  Abnormal ECG    Confirmed by Basim Guadalupe ([id number]) on 2022 8:49:08 PM (22 @ 17:28)  12 Lead ECG:   Ventricular Rate 149 BPM    Atrial Rate 149 BPM    P-R Interval 118 ms    QRS Duration 76 ms    Q-T Interval 262 ms    QTC Calculation(Bazett) 412 ms    P Axis 27 degrees    R Axis 84 degrees    T Axis -51 degrees    Diagnosis Line Sinus tachycardia  Possible Inferior infarct , age undetermined  Abnormal ECG    Confirmed by ROWDY CHAO MD ([id number]) on 2022 7:08:50 AM (22 @ 06:44)      MEDICATIONS  acetylcysteine 10%  Inhalation 4 Inhalation three times a day  apixaban 5 Oral every 12 hours  baclofen 20 Oral every 12 hours  cefepime   IVPB 2000 IV Intermittent every 8 hours  chlorhexidine 2% Cloths 1 Topical daily  donepezil 10 Oral at bedtime  lactated ringers. 1000 IV Continuous <Continuous>  lactulose Syrup 10 Oral daily  linezolid  IVPB 600 IV Intermittent every 12 hours  memantine 10 Oral two times a day  multivitamin 1 Oral daily  pantoprazole    Tablet 40 Oral before breakfast  senna 2 Oral at bedtime  tamsulosin 0.4 Oral at bedtime  thiamine 100 Oral daily      WEIGHT  Weight (kg): 68 (22 @ 08:20)      ANTIBIOTICS:  cefepime   IVPB 2000 milliGRAM(s) IV Intermittent every 8 hours  linezolid  IVPB 600 milliGRAM(s) IV Intermittent every 12 hours      All available historical records have been reviewed       KEVIN MEDRANO  52y, Male  Allergy: penicillin (Unknown)  vancomycin (Unknown)      LOS  2d    CHIEF COMPLAINT: sepsis (04 Dec 2022 09:38)      INTERVAL EVENTS/HPI  - No acute events overnight  - T(F): , Max: 101.5 (22 @ 05:27)  - Tolerating medication  - WBC Count: 13.61 (22 @ 08:30)  WBC Count: 15.00 (22 @ 08:22)     - Creatinine, Serum: 0.5 (22 @ 08:22)       ROS  unable to obtain history secondary to patient's mental status and/or sedation     VITALS:  T(F): 100.5, Max: 101.5 (22 @ 05:27)  HR: 114  BP: 118/74  RR: 20Vital Signs Last 24 Hrs  T(C): 38.1 (04 Dec 2022 08:11), Max: 38.6 (04 Dec 2022 05:27)  T(F): 100.5 (04 Dec 2022 08:11), Max: 101.5 (04 Dec 2022 05:27)  HR: 114 (04 Dec 2022 08:11) (114 - 132)  BP: 118/74 (04 Dec 2022 08:11) (111/63 - 125/71)  BP(mean): 89 (04 Dec 2022 08:11) (81 - 89)  RR: 20 (04 Dec 2022 08:11) (20 - 22)  SpO2: 99% (04 Dec 2022 08:11) (93% - 99%)    Parameters below as of 04 Dec 2022 08:11  Patient On (Oxygen Delivery Method): BiPAP/CPAP        PHYSICAL EXAM:  Gen: chronically ill appearing   HEENT: Normocephalic, atraumatic  Neck: supple, no lymphadenopathy  CV: Regular rate & regular rhythm  Lungs: decreased BS at bases, no fremitus  Abdomen: Soft, BS present  Ext: Warm, well perfused  Neuro: non focal, not following commands  Skin: no rash, no erythema  Lines: no phlebitis     FH: Non-contributory  Social Hx: Non-contributory    TESTS & MEASUREMENTS:                        10.0   13.61 )-----------( 261      ( 04 Dec 2022 08:30 )             30.7     12    144  |  110  |  29<H>  ----------------------------<  165<H>  3.2<L>   |  21  |  0.5<L>    Ca    8.9      03 Dec 2022 08:22  Mg     1.7         TPro  6.3  /  Alb  3.6  /  TBili  1.0  /  DBili  x   /  AST  15  /  ALT  40  /  AlkPhos  116<H>        LIVER FUNCTIONS - ( 03 Dec 2022 08:22 )  Alb: 3.6 g/dL / Pro: 6.3 g/dL / ALK PHOS: 116 U/L / ALT: 40 U/L / AST: 15 U/L / GGT: x           Urinalysis Basic - ( 02 Dec 2022 10:34 )    Color: Libby / Appearance: Turbid / S.021 / pH: x  Gluc: x / Ketone: Trace  / Bili: Negative / Urobili: 3 mg/dL   Blood: x / Protein: 100 mg/dL / Nitrite: Negative   Leuk Esterase: Large / RBC: 236 /HPF / WBC >720 /HPF   Sq Epi: x / Non Sq Epi: 2 /HPF / Bacteria: Many        Culture - Urine (collected 22 @ 10:34)  Source: Clean Catch Clean Catch (Midstream)  Final Report (22 @ 09:29):    >=3 organisms. Probable collection contamination.    Culture - Blood (collected 22 @ 08:21)  Source: .Blood Blood-Peripheral  Gram Stain (22 @ 01:05):    Growth in aerobic bottle: Gram Positive Cocci in Clusters    Growth in anaerobic bottle: Gram Positive Cocci in Clusters  Preliminary Report (22 @ 01:05):    Growth in aerobic bottle: Staphylococcus hominis    Coag Negative Staphylococcus    Single set isolate, possible contaminant. Contact    Microbiology if susceptibility testing clinically    indicated.    Growth in anaerobic bottle: Gram Positive Cocci in Clusters    ***Blood Panel PCR results on this specimen are available    approximately 3 hours after the Gram stain result.***    Gram stain, PCR, and/or culture results may not always    correspond due to difference in methodologies.    ************************************************************    This PCR assay was performed by multiplex PCR. This    Assay tests for 66 bacterial and resistance gene targets.    Please refer to the [facility] Labs test directory    at [email or web address] for details.  Organism: Blood Culture PCR (22 @ 10:39)  Organism: Blood Culture PCR (22 @ 10:39)      -  Staphylococcus epidermidis, Methicillin resistant: Detec      Method Type: PCR    Culture - Blood (collected 22 @ 08:21)  Source: .Blood Blood-Peripheral  Gram Stain (22 @ 16:14):    Growth in aerobic bottle: Gram Positive Cocci in Clusters  Preliminary Report (22 @ 16:14):    Growth in aerobic bottle: Gram Positive Cocci in Clusters    Culture - Urine (collected 22 @ 06:59)  Source: Suprapubic Suprapubic  Preliminary Report (22 @ 17:58):    Few Proteus mirabilis    Moderate Enterococcus faecalis    Culture - Blood (collected 22 @ 06:59)  Source: .Blood Blood  Gram Stain (22 @ 19:58):    Growth in aerobic bottle: Gram Positive Cocci in Clusters  Preliminary Report (22 @ 19:58):    Growth in aerobic bottle: Gram Positive Cocci in Clusters        Lactate, Blood: 1.1 mmol/L (22 @ 01:22)  Lactate, Blood: 2.4 mmol/L (22 @ 13:41)  Blood Gas Venous - Lactate: 2.60 mmol/L (22 @ 10:58)  Blood Gas Venous - Lactate: 3.60 mmol/L (22 @ 07:24)      INFECTIOUS DISEASES TESTING  Procalcitonin, Serum: 0.53 (22 @ 10:53)  COVID-19 PCR: NotDetec (22 @ 18:30)  strept    INFLAMMATORY MARKERS      RADIOLOGY & ADDITIONAL TESTS:  I have personally reviewed the last available Chest xray  CXR  Xray Chest 1 View AP/PA:   ACC: [id number] EXAM:  XR CHEST 1 VIEW                          PROCEDURE DATE:  2022          INTERPRETATION:  Clinical History / Reason for exam: Trauma.    Comparison : Chest radiograph dated 2019    Technique/Positioning: AP view of the chest.    Findings:    Support devices: A gastrostomy tube is noted.    Cardiac/mediastinum/hilum: Unremarkable.    Lung parenchyma/Pleura: There is a small left pleural effusion and   possible left basilar consolidation. No pneumothorax.    Skeleton/soft tissues: No acute abnormality seen.    Impression:    Small left pleural effusion and possible left basilar consolidation.    --- End of Report ---            YAIR BAILEY MD; Attending Radiologist  This document has been electronically signed. Dec  2 2022  2:17PM (22 @ 06:52)      CT  CT Angio Chest PE Protocol w/ IV Cont:   ACC: [id number] EXAM:  CT ANGIO CHEST PULM [facility]                          PROCEDURE DATE:  2022          INTERPRETATION:  CLINICAL HISTORY/REASON FOR EXAM: Tachycardia. Sepsis..    TECHNIQUE: Multislice helical sections were obtained from the thoracic   inlet to the lung bases during rapid administration of intravenous   contrast. Thin sections were reconstructed through the pulmonary   vasculature. 3D (MIP) reformats obtained.    COMPARISON: None.    FINDINGS:    PULMONARY EMBOLUS: No evidence of acute pulmonary embolism.    LUNGS, PLEURA, AIRWAYS: Occlusion of distal left main stem and left lobar   bronchi. Near-total collapse atelectasis of left lung, with small   residual left upper lobe aeration. Correlate for superimposed   consolidation/pneumonia in the appropriate clinical setting. Paraseptal   emphysema. Dependent right lung atelectasis. No pleural effusion or   pneumothorax.    THORACIC NODES: No mediastinal, hilar, supraclavicular, or axillary   lymphadenopathy.    MEDIASTINUM/GREAT VESSELS: No pericardial effusion. Heart size is within   normal limits. The aorta and main pulmonary artery are of normal caliber.    BONES/SOFT TISSUES: Bilateral gynecomastia. Degenerative change, thoracic   spine.    VISUALIZED UPPER ABDOMEN: Unremarkable.      IMPRESSION:    Occlusion of distal left main stem and left lobar bronchi. Near-total   collapse atelectasis of left lung, with small residual left upper lobe   aeration. Correlate for superimposed consolidation/pneumonia in the   appropriate clinical setting.    No evidence of acute pulmonary embolism.    --- End of Report ---            MINOR MEDINA MD; Attending Radiologist  This document has been electronically signed. Dec  3 2022  6:15PM (22 @ 17:50)      CARDIOLOGY TESTING  12 Lead ECG:   Ventricular Rate 120 BPM    Atrial Rate 120 BPM    P-R Interval 132 ms    QRS Duration 84 ms    Q-T Interval 288 ms    QTC Calculation(Bazett) 407 ms    P Axis 74 degrees    R Axis -69 degrees    T Axis -35 degrees    Diagnosis Line Sinus tachycardia  Left axis deviation  Poor R wave progression  Abnormal ECG    Confirmed by Basim Guadalupe ([id number]) on 2022 8:49:08 PM (22 @ 17:28)  12 Lead ECG:   Ventricular Rate 149 BPM    Atrial Rate 149 BPM    P-R Interval 118 ms    QRS Duration 76 ms    Q-T Interval 262 ms    QTC Calculation(Bazett) 412 ms    P Axis 27 degrees    R Axis 84 degrees    T Axis -51 degrees    Diagnosis Line Sinus tachycardia  Possible Inferior infarct , age undetermined  Abnormal ECG    Confirmed by ROWDY CHAO MD ([id number]) on 2022 7:08:50 AM (22 @ 06:44)      MEDICATIONS  acetylcysteine 10%  Inhalation 4 Inhalation three times a day  apixaban 5 Oral every 12 hours  baclofen 20 Oral every 12 hours  cefepime   IVPB 2000 IV Intermittent every 8 hours  chlorhexidine 2% Cloths 1 Topical daily  donepezil 10 Oral at bedtime  lactated ringers. 1000 IV Continuous <Continuous>  lactulose Syrup 10 Oral daily  linezolid  IVPB 600 IV Intermittent every 12 hours  memantine 10 Oral two times a day  multivitamin 1 Oral daily  pantoprazole    Tablet 40 Oral before breakfast  senna 2 Oral at bedtime  tamsulosin 0.4 Oral at bedtime  thiamine 100 Oral daily      WEIGHT  Weight (kg): 68 (22 @ 08:20)      ANTIBIOTICS:  cefepime   IVPB 2000 milliGRAM(s) IV Intermittent every 8 hours  linezolid  IVPB 600 milliGRAM(s) IV Intermittent every 12 hours      All available historical records have been reviewed

## 2022-12-04 NOTE — PROGRESS NOTE ADULT - SUBJECTIVE AND OBJECTIVE BOX
Called by  for Urology consult but we have seen pt on Nov 28, 2022 during this admission.  Our recommendation remains the same.  Interventional Radiology consultation for SP tube.      Urology called for evaluation of chronic mansfield which we saw pt on 11/28/22.  As per our recommendation, Interventional Radiology was consulted and they agree to place SPT.  Please refer to IR consult on 11/28/22

## 2022-12-04 NOTE — CONSULT NOTE ADULT - SUBJECTIVE AND OBJECTIVE BOX
INTERVENTIONAL RADIOLOGY CONSULT:     Procedure Requested: SPT replacement  HPI:  Pt is nonverbal at baseline. Hx obtained from chart.     53 yo male w/ PMH of MS, chronic low back pain, neurogenic bladder? s/p SPC placement and s/p dislodgement, presents for fever. Presented to the ED for SPC dislodgement a few days ago. Was seen by IR and planned for outpatient SPC replacement. Campuazno was placed at [facility]. Today. prior to placement, he was noted to be febrile w/ tachycardia and tachypnea so brought into ED.  Pt nonverbal at baseline, unable to provide further HPI.    In the ED:  · BP Systolic	138 mm Hg  · BP Diastolic	92 mm Hg  · Heart Rate	  150 /min  · Respiration Rate (breaths/min)	  60 /min  · Temp (F)	  103.7 Degrees F  · Temp (C)	  39.8 Degrees C  · Temp site	rectal  · SpO2 (%)	98 %  · O2 Delivery/Oxygen Delivery Method	nasal cannula  · Oxygen Therapy Flow (L/min)	2 L/min    Labs notable for WBC 32k, lactate 2.4, Na 147, AG 17, troponin 0.2  UA showing large leuks, WBC, bacteria, RBC  CXR showing worsening opacifications  ECG showing sinus tachy, possible inferior infarct  Given aztreonam, zyvox, fluid bolus  Pt admitted to SDU.  (02 Dec 2022 15:53)    PAST MEDICAL & SURGICAL HISTORY:  Multiple sclerosis  Chronic back pain  Hypertension    No significant past surgical history    MEDICATIONS  (STANDING):  acetylcysteine 10%  Inhalation 4 milliLiter(s) Inhalation three times a day  apixaban 5 milliGRAM(s) Oral every 12 hours  baclofen 20 milliGRAM(s) Oral every 12 hours  cefepime   IVPB 2000 milliGRAM(s) IV Intermittent every 8 hours  chlorhexidine 2% Cloths 1 Application(s) Topical daily  donepezil 10 milliGRAM(s) Oral at bedtime  lactated ringers. 1000 milliLiter(s) (100 mL/Hr) IV Continuous <Continuous>  lactulose Syrup 10 Gram(s) Oral daily  linezolid  IVPB 600 milliGRAM(s) IV Intermittent every 12 hours  memantine 10 milliGRAM(s) Oral two times a day  multivitamin 1 Tablet(s) Oral daily  pantoprazole    Tablet 40 milliGRAM(s) Oral before breakfast  senna 2 Tablet(s) Oral at bedtime  tamsulosin 0.4 milliGRAM(s) Oral at bedtime  thiamine 100 milliGRAM(s) Oral daily    MEDICATIONS  (PRN):  acetaminophen     Tablet .. 650 milliGRAM(s) Oral every 6 hours PRN Temp greater or equal to 38C (100.4F), Mild Pain (1 - 3)  albuterol    0.083% 2.5 milliGRAM(s) Nebulizer every 4 hours PRN Shortness of Breath and/or Wheezing  albuterol    90 MICROgram(s) HFA Inhaler 2 Puff(s) Inhalation every 6 hours PRN Shortness of Breath and/or Wheezing  ibuprofen  Suspension. 600 milliGRAM(s) Oral every 8 hours PRN Temp greater or equal to 38.5C (101.3F)  melatonin 3 milliGRAM(s) Oral at bedtime PRN Insomnia    Allergies    penicillin (Unknown)  vancomycin (Unknown)    Intolerances      Physical Exam:   Vital Signs Last 24 Hrs  T(C): 38.1 (04 Dec 2022 08:11), Max: 38.6 (04 Dec 2022 05:27)  T(F): 100.5 (04 Dec 2022 08:11), Max: 101.5 (04 Dec 2022 05:27)  HR: 114 (04 Dec 2022 08:11) (114 - 132)  BP: 118/74 (04 Dec 2022 08:11) (111/63 - 125/71)  BP(mean): 89 (04 Dec 2022 08:11) (81 - 89)  RR: 20 (04 Dec 2022 08:11) (20 - 22)  SpO2: 99% (04 Dec 2022 08:11) (93% - 99%)    Parameters below as of 04 Dec 2022 08:11  Patient On (Oxygen Delivery Method): BiPAP/CPAP      Labs:                         10.0   13.61 )-----------( 261      ( 04 Dec 2022 08:30 )             30.7     12-03    144  |  110  |  29<H>  ----------------------------<  165<H>  3.2<L>   |  21  |  0.5<L>    Ca    8.9      03 Dec 2022 08:22  Mg     1.7     12-03    TPro  6.3  /  Alb  3.6  /  TBili  1.0  /  DBili  x   /  AST  15  /  ALT  40  /  AlkPhos  116<H>  12-03    Radiology & Additional Studies:   Radiology imaging reviewed.     ASSESSMENT/ PLAN:   Risks, benefits, and alternatives to treatment discussed. All questions answered with understanding.    Thank you for the courtesy of this consult, please call [phone] Monday-Friday from 8am to 5pm. All other times please call [phone].   INTERVENTIONAL RADIOLOGY CONSULT:     Procedure Requested: SPT replacement  HPI:  Pt is nonverbal at baseline. Hx obtained from chart.     53 yo male w/ PMH of MS, chronic low back pain, neurogenic bladder? s/p SPC placement and s/p dislodgement, presents for fever. Presented to the ED for SPC dislodgement a few days ago. Was seen by IR and planned for outpatient SPC replacement. Mansfield was placed at [facility]. Today. prior to placement, he was noted to be febrile w/ tachycardia and tachypnea so brought into ED.  Pt nonverbal at baseline, unable to provide further HPI.    In the ED:  · BP Systolic	138 mm Hg  · BP Diastolic	92 mm Hg  · Heart Rate	  150 /min  · Respiration Rate (breaths/min)	  60 /min  · Temp (F)	  103.7 Degrees F  · Temp (C)	  39.8 Degrees C  · Temp site	rectal  · SpO2 (%)	98 %  · O2 Delivery/Oxygen Delivery Method	nasal cannula  · Oxygen Therapy Flow (L/min)	2 L/min    Labs notable for WBC 32k, lactate 2.4, Na 147, AG 17, troponin 0.2  UA showing large leuks, WBC, bacteria, RBC  CXR showing worsening opacifications  ECG showing sinus tachy, possible inferior infarct  Given aztreonam, zyvox, fluid bolus  Pt admitted to SDU.  (02 Dec 2022 15:53)    PAST MEDICAL & SURGICAL HISTORY:  Multiple sclerosis  Chronic back pain  Hypertension    No significant past surgical history    MEDICATIONS  (STANDING):  acetylcysteine 10%  Inhalation 4 milliLiter(s) Inhalation three times a day  apixaban 5 milliGRAM(s) Oral every 12 hours  baclofen 20 milliGRAM(s) Oral every 12 hours  cefepime   IVPB 2000 milliGRAM(s) IV Intermittent every 8 hours  chlorhexidine 2% Cloths 1 Application(s) Topical daily  donepezil 10 milliGRAM(s) Oral at bedtime  lactated ringers. 1000 milliLiter(s) (100 mL/Hr) IV Continuous <Continuous>  lactulose Syrup 10 Gram(s) Oral daily  linezolid  IVPB 600 milliGRAM(s) IV Intermittent every 12 hours  memantine 10 milliGRAM(s) Oral two times a day  multivitamin 1 Tablet(s) Oral daily  pantoprazole    Tablet 40 milliGRAM(s) Oral before breakfast  senna 2 Tablet(s) Oral at bedtime  tamsulosin 0.4 milliGRAM(s) Oral at bedtime  thiamine 100 milliGRAM(s) Oral daily    MEDICATIONS  (PRN):  acetaminophen     Tablet .. 650 milliGRAM(s) Oral every 6 hours PRN Temp greater or equal to 38C (100.4F), Mild Pain (1 - 3)  albuterol    0.083% 2.5 milliGRAM(s) Nebulizer every 4 hours PRN Shortness of Breath and/or Wheezing  albuterol    90 MICROgram(s) HFA Inhaler 2 Puff(s) Inhalation every 6 hours PRN Shortness of Breath and/or Wheezing  ibuprofen  Suspension. 600 milliGRAM(s) Oral every 8 hours PRN Temp greater or equal to 38.5C (101.3F)  melatonin 3 milliGRAM(s) Oral at bedtime PRN Insomnia    Allergies    penicillin (Unknown)  vancomycin (Unknown)    Intolerances      Physical Exam:   Vital Signs Last 24 Hrs  T(C): 38.1 (04 Dec 2022 08:11), Max: 38.6 (04 Dec 2022 05:27)  T(F): 100.5 (04 Dec 2022 08:11), Max: 101.5 (04 Dec 2022 05:27)  HR: 114 (04 Dec 2022 08:11) (114 - 132)  BP: 118/74 (04 Dec 2022 08:11) (111/63 - 125/71)  BP(mean): 89 (04 Dec 2022 08:11) (81 - 89)  RR: 20 (04 Dec 2022 08:11) (20 - 22)  SpO2: 99% (04 Dec 2022 08:11) (93% - 99%)    Parameters below as of 04 Dec 2022 08:11  Patient On (Oxygen Delivery Method): BiPAP/CPAP      Labs:                         10.0   13.61 )-----------( 261      ( 04 Dec 2022 08:30 )             30.7     12-03    144  |  110  |  29<H>  ----------------------------<  165<H>  3.2<L>   |  21  |  0.5<L>    Ca    8.9      03 Dec 2022 08:22  Mg     1.7     12-03    TPro  6.3  /  Alb  3.6  /  TBili  1.0  /  DBili  x   /  AST  15  /  ALT  40  /  AlkPhos  116<H>  12-03    Radiology & Additional Studies:   Radiology imaging reviewed.     ASSESSMENT/ PLAN:   53 yo M with chronic SPT, s/p dislodgement and plan for replacement with IR as an outpatient on December 3. Admitted with sepsis 2/2 UTI.     Plan:  - Can plan for SPT replacement once UTI has cleared and patient has completed course of abx  - Continue medical management per primary team and infectious disease  - No acute IR intervention until infection has resolved  - Continue mansfield catheter     Risks, benefits, and alternatives to treatment discussed. All questions answered with understanding.    Thank you for the courtesy of this consult, please call [phone] Monday-Friday from 8am to 5pm. All other times please call [phone].

## 2022-12-04 NOTE — PROGRESS NOTE ADULT - ASSESSMENT
IMPRESSION:    Sepsis POA  Acute hypoxemic respiratory failure  Urinary tract infection  Bacteremia - GPC in clusters   Left mucus plug/lung atelectasis  non verbal at baseline  NSTEMI likely Type 2  Pulm edema ?   history of MS/neurogenic bladder        PLAN:      CNS: avoid sedation.     HEENT: Oral care    PULMONARY:  CT chest reviewed with left lung atelectasis. Recommend nebs every 4 hours and as needed; mucomyst nebs as well; aggressive chest PT; place on BIPAP 12/6 40%; Repeat CXR in evening time; if still has atelectasis then will need bronchoscopy with airway clearance if stable.     CARDIOVASCULAR: avoid volume overload, MAP adequate, echo. Gentle IV hydration.     GI: GI prophylaxis. NPO. GI evaluation for PEG tube dislodgement.     RENAL:  Follow up lytes.  Correct as needed. IR for the SPC. Trend lactic acid    INFECTIOUS DISEASE: ID evaluation. Continue broad spectrum abx. Bcx positive GPC in clusters. UCx positive for proteus and Enterococcus. RVP is negative.     HEMATOLOGICAL:  DVT prophylaxis. Check LE duplex. Doubt VTE while on therapeutic Eliquis. Elevated D-dimer likely due to sepsis.     ENDOCRINE:  Follow up FS.  Insulin protocol if needed.    MUSCULOSKELETAL: bedrest    Continue SDU level of care for now.

## 2022-12-04 NOTE — PROGRESS NOTE ADULT - ASSESSMENT
51 y/o man with PMH of MS, nonverbal at baseline, chronic low back pain, ?neurogenic bladder s/p SPC placement and s/p dislodgement was at IR for replacement of SPC when he was found to be in severe sepsis.    #Severe sepsis present on admission 2/2 acute pyelonephritis  -L lung etelectasis/Mucus plug   -Ucx: Few Proteus Mirabilis/Mod E Faecalis  -Blood cx 12/2: GPC in clusters x3, 1 culture with staph hominis  -On Cefepime and Zyvox  -ID following, if hemodynamic compromise, Gentamycin x1  -c/w IVF   -repeat blood cx, Tylenol for fevers >101  -C/w HFNC, alternating with BIPAP, nebs and chest PT. Repeat Chest xray in PM    #Neurogenic bladder  -had SPC that was dislodged - now with mansfield and SPC was going to be replaced when he became septic  - and IR following  -monitor urine output  -on flomax    #Elevated dimer   -f/u LE duplex, on Eliquis     #Dysphagia   -on PEG feeds. Request GI eval for PEG malfunction     #MS, nonverbal, bedbound, contracted   -repositioning per protocol, continue baclofen    #SUKHDEV   -likely prerenal, resolved   -continue hydration and monitor     #NSTEMI type 2 due to sepsis   -TTE    #Misc  -DVT prophylaxis: Eliquis  -GI prophylaxis: Protonix  -Code status: Full code  -Dispo: SDU

## 2022-12-04 NOTE — PROGRESS NOTE ADULT - ASSESSMENT
ASSESSMENT  51 yo male w/ PMH of MS, chronic low back pain, neurogenic bladder? s/p SPC placement and s/p dislodgement, presents for fever. Presented to the ED for SPC dislodgement a few days ago. Was seen by IR and planned for outpatient SPC replacement. Campuzano was placed at [email or web address] Today. prior to placement, he was noted to be febrile w/ tachycardia and tachypnea so brought into ED.      IMPRESSION  #Severe Sepsis on admission T>101F, Pulse>90,WBC>12, lactic acidosis due to acute complicated cystitis    12/2 BCX  Staphylococcus epidermidis, Methicillin resistant: Detec 1/2 bottles ,  Staphylococcus hominis    12/2 BCX GPC 1/2     12/2 BCX  GPC 1/2 12/2 UCX   >=3 organisms. Probable collection contamination.    12/2 UCX   Few Proteus mirabilis  Moderate Enterococcus faecalis    UA Blood: x / Protein: 100 mg/dL / Nitrite: Negative   Leuk Esterase: Large / RBC: 236 /HPF / WBC >720 /HPF   Sq Epi: x / Non Sq Epi: 2 /HPF / Bacteria: Many  #CXR Small left pleural effusion and possible left basilar consolidation.  #MS with neurogenic bladder, dislodged SPC  #Lactic acidosis  #Hypernatremia   QTC Calculation(Bazett) 407 ms  #Severe protein calorie malnutrition  BMI (kg/m2): 17.8    #Abx allergy: penicillin (childhood)  vancomycin (Unknown)    Creatinine, Serum: 0.5 (12-03-22 @ 08:22)    Weight (kg): 68 (12-02-22 @ 08:20)    RECOMMENDATIONS  - CHANGE cefepime to meropenem 1g q8h IV while awaiting Proteus sensitivities as continues to spike fever  - linezolid  IVPB 600 milliGRAM(s) IV Intermittent every 12 hours   - Please call micro [phone] to add on sensitivities and speciation for all coNS in BCX    If any questions, please call or send a message on [employer] Teams  Please continue to update ID with any pertinent new laboratory or radiographic findings  #[id number]     ASSESSMENT  51 yo male w/ PMH of MS, chronic low back pain, neurogenic bladder? s/p SPC placement and s/p dislodgement, presents for fever. Presented to the ED for SPC dislodgement a few days ago. Was seen by IR and planned for outpatient SPC replacement. Campuzano was placed at [facility]. Today. prior to placement, he was noted to be febrile w/ tachycardia and tachypnea so brought into ED.      IMPRESSION  #Severe Sepsis on admission T>101F, Pulse>90,WBC>12, lactic acidosis due to acute complicated cystitis    12/2 BCX  Staphylococcus epidermidis, Methicillin resistant: Detec 1/2 bottles ,      12/2 BCX GPC 1/2  Staphylococcus hominis    12/2 BCX  GPC 1/2 Staphylococcus hominis    12/2 UCX   >=3 organisms. Probable collection contamination.    12/2 UCX   Few Proteus mirabilis  Moderate Enterococcus faecalis    UA Blood: x / Protein: 100 mg/dL / Nitrite: Negative   Leuk Esterase: Large / RBC: 236 /HPF / WBC >720 /HPF   Sq Epi: x / Non Sq Epi: 2 /HPF / Bacteria: Many  #CXR Small left pleural effusion and possible left basilar consolidation.  #MS with neurogenic bladder, dislodged SPC  #Lactic acidosis  #Hypernatremia   QTC Calculation(Bazett) 407 ms  #Severe protein calorie malnutrition  BMI (kg/m2): 17.8    #Abx allergy: penicillin (childhood)  vancomycin (Unknown)    Creatinine, Serum: 0.5 (12-03-22 @ 08:22)    Weight (kg): 68 (12-02-22 @ 08:20)    RECOMMENDATIONS  - CHANGE cefepime to meropenem 1g q8h IV while awaiting Proteus sensitivities as continues to spike fever  - linezolid  IVPB 600 milliGRAM(s) IV Intermittent every 12 hours   - Please call micro [phone] to add on sensitivities and speciation for all coNS in BCX    If any questions, please call or send a message on [employer] Teams  Please continue to update ID with any pertinent new laboratory or radiographic findings  #[id number]

## 2022-12-04 NOTE — PROGRESS NOTE ADULT - SUBJECTIVE AND OBJECTIVE BOX
KEVIN MEDRANO  52y Male    CHIEF COMPLAINT:    Patient is a 52y old  Male who presents with a chief complaint of sepsis (04 Dec 2022 06:44)    INTERVAL HPI/OVERNIGHT EVENTS:    Patient seen and examined. No acute events overnight. arousable, on bipap     ROS: All other systems are negative.    Vital Signs:    T(F): 100.5 (22 @ 08:11), Max: 101.5 (22 @ 05:27)  HR: 114 (22 @ 08:11) (114 - 132)  BP: 118/74 (22 @ 08:11) (111/63 - 125/71)  RR: 20 (22 @ 08:11) (20 - 22)  SpO2: 99% (22 @ 08:11) (93% - 99%)    03 Dec 2022 07:01  -  04 Dec 2022 07:00  --------------------------------------------------------  IN: 2290 mL / OUT: 725 mL / NET: 1565 mL    Daily Height in cm: 195.58 (03 Dec 2022 09:25)    Daily Weight in k.5 (04 Dec 2022 08:11)    PHYSICAL EXAM:    GENERAL:  NAD chronically ill appearing   SKIN: No rashes or lesions  HEENT: Atraumatic. Normocephalic.    NECK: Supple, No JVD.    PULMONARY: Coarse breath sounds B/L. No wheezing   CVS: Normal S1, S2. Rate and Rhythm are regular. tachycardic   ABDOMEN/GI: Soft, Nontender, Nondistended   MSK:  No edema B/L LE. Contracted   NEUROLOGIC:  unable to move extremities   PSYCH: Lethargic, arousable nonverbal     Consultant(s) Notes Reviewed:  [x ] YES  [ ] NO  Care Discussed with Consultants/Other Providers [ x] YES  [ ] NO    LABS:                        11.5   15.00 )-----------( 302      ( 03 Dec 2022 08:22 )             36.8    144  |  110  |  29<H>  ----------------------------<  165<H>  3.2<L>   |  21  |  0.5<L>    Ca    8.9      03 Dec 2022 08:22  Mg     1.7         TPro  6.3  /  Alb  3.6  /  TBili  1.0  /  DBili  x   /  AST  15  /  ALT  40  /  AlkPhos  116<H>  12    Serum Pro-Brain Natriuretic Peptide: 434 pg/mL (22 @ 01:22)    Trop 0.11, CKMB --, CK --, 22 @ 08:22  Trop 0.12, CKMB --, CK --, 22 @ 01:22  Trop 0.20, CKMB --, CK --, 22 @ 08:21    Culture - Urine (collected 02 Dec 2022 10:34)  Source: Clean Catch Clean Catch (Midstream)  Preliminary Report (04 Dec 2022 08:40):    >100,000 CFU/ml Gram Negative Rods    Culture - Blood (collected 02 Dec 2022 08:21)  Source: .Blood Blood-Peripheral  Gram Stain (04 Dec 2022 01:05):    Growth in aerobic bottle: Gram Positive Cocci in Clusters    Growth in anaerobic bottle: Gram Positive Cocci in Clusters  Preliminary Report (04 Dec 2022 01:05):    Growth in aerobic bottle: Staphylococcus hominis    Coag Negative Staphylococcus    Single set isolate, possible contaminant. Contact    Microbiology if susceptibility testing clinically    indicated.    Growth in anaerobic bottle: Gram Positive Cocci in Clusters    ***Blood Panel PCR results on this specimen are available    approximately 3 hours after the Gram stain result.***    Gram stain, PCR, and/or culture results may not always    correspond due to difference in methodologies.    ************************************************************    This PCR assay was performed by multiplex PCR. This    Assay tests for 66 bacterial and resistance gene targets.    Please refer to the [facility] test directory    at [email or web address] for details.  Organism: Blood Culture PCR (03 Dec 2022 10:39)  Organism: Blood Culture PCR (03 Dec 2022 10:39)    Culture - Blood (collected 02 Dec 2022 08:21)  Source: .Blood Blood-Peripheral  Gram Stain (03 Dec 2022 16:14):    Growth in aerobic bottle: Gram Positive Cocci in Clusters  Preliminary Report (03 Dec 2022 16:14):    Growth in aerobic bottle: Gram Positive Cocci in Clusters    Culture - Urine (collected 02 Dec 2022 06:59)  Source: Suprapubic Suprapubic  Preliminary Report (03 Dec 2022 17:58):    Few Proteus mirabilis    Moderate Enterococcus faecalis    Culture - Blood (collected 02 Dec 2022 06:59)  Source: .Blood Blood  Gram Stain (03 Dec 2022 19:58):    Growth in aerobic bottle: Gram Positive Cocci in Clusters  Preliminary Report (03 Dec 2022 19:58):    Growth in aerobic bottle: Gram Positive Cocci in Clusters    RADIOLOGY & ADDITIONAL TESTS:  Imaging or report Personally Reviewed:  [x] YES  [ ] NO  EKG reviewed: [x] YES  [ ] NO    Medications:  Standing  acetylcysteine 10%  Inhalation 4 milliLiter(s) Inhalation three times a day  apixaban 5 milliGRAM(s) Oral every 12 hours  baclofen 20 milliGRAM(s) Oral every 12 hours  cefepime   IVPB 2000 milliGRAM(s) IV Intermittent every 8 hours  chlorhexidine 2% Cloths 1 Application(s) Topical daily  donepezil 10 milliGRAM(s) Oral at bedtime  lactated ringers. 1000 milliLiter(s) IV Continuous <Continuous>  lactulose Syrup 10 Gram(s) Oral daily  linezolid  IVPB 600 milliGRAM(s) IV Intermittent every 12 hours  memantine 10 milliGRAM(s) Oral two times a day  multivitamin 1 Tablet(s) Oral daily  pantoprazole    Tablet 40 milliGRAM(s) Oral before breakfast  senna 2 Tablet(s) Oral at bedtime  tamsulosin 0.4 milliGRAM(s) Oral at bedtime  thiamine 100 milliGRAM(s) Oral daily    PRN Meds  acetaminophen     Tablet .. 650 milliGRAM(s) Oral every 6 hours PRN  albuterol    90 MICROgram(s) HFA Inhaler 2 Puff(s) Inhalation every 6 hours PRN  albuterol/ipratropium for Nebulization 3 milliLiter(s) Nebulizer every 4 hours PRN  ibuprofen  Suspension. 600 milliGRAM(s) Oral every 8 hours PRN  melatonin 3 milliGRAM(s) Oral at bedtime PRN

## 2022-12-04 NOTE — PROGRESS NOTE ADULT - SUBJECTIVE AND OBJECTIVE BOX
Patient is a 52y old  Male who presents with a chief complaint of sepsis (03 Dec 2022 14:33)        Over Night Events:    Now has left lung atelectasis likely due to mucus plug  Tmax 101.5   Remains on HFNC but O2 sat is acceptable   Still tachycardic        ROS:  See HPI    PHYSICAL EXAM    ICU Vital Signs Last 24 Hrs  T(C): 38.6 (04 Dec 2022 05:27), Max: 38.6 (04 Dec 2022 05:27)  T(F): 101.5 (04 Dec 2022 05:27), Max: 101.5 (04 Dec 2022 05:27)  HR: 120 (04 Dec 2022 05:) (119 - 132)  BP: 113/69 (04 Dec 2022 05:) (111/63 - 126/76)  BP(mean): 85 (04 Dec 2022 05:) (81 - 85)  ABP: --  ABP(mean): --  RR: 20 (04 Dec 2022 05:) (20 - 22)  SpO2: 98% (04 Dec 2022 05:) (93% - 98%)    O2 Parameters below as of 04 Dec 2022 05:27  Patient On (Oxygen Delivery Method): nasal cannula, high flow  O2 Flow (L/min): 60  O2 Concentration (%): 60        General: Does not appear in distress  HEENT: ASIA             Lymphatic system: No cervical LN   Lungs: Bilateral BS, decreased on the left side   Cardiovascular: Regular   Gastrointestinal: Soft, Positive BS  Extremities: No clubbing.  Moves extremities.  Full Range of motion   Skin: Warm, intact  Neurological: No motor or sensory deficit       22 @ 07:01  -  22 @ 07:00  --------------------------------------------------------  IN:    Enteral Tube Flush: 30 mL    Jevity 1.2: 200 mL  Total IN: 230 mL    OUT:    Indwelling Catheter - Urethral (mL): 1050 mL  Total OUT: 1050 mL    Total NET: -820 mL      22 @ 07:01  -  22 @ 06:44  --------------------------------------------------------  IN:    Enteral Tube Flush: 750 mL    Lactated Ringers: 1300 mL    Osmolite: 240 mL  Total IN: 2290 mL    OUT:    Indwelling Catheter - Urethral (mL): 725 mL  Total OUT: 725 mL    Total NET: 1565 mL          LABS:                            11.5   15.00 )-----------( 302      ( 03 Dec 2022 08:22 )             36.8                                               12    144  |  110  |  29<H>  ----------------------------<  165<H>  3.2<L>   |  21  |  0.5<L>    Ca    8.9      03 Dec 2022 08:22  Phos  4.0     12  Mg     1.7     12    TPro  6.3  /  Alb  3.6  /  TBili  1.0  /  DBili  x   /  AST  15  /  ALT  40  /  AlkPhos  116<H>  12-03      PT/INR - ( 02 Dec 2022 08:21 )   PT: 15.00 sec;   INR: 1.31 ratio         PTT - ( 02 Dec 2022 08:21 )  PTT:33.4 sec                                       Urinalysis Basic - ( 02 Dec 2022 10:34 )    Color: Libby / Appearance: Turbid / S.021 / pH: x  Gluc: x / Ketone: Trace  / Bili: Negative / Urobili: 3 mg/dL   Blood: x / Protein: 100 mg/dL / Nitrite: Negative   Leuk Esterase: Large / RBC: 236 /HPF / WBC >720 /HPF   Sq Epi: x / Non Sq Epi: 2 /HPF / Bacteria: Many        CARDIAC MARKERS ( 03 Dec 2022 08:22 )  x     / 0.11 ng/mL / x     / x     / x      CARDIAC MARKERS ( 03 Dec 2022 01:22 )  x     / 0.12 ng/mL / x     / x     / x      CARDIAC MARKERS ( 02 Dec 2022 08:21 )  x     / 0.20 ng/mL / x     / x     / x                                                LIVER FUNCTIONS - ( 03 Dec 2022 08:22 )  Alb: 3.6 g/dL / Pro: 6.3 g/dL / ALK PHOS: 116 U/L / ALT: 40 U/L / AST: 15 U/L / GGT: x                                                  Culture - Blood (collected 02 Dec 2022 08:21)  Source: .Blood Blood-Peripheral  Gram Stain (04 Dec 2022 01:05):    Growth in aerobic bottle: Gram Positive Cocci in Clusters    Growth in anaerobic bottle: Gram Positive Cocci in Clusters  Preliminary Report (04 Dec 2022 01:05):    Growth in aerobic bottle: Staphylococcus hominis    Coag Negative Staphylococcus    Single set isolate, possible contaminant. Contact    Microbiology if susceptibility testing clinically    indicated.    Growth in anaerobic bottle: Gram Positive Cocci in Clusters    ***Blood Panel PCR results on this specimen are available    approximately 3 hours after the Gram stain result.***    Gram stain, PCR, and/or culture results may not always    correspond due to difference in methodologies.    ************************************************************    This PCR assay was performed by multiplex PCR. This    Assay tests for 66 bacterial and resistance gene targets.    Please refer to the [facility] test directory    at [email or web address] for details.  Organism: Blood Culture PCR (03 Dec 2022 10:39)  Organism: Blood Culture PCR (03 Dec 2022 10:39)    Culture - Blood (collected 02 Dec 2022 08:21)  Source: .Blood Blood-Peripheral  Gram Stain (03 Dec 2022 16:14):    Growth in aerobic bottle: Gram Positive Cocci in Clusters  Preliminary Report (03 Dec 2022 16:14):    Growth in aerobic bottle: Gram Positive Cocci in Clusters    Culture - Urine (collected 02 Dec 2022 06:59)  Source: Suprapubic Suprapubic  Preliminary Report (03 Dec 2022 17:58):    Few Proteus mirabilis    Moderate Enterococcus faecalis    Culture - Blood (collected 02 Dec 2022 06:59)  Source: .Blood Blood  Gram Stain (03 Dec 2022 19:58):    Growth in aerobic bottle: Gram Positive Cocci in Clusters  Preliminary Report (03 Dec 2022 19:58):    Growth in aerobic bottle: Gram Positive Cocci in Clusters                                                                                           MEDICATIONS  (STANDING):  apixaban 5 milliGRAM(s) Oral every 12 hours  baclofen 20 milliGRAM(s) Oral every 12 hours  cefepime   IVPB 2000 milliGRAM(s) IV Intermittent every 8 hours  donepezil 10 milliGRAM(s) Oral at bedtime  lactated ringers. 1000 milliLiter(s) (100 mL/Hr) IV Continuous <Continuous>  lactulose Syrup 10 Gram(s) Oral daily  linezolid  IVPB 600 milliGRAM(s) IV Intermittent every 12 hours  memantine 10 milliGRAM(s) Oral two times a day  multivitamin 1 Tablet(s) Oral daily  pantoprazole    Tablet 40 milliGRAM(s) Oral before breakfast  senna 2 Tablet(s) Oral at bedtime  tamsulosin 0.4 milliGRAM(s) Oral at bedtime  thiamine 100 milliGRAM(s) Oral daily    MEDICATIONS  (PRN):  acetaminophen     Tablet .. 650 milliGRAM(s) Oral every 6 hours PRN Temp greater or equal to 38C (100.4F), Mild Pain (1 - 3)  albuterol    90 MICROgram(s) HFA Inhaler 2 Puff(s) Inhalation every 6 hours PRN Shortness of Breath and/or Wheezing  ibuprofen  Suspension. 600 milliGRAM(s) Oral every 8 hours PRN Temp greater or equal to 38.5C (101.3F)  melatonin 3 milliGRAM(s) Oral at bedtime PRN Insomnia

## 2022-12-04 NOTE — PROGRESS NOTE ADULT - ASSESSMENT
53 y/o man with PMH of MS, nonverbal at baseline, chronic low back pain, ?neurogenic bladder s/p SPC placement and s/p dislodgement was at IR for replacement of SPC when he was found to be in severe sepsis.    Severe sepsis present on admission 2/2 acute pyelonephritis  L lung etelectasis/Mucus plug   Ucx: Few Proteus Mirabilis/Mod E Faecalis  Blood cx 12/2: GPC in clusters x3, 1 culture with staph hominis  On Cefepime and Zyvox  ID following, if hemodynamic compromise, Gentamycin x1  c/w IVF   repeat blood cx, Tylenol for fevers >101  C/w HFNC, alternating with BIPAP, nebs and chest PT. Repeat Chest xray in PM    Neurogenic bladder  had SPC that was dislodged - now with mansfield and SPC was going to be replaced when he became septic   and IR following  monitor urine output  on flomax    Elevated dimer - 11oo, f/u LE duplex, on Eliquis     Dysphagia - on PEG feeds. Request GI eval for PEG malfunction     MS, nonverbal, bedbound, contracted - repositioning per protocol, continue baclofen    SUKHDEV - likely prerenal, resolved - continue hydration and monitor     Elevated trop  peaked0.20 - likely NSTEMI type 2 due to sepsis - downtrending, check ECHO    hypokalemia/Hypomagnesemia- replete and monitor levels     FULL CODE, prognosis is guarded, patient requires continuous monitoring in SDU

## 2022-12-05 NOTE — CONSULT NOTE ADULT - SUBJECTIVE AND OBJECTIVE BOX
NUTRITION SUPPORT TEAM  -  CONSULT NOTE     ADMISSION HPI:  Pt is nonverbal at baseline. Hx obtained from chart.   53 yo male w/ PMH of MS, chronic low back pain, neurogenic bladder? s/p SPC placement and s/p dislodgement, presents for fever. Presented to the ED for SPC dislodgement a few days ago. Was seen by IR and planned for outpatient SPC replacement. Campuzano was placed at [facility]. Today. prior to placement, he was noted to be febrile w/ tachycardia and tachypnea so brought into ED.  Pt nonverbal at baseline, unable to provide further HPI.    NUTRITION SUPPORT NOTE:        REVIEW OF SYSTEMS:  Negative except as noted above.     PAST MEDICAL/SURGICAL HISTORY:   Multiple sclerosis  Chronic back pain  Hypertension  No significant past surgical history    ALLERGIES:  penicillin (Unknown)  vancomycin (Unknown)    VITALS:  T(F): 99 (12-05 @ 04:00), Max: 99 (12-05 @ 04:00)  HR: 105 (12-05 @ 08:15) (89 - 116)  BP: 129/84 (12-05 @ 04:00) (121/67 - 129/84)  RR: 20 (12-05 @ 04:00) (20 - 20)  SpO2: 99% (12-05 @ 08:15) (98% - 100%)    HEIGHT/WEIGHT/BMI:   Height (cm): 195.6 (12-03)  Weight (kg): 68 (12-02)  BMI (kg/m2): 17.8 (12-03)    PHYSICAL EXAM:   GENERAL: non verbal  HEENT: Moist mucous membranes,, No lesions  ABDOMEN: Soft, Nontender, Nondistended   EXTREMITIES:  No clubbing, cyanosis, or edema  SKIN: warm and well perfused; No obvious rashes or lesions  IV ACCESS:   ENTERAL ACCESS: g-tube feed     I/Os:     12-04-22 @ 07:01  -  12-05-22 @ 07:00  --------------------------------------------------------  IN:    dextrose 5% + lactated ringers: 1300 mL  Total IN: 1300 mL    OUT:    Indwelling Catheter - Urethral (mL): 700 mL  Total OUT: 700 mL    Total NET: 600 mL        STANDING MEDICATIONS:   acetylcysteine 10%  Inhalation 4 milliLiter(s) Inhalation three times a day  baclofen 20 milliGRAM(s) Oral every 12 hours  chlorhexidine 2% Cloths 1 Application(s) Topical daily  donepezil 10 milliGRAM(s) Oral at bedtime  enoxaparin Injectable 70 milliGRAM(s) SubCutaneous every 12 hours  lactulose Syrup 10 Gram(s) Oral daily  linezolid  IVPB 600 milliGRAM(s) IV Intermittent every 12 hours  memantine 10 milliGRAM(s) Oral two times a day  meropenem  IVPB 1000 milliGRAM(s) IV Intermittent every 8 hours  multivitamin 1 Tablet(s) Oral daily  pantoprazole    Tablet 40 milliGRAM(s) Oral before breakfast  senna 2 Tablet(s) Oral at bedtime  tamsulosin 0.4 milliGRAM(s) Oral at bedtime  thiamine 100 milliGRAM(s) Oral daily      LABS:                         10.3   12.98 )-----------( 260      ( 05 Dec 2022 06:48 )             32.2     143  |  107  |  14  ----------------------------<  125<H>          (12-05-22 @ 06:48)  3.4<L>   |  24  |  <0.5<L>    Ca    8.7          (12-05-22 @ 06:48)  Phos  1.8         (12-04-22 @ 08:30)  Mg     1.7         (12-05-22 @ 06:48)    TPro  5.6<L>  /  Alb  3.0<L>  /  TBili  0.6  /  DBili  x   /  AST  13  /  ALT  20  /  AlkPhos  106       12-05-22 @ 06:48    DIET:   Diet, NPO with Tube Feed:   Tube Feeding Modality: Gastrostomy  Osmolite 1.5 Bhargav  Total Volume for 24 Hours (mL): 480  Bolus  Total Volume of Bolus (mL):  120  Total # of Feeds: 4  Tube Feed Frequency: Every 6 hours   Tube Feed Start Time: 18:00  Bolus Feed Rate (mL per Hour): 240   Bolus Feed Duration (in Hours): 0.5  Free Water Flush    Stop Time: 01:00  Free Water Flush Instructions:  150ml before and after each bolus (12-03-22 @ 10:56) [Active]          RADIOLOGY:    NUTRITION SUPPORT TEAM  -  CONSULT NOTE     ADMISSION HPI:  Pt is nonverbal at baseline. Hx obtained from chart.   53 yo male w/ PMH of MS, chronic low back pain, neurogenic bladder? s/p SPC placement and s/p dislodgement, presents for fever. Presented to the ED for SPC dislodgement a few days ago. Was seen by IR and planned for outpatient SPC replacement. Mansfield was placed at [facility]. Today. prior to placement, he was noted to be febrile w/ tachycardia and tachypnea so brought into ED.  Pt nonverbal at baseline, unable to provide further HPI.    NUTRITION SUPPORT NOTE:        REVIEW OF SYSTEMS:  Negative except as noted above.     PAST MEDICAL/SURGICAL HISTORY:   Multiple sclerosis  Chronic back pain  Hypertension  No significant past surgical history    ALLERGIES:  penicillin (Unknown)  vancomycin (Unknown)    VITALS:  T(F): 99 (12-05 @ 04:00), Max: 99 (12-05 @ 04:00)  HR: 105 (12-05 @ 08:15) (89 - 116)  BP: 129/84 (12-05 @ 04:00) (121/67 - 129/84)  RR: 20 (12-05 @ 04:00) (20 - 20)  SpO2: 99% (12-05 @ 08:15) (98% - 100%)    HEIGHT/WEIGHT/BMI:   Height (cm): 195.6 (12-03)  Weight (kg): 68 (12-02)  BMI (kg/m2): 17.8 (12-03)    PHYSICAL EXAM:   GENERAL: non verbal, on BIPAP  HEENT: Moist mucous membranes,, No lesions  ABDOMEN: Soft, Nontender, Nondistended +g-tube in place + wet dressing like bile appearance  + mansfield in place  EXTREMITIES:  no edema, contracted  SKIN: no lesions lesions  IV ACCESS:   ENTERAL ACCESS: g-tube feed     I/Os:     12-04-22 @ 07:01  -  12-05-22 @ 07:00  --------------------------------------------------------  IN:    dextrose 5% + lactated ringers: 1300 mL  Total IN: 1300 mL    OUT:    Indwelling Catheter - Urethral (mL): 700 mL  Total OUT: 700 mL    Total NET: 600 mL        STANDING MEDICATIONS:   acetylcysteine 10%  Inhalation 4 milliLiter(s) Inhalation three times a day  baclofen 20 milliGRAM(s) Oral every 12 hours  chlorhexidine 2% Cloths 1 Application(s) Topical daily  donepezil 10 milliGRAM(s) Oral at bedtime  enoxaparin Injectable 70 milliGRAM(s) SubCutaneous every 12 hours  lactulose Syrup 10 Gram(s) Oral daily  linezolid  IVPB 600 milliGRAM(s) IV Intermittent every 12 hours  memantine 10 milliGRAM(s) Oral two times a day  meropenem  IVPB 1000 milliGRAM(s) IV Intermittent every 8 hours  multivitamin 1 Tablet(s) Oral daily  pantoprazole    Tablet 40 milliGRAM(s) Oral before breakfast  senna 2 Tablet(s) Oral at bedtime  tamsulosin 0.4 milliGRAM(s) Oral at bedtime  thiamine 100 milliGRAM(s) Oral daily      LABS:                         10.3   12.98 )-----------( 260      ( 05 Dec 2022 06:48 )             32.2     143  |  107  |  14  ----------------------------<  125<H>          (12-05-22 @ 06:48)  3.4<L>   |  24  |  <0.5<L>    Ca    8.7          (12-05-22 @ 06:48)  Phos  1.8         (12-04-22 @ 08:30)  Mg     1.7         (12-05-22 @ 06:48)    TPro  5.6<L>  /  Alb  3.0<L>  /  TBili  0.6  /  DBili  x   /  AST  13  /  ALT  20  /  AlkPhos  106       12-05-22 @ 06:48    DIET:   Diet, NPO with Tube Feed:   Tube Feeding Modality: Gastrostomy  Osmolite 1.5 Bhargav  Total Volume for 24 Hours (mL): 480  Bolus  Total Volume of Bolus (mL):  120  Total # of Feeds: 4  Tube Feed Frequency: Every 6 hours   Tube Feed Start Time: 18:00  Bolus Feed Rate (mL per Hour): 240   Bolus Feed Duration (in Hours): 0.5  Free Water Flush    Stop Time: 01:00  Free Water Flush Instructions:  150ml before and after each bolus (12-03-22 @ 10:56) [Active]    RADIOLOGY:    NUTRITION SUPPORT TEAM  -  CONSULT NOTE     ADMISSION HPI:  Pt is nonverbal at baseline. Hx obtained from chart.   51 yo male w/ PMH of MS, chronic low back pain, neurogenic bladder? s/p SPC placement and s/p dislodgement, presents for fever. Presented to the ED for SPC dislodgement a few days ago. Was seen by IR and planned for outpatient SPC replacement. Mansfield was placed at [facility]. Today. prior to placement, he was noted to be febrile w/ tachycardia and tachypnea so brought into ED.  Pt nonverbal at baseline, unable to provide further HPI.    NUTRITION SUPPORT NOTE:    pt with chronic GT feeding due to dysphagia r/t MS  pt admitted for SOB and leaking at GT site    REVIEW OF SYSTEMS:  Negative except as noted above.     PAST MEDICAL/SURGICAL HISTORY:   Multiple sclerosis  Chronic back pain  Hypertension  No significant past surgical history    ALLERGIES:  penicillin (Unknown)  vancomycin (Unknown)    VITALS:  T(F): 99 (12-05 @ 04:00), Max: 99 (12-05 @ 04:00)  HR: 105 (12-05 @ 08:15) (89 - 116)  BP: 129/84 (12-05 @ 04:00) (121/67 - 129/84)  RR: 20 (12-05 @ 04:00) (20 - 20)  SpO2: 99% (12-05 @ 08:15) (98% - 100%)    HEIGHT/WEIGHT/BMI:   Height (cm): 195.6 (12-03)  Weight (kg): 68 (12-02)  BMI (kg/m2): 17.8 (12-03)    PHYSICAL EXAM:   GENERAL: non verbal, on BIPAP since last night  HEENT: Moist mucous membranes,, No lesions  ABDOMEN: Soft, Nontender, Nondistended +g-tube in place + yellow (non mucoid) drainage at site  + erythema around GT site, but skin not broken - d/w RN at bedside  + mansfield in place + clear urine output  EXTREMITIES:  no edema, contracted lower extremities, moves arms, + marked loss of muscle mass in all extrem (lower more so than upper) due to disuse  SKIN: no lesions   IV ACCESS: periph  ENTERAL ACCESS: g-tube feed     I/Os:     12-04-22 @ 07:01  -  12-05-22 @ 07:00  --------------------------------------------------------  IN:    dextrose 5% + lactated ringers: 1300 mL  Total IN: 1300 mL    OUT:    Indwelling Catheter - Urethral (mL): 700 mL  Total OUT: 700 mL    Total NET: 600 mL    STANDING MEDICATIONS:   acetylcysteine 10%  Inhalation 4 milliLiter(s) Inhalation three times a day  baclofen 20 milliGRAM(s) Oral every 12 hours  chlorhexidine 2% Cloths 1 Application(s) Topical daily  donepezil 10 milliGRAM(s) Oral at bedtime  enoxaparin Injectable 70 milliGRAM(s) SubCutaneous every 12 hours  lactulose Syrup 10 Gram(s) Oral daily  linezolid  IVPB 600 milliGRAM(s) IV Intermittent every 12 hours  memantine 10 milliGRAM(s) Oral two times a day  meropenem  IVPB 1000 milliGRAM(s) IV Intermittent every 8 hours  multivitamin 1 Tablet(s) Oral daily  pantoprazole    Tablet 40 milliGRAM(s) Oral before breakfast  senna 2 Tablet(s) Oral at bedtime  tamsulosin 0.4 milliGRAM(s) Oral at bedtime  thiamine 100 milliGRAM(s) Oral daily    LABS:                         10.3   12.98 )-----------( 260      ( 05 Dec 2022 06:48 )             32.2     143  |  107  |  14  ----------------------------<  125<H>          (12-05-22 @ 06:48)  3.4<L>   |  24  |  <0.5<L>    Ca    8.7          (12-05-22 @ 06:48)  Phos  1.8         (12-04-22 @ 08:30)  Mg     1.7         (12-05-22 @ 06:48)    TPro  5.6<L>  /  Alb  3.0<L>  /  TBili  0.6  /  DBili  x   /  AST  13  /  ALT  20  /  AlkPhos  106       12-05-22 @ 06:48    DIET:   Diet, NPO with Tube Feed:   Tube Feeding Modality: Gastrostomy  Osmolite 1.5 Bhargav  Total Volume for 24 Hours (mL): 480  Bolus  Total Volume of Bolus (mL):  120  Total # of Feeds: 4  Tube Feed Frequency: Every 6 hours   Tube Feed Start Time: 18:00  Bolus Feed Rate (mL per Hour): 240   Bolus Feed Duration (in Hours): 0.5  Free Water Flush    Stop Time: 01:00  Free Water Flush Instructions:  150ml before and after each bolus (12-03-22 @ 10:56) [Active]    RADIOLOGY:   < from: Xray Chest 1 View- PORTABLE-Routine (Xray Chest 1 View- PORTABLE-Routine in AM.) (12.05.22 @ 06:26) >  Support devices: None.    Cardiac/mediastinum/hilum: Stable.    Lung parenchyma/Pleura: Stable to mildly increased left-sided opacities.   No pneumothorax.    < end of copied text >

## 2022-12-05 NOTE — CONSULT NOTE ADULT - ASSESSMENT
ASSESSMENT  53 y/o man with PMH of MS, nonverbal at baseline, chronic low back pain, ?neurogenic bladder s/p SPC placement and s/p dislodgement was at IR for replacement of SPC when he was found to be in severe sepsis.  Severe sepsis present on admission 2/2 acute pyelonephritis  C/w HFNC, alternating with BIPAP, nebs and chest PT. Repeat Chest xray in PM  Neurogenic bladder  -had SPC that was dislodged   Elevated dimer   Dysphagia   -on PEG feeds  -hypokalemia/hypomagnesemia/hypophosphatemia  MS, nonverbal, bedbound, contracted   SUKHDEV   NSTEMI type 2 due to sepsis   PLAN     ASSESSMENT  Severe sepsis present on admission 2/2 acute pyelonephritis  C/w HFNC, alternating with BIPAP,   Neurogenic bladder  -had SPC that was dislodged   Elevated dimer       Dysphagia   -on PEG feeds  -hypokalemia/hypomagnesemia/hypophosphatemia  MS, nonverbal, bedbound, contracted   SUKHDEV   NSTEMI type 2 due to sepsis   PLAN     ASSESSMENT  Severe sepsis present on admission 2/2 acute pyelonephritis  resp distress:   HFNC, alternating with BIPAP  Neurogenic bladder - h/o SPC that was dislodged   leaking GT  NSTEMI r/t sepsis  SUKHDEV    PLAN:  - hold GT feeds and to have GI evaluate site leak  - local skin care  - why is high dose thiamin ordered, and is this a chromic med?  - treat phos - suggest level > 3.5  - will f/u

## 2022-12-05 NOTE — CDI QUERY NOTE - NSCDIOTHERTXTBX_GEN_ALL_CORE_HH
CLINICAL INDICATORS    12/2 H&P Adult: MS, nonverbal, bedbound    12/5 Chart Note-Transfer note: MS, nonverbal, bedbound, contracted     12/2 Nursing Flowsheet: (6406) Functional Assessment: Activity score = 6 Total assistance; Mobility Score = 6 Total assistance; Activity assistance provided = Total Assist; Eric Scale: Activity = 1 bedfast; Mobility = 1 completely immobile         Based on your professional judgment and the clinical indicators, please clarify if patient’s functional status (bedrest, total assistance, etc.) from the medical record can be further specified as:  -	Complete immobility due to severe physical disability  -	Functional Quadriplegia  -	Other (please specify):  -	Clinically unable to further specify patient’s functional status      Thank you,  Melba GONZALEZ, RN, BSN  [phone]

## 2022-12-05 NOTE — PROGRESS NOTE ADULT - SUBJECTIVE AND OBJECTIVE BOX
KEVIN MEDRANO  52y Male    CHIEF COMPLAINT:    Patient is a 52y old  Male who presents with a chief complaint of sepsis (05 Dec 2022 08:44)    INTERVAL HPI/OVERNIGHT EVENTS:    Patient seen and examined. No acute events overnight. Remains on BIPAP    ROS: All other systems are negative.    Vital Signs:    T(F): 97.2 (12-05-22 @ 12:00), Max: 102.1 (12-04-22 @ 20:01)  HR: 96 (12-05-22 @ 12:00) (89 - 117)  BP: 133/85 (12-05-22 @ 12:00) (121/67 - 153/83)  RR: 20 (12-05-22 @ 12:00) (20 - 20)  SpO2: 98% (12-05-22 @ 12:00) (98% - 100%)    04 Dec 2022 07:01  -  05 Dec 2022 07:00  --------------------------------------------------------  IN: 1300 mL / OUT: 700 mL / NET: 600 mL    PHYSICAL EXAM:    GENERAL:  NAD, thin, chronically ill appearing   SKIN: No rashes or lesions  HEENT: Atraumatic. Normocephalic   NECK: Supple, No JVD.    PULMONARY: Decreased breath sounds L>R. No wheezing.   CVS: Normal S1, S2. Rate and Rhythm are regular   ABDOMEN/GI: Soft, Nontender, Nondistended, leaking noted around PEG  MSK:  No clubbing or cyanosis. COntracted   NEUROLOGIC: arousable to voice   PSYCH: Awake, nonverbal     Consultant(s) Notes Reviewed:  [x ] YES  [ ] NO  Care Discussed with Consultants/Other Providers [ x] YES  [ ] NO    LABS:                        10.3   12.98 )-----------( 260      ( 05 Dec 2022 06:48 )             32.2    143  |  107  |  14  ----------------------------<  125<H>  3.4<L>   |  24  |  <0.5<L>    Ca    8.7      05 Dec 2022 06:48  Phos  1.8     12-04  Mg     1.7     12-05    TPro  5.6<L>  /  Alb  3.0<L>  /  TBili  0.6  /  DBili  x   /  AST  13  /  ALT  20  /  AlkPhos  106  12-05    Serum Pro-Brain Natriuretic Peptide: 434 pg/mL (12-03-22 @ 01:22)    Trop 0.11, CKMB --, CK --, 12-03-22 @ 08:22  Trop 0.12, CKMB --, CK --, 12-03-22 @ 01:22    Culture - Blood (collected 03 Dec 2022 21:51)  Source: .Blood None  Preliminary Report (05 Dec 2022 09:01):    No growth to date.    RADIOLOGY & ADDITIONAL TESTS  Imaging or report Personally Reviewed:  [x] YES  [ ] NO  EKG reviewed: [x] YES  [ ] NO    Medications:  Standing  acetylcysteine 10%  Inhalation 4 milliLiter(s) Inhalation three times a day  baclofen 20 milliGRAM(s) Oral every 12 hours  chlorhexidine 2% Cloths 1 Application(s) Topical daily  dextrose 5% + lactated ringers. 1000 milliLiter(s) IV Continuous <Continuous>  donepezil 10 milliGRAM(s) Oral at bedtime  enoxaparin Injectable 70 milliGRAM(s) SubCutaneous every 12 hours  lactulose Syrup 10 Gram(s) Oral daily  linezolid  IVPB 600 milliGRAM(s) IV Intermittent every 12 hours  memantine 10 milliGRAM(s) Oral two times a day  meropenem  IVPB 1000 milliGRAM(s) IV Intermittent every 8 hours  multivitamin 1 Tablet(s) Oral daily  pantoprazole    Tablet 40 milliGRAM(s) Oral before breakfast  senna 2 Tablet(s) Oral at bedtime  tamsulosin 0.4 milliGRAM(s) Oral at bedtime  thiamine 100 milliGRAM(s) Oral daily    PRN Meds  acetaminophen     Tablet .. 650 milliGRAM(s) Oral every 6 hours PRN  albuterol    0.083% 2.5 milliGRAM(s) Nebulizer every 4 hours PRN  albuterol    90 MICROgram(s) HFA Inhaler 2 Puff(s) Inhalation every 6 hours PRN  ibuprofen  Suspension. 600 milliGRAM(s) Oral every 8 hours PRN  melatonin 3 milliGRAM(s) Oral at bedtime PRN

## 2022-12-05 NOTE — PROGRESS NOTE ADULT - ASSESSMENT
53 y/o man with PMH of MS, nonverbal at baseline, chronic low back pain, ?neurogenic bladder s/p SPC placement and s/p dislodgement was at IR for replacement of SPC when he was found to be in severe sepsis.    Severe sepsis present on admission 2/2 acute pyelonephritis  L lung etelectasis/Mucus plug   Ucx: Few Proteus Mirabilis/Mod E Faecalis  Blood cx 12/2: GPC in clusters x3, 1 culture with staph hominis  On Meropenem   ID following  c/w IVF   repeat blood cx, Tylenol for fevers >101  C/w HFNC, alternating with BIPAP, nebs and chest PT. Repeat Chest xray daily  possible bronchoscopy if family in agreement     Neurogenic bladder  had SPC that was dislodged - now with [facility] and SPC was going to be replaced when he became septic   and IR following  monitor urine output  on flomax  recall IR when improved for possible SPC placement     Elevated dimer - 11oo, f/u LE duplex, previously on Eliquis. switched to therapeutic lovenox today     Dysphagia - on PEG feeds. Request GI eval for PEG malfunction/leakage     MS, nonverbal, bedbound, contracted - repositioning per protocol, continue baclofen    SUKHDEV - likely prerenal, resolved - continue hydration and monitor     Elevated trop  peaked 0.20 - likely NSTEMI type 2 due to sepsis - downtrending, check ECHO    hypokalemia/Hypomagnesemia- replete and monitor levels     FULL CODE, prognosis is guarded, patient requires continuous monitoring in SDU    53 y/o man with PMH of MS, nonverbal at baseline, chronic low back pain, ?neurogenic bladder s/p SPC placement and s/p dislodgement was at IR for replacement of SPC when he was found to be in severe sepsis.    Severe sepsis present on admission 2/2 acute pyelonephritis  L lung etelectasis/Mucus plug   Ucx: Few Proteus Mirabilis/Mod E Faecalis  Blood cx 12/2: GPC in clusters x3, 1 culture with staph hominis  On Meropenem   ID following  c/w IVF   repeat blood cx, Tylenol for fevers >101  C/w HFNC, alternating with BIPAP, nebs and chest PT. Repeat Chest xray daily  possible bronchoscopy if family in agreement     Neurogenic bladder  had SPC that was dislodged - now with [facility] and SPC was going to be replaced when he became septic   and IR following  monitor urine output  on flomax  recall IR when improved for possible SPC placement     Elevated dimer - 11oo, f/u LE duplex, previously on Eliquis. switched to therapeutic lovenox today     Dysphagia - on PEG feeds. Request GI eval for PEG malfunction/leakage     MS, nonverbal, bedbound, contracted/functional quadriplegia - repositioning per protocol, continue baclofen    SUKHDEV - likely prerenal, resolved - continue hydration and monitor     Elevated trop  peaked 0.20 - likely NSTEMI type 2 due to sepsis - downtrending, check ECHO    hypokalemia/Hypomagnesemia- replete and monitor levels     FULL CODE, prognosis is guarded, patient requires continuous monitoring in SDU

## 2022-12-05 NOTE — PROGRESS NOTE ADULT - ASSESSMENT
IMPRESSION:    Sepsis POA  Acute hypoxemic respiratory failure  Urinary tract infection  Bacteremia - MRSE  Left mucus plug/lung atelectasis  non verbal at baseline  NSTEMI likely Type 2  Pulm edema ?   history of MS/neurogenic bladder        PLAN:      CNS: avoid sedation.     HEENT: Oral care    PULMONARY:  CT chest reviewed with left lung atelectasis. Recommend nebs every 4 hours and as needed; mucomyst nebs as well; aggressive chest PT; place on BIPAP 12/6 40%; Will call family and offer bronchoscopy. Patient is high risk for extubation failure.    CARDIOVASCULAR: avoid volume overload, MAP adequate, echo. Gentle IV hydration.     GI: GI prophylaxis. NPO. GI evaluation for PEG tube dislodgement.     RENAL:  Follow up lytes.  Correct as needed. IR following for the SPC. Trend lactic acid    INFECTIOUS DISEASE: ID evaluation appreciated. Continue abx per ID. Bcx positive MRSEs. UCx positive for proteus and Enterococcus. RVP is negative.     HEMATOLOGICAL:  DVT prophylaxis. Check LE duplex. Doubt VTE while on therapeutic lovenox. Elevated D-dimer likely due to sepsis.     ENDOCRINE:  Follow up FS.  Insulin protocol if needed.    MUSCULOSKELETAL: bedrest    Continue SDU level of care for now.  IMPRESSION:    Sepsis POA  Acute hypoxemic respiratory failure  Urinary tract infection  Bacteremia - MRSE  Left mucus plug/lung atelectasis  non verbal at baseline  NSTEMI likely Type 2  Pulm edema ?   history of MS/neurogenic bladder        PLAN:      CNS: avoid sedation.     HEENT: Oral care    PULMONARY:  CT chest reviewed with left lung atelectasis. Recommend nebs every 4 hours and as needed; mucomyst nebs as well; aggressive chest PT; place on BIPAP 12/6 40%, HFNC during the day; Will call family and offer bronchoscopy. Patient is high risk for extubation failure.    CARDIOVASCULAR: avoid volume overload, MAP adequate, echo. Gentle IV hydration.     GI: GI prophylaxis. NPO. GI evaluation for PEG tube dislodgement.     RENAL:  Follow up lytes.  Correct as needed. IR following for the SPC. Trend lactic acid    INFECTIOUS DISEASE: ID evaluation appreciated. Continue abx per ID. Bcx positive MRSEs. UCx positive for proteus and Enterococcus. RVP is negative.     HEMATOLOGICAL:  DVT prophylaxis. Check LE duplex. Doubt VTE while on therapeutic lovenox. Elevated D-dimer likely due to sepsis.     ENDOCRINE:  Follow up FS.  Insulin protocol if needed.    MUSCULOSKELETAL: bedrest    Continue SDU level of care for now.  IMPRESSION:    Sepsis POA  Acute hypoxemic respiratory failure/ Left mucus plug/lung atelectasis  Urinary tract infection/ proteus  Bacteremia - MRSE  non verbal at baseline  NSTEMI likely Type 2  Pulm edema ?   history of MS/neurogenic bladder        PLAN:      CNS: avoid sedation.     HEENT: Oral care    PULMONARY:  CT chest reviewed with left lung atelectasis. Recommend nebs every 4 hours and as needed; mucomyst nebs as well; aggressive chest PT; place on BIPAP 12/6 40%, HFNC during the day; Will call family and offer bronchoscopy.    CARDIOVASCULAR: avoid volume overload, MAP adequate, echo. Gentle IV hydration.     GI: GI prophylaxis. NPO. GI evaluation for PEG tube dislodgement.     RENAL:  Follow up lytes.  Correct as needed. IR following for the SPC. Trend lactic acid    INFECTIOUS DISEASE: ID evaluation appreciated. Continue abx per ID. Bcx positive MRSEs. UCx positive for proteus and Enterococcus. RVP is negative.     HEMATOLOGICAL:  DVT prophylaxis. Check LE duplex. Doubt VTE while on therapeutic lovenox. Elevated D-dimer likely due to sepsis.     ENDOCRINE:  Follow up FS.  Insulin protocol if needed.    MUSCULOSKELETAL: bedrest  SDU

## 2022-12-05 NOTE — CONSULT NOTE ADULT - ASSESSMENT
51 y/o man with PMH of MS, nonverbal at baseline, chronic low back pain, ?neurogenic bladder s/p SPC placement and s/p dislodgement was at IR for replacement of SPC when he was found to be in severe sepsis. GI called for peristomal leak.       # peristomal leak  - pt has 18 Fr balloon PEG tube. Functioning well with minimal peristomal leak     PLAN   NPO for 24 hours   keep dry skin   apply zinc oxide three times/ day   will follow up  53 y/o man with PMH of MS, nonverbal at baseline, chronic low back pain, ?neurogenic bladder s/p SPC placement and s/p dislodgement was at IR for replacement of SPC when he was found to be in severe sepsis. GI called for peristomal leak.       # peristomal leak/ PEG tube malfunction   - pt has 18 Fr balloon PEG tube. Functioning well with minimal peristomal leak     PLAN   NPO for 24 hours   keep dry skin   apply zinc oxide three times/ day   apply dressing to keep the area clean and dry  PEG tube site care   wash with soap and water twice daily   apply bacitracin BID   will follow up  Statement Selected

## 2022-12-05 NOTE — CONSULT NOTE ADULT - TIME BILLING
I have personally seen and examined this patient.  I have reviewed all pertinent clinical information and reviewed all relevant imaging and diagnostic studies personally.   If possible, I counseled the patient about diagnostic testing and treatment plan.   I discussed my recommendations with the primary team.
coordination of care

## 2022-12-05 NOTE — CONSULT NOTE ADULT - SUBJECTIVE AND OBJECTIVE BOX
Gastroenterology Consultation:    Patient is a 52y old  Male who presents with a chief complaint of sepsis (05 Dec 2022 11:37)    Admitted on: 12-02-22    HPI:      51 yo male w/ PMH of MS, chronic low back pain, neurogenic bladder? s/p SPC placement and s/p dislodgement, presents for fever. Presented to the ED for SPC dislodgement a few days ago. Was seen by IR and planned for outpatient SPC replacement. admitted at [facility] for sepsis  2/2 acute pyelonephritis. GI was consulted for peristomal leak     Prior EGD/ Colonoscopy:  no records available     PAST MEDICAL & SURGICAL HISTORY:  Multiple sclerosis      Chronic back pain      Hypertension      No significant past surgical history            FAMILY HISTORY:  No pertinent family history in first degree relatives  no FH of gi cancers       Social History:  Tobacco: denies   Alcohol: denies   Drugs: denies     Home Medications:  Aricept 10 mg oral tablet: 1 tab(s) orally once a day (02 Dec 2022 17:22)  Avonex 30 mcg intramuscular injection: 30 microgram(s) intramuscular every 7 days (02 Dec 2022 17:22)  bacitracin 500 units/g topical ointment: Apply topically to affected area 4 times a day (02 Dec 2022 17:22)  baclofen 20 mg oral tablet: 1 tab(s) orally 3 times a day (02 Dec 2022 17:22)  Calmoseptine 0.44%-20.6% topical ointment: Apply topically to affected area once a day to sacrum (02 Dec 2022 17:22)  Elavil 25 mg oral tablet: 1 tab(s) orally once a day (at bedtime) (02 Dec 2022 17:22)  Eliquis 5 mg oral tablet: 1 tab(s) orally 2 times a day (02 Dec 2022 17:22)  lactulose 10 g/15 mL oral syrup: 15 milliliter(s) orally once a day (02 Dec 2022 17:22)  Multiple Vitamins oral tablet: 1 tab(s) orally once a day (02 Dec 2022 17:22)  Namenda 10 mg oral tablet: 1 tab(s) orally 2 times a day (02 Dec 2022 17:22)  omeprazole 20 mg oral delayed release capsule: 1 cap(s) orally once a day (02 Dec 2022 17:22)  ProAir HFA 90 mcg/inh inhalation aerosol: 2 puff(s) inhaled every 6 hours (02 Dec 2022 17:22)  Rapaflo 8 mg oral capsule: 1 cap(s) orally once a day (02 Dec 2022 17:22)  senna oral tablet: 2 tab(s) orally once a day (at bedtime) (02 Dec 2022 17:22)  sertraline 100 mg oral tablet: 1 tab(s) orally once a day (02 Dec 2022 17:22)  Tylenol 325 mg oral tablet: 2 tab(s) orally every 6 hours, As Needed (02 Dec 2022 17:22)  Vitamin B-100 oral tablet: 1 tab(s) orally once a day (02 Dec 2022 17:22)  Zanaflex 6 mg oral capsule: 1 cap(s) orally 3 times a day (02 Dec 2022 17:22)        MEDICATIONS  (STANDING):  acetylcysteine 10%  Inhalation 4 milliLiter(s) Inhalation three times a day  baclofen 20 milliGRAM(s) Oral every 12 hours  chlorhexidine 2% Cloths 1 Application(s) Topical daily  dextrose 5% + lactated ringers. 1000 milliLiter(s) (100 mL/Hr) IV Continuous <Continuous>  donepezil 10 milliGRAM(s) Oral at bedtime  enoxaparin Injectable 70 milliGRAM(s) SubCutaneous every 12 hours  lactulose Syrup 10 Gram(s) Oral daily  linezolid  IVPB 600 milliGRAM(s) IV Intermittent every 12 hours  memantine 10 milliGRAM(s) Oral two times a day  meropenem  IVPB 1000 milliGRAM(s) IV Intermittent every 8 hours  multivitamin 1 Tablet(s) Oral daily  pantoprazole    Tablet 40 milliGRAM(s) Oral before breakfast  senna 2 Tablet(s) Oral at bedtime  tamsulosin 0.4 milliGRAM(s) Oral at bedtime  thiamine 100 milliGRAM(s) Oral daily    MEDICATIONS  (PRN):  acetaminophen     Tablet .. 650 milliGRAM(s) Oral every 6 hours PRN Temp greater or equal to 38C (100.4F), Mild Pain (1 - 3)  albuterol    0.083% 2.5 milliGRAM(s) Nebulizer every 4 hours PRN Shortness of Breath and/or Wheezing  albuterol    90 MICROgram(s) HFA Inhaler 2 Puff(s) Inhalation every 6 hours PRN Shortness of Breath and/or Wheezing  ibuprofen  Suspension. 600 milliGRAM(s) Oral every 8 hours PRN Temp greater or equal to 38.5C (101.3F)  melatonin 3 milliGRAM(s) Oral at bedtime PRN Insomnia      Allergies  penicillin (Unknown)  vancomycin (Unknown)      Review of Systems:   Constitutional:  No Fever, No Chills  ENT/Mouth:  No Hearing Changes,  No Difficulty Swallowing  Eyes:  No Eye Pain, No Vision Changes  Cardiovascular:  No Chest Pain, No Palpitations  Respiratory:  No Cough, No Dyspnea  Gastrointestinal:  As described in HPI  Musculoskeletal:  No Joint Swelling, No Back Pain  Skin:  No Skin Lesions, No Jaundice  Neuro:  No Syncope, No Dizziness  Heme/Lymph:  No Bruising, No Bleeding.          Physical Examination:  T(C): 36.2 (12-05-22 @ 12:00), Max: 38.9 (12-04-22 @ 20:01)  HR: 96 (12-05-22 @ 12:00) (89 - 116)  BP: 133/85 (12-05-22 @ 12:00) (121/67 - 153/83)  RR: 20 (12-05-22 @ 12:00) (20 - 20)  SpO2: 98% (12-05-22 @ 12:00) (98% - 100%)      12-03-22 @ 07:01  -  12-04-22 @ 07:00  --------------------------------------------------------  IN: 2290 mL / OUT: 725 mL / NET: 1565 mL    12-04-22 @ 07:01  -  12-05-22 @ 07:00  --------------------------------------------------------  IN: 1300 mL / OUT: 700 mL / NET: 600 mL    12-05-22 @ 07:01  -  12-05-22 @ 16:07  --------------------------------------------------------  IN: 800 mL / OUT: 0 mL / NET: 800 mL    GENERAL: non verbal, BIPAP  no acute distress.  HEAD:  Atraumatic, Normocephalic  EYES: conjunctiva and sclera clear  NECK: Supple, no JVD or thyromegaly  CHEST/LUNG: Clear to auscultation bilaterally;  HEART: Regular rate and rhythm; normal S1, S2, No murmurs.  ABDOMEN: Soft, nontender, nondistended, erythematous skin at the stoma site  NEUROLOGY: No asterixis or tremor.   SKIN: Intact, no jaundice    Data:                        10.3   12.98 )-----------( 260      ( 05 Dec 2022 06:48 )             32.2     Hgb Trend:  10.3  12-05-22 @ 06:48  10.0  12-04-22 @ 08:30  11.5  12-03-22 @ 08:22        12-05    143  |  107  |  14  ----------------------------<  125<H>  3.4<L>   |  24  |  <0.5<L>    Ca    8.7      05 Dec 2022 06:48  Phos  1.8     12-04  Mg     1.7     12-05    TPro  5.6<L>  /  Alb  3.0<L>  /  TBili  0.6  /  DBili  x   /  AST  13  /  ALT  20  /  AlkPhos  106  12-05    Liver panel trend:  TBili 0.6   /   AST 13   /   ALT 20   /   AlkP 106   /   Tptn 5.6   /   Alb 3.0    /   DBili --      12-05  TBili 0.6   /   AST 9   /   ALT 25   /   AlkP 98   /   Tptn 5.4   /   Alb 3.1    /   DBili --      12-04  TBili 1.0   /   AST 15   /   ALT 40   /   AlkP 116   /   Tptn 6.3   /   Alb 3.6    /   DBili --      12-03  TBili 0.9   /   AST 25   /   ALT 65   /   AlkP 142   /   Tptn 6.9   /   Alb 3.9    /   DBili --      12-02  TBili 1.2   /   AST 32   /   ALT 80   /   AlkP 174   /   Tptn 7.9   /   Alb 4.3    /   DBili --      12-02  TBili 0.4   /   AST 45   /   ALT 37   /   AlkP 167   /   Tptn 7.8   /   Alb 4.3    /   DBili --      11-28          Culture - Blood (collected 03 Dec 2022 21:51)  Source: .Blood None  Preliminary Report (05 Dec 2022 09:01):    No growth to date.          Radiology:       Gastroenterology Consultation:    Patient is a 52y old  Male who presents with a chief complaint of sepsis (05 Dec 2022 11:37)    Admitted on: 12-02-22    HPI:      51 yo male w/ PMH of MS, chronic low back pain, neurogenic bladder? s/p SPC placement and s/p dislodgement, presents for fever. Presented to the ED for SPC dislodgement a few days ago. Was seen by IR and planned for outpatient SPC replacement. admitted at [facility] for sepsis  2/2 acute pyelonephritis. GI was consulted for peristomal leak . No melena, no fresh blood per rectum, no hematemesis. Patient does not look in distress or having pain. Patient with reported bowel movements, no constipation or diarrhea reported     Prior EGD/ Colonoscopy:  no records available     PAST MEDICAL & SURGICAL HISTORY:  Multiple sclerosis      Chronic back pain      Hypertension      No significant past surgical history            FAMILY HISTORY:  No pertinent family history in first degree relatives  no FH of gi cancers       Social History:  Tobacco: denies   Alcohol: denies   Drugs: denies     Home Medications:  Aricept 10 mg oral tablet: 1 tab(s) orally once a day (02 Dec 2022 17:22)  Avonex 30 mcg intramuscular injection: 30 microgram(s) intramuscular every 7 days (02 Dec 2022 17:22)  bacitracin 500 units/g topical ointment: Apply topically to affected area 4 times a day (02 Dec 2022 17:22)  baclofen 20 mg oral tablet: 1 tab(s) orally 3 times a day (02 Dec 2022 17:22)  Calmoseptine 0.44%-20.6% topical ointment: Apply topically to affected area once a day to sacrum (02 Dec 2022 17:22)  Elavil 25 mg oral tablet: 1 tab(s) orally once a day (at bedtime) (02 Dec 2022 17:22)  Eliquis 5 mg oral tablet: 1 tab(s) orally 2 times a day (02 Dec 2022 17:22)  lactulose 10 g/15 mL oral syrup: 15 milliliter(s) orally once a day (02 Dec 2022 17:22)  Multiple Vitamins oral tablet: 1 tab(s) orally once a day (02 Dec 2022 17:22)  Namenda 10 mg oral tablet: 1 tab(s) orally 2 times a day (02 Dec 2022 17:22)  omeprazole 20 mg oral delayed release capsule: 1 cap(s) orally once a day (02 Dec 2022 17:22)  ProAir HFA 90 mcg/inh inhalation aerosol: 2 puff(s) inhaled every 6 hours (02 Dec 2022 17:22)  Rapaflo 8 mg oral capsule: 1 cap(s) orally once a day (02 Dec 2022 17:22)  senna oral tablet: 2 tab(s) orally once a day (at bedtime) (02 Dec 2022 17:22)  sertraline 100 mg oral tablet: 1 tab(s) orally once a day (02 Dec 2022 17:22)  Tylenol 325 mg oral tablet: 2 tab(s) orally every 6 hours, As Needed (02 Dec 2022 17:22)  Vitamin B-100 oral tablet: 1 tab(s) orally once a day (02 Dec 2022 17:22)  Zanaflex 6 mg oral capsule: 1 cap(s) orally 3 times a day (02 Dec 2022 17:22)        MEDICATIONS  (STANDING):  acetylcysteine 10%  Inhalation 4 milliLiter(s) Inhalation three times a day  baclofen 20 milliGRAM(s) Oral every 12 hours  chlorhexidine 2% Cloths 1 Application(s) Topical daily  dextrose 5% + lactated ringers. 1000 milliLiter(s) (100 mL/Hr) IV Continuous <Continuous>  donepezil 10 milliGRAM(s) Oral at bedtime  enoxaparin Injectable 70 milliGRAM(s) SubCutaneous every 12 hours  lactulose Syrup 10 Gram(s) Oral daily  linezolid  IVPB 600 milliGRAM(s) IV Intermittent every 12 hours  memantine 10 milliGRAM(s) Oral two times a day  meropenem  IVPB 1000 milliGRAM(s) IV Intermittent every 8 hours  multivitamin 1 Tablet(s) Oral daily  pantoprazole    Tablet 40 milliGRAM(s) Oral before breakfast  senna 2 Tablet(s) Oral at bedtime  tamsulosin 0.4 milliGRAM(s) Oral at bedtime  thiamine 100 milliGRAM(s) Oral daily    MEDICATIONS  (PRN):  acetaminophen     Tablet .. 650 milliGRAM(s) Oral every 6 hours PRN Temp greater or equal to 38C (100.4F), Mild Pain (1 - 3)  albuterol    0.083% 2.5 milliGRAM(s) Nebulizer every 4 hours PRN Shortness of Breath and/or Wheezing  albuterol    90 MICROgram(s) HFA Inhaler 2 Puff(s) Inhalation every 6 hours PRN Shortness of Breath and/or Wheezing  ibuprofen  Suspension. 600 milliGRAM(s) Oral every 8 hours PRN Temp greater or equal to 38.5C (101.3F)  melatonin 3 milliGRAM(s) Oral at bedtime PRN Insomnia      Allergies  penicillin (Unknown)  vancomycin (Unknown)      Review of Systems:   unobtainable secondary to patient condition         Physical Examination:  T(C): 36.2 (12-05-22 @ 12:00), Max: 38.9 (12-04-22 @ 20:01)  HR: 96 (12-05-22 @ 12:00) (89 - 116)  BP: 133/85 (12-05-22 @ 12:00) (121/67 - 153/83)  RR: 20 (12-05-22 @ 12:00) (20 - 20)  SpO2: 98% (12-05-22 @ 12:00) (98% - 100%)      12-03-22 @ 07:01  -  12-04-22 @ 07:00  --------------------------------------------------------  IN: 2290 mL / OUT: 725 mL / NET: 1565 mL    12-04-22 @ 07:01  -  12-05-22 @ 07:00  --------------------------------------------------------  IN: 1300 mL / OUT: 700 mL / NET: 600 mL    12-05-22 @ 07:01  -  12-05-22 @ 16:07  --------------------------------------------------------  IN: 800 mL / OUT: 0 mL / NET: 800 mL    GENERAL: non verbal, BIPAP  no acute distress.  HEAD:  Atraumatic, Normocephalic  EYES: conjunctiva and sclera clear  NECK: Supple, no JVD or thyromegaly  CHEST/LUNG: Clear to auscultation bilaterally;  HEART: Regular rate and rhythm; normal S1, S2, No murmurs.  ABDOMEN: Soft, nontender, nondistended, erythematous skin at the stoma site with peristomal leak   NEUROLOGY: No asterixis or tremor.   SKIN: Intact, no jaundice    Data:                        10.3   12.98 )-----------( 260      ( 05 Dec 2022 06:48 )             32.2     Hgb Trend:  10.3  12-05-22 @ 06:48  10.0  12-04-22 @ 08:30  11.5  12-03-22 @ 08:22        12-05    143  |  107  |  14  ----------------------------<  125<H>  3.4<L>   |  24  |  <0.5<L>    Ca    8.7      05 Dec 2022 06:48  Phos  1.8     12-04  Mg     1.7     12-05    TPro  5.6<L>  /  Alb  3.0<L>  /  TBili  0.6  /  DBili  x   /  AST  13  /  ALT  20  /  AlkPhos  106  12-05    Liver panel trend:  TBili 0.6   /   AST 13   /   ALT 20   /   AlkP 106   /   Tptn 5.6   /   Alb 3.0    /   DBili --      12-05  TBili 0.6   /   AST 9   /   ALT 25   /   AlkP 98   /   Tptn 5.4   /   Alb 3.1    /   DBili --      12-04  TBili 1.0   /   AST 15   /   ALT 40   /   AlkP 116   /   Tptn 6.3   /   Alb 3.6    /   DBili --      12-03  TBili 0.9   /   AST 25   /   ALT 65   /   AlkP 142   /   Tptn 6.9   /   Alb 3.9    /   DBili --      12-02  TBili 1.2   /   AST 32   /   ALT 80   /   AlkP 174   /   Tptn 7.9   /   Alb 4.3    /   DBili --      12-02  TBili 0.4   /   AST 45   /   ALT 37   /   AlkP 167   /   Tptn 7.8   /   Alb 4.3    /   DBili --      11-28          Culture - Blood (collected 03 Dec 2022 21:51)  Source: .Blood None  Preliminary Report (05 Dec 2022 09:01):    No growth to date.          Radiology:

## 2022-12-05 NOTE — PROGRESS NOTE ADULT - SUBJECTIVE AND OBJECTIVE BOX
Patient is a 52y old  Male who presents with a chief complaint of sepsis (04 Dec 2022 22:08)        Over Night Events:    Remains comfortable. Left lung is still collapsed.    ROS:     All pertinent ROS are negative except HPI         PHYSICAL EXAM    ICU Vital Signs Last 24 Hrs  T(C): 37.2 (05 Dec 2022 04:00), Max: 38.9 (04 Dec 2022 14:00)  T(F): 99 (05 Dec 2022 04:00), Max: 102.1 (04 Dec 2022 20:01)  HR: 105 (05 Dec 2022 08:15) (89 - 117)  BP: 129/84 (05 Dec 2022 04:00) (121/67 - 145/91)  BP(mean): 102 (05 Dec 2022 04:00) (96 - 112)  ABP: --  ABP(mean): --  RR: 20 (05 Dec 2022 04:00) (20 - 20)  SpO2: 99% (05 Dec 2022 08:15) (98% - 100%)    O2 Parameters below as of 05 Dec 2022 04:00  Patient On (Oxygen Delivery Method): BiPAP/CPAP            CONSTITUTIONAL:   Ill appearing.  Well nourished.  NAD    ENT:   Airway patent,   Mouth with normal mucosa.   No thrush    EYES:   Pupils equal,   Round and reactive to light.    CARDIAC:   Normal rate,   Regular rhythm.    No edema    RESPIRATORY:   No wheezing  Bilateral BS  Normal chest expansion  Not tachypneic,  No use of accessory muscles    GASTROINTESTINAL:  Abdomen soft,   Non-tender,   No guarding,   + BS    MUSCULOSKELETAL:   Range of motion is not limited,  No clubbing, cyanosis    NEUROLOGICAL:   Alert    SKIN:   Skin normal color for race,   Warm and dry  No evidence of rash.    PSYCHIATRIC:   Normal mood and affect.   No apparent risk to self or others.          12-04-22 @ 07:01  -  12-05-22 @ 07:00  --------------------------------------------------------  IN:    dextrose 5% + lactated ringers: 1300 mL  Total IN: 1300 mL    OUT:    Indwelling Catheter - Urethral (mL): 700 mL  Total OUT: 700 mL    Total NET: 600 mL          LABS:                            10.3   12.98 )-----------( 260      ( 05 Dec 2022 06:48 )             32.2                                               12-05    143  |  107  |  14  ----------------------------<  125<H>  3.4<L>   |  24  |  <0.5<L>    Ca    8.7      05 Dec 2022 06:48  Phos  1.8     12-04  Mg     1.7     12-05    TPro  5.6<L>  /  Alb  3.0<L>  /  TBili  0.6  /  DBili  x   /  AST  13  /  ALT  20  /  AlkPhos  106  12-05                                                                                           LIVER FUNCTIONS - ( 05 Dec 2022 06:48 )  Alb: 3.0 g/dL / Pro: 5.6 g/dL / ALK PHOS: 106 U/L / ALT: 20 U/L / AST: 13 U/L / GGT: x                                                  Culture - Urine (collected 02 Dec 2022 10:34)  Source: Clean Catch Clean Catch (Midstream)  Final Report (04 Dec 2022 09:29):    >=3 organisms. Probable collection contamination.                                                                                           MEDICATIONS  (STANDING):  acetylcysteine 10%  Inhalation 4 milliLiter(s) Inhalation three times a day  baclofen 20 milliGRAM(s) Oral every 12 hours  chlorhexidine 2% Cloths 1 Application(s) Topical daily  dextrose 5% + lactated ringers. 1000 milliLiter(s) (100 mL/Hr) IV Continuous <Continuous>  donepezil 10 milliGRAM(s) Oral at bedtime  enoxaparin Injectable 70 milliGRAM(s) SubCutaneous every 12 hours  lactulose Syrup 10 Gram(s) Oral daily  linezolid  IVPB 600 milliGRAM(s) IV Intermittent every 12 hours  memantine 10 milliGRAM(s) Oral two times a day  meropenem  IVPB 1000 milliGRAM(s) IV Intermittent every 8 hours  multivitamin 1 Tablet(s) Oral daily  pantoprazole    Tablet 40 milliGRAM(s) Oral before breakfast  senna 2 Tablet(s) Oral at bedtime  tamsulosin 0.4 milliGRAM(s) Oral at bedtime  thiamine 100 milliGRAM(s) Oral daily    MEDICATIONS  (PRN):  acetaminophen     Tablet .. 650 milliGRAM(s) Oral every 6 hours PRN Temp greater or equal to 38C (100.4F), Mild Pain (1 - 3)  albuterol    0.083% 2.5 milliGRAM(s) Nebulizer every 4 hours PRN Shortness of Breath and/or Wheezing  albuterol    90 MICROgram(s) HFA Inhaler 2 Puff(s) Inhalation every 6 hours PRN Shortness of Breath and/or Wheezing  ibuprofen  Suspension. 600 milliGRAM(s) Oral every 8 hours PRN Temp greater or equal to 38.5C (101.3F)  melatonin 3 milliGRAM(s) Oral at bedtime PRN Insomnia      New X-rays reviewed:                                                                                  ECHO    CXR interpreted by me:       Patient is a 52y old  Male who presents with a chief complaint of sepsis (04 Dec 2022 22:08)        Over Night Events:    Remains comfortable. Left lung is still collapsed. on BIPAP        PHYSICAL EXAM    ICU Vital Signs Last 24 Hrs  T(C): 37.2 (05 Dec 2022 04:00), Max: 38.9 (04 Dec 2022 14:00)  T(F): 99 (05 Dec 2022 04:00), Max: 102.1 (04 Dec 2022 20:01)  HR: 105 (05 Dec 2022 08:15) (89 - 117)  BP: 129/84 (05 Dec 2022 04:00) (121/67 - 145/91)  BP(mean): 102 (05 Dec 2022 04:00) (96 - 112)  RR: 20 (05 Dec 2022 04:00) (20 - 20)  SpO2: 99% (05 Dec 2022 08:15) (98% - 100%)    O2 Parameters below as of 05 Dec 2022 04:00  Patient On (Oxygen Delivery Method): BiPAP/CPAP            CONSTITUTIONAL:   Ill appearing.  ENT:   Airway patent,   Mouth with normal mucosa.   No thrush    EYES:   Pupils equal,   Round and reactive to light.    CARDIAC:   JEFF 2.6    RESPIRATORY:   DEC BS L side    GASTROINTESTINAL:  Abdomen soft,   Non-tender,   No guarding,   + BS    MUSCULOSKELETAL:   Range of motion is not limited,  No clubbing, cyanosis    NEUROLOGICAL:   Alert        12-04-22 @ 07:01  -  12-05-22 @ 07:00  --------------------------------------------------------  IN:    dextrose 5% + lactated ringers: 1300 mL  Total IN: 1300 mL    OUT:    Indwelling Catheter - Urethral (mL): 700 mL  Total OUT: 700 mL    Total NET: 600 mL          LABS:                            10.3   12.98 )-----------( 260      ( 05 Dec 2022 06:48 )             32.2                                               12-05    143  |  107  |  14  ----------------------------<  125<H>  3.4<L>   |  24  |  <0.5<L>    Ca    8.7      05 Dec 2022 06:48  Phos  1.8     12-04  Mg     1.7     12-05    TPro  5.6<L>  /  Alb  3.0<L>  /  TBili  0.6  /  DBili  x   /  AST  13  /  ALT  20  /  AlkPhos  106  12-05                                                                                           LIVER FUNCTIONS - ( 05 Dec 2022 06:48 )  Alb: 3.0 g/dL / Pro: 5.6 g/dL / ALK PHOS: 106 U/L / ALT: 20 U/L / AST: 13 U/L / GGT: x                                                  Culture - Urine (collected 02 Dec 2022 10:34)  Source: Clean Catch Clean Catch (Midstream)  Final Report (04 Dec 2022 09:29):    >=3 organisms. Probable collection contamination.                                                                                           MEDICATIONS  (STANDING):  acetylcysteine 10%  Inhalation 4 milliLiter(s) Inhalation three times a day  baclofen 20 milliGRAM(s) Oral every 12 hours  chlorhexidine 2% Cloths 1 Application(s) Topical daily  dextrose 5% + lactated ringers. 1000 milliLiter(s) (100 mL/Hr) IV Continuous <Continuous>  donepezil 10 milliGRAM(s) Oral at bedtime  enoxaparin Injectable 70 milliGRAM(s) SubCutaneous every 12 hours  lactulose Syrup 10 Gram(s) Oral daily  linezolid  IVPB 600 milliGRAM(s) IV Intermittent every 12 hours  memantine 10 milliGRAM(s) Oral two times a day  meropenem  IVPB 1000 milliGRAM(s) IV Intermittent every 8 hours  multivitamin 1 Tablet(s) Oral daily  pantoprazole    Tablet 40 milliGRAM(s) Oral before breakfast  senna 2 Tablet(s) Oral at bedtime  tamsulosin 0.4 milliGRAM(s) Oral at bedtime  thiamine 100 milliGRAM(s) Oral daily    MEDICATIONS  (PRN):  acetaminophen     Tablet .. 650 milliGRAM(s) Oral every 6 hours PRN Temp greater or equal to 38C (100.4F), Mild Pain (1 - 3)  albuterol    0.083% 2.5 milliGRAM(s) Nebulizer every 4 hours PRN Shortness of Breath and/or Wheezing  albuterol    90 MICROgram(s) HFA Inhaler 2 Puff(s) Inhalation every 6 hours PRN Shortness of Breath and/or Wheezing  ibuprofen  Suspension. 600 milliGRAM(s) Oral every 8 hours PRN Temp greater or equal to 38.5C (101.3F)  melatonin 3 milliGRAM(s) Oral at bedtime PRN Insomnia      CXR reviewed

## 2022-12-05 NOTE — CONSULT NOTE ADULT - ATTENDING COMMENTS
I edited the note
Mr Macias was seen & examined on his ED stretcher in the hallway.  He presented with severe sepsis due to urinary source.  He should have broad spectrum antibiotics due to recent instrumentation and prior urinary hardware, and adequate IVF resuscitation for his lactic acidosis.  Admit to SDU for ongoing severe sepsis despite fluid bolus.  Close monitoring.  I agree with the fellow note, with the exceptions listed in my attestation above.  The remainder of impression and plan per fellow note.

## 2022-12-06 NOTE — PROGRESS NOTE ADULT - SUBJECTIVE AND OBJECTIVE BOX
Gastroenterology progress note:     Patient is a 52y old  Male who presents with a chief complaint of sepsis (06 Dec 2022 15:14)    Admitted on: 12-02-22    We are following the patient for peristomal leak        PAST MEDICAL & SURGICAL HISTORY:  Multiple sclerosis      Chronic back pain      Hypertension      No significant past surgical history          MEDICATIONS  (STANDING):  acetylcysteine 10%  Inhalation 4 milliLiter(s) Inhalation three times a day  baclofen 20 milliGRAM(s) Oral every 12 hours  chlorhexidine 2% Cloths 1 Application(s) Topical daily  dextrose 5% + lactated ringers. 1000 milliLiter(s) (100 mL/Hr) IV Continuous <Continuous>  donepezil 10 milliGRAM(s) Oral at bedtime  enoxaparin Injectable 70 milliGRAM(s) SubCutaneous every 12 hours  lactulose Syrup 10 Gram(s) Oral daily  memantine 10 milliGRAM(s) Oral two times a day  meropenem  IVPB 1000 milliGRAM(s) IV Intermittent every 8 hours  multivitamin 1 Tablet(s) Oral daily  pantoprazole    Tablet 40 milliGRAM(s) Oral before breakfast  senna 2 Tablet(s) Oral at bedtime  tamsulosin 0.4 milliGRAM(s) Oral at bedtime  thiamine 100 milliGRAM(s) Oral daily  zinc oxide 20% Ointment 1 Application(s) Topical three times a day    MEDICATIONS  (PRN):  acetaminophen  Suppository .. 650 milliGRAM(s) Rectal every 6 hours PRN Temp greater or equal to 38C (100.4F), Mild Pain (1 - 3)  albuterol    0.083% 2.5 milliGRAM(s) Nebulizer every 4 hours PRN Shortness of Breath and/or Wheezing  albuterol    90 MICROgram(s) HFA Inhaler 2 Puff(s) Inhalation every 6 hours PRN Shortness of Breath and/or Wheezing  ibuprofen  Suspension. 600 milliGRAM(s) Oral every 8 hours PRN Temp greater or equal to 38.5C (101.3F)  melatonin 3 milliGRAM(s) Oral at bedtime PRN Insomnia      Allergies  penicillin (Unknown)  vancomycin (Unknown)      Review of Systems:   unable to obtain     Physical Examination:  T(C): 36.7 (12-06-22 @ 11:55), Max: 38.3 (12-05-22 @ 19:00)  HR: 89 (12-06-22 @ 11:55) (71 - 104)  BP: 129/83 (12-06-22 @ 11:55) (99/58 - 135/84)  RR: 18 (12-06-22 @ 11:55) (14 - 20)  SpO2: 100% (12-06-22 @ 11:55) (97% - 100%)      12-05-22 @ 07:01  -  12-06-22 @ 07:00  --------------------------------------------------------  IN: 1200 mL / OUT: 900 mL / NET: 300 mL        GENERAL: non verbal no acute distress.  HEAD:  Atraumatic, Normocephalic  EYES: conjunctiva and sclera clear  NECK: Supple, no JVD or thyromegaly  CHEST/LUNG: Clear to auscultation bilaterally; No wheeze, rhonchi, or rales  HEART: Regular rate and rhythm; normal S1, S2, No murmurs.  ABDOMEN: Soft, nontender, nondistended; Bowel sounds present  NEUROLOGY: No asterixis or tremor.   SKIN: Intact, no jaundice     Data:                        9.2    10.26 )-----------( 264      ( 06 Dec 2022 00:44 )             28.0     Hgb trend:  9.2  12-06-22 @ 00:44  10.3  12-05-22 @ 06:48  10.0  12-04-22 @ 08:30        12-06    139  |  105  |  10  ----------------------------<  97  3.1<L>   |  23  |  <0.5<L>    Ca    7.9<L>      06 Dec 2022 00:44  Phos  3.3     12-06  Mg     1.6     12-06    TPro  4.9<L>  /  Alb  2.7<L>  /  TBili  0.4  /  DBili  x   /  AST  17  /  ALT  22  /  AlkPhos  94  12-06    Liver panel trend:  TBili 0.4   /   AST 17   /   ALT 22   /   AlkP 94   /   Tptn 4.9   /   Alb 2.7    /   DBili --      12-06  TBili 0.6   /   AST 13   /   ALT 20   /   AlkP 106   /   Tptn 5.6   /   Alb 3.0    /   DBili --      12-05  TBili 0.6   /   AST 9   /   ALT 25   /   AlkP 98   /   Tptn 5.4   /   Alb 3.1    /   DBili --      12-04  TBili 1.0   /   AST 15   /   ALT 40   /   AlkP 116   /   Tptn 6.3   /   Alb 3.6    /   DBili --      12-03  TBili 0.9   /   AST 25   /   ALT 65   /   AlkP 142   /   Tptn 6.9   /   Alb 3.9    /   DBili --      12-02  TBili 1.2   /   AST 32   /   ALT 80   /   AlkP 174   /   Tptn 7.9   /   Alb 4.3    /   DBili --      12-02  TBili 0.4   /   AST 45   /   ALT 37   /   AlkP 167   /   Tptn 7.8   /   Alb 4.3    /   DBili --      11-28          Culture - Blood (collected 04 Dec 2022 08:30)  Source: .Blood None  Preliminary Report (05 Dec 2022 22:02):    No growth to date.    Culture - Blood (collected 03 Dec 2022 21:51)  Source: .Blood None  Preliminary Report (05 Dec 2022 09:01):    No growth to date.         Radiology:

## 2022-12-06 NOTE — PROGRESS NOTE ADULT - ASSESSMENT
ASSESSMENT  51 yo male w/ PMH of MS, chronic low back pain, neurogenic bladder? s/p SPC placement and s/p dislodgement, presents for fever. Presented to the ED for SPC dislodgement a few days ago. Was seen by IR and planned for outpatient SPC replacement. Campuzano was placed at [facility]. Today. prior to placement, he was noted to be febrile w/ tachycardia and tachypnea so brought into ED.      IMPRESSION  #Fever  CXR L white-out, suspect aspiration/mucous plug  #Severe Sepsis on admission T>101F, Pulse>90,WBC>12, lactic acidosis due to acute complicated cystitis    12/4 BCX NGTD     12/3 BCX NGTD     12/2 BCX  Staphylococcus epidermidis, Methicillin resistant: Detec 1/2 bottles ,      12/2 BCX GPC 1/2  Staphylococcus hominis    12/2 BCX  GPC 1/2 Staphylococcus hominis    12/2 UCX   >=3 organisms. Probable collection contamination.    12/2 UCX    Few Proteus mirabilis ESBL    Moderate Enterococcus faecalis    UA Blood: x / Protein: 100 mg/dL / Nitrite: Negative   Leuk Esterase: Large / RBC: 236 /HPF / WBC >720 /HPF   Sq Epi: x / Non Sq Epi: 2 /HPF / Bacteria: Many  #CXR Small left pleural effusion and possible left basilar consolidation.  #MS with neurogenic bladder, dislodged SPC  #Lactic acidosis  #Hypernatremia   QTC Calculation(Bazett) 407 ms  #Severe protein calorie malnutrition  BMI (kg/m2): 17.8    #Abx allergy: penicillin (childhood)  vancomycin (Unknown)    Creatinine, Serum: 0.5 (12-03-22 @ 08:22)    Weight (kg): 68 (12-02-22 @ 08:20)    RECOMMENDATIONS  - Repeat BCX as fever  - Flu/COVID   - Appears at coNS in BCX are contaminant as different speciations (epi and hominiis)  - D/C linezolid   - Meropenem 1g q8h IV while awaiting Proteus sensitivities as continues to spike fever  - Pulm following  - GOC, poor prongnosis    If any questions, please call or send a message on Medication Review Teams  Please continue to update ID with any pertinent new laboratory or radiographic findings  #[id number]     ASSESSMENT  51 yo male w/ PMH of MS, chronic low back pain, neurogenic bladder? s/p SPC placement and s/p dislodgement, presents for fever. Presented to the ED for SPC dislodgement a few days ago. Was seen by IR and planned for outpatient SPC replacement. Campuzano was placed at [facility]. Today. prior to placement, he was noted to be febrile w/ tachycardia and tachypnea so brought into ED.      IMPRESSION  #Fever  CXR L white-out, suspect aspiration/mucous plug  #Severe Sepsis on admission T>101F, Pulse>90,WBC>12, lactic acidosis due to acute complicated cystitis    12/4 BCX NGTD     12/3 BCX NGTD     12/2 BCX  Staphylococcus epidermidis, Methicillin resistant: Detec 1/2 bottles ,      12/2 BCX GPC 1/2  Staphylococcus hominis    12/2 BCX  GPC 1/2 Staphylococcus hominis    12/2 UCX   >=3 organisms. Probable collection contamination.    12/2 UCX    Few Proteus mirabilis ESBL    Moderate Enterococcus faecalis    UA Blood: x / Protein: 100 mg/dL / Nitrite: Negative   Leuk Esterase: Large / RBC: 236 /HPF / WBC >720 /HPF   Sq Epi: x / Non Sq Epi: 2 /HPF / Bacteria: Many  #CXR Small left pleural effusion and possible left basilar consolidation.  #MS with neurogenic bladder, dislodged SPC  #Lactic acidosis  #Hypernatremia   QTC Calculation(Bazett) 407 ms  #Severe protein calorie malnutrition  BMI (kg/m2): 17.8    #Abx allergy: penicillin (childhood)  vancomycin (Unknown)    Creatinine, Serum: 0.5 (12-03-22 @ 08:22)    Weight (kg): 68 (12-02-22 @ 08:20)    RECOMMENDATIONS  - Repeat BCX as fever  - Flu/COVID   - Appears at coNS in BCX are contaminant as different speciations (epi and hominiis)  - D/C linezolid   - Meropenem 1g q8h IVx 7 days, if D/C prior midline x ertapenem 1g q24h IV  - Pulm following  - GOC, poor prongnosis    If any questions, please call or send a message on [employer] Teams  Please continue to update ID with any pertinent new laboratory or radiographic findings  #[id number]

## 2022-12-06 NOTE — PROGRESS NOTE ADULT - ASSESSMENT
51 y/o man with PMH of MS, nonverbal at baseline, chronic low back pain, ?neurogenic bladder s/p SPC placement and s/p dislodgement was at IR for replacement of SPC when he was found to be in severe sepsis. GI called for peristomal leak.       # peristomal leak/ PEG tube malfunction   - pt has 18 Fr balloon PEG tube. Functioning well with minimal peristomal leak   - tube was downsized to 12 Fr today to allow for stomal healing     PLAN   NPO   Nutrition eval to consider PPN given pt will yadira to be NPO for few days to allow for stomal healing   keep dry skin   apply zinc oxide three times/ day   apply dressing to keep the area clean and dry  PEG tube site care   wash with soap and water twice daily   apply bacitracin BID   will follow up

## 2022-12-06 NOTE — PROGRESS NOTE ADULT - ASSESSMENT
53 y/o man with PMH of MS, nonverbal at baseline, chronic low back pain, ?neurogenic bladder s/p SPC placement and s/p dislodgement was at IR for replacement of SPC when he was found to be in severe sepsis.    #Severe sepsis present on admission 2/2 acute pyelonephritis  -L lung etelectasis/Mucus plug   -Ucx: Few Proteus Mirabilis/Mod E Faecalis  -Blood cx : GPC in clusters x3, 1 culture with staph hominis  -On Cefepime and Zyvox  -ID following, if hemodynamic compromise, Gentamycin x1  -c/w IVF   -repeat blood cx, Tylenol for fevers >101  -C/w HFNC, alternating with BIPAP, nebs and chest PT. Repeat Chest xray in PM    #Neurogenic bladder  -had SPC that was dislodged - now with [facility] and SPC was going to be replaced when he became septic  - and IR following  -monitor urine output  -on flomax    #Elevated dimer   -f/u LE duplex, on Eliquis     #Dysphagia   -on PEG feeds. Request GI eval for PEG malfunction     #MS, nonverbal, bedbound, contracted   -repositioning per protocol, continue baclofen    #SUKHDEV   -likely prerenal, resolved   -continue hydration and monitor     #NSTEMI type 2 due to sepsis   -TTE    #Misc  -DVT prophylaxis: Eliquis  -GI prophylaxis: Protonix  -Code status: Full code  -Dispo: SDU    Handoff pendin) Deep trach aspiration,  2) cxr worsening on the left side, will need bronchoscopy and may require intubation soon  3) ICU transfer  is cancelled today

## 2022-12-06 NOTE — PROGRESS NOTE ADULT - SUBJECTIVE AND OBJECTIVE BOX
NUTRITION SUPPORT TEAM  -  PROGRESS NOTE   pt seen and evaluated   on BIPAP  NPO now per GI for 24hrs  spoke with medical team  abdomen  Nondistended +g-tube in place + yellow (non mucoid) drainage at site    extremities contracted      REVIEW OF SYSTEMS:  Negative except as noted above.     VITALS:  T(F): 98 (12-06 @ 11:55), Max: 98.1 (12-06 @ 04:00)  HR: 89 (12-06 @ 11:55) (71 - 89)  BP: 129/83 (12-06 @ 11:55) (99/58 - 129/83)  RR: 18 (12-06 @ 11:55) (14 - 18)  SpO2: 100% (12-06 @ 11:55) (98% - 100%)    HEIGHT/WEIGHT/BMI:   Height (cm): 195.6 (12-03)  Weight (kg): 68 (12-02)  BMI (kg/m2): 17.8 (12-03)    12-05-22 @ 07:01  -  12-06-22 @ 07:00  --------------------------------------------------------  IN:    dextrose 5% + lactated ringers: 1200 mL  Total IN: 1200 mL    OUT:    Indwelling Catheter - Urethral (mL): 900 mL  Total OUT: 900 mL    Total NET: 300 mL        STANDING MEDICATIONS:   acetylcysteine 10%  Inhalation 4 milliLiter(s) Inhalation three times a day  baclofen 20 milliGRAM(s) Oral every 12 hours  chlorhexidine 2% Cloths 1 Application(s) Topical daily  donepezil 10 milliGRAM(s) Oral at bedtime  enoxaparin Injectable 70 milliGRAM(s) SubCutaneous every 12 hours  lactulose Syrup 10 Gram(s) Oral daily  memantine 10 milliGRAM(s) Oral two times a day  meropenem  IVPB 1000 milliGRAM(s) IV Intermittent every 8 hours  multivitamin 1 Tablet(s) Oral daily  pantoprazole    Tablet 40 milliGRAM(s) Oral before breakfast  senna 2 Tablet(s) Oral at bedtime  tamsulosin 0.4 milliGRAM(s) Oral at bedtime  thiamine 100 milliGRAM(s) Oral daily  zinc oxide 20% Ointment 1 Application(s) Topical three times a day      LABS:                         9.2    10.26 )-----------( 264      ( 06 Dec 2022 00:44 )             28.0     139  |  105  |  10  ----------------------------<  97          (12-06-22 @ 00:44)  3.1<L>   |  23  |  <0.5<L>    Ca    7.9<L>          (12-06-22 @ 00:44)  Phos  3.3         (12-06-22 @ 00:44)  Mg     1.6         (12-06-22 @ 00:44)    TPro  4.9<L>  /  Alb  2.7<L>  /  TBili  0.4  /  DBili  x   /  AST  17  /  ALT  22  /  AlkPhos  94       12-06-22 @ 00:44    DIET:   Diet, NPO (12-06-22 @ 13:05) [Active]    RADIOLOGY:   < from: Xray Chest 1 View- PORTABLE-Routine (Xray Chest 1 View- PORTABLE-Routine in AM.) (12.05.22 @ 06:26) >  Stable to increased left-sided opacities.   NUTRITION SUPPORT TEAM  -  PROGRESS NOTE   pt seen and evaluated   on BIPAP  NPO now   per GI wait for for 24hrs  spoke with medical team/poss bronch.  today  abdomen  Nondistended +g-tube in place + yellow (non mucoid) drainage at site    extremities contracted      REVIEW OF SYSTEMS:  Negative except as noted above.     VITALS:  T(F): 98 (12-06 @ 11:55), Max: 98.1 (12-06 @ 04:00)  HR: 89 (12-06 @ 11:55) (71 - 89)  BP: 129/83 (12-06 @ 11:55) (99/58 - 129/83)  RR: 18 (12-06 @ 11:55) (14 - 18)  SpO2: 100% (12-06 @ 11:55) (98% - 100%)    HEIGHT/WEIGHT/BMI:   Height (cm): 195.6 (12-03)  Weight (kg): 68 (12-02)  BMI (kg/m2): 17.8 (12-03)    12-05-22 @ 07:01  -  12-06-22 @ 07:00  --------------------------------------------------------  IN:    dextrose 5% + lactated ringers: 1200 mL  Total IN: 1200 mL    OUT:    Indwelling Catheter - Urethral (mL): 900 mL  Total OUT: 900 mL    Total NET: 300 mL        STANDING MEDICATIONS:   acetylcysteine 10%  Inhalation 4 milliLiter(s) Inhalation three times a day  baclofen 20 milliGRAM(s) Oral every 12 hours  chlorhexidine 2% Cloths 1 Application(s) Topical daily  donepezil 10 milliGRAM(s) Oral at bedtime  enoxaparin Injectable 70 milliGRAM(s) SubCutaneous every 12 hours  lactulose Syrup 10 Gram(s) Oral daily  memantine 10 milliGRAM(s) Oral two times a day  meropenem  IVPB 1000 milliGRAM(s) IV Intermittent every 8 hours  multivitamin 1 Tablet(s) Oral daily  pantoprazole    Tablet 40 milliGRAM(s) Oral before breakfast  senna 2 Tablet(s) Oral at bedtime  tamsulosin 0.4 milliGRAM(s) Oral at bedtime  thiamine 100 milliGRAM(s) Oral daily  zinc oxide 20% Ointment 1 Application(s) Topical three times a day      LABS:                         9.2    10.26 )-----------( 264      ( 06 Dec 2022 00:44 )             28.0     139  |  105  |  10  ----------------------------<  97          (12-06-22 @ 00:44)  3.1<L>   |  23  |  <0.5<L>    Ca    7.9<L>          (12-06-22 @ 00:44)  Phos  3.3         (12-06-22 @ 00:44)  Mg     1.6         (12-06-22 @ 00:44)    TPro  4.9<L>  /  Alb  2.7<L>  /  TBili  0.4  /  DBili  x   /  AST  17  /  ALT  22  /  AlkPhos  94       12-06-22 @ 00:44    DIET:   Diet, NPO (12-06-22 @ 13:05) [Active]    RADIOLOGY:   < from: Xray Chest 1 View- PORTABLE-Routine (Xray Chest 1 View- PORTABLE-Routine in AM.) (12.05.22 @ 06:26) >  Stable to increased left-sided opacities.

## 2022-12-06 NOTE — PROGRESS NOTE ADULT - ASSESSMENT
53 y/o man with PMH of MS, nonverbal at baseline, chronic low back pain, ?neurogenic bladder s/p SPC placement and s/p dislodgement was at IR for replacement of SPC when he was found to be in severe sepsis.    Severe sepsis present on admission 2/2 acute pyelonephritis  L lung etelectasis/Mucus plug   Ucx: Few Proteus Mirabilis/Mod E Faecalis  Blood cx 12/2: GPC in clusters x3, 1 culture with staph hominis  On Meropenem   ID following, zyvox discsontinued as per ID   c/w IVF   repeat blood cx, Tylenol for fevers >101  C/w HFNC, alternating with BIPAP, nebs and chest PT. Repeat Chest xray daily  possible bronchoscopy if family in agreement, final plan as per pulm     Neurogenic bladder  had SPC that was dislodged - now with [facility] and SPC was going to be replaced when he became septic   and IR following  monitor urine output  on flomax  recall IR when improved for possible SPC placement     Elevated dimer - 11oo, f/u LE duplex, previously on Eliquis. switched to therapeutic lovenox    Dysphagia - on PEG feeds. Request GI eval for PEG malfunction/leakage     MS, nonverbal, bedbound, contracted/functional quadriplegia - repositioning per protocol, continue baclofen    SUKHDEV - likely prerenal, resolved - continue hydration and monitor     Elevated trop  peaked 0.20 - likely NSTEMI type 2 due to sepsis - downtrending, check ECHO, pending     hypokalemia/Hypomagnesemia- replete and monitor levels   iv replacement   daily BMP    FULL CODE, prognosis is guarded, patient requires continuous monitoring in SDU     Follow up: follow XRay, blood cultures, PULM/ID recommendations, daily labs, bronch as per pulm, follow GI,

## 2022-12-06 NOTE — PROGRESS NOTE ADULT - SUBJECTIVE AND OBJECTIVE BOX
Over Night Events: events noted, on NIV 40%, low grade fever, GI noted    PHYSICAL EXAM    ICU Vital Signs Last 24 Hrs  T(C): 36.8 (05 Dec 2022 23:47), Max: 38.3 (05 Dec 2022 19:00)  T(F): 98.2 (05 Dec 2022 23:47), Max: 101 (05 Dec 2022 19:00)  HR: 72 (06 Dec 2022 03:00) (72 - 105)  BP: 107/67 (06 Dec 2022 03:00) (105/62 - 153/83)  BP(mean): 82 (06 Dec 2022 03:00) (77 - 105)  RR: 16 (06 Dec 2022 03:00) (14 - 20)  SpO2: 100% (06 Dec 2022 03:00) (97% - 100%)    O2 Parameters below as of 06 Dec 2022 00:00  Patient On (Oxygen Delivery Method): BiPAP/CPAP,12/6    O2 Concentration (%): 40        General: chronically ill looking  Lungs: dec bs l base  Cardiovascular: Regular   Abdomen: Soft, Positive BS  non verbal      12-05-22 @ 07:01  -  12-06-22 @ 07:00  --------------------------------------------------------  IN:    dextrose 5% + lactated ringers: 1200 mL  Total IN: 1200 mL    OUT:    Indwelling Catheter - Urethral (mL): 900 mL  Total OUT: 900 mL    Total NET: 300 mL          LABS:                          9.2    10.26 )-----------( 264      ( 06 Dec 2022 00:44 )             28.0                                               12-06    139  |  105  |  10  ----------------------------<  97  3.1<L>   |  23  |  <0.5<L>    Ca    7.9<L>      06 Dec 2022 00:44  Phos  3.3     12-06  Mg     1.6     12-06    TPro  4.9<L>  /  Alb  2.7<L>  /  TBili  0.4  /  DBili  x   /  AST  17  /  ALT  22  /  AlkPhos  94  12-06                                                                                           LIVER FUNCTIONS - ( 06 Dec 2022 00:44 )  Alb: 2.7 g/dL / Pro: 4.9 g/dL / ALK PHOS: 94 U/L / ALT: 22 U/L / AST: 17 U/L / GGT: x                                                  Culture - Blood (collected 04 Dec 2022 08:30)  Source: .Blood None  Preliminary Report (05 Dec 2022 22:02):    No growth to date.    Culture - Blood (collected 03 Dec 2022 21:51)  Source: .Blood None  Preliminary Report (05 Dec 2022 09:01):    No growth to date.                                                                                           MEDICATIONS  (STANDING):  acetylcysteine 10%  Inhalation 4 milliLiter(s) Inhalation three times a day  baclofen 20 milliGRAM(s) Oral every 12 hours  chlorhexidine 2% Cloths 1 Application(s) Topical daily  dextrose 5% + lactated ringers. 1000 milliLiter(s) (100 mL/Hr) IV Continuous <Continuous>  donepezil 10 milliGRAM(s) Oral at bedtime  enoxaparin Injectable 70 milliGRAM(s) SubCutaneous every 12 hours  lactulose Syrup 10 Gram(s) Oral daily  linezolid  IVPB 600 milliGRAM(s) IV Intermittent every 12 hours  memantine 10 milliGRAM(s) Oral two times a day  meropenem  IVPB 1000 milliGRAM(s) IV Intermittent every 8 hours  multivitamin 1 Tablet(s) Oral daily  pantoprazole    Tablet 40 milliGRAM(s) Oral before breakfast  potassium chloride  20 mEq/100 mL IVPB 20 milliEquivalent(s) IV Intermittent every 2 hours  senna 2 Tablet(s) Oral at bedtime  tamsulosin 0.4 milliGRAM(s) Oral at bedtime  thiamine 100 milliGRAM(s) Oral daily    MEDICATIONS  (PRN):  acetaminophen  Suppository .. 650 milliGRAM(s) Rectal every 6 hours PRN Temp greater or equal to 38C (100.4F), Mild Pain (1 - 3)  albuterol    0.083% 2.5 milliGRAM(s) Nebulizer every 4 hours PRN Shortness of Breath and/or Wheezing  albuterol    90 MICROgram(s) HFA Inhaler 2 Puff(s) Inhalation every 6 hours PRN Shortness of Breath and/or Wheezing  ibuprofen  Suspension. 600 milliGRAM(s) Oral every 8 hours PRN Temp greater or equal to 38.5C (101.3F)  melatonin 3 milliGRAM(s) Oral at bedtime PRN Insomnia

## 2022-12-06 NOTE — PROGRESS NOTE ADULT - SUBJECTIVE AND OBJECTIVE BOX
KEVIN MEDRANO  52y Male    CHIEF COMPLAINT:    Patient is a 52y old  Male who presents with a chief complaint of sepsis (05 Dec 2022 08:44)    INTERVAL HPI/OVERNIGHT EVENTS:    Patient seen and examined. No acute events overnight. Remains on BIPAP    ROS: All other systems are negative.    Vital Signs:    Vital Signs Last 24 Hrs  T(C): 36.7 (06 Dec 2022 11:55), Max: 38.3 (05 Dec 2022 19:00)  T(F): 98 (06 Dec 2022 11:55), Max: 101 (05 Dec 2022 19:00)  HR: 89 (06 Dec 2022 11:55) (71 - 104)  BP: 129/83 (06 Dec 2022 11:55) (99/58 - 135/84)  BP(mean): 94 (06 Dec 2022 07:00) (77 - 105)  RR: 18 (06 Dec 2022 11:55) (14 - 20)  SpO2: 100% (06 Dec 2022 11:55) (97% - 100%)    Parameters below as of 06 Dec 2022 00:00  Patient On (Oxygen Delivery Method): BiPAP/CPAP,12/6    O2 Concentration (%): 40    PHYSICAL EXAM:    GENERAL:  NAD, thin, chronically ill appearing   SKIN: No rashes or lesions  HEENT: Atraumatic. Normocephalic   NECK: Supple, No JVD.    PULMONARY: Decreased breath sounds L>R. No wheezing.   CVS: Normal S1, S2. Rate and Rhythm are regular   ABDOMEN/GI: Soft, Nontender, Nondistended, leaking noted around PEG  MSK:  No clubbing or cyanosis. COntracted   NEUROLOGIC: arousable to voice   PSYCH: Awake, nonverbal     Consultant(s) Notes Reviewed:  [x ] YES  [ ] NO  Care Discussed with Consultants/Other Providers [ x] YES  [ ] NO    LABS:                LABS:                        9.2    10.26 )-----------( 264      ( 06 Dec 2022 00:44 )             28.0     12-06    139  |  105  |  10  ----------------------------<  97  3.1<L>   |  23  |  <0.5<L>    Ca    7.9<L>      06 Dec 2022 00:44  Phos  3.3     12-06  Mg     1.6     12-06    TPro  4.9<L>  /  Alb  2.7<L>  /  TBili  0.4  /  DBili  x   /  AST  17  /  ALT  22  /  AlkPhos  94  12-06            Culture - Blood (collected 03 Dec 2022 21:51)  Source: .Blood None  Preliminary Report (05 Dec 2022 09:01):    No growth to date.    RADIOLOGY & ADDITIONAL TESTS  Imaging or report Personally Reviewed:  [x] YES  [ ] NO  EKG reviewed: [x] YES  [ ] NO    Medications:  Standing  acetylcysteine 10%  Inhalation 4 milliLiter(s) Inhalation three times a day  baclofen 20 milliGRAM(s) Oral every 12 hours  chlorhexidine 2% Cloths 1 Application(s) Topical daily  dextrose 5% + lactated ringers. 1000 milliLiter(s) IV Continuous <Continuous>  donepezil 10 milliGRAM(s) Oral at bedtime  enoxaparin Injectable 70 milliGRAM(s) SubCutaneous every 12 hours  lactulose Syrup 10 Gram(s) Oral daily  linezolid  IVPB 600 milliGRAM(s) IV Intermittent every 12 hours  memantine 10 milliGRAM(s) Oral two times a day  meropenem  IVPB 1000 milliGRAM(s) IV Intermittent every 8 hours  multivitamin 1 Tablet(s) Oral daily  pantoprazole    Tablet 40 milliGRAM(s) Oral before breakfast  senna 2 Tablet(s) Oral at bedtime  tamsulosin 0.4 milliGRAM(s) Oral at bedtime  thiamine 100 milliGRAM(s) Oral daily    PRN Meds  acetaminophen     Tablet .. 650 milliGRAM(s) Oral every 6 hours PRN  albuterol    0.083% 2.5 milliGRAM(s) Nebulizer every 4 hours PRN  albuterol    90 MICROgram(s) HFA Inhaler 2 Puff(s) Inhalation every 6 hours PRN  ibuprofen  Suspension. 600 milliGRAM(s) Oral every 8 hours PRN  melatonin 3 milliGRAM(s) Oral at bedtime PRN

## 2022-12-06 NOTE — PROGRESS NOTE ADULT - ASSESSMENT
ASSESSMENT  Severe sepsis present on admission 2/2 acute pyelonephritis  resp distress:   HFNC, alternating with BIPAP  Neurogenic bladder - h/o SPC that was dislodged   leaking GT  NSTEMI r/t sepsis  SUKHDEV    PLAN:  -GI f/u noted to keep NPO for 24 hrs  - local skin care  -  treat phos - suggest level > 3.5  - will f/u

## 2022-12-06 NOTE — PROGRESS NOTE ADULT - SUBJECTIVE AND OBJECTIVE BOX
KEVIN MEDRANO  52y, Male  Allergy: penicillin (Unknown)  vancomycin (Unknown)      LOS  4d    CHIEF COMPLAINT: sepsis (06 Dec 2022 07:00)      INTERVAL EVENTS/HPI  - Fever  - T(F): , Max: 101 (12-05-22 @ 19:00)  - WBC Count: 10.26 (12-06-22 @ 00:44)  WBC Count: 12.98 (12-05-22 @ 06:48)  - Creatinine, Serum: <0.5 (12-06-22 @ 00:44)  Creatinine, Serum: <0.5 (12-05-22 @ 06:48)       ROS  unable to obtain history secondary to patient's mental status and/or sedation     VITALS:  T(F): 98, Max: 101 (12-05-22 @ 19:00)  HR: 89  BP: 129/83  RR: 18Vital Signs Last 24 Hrs  T(C): 36.7 (06 Dec 2022 11:55), Max: 38.3 (05 Dec 2022 19:00)  T(F): 98 (06 Dec 2022 11:55), Max: 101 (05 Dec 2022 19:00)  HR: 89 (06 Dec 2022 11:55) (71 - 104)  BP: 129/83 (06 Dec 2022 11:55) (99/58 - 135/84)  BP(mean): 94 (06 Dec 2022 07:00) (77 - 105)  RR: 18 (06 Dec 2022 11:55) (14 - 20)  SpO2: 100% (06 Dec 2022 11:55) (97% - 100%)    Parameters below as of 06 Dec 2022 00:00  Patient On (Oxygen Delivery Method): BiPAP/CPAP,12/6    O2 Concentration (%): 40    PHYSICAL EXAM:  Gen: chronically ill appearing   HEENT: Normocephalic, atraumatic  Neck: supple, no lymphadenopathy  CV: Regular rate & regular rhythm  Lungs: decreased BS at bases, no fremitus  Abdomen: Soft, BS present  Ext: Warm, well perfused  Neuro: non focal, not following commands  Skin: no rash, no erythema  Lines: no phlebitis     FH: Non-contributory  Social Hx: Non-contributory    TESTS & MEASUREMENTS:                        9.2    10.26 )-----------( 264      ( 06 Dec 2022 00:44 )             28.0     12-06    139  |  105  |  10  ----------------------------<  97  3.1<L>   |  23  |  <0.5<L>    Ca    7.9<L>      06 Dec 2022 00:44  Phos  3.3     12-06  Mg     1.6     12-06    TPro  4.9<L>  /  Alb  2.7<L>  /  TBili  0.4  /  DBili  x   /  AST  17  /  ALT  22  /  AlkPhos  94  12-06      LIVER FUNCTIONS - ( 06 Dec 2022 00:44 )  Alb: 2.7 g/dL / Pro: 4.9 g/dL / ALK PHOS: 94 U/L / ALT: 22 U/L / AST: 17 U/L / GGT: x               Culture - Blood (collected 12-04-22 @ 08:30)  Source: .Blood None  Preliminary Report (12-05-22 @ 22:02):    No growth to date.    Culture - Blood (collected 12-03-22 @ 21:51)  Source: .Blood None  Preliminary Report (12-05-22 @ 09:01):    No growth to date.    Culture - Urine (collected 12-02-22 @ 10:34)  Source: Clean Catch Clean Catch (Midstream)  Final Report (12-04-22 @ 09:29):    >=3 organisms. Probable collection contamination.    Culture - Blood (collected 12-02-22 @ 08:21)  Source: .Blood Blood-Peripheral  Gram Stain (12-04-22 @ 01:05):    Growth in aerobic bottle: Gram Positive Cocci in Clusters    Growth in anaerobic bottle: Gram Positive Cocci in Clusters  Final Report (12-04-22 @ 21:59):    Growth in aerobic and anaerobic bottles: Staphylococcus hominis Coag    Negative Staphylococcus    Single set isolate, possible contaminant. Contact    Microbiology if susceptibility testing clinically    indicated.    Growth in aerobic bottle: Staphylococcus epidermidis Coag Negative    Staphylococcus    Single set isolate, possible contaminant. Contact    Microbiology if susceptibility testing clinically    indicated.    ***Blood Panel PCR results on this specimen are available    approximately 3 hours after the Gram stain result.***    Gram stain, PCR, and/or culture results may not always    correspond due to difference in methodologies.    ************************************************************    This PCR assay was performed by multiplex PCR. This    Assay tests for 66 bacterial and resistance gene targets.    Please refer to the [facility] test directory    at [email or web address] for details.  Organism: Blood Culture PCR (12-04-22 @ 21:59)  Organism: Blood Culture PCR (12-04-22 @ 21:59)      -  Staphylococcus epidermidis, Methicillin resistant: Detec      Method Type: PCR    Culture - Blood (collected 12-02-22 @ 08:21)  Source: .Blood Blood-Peripheral  Gram Stain (12-03-22 @ 16:14):    Growth in aerobic bottle: Gram Positive Cocci in Clusters  Final Report (12-05-22 @ 10:49):    Growth in aerobic bottle: Staphylococcus epidermidis  Organism: Staphylococcus epidermidis (12-05-22 @ 10:49)  Organism: Staphylococcus epidermidis (12-05-22 @ 10:49)      -  Ampicillin/Sulbactam: S <=8/4      -  Cefazolin: S <=4      -  Clindamycin: S <=0.25      -  Erythromycin: R >4      -  Gentamicin: S <=1 Should not be used as monotherapy      -  Oxacillin: S <=0.25      -  Penicillin: R 2      -  Rifampin: S <=1 Should not be used as monotherapy      -  Tetracycline: R >8      -  Trimethoprim/Sulfamethoxazole: R >2/38      -  Vancomycin: S 2      Method Type: GAURAV    Culture - Urine (collected 12-02-22 @ 06:59)  Source: Suprapubic Suprapubic  Final Report (12-04-22 @ 20:09):    Few Proteus mirabilis ESBL    Moderate Enterococcus faecalis  Organism: Proteus mirabilis ESBL  Enterococcus faecalis (12-04-22 @ 20:09)  Organism: Enterococcus faecalis (12-04-22 @ 20:09)      -  Ampicillin: S <=2 Predicts results to ampicillin/sulbactam, amoxacillin-clavulanate and  piperacillin-tazobactam.      -  Ciprofloxacin: R >2      -  Levofloxacin: R >4      -  Nitrofurantoin: S <=32 Should not be used to treat pyelonephritis.      -  Tetracycline: R >8      -  Vancomycin: S 1      Method Type: GAURAV  Organism: Proteus mirabilis ESBL (12-04-22 @ 20:09)      -  Amikacin: S <=16      -  Amoxicillin/Clavulanic Acid: S <=8/4      -  Ampicillin: R >16 These ampicillin results predict results for amoxicillin      -  Ampicillin/Sulbactam: I 16/8 Enterobacter, Klebsiella aerogenes, Citrobacter, and Serratia may develop resistance during prolonged therapy (3-4 days)      -  Aztreonam: R <=4      -  Cefazolin: R >16 For uncomplicated UTI with K. pneumoniae, E. coli, or P. mirablis: GAURAV <=16 is sensitive and GAURAV >=32 is resistant. This also predicts results for oral agents cefaclor, cefdinir, cefpodoxime, cefprozil, cefuroxime axetil, cephalexin and locarbef for uncomplicated UTI. Note that some isolates may be susceptible to these agents while testing resistant to cefazolin.      -  Cefepime: R >16      -  Ceftriaxone: R >32 Enterobacter, Klebsiella aerogenes, Citrobacter, and Serratia may develop resistance during prolonged therapy      -  Ciprofloxacin: R >2      -  Ertapenem: S <=0.5      -  Gentamicin: I 8      -  Levofloxacin: R >4      -  Meropenem: S <=1      -  Nitrofurantoin: R >64 Should not be used to treat pyelonephritis      -  Piperacillin/Tazobactam: S <=8      -  Tobramycin: I 8      -  Trimethoprim/Sulfamethoxazole: R >2/38      Method Type: GAURAV    Culture - Blood (collected 12-02-22 @ 06:59)  Source: .Blood Blood  Gram Stain (12-03-22 @ 19:58):    Growth in aerobic bottle: Gram Positive Cocci in Clusters  Final Report (12-05-22 @ 08:59):    Growth in aerobic bottle: Staphylococcus hominis  Organism: Staphylococcus hominis (12-05-22 @ 08:59)  Organism: Staphylococcus hominis (12-05-22 @ 08:59)      -  Ampicillin/Sulbactam: R <=8/4      -  Cefazolin: R <=4      -  Clindamycin: S 0.5      -  Erythromycin: R >4      -  Gentamicin: S <=1 Should not be used as monotherapy      -  Oxacillin: R >2      -  Penicillin: R 8      -  Rifampin: S <=1 Should not be used as monotherapy      -  Tetracycline: S <=1      -  Trimethoprim/Sulfamethoxazole: R >2/38      -  Vancomycin: S 2      Method Type: GAURAV        Lactate, Blood: 1.1 mmol/L (12-03-22 @ 01:22)  Lactate, Blood: 2.4 mmol/L (12-02-22 @ 13:41)  Blood Gas Venous - Lactate: 2.60 mmol/L (12-02-22 @ 10:58)  Blood Gas Venous - Lactate: 3.60 mmol/L (12-02-22 @ 07:24)      INFECTIOUS DISEASES TESTING  Procalcitonin, Serum: 0.53 (12-03-22 @ 10:53)  Legionella Antigen, Urine: Negative (12-03-22 @ 08:10)  Streptococcus pneumoniae Ag, Ur Result: Negative (12-03-22 @ 08:10)  COVID-19 PCR: NotDetec (11-28-22 @ 18:30)  strept    INFLAMMATORY MARKERS      RADIOLOGY & ADDITIONAL TESTS:  I have personally reviewed the last available Chest xray  CXR      CT  CT Angio Chest PE Protocol w/ IV Cont:   ACC: [id number] EXAM:  CT ANGIO CHEST PULM [facility]                          PROCEDURE DATE:  12/03/2022          INTERPRETATION:  CLINICAL HISTORY/REASON FOR EXAM: Tachycardia. Sepsis..    TECHNIQUE: Multislice helical sections were obtained from the thoracic   inlet to the lung bases during rapid administration of intravenous   contrast. Thin sections were reconstructed through the pulmonary   vasculature. 3D (MIP) reformats obtained.    COMPARISON: None.    FINDINGS:    PULMONARY EMBOLUS: No evidence of acute pulmonary embolism.    LUNGS, PLEURA, AIRWAYS: Occlusion of distal left main stem and left lobar   bronchi. Near-total collapse atelectasis of left lung, with small   residual left upper lobe aeration. Correlate for superimposed   consolidation/pneumonia in the appropriate clinical setting. Paraseptal   emphysema. Dependent right lung atelectasis. No pleural effusion or   pneumothorax.    THORACIC NODES: No mediastinal, hilar, supraclavicular, or axillary   lymphadenopathy.    MEDIASTINUM/GREAT VESSELS: No pericardial effusion. Heart size is within   normal limits. The aorta and main pulmonary artery are of normal caliber.    BONES/SOFT TISSUES: Bilateral gynecomastia. Degenerative change, thoracic   spine.    VISUALIZED UPPER ABDOMEN: Unremarkable.      IMPRESSION:    Occlusion of distal left main stem and left lobar bronchi. Near-total   collapse atelectasis of left lung, with small residual left upper lobe   aeration. Correlate for superimposed consolidation/pneumonia in the   appropriate clinical setting.    No evidence of acute pulmonary embolism.    --- End of Report ---            MINOR MEDINA MD; Attending Radiologist  This document has been electronically signed. Dec  3 2022  6:15PM (12-03-22 @ 17:50)      CARDIOLOGY TESTING  12 Lead ECG:   Ventricular Rate 131 BPM    Atrial Rate 131 BPM    P-R Interval 152 ms    QRS Duration 76 ms    Q-T Interval 302 ms    QTC Calculation(Bazett) 445 ms    P Axis 70 degrees    R Axis -67 degrees    T Axis -66 degrees    Diagnosis Line Sinus tachycardia  Left axis deviation  Anterior infarct , age undetermined  Abnormal ECG    Confirmed by ATIF RODGERS [facility] ([id number]) on 12/4/2022 11:16:14 PM (12-02-22 @ 22:00)  12 Lead ECG:   Ventricular Rate 120 BPM    Atrial Rate 120 BPM    P-R Interval 132 ms    QRS Duration 84 ms    Q-T Interval 288 ms    QTC Calculation(Bazett) 407 ms    P Axis 74 degrees    R Axis -69 degrees    T Axis -35 degrees    Diagnosis Line Sinus tachycardia  Left axis deviation  Poor R wave progression  Abnormal ECG    Confirmed by Basim Guadalupe ([id number]) on 12/2/2022 8:49:08 PM (12-02-22 @ 17:28)      MEDICATIONS  acetylcysteine 10%  Inhalation 4 Inhalation three times a day  baclofen 20 Oral every 12 hours  chlorhexidine 2% Cloths 1 Topical daily  dextrose 5% + lactated ringers. 1000 IV Continuous <Continuous>  donepezil 10 Oral at bedtime  enoxaparin Injectable 70 SubCutaneous every 12 hours  lactulose Syrup 10 Oral daily  linezolid  IVPB 600 IV Intermittent every 12 hours  memantine 10 Oral two times a day  meropenem  IVPB 1000 IV Intermittent every 8 hours  multivitamin 1 Oral daily  pantoprazole    Tablet 40 Oral before breakfast  potassium chloride  20 mEq/100 mL IVPB 20 IV Intermittent every 2 hours  senna 2 Oral at bedtime  tamsulosin 0.4 Oral at bedtime  thiamine 100 Oral daily  zinc oxide 20% Ointment 1 Topical three times a day      WEIGHT  Weight (kg): 68 (12-02-22 @ 08:20)  Creatinine, Serum: <0.5 mg/dL (12-06-22 @ 00:44)      ANTIBIOTICS:  linezolid  IVPB 600 milliGRAM(s) IV Intermittent every 12 hours  meropenem  IVPB 1000 milliGRAM(s) IV Intermittent every 8 hours      All available historical records have been reviewed

## 2022-12-06 NOTE — PROGRESS NOTE ADULT - ASSESSMENT
IMPRESSION:    Sepsis POA  Acute hypoxemic respiratory failure/ Left mucus plug/lung atelectasis  Urinary tract infection/ proteus  Bacteremia - MRSE  non verbal at baseline  NSTEMI likely Type 2  Pulm edema ?   history of MS/neurogenic bladder        PLAN:      CNS: avoid sedation.     HEENT: Oral care    PULMONARY:  CT chest reviewed with left lung atelectasis. Recommend nebs every 4 hours and as needed; mucomyst nebs as well; aggressive chest PT; place on BIPAP 12/6 40%, HFNC during the day; repeat CXR    CARDIOVASCULAR: avoid volume overload, MAP adequate, echo. Gentle IV hydration.     GI: GI prophylaxis. Feeding per GI    RENAL:  Follow up lytes.  Correct as needed. IR following for the SPC.     INFECTIOUS DISEASE: ABX per ID    HEMATOLOGICAL:  DVT prophylaxis. on lovenox    ENDOCRINE:  Follow up FS.  Insulin protocol if needed.    MUSCULOSKELETAL: bedrest  SDU  GOC  poor prognosis

## 2022-12-06 NOTE — PROGRESS NOTE ADULT - SUBJECTIVE AND OBJECTIVE BOX
KEVIN MEDRANO 52y Male  MRN#: [id number]   Hospital Day: 4d    SUBJECTIVE  Patient is a 52y old Male who presents with a chief complaint of sepsis (06 Dec 2022 15:14)  Currently admitted to medicine with the primary diagnosis of Sepsis      INTERVAL HPI AND OVERNIGHT EVENTS:  Patient was examined and seen at bedside. This morning he is resting comfortably in bed. No issues or overnight events.    OBJECTIVE  PAST MEDICAL & SURGICAL HISTORY  Multiple sclerosis    Chronic back pain    Hypertension    No significant past surgical history      ALLERGIES:  penicillin (Unknown)  vancomycin (Unknown)    MEDICATIONS:  STANDING MEDICATIONS  acetylcysteine 10%  Inhalation 4 milliLiter(s) Inhalation three times a day  baclofen 20 milliGRAM(s) Oral every 12 hours  chlorhexidine 2% Cloths 1 Application(s) Topical daily  dextrose 5% + lactated ringers. 1000 milliLiter(s) IV Continuous <Continuous>  donepezil 10 milliGRAM(s) Oral at bedtime  enoxaparin Injectable 70 milliGRAM(s) SubCutaneous every 12 hours  lactulose Syrup 10 Gram(s) Oral daily  memantine 10 milliGRAM(s) Oral two times a day  meropenem  IVPB 1000 milliGRAM(s) IV Intermittent every 8 hours  multivitamin 1 Tablet(s) Oral daily  pantoprazole    Tablet 40 milliGRAM(s) Oral before breakfast  senna 2 Tablet(s) Oral at bedtime  tamsulosin 0.4 milliGRAM(s) Oral at bedtime  thiamine 100 milliGRAM(s) Oral daily  zinc oxide 20% Ointment 1 Application(s) Topical three times a day    PRN MEDICATIONS  acetaminophen  Suppository .. 650 milliGRAM(s) Rectal every 6 hours PRN  albuterol    0.083% 2.5 milliGRAM(s) Nebulizer every 4 hours PRN  albuterol    90 MICROgram(s) HFA Inhaler 2 Puff(s) Inhalation every 6 hours PRN  ibuprofen  Suspension. 600 milliGRAM(s) Oral every 8 hours PRN  melatonin 3 milliGRAM(s) Oral at bedtime PRN      VITAL SIGNS: Last 24 Hours  T(C): 36.7 (06 Dec 2022 11:55), Max: 38.3 (05 Dec 2022 19:00)  T(F): 98 (06 Dec 2022 11:55), Max: 101 (05 Dec 2022 19:00)  HR: 89 (06 Dec 2022 11:55) (71 - 104)  BP: 129/83 (06 Dec 2022 11:55) (99/58 - 135/84)  BP(mean): 94 (06 Dec 2022 07:00) (77 - 105)  RR: 18 (06 Dec 2022 11:55) (14 - 20)  SpO2: 100% (06 Dec 2022 11:55) (97% - 100%)    LABS:                        9.2    10.26 )-----------( 264      ( 06 Dec 2022 00:44 )             28.0     12-06    139  |  105  |  10  ----------------------------<  97  3.1<L>   |  23  |  <0.5<L>    Ca    7.9<L>      06 Dec 2022 00:44  Phos  3.3     12-06  Mg     1.6     12-06    TPro  4.9<L>  /  Alb  2.7<L>  /  TBili  0.4  /  DBili  x   /  AST  17  /  ALT  22  /  AlkPhos  94  12-06              Culture - Blood (collected 04 Dec 2022 08:30)  Source: .Blood None  Preliminary Report (05 Dec 2022 22:02):    No growth to date.    Culture - Blood (collected 03 Dec 2022 21:51)  Source: .Blood None  Preliminary Report (05 Dec 2022 09:01):    No growth to date.            PHYSICAL EXAM:  GEN: Ill looking  LUNGS: Clear to auscultation bilaterally   HEART: S1/S2 present.    ABD: Soft, non-tender, non-distended.   EXT: Contracted  NEURO: Lethargic non-verbal

## 2022-12-07 NOTE — PROGRESS NOTE ADULT - SUBJECTIVE AND OBJECTIVE BOX
KEVIN MEDRANO 52y Male  MRN#: [id number]   Hospital Day: 5d    SUBJECTIVE  Patient is a 52y old Male who presents with a chief complaint of sepsis (07 Dec 2022 08:23)  Currently admitted to medicine with the primary diagnosis of Sepsis      INTERVAL HPI AND OVERNIGHT EVENTS:  Patient was examined and seen at bedside. This morning he is resting comfortably in bed. No issues or overnight events.    OBJECTIVE  PAST MEDICAL & SURGICAL HISTORY  Multiple sclerosis    Chronic back pain    Hypertension    No significant past surgical history      ALLERGIES:  penicillin (Unknown)  vancomycin (Unknown)    MEDICATIONS:  STANDING MEDICATIONS  acetylcysteine 10%  Inhalation 4 milliLiter(s) Inhalation three times a day  baclofen 20 milliGRAM(s) Oral every 12 hours  chlorhexidine 2% Cloths 1 Application(s) Topical daily  dextrose 5% + lactated ringers. 1000 milliLiter(s) IV Continuous <Continuous>  donepezil 10 milliGRAM(s) Oral at bedtime  enoxaparin Injectable 70 milliGRAM(s) SubCutaneous every 12 hours  lactulose Syrup 10 Gram(s) Oral daily  memantine 10 milliGRAM(s) Oral two times a day  meropenem  IVPB 1000 milliGRAM(s) IV Intermittent every 8 hours  multivitamin 1 Tablet(s) Oral daily  pantoprazole    Tablet 40 milliGRAM(s) Oral before breakfast  senna 2 Tablet(s) Oral at bedtime  tamsulosin 0.4 milliGRAM(s) Oral at bedtime  thiamine 100 milliGRAM(s) Oral daily  zinc oxide 20% Ointment 1 Application(s) Topical three times a day    PRN MEDICATIONS  acetaminophen  Suppository .. 650 milliGRAM(s) Rectal every 6 hours PRN  albuterol    0.083% 2.5 milliGRAM(s) Nebulizer every 4 hours PRN  albuterol    90 MICROgram(s) HFA Inhaler 2 Puff(s) Inhalation every 6 hours PRN  ibuprofen  Suspension. 600 milliGRAM(s) Oral every 8 hours PRN  melatonin 3 milliGRAM(s) Oral at bedtime PRN      VITAL SIGNS: Last 24 Hours  T(C): 37.1 (07 Dec 2022 07:47), Max: 38.1 (06 Dec 2022 21:00)  T(F): 98.7 (07 Dec 2022 07:47), Max: 100.5 (06 Dec 2022 21:00)  HR: 81 (07 Dec 2022 07:47) (81 - 107)  BP: 128/90 (07 Dec 2022 07:47) (128/90 - 152/86)  BP(mean): 106 (07 Dec 2022 07:47) (83 - 110)  RR: 20 (07 Dec 2022 07:47) (18 - 20)  SpO2: 100% (07 Dec 2022 07:47) (96% - 100%)    LABS:                        9.8    8.04  )-----------( 319      ( 07 Dec 2022 01:41 )             29.9     12-07    145  |  108  |  6<L>  ----------------------------<  90  3.7   |  22  |  <0.5<L>    Ca    8.2<L>      07 Dec 2022 01:41  Phos  3.3     12-06  Mg     1.6     12-07    TPro  5.2<L>  /  Alb  2.7<L>  /  TBili  0.4  /  DBili  x   /  AST  13  /  ALT  20  /  AlkPhos  97  12-07                  RADIOLOGY:  ACC: [id number] EXAM:  XR CHEST PORTABLE ROUTINE 1V                          PROCEDURE DATE:  12/05/2022          INTERPRETATION:  CLINICAL HISTORY: .. Atelectasis    COMPARISON: 12/4/2022.    TECHNIQUE: Portable frontal chest radiograph. Adequate positioning.    FINDINGS:    Support devices: None.    Cardiac/mediastinum/hilum: Stable.    Lung parenchyma/Pleura: Stable to mildly increased left-sided opacities.   No pneumothorax.    Skeleton/soft tissues: Stable.      IMPRESSION:    Stable to increased left-sided opacities.    --- End of Report ---      PHYSICAL EXAM:  GEN: Ill looking  LUNGS: Clear to auscultation bilaterally   HEART: S1/S2 present.    ABD: Soft, non-tender, non-distended.   EXT: Contracted  NEURO: Lethargic non-verbal

## 2022-12-07 NOTE — PROGRESS NOTE ADULT - ASSESSMENT
53 y/o man with PMH of MS, nonverbal at baseline, chronic low back pain, ?neurogenic bladder s/p SPC placement and s/p dislodgement was at IR for replacement of SPC when he was found to be in severe sepsis.    #Severe sepsis present on admission 2/2 acute pyelonephritis  -L lung etelectasis/Mucus plug   -Ucx: Few Proteus Mirabilis/Mod E Faecalis  -Blood cx : GPC in clusters x3, 1 culture with staph hominis  -On Cefepime and Zyvox  -ID following, if hemodynamic compromise, Gentamycin x1  -c/w IVF   -repeat blood cx, Tylenol for fevers >101  -C/w HFNC, alternating with BIPAP, nebs and chest PT    #Neurogenic bladder  -had SPC that was dislodged - now with [facility] and SPC was going to be replaced when he became septic  - and IR following  -monitor urine output  -on flomax    #Elevated dimer   -vascular cant do duplex bc pt is contracted, on Therapeutic lovenox     #Dysphagia   -PEG feeds on hold as per GI due to PEG leak  -Nutrition consulted >. may need PPN    #MS, nonverbal, bedbound, contracted   -repositioning per protocol, continue baclofen    #SUKHDEV   -likely prerenal, resolved   -continue hydration and monitor     #NSTEMI type 2 due to sepsis   -TTE    #Misc  -DVT prophylaxis: Lovenox  -GI prophylaxis: Protonix  -Code status: Full code  -Dispo: SDU    Handoff pendin) High flow trial today >> removing BIPAP, if not stable place back on BIPAP  2) If condition remains the same, then pt will require intubation tomorrow and possible bronchoscopy. Pt is at high risk that he may not tolerate extubation, may need trach later  3) Mother aware of the his situation

## 2022-12-07 NOTE — PROGRESS NOTE ADULT - ASSESSMENT
ASSESSMENT  Severe sepsis present on admission 2/2 acute pyelonephritis  resp distress:   HFNC, alternating with BIPAP  Neurogenic bladder - h/o SPC that was dislodged   leaking GT  NSTEMI r/t sepsis  SUKHDEV    PLAN:  -GI f/u noted to keep NPO  PPN ordered for tonight    - local skin care  -    - will f/u ASSESSMENT  Severe sepsis present on admission 2/2 acute pyelonephritis  resp distress:   HFNC, alternating with BIPAP  Neurogenic bladder - h/o SPC that was dislodged   leaking GT  NSTEMI r/t sepsis  SUKHDEV    PLAN:  -GI f/u noted to keep NPO  PPN ordered for tonight  - d/w medical team  - local skin care  - iv access dressing change

## 2022-12-07 NOTE — PROGRESS NOTE ADULT - SUBJECTIVE AND OBJECTIVE BOX
NUTRITION SUPPORT TEAM  -  PROGRESS NOTE   pt seen and evaluated   on BIPAP  NPO now   GI f/u noted  abdomen  Nondistended +g-tube in place + dressing in place     extremities contracted  right EJ     REVIEW OF SYSTEMS:  Negative except as noted above.     VITALS:  T(F): 98.9 (12-07 @ 11:08), Max: 98.9 (12-07 @ 11:08)  HR: 103 (12-07 @ 11:08) (81 - 103)  BP: 128/87 (12-07 @ 11:08) (128/87 - 128/90)  RR: 20 (12-07 @ 11:08) (20 - 20)  SpO2: 100% (12-07 @ 11:08) (100% - 100%)    HEIGHT/WEIGHT/BMI:   Height (cm): 195.6 (12-03)  Weight (kg): 68 (12-02)  BMI (kg/m2): 17.8 (12-03)    12-06-22 @ 07:01  -  12-07-22 @ 07:00  --------------------------------------------------------  IN:    dextrose 5% + lactated ringers: 2000 mL  Total IN: 2000 mL    OUT:    Indwelling Catheter - Urethral (mL): 1200 mL  Total OUT: 1200 mL  Total NET: 800 mL        STANDING MEDICATIONS:   acetylcysteine 10%  Inhalation 4 milliLiter(s) Inhalation three times a day  baclofen 20 milliGRAM(s) Oral every 12 hours  chlorhexidine 2% Cloths 1 Application(s) Topical daily  donepezil 10 milliGRAM(s) Oral at bedtime  enoxaparin Injectable 70 milliGRAM(s) SubCutaneous every 12 hours  fat emulsion (Fish Oil and Plant Based) 20% Infusion 1.471 Gm/kG/Day IV Continuous <Continuous>  lactulose Syrup 10 Gram(s) Oral daily  memantine 10 milliGRAM(s) Oral two times a day  meropenem  IVPB 1000 milliGRAM(s) IV Intermittent every 8 hours  Parenteral Nutrition - Adult 1 Each TPN Continuous <Continuous>  senna 2 Tablet(s) Oral at bedtime  tamsulosin 0.4 milliGRAM(s) Oral at bedtime  zinc oxide 20% Ointment 1 Application(s) Topical three times a day      LABS:                         9.8    8.04  )-----------( 319      ( 07 Dec 2022 01:41 )             29.9     145  |  108  |  6<L>  ----------------------------<  90          (12-07-22 @ 01:41)  3.7   |  22  |  <0.5<L>    Ca    8.2<L>          (12-07-22 @ 01:41)  Phos  3.3         (12-06-22 @ 00:44)  Mg     1.6         (12-07-22 @ 01:41)    TPro  5.2<L>  /  Alb  2.7<L>  /  TBili  0.4  /  DBili  x   /  AST  13  /  ALT  20  /  AlkPhos  97       12-07-22 @ 01:41  Triglycerides, Serum: 149 mg/dL (12-07 @ 08:20)    Blood Glucose (Past 24 hours):  96 mg/dL (12-07 @ 11:53)      DIET:   Diet, NPO (12-06-22 @ 13:05) [Active]      fat emulsion (Fish Oil and Plant Based) 20% Infusion 1.471 Gm/kG/Day (31.3 mL/Hr) IV Continuous <Continuous>, 12-07-22 @ 20:00, 20:00, Stop order after: 16 Hours  Parenteral Nutrition - Adult 1 Each (65 mL/Hr) TPN Continuous <Continuous>, 12-07-22 @ 20:00, 20:00, Stop order after: 1 Days      RADIOLOGY:   < from: Xray Chest 1 View AP/PA (12.07.22 @ 09:34) >  IMPRESSION:  Near complete opacification of the right lung.       NUTRITION SUPPORT TEAM  -  PROGRESS NOTE   pt seen and evaluated   on BIPAP  NPO now   GI f/u noted  abdomen  Nondistended +g-tube in place + dressing in place     extremities contracted  right EJ in place site c.d.i    REVIEW OF SYSTEMS:  Negative except as noted above.     VITALS:  T(F): 98.9 (12-07 @ 11:08), Max: 98.9 (12-07 @ 11:08)  HR: 103 (12-07 @ 11:08) (81 - 103)  BP: 128/87 (12-07 @ 11:08) (128/87 - 128/90)  RR: 20 (12-07 @ 11:08) (20 - 20)  SpO2: 100% (12-07 @ 11:08) (100% - 100%)    HEIGHT/WEIGHT/BMI:   Height (cm): 195.6 (12-03)  Weight (kg): 68 (12-02)  BMI (kg/m2): 17.8 (12-03)    12-06-22 @ 07:01  -  12-07-22 @ 07:00  --------------------------------------------------------  IN:    dextrose 5% + lactated ringers: 2000 mL  Total IN: 2000 mL    OUT:    Indwelling Catheter - Urethral (mL): 1200 mL  Total OUT: 1200 mL  Total NET: 800 mL        STANDING MEDICATIONS:   acetylcysteine 10%  Inhalation 4 milliLiter(s) Inhalation three times a day  baclofen 20 milliGRAM(s) Oral every 12 hours  chlorhexidine 2% Cloths 1 Application(s) Topical daily  donepezil 10 milliGRAM(s) Oral at bedtime  enoxaparin Injectable 70 milliGRAM(s) SubCutaneous every 12 hours  fat emulsion (Fish Oil and Plant Based) 20% Infusion 1.471 Gm/kG/Day IV Continuous <Continuous>  lactulose Syrup 10 Gram(s) Oral daily  memantine 10 milliGRAM(s) Oral two times a day  meropenem  IVPB 1000 milliGRAM(s) IV Intermittent every 8 hours  Parenteral Nutrition - Adult 1 Each TPN Continuous <Continuous>  senna 2 Tablet(s) Oral at bedtime  tamsulosin 0.4 milliGRAM(s) Oral at bedtime  zinc oxide 20% Ointment 1 Application(s) Topical three times a day      LABS:                         9.8    8.04  )-----------( 319      ( 07 Dec 2022 01:41 )             29.9     145  |  108  |  6<L>  ----------------------------<  90          (12-07-22 @ 01:41)  3.7   |  22  |  <0.5<L>    Ca    8.2<L>          (12-07-22 @ 01:41)  Phos  3.3         (12-06-22 @ 00:44)  Mg     1.6         (12-07-22 @ 01:41)    TPro  5.2<L>  /  Alb  2.7<L>  /  TBili  0.4  /  DBili  x   /  AST  13  /  ALT  20  /  AlkPhos  97       12-07-22 @ 01:41  Triglycerides, Serum: 149 mg/dL (12-07 @ 08:20)    Blood Glucose (Past 24 hours):  96 mg/dL (12-07 @ 11:53)      DIET:   Diet, NPO (12-06-22 @ 13:05) [Active]      fat emulsion (Fish Oil and Plant Based) 20% Infusion 1.471 Gm/kG/Day (31.3 mL/Hr) IV Continuous <Continuous>, 12-07-22 @ 20:00, 20:00, Stop order after: 16 Hours  Parenteral Nutrition - Adult 1 Each (65 mL/Hr) TPN Continuous <Continuous>, 12-07-22 @ 20:00, 20:00, Stop order after: 1 Days      RADIOLOGY:   < from: Xray Chest 1 View AP/PA (12.07.22 @ 09:34) >  IMPRESSION:  Near complete opacification of the right lung.       NUTRITION SUPPORT TEAM  -  PROGRESS NOTE   pt seen and evaluated   on BIPAP, awake and aware, weak  NPO now   GI f/u noted  abdomen  Nondistended +g-tube in place + dressing in place     extremities contracted  right EJ in place site - dressing to be replaced now, d/w RN    REVIEW OF SYSTEMS:  Negative except as noted above.     VITALS:  T(F): 98.9 (12-07 @ 11:08), Max: 98.9 (12-07 @ 11:08)  HR: 103 (12-07 @ 11:08) (81 - 103)  BP: 128/87 (12-07 @ 11:08) (128/87 - 128/90)  RR: 20 (12-07 @ 11:08) (20 - 20)  SpO2: 100% (12-07 @ 11:08) (100% - 100%)    HEIGHT/WEIGHT/BMI:   Height (cm): 195.6 (12-03)  Weight (kg): 68 (12-02)  BMI (kg/m2): 17.8 (12-03)    12-06-22 @ 07:01  -  12-07-22 @ 07:00  --------------------------------------------------------  IN:    dextrose 5% + lactated ringers: 2000 mL  Total IN: 2000 mL    OUT:    Indwelling Catheter - Urethral (mL): 1200 mL  Total OUT: 1200 mL  Total NET: 800 mL      STANDING MEDICATIONS:   acetylcysteine 10%  Inhalation 4 milliLiter(s) Inhalation three times a day  baclofen 20 milliGRAM(s) Oral every 12 hours  chlorhexidine 2% Cloths 1 Application(s) Topical daily  donepezil 10 milliGRAM(s) Oral at bedtime  enoxaparin Injectable 70 milliGRAM(s) SubCutaneous every 12 hours  fat emulsion (Fish Oil and Plant Based) 20% Infusion 1.471 Gm/kG/Day IV Continuous <Continuous>  lactulose Syrup 10 Gram(s) Oral daily  memantine 10 milliGRAM(s) Oral two times a day  meropenem  IVPB 1000 milliGRAM(s) IV Intermittent every 8 hours  Parenteral Nutrition - Adult 1 Each TPN Continuous <Continuous>  senna 2 Tablet(s) Oral at bedtime  tamsulosin 0.4 milliGRAM(s) Oral at bedtime  zinc oxide 20% Ointment 1 Application(s) Topical three times a day      LABS:                         9.8    8.04  )-----------( 319      ( 07 Dec 2022 01:41 )             29.9     145  |  108  |  6<L>  ----------------------------<  90          (12-07-22 @ 01:41)  3.7   |  22  |  <0.5<L>    Ca    8.2<L>          (12-07-22 @ 01:41)  Phos  3.3         (12-06-22 @ 00:44)  Mg     1.6         (12-07-22 @ 01:41)    TPro  5.2<L>  /  Alb  2.7<L>  /  TBili  0.4  /  DBili  x   /  AST  13  /  ALT  20  /  AlkPhos  97       12-07-22 @ 01:41  Triglycerides, Serum: 149 mg/dL (12-07 @ 08:20)    Blood Glucose (Past 24 hours):  96 mg/dL (12-07 @ 11:53)      DIET:   Diet, NPO (12-06-22 @ 13:05) [Active]      RADIOLOGY:   < from: Xray Chest 1 View AP/PA (12.07.22 @ 09:34) >  IMPRESSION:  Near complete opacification of the right lung.

## 2022-12-07 NOTE — PROGRESS NOTE ADULT - ASSESSMENT
IMPRESSION:    Sepsis POA  Acute hypoxemic respiratory failure/ Left mucus plug/lung atelectasis  Urinary tract infection/ proteus  Bacteremia - MRSE  non verbal at baseline  NSTEMI likely Type 2  Pulm edema ?   history of MS/neurogenic bladder        PLAN:      CNS: avoid sedation.     HEENT: Oral care    PULMONARY:  CT chest reviewed with left lung atelectasis. Recommend nebs every 4 hours and as needed; mucomyst nebs as well; aggressive chest PT; place on BIPAP 12/6 40%, HFNC during the day; repeat CXR. If xray does not look better by tomorrow will intubate and do bronchoscopy after discussion with family.    CARDIOVASCULAR: avoid volume overload, MAP adequate, echo. Gentle IV hydration.     GI: GI prophylaxis. GI replaced PEG tube yesterday, want NPO for a few days. Nutrition is following.    RENAL:  Follow up lytes.  Correct as needed. IR following for the SPC.     INFECTIOUS DISEASE: ABX per ID. Repeat cultures, RVP per ID    HEMATOLOGICAL: DVT prophylaxis. on lovenox    ENDOCRINE:  Follow up FS.  Insulin protocol if needed.    MUSCULOSKELETAL: bedrest  SDU  GOC  poor prognosis

## 2022-12-07 NOTE — PROGRESS NOTE ADULT - SUBJECTIVE AND OBJECTIVE BOX
Patient is a 52y old  Male who presents with a chief complaint of sepsis (06 Dec 2022 15:40)        Over Night Events:    Clinically looking better. However xray looks worse.      ROS:     All pertinent ROS are negative except HPI         PHYSICAL EXAM    ICU Vital Signs Last 24 Hrs  T(C): 37.1 (07 Dec 2022 07:47), Max: 38.1 (06 Dec 2022 21:00)  T(F): 98.7 (07 Dec 2022 07:47), Max: 100.5 (06 Dec 2022 21:00)  HR: 81 (07 Dec 2022 07:47) (81 - 107)  BP: 128/90 (07 Dec 2022 07:47) (128/90 - 152/86)  BP(mean): 106 (07 Dec 2022 07:47) (83 - 110)  ABP: --  ABP(mean): --  RR: 20 (07 Dec 2022 07:47) (18 - 20)  SpO2: 100% (07 Dec 2022 07:47) (96% - 100%)    O2 Parameters below as of 07 Dec 2022 07:47  Patient On (Oxygen Delivery Method): BiPAP/CPAP            CONSTITUTIONAL:   Ill appearing.  Well nourished.  NAD    ENT:   Airway patent,   Mouth with normal mucosa.   No thrush    EYES:   Pupils equal,   Round and reactive to light.    CARDIAC:   Normal rate,   Regular rhythm.    No edema    RESPIRATORY:   No wheezing  Bilateral BS  Normal chest expansion  Not tachypneic,  No use of accessory muscles    GASTROINTESTINAL:  Abdomen soft,   Non-tender,   No guarding,   + BS    MUSCULOSKELETAL:   Range of motion is not limited,  No clubbing, cyanosis    NEUROLOGICAL:   Alert and oriented   No motor deficits.    SKIN:   Skin normal color for race,   Warm and dry  No evidence of rash.    PSYCHIATRIC:   Normal mood and affect.   No apparent risk to self or others.        12-06-22 @ 07:01  -  12-07-22 @ 07:00  --------------------------------------------------------  IN:    dextrose 5% + lactated ringers: 2000 mL  Total IN: 2000 mL    OUT:    Indwelling Catheter - Urethral (mL): 1200 mL  Total OUT: 1200 mL    Total NET: 800 mL          LABS:                            9.8    8.04  )-----------( 319      ( 07 Dec 2022 01:41 )             29.9                                               12-07    145  |  108  |  6<L>  ----------------------------<  90  3.7   |  22  |  <0.5<L>    Ca    8.2<L>      07 Dec 2022 01:41  Phos  3.3     12-06  Mg     1.6     12-07    TPro  5.2<L>  /  Alb  2.7<L>  /  TBili  0.4  /  DBili  x   /  AST  13  /  ALT  20  /  AlkPhos  97  12-07                                                                                           LIVER FUNCTIONS - ( 07 Dec 2022 01:41 )  Alb: 2.7 g/dL / Pro: 5.2 g/dL / ALK PHOS: 97 U/L / ALT: 20 U/L / AST: 13 U/L / GGT: x                                                  Culture - Blood (collected 04 Dec 2022 08:30)  Source: .Blood None  Preliminary Report (05 Dec 2022 22:02):    No growth to date.                                                                                           MEDICATIONS  (STANDING):  acetylcysteine 10%  Inhalation 4 milliLiter(s) Inhalation three times a day  baclofen 20 milliGRAM(s) Oral every 12 hours  chlorhexidine 2% Cloths 1 Application(s) Topical daily  dextrose 5% + lactated ringers. 1000 milliLiter(s) (100 mL/Hr) IV Continuous <Continuous>  donepezil 10 milliGRAM(s) Oral at bedtime  enoxaparin Injectable 70 milliGRAM(s) SubCutaneous every 12 hours  lactulose Syrup 10 Gram(s) Oral daily  memantine 10 milliGRAM(s) Oral two times a day  meropenem  IVPB 1000 milliGRAM(s) IV Intermittent every 8 hours  multivitamin 1 Tablet(s) Oral daily  pantoprazole    Tablet 40 milliGRAM(s) Oral before breakfast  senna 2 Tablet(s) Oral at bedtime  tamsulosin 0.4 milliGRAM(s) Oral at bedtime  thiamine 100 milliGRAM(s) Oral daily  zinc oxide 20% Ointment 1 Application(s) Topical three times a day    MEDICATIONS  (PRN):  acetaminophen  Suppository .. 650 milliGRAM(s) Rectal every 6 hours PRN Temp greater or equal to 38C (100.4F), Mild Pain (1 - 3)  albuterol    0.083% 2.5 milliGRAM(s) Nebulizer every 4 hours PRN Shortness of Breath and/or Wheezing  albuterol    90 MICROgram(s) HFA Inhaler 2 Puff(s) Inhalation every 6 hours PRN Shortness of Breath and/or Wheezing  ibuprofen  Suspension. 600 milliGRAM(s) Oral every 8 hours PRN Temp greater or equal to 38.5C (101.3F)  melatonin 3 milliGRAM(s) Oral at bedtime PRN Insomnia      New X-rays reviewed:                                                                                  ECHO    CXR interpreted by me:       Patient is a 52y old  Male who presents with a chief complaint of sepsis (06 Dec 2022 15:40)        Over Night Events:    Clinically looking better. However xray looks worse. on BIPAP        PHYSICAL EXAM    ICU Vital Signs Last 24 Hrs  T(C): 37.1 (07 Dec 2022 07:47), Max: 38.1 (06 Dec 2022 21:00)  T(F): 98.7 (07 Dec 2022 07:47), Max: 100.5 (06 Dec 2022 21:00)  HR: 81 (07 Dec 2022 07:47) (81 - 107)  BP: 128/90 (07 Dec 2022 07:47) (128/90 - 152/86)  BP(mean): 106 (07 Dec 2022 07:47) (83 - 110)  RR: 20 (07 Dec 2022 07:47) (18 - 20)  SpO2: 100% (07 Dec 2022 07:47) (96% - 100%)    O2 Parameters below as of 07 Dec 2022 07:47  Patient On (Oxygen Delivery Method): BiPAP/CPAP            CONSTITUTIONAL:   Ill appearing.     ENT:   Airway patent,   Mouth with normal mucosa.   No thrush    EYES:   Pupils equal,   Round and reactive to light.    CARDIAC:   nolan 2/6    RESPIRATORY:   dec bs l base    GASTROINTESTINAL:  Abdomen soft,   Non-tender,   No guarding,   + BS    MUSCULOSKELETAL:   Range of motion is not limited,  No clubbing, cyanosis    NEUROLOGICAL:   Alert and oriented   No motor deficits.    SKIN:   Skin normal color for race,   Warm and dry  No evidence of rash.          12-06-22 @ 07:01  -  12-07-22 @ 07:00  --------------------------------------------------------  IN:    dextrose 5% + lactated ringers: 2000 mL  Total IN: 2000 mL    OUT:    Indwelling Catheter - Urethral (mL): 1200 mL  Total OUT: 1200 mL    Total NET: 800 mL          LABS:                            9.8    8.04  )-----------( 319      ( 07 Dec 2022 01:41 )             29.9                                               12-07    145  |  108  |  6<L>  ----------------------------<  90  3.7   |  22  |  <0.5<L>    Ca    8.2<L>      07 Dec 2022 01:41  Phos  3.3     12-06  Mg     1.6     12-07    TPro  5.2<L>  /  Alb  2.7<L>  /  TBili  0.4  /  DBili  x   /  AST  13  /  ALT  20  /  AlkPhos  97  12-07                                                                                           LIVER FUNCTIONS - ( 07 Dec 2022 01:41 )  Alb: 2.7 g/dL / Pro: 5.2 g/dL / ALK PHOS: 97 U/L / ALT: 20 U/L / AST: 13 U/L / GGT: x                                                  Culture - Blood (collected 04 Dec 2022 08:30)  Source: .Blood None  Preliminary Report (05 Dec 2022 22:02):    No growth to date.                                                                                           MEDICATIONS  (STANDING):  acetylcysteine 10%  Inhalation 4 milliLiter(s) Inhalation three times a day  baclofen 20 milliGRAM(s) Oral every 12 hours  chlorhexidine 2% Cloths 1 Application(s) Topical daily  dextrose 5% + lactated ringers. 1000 milliLiter(s) (100 mL/Hr) IV Continuous <Continuous>  donepezil 10 milliGRAM(s) Oral at bedtime  enoxaparin Injectable 70 milliGRAM(s) SubCutaneous every 12 hours  lactulose Syrup 10 Gram(s) Oral daily  memantine 10 milliGRAM(s) Oral two times a day  meropenem  IVPB 1000 milliGRAM(s) IV Intermittent every 8 hours  multivitamin 1 Tablet(s) Oral daily  pantoprazole    Tablet 40 milliGRAM(s) Oral before breakfast  senna 2 Tablet(s) Oral at bedtime  tamsulosin 0.4 milliGRAM(s) Oral at bedtime  thiamine 100 milliGRAM(s) Oral daily  zinc oxide 20% Ointment 1 Application(s) Topical three times a day    MEDICATIONS  (PRN):  acetaminophen  Suppository .. 650 milliGRAM(s) Rectal every 6 hours PRN Temp greater or equal to 38C (100.4F), Mild Pain (1 - 3)  albuterol    0.083% 2.5 milliGRAM(s) Nebulizer every 4 hours PRN Shortness of Breath and/or Wheezing  albuterol    90 MICROgram(s) HFA Inhaler 2 Puff(s) Inhalation every 6 hours PRN Shortness of Breath and/or Wheezing  ibuprofen  Suspension. 600 milliGRAM(s) Oral every 8 hours PRN Temp greater or equal to 38.5C (101.3F)  melatonin 3 milliGRAM(s) Oral at bedtime PRN Insomnia

## 2022-12-08 NOTE — PROGRESS NOTE ADULT - SUBJECTIVE AND OBJECTIVE BOX
NUTRITION SUPPORT TEAM  -  PROGRESS NOTE   pt seen and evaluated   resting comfortably  NPO now on PPN   abdomen  Nondistended +g-tube in place + dressing in place     extremities contracted  right EJ in place site  REVIEW OF SYSTEMS:  Negative except as noted above.     VITALS:  T(F): 98.7 (12-08 @ 08:00), Max: 98.8 (12-08 @ 04:41)  HR: 86 (12-08 @ 08:00) (86 - 95)  BP: 135/84 (12-08 @ 08:00) (122/80 - 135/84)  RR: 20 (12-08 @ 08:00) (20 - 20)  SpO2: 100% (12-08 @ 08:00) (100% - 100%)    HEIGHT/WEIGHT/BMI:   Height (cm): 195.6 (12-03)  Weight (kg): 68 (12-02)  BMI (kg/m2): 17.8 (12-03)    12-07-22 @ 07:01  -  12-08-22 @ 07:00  --------------------------------------------------------  IN:    dextrose 5% + lactated ringers: 1000 mL    Fat Emulsion (Fish Oil &amp; Plant Based) 20% Infusion: 186 mL    IV PiggyBack: 100 mL    PPN (Peripheral Parenteral Nutrition): 390 mL  Total IN: 1676 mL    OUT:    Indwelling Catheter - Urethral (mL): 2000 mL  Total OUT: 2000 mL    Total NET: -324 mL        STANDING MEDICATIONS:   acetylcysteine 10%  Inhalation 4 milliLiter(s) Inhalation three times a day  baclofen 20 milliGRAM(s) Oral every 12 hours  chlorhexidine 2% Cloths 1 Application(s) Topical daily  dexMEDEtomidine Infusion 0.05 MICROgram(s)/kG/Hr IV Continuous <Continuous>  donepezil 10 milliGRAM(s) Oral at bedtime  enoxaparin Injectable 70 milliGRAM(s) SubCutaneous every 12 hours  etomidate Injectable 20 milliGRAM(s) IV Push once  fat emulsion (Fish Oil and Plant Based) 20% Infusion 1.471 Gm/kG/Day IV Continuous <Continuous>  fat emulsion (Fish Oil and Plant Based) 20% Infusion 1.471 Gm/kG/Day IV Continuous <Continuous>  fentaNYL   Infusion. 0.5 MICROgram(s)/kG/Hr IV Continuous <Continuous>  lactulose Syrup 10 Gram(s) Oral daily  magnesium sulfate  IVPB 2 Gram(s) IV Intermittent once  memantine 10 milliGRAM(s) Oral two times a day  meropenem  IVPB 1000 milliGRAM(s) IV Intermittent every 8 hours  Parenteral Nutrition - Adult 1 Each TPN Continuous <Continuous>  Parenteral Nutrition - Adult 1 Each TPN Continuous <Continuous>  potassium chloride  20 mEq/100 mL IVPB 20 milliEquivalent(s) IV Intermittent every 2 hours  rocuronium Injectable 60 milliGRAM(s) IV Push once  senna 2 Tablet(s) Oral at bedtime  tamsulosin 0.4 milliGRAM(s) Oral at bedtime  zinc oxide 20% Ointment 1 Application(s) Topical three times a day      LABS:                         9.6    6.75  )-----------( 333      ( 08 Dec 2022 00:20 )             30.1     137  |  102  |  7<L>  ----------------------------<  110<H>          (12-08-22 @ 07:02)  3.5   |  24  |  <0.5<L>    Ca    8.1<L>          (12-08-22 @ 07:02)  Phos  3.1         (12-08-22 @ 07:02)  Mg     1.9         (12-08-22 @ 07:02)    TPro  4.9<L>  /  Alb  2.4<L>  /  TBili  0.3  /  DBili  x   /  AST  11  /  ALT  16  /  AlkPhos  86       12-08-22 @ 00:20  Triglycerides, Serum: 149 mg/dL (12-07 @ 08:20)  Blood Glucose (Past 24 hours):  104 mg/dL (12-08 @ 06:54)  105 mg/dL (12-07 @ 21:13)  94 mg/dL (12-07 @ 16:26)  96 mg/dL (12-07 @ 11:53)      DIET:   Diet, NPO (12-06-22 @ 13:05) [Active]      fat emulsion (Fish Oil and Plant Based) 20% Infusion 1.471 Gm/kG/Day (31.3 mL/Hr) IV Continuous <Continuous>, 12-08-22 @ 20:00, 20:00, Stop order after: 16 Hours  fat emulsion (Fish Oil and Plant Based) 20% Infusion 1.471 Gm/kG/Day (31.3 mL/Hr) IV Continuous <Continuous>, 12-07-22 @ 20:00, 20:00, Stop order after: 16 Hours  Parenteral Nutrition - Adult 1 Each (65 mL/Hr) TPN Continuous <Continuous>, 12-07-22 @ 20:00, 20:00, Stop order after: 1 Days  Parenteral Nutrition - Adult 1 Each (65 mL/Hr) TPN Continuous <Continuous>, 12-08-22 @ 20:00, 20:00, Stop order after: 1 Days      RADIOLOGY:   < from: Xray Chest 1 View AP/PA (12.07.22 @ 09:34) >  IMPRESSION:  Near complete opacification of the right lung.

## 2022-12-08 NOTE — PROGRESS NOTE ADULT - ASSESSMENT
51 y/o man with PMH of MS, nonverbal at baseline, chronic low back pain, ?neurogenic bladder s/p SPC placement and s/p dislodgement was at IR for replacement of SPC when he was found to be in severe sepsis.    #Severe sepsis present on admission 2/ acute pyelonephritis  -L lung etelectasis/Mucus plug   -Ucx: Few Proteus Mirabilis/Mod E Faecalis  -Blood cx : GPC in clusters x3, 1 culture with staph hominis  -ID following, on meropenem  -c/w IVF   -C/w HFNC, alternating with BIPAP, nebs and chest PT  -left lung is collapsed likely due to mucus clogging >> bronchopscopy with intubation on , high risk for extubation    #Neurogenic bladder  -had SPC that was dislodged - now with [facility] and SPC was going to be replaced when he became septic  - and IR following  -monitor urine output  -on flomax    #Elevated dimer   -vascular cant do duplex bc pt is contracted, on Therapeutic lovenox     #Dysphagia   -PEG feeds on hold as per GI due to PEG leak  -Nutrition consulted >. PPN started  - GI f/u needed today when can we start PEG feeds    #MS, nonverbal, bedbound, contracted   -repositioning per protocol, continue baclofen    #SUKHDEV   -likely prerenal, resolved   -continue hydration and monitor     #NSTEMI type 2 due to sepsis   -TTE    #Misc  -DVT prophylaxis: Lovenox  -GI prophylaxis: Protonix  -Code status: Full code  -Dispo: SDU    Handoff pendin) Bronchoscopy with intubation, high risk for extubation, mother aware

## 2022-12-08 NOTE — PROGRESS NOTE ADULT - SUBJECTIVE AND OBJECTIVE BOX
Over Night Events: events noted, BIPAP dependant, no improvement in CXR, started on PPN    PHYSICAL EXAM    ICU Vital Signs Last 24 Hrs  T(C): 37.1 (08 Dec 2022 04:41), Max: 37.7 (07 Dec 2022 16:00)  T(F): 98.8 (08 Dec 2022 04:41), Max: 99.8 (07 Dec 2022 16:00)  HR: 92 (08 Dec 2022 04:41) (81 - 127)  BP: 126/81 (08 Dec 2022 04:41) (122/74 - 128/90)  BP(mean): 98 (08 Dec 2022 04:41) (91 - 106)  RR: 20 (08 Dec 2022 04:41) (20 - 20)  SpO2: 100% (08 Dec 2022 04:41) (99% - 100%)    O2 Parameters below as of 07 Dec 2022 20:00  Patient On (Oxygen Delivery Method): BiPAP/CPAP            General: Chronically ill looking  Lungs: dec bs l base  Cardiovascular: JEFF 2.6  Abdomen: Soft, Positive BS  Contracted      12-07-22 @ 07:01  -  12-08-22 @ 07:00  --------------------------------------------------------  IN:    dextrose 5% + lactated ringers: 1000 mL    Fat Emulsion (Fish Oil &amp; Plant Based) 20% Infusion: 186 mL    IV PiggyBack: 100 mL    PPN (Peripheral Parenteral Nutrition): 390 mL  Total IN: 1676 mL    OUT:    Indwelling Catheter - Urethral (mL): 2000 mL  Total OUT: 2000 mL    Total NET: -324 mL          LABS:                          9.6    6.75  )-----------( 333      ( 08 Dec 2022 00:20 )             30.1                                               12-08    136  |  101  |  6<L>  ----------------------------<  97  3.1<L>   |  25  |  <0.5<L>    Ca    7.9<L>      08 Dec 2022 00:20  Mg     1.7     12-08    TPro  4.9<L>  /  Alb  2.4<L>  /  TBili  0.3  /  DBili  x   /  AST  11  /  ALT  16  /  AlkPhos  86  12-08                                                                                           LIVER FUNCTIONS - ( 08 Dec 2022 00:20 )  Alb: 2.4 g/dL / Pro: 4.9 g/dL / ALK PHOS: 86 U/L / ALT: 16 U/L / AST: 11 U/L / GGT: x                                                                                                                                       MEDICATIONS  (STANDING):  acetylcysteine 10%  Inhalation 4 milliLiter(s) Inhalation three times a day  baclofen 20 milliGRAM(s) Oral every 12 hours  chlorhexidine 2% Cloths 1 Application(s) Topical daily  donepezil 10 milliGRAM(s) Oral at bedtime  enoxaparin Injectable 70 milliGRAM(s) SubCutaneous every 12 hours  fat emulsion (Fish Oil and Plant Based) 20% Infusion 1.471 Gm/kG/Day (31.3 mL/Hr) IV Continuous <Continuous>  lactulose Syrup 10 Gram(s) Oral daily  magnesium sulfate  IVPB 2 Gram(s) IV Intermittent once  memantine 10 milliGRAM(s) Oral two times a day  meropenem  IVPB 1000 milliGRAM(s) IV Intermittent every 8 hours  Parenteral Nutrition - Adult 1 Each (65 mL/Hr) TPN Continuous <Continuous>  potassium chloride  20 mEq/100 mL IVPB 20 milliEquivalent(s) IV Intermittent every 2 hours  senna 2 Tablet(s) Oral at bedtime  tamsulosin 0.4 milliGRAM(s) Oral at bedtime  zinc oxide 20% Ointment 1 Application(s) Topical three times a day    MEDICATIONS  (PRN):  acetaminophen  Suppository .. 650 milliGRAM(s) Rectal every 6 hours PRN Temp greater or equal to 38C (100.4F), Mild Pain (1 - 3)  albuterol    0.083% 2.5 milliGRAM(s) Nebulizer every 4 hours PRN Shortness of Breath and/or Wheezing  albuterol    90 MICROgram(s) HFA Inhaler 2 Puff(s) Inhalation every 6 hours PRN Shortness of Breath and/or Wheezing  ibuprofen  Suspension. 600 milliGRAM(s) Oral every 8 hours PRN Temp greater or equal to 38.5C (101.3F)  melatonin 3 milliGRAM(s) Oral at bedtime PRN Insomnia      CXR reivewed

## 2022-12-08 NOTE — PROGRESS NOTE ADULT - ASSESSMENT
IMPRESSION:    Sepsis POA  Acute hypoxemic respiratory failure/ Left mucus plug/lung atelectasis  Urinary tract infection/ proteus  Bacteremia - MRSE  non verbal at baseline  NSTEMI likely Type 2  Pulm edema ?   history of MS/neurogenic bladder        PLAN:      CNS: avoid sedation.     HEENT: Oral care    PULMONARY:  CT chest reviewed with left lung atelectasis. Recommend nebs every 4 hours and as needed; mucomyst nebs as well; aggressive chest PT; will intubate and bronch    CARDIOVASCULAR: avoid volume overload, MAP adequate, echo.    GI: GI prophylaxis. ppn    RENAL:  Follow up lytes.  Correct as needed. IR following for the SPC.     INFECTIOUS DISEASE: ABX per ID.     HEMATOLOGICAL: DVT prophylaxis. on lovenox    ENDOCRINE:  Follow up FS.  Insulin protocol if needed.    MUSCULOSKELETAL: bedrest  SDU  GOC  poor prognosis

## 2022-12-08 NOTE — AIRWAY PLACEMENT NOTE ADULT - AIRWAY COMMENTS:
Intubated for bronchoscopy. used glideoscope, successful intubated on first attempt. Breath sounds positive b/l after intubation. Xray pending.

## 2022-12-08 NOTE — PROGRESS NOTE ADULT - SUBJECTIVE AND OBJECTIVE BOX
KEVIN MEDRANO 52y Male  MRN#: [id number]   Hospital Day: 6d    SUBJECTIVE  Patient is a 52y old Male who presents with a chief complaint of sepsis (08 Dec 2022 07:28)  Currently admitted to medicine with the primary diagnosis of Sepsis      INTERVAL HPI AND OVERNIGHT EVENTS:  Patient was examined and seen at bedside. This morning he is resting comfortably in bed. No issues or overnight events.    OBJECTIVE  PAST MEDICAL & SURGICAL HISTORY  Multiple sclerosis    Chronic back pain    Hypertension    No significant past surgical history      ALLERGIES:  penicillin (Unknown)  vancomycin (Unknown)    MEDICATIONS:  STANDING MEDICATIONS  acetylcysteine 10%  Inhalation 4 milliLiter(s) Inhalation three times a day  baclofen 20 milliGRAM(s) Oral every 12 hours  chlorhexidine 2% Cloths 1 Application(s) Topical daily  donepezil 10 milliGRAM(s) Oral at bedtime  enoxaparin Injectable 70 milliGRAM(s) SubCutaneous every 12 hours  fat emulsion (Fish Oil and Plant Based) 20% Infusion 1.471 Gm/kG/Day IV Continuous <Continuous>  lactulose Syrup 10 Gram(s) Oral daily  magnesium sulfate  IVPB 2 Gram(s) IV Intermittent once  memantine 10 milliGRAM(s) Oral two times a day  meropenem  IVPB 1000 milliGRAM(s) IV Intermittent every 8 hours  Parenteral Nutrition - Adult 1 Each TPN Continuous <Continuous>  potassium chloride  20 mEq/100 mL IVPB 20 milliEquivalent(s) IV Intermittent every 2 hours  senna 2 Tablet(s) Oral at bedtime  tamsulosin 0.4 milliGRAM(s) Oral at bedtime  zinc oxide 20% Ointment 1 Application(s) Topical three times a day    PRN MEDICATIONS  acetaminophen  Suppository .. 650 milliGRAM(s) Rectal every 6 hours PRN  albuterol    0.083% 2.5 milliGRAM(s) Nebulizer every 4 hours PRN  albuterol    90 MICROgram(s) HFA Inhaler 2 Puff(s) Inhalation every 6 hours PRN  ibuprofen  Suspension. 600 milliGRAM(s) Oral every 8 hours PRN  melatonin 3 milliGRAM(s) Oral at bedtime PRN      VITAL SIGNS: Last 24 Hours  T(C): 37.1 (08 Dec 2022 08:00), Max: 37.7 (07 Dec 2022 16:00)  T(F): 98.7 (08 Dec 2022 08:00), Max: 99.8 (07 Dec 2022 16:00)  HR: 86 (08 Dec 2022 08:00) (86 - 127)  BP: 135/84 (08 Dec 2022 08:00) (122/74 - 135/84)  BP(mean): 98 (08 Dec 2022 04:41) (91 - 101)  RR: 20 (08 Dec 2022 08:00) (20 - 20)  SpO2: 100% (08 Dec 2022 08:00) (99% - 100%)    LABS:                        9.6    6.75  )-----------( 333      ( 08 Dec 2022 00:20 )             30.1     12-08    137  |  102  |  7<L>  ----------------------------<  110<H>  3.5   |  24  |  <0.5<L>    Ca    8.1<L>      08 Dec 2022 07:02  Phos  3.1     12-08  Mg     1.9     12-08    TPro  4.9<L>  /  Alb  2.4<L>  /  TBili  0.3  /  DBili  x   /  AST  11  /  ALT  16  /  AlkPhos  86  12-08                  RADIOLOGY:    ACC: [id number] EXAM:  XR CHEST PORTABLE ROUTINE 1V                          PROCEDURE DATE:  12/05/2022          INTERPRETATION:  CLINICAL HISTORY: .. Atelectasis    COMPARISON: 12/4/2022.    TECHNIQUE: Portable frontal chest radiograph. Adequate positioning.    FINDINGS:    Support devices: None.    Cardiac/mediastinum/hilum: Stable.    Lung parenchyma/Pleura: Stable to mildly increased left-sided opacities.   No pneumothorax.    Skeleton/soft tissues: Stable.      IMPRESSION:    Stable to increased left-sided opacities.    --- End of Report ---      PHYSICAL EXAM:  GEN: Ill looking, but alert  LUNGS: no breath sounds in the left side, clear right side  HEART: S1/S2 present.    ABD: Soft, non-tender, non-distended.   EXT: Contracted  NEURO: Lethargic non-verbal

## 2022-12-08 NOTE — PROGRESS NOTE ADULT - ASSESSMENT
ASSESSMENT  Severe sepsis present on admission 2/2 acute pyelonephritis  resp distress:   HFNC, alternating with BIPAP  Neurogenic bladder - h/o SPC that was dislodged   leaking GT  NSTEMI r/t sepsis  SUKHDEV  hypokalemia    PLAN:  -PPN ordered for tonight   - local skin care

## 2022-12-08 NOTE — PROCEDURE NOTE - NSBRONCHPROCDETAILS_GEN_A_CORE_FT
DATE OF PROCEDURE: 12/8/2022    PREOPERATIVE DIAGNOSIS: Mucus plug/atelectasis    POSTOPERATIVE DIAGNOSES: Mucus plug/atelectasis      PROCEDURE PERFORMED:  Bronchoscopy,  BAL and washing.                ANESTHESIA: Fentanyl, precedex    CONSENT: After explaining the risk and benefit the consent was obtained from mother, Alena.      The patient had been previously intubated and was on mechanical ventilatory support. He was on sedation, which was continued and adjusted during the procedure. His FiO2 was increased to 100% during the procedure. The fiberoptic bronchoscope was introduced through an endotracheal tube adaptor and the tip of the endotracheal tube was noted to be in good position above the dionne.   The Right tracheobronchial tree was inspected closely to the level of the subsegmental bronchi. All bronchi are patent with no endobronchial lesions and no mucosal lesions noted.   The Left tracheobronchial tree was also patent and intact with the mucosa normal.  Copious thick secretions were noted throughout both lungs, and they were suctioned.  The bronchoscope was then introduced to the RML and BAL were taken from that area.  The procedure was completed and all samples were submitted for appropriate studies.  After adequate clearing of secretions was accomplished, the bronchoscope was removed from the patient and the procedure was ended.   The patient tolerated the procedure well and there were no complications.

## 2022-12-09 NOTE — PROGRESS NOTE ADULT - ASSESSMENT
53 y/o man with PMH of MS, nonverbal at baseline, chronic low back pain, ?neurogenic bladder s/p SPC placement and s/p dislodgement was at IR for replacement of SPC when he was found to be in severe sepsis. GI called for peristomal leak.     # peristomal leak/ PEG tube malfunction   - pt has 18 Fr balloon PEG tube. Functioning well with minimal peristomal leak   - tube was downsized to 12 Fr today to allow for stomal healing. Tube was replaced with 18 Fr tube     PLAN   PEG tube study, resume feeding if study confirms appropriate position.   keep dry skin   apply zinc oxide three times/ day   apply dressing to keep the area clean and dry  PEG tube site care   wash with soap and water twice daily   apply bacitracin BID   call as needed  53 y/o man with PMH of MS, nonverbal at baseline, chronic low back pain, ?neurogenic bladder s/p SPC placement and s/p dislodgement was at IR for replacement of SPC when he was found to be in severe sepsis. GI called for peristomal leak.     # peristomal leak/ PEG tube malfunction   - pt has 18 Fr balloon PEG tube. Functioning well with minimal peristomal leak   - tube was downsized to 12 Fr today to allow for stomal healing. Tube was replaced with 18 Fr tube     PLAN   PEG tube study today, resume feeding if study confirms appropriate position.   keep dry skin   apply zinc oxide three times/ day   apply dressing to keep the area clean and dry  PEG tube site care   wash with soap and water twice daily   apply bacitracin BID   call as needed  51 y/o man with PMH of MS, nonverbal at baseline, chronic low back pain, ?neurogenic bladder s/p SPC placement and s/p dislodgement was at IR for replacement of SPC when he was found to be in severe sepsis. GI called for peristomal leak.     # peristomal leak/ PEG tube malfunction   - pt has 18 Fr balloon PEG tube.   - tube was downsized to 12 Fr today to allow for stomal healing. Tube was replaced with 18 Fr tube today  Stoma size is smaller and no further leak     PLAN   PEG tube study today, resume feeding if study confirms appropriate position.   keep dry skin   apply zinc oxide three times/ day   apply dressing to keep the area clean and dry  PEG tube site care   wash with soap and water twice daily   apply bacitracin BID   call as needed

## 2022-12-09 NOTE — PROGRESS NOTE ADULT - ASSESSMENT
53 y/o man with PMH of MS, nonverbal at baseline, chronic low back pain, ?neurogenic bladder s/p SPC placement and s/p dislodgement was at IR for replacement of SPC when he was found to be in severe sepsis.    #Severe sepsis present on admission 2/2 acute pyelonephritis  -L lung etelectasis/Mucus plug   -Ucx: Few Proteus Mirabilis/Mod E Faecalis  -Blood cx : GPC in clusters x3, 1 culture with staph hominis  -ID following, on meropenem  -c/w IVF   -C/w HFNC, alternating with BIPAP, nebs and chest PT  -intubated on , bronchopscopy  on  >> removed secretions mucus plugs/atelactasis  - CXR today showing possible infection, add linezolid dose today, ID f/u  - pt will be kept in CEU for now as he is stable, delay ICU trasnfer for now  - MRSA swab f/u    #Neurogenic bladder  -had SPC that was dislodged - now with [facility] and SPC was going to be replaced when he became septic  - and IR following  -monitor urine output  -on flomax    #Elevated dimer   -vascular cant do duplex bc pt is contracted, on Therapeutic lovenox     #Dysphagia   -PEG feeds on hold as per GI due to PEG leak  -Nutrition consulted >. PPN started  - GI f/u needed today when can we start PEG feeds    #MS, nonverbal, bedbound, contracted   -repositioning per protocol, continue baclofen    #SUKHDEV   -likely prerenal, resolved   -continue hydration and monitor     #NSTEMI type 2 due to sepsis   -TTE    #Misc  -DVT prophylaxis: Lovenox  -GI prophylaxis: Protonix  -Code status: Full code  -Dispo: SDU    Handoff pendin) CXR today showing possible infection, add linezolid dose today, ID f/u  2) pt will be kept in CEU for now as he is stable, delay ICU trasnfer for now  3) GI f/u for PEG feed restart  4) SBT trial today

## 2022-12-09 NOTE — PROGRESS NOTE ADULT - SUBJECTIVE AND OBJECTIVE BOX
NUTRITION SUPPORT TEAM  -  PROGRESS NOTE   vented   s/p bronchoscopy  npo now/  PPN discontinued  per GI plan is PEG study today         REVIEW OF SYSTEMS:  Negative except as noted above.     VITALS:  T(F): 97.9 (12-09 @ 12:00), Max: 99.9 (12-09 @ 04:00)  HR: 74 (12-09 @ 12:00) (74 - 114)  BP: 95/57 (12-09 @ 12:00) (86/53 - 138/76)  RR: 18 (12-09 @ 12:00) (14 - 19)  SpO2: 97% (12-09 @ 12:00) (97% - 100%)    HEIGHT/WEIGHT/BMI:   Height (cm): 195.6 (12-03)  Weight (kg): 68 (12-02)  BMI (kg/m2): 17.8 (12-03)    12-08-22 @ 07:01  -  12-09-22 @ 07:00  --------------------------------------------------------  IN:  Total IN: 0 mL    OUT:    Indwelling Catheter - Urethral (mL): 800 mL  Total OUT: 800 mL    Total NET: -800 mL        STANDING MEDICATIONS:   baclofen 20 milliGRAM(s) Oral every 12 hours  chlorhexidine 0.12% Liquid 15 milliLiter(s) Oral Mucosa every 12 hours  chlorhexidine 2% Cloths 1 Application(s) Topical daily  dexMEDEtomidine Infusion 0.2 MICROgram(s)/kG/Hr IV Continuous <Continuous>  diatrizoate meglumine/diatrizoate sodium. 30 milliLiter(s) Oral once  donepezil 10 milliGRAM(s) Oral at bedtime  enoxaparin Injectable 70 milliGRAM(s) SubCutaneous every 12 hours  fentaNYL   Infusion. 0.5 MICROgram(s)/kG/Hr IV Continuous <Continuous>  lactulose Syrup 10 Gram(s) Oral daily  magnesium sulfate  IVPB 2 Gram(s) IV Intermittent once  memantine 10 milliGRAM(s) Oral two times a day  meropenem  IVPB 1000 milliGRAM(s) IV Intermittent every 8 hours  norepinephrine Infusion 0.05 MICROgram(s)/kG/Min IV Continuous <Continuous>  senna 2 Tablet(s) Oral at bedtime  tamsulosin 0.4 milliGRAM(s) Oral at bedtime  zinc oxide 20% Ointment 1 Application(s) Topical three times a day      LABS:                         10.0   12.73 )-----------( 393      ( 09 Dec 2022 01:34 )             33.0     139  |  101  |  10  ----------------------------<  78          (12-09-22 @ 06:17)  4.3   |  23  |  <0.5<L>    Ca    8.7          (12-09-22 @ 06:17)  Phos  3.5         (12-09-22 @ 06:17)  Mg     1.6         (12-09-22 @ 06:17)    TPro  5.3<L>  /  Alb  2.5<L>  /  TBili  0.2  /  DBili  x   /  AST  10  /  ALT  14  /  AlkPhos  87       12-09-22 @ 01:34  Triglycerides, Serum: 149 mg/dL (12-07 @ 08:20)    Blood Glucose (Past 24 hours):  90 mg/dL (12-09 @ 11:47)  99 mg/dL (12-08 @ 12:27)    DIET:   Diet, NPO (12-06-22 @ 13:05) [Active]    RADIOLOGY:   < from: Xray Chest 1 View AP/PA (12.09.22 @ 06:48) >  Impression:  Improved aeration of the left lung with underlying pneumonia visualized   at this time.

## 2022-12-09 NOTE — PROGRESS NOTE ADULT - SUBJECTIVE AND OBJECTIVE BOX
KEVIN MEDRANO 52y Male  MRN#: [id number]   Hospital Day: 7d    SUBJECTIVE  Patient is a 52y old Male who presents with a chief complaint of sepsis (09 Dec 2022 07:40)  Currently admitted to medicine with the primary diagnosis of Sepsis      INTERVAL HPI AND OVERNIGHT EVENTS:  Patient was examined and seen at bedside. This morning he is resting comfortably in bed. No issues or overnight events.    OBJECTIVE  PAST MEDICAL & SURGICAL HISTORY  Multiple sclerosis    Chronic back pain    Hypertension    No significant past surgical history      ALLERGIES:  penicillin (Unknown)  vancomycin (Unknown)    MEDICATIONS:  STANDING MEDICATIONS  baclofen 20 milliGRAM(s) Oral every 12 hours  chlorhexidine 0.12% Liquid 15 milliLiter(s) Oral Mucosa every 12 hours  chlorhexidine 2% Cloths 1 Application(s) Topical daily  dexMEDEtomidine Infusion 0.2 MICROgram(s)/kG/Hr IV Continuous <Continuous>  donepezil 10 milliGRAM(s) Oral at bedtime  enoxaparin Injectable 70 milliGRAM(s) SubCutaneous every 12 hours  fentaNYL   Infusion. 0.5 MICROgram(s)/kG/Hr IV Continuous <Continuous>  lactulose Syrup 10 Gram(s) Oral daily  linezolid  IVPB 600 milliGRAM(s) IV Intermittent once  magnesium sulfate  IVPB 2 Gram(s) IV Intermittent once  magnesium sulfate  IVPB 2 Gram(s) IV Intermittent once  memantine 10 milliGRAM(s) Oral two times a day  meropenem  IVPB 1000 milliGRAM(s) IV Intermittent every 8 hours  norepinephrine Infusion 0.05 MICROgram(s)/kG/Min IV Continuous <Continuous>  senna 2 Tablet(s) Oral at bedtime  tamsulosin 0.4 milliGRAM(s) Oral at bedtime  zinc oxide 20% Ointment 1 Application(s) Topical three times a day    PRN MEDICATIONS  acetaminophen  Suppository .. 650 milliGRAM(s) Rectal every 6 hours PRN  albuterol    0.083% 2.5 milliGRAM(s) Nebulizer every 4 hours PRN  albuterol    90 MICROgram(s) HFA Inhaler 2 Puff(s) Inhalation every 6 hours PRN  ibuprofen  Suspension. 600 milliGRAM(s) Oral every 8 hours PRN  melatonin 3 milliGRAM(s) Oral at bedtime PRN      VITAL SIGNS: Last 24 Hours  T(C): 36.1 (09 Dec 2022 08:00), Max: 37.7 (09 Dec 2022 04:00)  T(F): 97 (09 Dec 2022 08:00), Max: 99.9 (09 Dec 2022 04:00)  HR: 77 (09 Dec 2022 08:00) (77 - 134)  BP: 99/59 (09 Dec 2022 08:00) (86/53 - 179/102)  BP(mean): 73 (09 Dec 2022 08:00) (64 - 100)  RR: 19 (09 Dec 2022 08:00) (14 - 26)  SpO2: 98% (09 Dec 2022 08:00) (95% - 100%)    LABS:                        10.0   12.73 )-----------( 393      ( 09 Dec 2022 01:34 )             33.0     12-09    137  |  101  |  9<L>  ----------------------------<  78  4.5   |  21  |  <0.5<L>    Ca    8.7      09 Dec 2022 01:34  Phos  3.1     12-08  Mg     1.6     12-09    TPro  5.3<L>  /  Alb  2.5<L>  /  TBili  0.2  /  DBili  x   /  AST  10  /  ALT  14  /  AlkPhos  87  12-09        ABG - ( 09 Dec 2022 04:16 )  pH, Arterial: 7.35  pH, Blood: x     /  pCO2: 40    /  pO2: 183   / HCO3: 22    / Base Excess: -3.3  /  SaO2: 98.0                  Culture - Bronchial (collected 08 Dec 2022 15:00)  Source: .Bronchial None  Gram Stain (09 Dec 2022 07:14):    Moderate polymorphonuclear leukocytes seen per oil power field    No squamous epithelial cells seen per oil power field    No organisms seen per oil power field    Culture - Blood (collected 07 Dec 2022 13:11)  Source: .Blood None  Preliminary Report (08 Dec 2022 23:02):    No growth to date.          RADIOLOGY:    ACC: [id number] EXAM:  XR CHEST PORTABLE ROUTINE 1V                          PROCEDURE DATE:  12/05/2022          INTERPRETATION:  CLINICAL HISTORY: .. Atelectasis    COMPARISON: 12/4/2022.    TECHNIQUE: Portable frontal chest radiograph. Adequate positioning.    FINDINGS:    Support devices: None.    Cardiac/mediastinum/hilum: Stable.    Lung parenchyma/Pleura: Stable to mildly increased left-sided opacities.   No pneumothorax.    Skeleton/soft tissues: Stable.      IMPRESSION:    Stable to increased left-sided opacities.    --- End of Report ---      PHYSICAL EXAM:  GEN:sedated  LUNGS: intubated, dreased breath sounds in the left side, clear right side  HEART: S1/S2 present.    ABD: Soft, non-tender, non-distended.   EXT: Contracted  NEURO: sedated

## 2022-12-09 NOTE — PROGRESS NOTE ADULT - SUBJECTIVE AND OBJECTIVE BOX
Gastroenterology progress note:     Patient is a 52y old  Male who presents with a chief complaint of sepsis (09 Dec 2022 08:49)       Admitted on: 12-02-22    We are following the patient for peristomal leak      intubated to MV   skin around the stoma looks ok with appropriate healing       PAST MEDICAL & SURGICAL HISTORY:  Multiple sclerosis      Chronic back pain      Hypertension      No significant past surgical history          MEDICATIONS  (STANDING):  baclofen 20 milliGRAM(s) Oral every 12 hours  chlorhexidine 0.12% Liquid 15 milliLiter(s) Oral Mucosa every 12 hours  chlorhexidine 2% Cloths 1 Application(s) Topical daily  dexMEDEtomidine Infusion 0.2 MICROgram(s)/kG/Hr (3.4 mL/Hr) IV Continuous <Continuous>  donepezil 10 milliGRAM(s) Oral at bedtime  enoxaparin Injectable 70 milliGRAM(s) SubCutaneous every 12 hours  fentaNYL   Infusion. 0.5 MICROgram(s)/kG/Hr (3.4 mL/Hr) IV Continuous <Continuous>  lactulose Syrup 10 Gram(s) Oral daily  linezolid  IVPB 600 milliGRAM(s) IV Intermittent once  magnesium sulfate  IVPB 2 Gram(s) IV Intermittent once  memantine 10 milliGRAM(s) Oral two times a day  meropenem  IVPB 1000 milliGRAM(s) IV Intermittent every 8 hours  norepinephrine Infusion 0.05 MICROgram(s)/kG/Min (6.38 mL/Hr) IV Continuous <Continuous>  senna 2 Tablet(s) Oral at bedtime  tamsulosin 0.4 milliGRAM(s) Oral at bedtime  zinc oxide 20% Ointment 1 Application(s) Topical three times a day    MEDICATIONS  (PRN):  acetaminophen  Suppository .. 650 milliGRAM(s) Rectal every 6 hours PRN Temp greater or equal to 38C (100.4F), Mild Pain (1 - 3)  albuterol    0.083% 2.5 milliGRAM(s) Nebulizer every 4 hours PRN Shortness of Breath and/or Wheezing  albuterol    90 MICROgram(s) HFA Inhaler 2 Puff(s) Inhalation every 6 hours PRN Shortness of Breath and/or Wheezing  ibuprofen  Suspension. 600 milliGRAM(s) Oral every 8 hours PRN Temp greater or equal to 38.5C (101.3F)  melatonin 3 milliGRAM(s) Oral at bedtime PRN Insomnia      Allergies  penicillin (Unknown)  vancomycin (Unknown)      Review of Systems:   unable to obtain     Physical Examination:  T(C): 36.1 (12-09-22 @ 08:00), Max: 37.7 (12-09-22 @ 04:00)  HR: 75 (12-09-22 @ 11:00) (75 - 134)  BP: 112/69 (12-09-22 @ 11:00) (86/53 - 179/102)  RR: 18 (12-09-22 @ 11:00) (14 - 26)  SpO2: 97% (12-09-22 @ 11:00) (95% - 100%)      12-08-22 @ 07:01  -  12-09-22 @ 07:00  --------------------------------------------------------  IN: 0 mL / OUT: 800 mL / NET: -800 mL    12-09-22 @ 07:01  -  12-09-22 @ 11:29  --------------------------------------------------------  IN: 126.9 mL / OUT: 125 mL / NET: 1.9 mL        GENERAL: intubated and sedated   HEAD:  Atraumatic, Normocephalic  EYES: conjunctiva and sclera clear  NECK: Supple, no JVD or thyromegaly  CHEST/LUNG: Clear to auscultation bilaterally  HEART: Regular rate and rhythm; normal S1, S2, No murmurs.  ABDOMEN: Soft, nontender, nondistended  NEUROLOGY: No asterixis or tremor.   SKIN: Intact, no jaundice     Data:                        10.0   12.73 )-----------( 393      ( 09 Dec 2022 01:34 )             33.0     Hgb trend:  10.0  12-09-22 @ 01:34  9.6  12-08-22 @ 00:20  9.8  12-07-22 @ 01:41        12-09    139  |  101  |  10  ----------------------------<  78  4.3   |  23  |  <0.5<L>    Ca    8.7      09 Dec 2022 06:17  Phos  3.5     12-09  Mg     1.6     12-09    TPro  5.3<L>  /  Alb  2.5<L>  /  TBili  0.2  /  DBili  x   /  AST  10  /  ALT  14  /  AlkPhos  87  12-09    Liver panel trend:  TBili 0.2   /   AST 10   /   ALT 14   /   AlkP 87   /   Tptn 5.3   /   Alb 2.5    /   DBili --      12-09  TBili 0.3   /   AST 11   /   ALT 16   /   AlkP 86   /   Tptn 4.9   /   Alb 2.4    /   DBili --      12-08  TBili 0.4   /   AST 13   /   ALT 20   /   AlkP 97   /   Tptn 5.2   /   Alb 2.7    /   DBili --      12-07  TBili 0.4   /   AST 17   /   ALT 22   /   AlkP 94   /   Tptn 4.9   /   Alb 2.7    /   DBili --      12-06  TBili 0.6   /   AST 13   /   ALT 20   /   AlkP 106   /   Tptn 5.6   /   Alb 3.0    /   DBili --      12-05  TBili 0.6   /   AST 9   /   ALT 25   /   AlkP 98   /   Tptn 5.4   /   Alb 3.1    /   DBili --      12-04  TBili 1.0   /   AST 15   /   ALT 40   /   AlkP 116   /   Tptn 6.3   /   Alb 3.6    /   DBili --      12-03  TBili 0.9   /   AST 25   /   ALT 65   /   AlkP 142   /   Tptn 6.9   /   Alb 3.9    /   DBili --      12-02  TBili 1.2   /   AST 32   /   ALT 80   /   AlkP 174   /   Tptn 7.9   /   Alb 4.3    /   DBili --      12-02    Culture - Bronchial (collected 08 Dec 2022 15:00)  Source: .Bronchial None  Gram Stain (09 Dec 2022 07:14):    Moderate polymorphonuclear leukocytes seen per oil power field    No squamous epithelial cells seen per oil power field    No organisms seen per oil power field    Culture - Blood (collected 07 Dec 2022 13:11)  Source: .Blood None  Preliminary Report (08 Dec 2022 23:02):    No growth to date.         Radiology:       Gastroenterology progress note:     Patient is a 52y old  Male who presents with a chief complaint of sepsis (09 Dec 2022 08:49)       Admitted on: 12-02-22    We are following the patient for peristomal leak      intubated to MV   skin around the stoma looks ok with appropriate healing       PAST MEDICAL & SURGICAL HISTORY:  Multiple sclerosis      Chronic back pain      Hypertension      No significant past surgical history          MEDICATIONS  (STANDING):  baclofen 20 milliGRAM(s) Oral every 12 hours  chlorhexidine 0.12% Liquid 15 milliLiter(s) Oral Mucosa every 12 hours  chlorhexidine 2% Cloths 1 Application(s) Topical daily  dexMEDEtomidine Infusion 0.2 MICROgram(s)/kG/Hr (3.4 mL/Hr) IV Continuous <Continuous>  donepezil 10 milliGRAM(s) Oral at bedtime  enoxaparin Injectable 70 milliGRAM(s) SubCutaneous every 12 hours  fentaNYL   Infusion. 0.5 MICROgram(s)/kG/Hr (3.4 mL/Hr) IV Continuous <Continuous>  lactulose Syrup 10 Gram(s) Oral daily  linezolid  IVPB 600 milliGRAM(s) IV Intermittent once  magnesium sulfate  IVPB 2 Gram(s) IV Intermittent once  memantine 10 milliGRAM(s) Oral two times a day  meropenem  IVPB 1000 milliGRAM(s) IV Intermittent every 8 hours  norepinephrine Infusion 0.05 MICROgram(s)/kG/Min (6.38 mL/Hr) IV Continuous <Continuous>  senna 2 Tablet(s) Oral at bedtime  tamsulosin 0.4 milliGRAM(s) Oral at bedtime  zinc oxide 20% Ointment 1 Application(s) Topical three times a day    MEDICATIONS  (PRN):  acetaminophen  Suppository .. 650 milliGRAM(s) Rectal every 6 hours PRN Temp greater or equal to 38C (100.4F), Mild Pain (1 - 3)  albuterol    0.083% 2.5 milliGRAM(s) Nebulizer every 4 hours PRN Shortness of Breath and/or Wheezing  albuterol    90 MICROgram(s) HFA Inhaler 2 Puff(s) Inhalation every 6 hours PRN Shortness of Breath and/or Wheezing  ibuprofen  Suspension. 600 milliGRAM(s) Oral every 8 hours PRN Temp greater or equal to 38.5C (101.3F)  melatonin 3 milliGRAM(s) Oral at bedtime PRN Insomnia      Allergies  penicillin (Unknown)  vancomycin (Unknown)      Review of Systems:   unable to obtain     Physical Examination:  T(C): 36.1 (12-09-22 @ 08:00), Max: 37.7 (12-09-22 @ 04:00)  HR: 75 (12-09-22 @ 11:00) (75 - 134)  BP: 112/69 (12-09-22 @ 11:00) (86/53 - 179/102)  RR: 18 (12-09-22 @ 11:00) (14 - 26)  SpO2: 97% (12-09-22 @ 11:00) (95% - 100%)      12-08-22 @ 07:01  -  12-09-22 @ 07:00  --------------------------------------------------------  IN: 0 mL / OUT: 800 mL / NET: -800 mL    12-09-22 @ 07:01  -  12-09-22 @ 11:29  --------------------------------------------------------  IN: 126.9 mL / OUT: 125 mL / NET: 1.9 mL        GENERAL: intubated and sedated   HEAD:  Atraumatic, Normocephalic  EYES: conjunctiva and sclera clear  NECK: Supple, no JVD or thyromegaly  CHEST/LUNG: Clear to auscultation bilaterally  HEART: Regular rate and rhythm; normal S1, S2, No murmurs.  ABDOMEN: Soft, nontender, nondistended, PEG in place, stoma smaller and no leak  NEUROLOGY: No asterixis or tremor.   SKIN: Intact, no jaundice     Data:                        10.0   12.73 )-----------( 393      ( 09 Dec 2022 01:34 )             33.0     Hgb trend:  10.0  12-09-22 @ 01:34  9.6  12-08-22 @ 00:20  9.8  12-07-22 @ 01:41        12-09    139  |  101  |  10  ----------------------------<  78  4.3   |  23  |  <0.5<L>    Ca    8.7      09 Dec 2022 06:17  Phos  3.5     12-09  Mg     1.6     12-09    TPro  5.3<L>  /  Alb  2.5<L>  /  TBili  0.2  /  DBili  x   /  AST  10  /  ALT  14  /  AlkPhos  87  12-09    Liver panel trend:  TBili 0.2   /   AST 10   /   ALT 14   /   AlkP 87   /   Tptn 5.3   /   Alb 2.5    /   DBili --      12-09  TBili 0.3   /   AST 11   /   ALT 16   /   AlkP 86   /   Tptn 4.9   /   Alb 2.4    /   DBili --      12-08  TBili 0.4   /   AST 13   /   ALT 20   /   AlkP 97   /   Tptn 5.2   /   Alb 2.7    /   DBili --      12-07  TBili 0.4   /   AST 17   /   ALT 22   /   AlkP 94   /   Tptn 4.9   /   Alb 2.7    /   DBili --      12-06  TBili 0.6   /   AST 13   /   ALT 20   /   AlkP 106   /   Tptn 5.6   /   Alb 3.0    /   DBili --      12-05  TBili 0.6   /   AST 9   /   ALT 25   /   AlkP 98   /   Tptn 5.4   /   Alb 3.1    /   DBili --      12-04  TBili 1.0   /   AST 15   /   ALT 40   /   AlkP 116   /   Tptn 6.3   /   Alb 3.6    /   DBili --      12-03  TBili 0.9   /   AST 25   /   ALT 65   /   AlkP 142   /   Tptn 6.9   /   Alb 3.9    /   DBili --      12-02  TBili 1.2   /   AST 32   /   ALT 80   /   AlkP 174   /   Tptn 7.9   /   Alb 4.3    /   DBili --      12-02    Culture - Bronchial (collected 08 Dec 2022 15:00)  Source: .Bronchial None  Gram Stain (09 Dec 2022 07:14):    Moderate polymorphonuclear leukocytes seen per oil power field    No squamous epithelial cells seen per oil power field    No organisms seen per oil power field    Culture - Blood (collected 07 Dec 2022 13:11)  Source: .Blood None  Preliminary Report (08 Dec 2022 23:02):    No growth to date.         Radiology:

## 2022-12-09 NOTE — PROGRESS NOTE ADULT - TIME BILLING
coordination of care
time spent on review of labs, imaging studies, old records, obtaining history, personally examining patient, counselling and communicating with patient, entering orders for medications/tests/etc, discussions with other health care providers, documentation in electronic health records, independent interpretation of labs, imaging/procedure results and care coordination.
coordination of care

## 2022-12-09 NOTE — PROGRESS NOTE ADULT - ASSESSMENT
ASSESSMENT  51 yo male w/ PMH of MS, chronic low back pain, neurogenic bladder? s/p SPC placement and s/p dislodgement, presents for fever. Presented to the ED for SPC dislodgement a few days ago. Was seen by IR and planned for outpatient SPC replacement. Campuzano was placed at [facility]. Today. prior to placement, he was noted to be febrile w/ tachycardia and tachypnea so brought into ED.      IMPRESSION  #Fever, resolved    12/8 bronch cx   No organisms seen per oil power field  CXR L white-out, suspect aspiration/mucous plug  #Severe Sepsis on admission T>101F, Pulse>90,WBC>12, lactic acidosis due to acute complicated cystitis    12/4 BCX NGTD     12/3 BCX NGTD     12/2 BCX  Staphylococcus epidermidis, Methicillin resistant: Detec 1/2 bottles ,      12/2 BCX GPC 1/2  Staphylococcus hominis    12/2 BCX  GPC 1/2 Staphylococcus hominis    12/2 UCX   >=3 organisms. Probable collection contamination.    12/2 UCX    Few Proteus mirabilis ESBL    Moderate Enterococcus faecalis    UA Blood: x / Protein: 100 mg/dL / Nitrite: Negative   Leuk Esterase: Large / RBC: 236 /HPF / WBC >720 /HPF   Sq Epi: x / Non Sq Epi: 2 /HPF / Bacteria: Many  #CXR Small left pleural effusion and possible left basilar consolidation.  #MS with neurogenic bladder, dislodged SPC  #Lactic acidosis  #Hypernatremia   QTC Calculation(Bazett) 407 ms  #Severe protein calorie malnutrition  BMI (kg/m2): 17.8    #Abx allergy: penicillin (childhood)  vancomycin (Unknown)    Creatinine, Serum: 0.5 (12-03-22 @ 08:22)    Weight (kg): 68 (12-02-22 @ 08:20)    RECOMMENDATIONS  - Meropenem 1g q8h IVx 10 days, if D/C prior midline x ertapenem 1g q24h IV  - f/u Bronch cx- so far NG and MRSA PCR NEGATIVE, monitor off linezolid  - if hemodynamic compromise, add levaquin 750mg q24h IV   - Pulm following  - GOC, poor prognosis    If any questions, please call or send a message on [employer]  Please continue to update ID with any pertinent new laboratory or radiographic findings  #[id number]

## 2022-12-09 NOTE — PROGRESS NOTE ADULT - ASSESSMENT
IMPRESSION:    Acute hypoxemic respiratory failure/ L side pneumonia/ artelectasis  Urinary tract infection/ proteus  Bacteremia - MRSMERLY  non verbal at baseline  NSTEMI likely Type 2  Pulm edema ?   history of MS/neurogenic bladder        PLAN:      CNS: Daily SAT    HEENT: Oral care    PULMONARY:  Aspiration precaution, keep Sao2 92 to 96%, monitor P/P/ DP, SBT    CARDIOVASCULAR: avoid volume overload, ivf, taper and dc levophed    GI: GI prophylaxis. ppn    RENAL:  Follow up lytes.  Correct as needed.     INFECTIOUS DISEASE: ABX per ID. fup cx, vanco x1    HEMATOLOGICAL: DVT prophylaxis. on lovenox    ENDOCRINE:  Follow up FS.  Insulin protocol if needed.    MICU  GOC  poor prognosis

## 2022-12-09 NOTE — PROGRESS NOTE ADULT - SUBJECTIVE AND OBJECTIVE BOX
Over Night Events: events noted, remain critically ill, still intubated, ventilated, on precedex, levophed, low grade T    PHYSICAL EXAM    ICU Vital Signs Last 24 Hrs  T(C): 37.7 (09 Dec 2022 04:00), Max: 37.7 (09 Dec 2022 04:00)  T(F): 99.9 (09 Dec 2022 04:00), Max: 99.9 (09 Dec 2022 04:00)  HR: 96 (09 Dec 2022 07:20) (78 - 134)  BP: 106/63 (09 Dec 2022 07:00) (86/53 - 179/102)  BP(mean): 78 (09 Dec 2022 07:00) (64 - 100)  RR: 16 (09 Dec 2022 04:00) (14 - 26)  SpO2: 100% (09 Dec 2022 07:20) (95% - 100%)    O2 Parameters below as of 09 Dec 2022 07:00  Patient On (Oxygen Delivery Method): ventilator    O2 Concentration (%): 60         General: ill looking  HEENT: ETT            Lungs: L side crackles  Cardiovascular: Regular   Abdomen: Soft, Positive BS  contracted      12-08-22 @ 07:01  -  12-09-22 @ 07:00  --------------------------------------------------------  IN:  Total IN: 0 mL    OUT:    Indwelling Catheter - Urethral (mL): 800 mL  Total OUT: 800 mL    Total NET: -800 mL          LABS:                          10.0   12.73 )-----------( 393      ( 09 Dec 2022 01:34 )             33.0                                               12-09    137  |  101  |  9<L>  ----------------------------<  78  4.5   |  21  |  <0.5<L>    Ca    8.7      09 Dec 2022 01:34  Phos  3.1     12-08  Mg     1.6     12-09    TPro  5.3<L>  /  Alb  2.5<L>  /  TBili  0.2  /  DBili  x   /  AST  10  /  ALT  14  /  AlkPhos  87  12-09                                                                                           LIVER FUNCTIONS - ( 09 Dec 2022 01:34 )  Alb: 2.5 g/dL / Pro: 5.3 g/dL / ALK PHOS: 87 U/L / ALT: 14 U/L / AST: 10 U/L / GGT: x                                                  Culture - Bronchial (collected 08 Dec 2022 15:00)  Source: .Bronchial None  Gram Stain (09 Dec 2022 07:14):    Moderate polymorphonuclear leukocytes seen per oil power field    No squamous epithelial cells seen per oil power field    No organisms seen per oil power field    Culture - Blood (collected 07 Dec 2022 13:11)  Source: .Blood None  Preliminary Report (08 Dec 2022 23:02):    No growth to date.                                                   Mode: AC/ CMV (Assist Control/ Continuous Mandatory Ventilation)  RR (machine): 14  TV (machine): 450  FiO2: 40  PEEP: 8  ITime: 1  MAP: 15  PIP: 28                                      ABG - ( 09 Dec 2022 04:16 )  pH, Arterial: 7.35  pH, Blood: x     /  pCO2: 40    /  pO2: 183   / HCO3: 22    / Base Excess: -3.3  /  SaO2: 98.0                MEDICATIONS  (STANDING):  baclofen 20 milliGRAM(s) Oral every 12 hours  chlorhexidine 0.12% Liquid 15 milliLiter(s) Oral Mucosa every 12 hours  chlorhexidine 2% Cloths 1 Application(s) Topical daily  dexMEDEtomidine Infusion 0.2 MICROgram(s)/kG/Hr (3.4 mL/Hr) IV Continuous <Continuous>  donepezil 10 milliGRAM(s) Oral at bedtime  enoxaparin Injectable 70 milliGRAM(s) SubCutaneous every 12 hours  fentaNYL   Infusion. 0.5 MICROgram(s)/kG/Hr (3.4 mL/Hr) IV Continuous <Continuous>  lactulose Syrup 10 Gram(s) Oral daily  magnesium sulfate  IVPB 2 Gram(s) IV Intermittent once  magnesium sulfate  IVPB 2 Gram(s) IV Intermittent once  memantine 10 milliGRAM(s) Oral two times a day  meropenem  IVPB 1000 milliGRAM(s) IV Intermittent every 8 hours  norepinephrine Infusion 0.05 MICROgram(s)/kG/Min (6.38 mL/Hr) IV Continuous <Continuous>  senna 2 Tablet(s) Oral at bedtime  tamsulosin 0.4 milliGRAM(s) Oral at bedtime  zinc oxide 20% Ointment 1 Application(s) Topical three times a day    MEDICATIONS  (PRN):  acetaminophen  Suppository .. 650 milliGRAM(s) Rectal every 6 hours PRN Temp greater or equal to 38C (100.4F), Mild Pain (1 - 3)  albuterol    0.083% 2.5 milliGRAM(s) Nebulizer every 4 hours PRN Shortness of Breath and/or Wheezing  albuterol    90 MICROgram(s) HFA Inhaler 2 Puff(s) Inhalation every 6 hours PRN Shortness of Breath and/or Wheezing  ibuprofen  Suspension. 600 milliGRAM(s) Oral every 8 hours PRN Temp greater or equal to 38.5C (101.3F)  melatonin 3 milliGRAM(s) Oral at bedtime PRN Insomnia      CXR reviewed

## 2022-12-09 NOTE — PROGRESS NOTE ADULT - SUBJECTIVE AND OBJECTIVE BOX
KEVIN MEDRANO  52y, Male  Allergy: penicillin (Unknown)  vancomycin (Unknown)      LOS  7d    CHIEF COMPLAINT: sepsis (09 Dec 2022 12:09)      INTERVAL EVENTS/HPI  - T(F): , Max: 99.9 (12-09-22 @ 04:00)  - WBC Count: 12.73 (12-09-22 @ 01:34)  WBC Count: 6.75 (12-08-22 @ 00:20)     - Creatinine, Serum: <0.5 (12-09-22 @ 06:17)  Creatinine, Serum: <0.5 (12-09-22 @ 01:34)     -   -   -     ROS  cannot obtain secondary to patient's sedation and/or mental status    VITALS:  T(F): 97.9, Max: 99.9 (12-09-22 @ 04:00)  HR: 130  BP: 169/79  RR: 30Vital Signs Last 24 Hrs  T(C): 36.6 (09 Dec 2022 12:00), Max: 37.7 (09 Dec 2022 04:00)  T(F): 97.9 (09 Dec 2022 12:00), Max: 99.9 (09 Dec 2022 04:00)  HR: 130 (09 Dec 2022 15:00) (74 - 134)  BP: 169/79 (09 Dec 2022 15:00) (86/53 - 169/79)  BP(mean): 116 (09 Dec 2022 15:00) (64 - 116)  RR: 30 (09 Dec 2022 15:00) (14 - 30)  SpO2: 93% (09 Dec 2022 15:00) (93% - 100%)    Parameters below as of 09 Dec 2022 15:00  Patient On (Oxygen Delivery Method): ventilator    O2 Concentration (%): 40    PHYSICAL EXAM:  Gen: Intubated  CV: RRR  Lungs: Decreased BS at bases  Abd: Soft  Neuro: Sedated  Lines clean, no phlebitis    FH: Non-contributory  Social Hx: Non-contributory    TESTS & MEASUREMENTS:                        10.0   12.73 )-----------( 393      ( 09 Dec 2022 01:34 )             33.0     12-09    139  |  101  |  10  ----------------------------<  78  4.3   |  23  |  <0.5<L>    Ca    8.7      09 Dec 2022 06:17  Phos  3.5     12-09  Mg     1.6     12-09    TPro  5.3<L>  /  Alb  2.5<L>  /  TBili  0.2  /  DBili  x   /  AST  10  /  ALT  14  /  AlkPhos  87  12-09      LIVER FUNCTIONS - ( 09 Dec 2022 01:34 )  Alb: 2.5 g/dL / Pro: 5.3 g/dL / ALK PHOS: 87 U/L / ALT: 14 U/L / AST: 10 U/L / GGT: x               Culture - Bronchial (collected 12-08-22 @ 15:00)  Source: .Bronchial None  Gram Stain (12-09-22 @ 07:14):    Moderate polymorphonuclear leukocytes seen per oil power field    No squamous epithelial cells seen per oil power field    No organisms seen per oil power field    Culture - Blood (collected 12-07-22 @ 13:11)  Source: .Blood None  Preliminary Report (12-08-22 @ 23:02):    No growth to date.    Culture - Blood (collected 12-04-22 @ 08:30)  Source: .Blood None  Preliminary Report (12-05-22 @ 22:02):    No growth to date.    Culture - Blood (collected 12-03-22 @ 21:51)  Source: .Blood None  Final Report (12-09-22 @ 09:00):    No Growth Final    Culture - Urine (collected 12-02-22 @ 10:34)  Source: Clean Catch Clean Catch (Midstream)  Final Report (12-04-22 @ 09:29):    >=3 organisms. Probable collection contamination.    Culture - Blood (collected 12-02-22 @ 08:21)  Source: .Blood Blood-Peripheral  Gram Stain (12-04-22 @ 01:05):    Growth in aerobic bottle: Gram Positive Cocci in Clusters    Growth in anaerobic bottle: Gram Positive Cocci in Clusters  Final Report (12-04-22 @ 21:59):    Growth in aerobic and anaerobic bottles: Staphylococcus hominis Coag    Negative Staphylococcus    Single set isolate, possible contaminant. Contact    Microbiology if susceptibility testing clinically    indicated.    Growth in aerobic bottle: Staphylococcus epidermidis Coag Negative    Staphylococcus    Single set isolate, possible contaminant. Contact    Microbiology if susceptibility testing clinically    indicated.    ***Blood Panel PCR results on this specimen are available    approximately 3 hours after the Gram stain result.***    Gram stain, PCR, and/or culture results may not always    correspond due to difference in methodologies.    ************************************************************    This PCR assay was performed by multiplex PCR. This    Assay tests for 66 bacterial and resistance gene targets.    Please refer to the [facility] test directory    at [email or web address] for details.  Organism: Blood Culture PCR (12-04-22 @ 21:59)  Organism: Blood Culture PCR (12-04-22 @ 21:59)      -  Staphylococcus epidermidis, Methicillin resistant: Detec      Method Type: PCR    Culture - Blood (collected 12-02-22 @ 08:21)  Source: .Blood Blood-Peripheral  Gram Stain (12-03-22 @ 16:14):    Growth in aerobic bottle: Gram Positive Cocci in Clusters  Final Report (12-05-22 @ 10:49):    Growth in aerobic bottle: Staphylococcus epidermidis  Organism: Staphylococcus epidermidis (12-05-22 @ 10:49)  Organism: Staphylococcus epidermidis (12-05-22 @ 10:49)      -  Ampicillin/Sulbactam: S <=8/4      -  Cefazolin: S <=4      -  Clindamycin: S <=0.25      -  Erythromycin: R >4      -  Gentamicin: S <=1 Should not be used as monotherapy      -  Oxacillin: S <=0.25      -  Penicillin: R 2      -  Rifampin: S <=1 Should not be used as monotherapy      -  Tetracycline: R >8      -  Trimethoprim/Sulfamethoxazole: R >2/38      -  Vancomycin: S 2      Method Type: GAURAV    Culture - Urine (collected 12-02-22 @ 06:59)  Source: Suprapubic Suprapubic  Final Report (12-04-22 @ 20:09):    Few Proteus mirabilis ESBL    Moderate Enterococcus faecalis  Organism: Proteus mirabilis ESBL  Enterococcus faecalis (12-04-22 @ 20:09)  Organism: Enterococcus faecalis (12-04-22 @ 20:09)      -  Ampicillin: S <=2 Predicts results to ampicillin/sulbactam, amoxacillin-clavulanate and  piperacillin-tazobactam.      -  Ciprofloxacin: R >2      -  Levofloxacin: R >4      -  Nitrofurantoin: S <=32 Should not be used to treat pyelonephritis.      -  Tetracycline: R >8      -  Vancomycin: S 1      Method Type: GAURAV  Organism: Proteus mirabilis ESBL (12-04-22 @ 20:09)      -  Amikacin: S <=16      -  Amoxicillin/Clavulanic Acid: S <=8/4      -  Ampicillin: R >16 These ampicillin results predict results for amoxicillin      -  Ampicillin/Sulbactam: I 16/8 Enterobacter, Klebsiella aerogenes, Citrobacter, and Serratia may develop resistance during prolonged therapy (3-4 days)      -  Aztreonam: R <=4      -  Cefazolin: R >16 For uncomplicated UTI with K. pneumoniae, E. coli, or P. mirablis: GAURAV <=16 is sensitive and GAURAV >=32 is resistant. This also predicts results for oral agents cefaclor, cefdinir, cefpodoxime, cefprozil, cefuroxime axetil, cephalexin and locarbef for uncomplicated UTI. Note that some isolates may be susceptible to these agents while testing resistant to cefazolin.      -  Cefepime: R >16      -  Ceftriaxone: R >32 Enterobacter, Klebsiella aerogenes, Citrobacter, and Serratia may develop resistance during prolonged therapy      -  Ciprofloxacin: R >2      -  Ertapenem: S <=0.5      -  Gentamicin: I 8      -  Levofloxacin: R >4      -  Meropenem: S <=1      -  Nitrofurantoin: R >64 Should not be used to treat pyelonephritis      -  Piperacillin/Tazobactam: S <=8      -  Tobramycin: I 8      -  Trimethoprim/Sulfamethoxazole: R >2/38      Method Type: GAURAV    Culture - Blood (collected 12-02-22 @ 06:59)  Source: .Blood Blood  Gram Stain (12-03-22 @ 19:58):    Growth in aerobic bottle: Gram Positive Cocci in Clusters  Final Report (12-05-22 @ 08:59):    Growth in aerobic bottle: Staphylococcus hominis  Organism: Staphylococcus hominis (12-05-22 @ 08:59)  Organism: Staphylococcus hominis (12-05-22 @ 08:59)      -  Ampicillin/Sulbactam: R <=8/4      -  Cefazolin: R <=4      -  Clindamycin: S 0.5      -  Erythromycin: R >4      -  Gentamicin: S <=1 Should not be used as monotherapy      -  Oxacillin: R >2      -  Penicillin: R 8      -  Rifampin: S <=1 Should not be used as monotherapy      -  Tetracycline: S <=1      -  Trimethoprim/Sulfamethoxazole: R >2/38      -  Vancomycin: S 2      Method Type: GAURAV            INFECTIOUS DISEASES TESTING  MRSA PCR Result.: Negative (12-09-22 @ 08:40)  Rapid RVP Result: NotDetec (12-07-22 @ 15:28)  Procalcitonin, Serum: 0.53 (12-03-22 @ 10:53)  Legionella Antigen, Urine: Negative (12-03-22 @ 08:10)  COVID-19 PCR: NotDetec (11-28-22 @ 18:30)      INFLAMMATORY MARKERS      RADIOLOGY & ADDITIONAL TESTS:  I have personally reviewed the last available Chest xray  CXR  Xray Chest 1 View AP/PA:   ACC: [id number] EXAM:  XR CHEST 1 VIEW                          PROCEDURE DATE:  12/09/2022          INTERPRETATION:  Clinical History / Reason for exam: Respiratory failure    Comparison : Chest radiograph prior day.    Technique/Positioning: Frontal portable.    Findings:    Support devices: Endotracheal catheter above the dionne. Telemetry leads   are seen.    Cardiac/mediastinum/hilum: Unremarkable.    Lung parenchyma/Pleura: Improved appearance of the left hemithorax is   seen with significant amount of underlying air bronchograms.    Skeleton/soft tissues: Unchanged    Impression:    Improved aeration of the left lung with underlying pneumonia visualized   at this time.    Support devices as described.    --- End of Report ---            EDDIE PITTS MD; Attending Interventional Radiologist  This document has been electronically signed. Dec  9 2022  7:48AM (12-09-22 @ 06:48)      CT      CARDIOLOGY TESTING  12 Lead ECG:   Ventricular Rate 126 BPM    Atrial Rate 126 BPM    P-R Interval 118 ms    QRS Duration 86 ms    Q-T Interval 308 ms    QTC Calculation(Bazett) 446 ms    P Axis 58 degrees    R Axis 60 degrees    T Axis 40 degrees    Diagnosis Line Sinus tachycardia  Otherwise normal ECG    Confirmed by Jair Brenner ([id number]) on 12/8/2022 10:25:53 PM (12-08-22 @ 19:47)  12 Lead ECG:   Ventricular Rate 131 BPM    Atrial Rate 131 BPM    P-R Interval 152 ms    QRS Duration 76 ms    Q-T Interval 302 ms    QTC Calculation(Bazett) 445 ms    P Axis 70 degrees    R Axis -67 degrees    T Axis -66 degrees    Diagnosis Line Sinus tachycardia  Left axis deviation  Anterior infarct , age undetermined  Abnormal ECG    Confirmed by ATIF RODGERS, [facility] ([id number]) on 12/4/2022 11:16:14 PM (12-02-22 @ 22:00)      MEDICATIONS  baclofen 20  chlorhexidine 0.12% Liquid 15  chlorhexidine 2% Cloths 1  dexMEDEtomidine Infusion 0.2  donepezil 10  enoxaparin Injectable 70  fentaNYL   Infusion. 0.5  lactulose Syrup 10  magnesium sulfate  IVPB 2  memantine 10  meropenem  IVPB 1000  norepinephrine Infusion 0.05  propofol Infusion 10  senna 2  tamsulosin 0.4  zinc oxide 20% Ointment 1      WEIGHT  Weight (kg): 68 (12-02-22 @ 08:20)  Creatinine, Serum: <0.5 mg/dL (12-09-22 @ 06:17)  Creatinine, Serum: <0.5 mg/dL (12-09-22 @ 01:34)      ANTIBIOTICS:  meropenem  IVPB 1000 milliGRAM(s) IV Intermittent every 8 hours      All available historical records have been reviewed

## 2022-12-10 NOTE — PROGRESS NOTE ADULT - ASSESSMENT
51 y/o man with PMH of MS, nonverbal at baseline, chronic low back pain, ?neurogenic bladder s/p SPC placement and s/p dislodgement was at IR for replacement of SPC when he was found to be in severe sepsis.    #Severe sepsis present on admission 2/2 acute pyelonephritis  -L lung etelectasis/Mucus plug   -Ucx: Few Proteus Mirabilis/Mod E Faecalis  -Blood cx : GPC in clusters x3, 1 culture with staph hominis  -ID following, on meropenem  -c/w IVF   -C/w HFNC, alternating with BIPAP, nebs and chest PT  -intubated on , bronchopscopy  on  >> removed secretions mucus plugs/atelactasis  - CXR today showing possible infection, add linezolid dose today, ID f/u  - pt will be kept in CEU for now as he is stable, delay ICU trasnfer for now  - MRSA swab f/u    #Neurogenic bladder  -had SPC that was dislodged - now with [facility] and SPC was going to be replaced when he became septic  - and IR following  -monitor urine output  -on flomax    #Elevated dimer   -vascular cant do duplex bc pt is contracted, on Therapeutic lovenox     #Dysphagia   -PEG feeds on hold as per GI due to PEG leak  -Nutrition consulted >. PPN started  - GI f/u needed today when can we start PEG feeds    #MS, nonverbal, bedbound, contracted   -repositioning per protocol, continue baclofen    #SUKHDEV   -likely prerenal, resolved   -continue hydration and monitor     #NSTEMI type 2 due to sepsis   -TTE    #Misc  -DVT prophylaxis: Lovenox  -GI prophylaxis: Protonix  -Code status: Full code  -Dispo: SDU    Handoff pendin) CXR today showing possible infection, add linezolid dose today, ID f/u  2) pt will be kept in CEU for now as he is stable, delay ICU trasnfer for now  3) GI f/u for PEG feed restart  4) SBT trial today   53 y/o man with PMH of MS, nonverbal at baseline, chronic low back pain, ?neurogenic bladder s/p SPC placement and s/p dislodgement was at IR for replacement of SPC when he was found to be in severe sepsis.    #Severe sepsis present on admission 2/2 acute pyelonephritis  -L lung etelectasis/Mucus plug   -Ucx: Few Proteus Mirabilis/Mod E Faecalis  -Blood cx : GPC in clusters x3, 1 culture with staph hominis  -  bcx negative,  bcx NGTD   - previously was on HFNC alternating with BIPAP, nebs and chest PT  -intubated on , bronchopscopy  on  >> removed secretions mucus plugs/atelactasis  - CXR : CXR patchy left lung opacities c/f pneumonia, received 1 dose linezolid  - MRSA negative  -  bronchial culture: leukocytes, no organisms  - ID following, c/w meropenem    #Neurogenic bladder  -had SPC that was dislodged - now with [facility] and SPC was going to be replaced when he became septic  - and IR following  -monitor urine output  -on flomax    #Elevated dimer   -vascular cant do duplex bc pt is contracted, on Therapeutic lovenox     #Dysphagia   -PEG feeds previously on hold as per GI due to PEG leak  -Nutrition consulted > PPN started  - PEG tube study  --> PEG in position, restarted bolus PEG tube feeds     #MS, nonverbal, bedbound, contracted   -repositioning per protocol, continue baclofen    #SUKHDEV   -likely prerenal, resolved   -continue hydration and monitor     #NSTEMI type 2 due to sepsis   -TTE    #Misc  -DVT prophylaxis: Lovenox  -GI prophylaxis: Protonix  -Code status: Full code  -Dispo: MICU    Handoff pendin) transfer to ICU, pt intubated and on levophed    53 y/o man with PMH of MS, nonverbal at baseline, chronic low back pain, ?neurogenic bladder s/p SPC placement and s/p dislodgement was at IR for replacement of SPC when he was found to be in severe sepsis.    #Severe sepsis present on admission 2/2 acute pyelonephritis  -L lung etelectasis/Mucus plug   -Ucx: Few Proteus Mirabilis/Mod E Faecalis  -Blood cx : GPC in clusters x3, 1 culture with staph hominis  -  bcx negative,  bcx NGTD   - previously was on HFNC alternating with BIPAP, nebs and chest PT  -intubated on , bronchopscopy  on  >> removed secretions mucus plugs/atelactasis  - CXR : CXR patchy left lung opacities c/f pneumonia, received 1 dose linezolid  - MRSA negative  -  bronchial culture: leukocytes, no organisms  - ID following, c/w meropenem    #Neurogenic bladder  -had SPC that was dislodged - now with [facility] and SPC was going to be replaced when he became septic  - and IR following  -monitor urine output  -on flomax    #Elevated dimer   -vascular cant do duplex bc pt is contracted, on Therapeutic lovenox     #Dysphagia   -PEG feeds previously on hold as per GI due to PEG leak  -Nutrition consulted > PPN started  - PEG tube study  --> PEG in position, restarted bolus PEG tube feeds     #MS, nonverbal, bedbound, contracted   -repositioning per protocol, continue baclofen    #SUKHDEV   -likely prerenal, resolved   -continue hydration and monitor     #NSTEMI type 2 due to sepsis   -TTE    #Misc  -DVT prophylaxis: Lovenox  -GI prophylaxis: Protonix  -Code status: Full code  -Dispo: MICU    Handoff pendin) transfer to ICU, pt intubated and on levophed - see transfer note dated    53 y/o man with PMH of MS, nonverbal at baseline, chronic low back pain, ?neurogenic bladder s/p SPC placement and s/p dislodgement was at IR for replacement of SPC when he was found to be in severe sepsis.    #Severe sepsis present on admission 2/2 acute pyelonephritis  -L lung etelectasis/Mucus plug   -Ucx: Few Proteus Mirabilis/Mod E Faecalis  -Blood cx : GPC in clusters x3, 1 culture with staph hominis  -  bcx negative,  bcx NGTD   - previously was on HFNC alternating with BIPAP, nebs and chest PT  -intubated on , bronchopscopy  on  >> removed secretions mucus plugs/atelactasis  - CXR : CXR patchy left lung opacities c/f pneumonia, received 1 dose linezolid  - MRSA negative  -  bronchial culture: leukocytes, no organisms  - ID following, c/w meropenem  - increase precedex and fentanyl; discontinue propofol as patient is on levophed; add versed if needed   - nebs q4 + PRN + mucomyst nebs     #Neurogenic bladder  -had SPC that was dislodged - now with [facility] and SPC was going to be replaced when he became septic  - and IR following  -monitor urine output  -on flomax    #Elevated dimer   -vascular cant do duplex bc pt is contracted, on Therapeutic lovenox     #Dysphagia   -PEG feeds previously on hold as per GI due to PEG leak  -Nutrition consulted > PPN started  - PEG tube study  --> PEG in position, restarted bolus PEG tube feeds     #MS, nonverbal, bedbound, contracted   -repositioning per protocol, continue baclofen    #SUKHDEV   -likely prerenal, resolved   -continue hydration and monitor     #NSTEMI type 2 due to sepsis   -TTE    #Misc  -DVT prophylaxis: Lovenox  -GI prophylaxis: Protonix  -Code status: Full code  -Dispo: MICU    Handoff pendin) transfer to ICU, pt intubated and on levophed - see transfer note dated

## 2022-12-10 NOTE — PROGRESS NOTE ADULT - ASSESSMENT
IMPRESSION:    Sepsis POA  Acute hypoxemic respiratory failure/ Left mucus plug/lung atelectasis  Urinary tract infection/ proteus  Bacteremia - MRSE  non verbal at baseline  NSTEMI likely Type 2  Pulm edema ?   history of MS/neurogenic bladder        PLAN:      CNS: Increase precedex to 1.5, dc propofol, if needed increase fentanyl    HEENT: Oral care    PULMONARY:  Recommend nebs every 4 hours and as needed; mucomyst nebs as well; aggressive chest PT    CARDIOVASCULAR: avoid volume overload, wean off levophed, Goal directed fluid resuscitation    GI: GI prophylaxis. restart PEG feeds    RENAL:  Follow up lytes.  Correct as needed. IR following for the SPC.     INFECTIOUS DISEASE: ABX per ID.     HEMATOLOGICAL: DVT prophylaxis. on lovenox    ENDOCRINE:  Follow up FS.  Insulin protocol if needed.    MUSCULOSKELETAL: bedrest  MICU  GOC  poor prognosis IMPRESSION:    Sepsis POA  Septic shock   Acute hypoxemic respiratory failure/ Left mucus plug/lung atelectasis  Urinary tract infection/ proteus  Bacteremia - MRSE  non verbal at baseline  NSTEMI likely Type 2  history of MS/neurogenic bladder        PLAN:      CNS: SAT.      HEENT: Oral care    PULMONARY:  Recommend nebs every 4 hours.  Vent changes:  SBT.  Aggressive chest PT    CARDIOVASCULAR: avoid volume overload, wean off levophed, Goal directed fluid resuscitation.  LR bolus 500     GI: GI prophylaxis. Restart PEG feeds.  Bowel regimen     RENAL:  Follow up lytes.  Correct as needed. IR following for possible SPC.     INFECTIOUS DISEASE: ABX per ID.     HEMATOLOGICAL: DVT prophylaxis. On lovenox    ENDOCRINE:  Follow up FS.  Insulin protocol if needed.    MUSCULOSKELETAL: bedrest  MICU  GOC  poor prognosis overall

## 2022-12-10 NOTE — PROGRESS NOTE ADULT - SUBJECTIVE AND OBJECTIVE BOX
Patient is a 52y old  Male who presents with a chief complaint of sepsis (09 Dec 2022 12:09)        Over Night Events:  remains critically ill on MV  tachycardic overnight, hypotension, febrile overnight  central line placed overnight  on levophed 0.08, 1mcg precedex, 1 fentanyl, and propofol    ROS:     All ROS are negative except HPI         PHYSICAL EXAM    ICU Vital Signs Last 24 Hrs  T(C): 37.2 (10 Dec 2022 04:00), Max: 38 (10 Dec 2022 00:00)  T(F): 99 (10 Dec 2022 04:00), Max: 100.4 (10 Dec 2022 00:00)  HR: 77 (10 Dec 2022 06:00) (74 - 138)  BP: 115/66 (10 Dec 2022 06:00) (72/48 - 169/79)  BP(mean): 86 (10 Dec 2022 06:00) (55 - 116)  ABP: --  ABP(mean): --  RR: 22 (10 Dec 2022 06:00) (14 - 34)  SpO2: 98% (10 Dec 2022 06:00) (93% - 100%)    O2 Parameters below as of 10 Dec 2022 06:00  Patient On (Oxygen Delivery Method): ventilator            CONSTITUTIONAL:  chronically ill appearing  In NAD    ENT:   Airway patent,   +ET      EYES:   Pupils equal,   Round and reactive to light.    CARDIAC:   Normal rate,   Regular rhythm.    dependent edema    RESPIRATORY:   No wheezing  Bilateral BS  Normal chest expansion  Not tachypneic,  No use of accessory muscles    GASTROINTESTINAL:  Abdomen soft,   Non-tender,   No guarding,     MUSCULOSKELETAL:   Range of motion is not limited,  No clubbing, cyanosis    NEUROLOGICAL:   sedated  contracted    SKIN:   Skin normal color for race,   Warm and dry and intact.   No evidence of rash.    PSYCHIATRIC:   No apparent risk to self or others.      12-09-22 @ 07:01  -  12-10-22 @ 07:00  --------------------------------------------------------  IN:    Dexmedetomidine: 365.8 mL    Enteral Tube Flush: 600 mL    FentaNYL: 194.7 mL    IV PiggyBack: 450 mL    Norepinephrine: 165.3 mL    Osmolite: 240 mL    Propofol: 40.8 mL  Total IN: 2056.6 mL    OUT:    Indwelling Catheter - Urethral (mL): 720 mL  Total OUT: 720 mL    Total NET: 1336.6 mL          LABS:                            9.9    19.29 )-----------( 559      ( 10 Dec 2022 03:17 )             31.7                                               12-10    140  |  101  |  10  ----------------------------<  103<H>  4.2   |  26  |  <0.5<L>    Ca    8.4      10 Dec 2022 03:17  Phos  3.5     12-09  Mg     1.7     12-10    TPro  5.7<L>  /  Alb  2.7<L>  /  TBili  0.3  /  DBili  x   /  AST  11  /  ALT  12  /  AlkPhos  98  12-10                                                                                           LIVER FUNCTIONS - ( 10 Dec 2022 03:17 )  Alb: 2.7 g/dL / Pro: 5.7 g/dL / ALK PHOS: 98 U/L / ALT: 12 U/L / AST: 11 U/L / GGT: x                                                  Culture - Bronchial (collected 08 Dec 2022 15:00)  Source: .Bronchial None  Gram Stain (09 Dec 2022 07:14):    Moderate polymorphonuclear leukocytes seen per oil power field    No squamous epithelial cells seen per oil power field    No organisms seen per oil power field    Culture - Blood (collected 07 Dec 2022 13:11)  Source: .Blood None  Preliminary Report (08 Dec 2022 23:02):    No growth to date.                                                   Mode: AC/ CMV (Assist Control/ Continuous Mandatory Ventilation)  RR (machine): 14  TV (machine): 450  FiO2: 40  PEEP: 8  ITime: 1  MAP: 13  PIP: 27                                      ABG - ( 10 Dec 2022 03:58 )  pH, Arterial: 7.36  pH, Blood: x     /  pCO2: 46    /  pO2: 92    / HCO3: 26    / Base Excess: 0.3   /  SaO2: 99.0                MEDICATIONS  (STANDING):  baclofen 20 milliGRAM(s) Oral every 12 hours  chlorhexidine 0.12% Liquid 15 milliLiter(s) Oral Mucosa every 12 hours  chlorhexidine 2% Cloths 1 Application(s) Topical daily  dexMEDEtomidine Infusion 0.2 MICROgram(s)/kG/Hr (3.4 mL/Hr) IV Continuous <Continuous>  donepezil 10 milliGRAM(s) Oral at bedtime  enoxaparin Injectable 70 milliGRAM(s) SubCutaneous every 12 hours  fentaNYL   Infusion. 0.5 MICROgram(s)/kG/Hr (3.4 mL/Hr) IV Continuous <Continuous>  lactulose Syrup 10 Gram(s) Oral daily  magnesium sulfate  IVPB 2 Gram(s) IV Intermittent once  memantine 10 milliGRAM(s) Oral two times a day  meropenem  IVPB 1000 milliGRAM(s) IV Intermittent every 8 hours  norepinephrine Infusion 0.05 MICROgram(s)/kG/Min (6.38 mL/Hr) IV Continuous <Continuous>  propofol Infusion 10 MICROgram(s)/kG/Min (4.08 mL/Hr) IV Continuous <Continuous>  senna 2 Tablet(s) Oral at bedtime  tamsulosin 0.4 milliGRAM(s) Oral at bedtime  zinc oxide 20% Ointment 1 Application(s) Topical three times a day    MEDICATIONS  (PRN):  acetaminophen     Tablet .. 650 milliGRAM(s) Oral every 6 hours PRN Temp greater or equal to 38C (100.4F), Mild Pain (1 - 3), Moderate Pain (4 - 6)  albuterol    0.083% 2.5 milliGRAM(s) Nebulizer every 4 hours PRN Shortness of Breath and/or Wheezing  albuterol    90 MICROgram(s) HFA Inhaler 2 Puff(s) Inhalation every 6 hours PRN Shortness of Breath and/or Wheezing  ibuprofen  Suspension. 600 milliGRAM(s) Oral every 8 hours PRN Temp greater or equal to 38.5C (101.3F)  melatonin 3 milliGRAM(s) Oral at bedtime PRN Insomnia      New X-rays reviewed:                                                                                  ECHO    CXR interpreted by me:       Patient is a 52y old  Male who presents with a chief complaint of sepsis (09 Dec 2022 12:09)        Over Night Events:  remains critically ill on MV  tachycardic overnight, hypotension, febrile overnight  central line placed overnight  on levophed 0.08, 1mcg precedex, 1 fentanyl, and propofol    ROS:     All ROS are negative except HPI         PHYSICAL EXAM    ICU Vital Signs Last 24 Hrs  T(C): 37.2 (10 Dec 2022 04:00), Max: 38 (10 Dec 2022 00:00)  T(F): 99 (10 Dec 2022 04:00), Max: 100.4 (10 Dec 2022 00:00)  HR: 77 (10 Dec 2022 06:00) (74 - 138)  BP: 115/66 (10 Dec 2022 06:00) (72/48 - 169/79)  BP(mean): 86 (10 Dec 2022 06:00) (55 - 116)  ABP: --  ABP(mean): --  RR: 22 (10 Dec 2022 06:00) (14 - 34)  SpO2: 98% (10 Dec 2022 06:00) (93% - 100%)    O2 Parameters below as of 10 Dec 2022 06:00  Patient On (Oxygen Delivery Method): ventilator            CONSTITUTIONAL:  chronically ill appearing  In NAD    ENT:   Airway patent,   +ET      EYES:   Pupils equal,   Round and reactive to light.    CARDIAC:   Normal rate,   Regular rhythm.    dependent edema    RESPIRATORY:   No wheezing  Bilateral BS  Normal chest expansion  Not tachypneic,  No use of accessory muscles    GASTROINTESTINAL:  Abdomen soft,   Non-tender,   No guarding,     MUSCULOSKELETAL:   Contracted  No clubbing, cyanosis    NEUROLOGICAL:   sedated  contracted    SKIN:   Skin normal color for race,   No evidence of rash.    PSYCHIATRIC:   No apparent risk to self or others.      12-09-22 @ 07:01  -  12-10-22 @ 07:00  --------------------------------------------------------  IN:    Dexmedetomidine: 365.8 mL    Enteral Tube Flush: 600 mL    FentaNYL: 194.7 mL    IV PiggyBack: 450 mL    Norepinephrine: 165.3 mL    Osmolite: 240 mL    Propofol: 40.8 mL  Total IN: 2056.6 mL    OUT:    Indwelling Catheter - Urethral (mL): 720 mL  Total OUT: 720 mL    Total NET: 1336.6 mL          LABS:                            9.9    19.29 )-----------( 559      ( 10 Dec 2022 03:17 )             31.7                                               12-10    140  |  101  |  10  ----------------------------<  103<H>  4.2   |  26  |  <0.5<L>    Ca    8.4      10 Dec 2022 03:17  Phos  3.5     12-09  Mg     1.7     12-10    TPro  5.7<L>  /  Alb  2.7<L>  /  TBili  0.3  /  DBili  x   /  AST  11  /  ALT  12  /  AlkPhos  98  12-10                                                                                           LIVER FUNCTIONS - ( 10 Dec 2022 03:17 )  Alb: 2.7 g/dL / Pro: 5.7 g/dL / ALK PHOS: 98 U/L / ALT: 12 U/L / AST: 11 U/L / GGT: x                                                  Culture - Bronchial (collected 08 Dec 2022 15:00)  Source: .Bronchial None  Gram Stain (09 Dec 2022 07:14):    Moderate polymorphonuclear leukocytes seen per oil power field    No squamous epithelial cells seen per oil power field    No organisms seen per oil power field    Culture - Blood (collected 07 Dec 2022 13:11)  Source: .Blood None  Preliminary Report (08 Dec 2022 23:02):    No growth to date.                                                   Mode: AC/ CMV (Assist Control/ Continuous Mandatory Ventilation)  RR (machine): 14  TV (machine): 450  FiO2: 40  PEEP: 8  ITime: 1  MAP: 13  PIP: 27                                      ABG - ( 10 Dec 2022 03:58 )  pH, Arterial: 7.36  pH, Blood: x     /  pCO2: 46    /  pO2: 92    / HCO3: 26    / Base Excess: 0.3   /  SaO2: 99.0                MEDICATIONS  (STANDING):  baclofen 20 milliGRAM(s) Oral every 12 hours  chlorhexidine 0.12% Liquid 15 milliLiter(s) Oral Mucosa every 12 hours  chlorhexidine 2% Cloths 1 Application(s) Topical daily  dexMEDEtomidine Infusion 0.2 MICROgram(s)/kG/Hr (3.4 mL/Hr) IV Continuous <Continuous>  donepezil 10 milliGRAM(s) Oral at bedtime  enoxaparin Injectable 70 milliGRAM(s) SubCutaneous every 12 hours  fentaNYL   Infusion. 0.5 MICROgram(s)/kG/Hr (3.4 mL/Hr) IV Continuous <Continuous>  lactulose Syrup 10 Gram(s) Oral daily  magnesium sulfate  IVPB 2 Gram(s) IV Intermittent once  memantine 10 milliGRAM(s) Oral two times a day  meropenem  IVPB 1000 milliGRAM(s) IV Intermittent every 8 hours  norepinephrine Infusion 0.05 MICROgram(s)/kG/Min (6.38 mL/Hr) IV Continuous <Continuous>  propofol Infusion 10 MICROgram(s)/kG/Min (4.08 mL/Hr) IV Continuous <Continuous>  senna 2 Tablet(s) Oral at bedtime  tamsulosin 0.4 milliGRAM(s) Oral at bedtime  zinc oxide 20% Ointment 1 Application(s) Topical three times a day    MEDICATIONS  (PRN):  acetaminophen     Tablet .. 650 milliGRAM(s) Oral every 6 hours PRN Temp greater or equal to 38C (100.4F), Mild Pain (1 - 3), Moderate Pain (4 - 6)  albuterol    0.083% 2.5 milliGRAM(s) Nebulizer every 4 hours PRN Shortness of Breath and/or Wheezing  albuterol    90 MICROgram(s) HFA Inhaler 2 Puff(s) Inhalation every 6 hours PRN Shortness of Breath and/or Wheezing  ibuprofen  Suspension. 600 milliGRAM(s) Oral every 8 hours PRN Temp greater or equal to 38.5C (101.3F)  melatonin 3 milliGRAM(s) Oral at bedtime PRN Insomnia      New X-rays reviewed:                                                                                  ECHO

## 2022-12-10 NOTE — PROGRESS NOTE ADULT - SUBJECTIVE AND OBJECTIVE BOX
Internal Medicine Progress Note    Location: [facility] A5- A  Patient Name: KEVIN MEDRANO  MRN: [id number]    Patient is a 52y old  Male who presents with a chief complaint of sepsis (10 Dec 2022 08:04)      Subjective  Interval events:  NAEON. This morning, patient was seen and examined at bedside. Patient reports doing well, denies any new symptoms/complaints      Objective  Vital Signs Last 24 Hrs  T(C): 36.9 (10 Dec 2022 07:00), Max: 38 (10 Dec 2022 00:00)  T(F): 98.5 (10 Dec 2022 07:00), Max: 100.4 (10 Dec 2022 00:00)  HR: 95 (10 Dec 2022 10:00) (74 - 138)  BP: 80/50 (10 Dec 2022 10:00) (72/48 - 169/79)  BP(mean): 60 (10 Dec 2022 10:00) (55 - 121)  RR: 18 (10 Dec 2022 07:00) (14 - 34)  SpO2: 97% (10 Dec 2022 07:30) (93% - 100%)    Parameters below as of 10 Dec 2022 06:00  Patient On (Oxygen Delivery Method): ventilator      Mode: AC/ CMV (Assist Control/ Continuous Mandatory Ventilation), RR (machine): 14, TV (machine): 450, FiO2: 40, PEEP: 8, ITime: 1, MAP: 8, PIP: 18  Intake/output   12-09-22 @ 07:01  -  12-10-22 @ 07:00  --------------------------------------------------------  IN: 2085.5 mL / OUT: 735 mL / NET: 1350.5 mL    12-10-22 @ 07:01  -  12-10-22 @ 10:43  --------------------------------------------------------  IN: 35.8 mL / OUT: 15 mL / NET: 20.8 mL        Physical Exam  General: nontoxic, NAD  Head: normocephalic, atraumatic  Eyes: conjunctiva and sclera clear  ENT: moist mucous membranes  Neck: supple  Chest/lung: nonlabored respirations on RA, CTAB, no wheezes, crackles, rhonchi   Heart: RRR, +S1 S2  Abdomen: soft, nontender, nondistended  Extremities: warm and well perfused, no LE pitting edema  Neuro: no gross focal deficits  Psych: AAOx3  Skin: no rashes or lesions on exposed skin    Labs  CBC:                       9.9    19.29 )-----------( 559      ( 10 Dec 2022 03:17 )             31.7     BMP: 12-10    140  |  101  |  10  ----------------------------<  103<H>  4.2   |  26  |  <0.5<L>    Ca    8.4      10 Dec 2022 03:17  Phos  3.5     12-09  Mg     1.7     12-10    TPro  5.7<L>  /  Alb  2.7<L>  /  TBili  0.3  /  DBili  x   /  AST  11  /  ALT  12  /  AlkPhos  98  12-10    Coag:   ABG: ABG - ( 10 Dec 2022 03:58 )  pH, Arterial: 7.36  pH, Blood: x     /  pCO2: 46    /  pO2: 92    / HCO3: 26    / Base Excess: 0.3   /  SaO2: 99.0              Cardiac markers:       Micro:      Culture - Bronchial (collected 08 Dec 2022 15:00)  Source: .Bronchial None  Gram Stain (09 Dec 2022 07:14):    Moderate polymorphonuclear leukocytes seen per oil power field    No squamous epithelial cells seen per oil power field    No organisms seen per oil power field    Culture - Blood (collected 07 Dec 2022 13:11)  Source: .Blood None  Preliminary Report (08 Dec 2022 23:02):    No growth to date.        Imaging:    Standing Medications  MEDICATIONS  (STANDING):  acetylcysteine 20% for bronchoscopy 4 milliLiter(s) Nebulizer once  albuterol    0.083% 2.5 milliGRAM(s) Nebulizer every 4 hours  baclofen 20 milliGRAM(s) Oral every 12 hours  chlorhexidine 0.12% Liquid 15 milliLiter(s) Oral Mucosa every 12 hours  chlorhexidine 2% Cloths 1 Application(s) Topical daily  dexMEDEtomidine Infusion 0.2 MICROgram(s)/kG/Hr (3.4 mL/Hr) IV Continuous <Continuous>  donepezil 10 milliGRAM(s) Oral at bedtime  enoxaparin Injectable 70 milliGRAM(s) SubCutaneous every 12 hours  fentaNYL   Infusion. 0.5 MICROgram(s)/kG/Hr (3.4 mL/Hr) IV Continuous <Continuous>  lactulose Syrup 10 Gram(s) Oral daily  magnesium sulfate  IVPB 2 Gram(s) IV Intermittent once  memantine 10 milliGRAM(s) Oral two times a day  meropenem  IVPB 1000 milliGRAM(s) IV Intermittent every 8 hours  midazolam Infusion 0.02 mG/kG/Hr (1.36 mL/Hr) IV Continuous <Continuous>  norepinephrine Infusion 0.05 MICROgram(s)/kG/Min (6.38 mL/Hr) IV Continuous <Continuous>  senna 2 Tablet(s) Oral at bedtime  tamsulosin 0.4 milliGRAM(s) Oral at bedtime  zinc oxide 20% Ointment 1 Application(s) Topical three times a day    PRN Medications  MEDICATIONS  (PRN):  acetaminophen     Tablet .. 650 milliGRAM(s) Oral every 6 hours PRN Temp greater or equal to 38C (100.4F), Mild Pain (1 - 3), Moderate Pain (4 - 6)  albuterol    0.083% 2.5 milliGRAM(s) Nebulizer every 4 hours PRN Shortness of Breath and/or Wheezing  albuterol    90 MICROgram(s) HFA Inhaler 2 Puff(s) Inhalation every 6 hours PRN Shortness of Breath and/or Wheezing  ibuprofen  Suspension. 600 milliGRAM(s) Oral every 8 hours PRN Temp greater or equal to 38.5C (101.3F)  melatonin 3 milliGRAM(s) Oral at bedtime PRN Insomnia   Internal Medicine Progress Note    Location: [facility] A5- A  Patient Name: KEVIN MEDRANO  MRN: [id number]    Patient is a 52y old  Male who presents with a chief complaint of sepsis (10 Dec 2022 08:04)      Subjective  Interval events: started propofol yesterday afternoon for hypertension   Overnight, patient tachycardic and hypotensive to 74/44. Central line placed. This morning, patient was seen and examined at bedside. On NE 0.08, precedex, fentanyl, propofol    Objective  Vital Signs Last 24 Hrs  T(C): 36.9 (10 Dec 2022 07:00), Max: 38 (10 Dec 2022 00:00)  T(F): 98.5 (10 Dec 2022 07:00), Max: 100.4 (10 Dec 2022 00:00)  HR: 95 (10 Dec 2022 10:00) (74 - 138)  BP: 80/50 (10 Dec 2022 10:00) (72/48 - 169/79)  BP(mean): 60 (10 Dec 2022 10:00) (55 - 121)  RR: 18 (10 Dec 2022 07:00) (14 - 34)  SpO2: 97% (10 Dec 2022 07:30) (93% - 100%)    Parameters below as of 10 Dec 2022 06:00  Patient On (Oxygen Delivery Method): ventilator      Mode: AC/ CMV (Assist Control/ Continuous Mandatory Ventilation), RR (machine): 14, TV (machine): 450, FiO2: 40, PEEP: 8, ITime: 1, MAP: 8, PIP: 18  Intake/output   12-09-22 @ 07:01  -  12-10-22 @ 07:00  --------------------------------------------------------  IN: 2085.5 mL / OUT: 735 mL / NET: 1350.5 mL    12-10-22 @ 07:01  -  12-10-22 @ 10:43  --------------------------------------------------------  IN: 35.8 mL / OUT: 15 mL / NET: 20.8 mL        Physical Exam  General: intubated, chronically appearing, NAD  Eyes: conjunctiva and sclera clear  ENT: moist mucous membranes  Neck: supple  Chest/lung: mechanical breath sounds   Heart: RRR, +S1 S2  Abdomen: soft, nontender, nondistended  Extremities: warm, 1-2+ pitting edema bilateral LEs   Neuro: sedated  Skin: no rashes or lesions on exposed skin    Labs  CBC:                       9.9    19.29 )-----------( 559      ( 10 Dec 2022 03:17 )             31.7     BMP: 12-10    140  |  101  |  10  ----------------------------<  103<H>  4.2   |  26  |  <0.5<L>    Ca    8.4      10 Dec 2022 03:17  Phos  3.5     12-09  Mg     1.7     12-10    TPro  5.7<L>  /  Alb  2.7<L>  /  TBili  0.3  /  DBili  x   /  AST  11  /  ALT  12  /  AlkPhos  98  12-10    Coag:   ABG: ABG - ( 10 Dec 2022 03:58 )  pH, Arterial: 7.36  pH, Blood: x     /  pCO2: 46    /  pO2: 92    / HCO3: 26    / Base Excess: 0.3   /  SaO2: 99.0        Culture - Bronchial (collected 08 Dec 2022 15:00)  Source: .Bronchial None  Gram Stain (09 Dec 2022 07:14):    Moderate polymorphonuclear leukocytes seen per oil power field    No squamous epithelial cells seen per oil power field    No organisms seen per oil power field    Culture - Blood (collected 07 Dec 2022 13:11)  Source: .Blood None  Preliminary Report (08 Dec 2022 23:02):    No growth to date.        Imaging:    Standing Medications  MEDICATIONS  (STANDING):  acetylcysteine 20% for bronchoscopy 4 milliLiter(s) Nebulizer once  albuterol    0.083% 2.5 milliGRAM(s) Nebulizer every 4 hours  baclofen 20 milliGRAM(s) Oral every 12 hours  chlorhexidine 0.12% Liquid 15 milliLiter(s) Oral Mucosa every 12 hours  chlorhexidine 2% Cloths 1 Application(s) Topical daily  dexMEDEtomidine Infusion 0.2 MICROgram(s)/kG/Hr (3.4 mL/Hr) IV Continuous <Continuous>  donepezil 10 milliGRAM(s) Oral at bedtime  enoxaparin Injectable 70 milliGRAM(s) SubCutaneous every 12 hours  fentaNYL   Infusion. 0.5 MICROgram(s)/kG/Hr (3.4 mL/Hr) IV Continuous <Continuous>  lactulose Syrup 10 Gram(s) Oral daily  magnesium sulfate  IVPB 2 Gram(s) IV Intermittent once  memantine 10 milliGRAM(s) Oral two times a day  meropenem  IVPB 1000 milliGRAM(s) IV Intermittent every 8 hours  midazolam Infusion 0.02 mG/kG/Hr (1.36 mL/Hr) IV Continuous <Continuous>  norepinephrine Infusion 0.05 MICROgram(s)/kG/Min (6.38 mL/Hr) IV Continuous <Continuous>  senna 2 Tablet(s) Oral at bedtime  tamsulosin 0.4 milliGRAM(s) Oral at bedtime  zinc oxide 20% Ointment 1 Application(s) Topical three times a day    PRN Medications  MEDICATIONS  (PRN):  acetaminophen     Tablet .. 650 milliGRAM(s) Oral every 6 hours PRN Temp greater or equal to 38C (100.4F), Mild Pain (1 - 3), Moderate Pain (4 - 6)  albuterol    0.083% 2.5 milliGRAM(s) Nebulizer every 4 hours PRN Shortness of Breath and/or Wheezing  albuterol    90 MICROgram(s) HFA Inhaler 2 Puff(s) Inhalation every 6 hours PRN Shortness of Breath and/or Wheezing  ibuprofen  Suspension. 600 milliGRAM(s) Oral every 8 hours PRN Temp greater or equal to 38.5C (101.3F)  melatonin 3 milliGRAM(s) Oral at bedtime PRN Insomnia

## 2022-12-11 NOTE — PROGRESS NOTE ADULT - ASSESSMENT
IMPRESSION:    Sepsis POA  Septic shock   Acute hypoxemic respiratory failure / Left mucus plug / lung atelectasis recurrent  Pseudomonas in DTA   Urinary tract infection/ proteus  Bacteremia - MRSE  Non verbal at baseline  NSTEMI likely Type 2  History of MS / neurogenic bladder      PLAN:      CNS: SAT.      HEENT: Oral care.  ET Care,     PULMONARY:  Recommend nebs every 4 hours.  Vent changes:  Aggressive chest PT and Pulmonary toilet.  Repeat CXR.  Might need Bronchoscopy     CARDIOVASCULAR: avoid volume overload, wean off levophed, Goal directed fluid resuscitation.  LR bolus 500     GI: GI prophylaxis. Restart PEG feeds.  Bowel regimen     RENAL:  Follow up lytes.  Correct as needed. IR following for possible SPC.     INFECTIOUS DISEASE: ABX per ID.     HEMATOLOGICAL: DVT prophylaxis. On Lovenox    ENDOCRINE:  Follow up FS.  Insulin protocol if needed.    MUSCULOSKELETAL: bedrest    GOC    Poor prognosis overall

## 2022-12-11 NOTE — PROGRESS NOTE ADULT - SUBJECTIVE AND OBJECTIVE BOX
KEVIN MEDRANO 52y Male  MRN#: [id number]   Hospital Day: 9d    SUBJECTIVE  Patient is a 52y old Male who presents with a chief complaint of sepsis (10 Dec 2022 10:43)  Currently admitted to medicine with the primary diagnosis of Sepsis      INTERVAL HPI AND OVERNIGHT EVENTS:  Patient was examined and seen at bedside. This morning he is resting comfortably in bed. No issues or overnight events.    OBJECTIVE  PAST MEDICAL & SURGICAL HISTORY  Multiple sclerosis    Chronic back pain    Hypertension    No significant past surgical history      ALLERGIES:  penicillin (Unknown)  vancomycin (Unknown)    MEDICATIONS:  STANDING MEDICATIONS  acetylcysteine 20% for bronchoscopy 4 milliLiter(s) Nebulizer once  albuterol    0.083% 2.5 milliGRAM(s) Nebulizer every 4 hours  albuterol    90 MICROgram(s) HFA Inhaler 1 Puff(s) Inhalation every 4 hours  baclofen 20 milliGRAM(s) Oral every 12 hours  chlorhexidine 0.12% Liquid 15 milliLiter(s) Oral Mucosa every 12 hours  chlorhexidine 2% Cloths 1 Application(s) Topical daily  dexMEDEtomidine Infusion 0.2 MICROgram(s)/kG/Hr IV Continuous <Continuous>  donepezil 10 milliGRAM(s) Oral at bedtime  enoxaparin Injectable 70 milliGRAM(s) SubCutaneous every 12 hours  fentaNYL   Infusion. 0.5 MICROgram(s)/kG/Hr IV Continuous <Continuous>  lactulose Syrup 10 Gram(s) Oral daily  magnesium sulfate  IVPB 2 Gram(s) IV Intermittent once  memantine 10 milliGRAM(s) Oral two times a day  meropenem  IVPB 1000 milliGRAM(s) IV Intermittent every 8 hours  midazolam Infusion 0.02 mG/kG/Hr IV Continuous <Continuous>  norepinephrine Infusion 0.05 MICROgram(s)/kG/Min IV Continuous <Continuous>  propofol Infusion 10 MICROgram(s)/kG/Min IV Continuous <Continuous>  senna 2 Tablet(s) Oral at bedtime  tamsulosin 0.4 milliGRAM(s) Oral at bedtime  zinc oxide 20% Ointment 1 Application(s) Topical three times a day    PRN MEDICATIONS  acetaminophen     Tablet .. 650 milliGRAM(s) Oral every 6 hours PRN  albuterol    0.083% 2.5 milliGRAM(s) Nebulizer every 4 hours PRN  albuterol    90 MICROgram(s) HFA Inhaler 2 Puff(s) Inhalation every 6 hours PRN  ibuprofen  Suspension. 600 milliGRAM(s) Oral every 8 hours PRN  melatonin 3 milliGRAM(s) Oral at bedtime PRN      VITAL SIGNS: Last 24 Hours  T(C): 38.1 (10 Dec 2022 15:00), Max: 38.1 (10 Dec 2022 15:00)  T(F): 100.5 (10 Dec 2022 15:00), Max: 100.5 (10 Dec 2022 15:00)  HR: 77 (10 Dec 2022 20:00) (75 - 111)  BP: 90/57 (10 Dec 2022 20:00) (80/50 - 166/88)  BP(mean): 68 (10 Dec 2022 20:00) (60 - 121)  RR: 14 (10 Dec 2022 20:00) (14 - 22)  SpO2: 96% (10 Dec 2022 20:00) (95% - 98%)    LABS:                        11.2   11.31 )-----------( 508      ( 11 Dec 2022 01:06 )             35.9     12-11    139  |  102  |  10  ----------------------------<  213<H>  4.3   |  27  |  <0.5<L>    Ca    8.7      11 Dec 2022 01:06  Phos  3.5     12-09  Mg     1.7     12-11    TPro  6.3  /  Alb  2.9<L>  /  TBili  0.2  /  DBili  x   /  AST  13  /  ALT  12  /  AlkPhos  100  12-11        ABG - ( 10 Dec 2022 03:58 )  pH, Arterial: 7.36  pH, Blood: x     /  pCO2: 46    /  pO2: 92    / HCO3: 26    / Base Excess: 0.3   /  SaO2: 99.0                  Culture - Bronchial (collected 08 Dec 2022 15:00)  Source: .Bronchial None  Gram Stain (09 Dec 2022 07:14):    Moderate polymorphonuclear leukocytes seen per oil power field    No squamous epithelial cells seen per oil power field    No organisms seen per oil power field  Preliminary Report (10 Dec 2022 13:30):    Few Pseudomonas aeruginosa    Normal Respiratory Tiera present            PHYSICAL EXAM:  EN: very agitated  LUNGS: intubated, dreased breath sounds in the left side, clear right side  HEART: S1/S2 present.    ABD: Soft, non-tender, non-distended.   EXT: Contracted  NEURO: sedated KEVIN MEDRANO 52y Male  MRN#: [id number]   Hospital Day: 9d    SUBJECTIVE  Patient is a 52y old Male who presents with a chief complaint of sepsis (10 Dec 2022 10:43)  Currently admitted to medicine with the primary diagnosis of Sepsis      INTERVAL HPI AND OVERNIGHT EVENTS:  Patient was examined and seen at bedside. This morning he is resting comfortably in bed. pt was agitated and had high BP around midnight. Propofol was started.    OBJECTIVE  PAST MEDICAL & SURGICAL HISTORY  Multiple sclerosis    Chronic back pain    Hypertension    No significant past surgical history      ALLERGIES:  penicillin (Unknown)  vancomycin (Unknown)    MEDICATIONS:  STANDING MEDICATIONS  acetylcysteine 20% for bronchoscopy 4 milliLiter(s) Nebulizer once  albuterol    0.083% 2.5 milliGRAM(s) Nebulizer every 4 hours  albuterol    90 MICROgram(s) HFA Inhaler 1 Puff(s) Inhalation every 4 hours  baclofen 20 milliGRAM(s) Oral every 12 hours  chlorhexidine 0.12% Liquid 15 milliLiter(s) Oral Mucosa every 12 hours  chlorhexidine 2% Cloths 1 Application(s) Topical daily  dexMEDEtomidine Infusion 0.2 MICROgram(s)/kG/Hr IV Continuous <Continuous>  donepezil 10 milliGRAM(s) Oral at bedtime  enoxaparin Injectable 70 milliGRAM(s) SubCutaneous every 12 hours  fentaNYL   Infusion. 0.5 MICROgram(s)/kG/Hr IV Continuous <Continuous>  lactulose Syrup 10 Gram(s) Oral daily  magnesium sulfate  IVPB 2 Gram(s) IV Intermittent once  memantine 10 milliGRAM(s) Oral two times a day  meropenem  IVPB 1000 milliGRAM(s) IV Intermittent every 8 hours  midazolam Infusion 0.02 mG/kG/Hr IV Continuous <Continuous>  norepinephrine Infusion 0.05 MICROgram(s)/kG/Min IV Continuous <Continuous>  propofol Infusion 10 MICROgram(s)/kG/Min IV Continuous <Continuous>  senna 2 Tablet(s) Oral at bedtime  tamsulosin 0.4 milliGRAM(s) Oral at bedtime  zinc oxide 20% Ointment 1 Application(s) Topical three times a day    PRN MEDICATIONS  acetaminophen     Tablet .. 650 milliGRAM(s) Oral every 6 hours PRN  albuterol    0.083% 2.5 milliGRAM(s) Nebulizer every 4 hours PRN  albuterol    90 MICROgram(s) HFA Inhaler 2 Puff(s) Inhalation every 6 hours PRN  ibuprofen  Suspension. 600 milliGRAM(s) Oral every 8 hours PRN  melatonin 3 milliGRAM(s) Oral at bedtime PRN      VITAL SIGNS: Last 24 Hours  T(C): 38.1 (10 Dec 2022 15:00), Max: 38.1 (10 Dec 2022 15:00)  T(F): 100.5 (10 Dec 2022 15:00), Max: 100.5 (10 Dec 2022 15:00)  HR: 77 (10 Dec 2022 20:00) (75 - 111)  BP: 90/57 (10 Dec 2022 20:00) (80/50 - 166/88)  BP(mean): 68 (10 Dec 2022 20:00) (60 - 121)  RR: 14 (10 Dec 2022 20:00) (14 - 22)  SpO2: 96% (10 Dec 2022 20:00) (95% - 98%)    LABS:                        11.2   11.31 )-----------( 508      ( 11 Dec 2022 01:06 )             35.9     12-11    139  |  102  |  10  ----------------------------<  213<H>  4.3   |  27  |  <0.5<L>    Ca    8.7      11 Dec 2022 01:06  Phos  3.5     12-09  Mg     1.7     12-11    TPro  6.3  /  Alb  2.9<L>  /  TBili  0.2  /  DBili  x   /  AST  13  /  ALT  12  /  AlkPhos  100  12-11        ABG - ( 10 Dec 2022 03:58 )  pH, Arterial: 7.36  pH, Blood: x     /  pCO2: 46    /  pO2: 92    / HCO3: 26    / Base Excess: 0.3   /  SaO2: 99.0                  Culture - Bronchial (collected 08 Dec 2022 15:00)  Source: .Bronchial None  Gram Stain (09 Dec 2022 07:14):    Moderate polymorphonuclear leukocytes seen per oil power field    No squamous epithelial cells seen per oil power field    No organisms seen per oil power field  Preliminary Report (10 Dec 2022 13:30):    Few Pseudomonas aeruginosa    Normal Respiratory Tiera present            PHYSICAL EXAM:  EN: very agitated  LUNGS: intubated, dreased breath sounds in the left side, clear right side  HEART: S1/S2 present.    ABD: Soft, non-tender, non-distended.   EXT: Contracted  NEURO: sedated

## 2022-12-11 NOTE — PROGRESS NOTE ADULT - ASSESSMENT
53 y/o man with PMH of MS, nonverbal at baseline, chronic low back pain, ?neurogenic bladder s/p SPC placement and s/p dislodgement was at IR for replacement of SPC when he was found to be in severe sepsis.    #Severe sepsis present on admission 2/2 acute pyelonephritis  -L lung etelectasis/Mucus plug   -Ucx: Few Proteus Mirabilis/Mod E Faecalis  -Blood cx : GPC in clusters x3, 1 culture with staph hominis  -  bcx negative,  bcx NGTD   - previously was on HFNC alternating with BIPAP, nebs and chest PT  -intubated on , bronchopscopy  on  >> removed secretions mucus plugs/atelactasis  - CXR : CXR patchy left lung opacities c/f pneumonia, received 1 dose linezolid  - MRSA negative  -  bronchial culture: leukocytes, no organisms  - ID following, c/w meropenem  - increase precedex and fentanyl; discontinue propofol as patient is on levophed; add versed if needed   - nebs q4 + PRN + mucomyst nebs     #Neurogenic bladder  -had SPC that was dislodged - now with [facility] and SPC was going to be replaced when he became septic  - and IR following  -monitor urine output  -on flomax    #Elevated dimer   -vascular cant do duplex bc pt is contracted, on Therapeutic lovenox     #Dysphagia   -PEG feeds previously on hold as per GI due to PEG leak  -Nutrition consulted > PPN started  - PEG tube study  --> PEG in position, restarted bolus PEG tube feeds     #MS, nonverbal, bedbound, contracted   -repositioning per protocol, continue baclofen    #SUKHDEV   -likely prerenal, resolved   -continue hydration and monitor     #NSTEMI type 2 due to sepsis   -TTE    #Misc  -DVT prophylaxis: Lovenox  -GI prophylaxis: Protonix  -Code status: Full code  -Dispo: MICU    Handoff pendin) transfer to ICU, pt intubated and on levophed - see transfer note dated   2) Pt was agitated overnight, and was fully awake. His BP was in 190s/100s, and HR was >140. Levophed was stopped and pt was monitored but pt was still very agitated with high BP and HR. Propofol was then started and pt became more sedated and BP stabilized.

## 2022-12-11 NOTE — PROGRESS NOTE ADULT - SUBJECTIVE AND OBJECTIVE BOX
Patient is a 52y old  Male who presents with a chief complaint of sepsis (11 Dec 2022 03:05)        Over Night Events:  Remains critically ill on MV.  Sedated.  On Levophed.          ROS:     All ROS are negative except HPI         PHYSICAL EXAM    ICU Vital Signs Last 24 Hrs  T(C): 37.6 (11 Dec 2022 07:00), Max: 38.1 (10 Dec 2022 15:00)  T(F): 99.7 (11 Dec 2022 07:00), Max: 100.5 (10 Dec 2022 15:00)  HR: 100 (11 Dec 2022 07:15) (75 - 111)  BP: 93/55 (11 Dec 2022 07:15) (78/48 - 166/88)  BP(mean): 70 (11 Dec 2022 07:15) (58 - 121)  ABP: --  ABP(mean): --  RR: 16 (11 Dec 2022 07:00) (14 - 16)  SpO2: 100% (11 Dec 2022 07:00) (96% - 100%)    O2 Parameters below as of 11 Dec 2022 07:00  Patient On (Oxygen Delivery Method): ventilator    O2 Concentration (%): 100        CONSTITUTIONAL:  Ill appearing in NAD    ENT:   Airway patent,   Mouth with normal mucosa.   ET tube     EYES:   Pupils equal,   Round and reactive to light.    CARDIAC:   Normal rate,   Regular rhythm.    No edema      RESPIRATORY:   No wheezing  Dec BS Left lung   Normal chest expansion  Not tachypneic,  No use of accessory muscles    GASTROINTESTINAL:  Abdomen soft,   Non-tender,   No guarding,   + BS    MUSCULOSKELETAL:   Range of motion is not limited,  No clubbing, cyanosis    NEUROLOGICAL:   Sedated  Opens eyes     SKIN:   Skin normal color for race,   No evidence of rash.    PSYCHIATRIC:   Normal mood and affect.   No apparent risk to self or others.      12-10-22 @ 07:01  -  12-11-22 @ 07:00  --------------------------------------------------------  IN:    Dexmedetomidine: 201 mL    Enteral Tube Flush: 900 mL    FentaNYL: 194.4 mL    Lactated Ringers Bolus: 500 mL    Midazolam: 4.7 mL    Norepinephrine: 90.8 mL    Osmolite: 360 mL    Propofol: 7.2 mL  Total IN: 2258.1 mL    OUT:    Indwelling Catheter - Urethral (mL): 660 mL  Total OUT: 660 mL    Total NET: 1598.1 mL          LABS:                            11.2   11.31 )-----------( 508      ( 11 Dec 2022 01:06 )             35.9                                               12-11    139  |  102  |  10  ----------------------------<  213<H>  4.3   |  27  |  <0.5<L>    Ca    8.7      11 Dec 2022 01:06  Mg     1.7     12-11    TPro  6.3  /  Alb  2.9<L>  /  TBili  0.2  /  DBili  x   /  AST  13  /  ALT  12  /  AlkPhos  100  12-11                                                                                           LIVER FUNCTIONS - ( 11 Dec 2022 01:06 )  Alb: 2.9 g/dL / Pro: 6.3 g/dL / ALK PHOS: 100 U/L / ALT: 12 U/L / AST: 13 U/L / GGT: x                                                  Culture - Bronchial (collected 08 Dec 2022 15:00)  Source: .Bronchial None  Gram Stain (09 Dec 2022 07:14):    Moderate polymorphonuclear leukocytes seen per oil power field    No squamous epithelial cells seen per oil power field    No organisms seen per oil power field  Preliminary Report (10 Dec 2022 13:30):    Few Pseudomonas aeruginosa    Normal Respiratory Tiera present                                                   Mode: AC/ CMV (Assist Control/ Continuous Mandatory Ventilation)  RR (machine): 14  TV (machine): 450  FiO2: 100  PEEP: 8  ITime: 1  MAP: 7  PIP: 17                                      ABG - ( 10 Dec 2022 03:58 )  pH, Arterial: 7.36  pH, Blood: x     /  pCO2: 46    /  pO2: 92    / HCO3: 26    / Base Excess: 0.3   /  SaO2: 99.0                MEDICATIONS  (STANDING):  acetylcysteine 20% for bronchoscopy 4 milliLiter(s) Nebulizer once  albuterol    0.083% 2.5 milliGRAM(s) Nebulizer every 4 hours  albuterol    90 MICROgram(s) HFA Inhaler 1 Puff(s) Inhalation every 4 hours  baclofen 20 milliGRAM(s) Oral every 12 hours  chlorhexidine 0.12% Liquid 15 milliLiter(s) Oral Mucosa every 12 hours  chlorhexidine 2% Cloths 1 Application(s) Topical daily  dexMEDEtomidine Infusion 0.2 MICROgram(s)/kG/Hr (3.4 mL/Hr) IV Continuous <Continuous>  donepezil 10 milliGRAM(s) Oral at bedtime  enoxaparin Injectable 70 milliGRAM(s) SubCutaneous every 12 hours  fentaNYL   Infusion. 0.5 MICROgram(s)/kG/Hr (3.4 mL/Hr) IV Continuous <Continuous>  lactulose Syrup 10 Gram(s) Oral daily  magnesium sulfate  IVPB 2 Gram(s) IV Intermittent once  memantine 10 milliGRAM(s) Oral two times a day  meropenem  IVPB 1000 milliGRAM(s) IV Intermittent every 8 hours  midazolam Infusion 0.02 mG/kG/Hr (1.36 mL/Hr) IV Continuous <Continuous>  norepinephrine Infusion 0.05 MICROgram(s)/kG/Min (6.38 mL/Hr) IV Continuous <Continuous>  senna 2 Tablet(s) Oral at bedtime  tamsulosin 0.4 milliGRAM(s) Oral at bedtime  zinc oxide 20% Ointment 1 Application(s) Topical three times a day    MEDICATIONS  (PRN):  acetaminophen     Tablet .. 650 milliGRAM(s) Oral every 6 hours PRN Temp greater or equal to 38C (100.4F), Mild Pain (1 - 3), Moderate Pain (4 - 6)  albuterol    0.083% 2.5 milliGRAM(s) Nebulizer every 4 hours PRN Shortness of Breath and/or Wheezing  albuterol    90 MICROgram(s) HFA Inhaler 2 Puff(s) Inhalation every 6 hours PRN Shortness of Breath and/or Wheezing  ibuprofen  Suspension. 600 milliGRAM(s) Oral every 8 hours PRN Temp greater or equal to 38.5C (101.3F)  melatonin 3 milliGRAM(s) Oral at bedtime PRN Insomnia      New X-rays reviewed:                                                                                  ECHO

## 2022-12-12 NOTE — PROGRESS NOTE ADULT - ASSESSMENT
ASSESSMENT  Sepsis POA  Septic shock   Acute hypoxemic respiratory failure/ Left mucus plug / lung atelectasis  Urinary tract infection/ proteus  Bacteremia - MRSE treated   Non verbal at baseline  NSTEMI likely Type 2  history of MS/neurogenic bladder  dysphagia on PEG tube feed  PLAN  continue with tube feed  - local skin care  check bmp/phos/mg and correct lytes     ASSESSMENT  Sepsis POA  Septic shock   Acute hypoxemic respiratory failure/ Left mucus plug / lung atelectasis  Urinary tract infection/ proteus  Bacteremia - MRSE treated   Non verbal at baseline  NSTEMI likely Type 2  history of MS/neurogenic bladder  dysphagia on PEG tube feed  PLAN  when ok to restart peg tube feed   continue with tube feed  - local skin care  check bmp/phos/mg and correct lytes     ASSESSMENT  Sepsis POA  Septic shock   Acute hypoxemic respiratory failure/ Left mucus plug / lung atelectasis  Urinary tract infection/ proteus  Bacteremia - MRSE treated   Non verbal at baseline but able to make himself understood  NSTEMI likely Type 2  history of MS/neurogenic bladder  dysphagia on G tube feedings    PLAN  when ok to restart G tube feeds, resume Osmolite 1.5 240 ml over 30 min q6h  after 2 such feeds tolerated increase to 360 ml over 30-40 min x 3 feeds/d  - local skin care  check bmp/phos/mg and correct lytes - f/u phos

## 2022-12-12 NOTE — PROGRESS NOTE ADULT - ASSESSMENT
53 y/o man with PMH of MS, nonverbal at baseline, chronic low back pain, neurogenic bladder s/p SPC placement and s/p dislodgement was at IR for replacement of SPC when he was found to be in severe sepsis.    #Severe sepsis present on admission 2/2 acute pyelonephritis  -L lung atelectasis/Mucus plug   -Ucx: Few Proteus Mirabilis/Mod E Faecalis  -Blood cx 12/2: GPC in clusters x3, 1 culture with staph hominis  - 12/4 bcx negative, 12/7 bcx NGTD   - previously was on HFNC alternating with BIPAP, nebs and chest PT  -intubated on 12/08, bronchopscopy  on 12/08 >> removed secretions mucus plugs/atelactasis - 12/8 bronchial culture: leukocytes, no organisms  - CXR 12/9: CXR patchy left lung opacities c/f pneumonia, received 1 dose linezolid  - MRSA negative  - ID following, c/w meropenem  - nebs q4 + PRN + mucomyst nebs   - increase precedex and fentanyl  - d/c levo due to tachy and htn  - repeat bronchoscopy today  - if continue to be tachy, can do fentanyl pushes. if pt responds to fentanyl pushes, may need to increase sedation  - daily ABG    #Neurogenic bladder  -had SPC that was dislodged - now with [facility] and SPC was going to be replaced when he became septic  - and IR following  -monitor urine output  -on flomax  -recall IR when improved for possible SPC placement    #Elevated dimer   -vascular cant do duplex bc pt is contracted, on Therapeutic lovenox   - CTA today after bronch    #Dysphagia   -PEG feeds previously on hold as per GI due to PEG leak  -PEG tube adjust by GI and 18 Fr was replaced - resume PEG feeding, hold PPN feeding  -monitor FS    #MS, nonverbal, bedbound, contracted/functional quadriplegia   -repositioning per protocol, continue baclofen    #SUKHDEV   -likely prerenal, resolved   -continue hydration and monitor     #NSTEMI type 2 due to sepsis   -TTE: EF 58%, G1DD, mild MVR    #Misc  -DVT prophylaxis: Lovenox  -GI prophylaxis: Protonix  -Code status: Full code  -Dispo: CEU     53 y/o man with PMH of MS, nonverbal at baseline, chronic low back pain, neurogenic bladder s/p SPC placement and s/p dislodgement was at IR for replacement of SPC when he was found to be in severe sepsis.    #Severe sepsis present on admission 2/2 acute pyelonephritis  -L lung atelectasis/Mucus plug   -Ucx: Few Proteus Mirabilis/Mod E Faecalis  -Blood cx 12/2: GPC in clusters x3, 1 culture with staph hominis  - 12/4 bcx negative, 12/7 bcx NGTD   - previously was on HFNC alternating with BIPAP, nebs and chest PT  -intubated on 12/08, bronchopscopy  on 12/08 >> removed secretions mucus plugs/atelactasis - 12/8 bronchial culture: leukocytes, no organisms  - CXR 12/9: CXR patchy left lung opacities c/f pneumonia, received 1 dose linezolid  - MRSA negative  - ID following, c/w meropenem  - nebs q4 + PRN + mucomyst nebs   - increase precedex and fentanyl  - d/c levo 12/11-12 overnight due to tachy and htn  - repeat bronchoscopy today, f/u cultures  - increased fentanyl, restarted levo. HR now 83  - daily ABG    #Neurogenic bladder  -had SPC that was dislodged - now with [facility] and SPC was going to be replaced when he became septic  - and IR following  -monitor urine output  -on flomax  -recall IR when improved for possible SPC placement    #Elevated dimer   -vascular cant do duplex bc pt is contracted, on Therapeutic lovenox   - CTA today after bronch    #Dysphagia   -PEG feeds previously on hold as per GI due to PEG leak  -PEG tube adjust by GI and 18 Fr was replaced - resume PEG feeding, hold PPN feeding  -monitor FS    #MS, nonverbal, bedbound, contracted/functional quadriplegia   -repositioning per protocol, continue baclofen    #SUKHDEV   -likely prerenal, resolved   -continue hydration and monitor     #NSTEMI type 2 due to sepsis   -TTE: EF 58%, G1DD, mild MVR    #Misc  -DVT prophylaxis: Lovenox  -GI prophylaxis: Protonix  -Code status: Full code  -Dispo: CEU

## 2022-12-12 NOTE — PROCEDURE NOTE - NSBRONCHPROCDETAILS_GEN_A_CORE_FT
PROCEDURE PERFORMED:  Bronchoscopy, washing.            ANESTHESIA: Fentanyl, precedex    CONSENT: After explaining the risk and benefit the consent was obtained from mother Alena.      The patient had been previously intubated and was on mechanical ventilatory support. He was on sedation, which was continued and adjusted during the procedure. His FiO2 was increased to 100% during the procedure. The  fiberoptic bronchoscope was introduced through an endotracheal tube adaptor and the tip of the endotracheal tube was noted to be in good position above the dinone.  Upon inspection, there were copious amounts of secretions noted on the left side, as much as possible was suctioned.  There also some secretions noted on the right side, as much as possible was suctioned.  Suboptimal views were noted throughout 2/2 the copius amounts of secretions/mucous.   The procedure was completed and all samples were submitted for appropriate studies.  After adequate clearing of secretions was accomplished, the bronchoscope was removed from the patient and the procedure was ended.  The patient tolerated the procedure well and there were no complications.    Cultures were sent.         PROCEDURE PERFORMED:  Bronchoscopy, washing.            ANESTHESIA: Fentanyl, precedex    CONSENT: After explaining the risk and benefit the consent was obtained from mother Alena.      The patient had been previously intubated and was on mechanical ventilatory support. He was on sedation, which was continued and adjusted during the procedure. His FiO2 was increased to 100% during the procedure. The  fiberoptic bronchoscope was introduced through an endotracheal tube adaptor and the tip of the endotracheal tube was noted to be in good position above the dionne.  Upon inspection, there were copious amounts of secretions noted on the left side, as much as possible was suctioned.  There also some secretions noted on the right side, as much as possible was suctioned.  Suboptimal views were noted throughout 2/2 the copious amounts of secretions/mucous.   The procedure was completed and all samples were submitted for appropriate studies.  After adequate clearing of secretions was accomplished, the bronchoscope was removed from the patient and the procedure was ended.  The patient tolerated the procedure well and there were no complications.    Cultures were sent.

## 2022-12-12 NOTE — PROGRESS NOTE ADULT - SUBJECTIVE AND OBJECTIVE BOX
Patient is a 52y old  Male who presents with a chief complaint of sepsis (11 Dec 2022 07:45)        Over Night Events:  Remains critically ill on MV>  Sedated.  Off pressors.          ROS:     All ROS are negative except HPI         PHYSICAL EXAM    ICU Vital Signs Last 24 Hrs  T(C): 38 (12 Dec 2022 08:00), Max: 38 (12 Dec 2022 08:00)  T(F): 100.4 (12 Dec 2022 08:00), Max: 100.4 (12 Dec 2022 08:00)  HR: 120 (12 Dec 2022 08:00) (75 - 120)  BP: 110/62 (12 Dec 2022 08:00) (82/52 - 152/96)  BP(mean): 78 (12 Dec 2022 08:00) (62 - 117)  ABP: --  ABP(mean): --  RR: 24 (12 Dec 2022 08:00) (16 - 24)  SpO2: 96% (12 Dec 2022 08:00) (96% - 100%)    O2 Parameters below as of 12 Dec 2022 08:00  Patient On (Oxygen Delivery Method): ventilator  O2 Flow (L/min): 50          CONSTITUTIONAL:  Ill appearing in NAD    ENT:   Airway patent,   Mouth with normal mucosa.     EYES:   Pupils equal,   Round and reactive to light.    CARDIAC:   Tachycardic    Regular rhythm.          RESPIRATORY:   No wheezing  Bilateral BS  Normal chest expansion  Not tachypneic,  No use of accessory muscles    GASTROINTESTINAL:  Abdomen soft,   Non-tender,   No guarding,   + BS    MUSCULOSKELETAL:   Contracted   No clubbing, cyanosis    NEUROLOGICAL:   Opens eyes  Sedated     SKIN:   Skin normal color for race,   Warm and dry and intact.   No evidence of rash.    PSYCHIATRIC:   Sedated   No apparent risk to self or others.          12-11-22 @ 07:01  -  12-12-22 @ 07:00  --------------------------------------------------------  IN:    Dexmedetomidine: 88.4 mL    FentaNYL: 98.6 mL    Midazolam: 3.7 mL    Norepinephrine: 50.1 mL  Total IN: 240.8 mL    OUT:    Indwelling Catheter - Urethral (mL): 740 mL  Total OUT: 740 mL    Total NET: -499.2 mL      12-12-22 @ 07:01  -  12-12-22 @ 08:12  --------------------------------------------------------  IN:    Dexmedetomidine: 10.2 mL    FentaNYL: 13.6 mL  Total IN: 23.8 mL    OUT:    Indwelling Catheter - Urethral (mL): 350 mL  Total OUT: 350 mL    Total NET: -326.2 mL          LABS:                            9.4    11.70 )-----------( 541      ( 12 Dec 2022 00:55 )             31.4                                               12-12    141  |  104  |  9<L>  ----------------------------<  124<H>  4.0   |  29  |  <0.5<L>    Ca    8.4      12 Dec 2022 00:55  Mg     1.9     12-12    TPro  5.7<L>  /  Alb  2.6<L>  /  TBili  0.2  /  DBili  x   /  AST  10  /  ALT  10  /  AlkPhos  82  12-12                                                                                           LIVER FUNCTIONS - ( 12 Dec 2022 00:55 )  Alb: 2.6 g/dL / Pro: 5.7 g/dL / ALK PHOS: 82 U/L / ALT: 10 U/L / AST: 10 U/L / GGT: x                                                                                               Mode: AC/ CMV (Assist Control/ Continuous Mandatory Ventilation)  RR (machine): 14  TV (machine): 450  FiO2: 50  PEEP: 8  ITime: 1  MAP: 21  PIP: 43                                          MEDICATIONS  (STANDING):  acetylcysteine 20% for bronchoscopy 4 milliLiter(s) Nebulizer once  albuterol    0.083% 2.5 milliGRAM(s) Nebulizer every 4 hours  albuterol    90 MICROgram(s) HFA Inhaler 1 Puff(s) Inhalation every 4 hours  baclofen 20 milliGRAM(s) Oral every 12 hours  chlorhexidine 0.12% Liquid 15 milliLiter(s) Oral Mucosa every 12 hours  chlorhexidine 2% Cloths 1 Application(s) Topical daily  dexMEDEtomidine Infusion 0.2 MICROgram(s)/kG/Hr (3.4 mL/Hr) IV Continuous <Continuous>  donepezil 10 milliGRAM(s) Oral at bedtime  enoxaparin Injectable 70 milliGRAM(s) SubCutaneous every 12 hours  fentaNYL   Infusion. 0.5 MICROgram(s)/kG/Hr (3.4 mL/Hr) IV Continuous <Continuous>  lactulose Syrup 10 Gram(s) Oral daily  magnesium sulfate  IVPB 2 Gram(s) IV Intermittent once  memantine 10 milliGRAM(s) Oral two times a day  meropenem  IVPB 1000 milliGRAM(s) IV Intermittent every 8 hours  midazolam Infusion 0.02 mG/kG/Hr (1.36 mL/Hr) IV Continuous <Continuous>  norepinephrine Infusion 0.05 MICROgram(s)/kG/Min (6.38 mL/Hr) IV Continuous <Continuous>  senna 2 Tablet(s) Oral at bedtime  tamsulosin 0.4 milliGRAM(s) Oral at bedtime  zinc oxide 20% Ointment 1 Application(s) Topical three times a day    MEDICATIONS  (PRN):  acetaminophen     Tablet .. 650 milliGRAM(s) Oral every 6 hours PRN Temp greater or equal to 38C (100.4F), Mild Pain (1 - 3), Moderate Pain (4 - 6)  albuterol    0.083% 2.5 milliGRAM(s) Nebulizer every 4 hours PRN Shortness of Breath and/or Wheezing  albuterol    90 MICROgram(s) HFA Inhaler 2 Puff(s) Inhalation every 6 hours PRN Shortness of Breath and/or Wheezing  ibuprofen  Suspension. 600 milliGRAM(s) Oral every 8 hours PRN Temp greater or equal to 38.5C (101.3F)  melatonin 3 milliGRAM(s) Oral at bedtime PRN Insomnia      New X-rays reviewed:                                                                                  ECHO

## 2022-12-12 NOTE — PROGRESS NOTE ADULT - ASSESSMENT
IMPRESSION:    Sepsis POA  Septic shock   Acute hypoxemic respiratory failure/ Left mucus plug / lung atelectasis  Urinary tract infection/ proteus  Bacteremia - MRSE treated   Non verbal at baseline  NSTEMI likely Type 2  history of MS/neurogenic bladder      PLAN:      CNS: SAT after bronchoscopy     HEENT: Oral care.  ET Care     PULMONARY:  Recommend nebs every 4 hours.  Vent changes:  Daily ABG.  Bronchoscopy today.  Aggressive chest PT.  Advance ET 2 cms.  CTA RO PE     CARDIOVASCULAR: avoid volume overload.  Goal directed fluid resuscitation.      GI: GI prophylaxis. PEG feeds.  Bowel regimen     RENAL:  Follow up lytes.  Correct as needed. IR following for possible SPC.     INFECTIOUS DISEASE: ABX per ID.      HEMATOLOGICAL: DVT prophylaxis. On Lovenox    ENDOCRINE:  Follow up FS.  Insulin protocol if needed.    MUSCULOSKELETAL: bedrest    MICU    GOC    Poor prognosis overall

## 2022-12-12 NOTE — PROGRESS NOTE ADULT - SUBJECTIVE AND OBJECTIVE BOX
KEVIN MEDRANO  52y, Male  Allergy: penicillin (Unknown)  vancomycin (Unknown)      LOS  10d    CHIEF COMPLAINT: sepsis (12 Dec 2022 10:12)      INTERVAL EVENTS/HPI  - T(F): , Max: 100.4 (12-12-22 @ 08:00)  - WBC Count: 11.70 (12-12-22 @ 00:55)  WBC Count: 11.31 (12-11-22 @ 01:06)     - Creatinine, Serum: <0.5 (12-12-22 @ 00:55)  Creatinine, Serum: <0.5 (12-11-22 @ 01:06)     -   -   -     ROS  cannot obtain secondary to patient's sedation and/or mental status    VITALS:  T(F): 99.8, Max: 100.4 (12-12-22 @ 08:00)  HR: 114  BP: 112/68  RR: 20Vital Signs Last 24 Hrs  T(C): 37.7 (12 Dec 2022 10:00), Max: 38 (12 Dec 2022 08:00)  T(F): 99.8 (12 Dec 2022 10:00), Max: 100.4 (12 Dec 2022 08:00)  HR: 114 (12 Dec 2022 10:00) (77 - 120)  BP: 112/68 (12 Dec 2022 10:00) (82/52 - 152/96)  BP(mean): 85 (12 Dec 2022 10:00) (62 - 117)  RR: 20 (12 Dec 2022 10:00) (16 - 24)  SpO2: 95% (12 Dec 2022 10:00) (95% - 100%)    Parameters below as of 12 Dec 2022 10:00  Patient On (Oxygen Delivery Method): ventilator  O2 Flow (L/min): 50      PHYSICAL EXAM:  Gen: Intubated chronically ill appearing   CV: RRR  Lungs: Decreased BS at bases  Abd: Soft  Neuro: Sedated  Lines clean, no phlebitis    FH: Non-contributory  Social Hx: Non-contributory    TESTS & MEASUREMENTS:                        9.4    11.70 )-----------( 541      ( 12 Dec 2022 00:55 )             31.4     12-12    141  |  104  |  9<L>  ----------------------------<  124<H>  4.0   |  29  |  <0.5<L>    Ca    8.4      12 Dec 2022 00:55  Mg     1.9     12-12    TPro  5.7<L>  /  Alb  2.6<L>  /  TBili  0.2  /  DBili  x   /  AST  10  /  ALT  10  /  AlkPhos  82  12-12      LIVER FUNCTIONS - ( 12 Dec 2022 00:55 )  Alb: 2.6 g/dL / Pro: 5.7 g/dL / ALK PHOS: 82 U/L / ALT: 10 U/L / AST: 10 U/L / GGT: x               Culture - Bronchial (collected 12-08-22 @ 15:00)  Source: .Bronchial None  Gram Stain (12-09-22 @ 07:14):    Moderate polymorphonuclear leukocytes seen per oil power field    No squamous epithelial cells seen per oil power field    No organisms seen per oil power field  Final Report (12-11-22 @ 11:48):    Few Pseudomonas aeruginosa (Carbapenem Resistant)    Normal Respiratory Tiera present  Organism: Pseudomonas aeruginosa (Carbapenem Resistant) (12-11-22 @ 11:48)  Organism: Pseudomonas aeruginosa (Carbapenem Resistant) (12-11-22 @ 11:48)      -  Amikacin: S <=16      -  Aztreonam: R >16      -  Cefepime: S 8      -  Ceftazidime: S 4      -  Ceftazidime/Avibactam: S <=4      -  Ceftolozane/tazobactam: S <=2      -  Ciprofloxacin: R >2      -  Gentamicin: R >8      -  Imipenem: R >8      -  Levofloxacin: R >4      -  Meropenem: R >8      -  Piperacillin/Tazobactam: S 16      -  Tobramycin: R >8      Method Type: GAURAV    Culture - Blood (collected 12-07-22 @ 13:11)  Source: .Blood None  Preliminary Report (12-08-22 @ 23:02):    No growth to date.    Culture - Blood (collected 12-04-22 @ 08:30)  Source: .Blood None  Final Report (12-09-22 @ 22:00):    No Growth Final    Culture - Blood (collected 12-03-22 @ 21:51)  Source: .Blood None  Final Report (12-09-22 @ 09:00):    No Growth Final    Culture - Urine (collected 12-02-22 @ 10:34)  Source: Clean Catch Clean Catch (Midstream)  Final Report (12-04-22 @ 09:29):    >=3 organisms. Probable collection contamination.    Culture - Blood (collected 12-02-22 @ 08:21)  Source: .Blood Blood-Peripheral  Gram Stain (12-04-22 @ 01:05):    Growth in aerobic bottle: Gram Positive Cocci in Clusters    Growth in anaerobic bottle: Gram Positive Cocci in Clusters  Final Report (12-04-22 @ 21:59):    Growth in aerobic and anaerobic bottles: Staphylococcus hominis Coag    Negative Staphylococcus    Single set isolate, possible contaminant. Contact    Microbiology if susceptibility testing clinically    indicated.    Growth in aerobic bottle: Staphylococcus epidermidis Coag Negative    Staphylococcus    Single set isolate, possible contaminant. Contact    Microbiology if susceptibility testing clinically    indicated.    ***Blood Panel PCR results on this specimen are available    approximately 3 hours after the Gram stain result.***    Gram stain, PCR, and/or culture results may not always    correspond due to difference in methodologies.    ************************************************************    This PCR assay was performed by multiplex PCR. This    Assay tests for 66 bacterial and resistance gene targets.    Please refer to the [facility] test directory    at [email or web address] for details.  Organism: Blood Culture PCR (12-04-22 @ 21:59)  Organism: Blood Culture PCR (12-04-22 @ 21:59)      -  Staphylococcus epidermidis, Methicillin resistant: Detec      Method Type: PCR    Culture - Blood (collected 12-02-22 @ 08:21)  Source: .Blood Blood-Peripheral  Gram Stain (12-03-22 @ 16:14):    Growth in aerobic bottle: Gram Positive Cocci in Clusters  Final Report (12-05-22 @ 10:49):    Growth in aerobic bottle: Staphylococcus epidermidis  Organism: Staphylococcus epidermidis (12-05-22 @ 10:49)  Organism: Staphylococcus epidermidis (12-05-22 @ 10:49)      -  Ampicillin/Sulbactam: S <=8/4      -  Cefazolin: S <=4      -  Clindamycin: S <=0.25      -  Erythromycin: R >4      -  Gentamicin: S <=1 Should not be used as monotherapy      -  Oxacillin: S <=0.25      -  Penicillin: R 2      -  Rifampin: S <=1 Should not be used as monotherapy      -  Tetracycline: R >8      -  Trimethoprim/Sulfamethoxazole: R >2/38      -  Vancomycin: S 2      Method Type: GAURAV    Culture - Urine (collected 12-02-22 @ 06:59)  Source: Suprapubic Suprapubic  Final Report (12-04-22 @ 20:09):    Few Proteus mirabilis ESBL    Moderate Enterococcus faecalis  Organism: Proteus mirabilis ESBL  Enterococcus faecalis (12-04-22 @ 20:09)  Organism: Enterococcus faecalis (12-04-22 @ 20:09)      -  Ampicillin: S <=2 Predicts results to ampicillin/sulbactam, amoxacillin-clavulanate and  piperacillin-tazobactam.      -  Ciprofloxacin: R >2      -  Levofloxacin: R >4      -  Nitrofurantoin: S <=32 Should not be used to treat pyelonephritis.      -  Tetracycline: R >8      -  Vancomycin: S 1      Method Type: GAURAV  Organism: Proteus mirabilis ESBL (12-04-22 @ 20:09)      -  Amikacin: S <=16      -  Amoxicillin/Clavulanic Acid: S <=8/4      -  Ampicillin: R >16 These ampicillin results predict results for amoxicillin      -  Ampicillin/Sulbactam: I 16/8 Enterobacter, Klebsiella aerogenes, Citrobacter, and Serratia may develop resistance during prolonged therapy (3-4 days)      -  Aztreonam: R <=4      -  Cefazolin: R >16 For uncomplicated UTI with K. pneumoniae, E. coli, or P. mirablis: GAURAV <=16 is sensitive and GAURAV >=32 is resistant. This also predicts results for oral agents cefaclor, cefdinir, cefpodoxime, cefprozil, cefuroxime axetil, cephalexin and locarbef for uncomplicated UTI. Note that some isolates may be susceptible to these agents while testing resistant to cefazolin.      -  Cefepime: R >16      -  Ceftriaxone: R >32 Enterobacter, Klebsiella aerogenes, Citrobacter, and Serratia may develop resistance during prolonged therapy      -  Ciprofloxacin: R >2      -  Ertapenem: S <=0.5      -  Gentamicin: I 8      -  Levofloxacin: R >4      -  Meropenem: S <=1      -  Nitrofurantoin: R >64 Should not be used to treat pyelonephritis      -  Piperacillin/Tazobactam: S <=8      -  Tobramycin: I 8      -  Trimethoprim/Sulfamethoxazole: R >2/38      Method Type: GAURAV    Culture - Blood (collected 12-02-22 @ 06:59)  Source: .Blood Blood  Gram Stain (12-03-22 @ 19:58):    Growth in aerobic bottle: Gram Positive Cocci in Clusters  Final Report (12-05-22 @ 08:59):    Growth in aerobic bottle: Staphylococcus hominis  Organism: Staphylococcus hominis (12-05-22 @ 08:59)  Organism: Staphylococcus hominis (12-05-22 @ 08:59)      -  Ampicillin/Sulbactam: R <=8/4      -  Cefazolin: R <=4      -  Clindamycin: S 0.5      -  Erythromycin: R >4      -  Gentamicin: S <=1 Should not be used as monotherapy      -  Oxacillin: R >2      -  Penicillin: R 8      -  Rifampin: S <=1 Should not be used as monotherapy      -  Tetracycline: S <=1      -  Trimethoprim/Sulfamethoxazole: R >2/38      -  Vancomycin: S 2      Method Type: GAURAV            INFECTIOUS DISEASES TESTING  MRSA PCR Result.: Negative (12-09-22 @ 08:40)  Rapid RVP Result: NotDetec (12-07-22 @ 15:28)  Procalcitonin, Serum: 0.53 (12-03-22 @ 10:53)  Legionella Antigen, Urine: Negative (12-03-22 @ 08:10)  COVID-19 PCR: NotDetec (11-28-22 @ 18:30)      INFLAMMATORY MARKERS      RADIOLOGY & ADDITIONAL TESTS:  I have personally reviewed the last available Chest xray  CXR  Xray Chest 1 View- PORTABLE-Urgent:   ACC: [id number] EXAM:  XR CHEST PORTABLE URGENT 1V                          PROCEDURE DATE:  12/11/2022          INTERPRETATION:  Clinical History / Reason for exam: Aspiration    Comparison : Chest radiograph December 10, 2022.    Technique/Positioning: Single AP view of the chest.    Findings:    Support devices: Endotracheal tube and left IJ central venous catheter   are unchanged    Cardiac/mediastinum/hilum: Obscured    Lung parenchyma/Pleura: Complete opacification left hemithorax, new from  prior. No pneumothorax.    Skeleton/soft tissues: Unchanged    Impression:    Complete opacification left hemithorax, new from prior. Correlate for   mucous plug.    Dr. Mercado was made aware of findings December 11, 2022 at 10:25 AM    --- End of Report ---            ELLIOT LANDAU MD; Attending Radiologist  This document has been electronically signed. Dec 11 2022 10:27AM (12-11-22 @ 01:38)      CT      CARDIOLOGY TESTING  12 Lead ECG:   Ventricular Rate 147 BPM    Atrial Rate 147 BPM    P-R Interval 120 ms    QRS Duration 76 ms    Q-T Interval 274 ms    QTC Calculation(Bazett) 428 ms    P Axis 69 degrees    R Axis 65 degrees    T Axis 36 degrees    Diagnosis Line Sinus tachycardia  Nonspecific ST abnormality  Abnormal ECG    Confirmed by Nakita De Jesus MDnifer ([id number]) on 12/12/2022 6:53:35 AM (12-12-22 @ 04:54)  12 Lead ECG:   Ventricular Rate 126 BPM    Atrial Rate 126 BPM    P-R Interval 118 ms    QRS Duration 86 ms    Q-T Interval 308 ms    QTC Calculation(Bazett) 446 ms    P Axis 58 degrees    R Axis 60 degrees    T Axis 40 degrees    Diagnosis Line Sinus tachycardia  Otherwise normal ECG    Confirmed by Jair Brenner ([id number]) on 12/8/2022 10:25:53 PM (12-08-22 @ 19:47)      MEDICATIONS  acetylcysteine 20% for bronchoscopy 4  albuterol    0.083% 2.5  albuterol    90 MICROgram(s) HFA Inhaler 1  baclofen 20  chlorhexidine 0.12% Liquid 15  chlorhexidine 2% Cloths 1  dexMEDEtomidine Infusion 0.2  donepezil 10  enoxaparin Injectable 70  fentaNYL   Infusion. 0.5  lactulose Syrup 10  magnesium sulfate  IVPB 2  memantine 10  meropenem  IVPB 1000  midazolam Infusion 0.02  midazolam Injectable 4  norepinephrine Infusion 0.05  senna 2  tamsulosin 0.4  zinc oxide 20% Ointment 1      WEIGHT  Weight (kg): 68 (12-02-22 @ 08:20)  Creatinine, Serum: <0.5 mg/dL (12-12-22 @ 00:55)      ANTIBIOTICS:  meropenem  IVPB 1000 milliGRAM(s) IV Intermittent every 8 hours      All available historical records have been reviewed

## 2022-12-12 NOTE — PROGRESS NOTE ADULT - SUBJECTIVE AND OBJECTIVE BOX
Overnight pt was tachy to 140s and SBP to 200s so levophed was held. EKG was done showing sinus tachy. Pt given fentanyl push and precedex was titrated up. ROS deferred as pt is sedated.     VITAL SIGNS:  T(C): 37.7 (12-12-22 @ 10:00), Max: 38 (12-12-22 @ 08:00)  HR: 114 (12-12-22 @ 10:00) (75 - 120)  BP: 112/68 (12-12-22 @ 10:00) (82/52 - 152/96)  RR: 20 (12-12-22 @ 10:00) (16 - 24)  SpO2: 95% (12-12-22 @ 10:00) (95% - 100%)      PHYSICAL EXAM:  GENERAL: NAD  HEAD: Atraumatic, Normocephalic  NERVOUS SYSTEM: sedated  CHEST/LUNG: good bilateral air entry  HEART: tachy   ABDOMEN: Soft, Nontender, Nondistended; Bowel sounds present  EXTREMITIES: B/l LE pitting   SKIN: No rashes or lesions    MEDICATIONS:  MEDICATIONS  (STANDING):  acetylcysteine 20% for bronchoscopy 4 milliLiter(s) Nebulizer once  albuterol    0.083% 2.5 milliGRAM(s) Nebulizer every 4 hours  albuterol    90 MICROgram(s) HFA Inhaler 1 Puff(s) Inhalation every 4 hours  baclofen 20 milliGRAM(s) Oral every 12 hours  chlorhexidine 0.12% Liquid 15 milliLiter(s) Oral Mucosa every 12 hours  chlorhexidine 2% Cloths 1 Application(s) Topical daily  dexMEDEtomidine Infusion 0.2 MICROgram(s)/kG/Hr (3.4 mL/Hr) IV Continuous <Continuous>  donepezil 10 milliGRAM(s) Oral at bedtime  enoxaparin Injectable 70 milliGRAM(s) SubCutaneous every 12 hours  fentaNYL   Infusion. 0.5 MICROgram(s)/kG/Hr (3.4 mL/Hr) IV Continuous <Continuous>  lactulose Syrup 10 Gram(s) Oral daily  magnesium sulfate  IVPB 2 Gram(s) IV Intermittent once  memantine 10 milliGRAM(s) Oral two times a day  meropenem  IVPB 1000 milliGRAM(s) IV Intermittent every 8 hours  midazolam Infusion 0.02 mG/kG/Hr (1.36 mL/Hr) IV Continuous <Continuous>  norepinephrine Infusion 0.05 MICROgram(s)/kG/Min (6.38 mL/Hr) IV Continuous <Continuous>  senna 2 Tablet(s) Oral at bedtime  tamsulosin 0.4 milliGRAM(s) Oral at bedtime  zinc oxide 20% Ointment 1 Application(s) Topical three times a day    MEDICATIONS  (PRN):  acetaminophen     Tablet .. 650 milliGRAM(s) Oral every 6 hours PRN Temp greater or equal to 38C (100.4F), Mild Pain (1 - 3), Moderate Pain (4 - 6)  albuterol    0.083% 2.5 milliGRAM(s) Nebulizer every 4 hours PRN Shortness of Breath and/or Wheezing  albuterol    90 MICROgram(s) HFA Inhaler 2 Puff(s) Inhalation every 6 hours PRN Shortness of Breath and/or Wheezing  ibuprofen  Suspension. 600 milliGRAM(s) Oral every 8 hours PRN Temp greater or equal to 38.5C (101.3F)  melatonin 3 milliGRAM(s) Oral at bedtime PRN Insomnia      LABS:                        9.4    11.70 )-----------( 541      ( 12 Dec 2022 00:55 )             31.4     12-12    141  |  104  |  9<L>  ----------------------------<  124<H>  4.0   |  29  |  <0.5<L>    Ca    8.4      12 Dec 2022 00:55  Mg     1.9     12-12    TPro  5.7<L>  /  Alb  2.6<L>  /  TBili  0.2  /  DBili  x   /  AST  10  /  ALT  10  /  AlkPhos  82  12-12

## 2022-12-12 NOTE — PROGRESS NOTE ADULT - SUBJECTIVE AND OBJECTIVE BOX
NUTRITION SUPPORT TEAM  -  PROGRESS NOTE           REVIEW OF SYSTEMS:  Negative except as noted above.     VITALS:  T(F): 100.4 (12-12 @ 08:00), Max: 100.4 (12-12 @ 08:00)  HR: 120 (12-12 @ 08:00) (77 - 120)  BP: 110/62 (12-12 @ 08:00) (102/58 - 152/96)  RR: 24 (12-12 @ 08:00) (16 - 24)  SpO2: 96% (12-12 @ 08:00) (96% - 97%)    HEIGHT/WEIGHT/BMI:   Height (cm): 195.6 (12-03)  Weight (kg): 68 (12-02)  BMI (kg/m2): 17.8 (12-03)    12-11-22 @ 07:01  -  12-12-22 @ 07:00  --------------------------------------------------------  IN:    Dexmedetomidine: 88.4 mL    FentaNYL: 98.6 mL    Midazolam: 3.7 mL    Norepinephrine: 50.1 mL  Total IN: 240.8 mL    OUT:    Indwelling Catheter - Urethral (mL): 740 mL  Total OUT: 740 mL    Total NET: -499.2 mL        STANDING MEDICATIONS:   acetylcysteine 20% for bronchoscopy 4 milliLiter(s) Nebulizer once  albuterol    0.083% 2.5 milliGRAM(s) Nebulizer every 4 hours  albuterol    90 MICROgram(s) HFA Inhaler 1 Puff(s) Inhalation every 4 hours  baclofen 20 milliGRAM(s) Oral every 12 hours  chlorhexidine 0.12% Liquid 15 milliLiter(s) Oral Mucosa every 12 hours  chlorhexidine 2% Cloths 1 Application(s) Topical daily  dexMEDEtomidine Infusion 0.2 MICROgram(s)/kG/Hr IV Continuous <Continuous>  donepezil 10 milliGRAM(s) Oral at bedtime  enoxaparin Injectable 70 milliGRAM(s) SubCutaneous every 12 hours  fentaNYL   Infusion. 0.5 MICROgram(s)/kG/Hr IV Continuous <Continuous>  lactulose Syrup 10 Gram(s) Oral daily  magnesium sulfate  IVPB 2 Gram(s) IV Intermittent once  memantine 10 milliGRAM(s) Oral two times a day  meropenem  IVPB 1000 milliGRAM(s) IV Intermittent every 8 hours  midazolam Infusion 0.02 mG/kG/Hr IV Continuous <Continuous>  norepinephrine Infusion 0.05 MICROgram(s)/kG/Min IV Continuous <Continuous>  senna 2 Tablet(s) Oral at bedtime  tamsulosin 0.4 milliGRAM(s) Oral at bedtime  zinc oxide 20% Ointment 1 Application(s) Topical three times a day      LABS:                         9.4    11.70 )-----------( 541      ( 12 Dec 2022 00:55 )             31.4     141  |  104  |  9<L>  ----------------------------<  124<H>          (12-12-22 @ 00:55)  4.0   |  29  |  <0.5<L>    Ca    8.4          (12-12-22 @ 00:55)  Mg     1.9         (12-12-22 @ 00:55)    TPro  5.7<L>  /  Alb  2.6<L>  /  TBili  0.2  /  DBili  x   /  AST  10  /  ALT  10  /  AlkPhos  82       12-12-22 @ 00:55  Triglycerides, Serum: 149 mg/dL (12-07 @ 08:20)    DIET:   Diet, NPO with Tube Feed:   Tube Feeding Modality: Gastrostomy  Osmolite 1.5 Bhargav  Total Volume for 24 Hours (mL): 960  Bolus  Total Volume of Bolus (mL):  240  Total # of Feeds: 4  Tube Feed Frequency: Every 6 hours   Tube Feed Start Time: 18:00  Bolus Feed Rate (mL per Hour): 240   Bolus Feed Duration (in Hours): 0.5  Free Water Flush  Free Water Flush Instructions:  150ml before and after each bolus (12-11-22 @ 14:05) [Active]      RADIOLOGY:   < from: Xray Chest 1 View-PORTABLE IMMEDIATE (Xray Chest 1 View-PORTABLE IMMEDIATE .) (12.11.22 @ 14:44) >  Impression:  Complete opacification of the left hemithorax, unchanged and faint   opacification overlying the lateral right midlung field, unchanged.  Support tubes and lines as above.   NUTRITION SUPPORT TEAM  -  PROGRESS NOTE     remain vented/ sedated  on peg tube feed  abdomen soft +peg tube in place  Tmax 100.4      REVIEW OF SYSTEMS:  Negative except as noted above.     VITALS:  T(F): 100.4 (12-12 @ 08:00), Max: 100.4 (12-12 @ 08:00)  HR: 120 (12-12 @ 08:00) (77 - 120)  BP: 110/62 (12-12 @ 08:00) (102/58 - 152/96)  RR: 24 (12-12 @ 08:00) (16 - 24)  SpO2: 96% (12-12 @ 08:00) (96% - 97%)    HEIGHT/WEIGHT/BMI:   Height (cm): 195.6 (12-03)  Weight (kg): 68 (12-02)  BMI (kg/m2): 17.8 (12-03)    12-11-22 @ 07:01  -  12-12-22 @ 07:00  --------------------------------------------------------  IN:    Dexmedetomidine: 88.4 mL    FentaNYL: 98.6 mL    Midazolam: 3.7 mL    Norepinephrine: 50.1 mL  Total IN: 240.8 mL    OUT:    Indwelling Catheter - Urethral (mL): 740 mL  Total OUT: 740 mL    Total NET: -499.2 mL        STANDING MEDICATIONS:   acetylcysteine 20% for bronchoscopy 4 milliLiter(s) Nebulizer once  albuterol    0.083% 2.5 milliGRAM(s) Nebulizer every 4 hours  albuterol    90 MICROgram(s) HFA Inhaler 1 Puff(s) Inhalation every 4 hours  baclofen 20 milliGRAM(s) Oral every 12 hours  chlorhexidine 0.12% Liquid 15 milliLiter(s) Oral Mucosa every 12 hours  chlorhexidine 2% Cloths 1 Application(s) Topical daily  dexMEDEtomidine Infusion 0.2 MICROgram(s)/kG/Hr IV Continuous <Continuous>  donepezil 10 milliGRAM(s) Oral at bedtime  enoxaparin Injectable 70 milliGRAM(s) SubCutaneous every 12 hours  fentaNYL   Infusion. 0.5 MICROgram(s)/kG/Hr IV Continuous <Continuous>  lactulose Syrup 10 Gram(s) Oral daily  magnesium sulfate  IVPB 2 Gram(s) IV Intermittent once  memantine 10 milliGRAM(s) Oral two times a day  meropenem  IVPB 1000 milliGRAM(s) IV Intermittent every 8 hours  midazolam Infusion 0.02 mG/kG/Hr IV Continuous <Continuous>  norepinephrine Infusion 0.05 MICROgram(s)/kG/Min IV Continuous <Continuous>  senna 2 Tablet(s) Oral at bedtime  tamsulosin 0.4 milliGRAM(s) Oral at bedtime  zinc oxide 20% Ointment 1 Application(s) Topical three times a day      LABS:                         9.4    11.70 )-----------( 541      ( 12 Dec 2022 00:55 )             31.4     141  |  104  |  9<L>  ----------------------------<  124<H>          (12-12-22 @ 00:55)  4.0   |  29  |  <0.5<L>    Ca    8.4          (12-12-22 @ 00:55)  Mg     1.9         (12-12-22 @ 00:55)    TPro  5.7<L>  /  Alb  2.6<L>  /  TBili  0.2  /  DBili  x   /  AST  10  /  ALT  10  /  AlkPhos  82       12-12-22 @ 00:55  Triglycerides, Serum: 149 mg/dL (12-07 @ 08:20)    DIET:   Diet, NPO with Tube Feed:   Tube Feeding Modality: Gastrostomy  Osmolite 1.5 Bhargav  Total Volume for 24 Hours (mL): 960  Bolus  Total Volume of Bolus (mL):  240  Total # of Feeds: 4  Tube Feed Frequency: Every 6 hours   Tube Feed Start Time: 18:00  Bolus Feed Rate (mL per Hour): 240   Bolus Feed Duration (in Hours): 0.5  Free Water Flush  Free Water Flush Instructions:  150ml before and after each bolus (12-11-22 @ 14:05) [Active]      RADIOLOGY:   < from: Xray Chest 1 View-PORTABLE IMMEDIATE (Xray Chest 1 View-PORTABLE IMMEDIATE .) (12.11.22 @ 14:44) >  Impression:  Complete opacification of the left hemithorax, unchanged and faint   opacification overlying the lateral right midlung field, unchanged.  Support tubes and lines as above.   NUTRITION SUPPORT TEAM  -  PROGRESS NOTE     remain vented/ sedated  on peg tube feed  tube feed on hold for procedure  abdomen soft +peg tube in place  Tmax 100.4      REVIEW OF SYSTEMS:  Negative except as noted above.     VITALS:  T(F): 100.4 (12-12 @ 08:00), Max: 100.4 (12-12 @ 08:00)  HR: 120 (12-12 @ 08:00) (77 - 120)  BP: 110/62 (12-12 @ 08:00) (102/58 - 152/96)  RR: 24 (12-12 @ 08:00) (16 - 24)  SpO2: 96% (12-12 @ 08:00) (96% - 97%)    HEIGHT/WEIGHT/BMI:   Height (cm): 195.6 (12-03)  Weight (kg): 68 (12-02)  BMI (kg/m2): 17.8 (12-03)    12-11-22 @ 07:01  -  12-12-22 @ 07:00  --------------------------------------------------------  IN:    Dexmedetomidine: 88.4 mL    FentaNYL: 98.6 mL    Midazolam: 3.7 mL    Norepinephrine: 50.1 mL  Total IN: 240.8 mL    OUT:    Indwelling Catheter - Urethral (mL): 740 mL  Total OUT: 740 mL    Total NET: -499.2 mL        STANDING MEDICATIONS:   acetylcysteine 20% for bronchoscopy 4 milliLiter(s) Nebulizer once  albuterol    0.083% 2.5 milliGRAM(s) Nebulizer every 4 hours  albuterol    90 MICROgram(s) HFA Inhaler 1 Puff(s) Inhalation every 4 hours  baclofen 20 milliGRAM(s) Oral every 12 hours  chlorhexidine 0.12% Liquid 15 milliLiter(s) Oral Mucosa every 12 hours  chlorhexidine 2% Cloths 1 Application(s) Topical daily  dexMEDEtomidine Infusion 0.2 MICROgram(s)/kG/Hr IV Continuous <Continuous>  donepezil 10 milliGRAM(s) Oral at bedtime  enoxaparin Injectable 70 milliGRAM(s) SubCutaneous every 12 hours  fentaNYL   Infusion. 0.5 MICROgram(s)/kG/Hr IV Continuous <Continuous>  lactulose Syrup 10 Gram(s) Oral daily  magnesium sulfate  IVPB 2 Gram(s) IV Intermittent once  memantine 10 milliGRAM(s) Oral two times a day  meropenem  IVPB 1000 milliGRAM(s) IV Intermittent every 8 hours  midazolam Infusion 0.02 mG/kG/Hr IV Continuous <Continuous>  norepinephrine Infusion 0.05 MICROgram(s)/kG/Min IV Continuous <Continuous>  senna 2 Tablet(s) Oral at bedtime  tamsulosin 0.4 milliGRAM(s) Oral at bedtime  zinc oxide 20% Ointment 1 Application(s) Topical three times a day      LABS:                         9.4    11.70 )-----------( 541      ( 12 Dec 2022 00:55 )             31.4     141  |  104  |  9<L>  ----------------------------<  124<H>          (12-12-22 @ 00:55)  4.0   |  29  |  <0.5<L>    Ca    8.4          (12-12-22 @ 00:55)  Mg     1.9         (12-12-22 @ 00:55)    TPro  5.7<L>  /  Alb  2.6<L>  /  TBili  0.2  /  DBili  x   /  AST  10  /  ALT  10  /  AlkPhos  82       12-12-22 @ 00:55  Triglycerides, Serum: 149 mg/dL (12-07 @ 08:20)    DIET:   Diet, NPO with Tube Feed:   Tube Feeding Modality: Gastrostomy  Osmolite 1.5 Bhargav  Total Volume for 24 Hours (mL): 960  Bolus  Total Volume of Bolus (mL):  240  Total # of Feeds: 4  Tube Feed Frequency: Every 6 hours   Tube Feed Start Time: 18:00  Bolus Feed Rate (mL per Hour): 240   Bolus Feed Duration (in Hours): 0.5  Free Water Flush  Free Water Flush Instructions:  150ml before and after each bolus (12-11-22 @ 14:05) [Active]      RADIOLOGY:   < from: Xray Chest 1 View-PORTABLE IMMEDIATE (Xray Chest 1 View-PORTABLE IMMEDIATE .) (12.11.22 @ 14:44) >  Impression:  Complete opacification of the left hemithorax, unchanged and faint   opacification overlying the lateral right midlung field, unchanged.  Support tubes and lines as above.   NUTRITION SUPPORT TEAM  -  PROGRESS NOTE     remain vented/ sedated on precedex and fentanyl  levo and vaso off this morning  on peg feedings  tube feeds on hold for MRI later today  abdomen soft +peg tube in place  Tmax 100.4    REVIEW OF SYSTEMS:  Negative except as noted above.     VITALS:  T(F): 100.4 (12-12 @ 08:00), Max: 100.4 (12-12 @ 08:00)  HR: 120 (12-12 @ 08:00) (77 - 120)  BP: 110/62 (12-12 @ 08:00) (102/58 - 152/96)  RR: 24 (12-12 @ 08:00) (16 - 24)  SpO2: 96% (12-12 @ 08:00) (96% - 97%)    HEIGHT/WEIGHT/BMI:   Height (cm): 195.6 (12-03)  Weight (kg): 68 (12-02)  BMI (kg/m2): 17.8 (12-03)    12-11-22 @ 07:01  -  12-12-22 @ 07:00  --------------------------------------------------------  IN:    Dexmedetomidine: 88.4 mL    FentaNYL: 98.6 mL    Midazolam: 3.7 mL    Norepinephrine: 50.1 mL  Total IN: 240.8 mL    OUT:    Indwelling Catheter - Urethral (mL): 740 mL  Total OUT: 740 mL    Total NET: -499.2 mL        STANDING MEDICATIONS:   acetylcysteine 20% for bronchoscopy 4 milliLiter(s) Nebulizer once  albuterol    0.083% 2.5 milliGRAM(s) Nebulizer every 4 hours  albuterol    90 MICROgram(s) HFA Inhaler 1 Puff(s) Inhalation every 4 hours  baclofen 20 milliGRAM(s) Oral every 12 hours  chlorhexidine 0.12% Liquid 15 milliLiter(s) Oral Mucosa every 12 hours  chlorhexidine 2% Cloths 1 Application(s) Topical daily  dexMEDEtomidine Infusion 0.2 MICROgram(s)/kG/Hr IV Continuous <Continuous>  donepezil 10 milliGRAM(s) Oral at bedtime  enoxaparin Injectable 70 milliGRAM(s) SubCutaneous every 12 hours  fentaNYL   Infusion. 0.5 MICROgram(s)/kG/Hr IV Continuous <Continuous>  lactulose Syrup 10 Gram(s) Oral daily  magnesium sulfate  IVPB 2 Gram(s) IV Intermittent once  memantine 10 milliGRAM(s) Oral two times a day  meropenem  IVPB 1000 milliGRAM(s) IV Intermittent every 8 hours  midazolam Infusion 0.02 mG/kG/Hr IV Continuous <Continuous>  norepinephrine Infusion 0.05 MICROgram(s)/kG/Min IV Continuous <Continuous>  senna 2 Tablet(s) Oral at bedtime  tamsulosin 0.4 milliGRAM(s) Oral at bedtime  zinc oxide 20% Ointment 1 Application(s) Topical three times a day      LABS:                         9.4    11.70 )-----------( 541      ( 12 Dec 2022 00:55 )             31.4     141  |  104  |  9<L>  ----------------------------<  124<H>          (12-12-22 @ 00:55)  4.0   |  29  |  <0.5<L>    Ca    8.4          (12-12-22 @ 00:55)  Mg     1.9         (12-12-22 @ 00:55)    TPro  5.7<L>  /  Alb  2.6<L>  /  TBili  0.2  /  DBili  x   /  AST  10  /  ALT  10  /  AlkPhos  82       12-12-22 @ 00:55  Triglycerides, Serum: 149 mg/dL (12-07 @ 08:20)    DIET:   Diet, NPO with Tube Feed:   Tube Feeding Modality: Gastrostomy  Osmolite 1.5 Bhargav  Total Volume for 24 Hours (mL): 960  Bolus  Total Volume of Bolus (mL):  240  Total # of Feeds: 4  Tube Feed Frequency: Every 6 hours   Tube Feed Start Time: 18:00  Bolus Feed Rate (mL per Hour): 240   Bolus Feed Duration (in Hours): 0.5  Free Water Flush  Free Water Flush Instructions:  150ml before and after each bolus (12-11-22 @ 14:05) [Active]      RADIOLOGY:   < from: Xray Chest 1 View-PORTABLE IMMEDIATE (Xray Chest 1 View-PORTABLE IMMEDIATE .) (12.11.22 @ 14:44) >  Impression:  Complete opacification of the left hemithorax, unchanged and faint   opacification overlying the lateral right midlung field, unchanged.  Support tubes and lines as above.

## 2022-12-12 NOTE — PROGRESS NOTE ADULT - ASSESSMENT
ASSESSMENT  51 yo male w/ PMH of MS, chronic low back pain, neurogenic bladder? s/p SPC placement and s/p dislodgement, presents for fever. Presented to the ED for SPC dislodgement a few days ago. Was seen by IR and planned for outpatient SPC replacement. Campuzano was placed at [facility]. Today. prior to placement, he was noted to be febrile w/ tachycardia and tachypnea so brought into ED.      IMPRESSION  #Fevers, HAP    12/8 bronch cx     Few Pseudomonas aeruginosa (Carbapenem Resistant) Cefepime: S 8 Piperacillin/Tazobactam: S 16  CXR L white-out, suspect aspiration/mucous plug  #Severe Sepsis on admission T>101F, Pulse>90,WBC>12, lactic acidosis due to acute complicated cystitis    12/4 BCX NGTD     12/3 BCX NGTD     12/2 BCX  Staphylococcus epidermidis, Methicillin resistant: Detec 1/2 bottles ,      12/2 BCX GPC 1/2  Staphylococcus hominis    12/2 BCX  GPC 1/2 Staphylococcus hominis    12/2 UCX   >=3 organisms. Probable collection contamination.    12/2 UCX    Few Proteus mirabilis ESBL    Moderate Enterococcus faecalis    UA Blood: x / Protein: 100 mg/dL / Nitrite: Negative   Leuk Esterase: Large / RBC: 236 /HPF / WBC >720 /HPF   Sq Epi: x / Non Sq Epi: 2 /HPF / Bacteria: Many  #CXR Small left pleural effusion and possible left basilar consolidation.  #MS with neurogenic bladder, dislodged SPC  #Lactic acidosis  #Hypernatremia   QTC Calculation(Bazett) 407 ms  #Severe protein calorie malnutrition  BMI (kg/m2): 17.8    #Abx allergy: penicillin (childhood)  vancomycin (Unknown)    Creatinine, Serum: 0.5 (12-03-22 @ 08:22)    Weight (kg): 68 (12-02-22 @ 08:20)    RECOMMENDATIONS  - D/C Meropenem---> Avycaz 2.5g q8h IV (CRE has high MICs to cefepime and cannot use zosyn as PCN allergy) x 7 days  - Pulm following  - GOC, poor prognosis    If any questions, please call or send a message on [employer] Teams  Please continue to update ID with any pertinent new laboratory or radiographic findings  #[id number]

## 2022-12-13 NOTE — PROCEDURE NOTE - NSBRONCHPROCDETAILS_GEN_A_CORE_FT
PREOPERATIVE DIAGNOSIS: Mucous plug, pneumonia    POSTOPERATIVE DIAGNOSES: Mucous plug, pneumonia      PROCEDURE PERFORMED:  Bronchoscopy, and washing.         CONSENT: After explaining the risk and benefit the consent was obtained from mother Alena.      The patient had been previously intubated and was on mechanical ventilatory support. He was on sedation, which was continued and adjusted during the procedure. His FiO2 was increased to 100% during the procedure. The  fiberoptic bronchoscope was introduced through an endotracheal tube adaptor and the tip of the endotracheal tube was noted to be in good position above the dionne.  Upon inspection, there were copious amounts of secretions noted on the left side, as much as possible was suctioned.  There also some secretions noted on the right side, as much as possible was suctioned.  Suboptimal views were noted throughout 2/2 the copious amounts of secretions/mucous.   The procedure was completed and all samples were submitted for appropriate studies.  After adequate clearing of secretions was accomplished, the bronchoscope was removed from the patient and the procedure was ended.  The patient tolerated the procedure well and there were no complications.

## 2022-12-13 NOTE — PROGRESS NOTE ADULT - ASSESSMENT
ASSESSMENT  Sepsis POA  Septic shock   Acute hypoxemic respiratory failure/ Left mucus plug / lung atelectasis  Urinary tract infection/ proteus  Bacteremia - MRSE treated   Non verbal at baseline but able to make himself understood  NSTEMI likely Type 2  history of MS/neurogenic bladder  dysphagia on G tube feedings    PLAN  increase PEG  tube feed to osmolite  1.5 at  360 ml over 30-40 min x 3 feeds/d  - local skin care  check bmp/phos/mg and correct lytes

## 2022-12-13 NOTE — PROGRESS NOTE ADULT - ASSESSMENT
51 y/o man with PMH of MS, nonverbal at baseline, chronic low back pain, neurogenic bladder s/p SPC placement and s/p dislodgement was at IR for replacement of SPC when he was found to be in severe sepsis.    #Severe sepsis present on admission 2/2 acute pyelonephritis  - L lung atelectasis/Mucus plug   - Ucx: Few Proteus Mirabilis/Mod E Faecalis  - Bcx 12/2: GPC in clusters x3, 1 culture with staph hominis  - 12/4 bcx negative, 12/7 bcx NGTD   - previously was on HFNC alternating with BIPAP, nebs and chest PT  - previously intubated, bronch 12/08 removed secretions/mucus plug/atelectasis - bronchial culture: pseudomonas  - CXR 12/9: CXR patchy left lung opacities c/w pneumonia, received 1 dose linezolid  - MRSA negative  - c/w avycaz per ID  - nebs q4 + PRN + mucomyst nebs   - repeat bronchoscopy 12/12, f/u cultures  - repeat bronchoscopy 12/13, f/u cultures  -   - increased fentanyl, restarted levo. HR now 83  - daily ABG    #Neurogenic bladder  - had SPC that was dislodged - now with [facility], SPC to be replaced but became septic  -  and IR following  - on flomax  - recall IR when improved for possible SPC placement    #Elevated dimer   - vascular can’t do duplex bc pt is contracted, on therapeutic lovenox   - CTA 12/12 no PE, complete left main stem occlusion, near complete atelectasis/consolidation     #Dysphagia   - resume PEG feeding, hold PPN feeding  - monitor FS    #MS, nonverbal, bedbound, contracted/functional quadriplegia   - repositioning per protocol, continue baclofen    #SUKHDEV   -likely prerenal, resolved   -continue hydration and monitor     #NSTEMI type 2 due to sepsis   -TTE: EF 58%, G1DD, mild MVR    Diet: PEG feeds  DVT prophylaxis: Lovenox  GI prophylaxis: Protonix  Code status: Full code  Dispo: CEU 53 y/o man with PMH of MS, nonverbal at baseline, chronic low back pain, neurogenic bladder s/p SPC placement and s/p dislodgement was at IR for replacement of SPC when he was found to be in severe sepsis.    #Severe sepsis present on admission 2/2 acute pyelonephritis  - L lung atelectasis/Mucus plug   - Ucx: Few Proteus Mirabilis/Mod E Faecalis  - Bcx 12/2: GPC in clusters x3, 1 culture with staph hominis  - 12/4 bcx negative, 12/7 bcx NGTD   - previously was on HFNC alternating with BIPAP, nebs and chest PT  - previously intubated, bronch 12/08 removed secretions/mucus plug/atelectasis - bronchial culture: pseudomonas  - CXR 12/9: CXR patchy left lung opacities c/w pneumonia, received 1 dose linezolid  - MRSA negative  - c/w avycaz per ID  - nebs q4 + PRN + mucomyst nebs   - repeat bronchoscopy 12/12, f/u cultures  - repeat bronchoscopy 12/13, f/u cultures  - SAT after bronch  - daily ABG  - fever on 12/12, f/u bcx and repeat  - levo 0.03, precedex 0.7, fentanyl 2. wean levo    #Neurogenic bladder  - had SPC that was dislodged - now with [facility], SPC to be replaced but became septic  - on flomax  - recall IR when improved for possible SPC placement    #Elevated dimer   - vascular can’t do duplex bc pt is contracted  - CTA 12/12 no PE, complete left main stem occlusion, near complete atelectasis/consolidation  - repeat d-dimer to trend  - switch lovenox to ppx only for now, determine need for therapeutic AC once confirmed why he takes eliquis at home    #Dysphagia   - PEG feeding  - monitor FS    #MS, nonverbal, bedbound, contracted/functional quadriplegia   - repositioning per protocol, continue baclofen    #SUKHDEV   -likely prerenal, resolved   -continue hydration and monitor     #NSTEMI type 2 due to sepsis   -TTE: EF 58%, G1DD, mild MVR    Diet: PEG feeds  DVT prophylaxis: Lovenox  GI prophylaxis: Protonix  Code status: Full code  Dispo: CEU 51 y/o man with PMH of MS, nonverbal at baseline, chronic low back pain, neurogenic bladder s/p SPC placement and s/p dislodgement was at IR for replacement of SPC when he was found to be in severe sepsis.    #Severe sepsis present on admission 2/2 acute pyelonephritis  - L lung atelectasis/Mucus plug   - Ucx: Few Proteus Mirabilis/Mod E Faecalis  - Bcx 12/2: GPC in clusters x3, 1 culture with staph hominis  - 12/4 bcx negative, 12/7 bcx NGTD   - previously was on HFNC alternating with BIPAP, nebs and chest PT  - previously intubated, bronch 12/08 removed secretions/mucus plug/atelectasis - bronchial culture: pseudomonas  - CXR 12/9: CXR patchy left lung opacities c/w pneumonia, received 1 dose linezolid  - MRSA negative  - c/w avycaz per ID  - nebs q4 + PRN + mucomyst nebs   - repeat bronchoscopy 12/12, f/u cultures  - repeat bronchoscopy 12/13, f/u cultures  - SAT after bronch  - daily ABG  - fever on 12/12, f/u bcx and repeat  - levo 0.03, precedex 0.7, fentanyl 2. wean levo    #Neurogenic bladder  - had SPC that was dislodged - now with [facility], SPC to be replaced but became septic  - on flomax  - recall IR when improved for possible SPC placement    #Elevated dimer   - 734 -> 1149 -> 713  - vascular can’t do duplex bc pt is contracted  - CTA 12/12 no PE, complete left main stem occlusion, near complete atelectasis/consolidation  - switch lovenox to ppx only for now, spoke with pt mother who is not aware of pt taking AC at home    #Dysphagia   - PEG feeding  - monitor FS    #MS, nonverbal, bedbound, contracted/functional quadriplegia   - repositioning per protocol, continue baclofen    #SUKHDEV   -likely prerenal, resolved   -continue hydration and monitor     #NSTEMI type 2 due to sepsis   -TTE: EF 58%, G1DD, mild MVR    Diet: PEG feeds  DVT prophylaxis: Lovenox  GI prophylaxis: Protonix  Code status: Full code  Dispo: CEU 53 y/o man with PMH of MS, nonverbal at baseline, chronic low back pain, neurogenic bladder s/p SPC placement and s/p dislodgement was at IR for replacement of SPC when he was found to be in severe sepsis.    #Severe sepsis present on admission 2/2 acute pyelonephritis  - L lung atelectasis/Mucus plug   - Ucx: Few Proteus Mirabilis/Mod E Faecalis  - Bcx 12/2: GPC in clusters x3, 1 culture with staph hominis  - 12/4 bcx negative, 12/7 bcx NGTD   - previously was on HFNC alternating with BIPAP, nebs and chest PT  - previously intubated, bronch 12/08 removed secretions/mucus plug/atelectasis - bronchial culture: pseudomonas  - CXR 12/9: CXR patchy left lung opacities c/w pneumonia, received 1 dose linezolid  - MRSA negative  - c/w avycaz per ID  - nebs q4 + PRN + mucomyst nebs   - repeat bronchoscopy 12/12, culture: rare pml, no organisms  - repeat bronchoscopy 12/13, f/u cultures  - SAT after bronch  - daily ABG  - fever on 12/12, f/u bcx and repeat  - levo 0.03, precedex 0.7, fentanyl 2. wean levo    #Neurogenic bladder  - had SPC that was dislodged - now with [facility], SPC to be replaced but became septic  - on flomax  - recall IR when improved for possible SPC placement    #Elevated dimer   - 734 -> 1149 -> 713  - vascular can’t do duplex bc pt is contracted  - CTA 12/12 no PE, complete left main stem occlusion, near complete atelectasis/consolidation  - switch lovenox to ppx only for now, spoke with pt mother who is not aware of pt taking AC at home    #Dysphagia   - PEG feeding  - monitor FS    #MS, nonverbal, bedbound, contracted/functional quadriplegia   - repositioning per protocol, continue baclofen    #SUKHDEV   -likely prerenal, resolved   -continue hydration and monitor     #NSTEMI type 2 due to sepsis   -TTE: EF 58%, G1DD, mild MVR    Diet: PEG feeds  DVT prophylaxis: Lovenox  GI prophylaxis: Protonix  Code status: Full code  Dispo: CEU/ICU    Pending: wean levo, bronc cx, bcx, IR when SPC placement possible, f/u mother about home AC meds

## 2022-12-13 NOTE — PROGRESS NOTE ADULT - SUBJECTIVE AND OBJECTIVE BOX
No acute events overnight. Pt sedated    VITAL SIGNS:  T(C): 37.9 (12-13-22 @ 08:00), Max: 38.3 (12-12-22 @ 16:00)  HR: 75 (12-13-22 @ 08:00) (74 - 124)  BP: 112/68 (12-13-22 @ 08:00) (77/51 - 144/90)  RR: 18 (12-13-22 @ 08:00) (17 - 20)  SpO2: 100% (12-13-22 @ 08:00) (97% - 100%)      PHYSICAL EXAM:  GENERAL: NAD, well-groomed, well-developed  HEAD:  Atraumatic, Normocephalic  ENMT: intubated  NECK: Supple  NERVOUS SYSTEM: sedated  CHEST/LUNG: vented, bilateral breath sounds, no wheezing  HEART: Regular rate and rhythm. Normal S1/S1. No murmurs  ABDOMEN: Soft, Nontender, Nondistended; Bowel sounds present  EXTREMITIES: b/l pitting edema      MEDICATIONS:  MEDICATIONS  (STANDING):  acetylcysteine 20% for bronchoscopy 4 milliLiter(s) Nebulizer once  albuterol    90 MICROgram(s) HFA Inhaler 1 Puff(s) Inhalation every 4 hours  baclofen 20 milliGRAM(s) Oral every 12 hours  ceftazidime/avibactam IVPB 2.5 Gram(s) IV Intermittent every 8 hours  chlorhexidine 0.12% Liquid 15 milliLiter(s) Oral Mucosa every 12 hours  chlorhexidine 2% Cloths 1 Application(s) Topical daily  dexMEDEtomidine Infusion 0.2 MICROgram(s)/kG/Hr (3.4 mL/Hr) IV Continuous <Continuous>  donepezil 10 milliGRAM(s) Oral at bedtime  enoxaparin Injectable 40 milliGRAM(s) SubCutaneous every 24 hours  fentaNYL    Injectable 50 MICROGram(s) IV Push once  fentaNYL   Infusion. 0.5 MICROgram(s)/kG/Hr (3.4 mL/Hr) IV Continuous <Continuous>  lactulose Syrup 10 Gram(s) Oral daily  magnesium sulfate  IVPB 2 Gram(s) IV Intermittent once  memantine 10 milliGRAM(s) Oral two times a day  midazolam Infusion 0.02 mG/kG/Hr (1.36 mL/Hr) IV Continuous <Continuous>  norepinephrine Infusion 0.05 MICROgram(s)/kG/Min (6.38 mL/Hr) IV Continuous <Continuous>  senna 2 Tablet(s) Oral at bedtime  tamsulosin 0.4 milliGRAM(s) Oral at bedtime  zinc oxide 20% Ointment 1 Application(s) Topical three times a day    MEDICATIONS  (PRN):  acetaminophen     Tablet .. 650 milliGRAM(s) Oral every 6 hours PRN Temp greater or equal to 38C (100.4F), Mild Pain (1 - 3), Moderate Pain (4 - 6)  ibuprofen  Suspension. 600 milliGRAM(s) Oral every 8 hours PRN Temp greater or equal to 38.5C (101.3F)  melatonin 3 milliGRAM(s) Oral at bedtime PRN Insomnia      LABS:                        9.4    9.22  )-----------( 462      ( 13 Dec 2022 01:10 )             31.6     12-13    141  |  106  |  10  ----------------------------<  101<H>  4.5   |  28  |  <0.5<L>    Ca    8.4      13 Dec 2022 01:10  Phos  3.0     12-13  Mg     1.8     12-13    TPro  5.8<L>  /  Alb  2.5<L>  /  TBili  0.2  /  DBili  x   /  AST  15  /  ALT  11  /  AlkPhos  82  12-13

## 2022-12-13 NOTE — PROGRESS NOTE ADULT - SUBJECTIVE AND OBJECTIVE BOX
Patient is a 52y old  Male who presents with a chief complaint of sepsis (12 Dec 2022 11:03)        Over Night Events:  remains critically ill on MV.  Sedated.  SP bronchoscopy yesterday.  On Leovphed 0.03.          ROS:     All ROS are negative except HPI         PHYSICAL EXAM    ICU Vital Signs Last 24 Hrs  T(C): 37.9 (13 Dec 2022 08:00), Max: 38.3 (12 Dec 2022 16:00)  T(F): 100.2 (13 Dec 2022 08:00), Max: 101 (12 Dec 2022 16:00)  HR: 75 (13 Dec 2022 08:00) (74 - 124)  BP: 112/68 (13 Dec 2022 08:00) (77/51 - 144/90)  BP(mean): 83 (12 Dec 2022 19:00) (60 - 112)  ABP: --  ABP(mean): --  RR: 18 (13 Dec 2022 08:00) (17 - 22)  SpO2: 100% (13 Dec 2022 08:00) (95% - 100%)    O2 Parameters below as of 12 Dec 2022 19:00  Patient On (Oxygen Delivery Method): ventilator  O2 Flow (L/min): 50          CONSTITUTIONAL:  Ill appearing in  NAD    ENT:   Airway patent,   Mouth with normal mucosa.   + ET     EYES:   Pupils equal,   Round and reactive to light.    CARDIAC:   Normal rate,   Regular rhythm.        RESPIRATORY:   No wheezing  Bilateral BS  Normal chest expansion  Not tachypneic,  No use of accessory muscles    GASTROINTESTINAL:  Abdomen soft,   Non-tender,   No guarding,   + BS    MUSCULOSKELETAL:   Contracted   No clubbing, cyanosis    NEUROLOGICAL:   Does not follow commands     SKIN:   Skin normal color for race,   No evidence of rash.    PSYCHIATRIC:   No apparent risk to self or others.          12-12-22 @ 07:01  -  12-13-22 @ 07:00  --------------------------------------------------------  IN:    Dexmedetomidine: 237.6 mL    Enteral Tube Flush: 150 mL    FentaNYL: 285.6 mL    Norepinephrine: 71.2 mL    Osmolite: 720 mL  Total IN: 1464.4 mL    OUT:    Indwelling Catheter - Urethral (mL): 1380 mL  Total OUT: 1380 mL    Total NET: 84.4 mL          LABS:                            9.4    9.22  )-----------( 462      ( 13 Dec 2022 01:10 )             31.6                                               12-13    141  |  106  |  10  ----------------------------<  101<H>  4.5   |  28  |  <0.5<L>    Ca    8.4      13 Dec 2022 01:10  Phos  3.0     12-13  Mg     1.8     12-13    TPro  5.8<L>  /  Alb  2.5<L>  /  TBili  0.2  /  DBili  x   /  AST  15  /  ALT  11  /  AlkPhos  82  12-13                                                                                           LIVER FUNCTIONS - ( 13 Dec 2022 01:10 )  Alb: 2.5 g/dL / Pro: 5.8 g/dL / ALK PHOS: 82 U/L / ALT: 11 U/L / AST: 15 U/L / GGT: x                                                  Culture - Sputum (collected 12 Dec 2022 11:55)  Source: Trach Asp Tracheal Aspirate  Gram Stain (13 Dec 2022 02:13):    Rare polymorphonuclear leukocytes per low power field    No Squamous epithelial cells per low power field    No organisms seen per oil power field                                                   Mode: AC/ CMV (Assist Control/ Continuous Mandatory Ventilation)  RR (machine): 14  TV (machine): 450  FiO2: 50  PEEP: 8  ITime: 0.7  MAP: 12  PIP: 20                                      ABG - ( 13 Dec 2022 05:48 )  pH, Arterial: 7.47  pH, Blood: x     /  pCO2: 42    /  pO2: 119   / HCO3: 31    / Base Excess: 6.2   /  SaO2: 99.0                MEDICATIONS  (STANDING):  acetylcysteine 20% for bronchoscopy 4 milliLiter(s) Nebulizer once  albuterol    0.083% 2.5 milliGRAM(s) Nebulizer every 4 hours  albuterol    90 MICROgram(s) HFA Inhaler 1 Puff(s) Inhalation every 4 hours  baclofen 20 milliGRAM(s) Oral every 12 hours  ceftazidime/avibactam IVPB 2.5 Gram(s) IV Intermittent every 8 hours  chlorhexidine 0.12% Liquid 15 milliLiter(s) Oral Mucosa every 12 hours  chlorhexidine 2% Cloths 1 Application(s) Topical daily  dexMEDEtomidine Infusion 0.2 MICROgram(s)/kG/Hr (3.4 mL/Hr) IV Continuous <Continuous>  donepezil 10 milliGRAM(s) Oral at bedtime  enoxaparin Injectable 70 milliGRAM(s) SubCutaneous every 12 hours  fentaNYL   Infusion. 0.5 MICROgram(s)/kG/Hr (3.4 mL/Hr) IV Continuous <Continuous>  lactulose Syrup 10 Gram(s) Oral daily  magnesium sulfate  IVPB 2 Gram(s) IV Intermittent once  memantine 10 milliGRAM(s) Oral two times a day  midazolam Infusion 0.02 mG/kG/Hr (1.36 mL/Hr) IV Continuous <Continuous>  norepinephrine Infusion 0.05 MICROgram(s)/kG/Min (6.38 mL/Hr) IV Continuous <Continuous>  senna 2 Tablet(s) Oral at bedtime  tamsulosin 0.4 milliGRAM(s) Oral at bedtime  zinc oxide 20% Ointment 1 Application(s) Topical three times a day    MEDICATIONS  (PRN):  acetaminophen     Tablet .. 650 milliGRAM(s) Oral every 6 hours PRN Temp greater or equal to 38C (100.4F), Mild Pain (1 - 3), Moderate Pain (4 - 6)  albuterol    0.083% 2.5 milliGRAM(s) Nebulizer every 4 hours PRN Shortness of Breath and/or Wheezing  albuterol    90 MICROgram(s) HFA Inhaler 2 Puff(s) Inhalation every 6 hours PRN Shortness of Breath and/or Wheezing  ibuprofen  Suspension. 600 milliGRAM(s) Oral every 8 hours PRN Temp greater or equal to 38.5C (101.3F)  melatonin 3 milliGRAM(s) Oral at bedtime PRN Insomnia      New X-rays reviewed:                                                                                  ECHO    CXR interpreted by me:

## 2022-12-13 NOTE — PROGRESS NOTE ADULT - SUBJECTIVE AND OBJECTIVE BOX
NUTRITION SUPPORT TEAM  -  PROGRESS NOTE   remain vented/sedated  on peg tube feed  abdomen soft  n/d + peg tube in place  Tmax 100.4  REVIEW OF SYSTEMS:  Negative except as noted above.     VITALS:  T(F): 100.4 (12-13 @ 12:00), Max: 100.4 (12-13 @ 12:00)  HR: 80 (12-13 @ 12:00) (74 - 104)  BP: 86/50 (12-13 @ 12:00) (86/50 - 140/96)  RR: 20 (12-13 @ 12:00) (18 - 26)  SpO2: 97% (12-13 @ 12:00) (97% - 100%)    HEIGHT/WEIGHT/BMI:   Height (cm): 195.6 (12-03)  Weight (kg): 68 (12-02)  BMI (kg/m2): 17.8 (12-03)    12-12-22 @ 07:01  -  12-13-22 @ 07:00  --------------------------------------------------------  IN:    Dexmedetomidine: 237.6 mL    Enteral Tube Flush: 150 mL    FentaNYL: 285.6 mL    Norepinephrine: 71.2 mL    Osmolite: 720 mL  Total IN: 1464.4 mL    OUT:    Indwelling Catheter - Urethral (mL): 1380 mL  Total OUT: 1380 mL    Total NET: 84.4 mL        STANDING MEDICATIONS:   acetylcysteine 20% for bronchoscopy 4 milliLiter(s) Nebulizer once  albuterol    90 MICROgram(s) HFA Inhaler 1 Puff(s) Inhalation every 4 hours  baclofen 20 milliGRAM(s) Oral every 12 hours  ceftazidime/avibactam IVPB 2.5 Gram(s) IV Intermittent every 8 hours  chlorhexidine 0.12% Liquid 15 milliLiter(s) Oral Mucosa every 12 hours  chlorhexidine 2% Cloths 1 Application(s) Topical daily  dexMEDEtomidine Infusion 0.2 MICROgram(s)/kG/Hr IV Continuous <Continuous>  donepezil 10 milliGRAM(s) Oral at bedtime  enoxaparin Injectable 40 milliGRAM(s) SubCutaneous every 24 hours  fentaNYL   Infusion. 0.5 MICROgram(s)/kG/Hr IV Continuous <Continuous>  lactulose Syrup 10 Gram(s) Oral daily  magnesium sulfate  IVPB 2 Gram(s) IV Intermittent once  memantine 10 milliGRAM(s) Oral two times a day  midazolam Infusion 0.02 mG/kG/Hr IV Continuous <Continuous>  norepinephrine Infusion 0.05 MICROgram(s)/kG/Min IV Continuous <Continuous>  senna 2 Tablet(s) Oral at bedtime  tamsulosin 0.4 milliGRAM(s) Oral at bedtime  zinc oxide 20% Ointment 1 Application(s) Topical three times a day      LABS:                         9.4    9.22  )-----------( 462      ( 13 Dec 2022 01:10 )             31.6     141  |  106  |  10  ----------------------------<  101<H>          (12-13-22 @ 01:10)  4.5   |  28  |  <0.5<L>    Ca    8.4          (12-13-22 @ 01:10)  Phos  3.0         (12-13-22 @ 01:10)  Mg     1.8         (12-13-22 @ 01:10)    TPro  5.8<L>  /  Alb  2.5<L>  /  TBili  0.2  /  DBili  x   /  AST  15  /  ALT  11  /  AlkPhos  82       12-13-22 @ 01:10  Triglycerides, Serum: 149 mg/dL (12-07 @ 08:20)  Blood Glucose (Past 24 hours):  150 mg/dL (12-13 @ 11:48)      DIET:   Diet, NPO with Tube Feed:   Tube Feeding Modality: Gastrostomy  Osmolite 1.5 Bhargav  Total Volume for 24 Hours (mL): 960  Bolus  Total Volume of Bolus (mL):  240  Total # of Feeds: 4  Tube Feed Frequency: Every 6 hours   Tube Feed Start Time: 18:00  Bolus Feed Rate (mL per Hour): 240   Bolus Feed Duration (in Hours): 0.5  Free Water Flush  Free Water Flush Instructions:  150ml before and after each bolus (12-11-22 @ 14:05) [Active]    RADIOLOGY:   < from: Xray Chest 1 View- PORTABLE-Routine (Xray Chest 1 View- PORTABLE-Routine in AM.) (12.13.22 @ 05:34) >  IMPRESSION:  Unchanged lines and tubes.  Increased left effusion/opacities now with essentially complete   opacification of the left hemithorax.  Obscured cardiomediastinal silhouette.  Unchanged osseous structures.

## 2022-12-13 NOTE — PROGRESS NOTE ADULT - ASSESSMENT
IMPRESSION:    Sepsis POA  Septic shock   Acute hypoxemic respiratory failure/ Left mucus plug / lung atelectasis  Urinary tract infection/ proteus  Bacteremia - MRSE treated   Non verbal at baseline  NSTEMI likely Type 2  history of MS/neurogenic bladder      PLAN:      CNS: SAT after bronchoscopy     HEENT: Oral care.  ET Care     PULMONARY:  Recommend nebs every 4 hours.  Vent changes:  Daily ABG.  Repeat Bronchoscopy today.  Aggressive chest PT.  Advance ET 2 cms.  CTA no PE     CARDIOVASCULAR: avoid volume overload.  Goal directed fluid resuscitation.  Wean Levophed     GI: GI prophylaxis. PEG feeds.  Bowel regimen     RENAL:  Follow up lytes.  Correct as needed. IR following for possible SPC.     INFECTIOUS DISEASE: ABX per ID.  FU culutres     HEMATOLOGICAL: DVT prophylaxis. On Lovenox.     ENDOCRINE:  Follow up FS.  Insulin protocol if needed.    MUSCULOSKELETAL: bedrest    MICU    GOC    Poor prognosis overall

## 2022-12-14 NOTE — PROGRESS NOTE ADULT - SUBJECTIVE AND OBJECTIVE BOX
Patient is a 52y old  Male who presents with a chief complaint of sepsis (13 Dec 2022 13:07)        Over Night Events:  Remains critically ill on MV. Sedated.  On Levophed  SP bronchoscopy         ROS:     All ROS are negative except HPI         PHYSICAL EXAM    ICU Vital Signs Last 24 Hrs  T(C): 37.5 (14 Dec 2022 07:50), Max: 38.4 (13 Dec 2022 20:00)  T(F): 99.5 (14 Dec 2022 07:50), Max: 101.2 (13 Dec 2022 20:00)  HR: 69 (14 Dec 2022 08:00) (69 - 119)  BP: 98/61 (14 Dec 2022 08:00) (86/50 - 142/82)  BP(mean): 75 (14 Dec 2022 08:00) (62 - 84)  ABP: --  ABP(mean): --  RR: 19 (14 Dec 2022 08:00) (18 - 26)  SpO2: 99% (14 Dec 2022 08:00) (95% - 100%)    O2 Parameters below as of 14 Dec 2022 08:00  Patient On (Oxygen Delivery Method): ventilator    O2 Concentration (%): 40        CONSTITUTIONAL:  Ill appearing in  NAD    ENT:   Airway patent,   Mouth with normal mucosa.   + ET     EYES:   Pupils equal,   Round and reactive to light.    CARDIAC:   Normal rate,   Regular rhythm.        RESPIRATORY:   No wheezing  Bilateral crackles   Normal chest expansion  Not tachypneic,  No use of accessory muscles    GASTROINTESTINAL:  Abdomen soft,   Non-tender,   No guarding,   + BS      NEUROLOGICAL:   Sedated     SKIN:   Skin normal color for race,   No evidence of rash.    PSYCHIATRIC:   No apparent risk to self or others.          12-13-22 @ 07:01  -  12-14-22 @ 07:00  --------------------------------------------------------  IN:    Dexmedetomidine: 418.2 mL    Enteral Tube Flush: 300 mL    FentaNYL: 343.4 mL    Norepinephrine: 85.3 mL    Osmolite: 720 mL  Total IN: 1866.9 mL    OUT:    Indwelling Catheter - Urethral (mL): 1800 mL  Total OUT: 1800 mL    Total NET: 66.9 mL          LABS:                            8.6    14.75 )-----------( 527      ( 14 Dec 2022 01:30 )             28.5                                               12-14    144  |  108  |  12  ----------------------------<  116<H>  4.1   |  30  |  <0.5<L>    Ca    8.3<L>      14 Dec 2022 01:30  Phos  3.4     12-14  Mg     1.8     12-14    TPro  5.5<L>  /  Alb  2.4<L>  /  TBili  0.2  /  DBili  x   /  AST  11  /  ALT  10  /  AlkPhos  73  12-14                                                                                           LIVER FUNCTIONS - ( 14 Dec 2022 01:30 )  Alb: 2.4 g/dL / Pro: 5.5 g/dL / ALK PHOS: 73 U/L / ALT: 10 U/L / AST: 11 U/L / GGT: x                                                  Culture - Fungal, Bronchial (collected 13 Dec 2022 09:40)  Source: .Bronchial None  Preliminary Report (14 Dec 2022 06:59):    Testing in progress    Culture - Bronchial (collected 13 Dec 2022 09:40)  Source: .Bronchial None  Gram Stain (13 Dec 2022 20:33):    Moderate polymorphonuclear leukocytes per low power field    Rare Squamous epithelial cells per low power field    Few Gram Positive Cocci in Clusters per oil power field    Culture - Blood (collected 12 Dec 2022 20:08)  Source: .Blood Blood  Preliminary Report (14 Dec 2022 02:02):    No growth to date.    Culture - Sputum (collected 12 Dec 2022 11:55)  Source: Trach Asp Tracheal Aspirate  Gram Stain (13 Dec 2022 02:13):    Rare polymorphonuclear leukocytes per low power field    No Squamous epithelial cells per low power field    No organisms seen per oil power field  Preliminary Report (13 Dec 2022 21:21):    Moderate Pseudomonas aeruginosa                                                   Mode: AC/ CMV (Assist Control/ Continuous Mandatory Ventilation)  RR (machine): 14  TV (machine): 450  FiO2: 40  PEEP: 8  ITime: 0.7  MAP: 11  PIP: 21                                      ABG - ( 14 Dec 2022 04:45 )  pH, Arterial: 7.45  pH, Blood: x     /  pCO2: 43    /  pO2: 83    / HCO3: 30    / Base Excess: 5.2   /  SaO2: 97.5                MEDICATIONS  (STANDING):  acetylcysteine 20% for bronchoscopy 4 milliLiter(s) Nebulizer once  albuterol    90 MICROgram(s) HFA Inhaler 1 Puff(s) Inhalation every 4 hours  baclofen 20 milliGRAM(s) Oral every 12 hours  ceftazidime/avibactam IVPB 2.5 Gram(s) IV Intermittent every 8 hours  chlorhexidine 0.12% Liquid 15 milliLiter(s) Oral Mucosa every 12 hours  chlorhexidine 2% Cloths 1 Application(s) Topical daily  dexMEDEtomidine Infusion 0.2 MICROgram(s)/kG/Hr (3.4 mL/Hr) IV Continuous <Continuous>  donepezil 10 milliGRAM(s) Oral at bedtime  enoxaparin Injectable 40 milliGRAM(s) SubCutaneous every 24 hours  fentaNYL   Infusion. 0.5 MICROgram(s)/kG/Hr (3.4 mL/Hr) IV Continuous <Continuous>  lactulose Syrup 10 Gram(s) Oral daily  magnesium sulfate  IVPB 2 Gram(s) IV Intermittent once  memantine 10 milliGRAM(s) Oral two times a day  midazolam Infusion 0.02 mG/kG/Hr (1.36 mL/Hr) IV Continuous <Continuous>  norepinephrine Infusion 0.05 MICROgram(s)/kG/Min (6.38 mL/Hr) IV Continuous <Continuous>  senna 2 Tablet(s) Oral at bedtime  tamsulosin 0.4 milliGRAM(s) Oral at bedtime  zinc oxide 20% Ointment 1 Application(s) Topical three times a day    MEDICATIONS  (PRN):  acetaminophen     Tablet .. 650 milliGRAM(s) Oral every 6 hours PRN Temp greater or equal to 38C (100.4F), Mild Pain (1 - 3), Moderate Pain (4 - 6)  ibuprofen  Suspension. 600 milliGRAM(s) Oral every 8 hours PRN Temp greater or equal to 38.5C (101.3F)  melatonin 3 milliGRAM(s) Oral at bedtime PRN Insomnia      New X-rays reviewed:                                                                                  ECHO

## 2022-12-14 NOTE — PATIENT PROFILE ADULT - FALL HARM RISK - HARM RISK INTERVENTIONS

## 2022-12-14 NOTE — PROGRESS NOTE ADULT - ASSESSMENT
ASSESSMENT  Sepsis POA  Septic shock   Acute hypoxemic respiratory failure/ Left mucus plug / lung atelectasis  Urinary tract infection/ proteus  Bacteremia - MRSE treated   Non verbal at baseline but able to make himself understood  NSTEMI likely Type 2  history of MS/neurogenic bladder  dysphagia on G tube feedings    PLAN  continue with  osmolite  1.5 at  360 ml over 30-40 min x 3 feeds/d  - local skin care  check bmp/phos/mg and correct lytes  ASSESSMENT  Sepsis POA  Septic shock   Acute hypoxemic respiratory failure/ Left mucus plug / lung atelectasis  Urinary tract infection/ proteus  Bacteremia - MRSE treated   Non verbal at baseline but able to make himself understood  NSTEMI likely Type 2  history of MS/neurogenic bladder  dysphagia on G tube feedings    PLAN  continue with  Osmolite  1.5 at  360 ml over 30-40 min x 3 feeds/d  - local skin care  watch CVC closely as if pt remains diaphoretic dressing may open again  check bmp/phos/mg and correct lytes

## 2022-12-14 NOTE — PROGRESS NOTE ADULT - SUBJECTIVE AND OBJECTIVE BOX
Fever overnight 101.2F. NSVT up to 8 beats on tele. ROS deferred, pt sedated    VITAL SIGNS:  T(C): 37.5 (12-14-22 @ 07:50), Max: 38.4 (12-13-22 @ 20:00)  HR: 72 (12-14-22 @ 08:30) (69 - 119)  BP: 111/62 (12-14-22 @ 08:30) (86/50 - 142/82)  RR: 19 (12-14-22 @ 08:30) (18 - 26)  SpO2: 100% (12-14-22 @ 08:30) (95% - 100%)      PHYSICAL EXAM:  GENERAL: NAD  HEAD:  Atraumatic, Normocephalic  ENMT: intubated  NERVOUS SYSTEM: sedated  CHEST/LUNG: intubated, vented. bilateral breath sounds decreased on the left, crackles, no wheezing  HEART: Regular rate and rhythm. Normal S1/S1. No murmurs  ABDOMEN: Soft, Nontender, Nondistended; Bowel sounds present  EXTREMITIES: b/l pitting edema, contracted      MEDICATIONS:  MEDICATIONS  (STANDING):  acetylcysteine 20% for bronchoscopy 4 milliLiter(s) Nebulizer once  albuterol    90 MICROgram(s) HFA Inhaler 1 Puff(s) Inhalation every 4 hours  baclofen 20 milliGRAM(s) Oral every 12 hours  ceftazidime/avibactam IVPB 2.5 Gram(s) IV Intermittent every 8 hours  chlorhexidine 0.12% Liquid 15 milliLiter(s) Oral Mucosa every 12 hours  chlorhexidine 2% Cloths 1 Application(s) Topical daily  dexMEDEtomidine Infusion 0.2 MICROgram(s)/kG/Hr (3.4 mL/Hr) IV Continuous <Continuous>  donepezil 10 milliGRAM(s) Oral at bedtime  enoxaparin Injectable 40 milliGRAM(s) SubCutaneous every 24 hours  fentaNYL   Infusion. 0.5 MICROgram(s)/kG/Hr (3.4 mL/Hr) IV Continuous <Continuous>  lactulose Syrup 10 Gram(s) Oral daily  linezolid  IVPB 600 milliGRAM(s) IV Intermittent once  magnesium sulfate  IVPB 2 Gram(s) IV Intermittent once  magnesium sulfate  IVPB 2 Gram(s) IV Intermittent once  memantine 10 milliGRAM(s) Oral two times a day  midazolam Infusion 0.02 mG/kG/Hr (1.36 mL/Hr) IV Continuous <Continuous>  norepinephrine Infusion 0.05 MICROgram(s)/kG/Min (6.38 mL/Hr) IV Continuous <Continuous>  senna 2 Tablet(s) Oral at bedtime  tamsulosin 0.4 milliGRAM(s) Oral at bedtime  zinc oxide 20% Ointment 1 Application(s) Topical three times a day    MEDICATIONS  (PRN):  acetaminophen     Tablet .. 650 milliGRAM(s) Oral every 6 hours PRN Temp greater or equal to 38C (100.4F), Mild Pain (1 - 3), Moderate Pain (4 - 6)  ibuprofen  Suspension. 600 milliGRAM(s) Oral every 8 hours PRN Temp greater or equal to 38.5C (101.3F)  melatonin 3 milliGRAM(s) Oral at bedtime PRN Insomnia      LABS:                        8.6    14.75 )-----------( 527      ( 14 Dec 2022 01:30 )             28.5     12-14    144  |  108  |  12  ----------------------------<  116<H>  4.1   |  30  |  <0.5<L>    Ca    8.3<L>      14 Dec 2022 01:30  Phos  3.4     12-14  Mg     1.8     12-14    TPro  5.5<L>  /  Alb  2.4<L>  /  TBili  0.2  /  DBili  x   /  AST  11  /  ALT  10  /  AlkPhos  73  12-14           Fever overnight 101.2F. NSVT up to 8 beats on tele. ROS deferred, pt sedated  After SAT, pt awake and agitated. Tachy and hypertensive, diaphoretic. Went up on precedex and fentanyl drip. extra fentanyl 25 and 50 push, versed 4, propofol 20.     VITAL SIGNS:  T(C): 37.5 (12-14-22 @ 07:50), Max: 38.4 (12-13-22 @ 20:00)  HR: 72 (12-14-22 @ 08:30) (69 - 119)  BP: 111/62 (12-14-22 @ 08:30) (86/50 - 142/82)  RR: 19 (12-14-22 @ 08:30) (18 - 26)  SpO2: 100% (12-14-22 @ 08:30) (95% - 100%)      PHYSICAL EXAM:  GENERAL: NAD  HEAD:  Atraumatic, Normocephalic  ENMT: intubated  NERVOUS SYSTEM: sedated  CHEST/LUNG: intubated, vented. bilateral breath sounds decreased on the left, crackles, no wheezing  HEART: Regular rate and rhythm. Normal S1/S1. No murmurs  ABDOMEN: Soft, Nontender, Nondistended; Bowel sounds present  EXTREMITIES: b/l pitting edema, contracted      MEDICATIONS:  MEDICATIONS  (STANDING):  acetylcysteine 20% for bronchoscopy 4 milliLiter(s) Nebulizer once  albuterol    90 MICROgram(s) HFA Inhaler 1 Puff(s) Inhalation every 4 hours  baclofen 20 milliGRAM(s) Oral every 12 hours  ceftazidime/avibactam IVPB 2.5 Gram(s) IV Intermittent every 8 hours  chlorhexidine 0.12% Liquid 15 milliLiter(s) Oral Mucosa every 12 hours  chlorhexidine 2% Cloths 1 Application(s) Topical daily  dexMEDEtomidine Infusion 0.2 MICROgram(s)/kG/Hr (3.4 mL/Hr) IV Continuous <Continuous>  donepezil 10 milliGRAM(s) Oral at bedtime  enoxaparin Injectable 40 milliGRAM(s) SubCutaneous every 24 hours  fentaNYL   Infusion. 0.5 MICROgram(s)/kG/Hr (3.4 mL/Hr) IV Continuous <Continuous>  lactulose Syrup 10 Gram(s) Oral daily  linezolid  IVPB 600 milliGRAM(s) IV Intermittent once  magnesium sulfate  IVPB 2 Gram(s) IV Intermittent once  magnesium sulfate  IVPB 2 Gram(s) IV Intermittent once  memantine 10 milliGRAM(s) Oral two times a day  midazolam Infusion 0.02 mG/kG/Hr (1.36 mL/Hr) IV Continuous <Continuous>  norepinephrine Infusion 0.05 MICROgram(s)/kG/Min (6.38 mL/Hr) IV Continuous <Continuous>  senna 2 Tablet(s) Oral at bedtime  tamsulosin 0.4 milliGRAM(s) Oral at bedtime  zinc oxide 20% Ointment 1 Application(s) Topical three times a day    MEDICATIONS  (PRN):  acetaminophen     Tablet .. 650 milliGRAM(s) Oral every 6 hours PRN Temp greater or equal to 38C (100.4F), Mild Pain (1 - 3), Moderate Pain (4 - 6)  ibuprofen  Suspension. 600 milliGRAM(s) Oral every 8 hours PRN Temp greater or equal to 38.5C (101.3F)  melatonin 3 milliGRAM(s) Oral at bedtime PRN Insomnia      LABS:                        8.6    14.75 )-----------( 527      ( 14 Dec 2022 01:30 )             28.5     12-14    144  |  108  |  12  ----------------------------<  116<H>  4.1   |  30  |  <0.5<L>    Ca    8.3<L>      14 Dec 2022 01:30  Phos  3.4     12-14  Mg     1.8     12-14    TPro  5.5<L>  /  Alb  2.4<L>  /  TBili  0.2  /  DBili  x   /  AST  11  /  ALT  10  /  AlkPhos  73  12-14

## 2022-12-14 NOTE — PROGRESS NOTE ADULT - ASSESSMENT
51 y/o man with PMH of MS, nonverbal at baseline, chronic low back pain, neurogenic bladder s/p SPC placement and s/p dislodgement was at IR for replacement of SPC when he was found to be in severe sepsis.    #Severe sepsis present on admission 2/2 acute pyelonephritis  - L lung atelectasis/Mucus plug   - Ucx: Few Proteus Mirabilis/Mod E Faecalis  - Bcx 12/2: GPC in clusters x3, 1 culture with staph hominis  - 12/4 bcx negative, 12/7 bcx NGTD   - previously was on HFNC alternating with BIPAP, nebs and chest PT  - previously intubated, bronch 12/08 removed secretions/mucus plug/atelectasis - bronchial culture: pseudomonas  - CXR 12/9: CXR patchy left lung opacities c/w pneumonia, received 1 dose linezolid  - MRSA negative  - c/w avycaz per ID  - nebs q4 + PRN + mucomyst nebs   - repeat bronchoscopy 12/12, culture: mod pseudomonas  - repeat bronchoscopy 12/13, gram+ cocci in clusters  - fever on 12/12 and 12/13, bcx ngtd and repeat  - 1 dose linezolid to cover staph (no vanc because of allergy)  - daily ABG, SAT  - levo 0.03, precedex 1, fentanyl 1.5. wean levo  - repeat bronch on 12/15    #Anemia  - decrease in hgb 9.4->8.6  - monitor for now    #Neurogenic bladder  - had SPC that was dislodged - now with [facility], SPC to be replaced but became septic  - on flomax  - recall IR when improved for possible SPC placement    #Elevated dimer   - 734 -> 1149 -> 713  - vascular can’t do duplex bc pt is contracted  - CTA 12/12 no PE, complete left main stem occlusion, near complete atelectasis/consolidation  - switch lovenox to ppx only for now, spoke with pt mother who is not aware of pt taking AC at home    #Dysphagia   - PEG feeding  - monitor FS    #MS, nonverbal, bedbound, contracted/functional quadriplegia   - repositioning per protocol, continue baclofen    #SUKHDEV   -likely prerenal, resolved   -continue hydration and monitor     #NSTEMI type 2 due to sepsis   -TTE: EF 58%, G1DD, mild MVR    Diet: PEG feeds  DVT prophylaxis: Lovenox  GI prophylaxis: Protonix  Code status: Full code  Dispo: CEU/ICU    Pending: wean levo, bronc cx, bcx, IR when SPC placement possible, f/u mother about home AC meds     51 y/o man with PMH of MS, nonverbal at baseline, chronic low back pain, neurogenic bladder s/p SPC placement and s/p dislodgement was at IR for replacement of SPC when he was found to be in severe sepsis.    #Severe sepsis present on admission 2/2 acute pyelonephritis  - L lung atelectasis/Mucus plug   - Ucx: Few Proteus Mirabilis/Mod E Faecalis  - Bcx 12/2: GPC in clusters x3, 1 culture with staph hominis  - 12/4 bcx negative, 12/7 bcx NGTD   - previously was on HFNC alternating with BIPAP, nebs and chest PT  - previously intubated, bronch 12/08 removed secretions/mucus plug/atelectasis - bronchial culture: pseudomonas  - CXR 12/9: CXR patchy left lung opacities c/w pneumonia, received 1 dose linezolid  - MRSA negative  - c/w avycaz per ID  - nebs q4 + PRN + mucomyst nebs   - repeat bronchoscopy 12/12, culture: mod pseudomonas  - repeat bronchoscopy 12/13, gram+ cocci in clusters  - fever on 12/12 and 12/13, bcx ngtd and repeat  - 1 dose linezolid to cover staph (no vanc because of allergy)  - daily ABG, SAT  - levo 0.03, precedex 1, fentanyl 1.5. wean levo  - repeat bronch on 12/15    #Anemia  - decrease in hgb 9.4->8.6  - monitor for now    #Neurogenic bladder  - had SPC that was dislodged - now with [facility], SPC to be replaced but became septic  - on flomax  - recall IR when improved for possible SPC placement    #Elevated dimer   - 734 -> 1149 -> 713  - vascular can’t do duplex bc pt is contracted  - CTA 12/12 no PE, complete left main stem occlusion, near complete atelectasis/consolidation  - switch lovenox to ppx only for now, spoke with pt mother who said pt on eliquis at facility for dvt ppx. no need for full AC    #Dysphagia   - PEG feeding  - monitor FS    #MS, nonverbal, bedbound, contracted/functional quadriplegia   - repositioning per protocol, continue baclofen    #SUKHDEV   -likely prerenal, resolved   -continue hydration and monitor     #NSTEMI type 2 due to sepsis   -TTE: EF 58%, G1DD, mild MVR    Diet: PEG feeds  DVT prophylaxis: Lovenox  GI prophylaxis: Protonix  Code status: Full code  Dispo: CEU/ICU    Pending: wean levo, bronc cx, bcx, IR when SPC placement possible

## 2022-12-14 NOTE — PROGRESS NOTE ADULT - SUBJECTIVE AND OBJECTIVE BOX
NUTRITION SUPPORT TEAM  -  PROGRESS NOTE   remain vented/sedated  on peg tube feed  abdomen soft  n/d + peg tube in place    REVIEW OF SYSTEMS:  Negative except as noted above.     VITALS:  T(F): 97.8 (12-14 @ 11:00), Max: 99.7 (12-14 @ 04:00)  HR: 96 (12-14 @ 12:00) (69 - 145)  BP: 105/61 (12-14 @ 12:00) (86/51 - 173/97)  RR: 24 (12-14 @ 12:00) (18 - 28)  SpO2: 97% (12-14 @ 12:00) (90% - 100%)    HEIGHT/WEIGHT/BMI:   Height (cm): 195.6 (12-03)  Weight (kg): 68 (12-02)  BMI (kg/m2): 17.8 (12-03)    12-13-22 @ 07:01  -  12-14-22 @ 07:00  --------------------------------------------------------  IN:    Dexmedetomidine: 418.2 mL    Enteral Tube Flush: 300 mL    FentaNYL: 343.4 mL    Norepinephrine: 85.3 mL    Osmolite: 720 mL  Total IN: 1866.9 mL    OUT:    Indwelling Catheter - Urethral (mL): 1800 mL  Total OUT: 1800 mL    Total NET: 66.9 mL        STANDING MEDICATIONS:   acetylcysteine 20% for bronchoscopy 4 milliLiter(s) Nebulizer once  albuterol    90 MICROgram(s) HFA Inhaler 1 Puff(s) Inhalation every 4 hours  baclofen 20 milliGRAM(s) Oral every 12 hours  ceftazidime/avibactam IVPB 2.5 Gram(s) IV Intermittent every 8 hours  chlorhexidine 0.12% Liquid 15 milliLiter(s) Oral Mucosa every 12 hours  chlorhexidine 2% Cloths 1 Application(s) Topical daily  dexMEDEtomidine Infusion 0.2 MICROgram(s)/kG/Hr IV Continuous <Continuous>  donepezil 10 milliGRAM(s) Oral at bedtime  enoxaparin Injectable 40 milliGRAM(s) SubCutaneous every 24 hours  fentaNYL   Infusion. 0.5 MICROgram(s)/kG/Hr IV Continuous <Continuous>  lactulose Syrup 10 Gram(s) Oral daily  magnesium sulfate  IVPB 2 Gram(s) IV Intermittent once  memantine 10 milliGRAM(s) Oral two times a day  midazolam Infusion 0.02 mG/kG/Hr IV Continuous <Continuous>  norepinephrine Infusion 0.05 MICROgram(s)/kG/Min IV Continuous <Continuous>  propofol Injectable 20 milliGRAM(s) IV Push once  senna 2 Tablet(s) Oral at bedtime  tamsulosin 0.4 milliGRAM(s) Oral at bedtime  zinc oxide 20% Ointment 1 Application(s) Topical three times a day      LABS:                         8.6    14.75 )-----------( 527      ( 14 Dec 2022 01:30 )             28.5     144  |  108  |  12  ----------------------------<  116<H>          (12-14-22 @ 01:30)  4.1   |  30  |  <0.5<L>    Ca    8.3<L>          (12-14-22 @ 01:30)  Phos  3.4         (12-14-22 @ 01:30)  Mg     1.8         (12-14-22 @ 01:30)    TPro  5.5<L>  /  Alb  2.4<L>  /  TBili  0.2  /  DBili  x   /  AST  11  /  ALT  10  /  AlkPhos  73       12-14-22 @ 01:30  Triglycerides, Serum: 149 mg/dL (12-07 @ 08:20)    Blood Glucose (Past 24 hours):  204 mg/dL (12-14 @ 12:06)  138 mg/dL (12-14 @ 07:04)  115 mg/dL (12-14 @ 00:32)    DIET:   Diet, NPO with Tube Feed:   Tube Feeding Modality: Gastrostomy  Osmolite 1.5 Bhargav  Total Volume for 24 Hours (mL): 1080  Bolus  Total Volume of Bolus (mL):  360  Total # of Feeds: 3  Tube Feed Frequency: Every 8 hours   Tube Feed Start Time: 17:00  Bolus Feed Rate (mL per Hour): 500   Bolus Feed Duration (in Hours): 0.5  Free Water Flush  Free Water Flush Instructions:  150ml before and after each bolus (12-13-22 @ 13:17) [Active]    RADIOLOGY:   < from: Xray Chest 1 View-PORTABLE IMMEDIATE (Xray Chest 1 View-PORTABLE IMMEDIATE .) (12.14.22 @ 10:27) >  Impression:  Lucency in the right lung apex favored to represent artifact. Consider   repeat radiograph with adequate patient positioning.  Unchanged patchy left basilar opacities and left pleural effusion.   NUTRITION SUPPORT TEAM  -  PROGRESS NOTE   remain vented/sedated  left IJ in place   on pressor  on peg tube feed  abdomen soft  n/d + peg tube in place    REVIEW OF SYSTEMS:  Negative except as noted above.     VITALS:  T(F): 97.8 (12-14 @ 11:00), Max: 99.7 (12-14 @ 04:00)  HR: 96 (12-14 @ 12:00) (69 - 145)  BP: 105/61 (12-14 @ 12:00) (86/51 - 173/97)  RR: 24 (12-14 @ 12:00) (18 - 28)  SpO2: 97% (12-14 @ 12:00) (90% - 100%)    HEIGHT/WEIGHT/BMI:   Height (cm): 195.6 (12-03)  Weight (kg): 68 (12-02)  BMI (kg/m2): 17.8 (12-03)    12-13-22 @ 07:01  -  12-14-22 @ 07:00  --------------------------------------------------------  IN:    Dexmedetomidine: 418.2 mL    Enteral Tube Flush: 300 mL    FentaNYL: 343.4 mL    Norepinephrine: 85.3 mL    Osmolite: 720 mL  Total IN: 1866.9 mL    OUT:    Indwelling Catheter - Urethral (mL): 1800 mL  Total OUT: 1800 mL    Total NET: 66.9 mL        STANDING MEDICATIONS:   acetylcysteine 20% for bronchoscopy 4 milliLiter(s) Nebulizer once  albuterol    90 MICROgram(s) HFA Inhaler 1 Puff(s) Inhalation every 4 hours  baclofen 20 milliGRAM(s) Oral every 12 hours  ceftazidime/avibactam IVPB 2.5 Gram(s) IV Intermittent every 8 hours  chlorhexidine 0.12% Liquid 15 milliLiter(s) Oral Mucosa every 12 hours  chlorhexidine 2% Cloths 1 Application(s) Topical daily  dexMEDEtomidine Infusion 0.2 MICROgram(s)/kG/Hr IV Continuous <Continuous>  donepezil 10 milliGRAM(s) Oral at bedtime  enoxaparin Injectable 40 milliGRAM(s) SubCutaneous every 24 hours  fentaNYL   Infusion. 0.5 MICROgram(s)/kG/Hr IV Continuous <Continuous>  lactulose Syrup 10 Gram(s) Oral daily  magnesium sulfate  IVPB 2 Gram(s) IV Intermittent once  memantine 10 milliGRAM(s) Oral two times a day  midazolam Infusion 0.02 mG/kG/Hr IV Continuous <Continuous>  norepinephrine Infusion 0.05 MICROgram(s)/kG/Min IV Continuous <Continuous>  propofol Injectable 20 milliGRAM(s) IV Push once  senna 2 Tablet(s) Oral at bedtime  tamsulosin 0.4 milliGRAM(s) Oral at bedtime  zinc oxide 20% Ointment 1 Application(s) Topical three times a day      LABS:                         8.6    14.75 )-----------( 527      ( 14 Dec 2022 01:30 )             28.5     144  |  108  |  12  ----------------------------<  116<H>          (12-14-22 @ 01:30)  4.1   |  30  |  <0.5<L>    Ca    8.3<L>          (12-14-22 @ 01:30)  Phos  3.4         (12-14-22 @ 01:30)  Mg     1.8         (12-14-22 @ 01:30)    TPro  5.5<L>  /  Alb  2.4<L>  /  TBili  0.2  /  DBili  x   /  AST  11  /  ALT  10  /  AlkPhos  73       12-14-22 @ 01:30  Triglycerides, Serum: 149 mg/dL (12-07 @ 08:20)    Blood Glucose (Past 24 hours):  204 mg/dL (12-14 @ 12:06)  138 mg/dL (12-14 @ 07:04)  115 mg/dL (12-14 @ 00:32)    DIET:   Diet, NPO with Tube Feed:   Tube Feeding Modality: Gastrostomy  Osmolite 1.5 Bhargav  Total Volume for 24 Hours (mL): 1080  Bolus  Total Volume of Bolus (mL):  360  Total # of Feeds: 3  Tube Feed Frequency: Every 8 hours   Tube Feed Start Time: 17:00  Bolus Feed Rate (mL per Hour): 500   Bolus Feed Duration (in Hours): 0.5  Free Water Flush  Free Water Flush Instructions:  150ml before and after each bolus (12-13-22 @ 13:17) [Active]    RADIOLOGY:   < from: Xray Chest 1 View-PORTABLE IMMEDIATE (Xray Chest 1 View-PORTABLE IMMEDIATE .) (12.14.22 @ 10:27) >  Impression:  Lucency in the right lung apex favored to represent artifact. Consider   repeat radiograph with adequate patient positioning.  Unchanged patchy left basilar opacities and left pleural effusion.   NUTRITION SUPPORT TEAM  -  PROGRESS NOTE   remains vented/sedated  agitated and diaphoretic earlier, as sedation held - now restarted precedex and fentanyl  left IJ in place - dressing loosened by diaphoresis and pt moving head - sterile dressing change done urgently by RN   on pressor  on peg tube feeds  abdomen soft  n/d + peg tube in place    REVIEW OF SYSTEMS:  Negative except as noted above.     VITALS:  T(F): 97.8 (12-14 @ 11:00), Max: 99.7 (12-14 @ 04:00)  HR: 96 (12-14 @ 12:00) (69 - 145)  BP: 105/61 (12-14 @ 12:00) (86/51 - 173/97)  RR: 24 (12-14 @ 12:00) (18 - 28)  SpO2: 97% (12-14 @ 12:00) (90% - 100%)    HEIGHT/WEIGHT/BMI:   Height (cm): 195.6 (12-03)  Weight (kg): 68 (12-02)  BMI (kg/m2): 17.8 (12-03)    12-13-22 @ 07:01  -  12-14-22 @ 07:00  --------------------------------------------------------  IN:    Dexmedetomidine: 418.2 mL    Enteral Tube Flush: 300 mL    FentaNYL: 343.4 mL    Norepinephrine: 85.3 mL    Osmolite: 720 mL  Total IN: 1866.9 mL    OUT:    Indwelling Catheter - Urethral (mL): 1800 mL  Total OUT: 1800 mL    Total NET: 66.9 mL    STANDING MEDICATIONS:   acetylcysteine 20% for bronchoscopy 4 milliLiter(s) Nebulizer once  albuterol    90 MICROgram(s) HFA Inhaler 1 Puff(s) Inhalation every 4 hours  baclofen 20 milliGRAM(s) Oral every 12 hours  ceftazidime/avibactam IVPB 2.5 Gram(s) IV Intermittent every 8 hours  chlorhexidine 0.12% Liquid 15 milliLiter(s) Oral Mucosa every 12 hours  chlorhexidine 2% Cloths 1 Application(s) Topical daily  dexMEDEtomidine Infusion 0.2 MICROgram(s)/kG/Hr IV Continuous <Continuous>  donepezil 10 milliGRAM(s) Oral at bedtime  enoxaparin Injectable 40 milliGRAM(s) SubCutaneous every 24 hours  fentaNYL   Infusion. 0.5 MICROgram(s)/kG/Hr IV Continuous <Continuous>  lactulose Syrup 10 Gram(s) Oral daily  magnesium sulfate  IVPB 2 Gram(s) IV Intermittent once  memantine 10 milliGRAM(s) Oral two times a day  midazolam Infusion 0.02 mG/kG/Hr IV Continuous <Continuous>  norepinephrine Infusion 0.05 MICROgram(s)/kG/Min IV Continuous <Continuous>  propofol Injectable 20 milliGRAM(s) IV Push once  senna 2 Tablet(s) Oral at bedtime  tamsulosin 0.4 milliGRAM(s) Oral at bedtime  zinc oxide 20% Ointment 1 Application(s) Topical three times a day      LABS:                         8.6    14.75 )-----------( 527      ( 14 Dec 2022 01:30 )             28.5     144  |  108  |  12  ----------------------------<  116<H>          (12-14-22 @ 01:30)  4.1   |  30  |  <0.5<L>    Ca    8.3<L>          (12-14-22 @ 01:30)  Phos  3.4         (12-14-22 @ 01:30)  Mg     1.8         (12-14-22 @ 01:30)    TPro  5.5<L>  /  Alb  2.4<L>  /  TBili  0.2  /  DBili  x   /  AST  11  /  ALT  10  /  AlkPhos  73       12-14-22 @ 01:30  Triglycerides, Serum: 149 mg/dL (12-07 @ 08:20)    Blood Glucose (Past 24 hours):  204 mg/dL (12-14 @ 12:06)  138 mg/dL (12-14 @ 07:04)  115 mg/dL (12-14 @ 00:32)    DIET:   Diet, NPO with Tube Feed:   Tube Feeding Modality: Gastrostomy  Osmolite 1.5 Bhargav  Total Volume for 24 Hours (mL): 1080  Bolus  Total Volume of Bolus (mL):  360  Total # of Feeds: 3  Tube Feed Frequency: Every 8 hours   Tube Feed Start Time: 17:00  Bolus Feed Rate (mL per Hour): 500   Bolus Feed Duration (in Hours): 0.5  Free Water Flush  Free Water Flush Instructions:  150ml before and after each bolus (12-13-22 @ 13:17) [Active]    RADIOLOGY:   < from: Xray Chest 1 View-PORTABLE IMMEDIATE (Xray Chest 1 View-PORTABLE IMMEDIATE .) (12.14.22 @ 10:27) >  Impression:  Lucency in the right lung apex favored to represent artifact. Consider   repeat radiograph with adequate patient positioning.  Unchanged patchy left basilar opacities and left pleural effusion.

## 2022-12-14 NOTE — PROGRESS NOTE ADULT - ASSESSMENT
IMPRESSION:    Sepsis POA  Septic shock   Acute hypoxemic respiratory failure/ Left mucus plug / lung atelectasis  Pseudomonas in BAL / wash   Urinary tract infection/ proteus  Bacteremia - MRSE treated   Non verbal at baseline  NSTEMI likely Type 2  History of MS/neurogenic bladder      PLAN:      CNS: SAT      HEENT: Oral care.  ET Care     PULMONARY:  Recommend nebs every 4 hours.  Vent changes:  .  Daily ABG.  Possible Repeat Bronchoscopy in am.  Aggressive pulmonary toilet and chest PT.      CARDIOVASCULAR: avoid volume overload.  Goal directed fluid resuscitation.  Wean Levophed     GI: GI prophylaxis. PEG feeds.  Bowel regimen     RENAL:  Follow up lytes.  Correct as needed. IR following for possible SPC.     INFECTIOUS DISEASE: ABX per ID.  FU cultures.  Zyvox for now      HEMATOLOGICAL: DVT prophylaxis. On Lovenox.     ENDOCRINE:  Follow up FS.  Insulin protocol if needed.    MUSCULOSKELETAL: bedrest    MICU    GOC    Poor prognosis overall

## 2022-12-15 NOTE — CONSULT NOTE ADULT - PROBLEM SELECTOR RECOMMENDATION 5
- will follow  ______________  Kevan Patel MD  Palliative Medicine  [facility]   [facility] Ger[facility] Pall[facility]  [phone]

## 2022-12-15 NOTE — PROGRESS NOTE ADULT - ASSESSMENT
IMPRESSION:    Sepsis POA  Septic shock   Acute hypoxemic respiratory failure/ Left mucus plug / lung atelectasis  Pseudomonas in BAL / wash   Urinary tract infection/ proteus  Bacteremia - MRSE treated   Non verbal at baseline  NSTEMI likely Type 2  History of MS/neurogenic bladder      PLAN:      CNS: SAT     HEENT: Oral care.  ET Care     PULMONARY:  Recommend nebs every 4 hours.  Vent changes:  . increase RR, Dec FIO2.      CARDIOVASCULAR: avoid volume overload.   Wean Levophed     GI: GI prophylaxis. PEG feeds.  Bowel regimen     RENAL:  Follow up lytes.  Correct as needed. IR following for possible SPC.     INFECTIOUS DISEASE: ABX per ID.      HEMATOLOGICAL: DVT prophylaxis. On Lovenox.     ENDOCRINE:  Follow up FS.  Insulin protocol if needed.    MICU    GOC    Poor prognosis   palliative care eval

## 2022-12-15 NOTE — PROGRESS NOTE ADULT - ASSESSMENT
51 y/o man with PMH of MS, nonverbal at baseline, chronic low back pain, neurogenic bladder s/p SPC placement and s/p dislodgement was at IR for replacement of SPC when he was found to be in severe sepsis.    #Severe sepsis present on admission  acute pyelonephritis  - L lung atelectasis/Mucus plug   - Ucx: Few Proteus Mirabilis/Mod E Faecalis  - Bcx : GPC in clusters x3, 1 culture with staph hominis  -  bcx negative,  bcx NGTD   - previously was on HFNC alternating with BIPAP, nebs and chest PT  - previously intubated, bronch  removed secretions/mucus plug/atelectasis - bronchial culture: pseudomonas  - CXR : CXR patchy left lung opacities c/w pneumonia, received 1 dose linezolid  - MRSA negative  - c/w avycaz per ID  - nebs q4 + PRN + mucomyst nebs   - repeat bronchoscopy , culture: mod pseudomonas  - repeat bronchoscopy , gram+ cocci in clusters  - fever on  and , bcx ngtd and repeat  - 1 dose linezolid to cover staph (no vanc because of allergy)  - daily ABG, SAT  - levo 0.03, precedex 1, fentanyl 1.5. wean levo  - repeat bronch on 12/15    #Anemia  - decrease in hgb 9.4->8.6  - monitor for now    #Neurogenic bladder  - had SPC that was dislodged - now with [facility], SPC to be replaced but became septic  - on flomax  - recall IR when improved for possible SPC placement    #Elevated dimer   - 734 -> 1149 -> 713  - vascular can’t do duplex bc pt is contracted  - CTA  no PE, complete left main stem occlusion, near complete atelectasis/consolidation  - switch lovenox to ppx only for now, spoke with pt mother who said pt on eliquis at facility for dvt ppx. no need for full AC    #Dysphagia   - PEG feeding  - monitor FS    #MS, nonverbal, bedbound, contracted/functional quadriplegia   - repositioning per protocol, continue baclofen    #SUKHDEV   -likely prerenal, resolved   -continue hydration and monitor     #NSTEMI type 2 due to sepsis   -TTE: EF 58%, G1DD, mild MVR    Diet: PEG feeds  DVT prophylaxis: Lovenox  GI prophylaxis: Protonix  Code status: Full code  Dispo: CEU/ICU    Handoff pendin)    51 y/o man with PMH of MS, nonverbal at baseline, chronic low back pain, neurogenic bladder s/p SPC placement and s/p dislodgement was at IR for replacement of SPC when he was found to be in severe sepsis.    #Severe sepsis present on admission 2/2 acute pyelonephritis  - L lung atelectasis/Mucus plug   - Ucx: Few Proteus Mirabilis/Mod E Faecalis  - Bcx 12/2: GPC in clusters x3, 1 culture with staph hominis  - 12/4 bcx negative, 12/7 bcx NGTD   - previously was on HFNC alternating with BIPAP, nebs and chest PT  - previously intubated, bronch 12/08 removed secretions/mucus plug/atelectasis - bronchial culture: pseudomonas  - CXR 12/9: CXR patchy left lung opacities c/w pneumonia, received 1 dose linezolid  - MRSA negative  - c/w avycaz per ID  - nebs q4 + PRN + mucomyst nebs   - repeat bronchoscopy 12/12, culture: mod pseudomonas  - repeat bronchoscopy 12/13, gram+ cocci in clusters  - fever on 12/12 and 12/13, bcx ngtd and repeat  - 1 dose linezolid to cover staph (no vanc because of allergy)  - daily ABG, SAT  - levo 0.03, precedex 1, fentanyl 1.5. wean levo  - repeat bronch on 12/15    #Anemia  - decrease in hgb 9.4->8.6  - monitor for now    #Neurogenic bladder  - had SPC that was dislodged - now with [facility], SPC to be replaced but became septic  - on flomax  - recall IR when improved for possible SPC placement    #Elevated dimer   - 734 -> 1149 -> 713  - vascular can’t do duplex bc pt is contracted  - CTA 12/12 no PE, complete left main stem occlusion, near complete atelectasis/consolidation  - switch lovenox to ppx only for now, spoke with pt mother who said pt on eliquis at facility for dvt ppx. no need for full AC    #Dysphagia   - PEG feeding  - monitor FS    #MS, nonverbal, bedbound, contracted/functional quadriplegia   - repositioning per protocol, continue baclofen    #SUKHDEV   -likely prerenal, resolved   -continue hydration and monitor     #NSTEMI type 2 due to sepsis   -TTE: EF 58%, G1DD, mild MVR    Diet: PEG feeds  DVT prophylaxis: Lovenox  GI prophylaxis: Protonix  Code status: Full code

## 2022-12-15 NOTE — PROGRESS NOTE ADULT - SUBJECTIVE AND OBJECTIVE BOX
KEVIN MEDRANO  52y, Male  Allergy: penicillin (Unknown)  vancomycin (Unknown)      LOS  13d    CHIEF COMPLAINT: sepsis (15 Dec 2022 10:29)      INTERVAL EVENTS/HPI  - T(F): , Max: 100.8 (12-15-22 @ 08:00)  - WBC Count: 21.26 (12-15-22 @ 04:55)  WBC Count: 29.83 (22 @ 23:30)     - Creatinine, Serum: <0.5 (12-15-22 @ 04:55)  Creatinine, Serum: <0.5 (22 @ 23:30)     -   -   -     ROS  cannot obtain secondary to patient's sedation and/or mental status    VITALS:  T(F): 100.2, Max: 100.8 (12-15-22 @ 08:00)  HR: 70  BP: 99/66  RR: 28Vital Signs Last 24 Hrs  T(C): 37.9 (15 Dec 2022 10:00), Max: 38.2 (15 Dec 2022 08:00)  T(F): 100.2 (15 Dec 2022 10:00), Max: 100.8 (15 Dec 2022 08:00)  HR: 70 (15 Dec 2022 11:45) (68 - 140)  BP: 99/66 (15 Dec 2022 11:45) (73/47 - 198/95)  BP(mean): 76 (15 Dec 2022 11:45) (52 - 145)  RR: 28 (15 Dec 2022 11:45) (15 - 29)  SpO2: 98% (15 Dec 2022 11:45) (92% - 100%)    Parameters below as of 15 Dec 2022 09:00  Patient On (Oxygen Delivery Method): ventilator    O2 Concentration (%): 40    PHYSICAL EXAM:  Gen: Intubated  CV: RRR  Lungs: Decreased BS at bases  Abd: Soft  Neuro: Sedated  Lines clean, no phlebitis    FH: Non-contributory  Social Hx: Non-contributory    TESTS & MEASUREMENTS:                        8.6    21.26 )-----------( 580      ( 15 Dec 2022 04:55 )             28.4     12-15    143  |  106  |  11  ----------------------------<  204<H>  4.0   |  29  |  <0.5<L>    Ca    8.2<L>      15 Dec 2022 04:55  Phos  3.5     12-  Mg     1.9     -15    TPro  5.8<L>  /  Alb  2.5<L>  /  TBili  0.2  /  DBili  x   /  AST  10  /  ALT  9   /  AlkPhos  82  12-15      LIVER FUNCTIONS - ( 15 Dec 2022 04:55 )  Alb: 2.5 g/dL / Pro: 5.8 g/dL / ALK PHOS: 82 U/L / ALT: 9 U/L / AST: 10 U/L / GGT: x           Urinalysis Basic - ( 14 Dec 2022 18:22 )    Color: Yellow / Appearance: Slightly Turbid / S.018 / pH: x  Gluc: x / Ketone: Negative  / Bili: Negative / Urobili: <2 mg/dL   Blood: x / Protein: 30 mg/dL / Nitrite: Negative   Leuk Esterase: Large / RBC: 11 /HPF /  /HPF   Sq Epi: x / Non Sq Epi: 7 /HPF / Bacteria: Negative        Culture - Fungal, Bronchial (collected 22 @ 09:40)  Source: .Bronchial None  Preliminary Report (22 @ 06:59):    Testing in progress    Culture - Acid Fast - Bronchial w/Smear (collected 22 @ 09:40)  Source: .Bronchial None    Culture - Bronchial (collected 22 @ 09:40)  Source: .Bronchial None  Gram Stain (22 @ 20:33):    Moderate polymorphonuclear leukocytes per low power field    Rare Squamous epithelial cells per low power field    Few Gram Positive Cocci in Clusters per oil power field  Preliminary Report (22 @ 17:53):    Numerous Mixed gram negative rods including    Moderate Pseudomonas aeruginosa    Numerous Acinetobacter baumannii/nosocomialis group    Culture - Blood (collected 22 @ 01:10)  Source: .Blood None  Preliminary Report (22 @ 14:01):    No growth to date.    Culture - Blood (collected 22 @ 20:08)  Source: .Blood Blood  Preliminary Report (22 @ 02:02):    No growth to date.    Culture - Sputum (collected 22 @ 11:55)  Source: Trach Asp Tracheal Aspirate  Gram Stain (22 @ 02:13):    Rare polymorphonuclear leukocytes per low power field    No Squamous epithelial cells per low power field    No organisms seen per oil power field  Preliminary Report (22 @ 18:43):    Moderate Pseudomonas aeruginosa (Carbapenem Resistant)    Moderate Acinetobacter baumannii/nosocomialis group Susceptibility to    follow.  Organism: Pseudomonas aeruginosa (Carbapenem Resistant) (22 @ 18:37)  Organism: Pseudomonas aeruginosa (Carbapenem Resistant) (22 @ 18:37)      -  Amikacin: S <=16      -  Aztreonam: R >16      -  Cefepime: I 16      -  Ceftazidime: S 4      -  Ceftazidime/Avibactam: S <=4      -  Ceftolozane/tazobactam: S <=2      -  Ciprofloxacin: R >2      -  Gentamicin: R >8      -  Imipenem: R >8      -  Levofloxacin: R >4      -  Meropenem: R >8      -  Piperacillin/Tazobactam: S 16      -  Tobramycin: R >8      Method Type: GAURAV    Culture - Bronchial (collected 22 @ 15:00)  Source: .Bronchial None  Gram Stain (22 @ 07:14):    Moderate polymorphonuclear leukocytes seen per oil power field    No squamous epithelial cells seen per oil power field    No organisms seen per oil power field  Final Report (22 @ 11:48):    Few Pseudomonas aeruginosa (Carbapenem Resistant)    Normal Respiratory Tiera present  Organism: Pseudomonas aeruginosa (Carbapenem Resistant) (22 @ 11:48)  Organism: Pseudomonas aeruginosa (Carbapenem Resistant) (22 @ 11:48)      -  Amikacin: S <=16      -  Aztreonam: R >16      -  Cefepime: S 8      -  Ceftazidime: S 4      -  Ceftazidime/Avibactam: S <=4      -  Ceftolozane/tazobactam: S <=2      -  Ciprofloxacin: R >2      -  Gentamicin: R >8      -  Imipenem: R >8      -  Levofloxacin: R >4      -  Meropenem: R >8      -  Piperacillin/Tazobactam: S 16      -  Tobramycin: R >8      Method Type: GAURAV    Culture - Blood (collected 22 @ 13:11)  Source: .Blood None  Final Report (22 @ 23:00):    No Growth Final    Culture - Blood (collected 22 @ 08:30)  Source: .Blood None  Final Report (22 @ 22:00):    No Growth Final    Culture - Blood (collected 22 @ 21:51)  Source: .Blood None  Final Report (22 @ 09:00):    No Growth Final  INFLAMMATORY MARKERS      RADIOLOGY & ADDITIONAL TESTS:  I have personally reviewed the last available Chest xray  CXR  Xray Chest 1 View- PORTABLE-Urgent:   ACC: [id number] EXAM:  XR CHEST PORTABLE URGENT 1V                          PROCEDURE DATE:  2022          INTERPRETATION:  STUDY INDICATION: s/p bronch    TECHNIQUE:  Portable frontal view of the chest obtained.    COMPARISON: 2022    FINDINGS/  IMPRESSION:    Unchanged lines and tubes.    Decreased left pleural effusion and opacities with consolidative and hazy   opacities of the reexpanded lung.    Stable cardiomediastinal silhouette.    Unchanged osseous structures.    --- End of Report ---            JESUS RENTERIA MD; Attending Radiologist  This document has been electronically signed. Dec 13 2022  3:49PM (22 @ 12:54)      CT  CT Angio Chest PE Protocol w/ IV Cont:   ACC: [id number] EXAM:  CT ANGIO CHEST PULM [facility]                          PROCEDURE DATE:  2022          INTERPRETATION:  CLINICAL INDICATION: PE    TECHNIQUE:  CTA of the thorax was performed after administration of contrast per the   PE protocol. Sagittal and coronal reformats were performed as well as 3D   reconstructions.  Intravenous contrast: 70 cc of Omnipaque 350    COMPARISON: CT CHEST 12/3/2022.    INTERPRETATION:    PULMONARY ARTERIES: There are no central pulmonary emboli. The segmental   and subsegmental branches are obscured due to breathing motion.    AIRWAYS/LUNGS/PLEURA: Breathing motion limits dilation of the lung   parenchyma. There are secretions within the trachea with complete left   main stem occlusion and resultant near complete atelectasis/consolidation   of the left lung. Patchy right lung and left upper lobe groundglass   opacities. There is a trace left pleural effusion.  There is no   pneumothorax.    MEDIASTINUM: There are an increased number of subcentimeter mediastinal   lymph nodes.  There are no enlarged axillary lymph nodes. The visualized   portion of the thyroid gland is unremarkable.    HEART AND VESSELS: The heart is normal in size. The thoracic aorta has a   normal caliber. There is nopericardial effusion.    UPPER ABDOMEN: Images of the upper abdomen demonstrate no abnormality.    BONES AND SOFT TISSUES: There are degenerative changes of the spine.  The   soft tissues are unremarkable.    TUBES AND LINES: ET tube terminates abovethe dionne. Left IJ catheter   terminates within the left brachiocephalic vein.    IMPRESSION:    No central pulmonary embolism.    Persistent complete left main stem occlusion and resultant near complete   atelectasis/consolidation of the left lung.There is a trace left pleural   effusion.    Patchy right lung and left upper lobe groundglass opacities, likely an   etiology.    --- End of Report ---            JUNIE BOB MD; Attending Radiologist  This document has been electronically signed. Dec 12 2022  4:03PM (22 @ 14:45)      CARDIOLOGY TESTING  12 Lead ECG:   Ventricular Rate 110 BPM    Atrial Rate 110 BPM    P-R Interval 116 ms    QRS Duration 78 ms    Q-T Interval 322 ms    QTC Calculation(Bazett) 435 ms    P Axis 77 degrees    R Axis 89 degrees    T Axis 65 degrees    Diagnosis Line Sinus tachycardia  Otherwise normal ECG    Confirmed by Jair Brenner ([id number]) on 12/15/2022 7:54:32 AM (22 @ 19:37)  12 Lead ECG:   Ventricular Rate 119 BPM    Atrial Rate 119 BPM    P-R Interval 114 ms    QRS Duration 80 ms    Q-T Interval 310 ms    QTC Calculation(Bazett) 436 ms    P Axis 77 degrees    R Axis 92 degrees    T Axis 36 degrees    Diagnosis Line Sinus tachycardia  Rightward axis  Borderline ECG    Confirmed by Neal RODGERS Nasra ([id number]) on 2022 4:27:51 PM (22 @ 09:36)      MEDICATIONS  acetylcysteine 20% for bronchoscopy 4  albuterol    90 MICROgram(s) HFA Inhaler 1  baclofen 20  ceftazidime/avibactam IVPB 2.5  chlorhexidine 0.12% Liquid 15  chlorhexidine 2% Cloths 1  dexMEDEtomidine Infusion 0.2  donepezil 10  doxazosin 2  enoxaparin Injectable 40  fentaNYL   Infusion. 0.5  lactulose Syrup 10  magnesium sulfate  IVPB 2  memantine 10  midazolam Infusion 0.02  norepinephrine Infusion 0.05  pantoprazole    Tablet 40  senna 2  tamsulosin 0.4  zinc oxide 20% Ointment 1      WEIGHT  Weight (kg): 68 (22 @ 08:20)  Creatinine, Serum: <0.5 mg/dL (12-15-22 @ 04:55)  Creatinine, Serum: <0.5 mg/dL (22 @ 23:30)      ANTIBIOTICS:  ceftazidime/avibactam IVPB 2.5 Gram(s) IV Intermittent every 8 hours      All available historical records have been reviewed

## 2022-12-15 NOTE — CONSULT NOTE ADULT - ASSESSMENT
52M with PMH of MS, nonverbal at baseline, chronic low back pain, neurogenic bladder s/p SPC s/p dislodgement, now with severe sepsis due to acute pyelo, on pressors, broad-spectrum abx, on PEG feeds, and SUKHDEV (resolving). Palliative care following for GOC.

## 2022-12-15 NOTE — PROGRESS NOTE ADULT - SUBJECTIVE AND OBJECTIVE BOX
KEVIN MEDRANO 52y Male  MRN#: [id number]   Hospital Day: 13d    SUBJECTIVE  Patient is a 52y old Male who presents with a chief complaint of sepsis (15 Dec 2022 10:29)  Currently admitted to medicine with the primary diagnosis of Sepsis      INTERVAL HPI AND OVERNIGHT EVENTS:  Patient was examined and seen at bedside. This morning he is resting comfortably in bed. No issues or overnight events.    OBJECTIVE  PAST MEDICAL & SURGICAL HISTORY  Multiple sclerosis    Chronic back pain    Hypertension    No significant past surgical history      ALLERGIES:  penicillin (Unknown)  vancomycin (Unknown)    MEDICATIONS:  STANDING MEDICATIONS  acetylcysteine 20% for bronchoscopy 4 milliLiter(s) Nebulizer once  albuterol    90 MICROgram(s) HFA Inhaler 1 Puff(s) Inhalation every 4 hours  baclofen 20 milliGRAM(s) Oral every 12 hours  ceftazidime/avibactam IVPB 2.5 Gram(s) IV Intermittent every 8 hours  chlorhexidine 0.12% Liquid 15 milliLiter(s) Oral Mucosa every 12 hours  chlorhexidine 2% Cloths 1 Application(s) Topical daily  dexMEDEtomidine Infusion 0.2 MICROgram(s)/kG/Hr IV Continuous <Continuous>  donepezil 10 milliGRAM(s) Oral at bedtime  doxazosin 2 milliGRAM(s) Oral at bedtime  enoxaparin Injectable 40 milliGRAM(s) SubCutaneous every 24 hours  fentaNYL   Infusion. 0.5 MICROgram(s)/kG/Hr IV Continuous <Continuous>  lactulose Syrup 10 Gram(s) Oral daily  magnesium sulfate  IVPB 2 Gram(s) IV Intermittent once  memantine 10 milliGRAM(s) Oral two times a day  midazolam Infusion 0.02 mG/kG/Hr IV Continuous <Continuous>  norepinephrine Infusion 0.05 MICROgram(s)/kG/Min IV Continuous <Continuous>  pantoprazole    Tablet 40 milliGRAM(s) Oral before breakfast  senna 2 Tablet(s) Oral at bedtime  tamsulosin 0.4 milliGRAM(s) Oral at bedtime  zinc oxide 20% Ointment 1 Application(s) Topical three times a day    PRN MEDICATIONS  acetaminophen     Tablet .. 650 milliGRAM(s) Oral every 6 hours PRN  ibuprofen  Suspension. 600 milliGRAM(s) Oral every 8 hours PRN  melatonin 3 milliGRAM(s) Oral at bedtime PRN      VITAL SIGNS: Last 24 Hours  T(C): 37.9 (15 Dec 2022 10:00), Max: 38.2 (15 Dec 2022 08:00)  T(F): 100.2 (15 Dec 2022 10:00), Max: 100.8 (15 Dec 2022 08:00)  HR: 70 (15 Dec 2022 11:45) (68 - 140)  BP: 99/66 (15 Dec 2022 11:45) (73/47 - 198/95)  BP(mean): 76 (15 Dec 2022 11:45) (52 - 145)  RR: 28 (15 Dec 2022 11:45) (15 - 29)  SpO2: 98% (15 Dec 2022 11:45) (92% - 100%)    LABS:                        8.6    21.26 )-----------( 580      ( 15 Dec 2022 04:55 )             28.4     12-15    143  |  106  |  11  ----------------------------<  204<H>  4.0   |  29  |  <0.5<L>    Ca    8.2<L>      15 Dec 2022 04:55  Phos  3.5     12-14  Mg     1.9     12-15    TPro  5.8<L>  /  Alb  2.5<L>  /  TBili  0.2  /  DBili  x   /  AST  10  /  ALT  9   /  AlkPhos  82  12-15      Urinalysis Basic - ( 14 Dec 2022 18:22 )    Color: Yellow / Appearance: Slightly Turbid / S.018 / pH: x  Gluc: x / Ketone: Negative  / Bili: Negative / Urobili: <2 mg/dL   Blood: x / Protein: 30 mg/dL / Nitrite: Negative   Leuk Esterase: Large / RBC: 11 /HPF /  /HPF   Sq Epi: x / Non Sq Epi: 7 /HPF / Bacteria: Negative      ABG - ( 15 Dec 2022 02:25 )  pH, Arterial: 7.49  pH, Blood: x     /  pCO2: 41    /  pO2: 115   / HCO3: 31    / Base Excess: 7.2   /  SaO2: 99.8                  Culture - Fungal, Bronchial (collected 13 Dec 2022 09:40)  Source: .Bronchial None  Preliminary Report (14 Dec 2022 06:59):    Testing in progress    Culture - Acid Fast - Bronchial w/Smear (collected 13 Dec 2022 09:40)  Source: .Bronchial None    Culture - Bronchial (collected 13 Dec 2022 09:40)  Source: .Bronchial None  Gram Stain (13 Dec 2022 20:33):    Moderate polymorphonuclear leukocytes per low power field    Rare Squamous epithelial cells per low power field    Few Gram Positive Cocci in Clusters per oil power field  Preliminary Report (14 Dec 2022 17:53):    Numerous Mixed gram negative rods including    Moderate Pseudomonas aeruginosa    Numerous Acinetobacter baumannii/nosocomialis group    Culture - Blood (collected 13 Dec 2022 01:10)  Source: .Blood None  Preliminary Report (14 Dec 2022 14:01):    No growth to date.    Culture - Blood (collected 12 Dec 2022 20:08)  Source: .Blood Blood  Preliminary Report (14 Dec 2022 02:02):    No growth to date.          PHYSICAL EXAM:  GENERAL: NAD  HEAD:  Atraumatic, Normocephalic  ENMT: intubated  NERVOUS SYSTEM: sedated  CHEST/LUNG: intubated, vented. bilateral breath sounds decreased on the left, crackles, no wheezing  HEART: Regular rate and rhythm. Normal S1/S1. No murmurs  ABDOMEN: Soft, Nontender, Nondistended; Bowel sounds present  EXTREMITIES: b/l pitting edema, contracted KEVIN MEDRANO 52y Male  MRN#: [id number]   Hospital Day: 13    SUBJECTIVE  Patient is a 52y old Male who presents with a chief complaint of sepsis (15 Dec 2022 10:29)  Currently admitted to medicine with the primary diagnosis of Sepsis      INTERVAL HPI AND OVERNIGHT EVENTS:  Patient was examined and seen at bedside. This morning he is resting comfortably in bed. No issues or overnight events.      ICU course:  12/15: pt is agitated episodically which causes increase in BP and HR despite being on precedex and fentanyl. Still requiring levo for pressure suppport. ID changed the Abx today.     OBJECTIVE  PAST MEDICAL & SURGICAL HISTORY  Multiple sclerosis    Chronic back pain    Hypertension    No significant past surgical history      ALLERGIES:  penicillin (Unknown)  vancomycin (Unknown)    MEDICATIONS:  STANDING MEDICATIONS  acetylcysteine 20% for bronchoscopy 4 milliLiter(s) Nebulizer once  albuterol    90 MICROgram(s) HFA Inhaler 1 Puff(s) Inhalation every 4 hours  baclofen 20 milliGRAM(s) Oral every 12 hours  ceftazidime/avibactam IVPB 2.5 Gram(s) IV Intermittent every 8 hours  chlorhexidine 0.12% Liquid 15 milliLiter(s) Oral Mucosa every 12 hours  chlorhexidine 2% Cloths 1 Application(s) Topical daily  dexMEDEtomidine Infusion 0.2 MICROgram(s)/kG/Hr IV Continuous <Continuous>  donepezil 10 milliGRAM(s) Oral at bedtime  doxazosin 2 milliGRAM(s) Oral at bedtime  enoxaparin Injectable 40 milliGRAM(s) SubCutaneous every 24 hours  fentaNYL   Infusion. 0.5 MICROgram(s)/kG/Hr IV Continuous <Continuous>  lactulose Syrup 10 Gram(s) Oral daily  magnesium sulfate  IVPB 2 Gram(s) IV Intermittent once  memantine 10 milliGRAM(s) Oral two times a day  midazolam Infusion 0.02 mG/kG/Hr IV Continuous <Continuous>  norepinephrine Infusion 0.05 MICROgram(s)/kG/Min IV Continuous <Continuous>  pantoprazole    Tablet 40 milliGRAM(s) Oral before breakfast  senna 2 Tablet(s) Oral at bedtime  tamsulosin 0.4 milliGRAM(s) Oral at bedtime  zinc oxide 20% Ointment 1 Application(s) Topical three times a day    PRN MEDICATIONS  acetaminophen     Tablet .. 650 milliGRAM(s) Oral every 6 hours PRN  ibuprofen  Suspension. 600 milliGRAM(s) Oral every 8 hours PRN  melatonin 3 milliGRAM(s) Oral at bedtime PRN      VITAL SIGNS: Last 24 Hours  T(C): 37.9 (15 Dec 2022 10:00), Max: 38.2 (15 Dec 2022 08:00)  T(F): 100.2 (15 Dec 2022 10:00), Max: 100.8 (15 Dec 2022 08:00)  HR: 70 (15 Dec 2022 11:45) (68 - 140)  BP: 99/66 (15 Dec 2022 11:45) (73/47 - 198/95)  BP(mean): 76 (15 Dec 2022 11:45) (52 - 145)  RR: 28 (15 Dec 2022 11:45) (15 - 29)  SpO2: 98% (15 Dec 2022 11:45) (92% - 100%)    LABS:                        8.6    21.26 )-----------( 580      ( 15 Dec 2022 04:55 )             28.4     12-15    143  |  106  |  11  ----------------------------<  204<H>  4.0   |  29  |  <0.5<L>    Ca    8.2<L>      15 Dec 2022 04:55  Phos  3.5     12-14  Mg     1.9     12-15    TPro  5.8<L>  /  Alb  2.5<L>  /  TBili  0.2  /  DBili  x   /  AST  10  /  ALT  9   /  AlkPhos  82  12-15      Urinalysis Basic - ( 14 Dec 2022 18:22 )    Color: Yellow / Appearance: Slightly Turbid / S.018 / pH: x  Gluc: x / Ketone: Negative  / Bili: Negative / Urobili: <2 mg/dL   Blood: x / Protein: 30 mg/dL / Nitrite: Negative   Leuk Esterase: Large / RBC: 11 /HPF /  /HPF   Sq Epi: x / Non Sq Epi: 7 /HPF / Bacteria: Negative      ABG - ( 15 Dec 2022 02:25 )  pH, Arterial: 7.49  pH, Blood: x     /  pCO2: 41    /  pO2: 115   / HCO3: 31    / Base Excess: 7.2   /  SaO2: 99.8                  Culture - Fungal, Bronchial (collected 13 Dec 2022 09:40)  Source: .Bronchial None  Preliminary Report (14 Dec 2022 06:59):    Testing in progress    Culture - Acid Fast - Bronchial w/Smear (collected 13 Dec 2022 09:40)  Source: .Bronchial None    Culture - Bronchial (collected 13 Dec 2022 09:40)  Source: .Bronchial None  Gram Stain (13 Dec 2022 20:33):    Moderate polymorphonuclear leukocytes per low power field    Rare Squamous epithelial cells per low power field    Few Gram Positive Cocci in Clusters per oil power field  Preliminary Report (14 Dec 2022 17:53):    Numerous Mixed gram negative rods including    Moderate Pseudomonas aeruginosa    Numerous Acinetobacter baumannii/nosocomialis group    Culture - Blood (collected 13 Dec 2022 01:10)  Source: .Blood None  Preliminary Report (14 Dec 2022 14:01):    No growth to date.    Culture - Blood (collected 12 Dec 2022 20:08)  Source: .Blood Blood  Preliminary Report (14 Dec 2022 02:02):    No growth to date.          PHYSICAL EXAM:  GENERAL: NAD  HEAD:  Atraumatic, Normocephalic  ENMT: intubated  NERVOUS SYSTEM: sedated  CHEST/LUNG: intubated, vented. bilateral breath sounds decreased on the left, crackles, no wheezing  HEART: Regular rate and rhythm. Normal S1/S1. No murmurs  ABDOMEN: Soft, Nontender, Nondistended; Bowel sounds present  EXTREMITIES: b/l pitting edema, contracted

## 2022-12-15 NOTE — PROGRESS NOTE ADULT - SUBJECTIVE AND OBJECTIVE BOX
NUTR[employer] SUPPORT TEAM  -  PROGRESS NOTE   remain vented/sedated  on PEG tube feed  abdomen soft n/d +peg tube in place site c/d/i    REVIEW OF SYSTEMS:  Negative except as noted above.     VITALS:  T(F): 100.2 (12-15 @ 10:00), Max: 100.8 (12-15 @ 08:00)  HR: 77 (12-15 @ 14:26) (68 - 80)  BP: 99/66 (12-15 @ 11:45) (73/47 - 137/81)  RR: 28 (12-15 @ 11:45) (19 - 29)  SpO2: 99% (12-15 @ 14:26) (96% - 100%)    ABG - ( 15 Dec 2022 11:12 )  pH, Arterial: 7.50  pH, Blood: x     /  pCO2: 38    /  pO2: 58    / HCO3: 30    / Base Excess: 6.0   /  SaO2: 93.2    Mode: AC/ CMV (Assist Control/ Continuous Mandatory Ventilation)  RR (machine): 24  TV (machine): 400  FiO2: 40  PEEP: 8  ITime: 0.8  MAP: 11  PIP: 18  HEIGHT/WEIGHT/BMI:   Height (cm): 195.6 (12-03)  Weight (kg): 68 (12-02)  BMI (kg/m2): 17.8 (12-03)    12-14-22 @ 07:01  -  12-15-22 @ 07:00  --------------------------------------------------------  IN:    Dexmedetomidine: 212.5 mL    Dexmedetomidine: 144.5 mL    Dexmedetomidine: 136 mL    Enteral Tube Flush: 900 mL    FentaNYL: 51 mL    FentaNYL: 442 mL    IV PiggyBack: 550 mL    Norepinephrine: 154.3 mL    Osmolite: 1080 mL  Total IN: 3670.3 mL    OUT:    Indwelling Catheter - Urethral (mL): 1250 mL  Total OUT: 1250 mL    Total NET: 2420.3 mL        STANDING MEDICATIONS:   acetylcysteine 20% for bronchoscopy 4 milliLiter(s) Nebulizer once  albuterol    90 MICROgram(s) HFA Inhaler 1 Puff(s) Inhalation every 4 hours  baclofen 20 milliGRAM(s) Oral every 12 hours  cefiderocol IVPB 2000 milliGRAM(s) IV Intermittent every 8 hours  chlorhexidine 0.12% Liquid 15 milliLiter(s) Oral Mucosa every 12 hours  chlorhexidine 2% Cloths 1 Application(s) Topical daily  dexMEDEtomidine Infusion 0.2 MICROgram(s)/kG/Hr IV Continuous <Continuous>  donepezil 10 milliGRAM(s) Oral at bedtime  doxazosin 2 milliGRAM(s) Oral at bedtime  enoxaparin Injectable 40 milliGRAM(s) SubCutaneous every 24 hours  fentaNYL   Infusion. 0.5 MICROgram(s)/kG/Hr IV Continuous <Continuous>  lactulose Syrup 10 Gram(s) Oral daily  magnesium sulfate  IVPB 2 Gram(s) IV Intermittent once  memantine 10 milliGRAM(s) Oral two times a day  midazolam Infusion 0.02 mG/kG/Hr IV Continuous <Continuous>  norepinephrine Infusion 0.05 MICROgram(s)/kG/Min IV Continuous <Continuous>  pantoprazole   Suspension 40 milliGRAM(s) Oral daily  senna 2 Tablet(s) Oral at bedtime  tamsulosin 0.4 milliGRAM(s) Oral at bedtime  zinc oxide 20% Ointment 1 Application(s) Topical three times a day      LABS:                         8.6    21.26 )-----------( 580      ( 15 Dec 2022 04:55 )             28.4     143  |  106  |  11  ----------------------------<  204<H>          (12-15-22 @ 04:55)  4.0   |  29  |  <0.5<L>    Ca    8.2<L>          (12-15-22 @ 04:55)  Phos  3.5         (12-14-22 @ 23:30)  Mg     1.9         (12-15-22 @ 04:55)    TPro  5.8<L>  /  Alb  2.5<L>  /  TBili  0.2  /  DBili  x   /  AST  10  /  ALT  9   /  AlkPhos  82       12-15-22 @ 04:55  Triglycerides, Serum: 125 mg/dL (12-15 @ 04:55)  Blood Glucose (Past 24 hours):  192 mg/dL (12-15 @ 05:25)  119 mg/dL (12-14 @ 22:14)    DIET:   Diet, NPO with Tube Feed:   Tube Feeding Modality: Gastrostomy  Osmolite 1.5 Bhargav  Total Volume for 24 Hours (mL): 1080  Bolus  Total Volume of Bolus (mL):  360  Total # of Feeds: 3  Tube Feed Frequency: Every 8 hours   Tube Feed Start Time: 17:00  Bolus Feed Rate (mL per Hour): 500   Bolus Feed Duration (in Hours): 0.5  Free Water Flush  Free Water Flush Instructions:  150ml before and after each bolus (12-13-22 @ 13:17) [Active]    RADIOLOGY:   < from: Xray Chest 1 View- PORTABLE-Routine (Xray Chest 1 View- PORTABLE-Routine in AM.) (12.15.22 @ 06:06)   Impression:  Low lung volume.

## 2022-12-15 NOTE — PROGRESS NOTE ADULT - ASSESSMENT
ASSESSMENT  53 yo male w/ PMH of MS, chronic low back pain, neurogenic bladder? s/p SPC placement and s/p dislodgement, presents for fever. Presented to the ED for SPC dislodgement a few days ago. Was seen by IR and planned for outpatient SPC replacement. Campuzano was placed at [facility]. Today. prior to placement, he was noted to be febrile w/ tachycardia and tachypnea so brought into ED.      IMPRESSION  #Fevers, HAP/ VAP     12/13 bronch   Numerous Mixed gram negative rods including    Moderate Pseudomonas aeruginosa    Numerous Acinetobacter baumannii/nosocomialis group    12/12 Bronch Acinetobacter baumannii/nosocomialis  & CRE Pseudo    12/8 bronch cx     Few Pseudomonas aeruginosa (Carbapenem Resistant) Cefepime: S 8 Piperacillin/Tazobactam: S 16  CXR L white-out, suspect aspiration/mucous plug  #Severe Sepsis on admission T>101F, Pulse>90,WBC>12, lactic acidosis due to acute complicated cystitis    12/4 BCX NGTD     12/3 BCX NGTD     12/2 BCX  Staphylococcus epidermidis, Methicillin resistant: Detec 1/2 bottles ,      12/2 BCX GPC 1/2  Staphylococcus hominis    12/2 BCX  GPC 1/2 Staphylococcus hominis    12/2 UCX   >=3 organisms. Probable collection contamination.    12/2 UCX    Few Proteus mirabilis ESBL    Moderate Enterococcus faecalis    UA Blood: x / Protein: 100 mg/dL / Nitrite: Negative   Leuk Esterase: Large / RBC: 236 /HPF / WBC >720 /HPF   Sq Epi: x / Non Sq Epi: 2 /HPF / Bacteria: Many  #CXR Small left pleural effusion and possible left basilar consolidation.  #MS with neurogenic bladder, dislodged SPC  #Lactic acidosis  #Hypernatremia   QTC Calculation(Bazett) 407 ms  #Severe protein calorie malnutrition  BMI (kg/m2): 17.8    #Abx allergy: penicillin (childhood)  vancomycin (Unknown)    Creatinine, Serum: 0.5 (12-03-22 @ 08:22)    Weight (kg): 68 (12-02-22 @ 08:20)    RECOMMENDATIONS  - Cefiderocol 2g q8h IV , d/c avycaz  - f/u acinetobacter sensitivities   - Pulm following  - GOC, poor prognosis    If any questions, please call or send a message on [employer]  Please continue to update ID with any pertinent new laboratory or radiographic findings  #[id number]

## 2022-12-15 NOTE — CONSULT NOTE ADULT - CONVERSATION DETAILS
Discussed with patient's mother, who states being HCP, and that patient's wife  when he got more ill, and that he also has two daughters. Explained he has been intubated multiple times in the last year, and she is considering focus on comfort. Explained CMO vs full medical care, and she is open to trial of continued medical care for a few days to see clinical course before making a decision. Open to further discussions.

## 2022-12-15 NOTE — CONSULT NOTE ADULT - SUBJECTIVE AND OBJECTIVE BOX
HPI:  Pt is nonverbal at baseline. Hx obtained from chart.     51 yo male w/ PMH of MS, chronic low back pain, neurogenic bladder? s/p SPC placement and s/p dislodgement, presents for fever. Presented to the ED for SPC dislodgement a few days ago. Was seen by IR and planned for outpatient SPC replacement. Campuzano was placed at [facility]. Today. prior to placement, he was noted to be febrile w/ tachycardia and tachypnea so brought into ED.  Pt nonverbal at baseline, unable to provide further HPI.    Pt upgraded to ICU sepsis, on multiple pressors. Palliative care consulted, pt w end stage MS at baseline. Poor prognosis.     PERTINENT PM/SXH:   Multiple sclerosis    Chronic back pain    Hypertension      No significant past surgical history      FAMILY HISTORY:  No pertinent family history in first degree relatives      ITEMS NOT CHECKED ARE NOT PRESENT    SOCIAL HISTORY:   Significant other/partner[ ]  Children[ ]  Protestant/Spirituality:  Substance hx:  [ ]   Tobacco hx:  [ ]   Alcohol hx: [ ]   Living Situation: [ ]Home  [ ]Long term care  [ ]Rehab [ ]Other  Home Services: [ ] HHA [ ] Visting RN [ ] Hospice  Occupation:  Home Opioid hx:  [ ] Y [ ] N [ ] I-Stop Reference No:    ADVANCE DIRECTIVES:    [ ] Full Code [ ] DNR  MOLST  [ ]  Living Will  [ ]   DECISION MAKER(s):  [ ] Health Care Proxy(s)  [ ] Surrogate(s)  [ ] Guardian           Name(s): Phone Number(s):    BASELINE (I)ADL(s) (prior to admission):  [place]: [ ]Total  [ ] Moderate [ ]Dependent  Palliative Performance Status Version 2:         %    http://npcrc.org/files/news/palliative_performance_scale_ppsv2.pdf    Allergies    penicillin (Unknown)  vancomycin (Unknown)    Intolerances    MEDICATIONS  (STANDING):  acetylcysteine 20% for bronchoscopy 4 milliLiter(s) Nebulizer once  albuterol    90 MICROgram(s) HFA Inhaler 1 Puff(s) Inhalation every 4 hours  baclofen 20 milliGRAM(s) Oral every 12 hours  ceftazidime/avibactam IVPB 2.5 Gram(s) IV Intermittent every 8 hours  chlorhexidine 0.12% Liquid 15 milliLiter(s) Oral Mucosa every 12 hours  chlorhexidine 2% Cloths 1 Application(s) Topical daily  dexMEDEtomidine Infusion 0.2 MICROgram(s)/kG/Hr (3.4 mL/Hr) IV Continuous <Continuous>  donepezil 10 milliGRAM(s) Oral at bedtime  doxazosin 2 milliGRAM(s) Oral at bedtime  enoxaparin Injectable 40 milliGRAM(s) SubCutaneous every 24 hours  fentaNYL   Infusion. 0.5 MICROgram(s)/kG/Hr (3.4 mL/Hr) IV Continuous <Continuous>  lactulose Syrup 10 Gram(s) Oral daily  magnesium sulfate  IVPB 2 Gram(s) IV Intermittent once  memantine 10 milliGRAM(s) Oral two times a day  midazolam Infusion 0.02 mG/kG/Hr (1.36 mL/Hr) IV Continuous <Continuous>  norepinephrine Infusion 0.05 MICROgram(s)/kG/Min (6.38 mL/Hr) IV Continuous <Continuous>  pantoprazole    Tablet 40 milliGRAM(s) Oral before breakfast  senna 2 Tablet(s) Oral at bedtime  tamsulosin 0.4 milliGRAM(s) Oral at bedtime  zinc oxide 20% Ointment 1 Application(s) Topical three times a day    MEDICATIONS  (PRN):  acetaminophen     Tablet .. 650 milliGRAM(s) Oral every 6 hours PRN Temp greater or equal to 38C (100.4F), Mild Pain (1 - 3), Moderate Pain (4 - 6)  ibuprofen  Suspension. 600 milliGRAM(s) Oral every 8 hours PRN Temp greater or equal to 38.5C (101.3F)  melatonin 3 milliGRAM(s) Oral at bedtime PRN Insomnia    PRESENT SYMPTOMS: [ ]Unable to obtain due to poor mentation   Source if other than patient:  [ ]Family   [ ]Team     Pain: [ ]yes [ ]no  QOL impact -   Location -                    Aggravating factors -  Quality -  Radiation -  Timing-  Severity (0-10 scale):  Minimal acceptable level (0-10 scale):     CPOT:    https://www.sccm.org/getattachment/[email or web address]/Critical-Care-Pain-Observation-Tool-(CPOT)      PAIN AD Score:     http://geriatrictoolkit.[place].[facility]/cog/painad.pdf (press ctrl +  left click to view)      Dyspnea:                           [ ]Mild [ ]Moderate [ ]Severe [ ]None  Anxiety:                             [ ]Mild [ ]Moderate [ ]Severe [ ]None  Fatigue:                             [ ]Mild [ ]Moderate [ ]Severe [ ]None  Nausea:                             [ ]Mild [ ]Moderate [ ]Severe [ ]None  Loss of appetite:              [ ]Mild [ ]Moderate [ ]Severe [ ]None  Constipation:                    [ ]Mild [ ]Moderate [ ]Severe [ ]None    Other Symptoms:  [ ]All other review of systems negative     Palliative Performance Status Version 2:         %    http://npcrc.org/files/news/palliative_performance_scale_ppsv2.pdf  PHYSICAL EXAM:  Vital Signs Last 24 Hrs  T(C): 37.9 (15 Dec 2022 10:00), Max: 38.2 (15 Dec 2022 08:00)  T(F): 100.2 (15 Dec 2022 10:00), Max: 100.8 (15 Dec 2022 08:00)  HR: 80 (15 Dec 2022 10:00) (68 - 140)  BP: 110/71 (15 Dec 2022 10:00) (73/47 - 198/95)  BP(mean): 82 (15 Dec 2022 10:00) (52 - 145)  RR: 26 (15 Dec 2022 10:00) (15 - 28)  SpO2: 96% (15 Dec 2022 10:00) (92% - 100%)    Parameters below as of 15 Dec 2022 09:00  Patient On (Oxygen Delivery Method): ventilator    O2 Concentration (%): 40 I&O's Summary    14 Dec 2022 07:  -  15 Dec 2022 07:00  --------------------------------------------------------  IN: 3670.3 mL / OUT: 1250 mL / NET: 2420.3 mL    15 Dec 2022 07:01  -  15 Dec 2022 10:30  --------------------------------------------------------  IN: 136.6 mL / OUT: 190 mL / NET: -53.4 mL        GENERAL:  [ ] No acute distress [ ]Lethargic  [ ]Unarousable  [ ]Verbal  [ ]Non-Verbal [ ]Cachexia    BEHAVIORAL/PSYCH:  [ ]Alert and Oriented x  [ ] Anxiety [ ] Delirium [ ] Agitation [ ] Calm   EYES: [ ] No scleral icterus [ ] Scleral icterus [ ] Closed  ENMT:  [ ]Dry mouth  [ ]No external oral lesions [ ] No external ear or nose lesions  CARDIOVASCULAR:  [ ]Regular [ ]Irregular [ ]Tachy [ ]Not Tachy  [ ]Hugo [ ] Edema [ ] No edema  PULMONARY:  [ ]Tachypnea  [ ]Audible excessive secretions [ ] No labored breathing [ ] labored breathing  GASTROINTESTINAL: [ ]Soft  [ ]Distended  [ ]Not distended [ ]Non tender [ ]Tender  MUSCULOSKELETAL: [ ]No clubbing [ ] clubbing  [ ] No cyanosis [ ] cyanosis  NEUROLOGIC: [ ]No focal deficits  [ ]Follows commands  [ ]Does not follow commands  [ ]Cognitive impairment  [ ]Dysphagia  [ ]Dysarthria  [ ]Paresis   SKIN: [ ] Jaundiced [ ] Non-jaundiced [ ]Rash [ ]No Rash [ ] Warm [ ] Dry  MISC/LINES: [ ] ET tube [ ] Trach [ ]NGT/OGT [ ]PEG [ ]Campuzano    CRITICAL CARE:  [ ] Shock Present  [ ]Septic [ ]Cardiogenic [ ]Neurologic [ ]Hypovolemic  [ ]  Vasopressors [ ]  Inotropes   [ ]Respiratory failure present [ ]Mechanical ventilation [ ]Non-invasive ventilatory support [ ]High flow  [ ]Acute  [ ]Chronic [ ]Hypoxic  [ ]Hypercarbic [ ]Other  [ ]Other organ failure     LABS: reviewed by me                        8.6    21.26 )-----------( 580      ( 15 Dec 2022 04:55 )             28.4   12-15    143  |  106  |  11  ----------------------------<  204<H>  4.0   |  29  |  <0.5<L>    Ca    8.2<L>      15 Dec 2022 04:55  Phos  3.5     12-14  Mg     1.9     12-15    TPro  5.8<L>  /  Alb  2.5<L>  /  TBili  0.2  /  DBili  x   /  AST  10  /  ALT  9   /  AlkPhos  82  12-15      Urinalysis Basic - ( 14 Dec 2022 18:22 )    Color: Yellow / Appearance: Slightly Turbid / S.018 / pH: x  Gluc: x / Ketone: Negative  / Bili: Negative / Urobili: <2 mg/dL   Blood: x / Protein: 30 mg/dL / Nitrite: Negative   Leuk Esterase: Large / RBC: 11 /HPF /  /HPF   Sq Epi: x / Non Sq Epi: 7 /HPF / Bacteria: Negative      RADIOLOGY & ADDITIONAL STUDIES: reviewed by me    EKG: reviewed by me      PROTEIN CALORIE MALNUTRITION PRESENT: [ ]mild [ ]moderate [ ]severe [ ]underweight [ ]morbid obesity  https://www.andeal.org/vault/2440/web/files/ONC/Table_Clinical%20Characteristics%20to%20Document%20Malnutrition-White%20JV%20et%20al%2020.pdf    Height (cm): 195.6 (22 @ 09:25)  Weight (kg): 68 (22 @ 08:20)  BMI (kg/m2): 17.8 (22 @ 09:25)    [ ]PPSV2 < or = to 30% [ ]significant weight loss  [ ]poor nutritional intake  [ ]anasarca      [ ]Artificial Nutrition      REFERRALS:   [ ]Chaplaincy  [ ]Hospice  [ ]Child Life  [ ]Social Work  [ ]Case management [ ]Holistic Therapy     Goals of Care Document:    HPI:  Pt is nonverbal at baseline. Hx obtained from chart.     51 yo male w/ PMH of MS, chronic low back pain, neurogenic bladder? s/p SPC placement and s/p dislodgement, presents for fever. Presented to the ED for SPC dislodgement a few days ago. Was seen by IR and planned for outpatient SPC replacement. Campuzano was placed at [facility]. Today. prior to placement, he was noted to be febrile w/ tachycardia and tachypnea so brought into ED.  Pt nonverbal at baseline, unable to provide further HPI.    Pt upgraded to ICU sepsis, on multiple pressors. Palliative care consulted, pt w end stage MS at baseline. Poor prognosis.     PERTINENT PM/SXH:   Multiple sclerosis    Chronic back pain    Hypertension      No significant past surgical history      FAMILY HISTORY:  No pertinent family history in first degree relatives      ITEMS NOT CHECKED ARE NOT PRESENT    SOCIAL HISTORY:   Significant other/partner[x ]  Children[ x]  Alevism/Spirituality:  Substance hx:  [ ]   Tobacco hx:  [ ]   Alcohol hx: [ ]   Living Situation: [ ]Home  [ x]Long term care  [ ]Rehab [ ]Other *lives at [facility]   Home Services: [ ] HHA [ ] Óscar RN [ ] Hospice  Occupation:  Home Opioid hx:  [ ] Y [x ] N [x ] I-Stop Reference No:  : Hazel Ardon | Reference #: [id number]    There are no results for the search terms that you entered.       ADVANCE DIRECTIVES:    [x ] Full Code [ ] DNR  MOLST  [ ]  Living Will  [ ]   DECISION MAKER(s):  [ ] Health Care Proxy(s)  [ ] Surrogate(s)  [ ] Guardian           Name(s): Phone Number(s):  Answers: Caregiver Name: JAYY MEDRANO,  Answers: Caregiver Relationship: MOTHER, HCP  Answers: Caregiver Contact Number: [phone]  HEALTH CARE PROXY ON FILE IN EMR - REVIEWED   BASELINE (I)ADL(s) (prior to admission):  [place]: [ ]Total  [ ] Moderate [x ]Dependent  Palliative Performance Status Version 2:         %    http://npcrc.org/files/news/palliative_performance_scale_ppsv2.pdf    Allergies    penicillin (Unknown)  vancomycin (Unknown)    Intolerances    MEDICATIONS  (STANDING):  acetylcysteine 20% for bronchoscopy 4 milliLiter(s) Nebulizer once  albuterol    90 MICROgram(s) HFA Inhaler 1 Puff(s) Inhalation every 4 hours  baclofen 20 milliGRAM(s) Oral every 12 hours  ceftazidime/avibactam IVPB 2.5 Gram(s) IV Intermittent every 8 hours  chlorhexidine 0.12% Liquid 15 milliLiter(s) Oral Mucosa every 12 hours  chlorhexidine 2% Cloths 1 Application(s) Topical daily  dexMEDEtomidine Infusion 0.2 MICROgram(s)/kG/Hr (3.4 mL/Hr) IV Continuous <Continuous>  donepezil 10 milliGRAM(s) Oral at bedtime  doxazosin 2 milliGRAM(s) Oral at bedtime  enoxaparin Injectable 40 milliGRAM(s) SubCutaneous every 24 hours  fentaNYL   Infusion. 0.5 MICROgram(s)/kG/Hr (3.4 mL/Hr) IV Continuous <Continuous>  lactulose Syrup 10 Gram(s) Oral daily  magnesium sulfate  IVPB 2 Gram(s) IV Intermittent once  memantine 10 milliGRAM(s) Oral two times a day  midazolam Infusion 0.02 mG/kG/Hr (1.36 mL/Hr) IV Continuous <Continuous>  norepinephrine Infusion 0.05 MICROgram(s)/kG/Min (6.38 mL/Hr) IV Continuous <Continuous>  pantoprazole    Tablet 40 milliGRAM(s) Oral before breakfast  senna 2 Tablet(s) Oral at bedtime  tamsulosin 0.4 milliGRAM(s) Oral at bedtime  zinc oxide 20% Ointment 1 Application(s) Topical three times a day    MEDICATIONS  (PRN):  acetaminophen     Tablet .. 650 milliGRAM(s) Oral every 6 hours PRN Temp greater or equal to 38C (100.4F), Mild Pain (1 - 3), Moderate Pain (4 - 6)  ibuprofen  Suspension. 600 milliGRAM(s) Oral every 8 hours PRN Temp greater or equal to 38.5C (101.3F)  melatonin 3 milliGRAM(s) Oral at bedtime PRN Insomnia    PRESENT SYMPTOMS: [ ]Unable to obtain due to poor mentation   Source if other than patient:  [ ]Family   [ ]Team     Pain: [ ]yes [ ]no  QOL impact -   Location -                    Aggravating factors -  Quality -  Radiation -  Timing-  Severity (0-10 scale):  Minimal acceptable level (0-10 scale):     CPOT:    https://www.sccm.org/getattachment/[email or web address]-[email or web address]-3434e5374y6r/Critical-Care-Pain-Observation-Tool-(CPOT)      PAIN AD Score:     http://geriatrictoolkit.[facility]/cog/painad.pdf (press ctrl +  left click to view)      Dyspnea:                           [ ]Mild [ ]Moderate [ ]Severe [ ]None  Anxiety:                             [ ]Mild [ ]Moderate [ ]Severe [ ]None  Fatigue:                             [ ]Mild [ ]Moderate [ ]Severe [ ]None  Nausea:                             [ ]Mild [ ]Moderate [ ]Severe [ ]None  Loss of appetite:              [ ]Mild [ ]Moderate [ ]Severe [ ]None  Constipation:                    [ ]Mild [ ]Moderate [ ]Severe [ ]None    Other Symptoms:  [ ]All other review of systems negative     Palliative Performance Status Version 2:         %    http://[facility].org/files/news/palliative_performance_scale_ppsv2.pdf  PHYSICAL EXAM:  Vital Signs Last 24 Hrs  T(C): 37.9 (15 Dec 2022 10:00), Max: 38.2 (15 Dec 2022 08:00)  T(F): 100.2 (15 Dec 2022 10:00), Max: 100.8 (15 Dec 2022 08:00)  HR: 80 (15 Dec 2022 10:00) (68 - 140)  BP: 110/71 (15 Dec 2022 10:00) (73/47 - 198/95)  BP(mean): 82 (15 Dec 2022 10:00) (52 - 145)  RR: 26 (15 Dec 2022 10:00) (15 - 28)  SpO2: 96% (15 Dec 2022 10:00) (92% - 100%)    Parameters below as of 15 Dec 2022 09:00  Patient On (Oxygen Delivery Method): ventilator    O2 Concentration (%): 40 I&O's Summary    14 Dec 2022 07:01  -  15 Dec 2022 07:00  --------------------------------------------------------  IN: 3670.3 mL / OUT: 1250 mL / NET: 2420.3 mL    15 Dec 2022 07:01  -  15 Dec 2022 10:30  --------------------------------------------------------  IN: 136.6 mL / OUT: 190 mL / NET: -53.4 mL        GENERAL:  [ ] No acute distress [ ]Lethargic  [ ]Unarousable  [ ]Verbal  [ ]Non-Verbal [ ]Cachexia    BEHAVIORAL/PSYCH:  [ ]Alert and Oriented x  [ ] Anxiety [ ] Delirium [ ] Agitation [ ] Calm   EYES: [ ] No scleral icterus [ ] Scleral icterus [ ] Closed  ENMT:  [ ]Dry mouth  [ ]No external oral lesions [ ] No external ear or nose lesions  CARDIOVASCULAR:  [ ]Regular [ ]Irregular [ ]Tachy [ ]Not Tachy  [ ]Hugo [ ] Edema [ ] No edema  PULMONARY:  [ ]Tachypnea  [ ]Audible excessive secretions [ ] No labored breathing [ ] labored breathing  GASTROINTESTINAL: [ ]Soft  [ ]Distended  [ ]Not distended [ ]Non tender [ ]Tender  MUSCULOSKELETAL: [ ]No clubbing [ ] clubbing  [ ] No cyanosis [ ] cyanosis  NEUROLOGIC: [ ]No focal deficits  [ ]Follows commands  [ ]Does not follow commands  [ ]Cognitive impairment  [ ]Dysphagia  [ ]Dysarthria  [ ]Paresis   SKIN: [ ] Jaundiced [ ] Non-jaundiced [ ]Rash [ ]No Rash [ ] Warm [ ] Dry  MISC/LINES: [ ] ET tube [ ] Trach [ ]NGT/OGT [ ]PEG [ ]Campuzano    CRITICAL CARE:  [ x] Shock Present  [ x]Septic [ ]Cardiogenic [ ]Neurologic [ ]Hypovolemic  [x ]  Vasopressors [ ]  Inotropes   [x ]Respiratory failure present [x ]Mechanical ventilation [ ]Non-invasive ventilatory support [ ]High flow  [ ]Acute  [ ]Chronic [ ]Hypoxic  [ ]Hypercarbic [ ]Other  [ ]Other organ failure     LABS: reviewed by me                        8.6    21.26 )-----------( 580      ( 15 Dec 2022 04:55 )             28.4   12-15    143  |  106  |  11  ----------------------------<  204<H>  4.0   |  29  |  <0.5<L>    Ca    8.2<L>      15 Dec 2022 04:55  Phos  3.5     12-14  Mg     1.9     12-15    TPro  5.8<L>  /  Alb  2.5<L>  /  TBili  0.2  /  DBili  x   /  AST  10  /  ALT  9   /  AlkPhos  82  12-15      Urinalysis Basic - ( 14 Dec 2022 18:22 )    Color: Yellow / Appearance: Slightly Turbid / S.018 / pH: x  Gluc: x / Ketone: Negative  / Bili: Negative / Urobili: <2 mg/dL   Blood: x / Protein: 30 mg/dL / Nitrite: Negative   Leuk Esterase: Large / RBC: 11 /HPF /  /HPF   Sq Epi: x / Non Sq Epi: 7 /HPF / Bacteria: Negative      RADIOLOGY & ADDITIONAL STUDIES: reviewed by me    EKG: reviewed by me      PROTEIN CALORIE MALNUTRITION PRESENT: [ ]mild [ ]moderate [ ]severe [ ]underweight [ ]morbid obesity  https://www.andeal.org/vault/2440/web/files/ONC/Table_Clinical%20Characteristics%20to%20Document%20Malnutrition-White%20JV%20et%20al%2020.pdf    Height (cm): 195.6 (22 @ 09:25)  Weight (kg): 68 (22 @ 08:20)  BMI (kg/m2): 17.8 (22 @ 09:25)    [ ]PPSV2 < or = to 30% [ ]significant weight loss  [ ]poor nutritional intake  [ ]anasarca      [ ]Artificial Nutrition      REFERRALS:   [ ]Chaplaincy  [ ]Hospice  [ ]Child Life  [ ]Social Work  [ ]Case management [ ]Holistic Therapy     Goals of Care Document:    HPI:  Pt is nonverbal at baseline. Hx obtained from chart.     53 yo male w/ PMH of MS, chronic low back pain, neurogenic bladder? s/p SPC placement and s/p dislodgement, presents for fever. Presented to the ED for SPC dislodgement a few days ago. Was seen by IR and planned for outpatient SPC replacement. Campuzano was placed at [facility]. Today. prior to placement, he was noted to be febrile w/ tachycardia and tachypnea so brought into ED.  Pt nonverbal at baseline, unable to provide further HPI.    Pt upgraded to ICU sepsis, on multiple pressors. Palliative care consulted, pt w end stage MS at baseline. Poor prognosis.     PERTINENT PM/SXH:   Multiple sclerosis    Chronic back pain    Hypertension      No significant past surgical history      FAMILY HISTORY:  No pertinent family history in first degree relatives      ITEMS NOT CHECKED ARE NOT PRESENT    SOCIAL HISTORY:   Significant other/partner[x ]  Children[ x]  Pentecostalism/Spirituality:  Substance hx:  [ ]   Tobacco hx:  [ ]   Alcohol hx: [ ]   Living Situation: [ ]Home  [ x]Long term care  [ ]Rehab [ ]Other *lives at [facility]   Home Services: [ ] HHA [ ] Visting RN [ ] Hospice  Occupation:  Home Opioid hx:  x ] Y [] N [x ] I-Stop Reference No:     This report was requested by: Hazel Ardon | Reference #: [id number]    You have not added a ANTONIA number. Keeping your ANTONIA number(s) up to date on the My ANTONIA # page will enable the separation of your prescriptions from others in the search results.    Others' Prescriptions  Patient Name: Mckinley MedranoBirth Date: 1970  Address: [address] [place] [id number]: Male  Rx Written	Rx Dispensed	Drug	Quantity	Days Supply	Prescriber Name	Prescriber Antonia #	Payment Method	Dispenser  2022	tramadol hcl 50 mg tablet	30	5	Rachelle Gomez	[id number]	Insurance	[facility] [employer] [facility]  * - Drugs marked with an asterisk are compound drugs. If the compound drug is made up of more than one controlled substance, then each controlled substance will be a separate row in the  ADVANCE DIRECTIVES:    [x ] Full Code [ ] DNR  MOLST  [ ]  Living Will  [ ]   DECISION MAKER(s):  [ ] Health Care Proxy(s)  [ ] Surrogate(s)  [ ] Guardian           Name(s): Phone Number(s):  Answers: Caregiver Name: JAYY MEDRANO,  Answers: Caregiver Relationship: MOTHER, HCP  Answers: Caregiver Contact Number: [phone]  HEALTH CARE PROXY ON FILE IN EMR - REVIEWED   BASELINE (I)ADL(s) (prior to admission):  [place]: [ ]Total  [ ] Moderate [x ]Dependent  Palliative Performance Status Version 2:         %    http://[facility].org/files/news/palliative_performance_scale_ppsv2.pdf    Allergies    penicillin (Unknown)  vancomycin (Unknown)    Intolerances    MEDICATIONS  (STANDING):  acetylcysteine 20% for bronchoscopy 4 milliLiter(s) Nebulizer once  albuterol    90 MICROgram(s) HFA Inhaler 1 Puff(s) Inhalation every 4 hours  baclofen 20 milliGRAM(s) Oral every 12 hours  ceftazidime/avibactam IVPB 2.5 Gram(s) IV Intermittent every 8 hours  chlorhexidine 0.12% Liquid 15 milliLiter(s) Oral Mucosa every 12 hours  chlorhexidine 2% Cloths 1 Application(s) Topical daily  dexMEDEtomidine Infusion 0.2 MICROgram(s)/kG/Hr (3.4 mL/Hr) IV Continuous <Continuous>  donepezil 10 milliGRAM(s) Oral at bedtime  doxazosin 2 milliGRAM(s) Oral at bedtime  enoxaparin Injectable 40 milliGRAM(s) SubCutaneous every 24 hours  fentaNYL   Infusion. 0.5 MICROgram(s)/kG/Hr (3.4 mL/Hr) IV Continuous <Continuous>  lactulose Syrup 10 Gram(s) Oral daily  magnesium sulfate  IVPB 2 Gram(s) IV Intermittent once  memantine 10 milliGRAM(s) Oral two times a day  midazolam Infusion 0.02 mG/kG/Hr (1.36 mL/Hr) IV Continuous <Continuous>  norepinephrine Infusion 0.05 MICROgram(s)/kG/Min (6.38 mL/Hr) IV Continuous <Continuous>  pantoprazole    Tablet 40 milliGRAM(s) Oral before breakfast  senna 2 Tablet(s) Oral at bedtime  tamsulosin 0.4 milliGRAM(s) Oral at bedtime  zinc oxide 20% Ointment 1 Application(s) Topical three times a day    MEDICATIONS  (PRN):  acetaminophen     Tablet .. 650 milliGRAM(s) Oral every 6 hours PRN Temp greater or equal to 38C (100.4F), Mild Pain (1 - 3), Moderate Pain (4 - 6)  ibuprofen  Suspension. 600 milliGRAM(s) Oral every 8 hours PRN Temp greater or equal to 38.5C (101.3F)  melatonin 3 milliGRAM(s) Oral at bedtime PRN Insomnia    PRESENT SYMPTOMS: [ ]Unable to obtain due to poor mentation   Source if other than patient:  [ ]Family   [ ]Team     Pain: [ ]yes [ ]no  QOL impact -   Location -                    Aggravating factors -  Quality -  Radiation -  Timing-  Severity (0-10 scale):  Minimal acceptable level (0-10 scale):     CPOT:    https://www.[facility].org/getattachment/[email or web address]/Critical-Care-Pain-Observation-Tool-(CPOT)      PAIN AD Score:     http://geriatrictoolkit.[facility]/cog/painad.pdf (press ctrl +  left click to view)      Dyspnea:                           [ ]Mild [ ]Moderate [ ]Severe [ ]None  Anxiety:                             [ ]Mild [ ]Moderate [ ]Severe [ ]None  Fatigue:                             [ ]Mild [ ]Moderate [ ]Severe [ ]None  Nausea:                             [ ]Mild [ ]Moderate [ ]Severe [ ]None  Loss of appetite:              [ ]Mild [ ]Moderate [ ]Severe [ ]None  Constipation:                    [ ]Mild [ ]Moderate [ ]Severe [ ]None    Other Symptoms:  [ ]All other review of systems negative     Palliative Performance Status Version 2:         %    http://[facility].org/files/news/palliative_performance_scale_ppsv2.pdf  PHYSICAL EXAM:  Vital Signs Last 24 Hrs  T(C): 37.9 (15 Dec 2022 10:00), Max: 38.2 (15 Dec 2022 08:00)  T(F): 100.2 (15 Dec 2022 10:00), Max: 100.8 (15 Dec 2022 08:00)  HR: 80 (15 Dec 2022 10:00) (68 - 140)  BP: 110/71 (15 Dec 2022 10:00) (73/47 - 198/95)  BP(mean): 82 (15 Dec 2022 10:00) (52 - 145)  RR: 26 (15 Dec 2022 10:00) (15 - 28)  SpO2: 96% (15 Dec 2022 10:00) (92% - 100%)    Parameters below as of 15 Dec 2022 09:00  Patient On (Oxygen Delivery Method): ventilator    O2 Concentration (%): 40 I&O's Summary    14 Dec 2022 07:01  -  15 Dec 2022 07:00  --------------------------------------------------------  IN: 3670.3 mL / OUT: 1250 mL / NET: 2420.3 mL    15 Dec 2022 07:01  -  15 Dec 2022 10:30  --------------------------------------------------------  IN: 136.6 mL / OUT: 190 mL / NET: -53.4 mL        GENERAL:  [ ] No acute distress [ ]Lethargic  [ ]Unarousable  [ ]Verbal  [ ]Non-Verbal [ ]Cachexia    BEHAVIORAL/PSYCH:  [ ]Alert and Oriented x  [ ] Anxiety [ ] Delirium [ ] Agitation [ ] Calm   EYES: [ ] No scleral icterus [ ] Scleral icterus [ ] Closed  ENMT:  [ ]Dry mouth  [ ]No external oral lesions [ ] No external ear or nose lesions  CARDIOVASCULAR:  [ ]Regular [ ]Irregular [ ]Tachy [ ]Not Tachy  [ ]Hugo [ ] Edema [ ] No edema  PULMONARY:  [ ]Tachypnea  [ ]Audible excessive secretions [ ] No labored breathing [ ] labored breathing  GASTROINTESTINAL: [ ]Soft  [ ]Distended  [ ]Not distended [ ]Non tender [ ]Tender  MUSCULOSKELETAL: [ ]No clubbing [ ] clubbing  [ ] No cyanosis [ ] cyanosis  NEUROLOGIC: [ ]No focal deficits  [ ]Follows commands  [ ]Does not follow commands  [ ]Cognitive impairment  [ ]Dysphagia  [ ]Dysarthria  [ ]Paresis   SKIN: [ ] Jaundiced [ ] Non-jaundiced [ ]Rash [ ]No Rash [ ] Warm [ ] Dry  MISC/LINES: [ ] ET tube [ ] Trach [ ]NGT/OGT [ ]PEG [ ]Campuzano    CRITICAL CARE:  [ x] Shock Present  [ x]Septic [ ]Cardiogenic [ ]Neurologic [ ]Hypovolemic  [x ]  Vasopressors [ ]  Inotropes   [x ]Respiratory failure present [x ]Mechanical ventilation [ ]Non-invasive ventilatory support [ ]High flow  [ ]Acute  [ ]Chronic [ ]Hypoxic  [ ]Hypercarbic [ ]Other  [ ]Other organ failure     LABS: reviewed by me                        8.6    21.26 )-----------( 580      ( 15 Dec 2022 04:55 )             28.4   12-15    143  |  106  |  11  ----------------------------<  204<H>  4.0   |  29  |  <0.5<L>    Ca    8.2<L>      15 Dec 2022 04:55  Phos  3.5     12-14  Mg     1.9     12-15    TPro  5.8<L>  /  Alb  2.5<L>  /  TBili  0.2  /  DBili  x   /  AST  10  /  ALT  9   /  AlkPhos  82  12-15      Urinalysis Basic - ( 14 Dec 2022 18:22 )    Color: Yellow / Appearance: Slightly Turbid / S.018 / pH: x  Gluc: x / Ketone: Negative  / Bili: Negative / Urobili: <2 mg/dL   Blood: x / Protein: 30 mg/dL / Nitrite: Negative   Leuk Esterase: Large / RBC: 11 /HPF /  /HPF   Sq Epi: x / Non Sq Epi: 7 /HPF / Bacteria: Negative      RADIOLOGY & ADDITIONAL STUDIES: reviewed by me    EKG: reviewed by me      PROTEIN CALORIE MALNUTRITION PRESENT: [ ]mild [ ]moderate [ ]severe [ ]underweight [ ]morbid obesity  https://www.andeal.org/vault/2440/web/files/ONC/Table_Clinical%20Characteristics%20to%20Document%20Malnutrition-White%20JV%20et%20al%2020.pdf    Height (cm): 195.6 (22 @ 09:25)  Weight (kg): 68 (22 @ 08:20)  BMI (kg/m2): 17.8 (22 @ 09:25)    [ ]PPSV2 < or = to 30% [ ]significant weight loss  [ ]poor nutritional intake  [ ]anasarca      [ ]Artificial Nutrition      REFERRALS:   [ ]Chaplaincy  [ ]Hospice  [ ]Child Life  [ ]Social Work  [ ]Case management [ ]Holistic Therapy     Goals of Care Document:    HPI:  Pt is nonverbal at baseline. Hx obtained from chart.     51 yo male w/ PMH of MS, chronic low back pain, neurogenic bladder? s/p SPC placement and s/p dislodgement, presents for fever. Presented to the ED for SPC dislodgement a few days ago. Was seen by IR and planned for outpatient SPC replacement. Campuzano was placed at [facility]. Today. prior to placement, he was noted to be febrile w/ tachycardia and tachypnea so brought into ED.  Pt nonverbal at baseline, unable to provide further HPI.    Pt upgraded to ICU sepsis, on multiple pressors. Palliative care consulted, pt w end stage MS at baseline. Poor prognosis.     PERTINENT PM/SXH:   Multiple sclerosis    Chronic back pain    Hypertension      No significant past surgical history      FAMILY HISTORY:  Cannot obtain from patient      ITEMS NOT CHECKED ARE NOT PRESENT    SOCIAL HISTORY:   Significant other/partner[x ]  Children[ x]  Voodoo/Spirituality:  Substance hx:  [ ]   Tobacco hx:  [ ]   Alcohol hx: [ ]   Living Situation: [ ]Home  [ x]Long term care  [ ]Rehab [ ]Other *lives at [facility]   Home Services: [ ] HHA [ ] Óscar RN [ ] Hospice  Occupation:  Home Opioid hx:  x ] Y [] N [x ] I-Stop Reference No:     This report was requested by: Hazel Ardon | Reference #: [id number]    You have not added a ANTONIA number. Keeping your ANTONIA number(s) up to date on the My ANTONIA # page will enable the separation of your prescriptions from others in the search results.    Others' Prescriptions  Patient Name: Mckinley MedranoBirth Date: 1970  Address: [address] [place] [id number]: Male  Rx Written	Rx Dispensed	Drug	Quantity	Days Supply	Prescriber Name	Prescriber Antonia #	Payment Method	Dispenser  2022	tramadol hcl 50 mg tablet	30	5	Rachelle Gomez	[id number]	Insurance	[facility] [employer] [facility]  * - Drugs marked with an asterisk are compound drugs. If the compound drug is made up of more than one controlled substance, then each controlled substance will be a separate row in the  ADVANCE DIRECTIVES:    [x ] Full Code [ ] DNR  MOLST  [ ]  Living Will  [ ]   DECISION MAKER(s):  [ ] Health Care Proxy(s)  [ ] Surrogate(s)  [ ] Guardian           Name(s): Phone Number(s):  Answers: Caregiver Name: JAYY MEDRANO,  Answers: Caregiver Relationship: MOTHER, HCP  Answers: Caregiver Contact Number: [phone]  HEALTH CARE PROXY ON FILE IN EMR - REVIEWED   BASELINE (I)ADL(s) (prior to admission):  Germfask: [ ]Total  [ ] Moderate [x ]Dependent  Palliative Performance Status Version 2:         %    http://[facility].org/files/news/palliative_performance_scale_ppsv2.pdf    Allergies    penicillin (Unknown)  vancomycin (Unknown)    Intolerances    MEDICATIONS  (STANDING):  acetylcysteine 20% for bronchoscopy 4 milliLiter(s) Nebulizer once  albuterol    90 MICROgram(s) HFA Inhaler 1 Puff(s) Inhalation every 4 hours  baclofen 20 milliGRAM(s) Oral every 12 hours  ceftazidime/avibactam IVPB 2.5 Gram(s) IV Intermittent every 8 hours  chlorhexidine 0.12% Liquid 15 milliLiter(s) Oral Mucosa every 12 hours  chlorhexidine 2% Cloths 1 Application(s) Topical daily  dexMEDEtomidine Infusion 0.2 MICROgram(s)/kG/Hr (3.4 mL/Hr) IV Continuous <Continuous>  donepezil 10 milliGRAM(s) Oral at bedtime  doxazosin 2 milliGRAM(s) Oral at bedtime  enoxaparin Injectable 40 milliGRAM(s) SubCutaneous every 24 hours  fentaNYL   Infusion. 0.5 MICROgram(s)/kG/Hr (3.4 mL/Hr) IV Continuous <Continuous>  lactulose Syrup 10 Gram(s) Oral daily  magnesium sulfate  IVPB 2 Gram(s) IV Intermittent once  memantine 10 milliGRAM(s) Oral two times a day  midazolam Infusion 0.02 mG/kG/Hr (1.36 mL/Hr) IV Continuous <Continuous>  norepinephrine Infusion 0.05 MICROgram(s)/kG/Min (6.38 mL/Hr) IV Continuous <Continuous>  pantoprazole    Tablet 40 milliGRAM(s) Oral before breakfast  senna 2 Tablet(s) Oral at bedtime  tamsulosin 0.4 milliGRAM(s) Oral at bedtime  zinc oxide 20% Ointment 1 Application(s) Topical three times a day    MEDICATIONS  (PRN):  acetaminophen     Tablet .. 650 milliGRAM(s) Oral every 6 hours PRN Temp greater or equal to 38C (100.4F), Mild Pain (1 - 3), Moderate Pain (4 - 6)  ibuprofen  Suspension. 600 milliGRAM(s) Oral every 8 hours PRN Temp greater or equal to 38.5C (101.3F)  melatonin 3 milliGRAM(s) Oral at bedtime PRN Insomnia    PRESENT SYMPTOMS: [ ]Unable to obtain due to poor mentation   Source if other than patient:  [ ]Family   [ ]Team     Pain: [ ]yes [ ]no  QOL impact -   Location -                    Aggravating factors -  Quality -  Radiation -  Timing-  Severity (0-10 scale):  Minimal acceptable level (0-10 scale):     CPOT:    https://www.[facility].org/getattachment/[email or web address]/Critical-Care-Pain-Observation-Tool-(CPOT)      PAIN AD Score:     http://geriatrictoolkit.[email or web address]/cog/painad.pdf (press ctrl +  left click to view)      Dyspnea:                           [ ]Mild [ ]Moderate [ ]Severe [ ]None  Anxiety:                             [ ]Mild [ ]Moderate [ ]Severe [ ]None  Fatigue:                             [ ]Mild [ ]Moderate [ ]Severe [ ]None  Nausea:                             [ ]Mild [ ]Moderate [ ]Severe [ ]None  Loss of appetite:              [ ]Mild [ ]Moderate [ ]Severe [ ]None  Constipation:                    [ ]Mild [ ]Moderate [ ]Severe [ ]None    Other Symptoms:  [ ]All other review of systems negative     Palliative Performance Status Version 2:         %    http://npcrc.org/files/news/palliative_performance_scale_ppsv2.pdf  PHYSICAL EXAM:  Vital Signs Last 24 Hrs  T(C): 37.9 (15 Dec 2022 10:00), Max: 38.2 (15 Dec 2022 08:00)  T(F): 100.2 (15 Dec 2022 10:00), Max: 100.8 (15 Dec 2022 08:00)  HR: 80 (15 Dec 2022 10:00) (68 - 140)  BP: 110/71 (15 Dec 2022 10:00) (73/47 - 198/95)  BP(mean): 82 (15 Dec 2022 10:00) (52 - 145)  RR: 26 (15 Dec 2022 10:00) (15 - 28)  SpO2: 96% (15 Dec 2022 10:00) (92% - 100%)    Parameters below as of 15 Dec 2022 09:00  Patient On (Oxygen Delivery Method): ventilator    O2 Concentration (%): 40 I&O's Summary    14 Dec 2022 07:01  -  15 Dec 2022 07:00  --------------------------------------------------------  IN: 3670.3 mL / OUT: 1250 mL / NET: 2420.3 mL    15 Dec 2022 07:01  -  15 Dec 2022 10:30  --------------------------------------------------------  IN: 136.6 mL / OUT: 190 mL / NET: -53.4 mL        GENERAL:  [X ] No acute distress [ ]Lethargic  [ ]Unarousable  [ ]Verbal  [ ]Non-Verbal [ ]Cachexia    BEHAVIORAL/PSYCH:  [ ]Alert and Oriented x  [ ] Anxiety [ ] Delirium [ ] Agitation [ X] Calm   EYES: [ ] No scleral icterus [ ] Scleral icterus [X ] Closed  ENMT:  [ ]Dry mouth  [ ]No external oral lesions [ X] No external ear or nose lesions  CARDIOVASCULAR:  [ ]Regular [ ]Irregular [ ]Tachy [ ]Not Tachy  [ ]Hugo [ ] Edema [ ] No edema  PULMONARY:  [ ]Tachypnea  [ ]Audible excessive secretions [X ] No labored breathing [ ] labored breathing  GASTROINTESTINAL: [ ]Soft  [ ]Distended  [X ]Not distended [ ]Non tender [ ]Tender  MUSCULOSKELETAL: [ ]No clubbing [ ] clubbing  [X ] No cyanosis [ ] cyanosis  NEUROLOGIC: [ ]No focal deficits  [ ]Follows commands  [ ]Does not follow commands  [X ]Cognitive impairment  [ ]Dysphagia  [ ]Dysarthria  [ ]Paresis   SKIN: [ ] Jaundiced [ X] Non-jaundiced [ ]Rash [ ]No Rash [ ] Warm [ ] Dry  MISC/LINES: [X ] ET tube [ ] Trach [ ]NGT/OGT [ ]PEG [ ]Campuzano    CRITICAL CARE:  [ x] Shock Present  [ x]Septic [ ]Cardiogenic [ ]Neurologic [ ]Hypovolemic  [x ]  Vasopressors [ ]  Inotropes   [x ]Respiratory failure present [x ]Mechanical ventilation [ ]Non-invasive ventilatory support [ ]High flow  [ ]Acute  [ ]Chronic [ ]Hypoxic  [ ]Hypercarbic [ ]Other  [ ]Other organ failure     LABS: reviewed by me                        8.6    21.26 )-----------( 580      ( 15 Dec 2022 04:55 )             28.4   12-15    143  |  106  |  11  ----------------------------<  204<H>  4.0   |  29  |  <0.5<L>    Ca    8.2<L>      15 Dec 2022 04:55  Phos  3.5     12-14  Mg     1.9     12-15    TPro  5.8<L>  /  Alb  2.5<L>  /  TBili  0.2  /  DBili  x   /  AST  10  /  ALT  9   /  AlkPhos  82  12-15      Urinalysis Basic - ( 14 Dec 2022 18:22 )    Color: Yellow / Appearance: Slightly Turbid / S.018 / pH: x  Gluc: x / Ketone: Negative  / Bili: Negative / Urobili: <2 mg/dL   Blood: x / Protein: 30 mg/dL / Nitrite: Negative   Leuk Esterase: Large / RBC: 11 /HPF /  /HPF   Sq Epi: x / Non Sq Epi: 7 /HPF / Bacteria: Negative      RADIOLOGY & ADDITIONAL STUDIES: reviewed by me    EKG: reviewed by me      PROTEIN CALORIE MALNUTRITION PRESENT: [ ]mild [ ]moderate [ ]severe [ ]underweight [ ]morbid obesity  https://www.andeal.org/vault/2440/web/files/ONC/Table_Clinical%20Characteristics%20to%20Document%20Malnutrition-White%20JV%20et%20al%2020.pdf    Height (cm): 195.6 (22 @ 09:25)  Weight (kg): 68 (22 @ 08:20)  BMI (kg/m2): 17.8 (22 @ 09:25)    [ ]PPSV2 < or = to 30% [ ]significant weight loss  [ ]poor nutritional intake  [ ]anasarca      [ ]Artificial Nutrition      REFERRALS:   [ ]Chaplaincy  [ ]Hospice  [ ]Child Life  [ ]Social Work  [ ]Case management [ ]Holistic Therapy     Goals of Care Document:    No

## 2022-12-15 NOTE — CONSULT NOTE ADULT - PROBLEM SELECTOR RECOMMENDATION 2
- contracted and bedbound at baseline, sometimes able to communicate per mother, but she feels overall quality of life is poor  - see GOC above  - supportive care

## 2022-12-15 NOTE — PROCEDURE NOTE - NSINFORMCONSENT_GEN_A_CORE
Tried to contact spouse and mother- none answered/This was an emergent procedure.
Benefits, risks, and possible complications of procedure explained to patient/caregiver who verbalized understanding and gave written consent.
Benefits, risks, and possible complications of procedure explained to patient/caregiver who verbalized understanding and gave verbal consent.
Benefits, risks, and possible complications of procedure explained to patient/caregiver who verbalized understanding and gave verbal consent.
Benefits, risks, and possible complications of procedure explained to patient/caregiver who verbalized understanding and gave written consent.

## 2022-12-15 NOTE — PROGRESS NOTE ADULT - ASSESSMENT
ASSESSMENT  Sepsis POA  Septic shock   Acute hypoxemic respiratory failure/ Left mucus plug / lung atelectasis  Urinary tract infection/ proteus  Bacteremia - MRSE treated   Non verbal at baseline but able to make himself understood  NSTEMI likely Type 2  history of MS/neurogenic bladder  dysphagia on G tube feedings    PLAN  continue with  Osmolite  1.5 at  360 ml over 30-40 min x 3 feeds/d  - local skin care  check bmp/phos/mg and correct lytes

## 2022-12-15 NOTE — CONSULT NOTE ADULT - NS ATTEND AMEND GEN_ALL_CORE FT
GOC conversation and A&P above authored entirely by me above  ______________  Kevan Patel MD  Palliative Medicine  [facility]   of Geriatric and Palliative Medicine  [phone]

## 2022-12-15 NOTE — CONSULT NOTE ADULT - PROBLEM SELECTOR RECOMMENDATION 9
- c/w IV fetroja, high risk, pressors, monitor counts and hemodynamics  - intubated in ICU, vent care per primary team  - c/w fentanyl IV gtt

## 2022-12-15 NOTE — PROGRESS NOTE ADULT - SUBJECTIVE AND OBJECTIVE BOX
Over Night Events: events noted, remain critically ill, on levophed 0.09, fentanyl,  precedex, low grade fever    PHYSICAL EXAM    ICU Vital Signs Last 24 Hrs  T(C): 38.2 (15 Dec 2022 08:00), Max: 38.2 (15 Dec 2022 08:00)  T(F): 100.8 (15 Dec 2022 08:00), Max: 100.8 (15 Dec 2022 08:00)  HR: 74 (15 Dec 2022 08:00) (68 - 145)  BP: 106/62 (15 Dec 2022 08:00) (73/47 - 198/95)  BP(mean): 71 (15 Dec 2022 08:00) (52 - 145)  RR: 28 (15 Dec 2022 08:00) (15 - 28)  SpO2: 100% (15 Dec 2022 08:00) (90% - 100%)    O2 Parameters below as of 15 Dec 2022 06:00  Patient On (Oxygen Delivery Method): ventilator    O2 Concentration (%): 50        General: ill looking  HEENT: ETT  Lungs: dec  sl side  Cardiovascular: Regular   Abdomen: Soft, Positive BS  Extremities: No clubbing   sedated      22 @ 07:01  -  12-15-22 @ 07:00  --------------------------------------------------------  IN:    Dexmedetomidine: 212.5 mL    Dexmedetomidine: 144.5 mL    Dexmedetomidine: 136 mL    Enteral Tube Flush: 900 mL    FentaNYL: 51 mL    FentaNYL: 442 mL    IV PiggyBack: 550 mL    Norepinephrine: 154.3 mL    Osmolite: 1080 mL  Total IN: 3670.3 mL    OUT:    Indwelling Catheter - Urethral (mL): 1250 mL  Total OUT: 1250 mL    Total NET: 2420.3 mL      12-15-22 @ 07:01  -  12-15-22 @ 08:48  --------------------------------------------------------  IN:    Dexmedetomidine: 17 mL    FentaNYL: 17 mL    Norepinephrine: 12.9 mL  Total IN: 46.9 mL    OUT:    Indwelling Catheter - Urethral (mL): 65 mL  Total OUT: 65 mL    Total NET: -18.1 mL          LABS:                          8.6    21.26 )-----------( 580      ( 15 Dec 2022 04:55 )             28.4                                               12-15    143  |  106  |  11  ----------------------------<  204<H>  4.0   |  29  |  <0.5<L>    Ca    8.2<L>      15 Dec 2022 04:55  Phos  3.5     12-14  Mg     1.9     12-15    TPro  5.8<L>  /  Alb  2.5<L>  /  TBili  0.2  /  DBili  x   /  AST  10  /  ALT  9   /  AlkPhos  82  12-15                                             Urinalysis Basic - ( 14 Dec 2022 18:22 )    Color: Yellow / Appearance: Slightly Turbid / S.018 / pH: x  Gluc: x / Ketone: Negative  / Bili: Negative / Urobili: <2 mg/dL   Blood: x / Protein: 30 mg/dL / Nitrite: Negative   Leuk Esterase: Large / RBC: 11 /HPF /  /HPF   Sq Epi: x / Non Sq Epi: 7 /HPF / Bacteria: Negative                                                  LIVER FUNCTIONS - ( 15 Dec 2022 04:55 )  Alb: 2.5 g/dL / Pro: 5.8 g/dL / ALK PHOS: 82 U/L / ALT: 9 U/L / AST: 10 U/L / GGT: x                                                  Culture - Fungal, Bronchial (collected 13 Dec 2022 09:40)  Source: .Bronchial None  Preliminary Report (14 Dec 2022 06:59):    Testing in progress    Culture - Acid Fast - Bronchial w/Smear (collected 13 Dec 2022 09:40)  Source: .Bronchial None    Culture - Bronchial (collected 13 Dec 2022 09:40)  Source: .Bronchial None  Gram Stain (13 Dec 2022 20:33):    Moderate polymorphonuclear leukocytes per low power field    Rare Squamous epithelial cells per low power field    Few Gram Positive Cocci in Clusters per oil power field  Preliminary Report (14 Dec 2022 17:53):    Numerous Mixed gram negative rods including    Moderate Pseudomonas aeruginosa    Numerous Acinetobacter baumannii/nosocomialis group    Culture - Blood (collected 13 Dec 2022 01:10)  Source: .Blood None  Preliminary Report (14 Dec 2022 14:01):    No growth to date.    Culture - Blood (collected 12 Dec 2022 20:08)  Source: .Blood Blood  Preliminary Report (14 Dec 2022 02:02):    No growth to date.    Culture - Sputum (collected 12 Dec 2022 11:55)  Source: Trach Asp Tracheal Aspirate  Gram Stain (13 Dec 2022 02:13):    Rare polymorphonuclear leukocytes per low power field    No Squamous epithelial cells per low power field    No organisms seen per oil power field  Preliminary Report (14 Dec 2022 18:43):    Moderate Pseudomonas aeruginosa (Carbapenem Resistant)    Moderate Acinetobacter baumannii/nosocomialis group Susceptibility to    follow.  Organism: Pseudomonas aeruginosa (Carbapenem Resistant) (14 Dec 2022 18:37)  Organism: Pseudomonas aeruginosa (Carbapenem Resistant) (14 Dec 2022 18:37)                                                   Mode: AC/ CMV (Assist Control/ Continuous Mandatory Ventilation)  RR (machine): 14  TV (machine): 450  FiO2: 50  PEEP: 8  ITime: 0.8  MAP: 8  PIP: 17                                      ABG - ( 15 Dec 2022 02:25 )  pH, Arterial: 7.49  pH, Blood: x     /  pCO2: 41    /  pO2: 115   / HCO3: 31    / Base Excess: 7.2   /  SaO2: 99.8                MEDICATIONS  (STANDING):  acetylcysteine 20% for bronchoscopy 4 milliLiter(s) Nebulizer once  albuterol    90 MICROgram(s) HFA Inhaler 1 Puff(s) Inhalation every 4 hours  baclofen 20 milliGRAM(s) Oral every 12 hours  ceftazidime/avibactam IVPB 2.5 Gram(s) IV Intermittent every 8 hours  chlorhexidine 0.12% Liquid 15 milliLiter(s) Oral Mucosa every 12 hours  chlorhexidine 2% Cloths 1 Application(s) Topical daily  dexMEDEtomidine Infusion 0.2 MICROgram(s)/kG/Hr (3.4 mL/Hr) IV Continuous <Continuous>  donepezil 10 milliGRAM(s) Oral at bedtime  enoxaparin Injectable 40 milliGRAM(s) SubCutaneous every 24 hours  fentaNYL   Infusion. 0.5 MICROgram(s)/kG/Hr (3.4 mL/Hr) IV Continuous <Continuous>  lactulose Syrup 10 Gram(s) Oral daily  magnesium sulfate  IVPB 2 Gram(s) IV Intermittent once  memantine 10 milliGRAM(s) Oral two times a day  midazolam Infusion 0.02 mG/kG/Hr (1.36 mL/Hr) IV Continuous <Continuous>  norepinephrine Infusion 0.05 MICROgram(s)/kG/Min (6.38 mL/Hr) IV Continuous <Continuous>  pantoprazole    Tablet 40 milliGRAM(s) Oral before breakfast  senna 2 Tablet(s) Oral at bedtime  tamsulosin 0.4 milliGRAM(s) Oral at bedtime  zinc oxide 20% Ointment 1 Application(s) Topical three times a day    MEDICATIONS  (PRN):  acetaminophen     Tablet .. 650 milliGRAM(s) Oral every 6 hours PRN Temp greater or equal to 38C (100.4F), Mild Pain (1 - 3), Moderate Pain (4 - 6)  ibuprofen  Suspension. 600 milliGRAM(s) Oral every 8 hours PRN Temp greater or equal to 38.5C (101.3F)  melatonin 3 milliGRAM(s) Oral at bedtime PRN Insomnia        CXR reviewed

## 2022-12-16 NOTE — PROGRESS NOTE ADULT - ASSESSMENT
ASSESSMENT  53 yo male w/ PMH of MS, chronic low back pain, neurogenic bladder? s/p SPC placement and s/p dislodgement, presents for fever. Presented to the ED for SPC dislodgement a few days ago. Was seen by IR and planned for outpatient SPC replacement. Campuzano was placed at [facility]. Today. prior to placement, he was noted to be febrile w/ tachycardia and tachypnea so brought into ED.      IMPRESSION  #Fevers, HAP/ VAP     12/13 bronch   Numerous Mixed gram negative rods including    Moderate Pseudomonas aeruginosa    Numerous Acinetobacter baumannii/nosocomialis group    12/12 Bronch Acinetobacter baumannii/nosocomialis  & CRE Pseudo    12/8 bronch cx     Few Pseudomonas aeruginosa (Carbapenem Resistant) Cefepime: S 8 Piperacillin/Tazobactam: S 16  CXR L white-out, suspect aspiration/mucous plug  #Severe Sepsis on admission T>101F, Pulse>90,WBC>12, lactic acidosis due to acute complicated cystitis    12/4 BCX NGTD     12/3 BCX NGTD     12/2 BCX  Staphylococcus epidermidis, Methicillin resistant: Detec 1/2 bottles ,      12/2 BCX GPC 1/2  Staphylococcus hominis    12/2 BCX  GPC 1/2 Staphylococcus hominis    12/2 UCX   >=3 organisms. Probable collection contamination.    12/2 UCX    Few Proteus mirabilis ESBL    Moderate Enterococcus faecalis    UA Blood: x / Protein: 100 mg/dL / Nitrite: Negative   Leuk Esterase: Large / RBC: 236 /HPF / WBC >720 /HPF   Sq Epi: x / Non Sq Epi: 2 /HPF / Bacteria: Many  #CXR Small left pleural effusion and possible left basilar consolidation.  #MS with neurogenic bladder, dislodged SPC  #Lactic acidosis  #Hypernatremia   QTC Calculation(Bazett) 407 ms  #Severe protein calorie malnutrition  BMI (kg/m2): 17.8    #Abx allergy: penicillin (childhood)  vancomycin (Unknown)    Creatinine, Serum: 0.5 (12-03-22 @ 08:22)    Weight (kg): 68 (12-02-22 @ 08:20)    RECOMMENDATIONS  - Cefiderocol 2g q8h IV   - Acinetobacter is MDR resistant to every antibiotics on the panel- called to add on cefiderocol and polymyxin  - If hemodynamic compromise, ADD polymyxin 12,500 units/kg q12h IV , monitor Cr/lytes  - Pulm following  - GOC, grave prognosis as colonized with MDROs    If any questions, please call or send a message on [facility] Teams  Please continue to update ID with any pertinent new laboratory or radiographic findings  #[id number]

## 2022-12-16 NOTE — PROGRESS NOTE ADULT - ASSESSMENT
51 y/o man with PMH of MS, nonverbal at baseline, chronic low back pain, neurogenic bladder s/p SPC placement and s/p dislodgement was at IR for replacement of SPC when he was found to be in severe sepsis.    #Severe sepsis present on admission 2/2 acute pyelonephritis  - L lung atelectasis/Mucus plug   - Ucx: Few Proteus Mirabilis/Mod E Faecalis  - Bcx 12/2: GPC in clusters x3, 1 culture with staph hominis  - 12/4 bcx negative, 12/7 bcx NGTD   - previously was on HFNC alternating with BIPAP, nebs and chest PT  - previously intubated, bronch 12/08 removed secretions/mucus plug/atelectasis - bronchial culture: pseudomonas  - CXR 12/9: CXR patchy left lung opacities c/w pneumonia, received 1 dose linezolid  - MRSA negative  - c/w avycaz per ID  - nebs q4 + PRN + mucomyst nebs   - repeat bronchoscopy 12/12, culture: mod pseudomonas  - repeat bronchoscopy 12/13, gram+ cocci in clusters  - fever on 12/12 and 12/13, bcx ngtd and repeat  - 1 dose linezolid to cover staph (no vanc because of allergy)  - daily ABG, SAT  - levo 0.03, precedex 1, fentanyl 1.5. wean levo  - repeat bronch on 12/15    #Anemia  - decrease in hgb 9.4->8.6  - monitor for now    #Neurogenic bladder  - had SPC that was dislodged - now with [facility], SPC to be replaced but became septic  - on flomax  - recall IR when improved for possible SPC placement    #Elevated dimer   - 734 -> 1149 -> 713  - vascular can’t do duplex bc pt is contracted  - CTA 12/12 no PE, complete left main stem occlusion, near complete atelectasis/consolidation  - switch lovenox to ppx only for now, spoke with pt mother who said pt on eliquis at facility for dvt ppx. no need for full AC    #Dysphagia   - PEG feeding  - monitor FS    #MS, nonverbal, bedbound, contracted/functional quadriplegia   - repositioning per protocol, continue baclofen    #SUKHDEV   -likely prerenal, resolved   -continue hydration and monitor     #NSTEMI type 2 due to sepsis   -TTE: EF 58%, G1DD, mild MVR    Diet: PEG feeds  DVT prophylaxis: Lovenox  GI prophylaxis: Protonix  Code status: Full code     51 y/o man with PMH of MS, nonverbal at baseline, chronic low back pain, neurogenic bladder s/p SPC placement and s/p dislodgement was at IR for replacement of SPC when he was found to be in severe sepsis.    #Severe sepsis present on admission 2/ acute pyelonephritis  - L lung atelectasis/Mucus plug   - Bcx : GPC in clusters x3, 1 culture with staph hominis  - blood culture neg since   - bronch sputum culture on >> pseudomonas, and acinetobacterPT  - previously intubated, bronch  removed secretions/mucus plug/atelectasis - bronchial culture: pseudomonas  - MRSA negative  - dc avycaz per ID on 12/15 and start cefidericol  - repeat bronchoscopy , culture: mod pseudomonas  - repeat bronchoscopy , gram+ cocci in clusters  - fever on  and , bcx ngtd and repeat  - levo 0.03, propofol, precedex, fentanyl wean levo  - repeat bronch on 12/15    #Anemia  - decrease in hgb 9.4->8.6  - monitor for now    #Neurogenic bladder  - had SPC that was dislodged - now with [facility], SPC to be replaced but became septic  - on flomax  - recall IR when improved for possible SPC placement    #Elevated dimer   - 734 -> 1149 -> 713  - vascular can’t do duplex bc pt is contracted  - CTA  no PE, complete left main stem occlusion, near complete atelectasis/consolidation  - switch lovenox to ppx only for now, spoke with pt mother who said pt on eliquis at facility for dvt ppx. no need for full AC    #Dysphagia   - PEG feeding  - monitor FS    #MS, nonverbal, bedbound, contracted/functional quadriplegia   - repositioning per protocol, continue baclofen    #SUKHDEV   -likely prerenal, resolved   -continue hydration and monitor     #NSTEMI type 2 due to sepsis   -TTE: EF 58%, G1DD, mild MVR    Diet: PEG feeds  DVT prophylaxis: Lovenox  GI prophylaxis: Protonix  Code status: Full code    Handoff pendin) GOC coversation with mother, palliative on board and speaking with mother daily. Mother wants to know prognosis so that she can make decision. She was updated today at the bedside about poor prognosis and possible trach soon.      53 y/o man with PMH of MS, nonverbal at baseline, chronic low back pain, neurogenic bladder s/p SPC placement and s/p dislodgement was at IR for replacement of SPC when he was found to be in severe sepsis.    #Severe sepsis present on admission  acute pyelonephritis  - L lung atelectasis/Mucus plug   - Bcx : GPC in clusters x3, 1 culture with staph hominis  - blood culture neg since   - bronch sputum culture on >> pseudomonas, and acinetobacterPT  - previously intubated, bronch  removed secretions/mucus plug/atelectasis - bronchial culture: pseudomonas  - MRSA negative  - dc avycaz per ID on 12/15 and start cefidericol, added polymyxin on  as per ID  - repeat bronchoscopy , culture: mod pseudomonas  - repeat bronchoscopy , gram+ cocci in clusters  - fever on  and , bcx ngtd and repeat  - levo 0.03, propofol, precedex, fentanyl wean levo  - repeat bronch on 12/15    #Anemia  - decrease in hgb 9.4->8.6  - monitor for now    #Neurogenic bladder  - had SPC that was dislodged - now with [facility], SPC to be replaced but became septic  - on flomax  - recall IR when improved for possible SPC placement    #Elevated dimer   - 734 -> 1149 -> 713  - vascular can’t do duplex bc pt is contracted  - CTA  no PE, complete left main stem occlusion, near complete atelectasis/consolidation  - switch lovenox to ppx only for now, spoke with pt mother who said pt on eliquis at facility for dvt ppx. no need for full AC    #Dysphagia   - PEG feeding  - monitor FS    #MS, nonverbal, bedbound, contracted/functional quadriplegia   - repositioning per protocol, continue baclofen    #SUKHDEV   -likely prerenal, resolved   -continue hydration and monitor     #NSTEMI type 2 due to sepsis   -TTE: EF 58%, G1DD, mild MVR    Diet: PEG feeds  DVT prophylaxis: Lovenox  GI prophylaxis: Protonix  Code status: Full code    Handoff pendin) C coversation with mother, palliative on board and speaking with mother daily. I spoke with mother today as well at the bedside, she said palliative team and she have decided to see if he gets better over the weekend and possibly go for CMO on monday. She is hesitant about tracheostomy at this time.

## 2022-12-16 NOTE — PROGRESS NOTE ADULT - ASSESSMENT
ASSESSMENT  Sepsis POA  Septic shock   Acute hypoxemic respiratory failure/ Left mucus plug / lung atelectasis  Urinary tract infection/ proteus  Bacteremia - MRSE treated   Non verbal at baseline but able to make himself understood  NSTEMI likely Type 2  history of MS/neurogenic bladder  dysphagia on G tube feedings  hypomagnesemia    PLAN  continue with  Osmolite  1.5 at  360 ml over 30-40 min x 3 feeds/d  - local skin care  check bmp/phos/mg and correct lytes  ASSESSMENT  Sepsis POA  Septic shock   Acute hypoxemic respiratory failure/ Left mucus plug / lung atelectasis  Urinary tract infection/ proteus  Bacteremia - MRSE treated   Non verbal at baseline but able to make himself understood  NSTEMI likely Type 2  history of MS/neurogenic bladder  dysphagia on G tube feedings  hypomagnesemia    PLAN  continue with  Osmolite  1.5 at  360 ml over 30-40 min x 3 feeds/d  - local skin care  check bmp/phos/mg and correct phos to > 3.5 ASSESSMENT  Sepsis POA  Septic shock   Acute hypoxemic respiratory failure/ Left mucus plug / lung atelectasis  Urinary tract infection/ proteus  Bacteremia - MRSE treated   Non verbal at baseline but able to make himself understood  NSTEMI likely Type 2  history of MS/neurogenic bladder  dysphagia on G tube feedings  hypomagnesemia    PLAN  continue with  Osmolite  1.5 at  360 ml over 30-40 min x 3 feeds/d  - local skin care  check bmp/phos/mg and correct phos to > 3.5  once stable will need ot increase caloric load

## 2022-12-16 NOTE — PROGRESS NOTE ADULT - ASSESSMENT
52yMale being evaluated for goals of care and symptom management       MEDD (morphine equivalent daily dose): Fent gtt      See Recs below.    Please call [phone] with questions or concerns 24/7.   We will continue to follow.

## 2022-12-16 NOTE — PROGRESS NOTE ADULT - SUBJECTIVE AND OBJECTIVE BOX
KEVIN MEDRANO  52y, Male  Allergy: penicillin (Unknown)  vancomycin (Unknown)      LOS  14d    CHIEF COMPLAINT: sepsis (16 Dec 2022 12:28)      INTERVAL EVENTS/HPI  - T(F): , Max: 101.8 (12-15-22 @ 17:00) fever  - WBC Count: 12.49 (22 @ 04:49)  WBC Count: 12.12 (12-15-22 @ 23:21)     - Creatinine, Serum: <0.5 (22 @ 04:49)  Creatinine, Serum: <0.5 (12-15-22 @ 23:21)     -   -   -     ROS  cannot obtain secondary to patient's sedation and/or mental status    VITALS:  T(F): 99.9, Max: 101.8 (12-15-22 @ 17:00)  HR: 104  BP: 105/70  RR: 22Vital Signs Last 24 Hrs  T(C): 37.7 (16 Dec 2022 12:00), Max: 38.8 (15 Dec 2022 17:00)  T(F): 99.9 (16 Dec 2022 12:00), Max: 101.8 (15 Dec 2022 17:00)  HR: 104 (16 Dec 2022 14:00) (68 - 144)  BP: 105/70 (16 Dec 2022 14:00) (75/44 - 156/93)  BP(mean): 82 (16 Dec 2022 14:00) (53 - 116)  RR: 22 (16 Dec 2022 14:00) (15 - 42)  SpO2: 97% (16 Dec 2022 14:00) (93% - 100%)    Parameters below as of 16 Dec 2022 12:00  Patient On (Oxygen Delivery Method): ventilator    O2 Concentration (%): 40    PHYSICAL EXAM:  Gen: Intubated  CV: RRR  Lungs: Decreased BS at bases  Abd: Soft  Neuro: Sedated  Lines clean, no phlebitis    FH: Non-contributory  Social Hx: Non-contributory    TESTS & MEASUREMENTS:                        8.4    12.49 )-----------( 600      ( 16 Dec 2022 04:49 )             27.0     12-16    142  |  106  |  13  ----------------------------<  94  4.4   |  29  |  <0.5<L>    Ca    8.4      16 Dec 2022 04:49  Phos  2.7     12-15  Mg     1.7     12-16    TPro  5.7<L>  /  Alb  2.4<L>  /  TBili  0.2  /  DBili  x   /  AST  10  /  ALT  8   /  AlkPhos  81  12-16      LIVER FUNCTIONS - ( 16 Dec 2022 04:49 )  Alb: 2.4 g/dL / Pro: 5.7 g/dL / ALK PHOS: 81 U/L / ALT: 8 U/L / AST: 10 U/L / GGT: x           Urinalysis Basic - ( 14 Dec 2022 18:22 )    Color: Yellow / Appearance: Slightly Turbid / S.018 / pH: x  Gluc: x / Ketone: Negative  / Bili: Negative / Urobili: <2 mg/dL   Blood: x / Protein: 30 mg/dL / Nitrite: Negative   Leuk Esterase: Large / RBC: 11 /HPF /  /HPF   Sq Epi: x / Non Sq Epi: 7 /HPF / Bacteria: Negative      Culture - Bronchial (collected 22 @ 09:40)  Source: .Bronchial None  Gram Stain (22 @ 20:33):    Moderate polymorphonuclear leukocytes per low power field    Rare Squamous epithelial cells per low power field    Few Gram Positive Cocci in Clusters per oil power field  Final Report (12-15-22 @ 17:43):    Numerous Mixed gram negative rods including    Moderate Pseudomonas aeruginosa (Carbapenem Resistant)    Numerous Acinetobacter baumannii/nosocom group (Carbapenem Resistant)  Organism: Pseudomonas aeruginosa (Carbapenem Resistant)  Acinetobacter baumannii/nosocom group (Carbapenem Resistant)  Acinetobacter baumannii/nosocom group (Carbapenem Resistant) (12-15-22 @ 17:42)  Organism: Acinetobacter baumannii/nosocom group (Carbapenem Resistant) (12-15-22 @ 17:42)      -  Amikacin: R >32      -  Ampicillin/Sulbactam: R >16/8      -  Cefepime: R >16      -  Ceftazidime: R >16      -  Ciprofloxacin: R >2      -  Gentamicin: R >8      -  Imipenem: R >8      -  Levofloxacin: R >4      -  Meropenem: R >8      -  Tobramycin: R >8      -  Trimethoprim/Sulfamethoxazole: R >2/38      Method Type: GAURAV  Organism: Acinetobacter baumannii/nosocom group (Carbapenem Resistant) (12-15-22 @ 17:42)      -  Piperacillin/Tazobactam: R      Method Type: KB  Organism: Pseudomonas aeruginosa (Carbapenem Resistant) (12-15-22 @ 17:37)      -  Amikacin: S <=16      -  Aztreonam: R >16      -  Cefepime: I 16      -  Ceftazidime: S 4      -  Ceftazidime/Avibactam: S <=4      -  Ceftolozane/tazobactam: S <=2      -  Ciprofloxacin: R >2      -  Gentamicin: R >8      -  Imipenem: R >8      -  Levofloxacin: R >4      -  Meropenem: R >8      -  Piperacillin/Tazobactam: S 16      -  Tobramycin: R >8      Method Type: GAURAV    Culture - Blood (collected 22 @ 01:10)  Source: .Blood None  Preliminary Report (22 @ 14:01):    No growth to date.    Culture - Blood (collected 22 @ 20:08)  Source: .Blood Blood  Preliminary Report (22 @ 02:02):    No growth to date.        INFECTIOUS DISEASES TESTING  MRSA PCR Result.: Negative (22 @ 08:40)  Rapid RVP Result: NotDetec (22 @ 15:28)  Procalcitonin, Serum: 0.53 (22 @ 10:53)  Legionella Antigen, Urine: Negative (22 @ 08:10)  COVID-19 PCR: NotDetec (22 @ 18:30)      INFLAMMATORY MARKERS      RADIOLOGY & ADDITIONAL TESTS:  I have personally reviewed the last available Chest xray  CXR  Xray Chest 1 View- PORTABLE-Urgent:   ACC: [id number] EXAM:  XR CHEST PORTABLE URGENT 1V                          PROCEDURE DATE:  12/15/2022          INTERPRETATION:  Clinical History / Reason for exam: Follow-up    Comparison : Chest radiograph December 15, 2022.    Technique/Positioning: Low lung volume.    Findings:    Support devices: ETT with its tip above the dionne and left IJ line with   its tip overlying the left brachiocephalic vein. Right IJ line with its   tip overlying the SVC.    Cardiac/mediastinum/hilum: Obscured.    Lung parenchyma/Pleura: Bilateral opacities, unchanged. No pneumothorax   is seen.    Skeleton/soft tissues: Stable.    Impression:    Low lung volume.    Bilateral opacities, unchanged.    Support tubes and lines as above.    --- End of Report ---            RAINE SOLANO MD; Attending Radiologist  This document has been electronically signed. Dec 15 2022  4:07PM (12-15-22 @ 15:46)      CT      CARDIOLOGY TESTING  12 Lead ECG:   Ventricular Rate 107 BPM    Atrial Rate 107 BPM    P-R Interval 108 ms    QRS Duration 79 ms    Q-T Interval 317 ms    QTC Calculation(Bazett) 423 ms    P Axis 79 degrees    R Axis 90 degrees    T Axis 73 degrees    Diagnosis Line Sinus tachycardiawith short AR  Rightward axis  Septal infarct , age undetermined  Abnormal ECG    Confirmed by ROWDY CHAO MD ([id number]) on 2022 6:58:08 AM (22 @ 06:04)  12 Lead ECG:   Ventricular Rate 120 BPM    Atrial Rate 120 BPM    P-R Interval 126 ms    QRS Duration 74 ms    Q-T Interval 328 ms    QTC Calculation(Bazett) 463 ms    P Axis 67 degrees    R Axis 90 degrees    T Axis 49 degrees    Diagnosis Line Sinus tachycardia  Rightward axis  Septal infarct , age undetermined  Abnormal ECG    Confirmed by Basim Guadalupe ([id number]) on 2022 12:24:04 PM (12-15-22 @ 15:04)      MEDICATIONS  acetylcysteine 20% for bronchoscopy 4  albuterol    90 MICROgram(s) HFA Inhaler 1  baclofen 20  cefiderocol IVPB 2000  chlorhexidine 0.12% Liquid 15  chlorhexidine 2% Cloths 1  dexMEDEtomidine Infusion 0.2  donepezil 10  doxazosin 2  enoxaparin Injectable 40  fentaNYL   Infusion. 0.5  lactulose Syrup 20  magnesium sulfate  IVPB 2  memantine 10  midazolam Infusion 0.02  norepinephrine Infusion 0.05  pantoprazole   Suspension 40  pantoprazole  Injectable 40  polyethylene glycol 3350 17  propofol Infusion 10  senna 2  tamsulosin 0.4  zinc oxide 20% Ointment 1      WEIGHT  Weight (kg): 68 (22 @ 08:20)  Creatinine, Serum: <0.5 mg/dL (22 @ 04:49)  Creatinine, Serum: <0.5 mg/dL (12-15-22 @ 23:21)      ANTIBIOTICS:  cefiderocol IVPB 2000 milliGRAM(s) IV Intermittent every 8 hours      All available historical records have been reviewed

## 2022-12-16 NOTE — PROGRESS NOTE ADULT - ASSESSMENT
IMPRESSION:    Sepsis POA  Septic shock still on pressors  Acute hypoxemic respiratory failure/ Left mucus plug / lung atelectasis  Pseudomonas/ acineto in BAL / wash   Urinary tract infection/ proteus  Bacteremia - MRSE treated   Non verbal at baseline  NSTEMI likely Type 2  History of MS/neurogenic bladder      PLAN:      CNS: SAT , dc fentanyl    HEENT: Oral care.  ET Care     PULMONARY:  Recommend nebs every 4 hours.  Vent changes:  dec RR, FIO2 to 92- 96%    CARDIOVASCULAR: avoid volume overload.   Wean Levophed , IVF, mododrine    GI: GI prophylaxis. PEG feeds.  Bowel regimen     RENAL:  Follow up lytes.  Correct as needed. IR following    INFECTIOUS DISEASE: ABX per ID.      HEMATOLOGICAL: DVT prophylaxis. On Lovenox.     ENDOCRINE:  Follow up FS.  Insulin protocol if needed.    MICU    GOC    Poor prognosis   palliative care fup  R IJ 12/14

## 2022-12-16 NOTE — PROGRESS NOTE ADULT - SUBJECTIVE AND OBJECTIVE BOX
Over Night Events: events noted, remain critically ill, still intubated, ventilated, on fentanyl, propofol, levophed 0.08    PHYSICAL EXAM    ICU Vital Signs Last 24 Hrs  T(C): 37.4 (16 Dec 2022 06:00), Max: 38.8 (15 Dec 2022 17:00)  T(F): 99.4 (16 Dec 2022 06:00), Max: 101.8 (15 Dec 2022 17:00)  HR: 122 (16 Dec 2022 07:38) (68 - 138)  BP: 107/60 (16 Dec 2022 07:00) (75/44 - 175/94)  BP(mean): 72 (16 Dec 2022 07:00) (53 - 120)  RR: 23 (16 Dec 2022 07:00) (0 - 42)  SpO2: 99% (16 Dec 2022 07:38) (84% - 100%)    O2 Parameters below as of 16 Dec 2022 04:00  Patient On (Oxygen Delivery Method): ventilator    O2 Concentration (%): 40        General: ill looking  HEENT: ETT  Lungs: dec bs l base  Cardiovascular: Regular   Abdomen: Soft, Positive BS  Extremities: No clubbing   sacral ulcer  sedated      12-15-22 @ 07:01  -  22 @ 07:00  --------------------------------------------------------  IN:    Dexmedetomidine: 297.5 mL    Enteral Tube Flush: 950 mL    FentaNYL: 421.6 mL    Norepinephrine: 316.9 mL    Osmolite: 1080 mL    Propofol: 44.6 mL  Total IN: 3110.6 mL    OUT:    Indwelling Catheter - Urethral (mL): 1265 mL  Total OUT: 1265 mL    Total NET: 1845.6 mL          LABS:                          8.4    12.49 )-----------( 600      ( 16 Dec 2022 04:49 )             27.0                                               12    142  |  106  |  13  ----------------------------<  94  4.4   |  29  |  <0.5<L>    Ca    8.4      16 Dec 2022 04:49  Phos  2.7     12-15  Mg     1.7     12-16    TPro  5.7<L>  /  Alb  2.4<L>  /  TBili  0.2  /  DBili  x   /  AST  10  /  ALT  8   /  AlkPhos  81  12-16                                             Urinalysis Basic - ( 14 Dec 2022 18:22 )    Color: Yellow / Appearance: Slightly Turbid / S.018 / pH: x  Gluc: x / Ketone: Negative  / Bili: Negative / Urobili: <2 mg/dL   Blood: x / Protein: 30 mg/dL / Nitrite: Negative   Leuk Esterase: Large / RBC: 11 /HPF /  /HPF   Sq Epi: x / Non Sq Epi: 7 /HPF / Bacteria: Negative                                                  LIVER FUNCTIONS - ( 16 Dec 2022 04:49 )  Alb: 2.4 g/dL / Pro: 5.7 g/dL / ALK PHOS: 81 U/L / ALT: 8 U/L / AST: 10 U/L / GGT: x                                                  Culture - Blood (collected 14 Dec 2022 18:21)  Source: .Blood Blood  Preliminary Report (16 Dec 2022 03:02):    No growth to date.    Culture - Blood (collected 14 Dec 2022 11:24)  Source: .Blood None  Preliminary Report (15 Dec 2022 23:02):    No growth to date.    Culture - Fungal, Bronchial (collected 13 Dec 2022 09:40)  Source: .Bronchial None  Preliminary Report (14 Dec 2022 06:59):    Testing in progress    Culture - Acid Fast - Bronchial w/Smear (collected 13 Dec 2022 09:40)  Source: .Bronchial None    Culture - Bronchial (collected 13 Dec 2022 09:40)  Source: .Bronchial None  Gram Stain (13 Dec 2022 20:33):    Moderate polymorphonuclear leukocytes per low power field    Rare Squamous epithelial cells per low power field    Few Gram Positive Cocci in Clusters per oil power field  Final Report (15 Dec 2022 17:43):    Numerous Mixed gram negative rods including    Moderate Pseudomonas aeruginosa (Carbapenem Resistant)    Numerous Acinetobacter baumannii/nosocom group (Carbapenem Resistant)  Organism: Pseudomonas aeruginosa (Carbapenem Resistant)  Acinetobacter baumannii/nosocom group (Carbapenem Resistant)  Acinetobacter baumannii/nosocom group (Carbapenem Resistant) (15 Dec 2022 17:42)  Organism: Acinetobacter baumannii/nosocom group (Carbapenem Resistant) (15 Dec 2022 17:42)  Organism: Acinetobacter baumannii/nosocom group (Carbapenem Resistant) (15 Dec 2022 17:42)  Organism: Pseudomonas aeruginosa (Carbapenem Resistant) (15 Dec 2022 17:37)                                                   Mode: AC/ CMV (Assist Control/ Continuous Mandatory Ventilation)  RR (machine): 24  TV (machine): 400  FiO2: 40  PEEP: 8  ITime: 0.8  MAP: 7  PIP: 19                                      ABG - ( 16 Dec 2022 03:07 )  pH, Arterial: 7.46  pH, Blood: x     /  pCO2: 39    /  pO2: 99    / HCO3: 28    / Base Excess: 3.7   /  SaO2: 99.6                MEDICATIONS  (STANDING):  acetylcysteine 20% for bronchoscopy 4 milliLiter(s) Nebulizer once  albuterol    90 MICROgram(s) HFA Inhaler 1 Puff(s) Inhalation every 4 hours  baclofen 20 milliGRAM(s) Oral every 12 hours  cefiderocol IVPB 2000 milliGRAM(s) IV Intermittent every 8 hours  chlorhexidine 0.12% Liquid 15 milliLiter(s) Oral Mucosa every 12 hours  chlorhexidine 2% Cloths 1 Application(s) Topical daily  donepezil 10 milliGRAM(s) Oral at bedtime  doxazosin 2 milliGRAM(s) Oral at bedtime  enoxaparin Injectable 40 milliGRAM(s) SubCutaneous every 24 hours  fentaNYL   Infusion. 0.5 MICROgram(s)/kG/Hr (3.4 mL/Hr) IV Continuous <Continuous>  lactulose Syrup 20 Gram(s) Oral two times a day  magnesium sulfate  IVPB 2 Gram(s) IV Intermittent once  memantine 10 milliGRAM(s) Oral two times a day  midazolam Infusion 0.02 mG/kG/Hr (1.36 mL/Hr) IV Continuous <Continuous>  norepinephrine Infusion 0.05 MICROgram(s)/kG/Min (6.38 mL/Hr) IV Continuous <Continuous>  pantoprazole   Suspension 40 milliGRAM(s) Oral daily  pantoprazole  Injectable 40 milliGRAM(s) IV Push two times a day  propofol Infusion 10 MICROgram(s)/kG/Min (4.08 mL/Hr) IV Continuous <Continuous>  senna 2 Tablet(s) Oral at bedtime  tamsulosin 0.4 milliGRAM(s) Oral at bedtime  zinc oxide 20% Ointment 1 Application(s) Topical three times a day    MEDICATIONS  (PRN):  acetaminophen     Tablet .. 650 milliGRAM(s) Oral every 6 hours PRN Mild Pain (1 - 3), Moderate Pain (4 - 6)  ibuprofen  Suspension. 600 milliGRAM(s) Oral every 8 hours PRN Temp greater or equal to 38.5C (101.3F)  melatonin 3 milliGRAM(s) Oral at bedtime PRN Insomnia      CXR reviewed

## 2022-12-16 NOTE — PROGRESS NOTE ADULT - SUBJECTIVE AND OBJECTIVE BOX
NUTRITION SUPPORT TEAM  -  PROGRESS NOTE     REVIEW OF SYSTEMS:  Negative except as noted above.     VITALS:  T(F): 100 (12-16 @ 08:00), Max: 100.8 (12-15 @ 22:00)  HR: 126 (12-16 @ 08:30) (69 - 126)  BP: 99/66 (12-16 @ 08:30) (79/55 - 156/93)  RR: 24 (12-16 @ 08:30) (15 - 42)  SpO2: 99% (12-16 @ 08:30) (93% - 100%)    ABG - ( 16 Dec 2022 03:07 )  pH, Arterial: 7.46  pH, Blood: x     /  pCO2: 39    /  pO2: 99    / HCO3: 28    / Base Excess: 3.7   /  SaO2: 99.6    Mode: AC/ CMV (Assist Control/ Continuous Mandatory Ventilation)  RR (machine): 22  TV (machine): 400  FiO2: 40  PEEP: 8  ITime: 0.8  MAP: 7  PIP: 19    HEIGHT/WEIGHT/BMI:   Height (cm): 195.6 (12-03)  Weight (kg): 68 (12-02)  BMI (kg/m2): 17.8 (12-03)    12-15-22 @ 07:01  -  12-16-22 @ 07:00  --------------------------------------------------------  IN:    Dexmedetomidine: 297.5 mL    Enteral Tube Flush: 950 mL    FentaNYL: 421.6 mL    Norepinephrine: 316.9 mL    Osmolite: 1080 mL    Propofol: 44.6 mL  Total IN: 3110.6 mL    OUT:    Indwelling Catheter - Urethral (mL): 1265 mL  Total OUT: 1265 mL  Total NET: 1845.6 mL    STANDING MEDICATIONS:   acetylcysteine 20% for bronchoscopy 4 milliLiter(s) Nebulizer once  albuterol    90 MICROgram(s) HFA Inhaler 1 Puff(s) Inhalation every 4 hours  baclofen 20 milliGRAM(s) Oral every 12 hours  cefiderocol IVPB 2000 milliGRAM(s) IV Intermittent every 8 hours  chlorhexidine 0.12% Liquid 15 milliLiter(s) Oral Mucosa every 12 hours  chlorhexidine 2% Cloths 1 Application(s) Topical daily  dexMEDEtomidine Infusion 0.2 MICROgram(s)/kG/Hr IV Continuous <Continuous>  donepezil 10 milliGRAM(s) Oral at bedtime  doxazosin 2 milliGRAM(s) Oral at bedtime  enoxaparin Injectable 40 milliGRAM(s) SubCutaneous every 24 hours  fentaNYL   Infusion. 0.5 MICROgram(s)/kG/Hr IV Continuous <Continuous>  lactulose Syrup 20 Gram(s) Oral two times a day  magnesium sulfate  IVPB 2 Gram(s) IV Intermittent once  magnesium sulfate  IVPB 2 Gram(s) IV Intermittent once  memantine 10 milliGRAM(s) Oral two times a day  midazolam Infusion 0.02 mG/kG/Hr IV Continuous <Continuous>  norepinephrine Infusion 0.05 MICROgram(s)/kG/Min IV Continuous <Continuous>  pantoprazole   Suspension 40 milliGRAM(s) Oral daily  pantoprazole  Injectable 40 milliGRAM(s) IV Push two times a day  propofol Infusion 10 MICROgram(s)/kG/Min IV Continuous <Continuous>  senna 2 Tablet(s) Oral at bedtime  tamsulosin 0.4 milliGRAM(s) Oral at bedtime  zinc oxide 20% Ointment 1 Application(s) Topical three times a day      LABS:                         8.4    12.49 )-----------( 600      ( 16 Dec 2022 04:49 )             27.0     142  |  106  |  13  ----------------------------<  94          (12-16-22 @ 04:49)  4.4   |  29  |  <0.5<L>    Ca    8.4          (12-16-22 @ 04:49)  Phos  2.7         (12-15-22 @ 23:21)  Mg     1.7         (12-16-22 @ 04:49)    TPro  5.7<L>  /  Alb  2.4<L>  /  TBili  0.2  /  DBili  x   /  AST  10  /  ALT  8   /  AlkPhos  81       12-16-22 @ 04:49  Triglycerides, Serum: 125 mg/dL (12-15 @ 04:55)  Blood Glucose (Past 24 hours):  103 mg/dL (12-16 @ 06:09)  109 mg/dL (12-15 @ 21:37)  133 mg/dL (12-15 @ 15:55)      DIET:   Diet, NPO with Tube Feed:   Tube Feeding Modality: Gastrostomy  Osmolite 1.5 Bhargav  Total Volume for 24 Hours (mL): 1080  Bolus  Total Volume of Bolus (mL):  360  Total # of Feeds: 3  Tube Feed Frequency: Every 8 hours   Tube Feed Start Time: 17:00  Bolus Feed Rate (mL per Hour): 500   Bolus Feed Duration (in Hours): 0.5  Free Water Flush  Free Water Flush Instructions:  150ml before and after each bolus (12-13-22 @ 13:17) [Active]    RADIOLOGY:   < from: Xray Kidney Ureter Bladder (12.16.22 @ 03:01) >  impression:  Limited study. Lower abdomen/pelvis excluded from field-of-view.   Gastrostomy tube overlying the upper abdomen. Nonspecific gaseous   distention of colon. Left basilar pulmonary opacity/effusion. Stable   osseous structures.    < from: Xray Chest 1 View- PORTABLE-Urgent (Xray Chest 1 View- PORTABLE-Urgent .) (12.15.22 @ 15:46) >  Impression:  Low lung volume.  Bilateral opacities, unchanged.  Support tubes and lines as above.   NUTRITION SUPPORT TEAM  -  PROGRESS NOTE   pt seen and evaluated   remain vented/ sedated   on propofol  on PEG tube feed   abdomen soft +peg tube in place site c/d/i    REVIEW OF SYSTEMS:  Negative except as noted above.     VITALS:  T(F): 100 (12-16 @ 08:00), Max: 100.8 (12-15 @ 22:00)  HR: 126 (12-16 @ 08:30) (69 - 126)  BP: 99/66 (12-16 @ 08:30) (79/55 - 156/93)  RR: 24 (12-16 @ 08:30) (15 - 42)  SpO2: 99% (12-16 @ 08:30) (93% - 100%)    ABG - ( 16 Dec 2022 03:07 )  pH, Arterial: 7.46  pH, Blood: x     /  pCO2: 39    /  pO2: 99    / HCO3: 28    / Base Excess: 3.7   /  SaO2: 99.6    Mode: AC/ CMV (Assist Control/ Continuous Mandatory Ventilation)  RR (machine): 22  TV (machine): 400  FiO2: 40  PEEP: 8  ITime: 0.8  MAP: 7  PIP: 19    HEIGHT/WEIGHT/BMI:   Height (cm): 195.6 (12-03)  Weight (kg): 68 (12-02)  BMI (kg/m2): 17.8 (12-03)    12-15-22 @ 07:01  -  12-16-22 @ 07:00  --------------------------------------------------------  IN:    Dexmedetomidine: 297.5 mL    Enteral Tube Flush: 950 mL    FentaNYL: 421.6 mL    Norepinephrine: 316.9 mL    Osmolite: 1080 mL    Propofol: 44.6 mL  Total IN: 3110.6 mL    OUT:    Indwelling Catheter - Urethral (mL): 1265 mL  Total OUT: 1265 mL  Total NET: 1845.6 mL    STANDING MEDICATIONS:   acetylcysteine 20% for bronchoscopy 4 milliLiter(s) Nebulizer once  albuterol    90 MICROgram(s) HFA Inhaler 1 Puff(s) Inhalation every 4 hours  baclofen 20 milliGRAM(s) Oral every 12 hours  cefiderocol IVPB 2000 milliGRAM(s) IV Intermittent every 8 hours  chlorhexidine 0.12% Liquid 15 milliLiter(s) Oral Mucosa every 12 hours  chlorhexidine 2% Cloths 1 Application(s) Topical daily  dexMEDEtomidine Infusion 0.2 MICROgram(s)/kG/Hr IV Continuous <Continuous>  donepezil 10 milliGRAM(s) Oral at bedtime  doxazosin 2 milliGRAM(s) Oral at bedtime  enoxaparin Injectable 40 milliGRAM(s) SubCutaneous every 24 hours  fentaNYL   Infusion. 0.5 MICROgram(s)/kG/Hr IV Continuous <Continuous>  lactulose Syrup 20 Gram(s) Oral two times a day  magnesium sulfate  IVPB 2 Gram(s) IV Intermittent once  magnesium sulfate  IVPB 2 Gram(s) IV Intermittent once  memantine 10 milliGRAM(s) Oral two times a day  midazolam Infusion 0.02 mG/kG/Hr IV Continuous <Continuous>  norepinephrine Infusion 0.05 MICROgram(s)/kG/Min IV Continuous <Continuous>  pantoprazole   Suspension 40 milliGRAM(s) Oral daily  pantoprazole  Injectable 40 milliGRAM(s) IV Push two times a day  propofol Infusion 10 MICROgram(s)/kG/Min IV Continuous <Continuous>  senna 2 Tablet(s) Oral at bedtime  tamsulosin 0.4 milliGRAM(s) Oral at bedtime  zinc oxide 20% Ointment 1 Application(s) Topical three times a day      LABS:                         8.4    12.49 )-----------( 600      ( 16 Dec 2022 04:49 )             27.0     142  |  106  |  13  ----------------------------<  94          (12-16-22 @ 04:49)  4.4   |  29  |  <0.5<L>    Ca    8.4          (12-16-22 @ 04:49)  Phos  2.7         (12-15-22 @ 23:21)  Mg     1.7         (12-16-22 @ 04:49)    TPro  5.7<L>  /  Alb  2.4<L>  /  TBili  0.2  /  DBili  x   /  AST  10  /  ALT  8   /  AlkPhos  81       12-16-22 @ 04:49  Triglycerides, Serum: 125 mg/dL (12-15 @ 04:55)  Blood Glucose (Past 24 hours):  103 mg/dL (12-16 @ 06:09)  109 mg/dL (12-15 @ 21:37)  133 mg/dL (12-15 @ 15:55)      DIET:   Diet, NPO with Tube Feed:   Tube Feeding Modality: Gastrostomy  Osmolite 1.5 Bhargav  Total Volume for 24 Hours (mL): 1080  Bolus  Total Volume of Bolus (mL):  360  Total # of Feeds: 3  Tube Feed Frequency: Every 8 hours   Tube Feed Start Time: 17:00  Bolus Feed Rate (mL per Hour): 500   Bolus Feed Duration (in Hours): 0.5  Free Water Flush  Free Water Flush Instructions:  150ml before and after each bolus (12-13-22 @ 13:17) [Active]    RADIOLOGY:   < from: Xray Kidney Ureter Bladder (12.16.22 @ 03:01) >  impression:  Limited study. Lower abdomen/pelvis excluded from field-of-view.   Gastrostomy tube overlying the upper abdomen. Nonspecific gaseous   distention of colon. Left basilar pulmonary opacity/effusion. Stable   osseous structures.    < from: Xray Chest 1 View- PORTABLE-Urgent (Xray Chest 1 View- PORTABLE-Urgent .) (12.15.22 @ 15:46) >  Impression:  Low lung volume.  Bilateral opacities, unchanged.  Support tubes and lines as above.   NUTRITION SUPPORT TEAM  -  PROGRESS NOTE   pt seen and evaluated   remains vented/ sedated   on propofol  receiving GT feedings  abdomen soft +peg tube in place site c/d/i  + low grade fever, not signif diaphoretic ealrier    REVIEW OF SYSTEMS:  Negative except as noted above.     VITALS:  T(F): 100 (12-16 @ 08:00), Max: 100.8 (12-15 @ 22:00)  HR: 126 (12-16 @ 08:30) (69 - 126)  BP: 99/66 (12-16 @ 08:30) (79/55 - 156/93)  RR: 24 (12-16 @ 08:30) (15 - 42)  SpO2: 99% (12-16 @ 08:30) (93% - 100%)    ABG - ( 16 Dec 2022 03:07 )  pH, Arterial: 7.46  pH, Blood: x     /  pCO2: 39    /  pO2: 99    / HCO3: 28    / Base Excess: 3.7   /  SaO2: 99.6    Mode: AC/ CMV (Assist Control/ Continuous Mandatory Ventilation)  RR (machine): 22  TV (machine): 400  FiO2: 40  PEEP: 8  ITime: 0.8  MAP: 7  PIP: 19    HEIGHT/WEIGHT/BMI:   Height (cm): 195.6 (12-03)  Weight (kg): 68 (12-02)  BMI (kg/m2): 17.8 (12-03)    12-15-22 @ 07:01  -  12-16-22 @ 07:00  --------------------------------------------------------  IN:    Dexmedetomidine: 297.5 mL    Enteral Tube Flush: 950 mL    FentaNYL: 421.6 mL    Norepinephrine: 316.9 mL    Osmolite: 1080 mL    Propofol: 44.6 mL  Total IN: 3110.6 mL    OUT:    Indwelling Catheter - Urethral (mL): 1265 mL  Total OUT: 1265 mL  Total NET: 1845.6 mL    STANDING MEDICATIONS:   acetylcysteine 20% for bronchoscopy 4 milliLiter(s) Nebulizer once  albuterol    90 MICROgram(s) HFA Inhaler 1 Puff(s) Inhalation every 4 hours  baclofen 20 milliGRAM(s) Oral every 12 hours  cefiderocol IVPB 2000 milliGRAM(s) IV Intermittent every 8 hours  chlorhexidine 0.12% Liquid 15 milliLiter(s) Oral Mucosa every 12 hours  chlorhexidine 2% Cloths 1 Application(s) Topical daily  dexMEDEtomidine Infusion 0.2 MICROgram(s)/kG/Hr IV Continuous <Continuous>  donepezil 10 milliGRAM(s) Oral at bedtime  doxazosin 2 milliGRAM(s) Oral at bedtime  enoxaparin Injectable 40 milliGRAM(s) SubCutaneous every 24 hours  fentaNYL   Infusion. 0.5 MICROgram(s)/kG/Hr IV Continuous <Continuous>  lactulose Syrup 20 Gram(s) Oral two times a day  magnesium sulfate  IVPB 2 Gram(s) IV Intermittent once  magnesium sulfate  IVPB 2 Gram(s) IV Intermittent once  memantine 10 milliGRAM(s) Oral two times a day  midazolam Infusion 0.02 mG/kG/Hr IV Continuous <Continuous>  norepinephrine Infusion 0.05 MICROgram(s)/kG/Min IV Continuous <Continuous>  pantoprazole   Suspension 40 milliGRAM(s) Oral daily  pantoprazole  Injectable 40 milliGRAM(s) IV Push two times a day  propofol Infusion 10 MICROgram(s)/kG/Min IV Continuous <Continuous>  senna 2 Tablet(s) Oral at bedtime  tamsulosin 0.4 milliGRAM(s) Oral at bedtime  zinc oxide 20% Ointment 1 Application(s) Topical three times a day      LABS:                         8.4    12.49 )-----------( 600      ( 16 Dec 2022 04:49 )             27.0     142  |  106  |  13  ----------------------------<  94          (12-16-22 @ 04:49)  4.4   |  29  |  <0.5<L>    Ca    8.4          (12-16-22 @ 04:49)  Phos  2.7         (12-15-22 @ 23:21)  Mg     1.7         (12-16-22 @ 04:49)    TPro  5.7<L>  /  Alb  2.4<L>  /  TBili  0.2  /  DBili  x   /  AST  10  /  ALT  8   /  AlkPhos  81       12-16-22 @ 04:49  Triglycerides, Serum: 125 mg/dL (12-15 @ 04:55)  Blood Glucose (Past 24 hours):  103 mg/dL (12-16 @ 06:09)  109 mg/dL (12-15 @ 21:37)  133 mg/dL (12-15 @ 15:55)      DIET:   Diet, NPO with Tube Feed:   Tube Feeding Modality: Gastrostomy  Osmolite 1.5 Bhargav  Total Volume for 24 Hours (mL): 1080  Bolus  Total Volume of Bolus (mL):  360  Total # of Feeds: 3  Tube Feed Frequency: Every 8 hours   Tube Feed Start Time: 17:00  Bolus Feed Rate (mL per Hour): 500   Bolus Feed Duration (in Hours): 0.5  Free Water Flush  Free Water Flush Instructions:  150ml before and after each bolus (12-13-22 @ 13:17) [Active]    RADIOLOGY:   < from: Xray Kidney Ureter Bladder (12.16.22 @ 03:01) >  impression:  Limited study. Lower abdomen/pelvis excluded from field-of-view.   Gastrostomy tube overlying the upper abdomen. Nonspecific gaseous   distention of colon. Left basilar pulmonary opacity/effusion. Stable   osseous structures.    < from: Xray Chest 1 View- PORTABLE-Urgent (Xray Chest 1 View- PORTABLE-Urgent .) (12.15.22 @ 15:46) >  Impression:  Low lung volume.  Bilateral opacities, unchanged.  Support tubes and lines as above.

## 2022-12-16 NOTE — PROGRESS NOTE ADULT - SUBJECTIVE AND OBJECTIVE BOX
HPI:  Pt is nonverbal at baseline. Hx obtained from chart.     51 yo male w/ PMH of MS, chronic low back pain, neurogenic bladder? s/p SPC placement and s/p dislodgement, presents for fever. Presented to the ED for SPC dislodgement a few days ago. Was seen by IR and planned for outpatient SPC replacement. Campuzano was placed at [email or web address] Today. prior to placement, he was noted to be febrile w/ tachycardia and tachypnea so brought into ED.  Pt nonverbal at baseline, unable to provide further HPI.    Pt upgraded to ICU sepsis, on multiple pressors. Palliative care consulted, pt w end stage MS at baseline. Poor prognosis.      INTERVAL EVENTS:  : pt appears sedated on ventilator in ICU   ADVANCE DIRECTIVES:    DECISION MAKER(s):  [ ] Health Care Proxy(s)  [ ] Surrogate(s)  [ ] Guardian           Name(s): Phone Number(s):  Answers: Caregiver Name: JAYY MEDRANO,  Answers: Caregiver Relationship: MOTHER, HCP  Answers: Caregiver Contact Number: [phone]  HEALTH CARE PROXY ON FILE IN EMR - REVIEWED   BASELINE (I)ADL(s) (prior to admission):  [place]: [ ]Total  [ ] Moderate [x ]Dependent  Allergies    penicillin (Unknown)  vancomycin (Unknown)    Intolerances    MEDICATIONS  (STANDING):  acetylcysteine 20% for bronchoscopy 4 milliLiter(s) Nebulizer once  albuterol    90 MICROgram(s) HFA Inhaler 1 Puff(s) Inhalation every 4 hours  baclofen 20 milliGRAM(s) Oral every 12 hours  cefiderocol IVPB 2000 milliGRAM(s) IV Intermittent every 8 hours  chlorhexidine 0.12% Liquid 15 milliLiter(s) Oral Mucosa every 12 hours  chlorhexidine 2% Cloths 1 Application(s) Topical daily  dexMEDEtomidine Infusion 0.2 MICROgram(s)/kG/Hr (3.4 mL/Hr) IV Continuous <Continuous>  donepezil 10 milliGRAM(s) Oral at bedtime  doxazosin 2 milliGRAM(s) Oral at bedtime  enoxaparin Injectable 40 milliGRAM(s) SubCutaneous every 24 hours  fentaNYL   Infusion. 0.5 MICROgram(s)/kG/Hr (3.4 mL/Hr) IV Continuous <Continuous>  lactated ringers Bolus 1000 milliLiter(s) IV Bolus once  lactulose Syrup 20 Gram(s) Oral every 6 hours  magnesium sulfate  IVPB 2 Gram(s) IV Intermittent once  magnesium sulfate  IVPB 2 Gram(s) IV Intermittent once  memantine 10 milliGRAM(s) Oral two times a day  midazolam Infusion 0.02 mG/kG/Hr (1.36 mL/Hr) IV Continuous <Continuous>  norepinephrine Infusion 0.05 MICROgram(s)/kG/Min (6.38 mL/Hr) IV Continuous <Continuous>  pantoprazole   Suspension 40 milliGRAM(s) Oral daily  pantoprazole  Injectable 40 milliGRAM(s) IV Push two times a day  polyethylene glycol 3350 17 Gram(s) Oral daily  propofol Infusion 10 MICROgram(s)/kG/Min (4.08 mL/Hr) IV Continuous <Continuous>  senna 2 Tablet(s) Oral at bedtime  tamsulosin 0.4 milliGRAM(s) Oral at bedtime  zinc oxide 20% Ointment 1 Application(s) Topical three times a day    MEDICATIONS  (PRN):  acetaminophen     Tablet .. 650 milliGRAM(s) Oral every 6 hours PRN Mild Pain (1 - 3), Moderate Pain (4 - 6)  ibuprofen  Suspension. 600 milliGRAM(s) Oral every 8 hours PRN Temp greater or equal to 38.5C (101.3F)  melatonin 3 milliGRAM(s) Oral at bedtime PRN Insomnia    PRESENT SYMPTOMS: [ x]Unable to obtain due to poor mentation   Source if other than patient:  [ ]Family   [ ]Team     Pain: [ ]yes [ ]no  QOL impact -   Location -                    Aggravating factors -  Quality -  Radiation -  Timing-  Severity (0-10 scale):  Minimal acceptable level (0-10 scale):     Dyspnea:                           [ ]Mild [ ]Moderate [ ]Severe  Anxiety:                             [ ]Mild [ ]Moderate [ ]Severe  Fatigue:                             [ ]Mild [ ]Moderate [ ]Severe  Nausea:                             [ ]Mild [ ]Moderate [ ]Severe  Loss of appetite:              [ ]Mild [ ]Moderate [ ]Severe  Constipation:                    [ ]Mild [ ]Moderate [ ]Severe    Other Symptoms:  [ ]All other review of systems negative     Palliative Performance Status Version 2:         %    http://npcrc.org/files/news/palliative_performance_scale_[email or web address].pdf  PHYSICAL EXAM:  Vital Signs Last 24 Hrs  T(C): 37.8 (16 Dec 2022 08:00), Max: 38.8 (15 Dec 2022 17:00)  T(F): 100 (16 Dec 2022 08:00), Max: 101.8 (15 Dec 2022 17:00)  HR: 144 (16 Dec 2022 11:00) (68 - 144)  BP: 78/56 (16 Dec 2022 11:00) (75/44 - 175/94)  BP(mean): 64 (16 Dec 2022 11:00) (53 - 120)  RR: 18 (16 Dec 2022 11:00) (0 - 42)  SpO2: 99% (16 Dec 2022 11:00) (84% - 100%)    Parameters below as of 16 Dec 2022 08:00  Patient On (Oxygen Delivery Method): ventilator     I&O's Summary    15 Dec 2022 07:01  -  16 Dec 2022 07:00  --------------------------------------------------------  IN: 3110.6 mL / OUT: 1265 mL / NET: 1845.6 mL  GENERAL:  [X ] No acute distress [ ]Lethargic  [ ]Unarousable  [ ]Verbal  [ ]Non-Verbal [ ]Cachexia    BEHAVIORAL/PSYCH:  [ ]Alert and Oriented x  [ ] Anxiety [ ] Delirium [ ] Agitation [ X] Calm   EYES: [ ] No scleral icterus [ ] Scleral icterus [X ] Closed  ENMT:  [ ]Dry mouth  [ ]No external oral lesions [ X] No external ear or nose lesions  CARDIOVASCULAR:  [ ]Regular [ ]Irregular [ ]Tachy [ ]Not Tachy  [ ][place] [ ] Edema [ ] No edema  PULMONARY:  [ ]Tachypnea  [ ]Audible excessive secretions [X ] No labored breathing [ ] labored breathing  GASTROINTESTINAL: [ ]Soft  [ ]Distended  [X ]Not distended [ ]Non tender [ ]Tender  MUSCULOSKELETAL: [ ]No clubbing [ ] clubbing  [X ] No cyanosis [ ] cyanosis  NEUROLOGIC: [ ]No focal deficits  [ ]Follows commands  [ ]Does not follow commands  [X ]Cognitive impairment  [ ]Dysphagia  [ ]Dysarthria  [ ]Paresis   SKIN: [ ] Jaundiced [ X] Non-jaundiced [ ]Rash [ ]No Rash [ ] Warm [ ] Dry  MISC/LINES: [X ] ET tube [ ] Trach [ ]NGT/OGT [ x]PEG [x ]Campuzano (SP tube dislodged Campuzano for now)     CRITICAL CARE:  [x ] Shock Present  [x ]Septic [ ]Cardiogenic [ ]Neurologic [ ]Hypovolemic  [x ]  Vasopressors [ ]  Inotropes   [x ]Respiratory failure present [x ]Mechanical ventilation [ ]Non-invasive ventilatory support [ ]High flow  [ ]Acute  [ ]Chronic [ ]Hypoxic  [ ]Hypercarbic [ ]Other  [ ]Other organ failure     LABS:                        8.4    12.49 )-----------( 600      ( 16 Dec 2022 04:49 )             27.0   12-16    142  |  106  |  13  ----------------------------<  94  4.4   |  29  |  <0.5<L>    Ca    8.4      16 Dec 2022 04:49  Phos  2.7     12-15  Mg     1.7     12-16    TPro  5.7<L>  /  Alb  2.4<L>  /  TBili  0.2  /  DBili  x   /  AST  10  /  ALT  8   /  AlkPhos  81  12-16      Urinalysis Basic - ( 14 Dec 2022 18:22 )    Color: Yellow / Appearance: Slightly Turbid / S.018 / pH: x  Gluc: x / Ketone: Negative  / Bili: Negative / Urobili: <2 mg/dL   Blood: x / Protein: 30 mg/dL / Nitrite: Negative   Leuk Esterase: Large / RBC: 11 /HPF /  /HPF   Sq Epi: x / Non Sq Epi: 7 /HPF / Bacteria: Negative      RADIOLOGY & ADDITIONAL STUDIES:        REFERRALS:   [ ]Chaplaincy  [ ]Hospice  [ ]Child Life  [ x]Social Work  [x ]Case management [ ]Holistic Therapy     Goals of Care Document:

## 2022-12-16 NOTE — PROGRESS NOTE ADULT - SUBJECTIVE AND OBJECTIVE BOX
KEVIN MEDRANO 52y Male  MRN#: [id number]   Hospital Day: 14d    SUBJECTIVE  Patient is a 52y old Male who presents with a chief complaint of sepsis (16 Dec 2022 09:29)  Currently admitted to medicine with the primary diagnosis of Sepsis      INTERVAL HPI AND OVERNIGHT EVENTS:  Patient was examined and seen at bedside. This morning he is resting comfortably in bed. No issues or overnight events.    OBJECTIVE  PAST MEDICAL & SURGICAL HISTORY  Multiple sclerosis    Chronic back pain    Hypertension    No significant past surgical history      ALLERGIES:  penicillin (Unknown)  vancomycin (Unknown)    MEDICATIONS:  STANDING MEDICATIONS  acetylcysteine 20% for bronchoscopy 4 milliLiter(s) Nebulizer once  albuterol    90 MICROgram(s) HFA Inhaler 1 Puff(s) Inhalation every 4 hours  baclofen 20 milliGRAM(s) Oral every 12 hours  cefiderocol IVPB 2000 milliGRAM(s) IV Intermittent every 8 hours  chlorhexidine 0.12% Liquid 15 milliLiter(s) Oral Mucosa every 12 hours  chlorhexidine 2% Cloths 1 Application(s) Topical daily  dexMEDEtomidine Infusion 0.2 MICROgram(s)/kG/Hr IV Continuous <Continuous>  donepezil 10 milliGRAM(s) Oral at bedtime  doxazosin 2 milliGRAM(s) Oral at bedtime  enoxaparin Injectable 40 milliGRAM(s) SubCutaneous every 24 hours  fentaNYL   Infusion. 0.5 MICROgram(s)/kG/Hr IV Continuous <Continuous>  lactated ringers Bolus 1000 milliLiter(s) IV Bolus once  lactulose Syrup 20 Gram(s) Oral two times a day  magnesium sulfate  IVPB 2 Gram(s) IV Intermittent once  magnesium sulfate  IVPB 2 Gram(s) IV Intermittent once  memantine 10 milliGRAM(s) Oral two times a day  midazolam Infusion 0.02 mG/kG/Hr IV Continuous <Continuous>  norepinephrine Infusion 0.05 MICROgram(s)/kG/Min IV Continuous <Continuous>  pantoprazole   Suspension 40 milliGRAM(s) Oral daily  pantoprazole  Injectable 40 milliGRAM(s) IV Push two times a day  propofol Infusion 10 MICROgram(s)/kG/Min IV Continuous <Continuous>  senna 2 Tablet(s) Oral at bedtime  tamsulosin 0.4 milliGRAM(s) Oral at bedtime  zinc oxide 20% Ointment 1 Application(s) Topical three times a day    PRN MEDICATIONS  acetaminophen     Tablet .. 650 milliGRAM(s) Oral every 6 hours PRN  ibuprofen  Suspension. 600 milliGRAM(s) Oral every 8 hours PRN  melatonin 3 milliGRAM(s) Oral at bedtime PRN      VITAL SIGNS: Last 24 Hours  T(C): 37.8 (16 Dec 2022 08:00), Max: 38.8 (15 Dec 2022 17:00)  T(F): 100 (16 Dec 2022 08:00), Max: 101.8 (15 Dec 2022 17:00)  HR: 144 (16 Dec 2022 11:00) (68 - 144)  BP: 78/56 (16 Dec 2022 11:00) (75/44 - 175/94)  BP(mean): 64 (16 Dec 2022 11:00) (53 - 120)  RR: 18 (16 Dec 2022 11:00) (0 - 42)  SpO2: 99% (16 Dec 2022 11:00) (84% - 100%)    LABS:                        8.4    12.49 )-----------( 600      ( 16 Dec 2022 04:49 )             27.0     12-16    142  |  106  |  13  ----------------------------<  94  4.4   |  29  |  <0.5<L>    Ca    8.4      16 Dec 2022 04:49  Phos  2.7     12-15  Mg     1.7     12-16    TPro  5.7<L>  /  Alb  2.4<L>  /  TBili  0.2  /  DBili  x   /  AST  10  /  ALT  8   /  AlkPhos  81  12-16      Urinalysis Basic - ( 14 Dec 2022 18:22 )    Color: Yellow / Appearance: Slightly Turbid / S.018 / pH: x  Gluc: x / Ketone: Negative  / Bili: Negative / Urobili: <2 mg/dL   Blood: x / Protein: 30 mg/dL / Nitrite: Negative   Leuk Esterase: Large / RBC: 11 /HPF /  /HPF   Sq Epi: x / Non Sq Epi: 7 /HPF / Bacteria: Negative      ABG - ( 16 Dec 2022 03:07 )  pH, Arterial: 7.46  pH, Blood: x     /  pCO2: 39    /  pO2: 99    / HCO3: 28    / Base Excess: 3.7   /  SaO2: 99.6                  Culture - Blood (collected 14 Dec 2022 18:21)  Source: .Blood Blood  Preliminary Report (16 Dec 2022 03:02):    No growth to date.    Culture - Blood (collected 14 Dec 2022 11:24)  Source: .Blood None  Preliminary Report (15 Dec 2022 23:02):    No growth to date.          RADIOLOGY:      PHYSICAL EXAM:  CONSTITUTIONAL: No acute distress, well-developed, well-groomed, AAOx3  HEAD: Atraumatic, normocephalic  EYES: EOM intact, PERRLA, conjunctiva and sclera clear  ENT: Supple, no masses, no thyromegaly, no bruits, no JVD; moist mucous membranes  PULMONARY: Clear to auscultation bilaterally; no wheezes, rales, or rhonchi  CARDIOVASCULAR: Regular rate and rhythm; no murmurs, rubs, or gallops  GASTROINTESTINAL: Soft, non-tender, non-distended; bowel sounds present  MUSCULOSKELETAL: 2+ peripheral pulses; no clubbing, no cyanosis, no edema  NEUROLOGY: non-focal  SKIN: No rashes or lesions; warm and dry   KEVIN MEDRANO 52y Male  MRN#: [id number]   Hospital Day: 14d    SUBJECTIVE  Patient is a 52y old Male who presents with a chief complaint of sepsis (16 Dec 2022 09:29)  Currently admitted to medicine with the primary diagnosis of Sepsis      INTERVAL HPI AND OVERNIGHT EVENTS:  Patient was examined and seen at bedside. This morning he is resting comfortably in bed. No issues or overnight events.    OBJECTIVE  PAST MEDICAL & SURGICAL HISTORY  Multiple sclerosis    Chronic back pain    Hypertension    No significant past surgical history      ALLERGIES:  penicillin (Unknown)  vancomycin (Unknown)    MEDICATIONS:  STANDING MEDICATIONS  acetylcysteine 20% for bronchoscopy 4 milliLiter(s) Nebulizer once  albuterol    90 MICROgram(s) HFA Inhaler 1 Puff(s) Inhalation every 4 hours  baclofen 20 milliGRAM(s) Oral every 12 hours  cefiderocol IVPB 2000 milliGRAM(s) IV Intermittent every 8 hours  chlorhexidine 0.12% Liquid 15 milliLiter(s) Oral Mucosa every 12 hours  chlorhexidine 2% Cloths 1 Application(s) Topical daily  dexMEDEtomidine Infusion 0.2 MICROgram(s)/kG/Hr IV Continuous <Continuous>  donepezil 10 milliGRAM(s) Oral at bedtime  doxazosin 2 milliGRAM(s) Oral at bedtime  enoxaparin Injectable 40 milliGRAM(s) SubCutaneous every 24 hours  fentaNYL   Infusion. 0.5 MICROgram(s)/kG/Hr IV Continuous <Continuous>  lactated ringers Bolus 1000 milliLiter(s) IV Bolus once  lactulose Syrup 20 Gram(s) Oral two times a day  magnesium sulfate  IVPB 2 Gram(s) IV Intermittent once  magnesium sulfate  IVPB 2 Gram(s) IV Intermittent once  memantine 10 milliGRAM(s) Oral two times a day  midazolam Infusion 0.02 mG/kG/Hr IV Continuous <Continuous>  norepinephrine Infusion 0.05 MICROgram(s)/kG/Min IV Continuous <Continuous>  pantoprazole   Suspension 40 milliGRAM(s) Oral daily  pantoprazole  Injectable 40 milliGRAM(s) IV Push two times a day  propofol Infusion 10 MICROgram(s)/kG/Min IV Continuous <Continuous>  senna 2 Tablet(s) Oral at bedtime  tamsulosin 0.4 milliGRAM(s) Oral at bedtime  zinc oxide 20% Ointment 1 Application(s) Topical three times a day    PRN MEDICATIONS  acetaminophen     Tablet .. 650 milliGRAM(s) Oral every 6 hours PRN  ibuprofen  Suspension. 600 milliGRAM(s) Oral every 8 hours PRN  melatonin 3 milliGRAM(s) Oral at bedtime PRN      VITAL SIGNS: Last 24 Hours  T(C): 37.8 (16 Dec 2022 08:00), Max: 38.8 (15 Dec 2022 17:00)  T(F): 100 (16 Dec 2022 08:00), Max: 101.8 (15 Dec 2022 17:00)  HR: 144 (16 Dec 2022 11:00) (68 - 144)  BP: 78/56 (16 Dec 2022 11:00) (75/44 - 175/94)  BP(mean): 64 (16 Dec 2022 11:00) (53 - 120)  RR: 18 (16 Dec 2022 11:00) (0 - 42)  SpO2: 99% (16 Dec 2022 11:00) (84% - 100%)    LABS:                        8.4    12.49 )-----------( 600      ( 16 Dec 2022 04:49 )             27.0     12-16    142  |  106  |  13  ----------------------------<  94  4.4   |  29  |  <0.5<L>    Ca    8.4      16 Dec 2022 04:49  Phos  2.7     12-15  Mg     1.7     12-16    TPro  5.7<L>  /  Alb  2.4<L>  /  TBili  0.2  /  DBili  x   /  AST  10  /  ALT  8   /  AlkPhos  81  12-16      Urinalysis Basic - ( 14 Dec 2022 18:22 )    Color: Yellow / Appearance: Slightly Turbid / S.018 / pH: x  Gluc: x / Ketone: Negative  / Bili: Negative / Urobili: <2 mg/dL   Blood: x / Protein: 30 mg/dL / Nitrite: Negative   Leuk Esterase: Large / RBC: 11 /HPF /  /HPF   Sq Epi: x / Non Sq Epi: 7 /HPF / Bacteria: Negative      ABG - ( 16 Dec 2022 03:07 )  pH, Arterial: 7.46  pH, Blood: x     /  pCO2: 39    /  pO2: 99    / HCO3: 28    / Base Excess: 3.7   /  SaO2: 99.6                  Culture - Blood (collected 14 Dec 2022 18:21)  Source: .Blood Blood  Preliminary Report (16 Dec 2022 03:02):    No growth to date.    Culture - Blood (collected 14 Dec 2022 11:24)  Source: .Blood None  Preliminary Report (15 Dec 2022 23:02):    No growth to date.          PHYSICAL EXAM:  GENERAL: NAD, contratced  HEAD:  Atraumatic, Normocephalic  ENMT: intubated  NERVOUS SYSTEM: sedated  CHEST/LUNG: intubated, vented. bilateral breath sounds decreased on the left, crackles, no wheezing  HEART: Regular rate and rhythm. Normal S1/S1. No murmurs  ABDOMEN: Soft, Nontender, Nondistended; Bowel sounds present  EXTREMITIES:no edema, contracted     KEVIN MEDRANO 52y Male  MRN#: [id number]   Hospital Day: 14d    SUBJECTIVE  Patient is a 52y old Male who presents with a chief complaint of sepsis (16 Dec 2022 09:29)  Currently admitted to medicine with the primary diagnosis of Sepsis    ICU course:  12/15: pt is agitated episodically which causes increase in BP and HR despite being on precedex and fentanyl. Still requiring levo for pressure suppport. ID changed the Abx today.   : pt sedated well now and calm on propofol and fentanyl. Will try to wean propofol today. Still on levo, GOC conversation with mother at the bedside regarding trach.      INTERVAL HPI AND OVERNIGHT EVENTS:  Patient was examined and seen at bedside. This morning he is resting comfortably in bed. No issues or overnight events.    OBJECTIVE  PAST MEDICAL & SURGICAL HISTORY  Multiple sclerosis    Chronic back pain    Hypertension    No significant past surgical history      ALLERGIES:  penicillin (Unknown)  vancomycin (Unknown)    MEDICATIONS:  STANDING MEDICATIONS  acetylcysteine 20% for bronchoscopy 4 milliLiter(s) Nebulizer once  albuterol    90 MICROgram(s) HFA Inhaler 1 Puff(s) Inhalation every 4 hours  baclofen 20 milliGRAM(s) Oral every 12 hours  cefiderocol IVPB 2000 milliGRAM(s) IV Intermittent every 8 hours  chlorhexidine 0.12% Liquid 15 milliLiter(s) Oral Mucosa every 12 hours  chlorhexidine 2% Cloths 1 Application(s) Topical daily  dexMEDEtomidine Infusion 0.2 MICROgram(s)/kG/Hr IV Continuous <Continuous>  donepezil 10 milliGRAM(s) Oral at bedtime  doxazosin 2 milliGRAM(s) Oral at bedtime  enoxaparin Injectable 40 milliGRAM(s) SubCutaneous every 24 hours  fentaNYL   Infusion. 0.5 MICROgram(s)/kG/Hr IV Continuous <Continuous>  lactated ringers Bolus 1000 milliLiter(s) IV Bolus once  lactulose Syrup 20 Gram(s) Oral two times a day  magnesium sulfate  IVPB 2 Gram(s) IV Intermittent once  magnesium sulfate  IVPB 2 Gram(s) IV Intermittent once  memantine 10 milliGRAM(s) Oral two times a day  midazolam Infusion 0.02 mG/kG/Hr IV Continuous <Continuous>  norepinephrine Infusion 0.05 MICROgram(s)/kG/Min IV Continuous <Continuous>  pantoprazole   Suspension 40 milliGRAM(s) Oral daily  pantoprazole  Injectable 40 milliGRAM(s) IV Push two times a day  propofol Infusion 10 MICROgram(s)/kG/Min IV Continuous <Continuous>  senna 2 Tablet(s) Oral at bedtime  tamsulosin 0.4 milliGRAM(s) Oral at bedtime  zinc oxide 20% Ointment 1 Application(s) Topical three times a day    PRN MEDICATIONS  acetaminophen     Tablet .. 650 milliGRAM(s) Oral every 6 hours PRN  ibuprofen  Suspension. 600 milliGRAM(s) Oral every 8 hours PRN  melatonin 3 milliGRAM(s) Oral at bedtime PRN      VITAL SIGNS: Last 24 Hours  T(C): 37.8 (16 Dec 2022 08:00), Max: 38.8 (15 Dec 2022 17:00)  T(F): 100 (16 Dec 2022 08:00), Max: 101.8 (15 Dec 2022 17:00)  HR: 144 (16 Dec 2022 11:00) (68 - 144)  BP: 78/56 (16 Dec 2022 11:00) (75/44 - 175/94)  BP(mean): 64 (16 Dec 2022 11:00) (53 - 120)  RR: 18 (16 Dec 2022 11:00) (0 - 42)  SpO2: 99% (16 Dec 2022 11:00) (84% - 100%)    LABS:                        8.4    12.49 )-----------( 600      ( 16 Dec 2022 04:49 )             27.0     12-16    142  |  106  |  13  ----------------------------<  94  4.4   |  29  |  <0.5<L>    Ca    8.4      16 Dec 2022 04:49  Phos  2.7     12-15  Mg     1.7     12-16    TPro  5.7<L>  /  Alb  2.4<L>  /  TBili  0.2  /  DBili  x   /  AST  10  /  ALT  8   /  AlkPhos  81  12-16      Urinalysis Basic - ( 14 Dec 2022 18:22 )    Color: Yellow / Appearance: Slightly Turbid / S.018 / pH: x  Gluc: x / Ketone: Negative  / Bili: Negative / Urobili: <2 mg/dL   Blood: x / Protein: 30 mg/dL / Nitrite: Negative   Leuk Esterase: Large / RBC: 11 /HPF /  /HPF   Sq Epi: x / Non Sq Epi: 7 /HPF / Bacteria: Negative      ABG - ( 16 Dec 2022 03:07 )  pH, Arterial: 7.46  pH, Blood: x     /  pCO2: 39    /  pO2: 99    / HCO3: 28    / Base Excess: 3.7   /  SaO2: 99.6                  Culture - Blood (collected 14 Dec 2022 18:21)  Source: .Blood Blood  Preliminary Report (16 Dec 2022 03:02):    No growth to date.    Culture - Blood (collected 14 Dec 2022 11:24)  Source: .Blood None  Preliminary Report (15 Dec 2022 23:02):    No growth to date.          PHYSICAL EXAM:  GENERAL: NAD, contratced  HEAD:  Atraumatic, Normocephalic  ENMT: intubated  NERVOUS SYSTEM: sedated  CHEST/LUNG: intubated, vented. bilateral breath sounds decreased on the left, crackles, no wheezing  HEART: Regular rate and rhythm. Normal S1/S1. No murmurs  ABDOMEN: Soft, Nontender, Nondistended; Bowel sounds present  EXTREMITIES:no edema, contracted

## 2022-12-16 NOTE — PROGRESS NOTE ADULT - PROBLEM SELECTOR PLAN 2
- contracted and bedbound at baseline, sometimes able to communicate per mother, but she feels overall quality of life is poor  - see GOC above  - supportive care. - contracted and bedbound at baseline, sometimes able to communicate per mother, but she feels overall quality of life is poor  - supportive care.

## 2022-12-17 NOTE — PROGRESS NOTE ADULT - ASSESSMENT
IMPRESSION:    Sepsis POA  Septic shock still on pressors  Acute hypoxemic respiratory failure/ Left mucus plug / lung atelectasis  Pseudomonas/ acineto in BAL / wash   Urinary tract infection/ proteus  Bacteremia - MRSE treated   Non verbal at baseline  NSTEMI likely Type 2  History of MS/neurogenic bladder      PLAN:      CNS: SAT    HEENT: Oral care.  ET Care     PULMONARY:  Recommend nebs every 4 hours.  Vent changes:   FIO2 to 92- 96%, chest PT    CARDIOVASCULAR: avoid volume overload.   Wean Levophed , IVF, midodrine    GI: GI prophylaxis. PEG feeds.  Bowel regimen     RENAL:  Follow up lytes.  Correct as needed. IR following    INFECTIOUS DISEASE: ABX per ID.      HEMATOLOGICAL: DVT prophylaxis. On Lovenox.     ENDOCRINE:  Follow up FS.  Insulin protocol if needed.    MICU    GOC    Poor prognosis   palliative care fup  R IJ 12/14

## 2022-12-17 NOTE — PHARMACOTHERAPY INTERVENTION NOTE - INTERVENTION TYPE RECOOMEND
Therapy Recommended - Med Rec related
Timing/Frequency of Administration Recommended
Therapy Recommended - Alternative treatment

## 2022-12-17 NOTE — PHARMACOTHERAPY INTERVENTION NOTE - COMMENTS
Modified penicillin allergy history to state that patient tolerated cefepime, cefiderocol, ceftazidime-avibactam, and meropenem during this admission. 
pt had several BMs, recommended adjusting lactulose to 20g og q12h 
Contacted microbiology lab to request cefiderocol and minocycline susceptibilities for the Acinetobacter baumannii in the bronchial culture from 12/13, and to request cefiderocol susceptibility for the Pseudomonas aerguinosa in the same bronchial culture. 
pantoprazole 40mg IV q12h x1 day completed, d/w team, resume pantoprazole 40mg as suspension og daily
pt intubated, recommended changing pantoprazole tab to 40mg suspension daily  -tamsulosin to doxazosin 2mg bedtime

## 2022-12-17 NOTE — PROGRESS NOTE ADULT - SUBJECTIVE AND OBJECTIVE BOX
Over Night Events: events noted, remain critically ill, on propofol fentanyl, levophed 0.11    PHYSICAL EXAM    ICU Vital Signs Last 24 Hrs  T(C): 37 (17 Dec 2022 06:00), Max: 37.7 (16 Dec 2022 12:00)  T(F): 98.6 (17 Dec 2022 06:00), Max: 99.9 (16 Dec 2022 12:00)  HR: 68 (17 Dec 2022 08:19) (60 - 144)  BP: 90/54 (17 Dec 2022 07:00) (77/45 - 111/74)  BP(mean): 63 (17 Dec 2022 07:00) (54 - 85)  RR: 22 (17 Dec 2022 07:00) (18 - 26)  SpO2: 91% (17 Dec 2022 08:19) (91% - 100%)    O2 Parameters below as of 17 Dec 2022 06:00  Patient On (Oxygen Delivery Method): ventilator    O2 Concentration (%): 40        General: ill looking  HEENT: ETT   Lungs: DEC BS L side  Cardiovascular: Regular   Abdomen: Soft, Positive BS  Extremities: No clubbing   Neurological: Non focal       12-16-22 @ 07:01  -  12-17-22 @ 07:00  --------------------------------------------------------  IN:    Dexmedetomidine: 76.5 mL    Enteral Tube Flush: 900 mL    FentaNYL: 430.6 mL    IV PiggyBack: 1200 mL    Lactated Ringers Bolus: 1000 mL    Norepinephrine: 211.3 mL    Osmolite: 1080 mL    Propofol: 448.4 mL  Total IN: 5346.8 mL    OUT:    Dexmedetomidine: 0 mL    Indwelling Catheter - Urethral (mL): 1875 mL    Midazolam: 0 mL  Total OUT: 1875 mL    Total NET: 3471.8 mL          LABS:                          8.0    9.37  )-----------( 532      ( 17 Dec 2022 05:18 )             25.3                                               12-17    140  |  105  |  8<L>  ----------------------------<  94  4.4   |  28  |  <0.5<L>    Ca    8.2<L>      17 Dec 2022 05:18  Phos  3.5     12-17  Mg     1.8     12-17    TPro  5.5<L>  /  Alb  2.5<L>  /  TBili  <0.2  /  DBili  x   /  AST  10  /  ALT  8   /  AlkPhos  77  12-17                                                                                           LIVER FUNCTIONS - ( 17 Dec 2022 05:18 )  Alb: 2.5 g/dL / Pro: 5.5 g/dL / ALK PHOS: 77 U/L / ALT: 8 U/L / AST: 10 U/L / GGT: x                                                  Culture - Blood (collected 14 Dec 2022 18:21)  Source: .Blood Blood  Preliminary Report (16 Dec 2022 03:02):    No growth to date.    Culture - Blood (collected 14 Dec 2022 11:24)  Source: .Blood None  Preliminary Report (15 Dec 2022 23:02):    No growth to date.                                                   Mode: AC/ CMV (Assist Control/ Continuous Mandatory Ventilation)  RR (machine): 22  TV (machine): 400  FiO2: 40  PEEP: 8  ITime: 0.8  MAP: 11  PIP: 20                                      ABG - ( 17 Dec 2022 03:02 )  pH, Arterial: 7.43  pH, Blood: x     /  pCO2: 41    /  pO2: 115   / HCO3: 27    / Base Excess: 2.6   /  SaO2: 99.4                MEDICATIONS  (STANDING):  acetylcysteine 20% for bronchoscopy 4 milliLiter(s) Nebulizer once  albuterol    90 MICROgram(s) HFA Inhaler 1 Puff(s) Inhalation every 4 hours  baclofen 20 milliGRAM(s) Oral every 12 hours  cefiderocol IVPB 2000 milliGRAM(s) IV Intermittent every 8 hours  chlorhexidine 0.12% Liquid 15 milliLiter(s) Oral Mucosa every 12 hours  chlorhexidine 2% Cloths 1 Application(s) Topical daily  dexMEDEtomidine Infusion 0.2 MICROgram(s)/kG/Hr (3.4 mL/Hr) IV Continuous <Continuous>  donepezil 10 milliGRAM(s) Oral at bedtime  doxazosin 2 milliGRAM(s) Oral at bedtime  enoxaparin Injectable 40 milliGRAM(s) SubCutaneous every 24 hours  fentaNYL   Infusion. 0.5 MICROgram(s)/kG/Hr (3.4 mL/Hr) IV Continuous <Continuous>  lactulose Syrup 20 Gram(s) Oral every 6 hours  magnesium sulfate  IVPB 2 Gram(s) IV Intermittent once  memantine 10 milliGRAM(s) Oral two times a day  midazolam Infusion 0.02 mG/kG/Hr (1.36 mL/Hr) IV Continuous <Continuous>  midodrine 10 milliGRAM(s) Oral every 8 hours  norepinephrine Infusion 0.05 MICROgram(s)/kG/Min (6.38 mL/Hr) IV Continuous <Continuous>  pantoprazole   Suspension 40 milliGRAM(s) Oral daily  polyethylene glycol 3350 17 Gram(s) Oral daily  polymyxin B IVPB [id number] Unit(s) IV Intermittent every 12 hours  propofol Infusion 10 MICROgram(s)/kG/Min (4.08 mL/Hr) IV Continuous <Continuous>  senna 2 Tablet(s) Oral at bedtime  tamsulosin 0.4 milliGRAM(s) Oral at bedtime  zinc oxide 20% Ointment 1 Application(s) Topical three times a day    MEDICATIONS  (PRN):  acetaminophen     Tablet .. 650 milliGRAM(s) Oral every 6 hours PRN Mild Pain (1 - 3), Moderate Pain (4 - 6)  ibuprofen  Suspension. 600 milliGRAM(s) Oral every 8 hours PRN Temp greater or equal to 38.5C (101.3F)  melatonin 3 milliGRAM(s) Oral at bedtime PRN Insomnia      Xrays:                                                                                     ECHO

## 2022-12-18 NOTE — PROGRESS NOTE ADULT - SUBJECTIVE AND OBJECTIVE BOX
KEVIN MEDRANO 52y Male  MRN#: [id number]   Hospital Day: 16d    SUBJECTIVE  Patient is a 52y old Male who presents with a chief complaint of sepsis (18 Dec 2022 08:20)  Currently admitted to medicine with the primary diagnosis of Sepsis      INTERVAL HPI AND OVERNIGHT EVENTS:  Patient was examined and seen at bedside. This morning he is resting comfortably in bed. No issues or overnight events.    OBJECTIVE  PAST MEDICAL & SURGICAL HISTORY  Multiple sclerosis    Chronic back pain    Hypertension    No significant past surgical history      ALLERGIES:  penicillin (Unknown)  vancomycin (Unknown)    MEDICATIONS:  STANDING MEDICATIONS  acetylcysteine 20% for bronchoscopy 4 milliLiter(s) Nebulizer once  albuterol    90 MICROgram(s) HFA Inhaler 1 Puff(s) Inhalation every 4 hours  baclofen 20 milliGRAM(s) Oral every 12 hours  cefiderocol IVPB 2000 milliGRAM(s) IV Intermittent every 8 hours  chlorhexidine 0.12% Liquid 15 milliLiter(s) Oral Mucosa every 12 hours  chlorhexidine 2% Cloths 1 Application(s) Topical daily  donepezil 10 milliGRAM(s) Oral at bedtime  doxazosin 2 milliGRAM(s) Oral at bedtime  enoxaparin Injectable 40 milliGRAM(s) SubCutaneous every 24 hours  fentaNYL   Infusion. 0.5 MICROgram(s)/kG/Hr IV Continuous <Continuous>  lactulose Syrup 20 Gram(s) Oral every 12 hours  magnesium sulfate  IVPB 2 Gram(s) IV Intermittent once  magnesium sulfate  IVPB 2 Gram(s) IV Intermittent every 2 hours  memantine 10 milliGRAM(s) Oral two times a day  midodrine 10 milliGRAM(s) Oral every 8 hours  norepinephrine Infusion 0.05 MICROgram(s)/kG/Min IV Continuous <Continuous>  pantoprazole   Suspension 40 milliGRAM(s) Oral daily  polyethylene glycol 3350 17 Gram(s) Oral daily  polymyxin B IVPB [id number] Unit(s) IV Intermittent every 12 hours  propofol Infusion 10 MICROgram(s)/kG/Min IV Continuous <Continuous>  senna 2 Tablet(s) Oral at bedtime  zinc oxide 20% Ointment 1 Application(s) Topical three times a day    PRN MEDICATIONS  acetaminophen     Tablet .. 650 milliGRAM(s) Oral every 6 hours PRN  ibuprofen  Suspension. 600 milliGRAM(s) Oral every 8 hours PRN  melatonin 3 milliGRAM(s) Oral at bedtime PRN      VITAL SIGNS: Last 24 Hours  T(C): 37.7 (18 Dec 2022 08:00), Max: 37.7 (18 Dec 2022 04:00)  T(F): 99.9 (18 Dec 2022 08:00), Max: 99.9 (18 Dec 2022 04:00)  HR: 88 (18 Dec 2022 10:00) (66 - 102)  BP: 100/57 (18 Dec 2022 10:00) (87/51 - 130/80)  BP(mean): 70 (18 Dec 2022 10:00) (61 - 97)  RR: 22 (18 Dec 2022 10:00) (19 - 26)  SpO2: 100% (18 Dec 2022 10:00) (99% - 100%)    LABS:                        8.5    7.90  )-----------( 567      ( 18 Dec 2022 04:57 )             26.3     12-18    138  |  102  |  5<L>  ----------------------------<  99  4.1   |  29  |  <0.5<L>    Ca    8.5      18 Dec 2022 04:57  Phos  4.6     12-17  Mg     1.4     12-18    TPro  5.8<L>  /  Alb  2.4<L>  /  TBili  <0.2  /  DBili  x   /  AST  12  /  ALT  8   /  AlkPhos  84  12-18        ABG - ( 18 Dec 2022 03:18 )  pH, Arterial: 7.46  pH, Blood: x     /  pCO2: 41    /  pO2: 173   / HCO3: 29    / Base Excess: 4.9   /  SaO2: 99.0                          PHYSICAL EXAM:  GENERAL: NAD, contratced  HEAD:  Atraumatic, Normocephalic  ENMT: intubated  NERVOUS SYSTEM: sedated  CHEST/LUNG: intubated, vented. bilateral breath sounds decreased on the left, crackles, no wheezing  HEART: Regular rate and rhythm. Normal S1/S1. No murmurs  ABDOMEN: Soft, Nontender, Nondistended; Bowel sounds present  EXTREMITIES:no edema, contracted

## 2022-12-18 NOTE — PROGRESS NOTE ADULT - SUBJECTIVE AND OBJECTIVE BOX
Over Night Events: events noted, remain critically ill, still intubated, ventilated, on propofol, fentanyl, levophed 0.02    PHYSICAL EXAM    ICU Vital Signs Last 24 Hrs  T(C): 37.7 (18 Dec 2022 04:00), Max: 37.7 (18 Dec 2022 04:00)  T(F): 99.9 (18 Dec 2022 04:00), Max: 99.9 (18 Dec 2022 04:00)  HR: 94 (18 Dec 2022 07:42) (66 - 102)  BP: 109/64 (18 Dec 2022 07:00) (87/51 - 130/80)  BP(mean): 77 (18 Dec 2022 07:00) (61 - 97)  RR: 22 (18 Dec 2022 07:00) (19 - 26)  SpO2: 100% (18 Dec 2022 07:42) (93% - 100%)    O2 Parameters below as of 18 Dec 2022 06:00  Patient On (Oxygen Delivery Method): ventilator    O2 Concentration (%): 40        General: ill looking  HEENT: ETT             Lungs: DEC BS L side  Cardiovascular: Regular   Abdomen: Soft, Positive BS  Extremities: No clubbing   Sedated    12-17-22 @ 07:01  -  12-18-22 @ 07:00  --------------------------------------------------------  IN:    Enteral Tube Flush: 900 mL    FentaNYL: 425.2 mL    IV PiggyBack: 1200 mL    Norepinephrine: 112.2 mL    Osmolite: 1080 mL    Propofol: 237.9 mL  Total IN: 3955.3 mL    OUT:    Indwelling Catheter - Urethral (mL): 2500 mL  Total OUT: 2500 mL    Total NET: 1455.3 mL          LABS:                          8.5    7.90  )-----------( 567      ( 18 Dec 2022 04:57 )             26.3                                               12-18    138  |  102  |  5<L>  ----------------------------<  99  4.1   |  29  |  <0.5<L>    Ca    8.5      18 Dec 2022 04:57  Phos  4.6     12-17  Mg     1.4     12-18    TPro  5.8<L>  /  Alb  2.4<L>  /  TBili  <0.2  /  DBili  x   /  AST  12  /  ALT  8   /  AlkPhos  84  12-18                                                                                           LIVER FUNCTIONS - ( 18 Dec 2022 04:57 )  Alb: 2.4 g/dL / Pro: 5.8 g/dL / ALK PHOS: 84 U/L / ALT: 8 U/L / AST: 12 U/L / GGT: x                                                                                               Mode: AC/ CMV (Assist Control/ Continuous Mandatory Ventilation)  RR (machine): 22  TV (machine): 400  FiO2: 40  PEEP: 8  ITime: 0.8  MAP: 11  PIP: 20                                      ABG - ( 18 Dec 2022 03:18 )  pH, Arterial: 7.46  pH, Blood: x     /  pCO2: 41    /  pO2: 173   / HCO3: 29    / Base Excess: 4.9   /  SaO2: 99.0                MEDICATIONS  (STANDING):  acetylcysteine 20% for bronchoscopy 4 milliLiter(s) Nebulizer once  albuterol    90 MICROgram(s) HFA Inhaler 1 Puff(s) Inhalation every 4 hours  baclofen 20 milliGRAM(s) Oral every 12 hours  cefiderocol IVPB 2000 milliGRAM(s) IV Intermittent every 8 hours  chlorhexidine 0.12% Liquid 15 milliLiter(s) Oral Mucosa every 12 hours  chlorhexidine 2% Cloths 1 Application(s) Topical daily  donepezil 10 milliGRAM(s) Oral at bedtime  doxazosin 2 milliGRAM(s) Oral at bedtime  enoxaparin Injectable 40 milliGRAM(s) SubCutaneous every 24 hours  fentaNYL   Infusion. 0.5 MICROgram(s)/kG/Hr (3.4 mL/Hr) IV Continuous <Continuous>  lactulose Syrup 20 Gram(s) Oral every 12 hours  magnesium sulfate  IVPB 2 Gram(s) IV Intermittent once  memantine 10 milliGRAM(s) Oral two times a day  midodrine 10 milliGRAM(s) Oral every 8 hours  norepinephrine Infusion 0.05 MICROgram(s)/kG/Min (6.38 mL/Hr) IV Continuous <Continuous>  pantoprazole   Suspension 40 milliGRAM(s) Oral daily  polyethylene glycol 3350 17 Gram(s) Oral daily  polymyxin B IVPB [id number] Unit(s) IV Intermittent every 12 hours  propofol Infusion 10 MICROgram(s)/kG/Min (4.08 mL/Hr) IV Continuous <Continuous>  senna 2 Tablet(s) Oral at bedtime  zinc oxide 20% Ointment 1 Application(s) Topical three times a day    MEDICATIONS  (PRN):  acetaminophen     Tablet .. 650 milliGRAM(s) Oral every 6 hours PRN Mild Pain (1 - 3), Moderate Pain (4 - 6)  ibuprofen  Suspension. 600 milliGRAM(s) Oral every 8 hours PRN Temp greater or equal to 38.5C (101.3F)  melatonin 3 milliGRAM(s) Oral at bedtime PRN Insomnia      CXR reviewed

## 2022-12-18 NOTE — PROGRESS NOTE ADULT - ASSESSMENT
IMPRESSION:    Sepsis POA  Septic shock still on pressors  Acute hypoxemic respiratory failure/ Left mucus plug / lung atelectasis  Pseudomonas/ acineto in BAL / wash   Urinary tract infection/ proteus  Bacteremia - MRSE treated   Non verbal at baseline  NSTEMI likely Type 2  History of MS/neurogenic bladder      PLAN:      CNS: SAT    HEENT: Oral care.  ET Care     PULMONARY:  Recommend nebs every 4 hours.  Vent changes:   FIO2 to 92- 96%, chest PT ( spoke wit mother declined bronc want palliative care    CARDIOVASCULAR: avoid volume overload.   Wean Levophed ,  midodrine    GI: GI prophylaxis. PEG feeds.  Bowel regimen     RENAL:  Follow up lytes.  Correct as needed. IR following    INFECTIOUS DISEASE: ABX per ID.      HEMATOLOGICAL: DVT prophylaxis. On Lovenox.     ENDOCRINE:  Follow up FS.  Insulin protocol if needed.    MICU    GOC    Poor prognosis   palliative care fup  R IJ 12/14

## 2022-12-18 NOTE — PROGRESS NOTE ADULT - ASSESSMENT
53 y/o man with PMH of MS, nonverbal at baseline, chronic low back pain, neurogenic bladder s/p SPC placement and s/p dislodgement was at IR for replacement of SPC when he was found to be in severe sepsis.    #Severe sepsis present on admission  acute pyelonephritis  - L lung atelectasis/Mucus plug   - Bcx : GPC in clusters x3, 1 culture with staph hominis  - blood culture neg since   - bronch sputum culture on >> pseudomonas, and acinetobacterPT  - previously intubated, bronch  removed secretions/mucus plug/atelectasis - bronchial culture: pseudomonas  - MRSA negative  - dc avycaz per ID on 12/15 and start cefidericol, added polymyxin on  as per ID  - repeat bronchoscopy , culture: mod pseudomonas  - repeat bronchoscopy , gram+ cocci in clusters  - fever on  and , bcx ngtd and repeat  - levo 0.03, propofol, precedex, fentanyl wean levo  - repeat bronch on 12/15  -  left lung collapsed again, no bronch as per mother    #Anemia  - decrease in hgb 9.4->8.6  - monitor for now    #Neurogenic bladder  - had SPC that was dislodged - now with [facility], SPC to be replaced but became septic  - on flomax  - recall IR when improved for possible SPC placement    #Elevated dimer   - 734 -> 1149 -> 713  - vascular can’t do duplex bc pt is contracted  - CTA  no PE, complete left main stem occlusion, near complete atelectasis/consolidation  - switch lovenox to ppx only for now, spoke with pt mother who said pt on eliquis at facility for dvt ppx. no need for full AC    #Dysphagia   - PEG feeding  - monitor FS    #MS, nonverbal, bedbound, contracted/functional quadriplegia   - repositioning per protocol, continue baclofen    #SUKHDEV   -likely prerenal, resolved   -continue hydration and monitor     #NSTEMI type 2 due to sepsis   -TTE: EF 58%, G1DD, mild MVR    Diet: PEG feeds  DVT prophylaxis: Lovenox  GI prophylaxis: Protonix  Code status: Full code    Handoff pendin) left lung collapsed again, asked mother today if she want us to do another bronchoscopy. She declined and moving towards CMO. Mother will speak with palliative care team on monday about making pt CMO

## 2022-12-19 NOTE — PROGRESS NOTE ADULT - PROBLEM SELECTOR PLAN 5
- DNR  - will follow  - case d/w ICU team  ______________  Kevan Patel MD  Palliative Medicine  [facility]  [phone]

## 2022-12-19 NOTE — PROGRESS NOTE ADULT - ASSESSMENT
53 y/o man with PMH of MS, nonverbal at baseline, chronic low back pain, neurogenic bladder s/p SPC placement and s/p dislodgement was at IR for replacement of SPC when he was found to be in severe sepsis.    #Severe sepsis present on admission  acute pyelonephritis  - L lung atelectasis/Mucus plug   - Bcx : GPC in clusters x3, 1 culture with staph hominis  - blood culture neg since   - bronch sputum culture on >> pseudomonas, and acinetobacter  - previously intubated, bronch  removed secretions/mucus plug/atelectasis - bronchial culture: pseudomonas  - MRSA negative  - dc avycaz per ID on 12/15 and start cefidericol, added polymyxin on  as per ID  - repeat bronchoscopy , culture: mod pseudomonas  - repeat bronchoscopy , gram+ cocci in clusters  - fever on  and , bcx ngtd and repeat  - levo 0.03, propofol, precedex, fentanyl wean levo  - repeat bronch on 12/15  -  left lung collapsed again, no bronch as per mother    #diarrhea on   - NPO for 24 hours  - if diarrhea doesnt improve can send c diff and GI PCR    #Anemia  - decrease in hgb 9.4->8.6  - monitor for now    #Neurogenic bladder  - had SPC that was dislodged - now with [facility], SPC to be replaced but became septic  - on flomax  - recall IR when improved for possible SPC placement    #Elevated dimer   - 734 -> 1149 -> 713  - vascular can’t do duplex bc pt is contracted  - CTA  no PE, complete left main stem occlusion, near complete atelectasis/consolidation  - switch lovenox to ppx only for now, spoke with pt mother who said pt on eliquis at facility for dvt ppx. no need for full AC    #Dysphagia   - PEG feeding  - monitor FS    #MS, nonverbal, bedbound, contracted/functional quadriplegia   - repositioning per protocol, continue baclofen    #SUKHDEV   -likely prerenal, resolved   -continue hydration and monitor     #NSTEMI type 2 due to sepsis   -TTE: EF 58%, G1DD, mild MVR    Diet: Hold PEG feeds for 24 hours due to diarrhea  DVT prophylaxis: Lovenox  GI prophylaxis: Protonix  Code status: DNR made on     Handoff pendin) Mother considering CMO strongly. Pt was made DNR today, DC all labs  2) wean sedative, SAT today

## 2022-12-19 NOTE — PROGRESS NOTE ADULT - SUBJECTIVE AND OBJECTIVE BOX
KEVIN MEDRANO  52y, Male  Allergy: penicillin (Unknown)  vancomycin (Unknown)      LOS  17d    CHIEF COMPLAINT: sepsis (18 Dec 2022 11:30)      INTERVAL EVENTS/HPI  - T(F): , Max: 100.4 (12-18-22 @ 13:00)  - WBC Count: 8.54 (12-19-22 @ 05:20)  WBC Count: 6.11 (12-18-22 @ 23:40)     - Creatinine, Serum: <0.5 (12-19-22 @ 05:20)  Creatinine, Serum: <0.5 (12-18-22 @ 23:40)     -   -   -     ROS  cannot obtain secondary to patient's sedation and/or mental status    VITALS:  T(F): 99.6, Max: 100.4 (12-18-22 @ 13:00)  HR: 88  BP: 103/63  RR: 22Vital Signs Last 24 Hrs  T(C): 37.6 (19 Dec 2022 08:00), Max: 38 (18 Dec 2022 13:00)  T(F): 99.6 (19 Dec 2022 08:00), Max: 100.4 (18 Dec 2022 13:00)  HR: 88 (19 Dec 2022 08:45) (72 - 120)  BP: 103/63 (19 Dec 2022 08:45) (77/47 - 133/74)  BP(mean): 75 (19 Dec 2022 08:45) (54 - 95)  RR: 22 (19 Dec 2022 08:45) (15 - 25)  SpO2: 100% (19 Dec 2022 08:45) (99% - 100%)    Parameters below as of 19 Dec 2022 07:00  Patient On (Oxygen Delivery Method): ventilator    O2 Concentration (%): 40    PHYSICAL EXAM:  Gen: Intubated  CV: RRR  Lungs: Decreased BS at bases  Abd: Soft  Neuro: Sedated  Lines clean, no phlebitis    FH: Non-contributory  Social Hx: Non-contributory    TESTS & MEASUREMENTS:                        8.6    8.54  )-----------( 560      ( 19 Dec 2022 05:20 )             26.8     12-19    141  |  105  |  4<L>  ----------------------------<  113<H>  3.9   |  28  |  <0.5<L>    Ca    8.6      19 Dec 2022 05:20  Phos  4.2     12-18  Mg     1.6     12-19    TPro  5.6<L>  /  Alb  2.4<L>  /  TBili  <0.2  /  DBili  x   /  AST  13  /  ALT  8   /  AlkPhos  93  12-19      LIVER FUNCTIONS - ( 19 Dec 2022 05:20 )  Alb: 2.4 g/dL / Pro: 5.6 g/dL / ALK PHOS: 93 U/L / ALT: 8 U/L / AST: 13 U/L / GGT: x               Culture - Blood (collected 12-14-22 @ 18:21)  Source: .Blood Blood  Preliminary Report (12-16-22 @ 03:02):    No growth to date.    Culture - Urine (collected 12-14-22 @ 18:20)  Source: Clean Catch Clean Catch (Midstream)  Preliminary Report (12-18-22 @ 11:35):    >100,000 CFU/ml Candida auris Susceptibility to follow.    Culture - Blood (collected 12-14-22 @ 11:24)  Source: .Blood None  Preliminary Report (12-15-22 @ 23:02):    No growth to date.    Culture - Fungal, Bronchial (collected 12-13-22 @ 09:40)  Source: .Bronchial None  Preliminary Report (12-14-22 @ 06:59):    Testing in progress    Culture - Acid Fast - Bronchial w/Smear (collected 12-13-22 @ 09:40)  Source: .Bronchial None  Preliminary Report (12-17-22 @ 15:06):    Culture is being performed.    Culture - Bronchial (collected 12-13-22 @ 09:40)  Source: .Bronchial None  Gram Stain (12-13-22 @ 20:33):    Moderate polymorphonuclear leukocytes per low power field    Rare Squamous epithelial cells per low power field    Few Gram Positive Cocci in Clusters per oil power field  Final Report (12-18-22 @ 22:11):    Numerous Mixed gram negative rods including    Moderate Pseudomonas aeruginosa (Carbapenem Resistant)    Numerous Acinetobacter baumannii/nosocom group (Carbapenem Resistant)    No routine respiratory nadya Isolated  Organism: Pseudomonas aeruginosa (Carbapenem Resistant)  Pseudomonas aeruginosa (Carbapenem Resistant)  Acinetobacter baumannii/nosocom group (Carbapenem Resistant)  Acinetobacter baumannii/nosocom group (Carbapenem Resistant)  Acinetobacter baumannii/nosocom group (Carbapenem Resistant) (12-18-22 @ 20:51)  Organism: Acinetobacter baumannii/nosocom group (Carbapenem Resistant) (12-18-22 @ 20:51)      -  Polymyxin B: I 2      Method Type: ETEST  Organism: Pseudomonas aeruginosa (Carbapenem Resistant) (12-17-22 @ 16:38)      -  Cefiderocol: S      Method Type: KB  Organism: Acinetobacter baumannii/nosocom group (Carbapenem Resistant) (12-16-22 @ 20:19)      -  Cefiderocol: S      -  Minocycline: R      -  Piperacillin/Tazobactam: R      Method Type: KB  Organism: Acinetobacter baumannii/nosocom group (Carbapenem Resistant) (12-16-22 @ 20:19)      -  Amikacin: R >32      -  Ampicillin/Sulbactam: R >16/8      -  Cefepime: R >16      -  Ceftazidime: R >16      -  Ciprofloxacin: R >2      -  Gentamicin: R >8      -  Imipenem: R >8      -  Levofloxacin: R >4      -  Meropenem: R >8      -  Tobramycin: R >8      -  Trimethoprim/Sulfamethoxazole: R >2/38      Method Type: GAURAV  Organism: Pseudomonas aeruginosa (Carbapenem Resistant) (12-16-22 @ 20:19)      -  Amikacin: S <=16      -  Aztreonam: R >16      -  Cefepime: I 16      -  Ceftazidime: S 4      -  Ceftazidime/Avibactam: S <=4      -  Ceftolozane/tazobactam: S <=2      -  Ciprofloxacin: R >2      -  Gentamicin: R >8      -  Imipenem: R >8      -  Levofloxacin: R >4      -  Meropenem: R >8      -  Piperacillin/Tazobactam: S 16      -  Tobramycin: R >8      Method Type: GAURAV    Culture - Blood (collected 12-13-22 @ 01:10)  Source: .Blood None  Final Report (12-18-22 @ 14:00):    No Growth Final    Culture - Blood (collected 12-12-22 @ 20:08)  Source: .Blood Blood  Final Report (12-18-22 @ 02:00):    No Growth Final    Culture - Sputum (collected 12-12-22 @ 11:55)  Source: Trach Asp Tracheal Aspirate  Gram Stain (12-13-22 @ 02:13):    Rare polymorphonuclear leukocytes per low power field    No Squamous epithelial cells per low power field    No organisms seen per oil power field  Final Report (12-15-22 @ 15:53):    Moderate Pseudomonas aeruginosa (Carbapenem Resistant)    Moderate Acinetobacter baumannii/nosocom group (Carbapenem Resistant)  Organism: Acinetobacter baumannii/nosocom group (Carbapenem Resistant)  Acinetobacter baumannii/nosocom group (Carbapenem Resistant)  Pseudomonas aeruginosa (Carbapenem Resistant) (12-15-22 @ 15:53)  Organism: Pseudomonas aeruginosa (Carbapenem Resistant) (12-15-22 @ 15:53)      -  Amikacin: S <=16      -  Aztreonam: R >16      -  Cefepime: I 16      -  Ceftazidime: S 4      -  Ceftazidime/Avibactam: S <=4      -  Ceftolozane/tazobactam: S <=2      -  Ciprofloxacin: R >2      -  Gentamicin: R >8      -  Imipenem: R >8      -  Levofloxacin: R >4      -  Meropenem: R >8      -  Piperacillin/Tazobactam: S 16      -  Tobramycin: R >8      Method Type: GAURAV  Organism: Acinetobacter baumannii/nosocom group (Carbapenem Resistant) (12-15-22 @ 15:53)      -  Piperacillin/Tazobactam: R      Method Type: KB  Organism: Acinetobacter baumannii/nosocom group (Carbapenem Resistant) (12-15-22 @ 15:53)      -  Amikacin: R >32      -  Ampicillin/Sulbactam: I 16/8      -  Cefepime: R >16      -  Ceftazidime: R >16      -  Ciprofloxacin: R >2      -  Gentamicin: R >8      -  Imipenem: R >8      -  Levofloxacin: R >4      -  Meropenem: R >8      -  Polymyxin B: I 2      -  Tobramycin: R >8      -  Trimethoprim/Sulfamethoxazole: R >2/38      Method Type: GAURAV    Culture - Bronchial (collected 12-08-22 @ 15:00)  Source: .Bronchial None  Gram Stain (12-09-22 @ 07:14):    Moderate polymorphonuclear leukocytes seen per oil power field    No squamous epithelial cells seen per oil power field    No organisms seen per oil power field  Final Report (12-11-22 @ 11:48):    Few Pseudomonas aeruginosa (Carbapenem Resistant)    Normal Respiratory Nadya present  Organism: Pseudomonas aeruginosa (Carbapenem Resistant) (12-11-22 @ 11:48)  Organism: Pseudomonas aeruginosa (Carbapenem Resistant) (12-11-22 @ 11:48)      -  Amikacin: S <=16      -  Aztreonam: R >16      -  Cefepime: S 8      -  Ceftazidime: S 4      -  Ceftazidime/Avibactam: S <=4      -  Ceftolozane/tazobactam: S <=2      -  Ciprofloxacin: R >2      -  Gentamicin: R >8      -  Imipenem: R >8      -  Levofloxacin: R >4      -  Meropenem: R >8      -  Piperacillin/Tazobactam: S 16      -  Tobramycin: R >8      Method Type: GAURAV    Culture - Blood (collected 12-07-22 @ 13:11)  Source: .Blood None  Final Report (12-12-22 @ 23:00):    No Growth Final    Culture - Blood (collected 12-04-22 @ 08:30)  Source: .Blood None  Final Report (12-09-22 @ 22:00):    No Growth Final    Culture - Blood (collected 12-03-22 @ 21:51)  Source: .Blood None  Final Report (12-09-22 @ 09:00):    No Growth Final    Culture - Urine (collected 12-02-22 @ 10:34)  Source: Clean Catch Clean Catch (Midstream)  Final Report (12-04-22 @ 09:29):    >=3 organisms. Probable collection contamination.    Culture - Blood (collected 12-02-22 @ 08:21)  Source: .Blood Blood-Peripheral  Gram Stain (12-04-22 @ 01:05):    Growth in aerobic bottle: Gram Positive Cocci in Clusters    Growth in anaerobic bottle: Gram Positive Cocci in Clusters  Final Report (12-04-22 @ 21:59):    Growth in aerobic and anaerobic bottles: Staphylococcus hominis Coag    Negative Staphylococcus    Single set isolate, possible contaminant. Contact    Microbiology if susceptibility testing clinically    indicated.    Growth in aerobic bottle: Staphylococcus epidermidis Coag Negative    Staphylococcus    Single set isolate, possible contaminant. Contact    Microbiology if susceptibility testing clinically    indicated.    ***Blood Panel PCR results on this specimen are available    approximately 3 hours after the Gram stain result.***    Gram stain, PCR, and/or culture results may not always    correspond due to difference in methodologies.    ************************************************************    This PCR assay was performed by multiplex PCR. This    Assay tests for 66 bacterial and resistance gene targets.    Please refer to the [facility] test directory    at [email or web address] for details.  Organism: Blood Culture PCR (12-04-22 @ 21:59)  Organism: Blood Culture PCR (12-04-22 @ 21:59)      -  Staphylococcus epidermidis, Methicillin resistant: Detec      Method Type: PCR    Culture - Blood (collected 12-02-22 @ 08:21)  Source: .Blood Blood-Peripheral  Gram Stain (12-03-22 @ 16:14):    Growth in aerobic bottle: Gram Positive Cocci in Clusters  Final Report (12-05-22 @ 10:49):    Growth in aerobic bottle: Staphylococcus epidermidis  Organism: Staphylococcus epidermidis (12-05-22 @ 10:49)  Organism: Staphylococcus epidermidis (12-05-22 @ 10:49)      -  Ampicillin/Sulbactam: S <=8/4      -  Cefazolin: S <=4      -  Clindamycin: S <=0.25      -  Erythromycin: R >4      -  Gentamicin: S <=1 Should not be used as monotherapy      -  Oxacillin: S <=0.25      -  Penicillin: R 2      -  Rifampin: S <=1 Should not be used as monotherapy      -  Tetracycline: R >8      -  Trimethoprim/Sulfamethoxazole: R >2/38      -  Vancomycin: S 2      Method Type: GAURAV    Culture - Urine (collected 12-02-22 @ 06:59)  Source: Suprapubic Suprapubic  Final Report (12-04-22 @ 20:09):    Few Proteus mirabilis ESBL    Moderate Enterococcus faecalis  Organism: Proteus mirabilis ESBL  Enterococcus faecalis (12-04-22 @ 20:09)  Organism: Enterococcus faecalis (12-04-22 @ 20:09)      -  Ampicillin: S <=2 Predicts results to ampicillin/sulbactam, amoxacillin-clavulanate and  piperacillin-tazobactam.      -  Ciprofloxacin: R >2      -  Levofloxacin: R >4      -  Nitrofurantoin: S <=32 Should not be used to treat pyelonephritis.      -  Tetracycline: R >8      -  Vancomycin: S 1      Method Type: GAURAV  Organism: Proteus mirabilis ESBL (12-04-22 @ 20:09)      -  Amikacin: S <=16      -  Amoxicillin/Clavulanic Acid: S <=8/4      -  Ampicillin: R >16 These ampicillin results predict results for amoxicillin      -  Ampicillin/Sulbactam: I 16/8 Enterobacter, Klebsiella aerogenes, Citrobacter, and Serratia may develop resistance during prolonged therapy (3-4 days)      -  Aztreonam: R <=4      -  Cefazolin: R >16 For uncomplicated UTI with K. pneumoniae, E. coli, or P. mirablis: GAURAV <=16 is sensitive and GAURAV >=32 is resistant. This also predicts results for oral agents cefaclor, cefdinir, cefpodoxime, cefprozil, cefuroxime axetil, cephalexin and locarbef for uncomplicated UTI. Note that some isolates may be susceptible to these agents while testing resistant to cefazolin.      -  Cefepime: R >16      -  Ceftriaxone: R >32 Enterobacter, Klebsiella aerogenes, Citrobacter, and Serratia may develop resistance during prolonged therapy      -  Ciprofloxacin: R >2      -  Ertapenem: S <=0.5      -  Gentamicin: I 8      -  Levofloxacin: R >4      -  Meropenem: S <=1      -  Nitrofurantoin: R >64 Should not be used to treat pyelonephritis      -  Piperacillin/Tazobactam: S <=8      -  Tobramycin: I 8      -  Trimethoprim/Sulfamethoxazole: R >2/38      Method Type: GAURAV    Culture - Blood (collected 12-02-22 @ 06:59)  Source: .Blood Blood  Gram Stain (12-03-22 @ 19:58):    Growth in aerobic bottle: Gram Positive Cocci in Clusters  Final Report (12-05-22 @ 08:59):    Growth in aerobic bottle: Staphylococcus hominis  Organism: Staphylococcus hominis (12-05-22 @ 08:59)  Organism: Staphylococcus hominis (12-05-22 @ 08:59)      -  Ampicillin/Sulbactam: R <=8/4      -  Cefazolin: R <=4      -  Clindamycin: S 0.5      -  Erythromycin: R >4      -  Gentamicin: S <=1 Should not be used as monotherapy      -  Oxacillin: R >2      -  Penicillin: R 8      -  Rifampin: S <=1 Should not be used as monotherapy      -  Tetracycline: S <=1      -  Trimethoprim/Sulfamethoxazole: R >2/38      -  Vancomycin: S 2      Method Type: GAURAV            INFECTIOUS DISEASES TESTING  MRSA PCR Result.: Negative (12-09-22 @ 08:40)  Rapid RVP Result: NotDetec (12-07-22 @ 15:28)  Procalcitonin, Serum: 0.53 (12-03-22 @ 10:53)  Legionella Antigen, Urine: Negative (12-03-22 @ 08:10)  COVID-19 PCR: NotDetec (11-28-22 @ 18:30)      INFLAMMATORY MARKERS      RADIOLOGY & ADDITIONAL TESTS:  I have personally reviewed the last available Chest xray  CXR      CT      CARDIOLOGY TESTING  12 Lead ECG:   Ventricular Rate 93 BPM    Atrial Rate 93 BPM    P-R Interval 117 ms    QRS Duration 79 ms    Q-T Interval 325 ms    QTC Calculation(Bazett) 405 ms    P Axis 57 degrees    R Axis 79 degrees    T Axis 61 degrees    Diagnosis Line Normal sinus rhythm  Septal infarct , age undetermined  Abnormal ECG    Confirmed by Abe Alves ([id number]) on 12/19/2022 12:20:00 AM (12-18-22 @ 05:10)  12 Lead ECG:   Ventricular Rate 107 BPM    Atrial Rate 107 BPM    P-R Interval 108 ms    QRS Duration 79 ms    Q-T Interval 317 ms    QTC Calculation(Bazett) 423 ms    P Axis 79 degrees    R Axis 90 degrees    T Axis 73 degrees    Diagnosis Line Sinus tachycardiawith short TX  Rightward axis  Septal infarct , age undetermined  Abnormal ECG    Confirmed by ROWDY CHAO MD ([id number]) on 12/16/2022 6:58:08 AM (12-16-22 @ 06:04)      MEDICATIONS  acetylcysteine 20% for bronchoscopy 4  albuterol    90 MICROgram(s) HFA Inhaler 1  baclofen 20  cefiderocol IVPB 2000  chlorhexidine 0.12% Liquid 15  chlorhexidine 2% Cloths 1  donepezil 10  doxazosin 2  enoxaparin Injectable 40  fentaNYL   Infusion. 0.5  lactulose Syrup 20  magnesium sulfate  IVPB 2  memantine 10  midodrine 10  norepinephrine Infusion 0.05  pantoprazole   Suspension 40  polyethylene glycol 3350 17  polymyxin B IVPB 029269  propofol Infusion 10  senna 2  zinc oxide 20% Ointment 1      WEIGHT  Weight (kg): 68 (12-02-22 @ 08:20)  Creatinine, Serum: <0.5 mg/dL (12-19-22 @ 05:20)  Creatinine, Serum: <0.5 mg/dL (12-18-22 @ 23:40)      ANTIBIOTICS:  cefiderocol IVPB 2000 milliGRAM(s) IV Intermittent every 8 hours  polymyxin B IVPB 998635 Unit(s) IV Intermittent every 12 hours      All available historical records have been reviewed   KEVIN MEDRANO  52y, Male  Allergy: penicillin (Unknown)  vancomycin (Unknown)      LOS  17d    CHIEF COMPLAINT: sepsis (18 Dec 2022 11:30)      INTERVAL EVENTS/HPI  - T(F): , Max: 100.4 (12-18-22 @ 13:00)  - WBC Count: 8.54 (12-19-22 @ 05:20)  WBC Count: 6.11 (12-18-22 @ 23:40)     - Creatinine, Serum: <0.5 (12-19-22 @ 05:20)  Creatinine, Serum: <0.5 (12-18-22 @ 23:40)     -   -   -     ROS  cannot obtain secondary to patient's sedation and/or mental status    VITALS:  T(F): 99.6, Max: 100.4 (12-18-22 @ 13:00)  HR: 88  BP: 103/63  RR: 22Vital Signs Last 24 Hrs  T(C): 37.6 (19 Dec 2022 08:00), Max: 38 (18 Dec 2022 13:00)  T(F): 99.6 (19 Dec 2022 08:00), Max: 100.4 (18 Dec 2022 13:00)  HR: 88 (19 Dec 2022 08:45) (72 - 120)  BP: 103/63 (19 Dec 2022 08:45) (77/47 - 133/74)  BP(mean): 75 (19 Dec 2022 08:45) (54 - 95)  RR: 22 (19 Dec 2022 08:45) (15 - 25)  SpO2: 100% (19 Dec 2022 08:45) (99% - 100%)    Parameters below as of 19 Dec 2022 07:00  Patient On (Oxygen Delivery Method): ventilator    O2 Concentration (%): 40    PHYSICAL EXAM:  Gen: Intubated, chronically ill appearing contracted  CV: RRR  Lungs: Decreased BS at bases  Abd: Soft  Neuro: Sedated  Lines clean, no phlebitis    FH: Non-contributory  Social Hx: Non-contributory    TESTS & MEASUREMENTS:                        8.6    8.54  )-----------( 560      ( 19 Dec 2022 05:20 )             26.8     12-19    141  |  105  |  4<L>  ----------------------------<  113<H>  3.9   |  28  |  <0.5<L>    Ca    8.6      19 Dec 2022 05:20  Phos  4.2     12-18  Mg     1.6     12-19    TPro  5.6<L>  /  Alb  2.4<L>  /  TBili  <0.2  /  DBili  x   /  AST  13  /  ALT  8   /  AlkPhos  93  12-19      LIVER FUNCTIONS - ( 19 Dec 2022 05:20 )  Alb: 2.4 g/dL / Pro: 5.6 g/dL / ALK PHOS: 93 U/L / ALT: 8 U/L / AST: 13 U/L / GGT: x               Culture - Blood (collected 12-14-22 @ 18:21)  Source: .Blood Blood  Preliminary Report (12-16-22 @ 03:02):    No growth to date.    Culture - Urine (collected 12-14-22 @ 18:20)  Source: Clean Catch Clean Catch (Midstream)  Preliminary Report (12-18-22 @ 11:35):    >100,000 CFU/ml Candida auris Susceptibility to follow.    Culture - Blood (collected 12-14-22 @ 11:24)  Source: .Blood None  Preliminary Report (12-15-22 @ 23:02):    No growth to date.    Culture - Fungal, Bronchial (collected 12-13-22 @ 09:40)  Source: .Bronchial None  Preliminary Report (12-14-22 @ 06:59):    Testing in progress    Culture - Acid Fast - Bronchial w/Smear (collected 12-13-22 @ 09:40)  Source: .Bronchial None  Preliminary Report (12-17-22 @ 15:06):    Culture is being performed.    Culture - Bronchial (collected 12-13-22 @ 09:40)  Source: .Bronchial None  Gram Stain (12-13-22 @ 20:33):    Moderate polymorphonuclear leukocytes per low power field    Rare Squamous epithelial cells per low power field    Few Gram Positive Cocci in Clusters per oil power field  Final Report (12-18-22 @ 22:11):    Numerous Mixed gram negative rods including    Moderate Pseudomonas aeruginosa (Carbapenem Resistant)    Numerous Acinetobacter baumannii/nosocom group (Carbapenem Resistant)    No routine respiratory nadya Isolated  Organism: Pseudomonas aeruginosa (Carbapenem Resistant)  Pseudomonas aeruginosa (Carbapenem Resistant)  Acinetobacter baumannii/nosocom group (Carbapenem Resistant)  Acinetobacter baumannii/nosocom group (Carbapenem Resistant)  Acinetobacter baumannii/nosocom group (Carbapenem Resistant) (12-18-22 @ 20:51)  Organism: Acinetobacter baumannii/nosocom group (Carbapenem Resistant) (12-18-22 @ 20:51)      -  Polymyxin B: I 2      Method Type: ETEST  Organism: Pseudomonas aeruginosa (Carbapenem Resistant) (12-17-22 @ 16:38)      -  Cefiderocol: S      Method Type: KB  Organism: Acinetobacter baumannii/nosocom group (Carbapenem Resistant) (12-16-22 @ 20:19)      -  Cefiderocol: S      -  Minocycline: R      -  Piperacillin/Tazobactam: R      Method Type: KB  Organism: Acinetobacter baumannii/nosocom group (Carbapenem Resistant) (12-16-22 @ 20:19)      -  Amikacin: R >32      -  Ampicillin/Sulbactam: R >16/8      -  Cefepime: R >16      -  Ceftazidime: R >16      -  Ciprofloxacin: R >2      -  Gentamicin: R >8      -  Imipenem: R >8      -  Levofloxacin: R >4      -  Meropenem: R >8      -  Tobramycin: R >8      -  Trimethoprim/Sulfamethoxazole: R >2/38      Method Type: GAURAV  Organism: Pseudomonas aeruginosa (Carbapenem Resistant) (12-16-22 @ 20:19)      -  Amikacin: S <=16      -  Aztreonam: R >16      -  Cefepime: I 16      -  Ceftazidime: S 4      -  Ceftazidime/Avibactam: S <=4      -  Ceftolozane/tazobactam: S <=2      -  Ciprofloxacin: R >2      -  Gentamicin: R >8      -  Imipenem: R >8      -  Levofloxacin: R >4      -  Meropenem: R >8      -  Piperacillin/Tazobactam: S 16      -  Tobramycin: R >8      Method Type: GAURAV    Culture - Blood (collected 12-13-22 @ 01:10)  Source: .Blood None  Final Report (12-18-22 @ 14:00):    No Growth Final    Culture - Blood (collected 12-12-22 @ 20:08)  Source: .Blood Blood  Final Report (12-18-22 @ 02:00):    No Growth Final    Culture - Sputum (collected 12-12-22 @ 11:55)  Source: Trach Asp Tracheal Aspirate  Gram Stain (12-13-22 @ 02:13):    Rare polymorphonuclear leukocytes per low power field    No Squamous epithelial cells per low power field    No organisms seen per oil power field  Final Report (12-15-22 @ 15:53):    Moderate Pseudomonas aeruginosa (Carbapenem Resistant)    Moderate Acinetobacter baumannii/nosocom group (Carbapenem Resistant)  Organism: Acinetobacter baumannii/nosocom group (Carbapenem Resistant)  Acinetobacter baumannii/nosocom group (Carbapenem Resistant)  Pseudomonas aeruginosa (Carbapenem Resistant) (12-15-22 @ 15:53)  Organism: Pseudomonas aeruginosa (Carbapenem Resistant) (12-15-22 @ 15:53)      -  Amikacin: S <=16      -  Aztreonam: R >16      -  Cefepime: I 16      -  Ceftazidime: S 4      -  Ceftazidime/Avibactam: S <=4      -  Ceftolozane/tazobactam: S <=2      -  Ciprofloxacin: R >2      -  Gentamicin: R >8      -  Imipenem: R >8      -  Levofloxacin: R >4      -  Meropenem: R >8      -  Piperacillin/Tazobactam: S 16      -  Tobramycin: R >8      Method Type: GAURAV  Organism: Acinetobacter baumannii/nosocom group (Carbapenem Resistant) (12-15-22 @ 15:53)      -  Piperacillin/Tazobactam: R      Method Type: KB  Organism: Acinetobacter baumannii/nosocom group (Carbapenem Resistant) (12-15-22 @ 15:53)      -  Amikacin: R >32      -  Ampicillin/Sulbactam: I 16/8      -  Cefepime: R >16      -  Ceftazidime: R >16      -  Ciprofloxacin: R >2      -  Gentamicin: R >8      -  Imipenem: R >8      -  Levofloxacin: R >4      -  Meropenem: R >8      -  Polymyxin B: I 2      -  Tobramycin: R >8      -  Trimethoprim/Sulfamethoxazole: R >2/38      Method Type: GAURAV    Culture - Bronchial (collected 12-08-22 @ 15:00)  Source: .Bronchial None  Gram Stain (12-09-22 @ 07:14):    Moderate polymorphonuclear leukocytes seen per oil power field    No squamous epithelial cells seen per oil power field    No organisms seen per oil power field  Final Report (12-11-22 @ 11:48):    Few Pseudomonas aeruginosa (Carbapenem Resistant)    Normal Respiratory Nadya present  Organism: Pseudomonas aeruginosa (Carbapenem Resistant) (12-11-22 @ 11:48)  Organism: Pseudomonas aeruginosa (Carbapenem Resistant) (12-11-22 @ 11:48)      -  Amikacin: S <=16      -  Aztreonam: R >16      -  Cefepime: S 8      -  Ceftazidime: S 4      -  Ceftazidime/Avibactam: S <=4      -  Ceftolozane/tazobactam: S <=2      -  Ciprofloxacin: R >2      -  Gentamicin: R >8      -  Imipenem: R >8      -  Levofloxacin: R >4      -  Meropenem: R >8      -  Piperacillin/Tazobactam: S 16      -  Tobramycin: R >8      Method Type: GAURAV    Culture - Blood (collected 12-07-22 @ 13:11)  Source: .Blood None  Final Report (12-12-22 @ 23:00):    No Growth Final    Culture - Blood (collected 12-04-22 @ 08:30)  Source: .Blood None  Final Report (12-09-22 @ 22:00):    No Growth Final    Culture - Blood (collected 12-03-22 @ 21:51)  Source: .Blood None  Final Report (12-09-22 @ 09:00):    No Growth Final    Culture - Urine (collected 12-02-22 @ 10:34)  Source: Clean Catch Clean Catch (Midstream)  Final Report (12-04-22 @ 09:29):    >=3 organisms. Probable collection contamination.    Culture - Blood (collected 12-02-22 @ 08:21)  Source: .Blood Blood-Peripheral  Gram Stain (12-04-22 @ 01:05):    Growth in aerobic bottle: Gram Positive Cocci in Clusters    Growth in anaerobic bottle: Gram Positive Cocci in Clusters  Final Report (12-04-22 @ 21:59):    Growth in aerobic and anaerobic bottles: Staphylococcus hominis Coag    Negative Staphylococcus    Single set isolate, possible contaminant. Contact    Microbiology if susceptibility testing clinically    indicated.    Growth in aerobic bottle: Staphylococcus epidermidis Coag Negative    Staphylococcus    Single set isolate, possible contaminant. Contact    Microbiology if susceptibility testing clinically    indicated.    ***Blood Panel PCR results on this specimen are available    approximately 3 hours after the Gram stain result.***    Gram stain, PCR, and/or culture results may not always    correspond due to difference in methodologies.    ************************************************************    This PCR assay was performed by multiplex PCR. This    Assay tests for 66 bacterial and resistance gene targets.    Please refer to the [facility] test directory    at [email or web address] for details.  Organism: Blood Culture PCR (12-04-22 @ 21:59)  Organism: Blood Culture PCR (12-04-22 @ 21:59)      -  Staphylococcus epidermidis, Methicillin resistant: Detec      Method Type: PCR    Culture - Blood (collected 12-02-22 @ 08:21)  Source: .Blood Blood-Peripheral  Gram Stain (12-03-22 @ 16:14):    Growth in aerobic bottle: Gram Positive Cocci in Clusters  Final Report (12-05-22 @ 10:49):    Growth in aerobic bottle: Staphylococcus epidermidis  Organism: Staphylococcus epidermidis (12-05-22 @ 10:49)  Organism: Staphylococcus epidermidis (12-05-22 @ 10:49)      -  Ampicillin/Sulbactam: S <=8/4      -  Cefazolin: S <=4      -  Clindamycin: S <=0.25      -  Erythromycin: R >4      -  Gentamicin: S <=1 Should not be used as monotherapy      -  Oxacillin: S <=0.25      -  Penicillin: R 2      -  Rifampin: S <=1 Should not be used as monotherapy      -  Tetracycline: R >8      -  Trimethoprim/Sulfamethoxazole: R >2/38      -  Vancomycin: S 2      Method Type: GAURAV    Culture - Urine (collected 12-02-22 @ 06:59)  Source: Suprapubic Suprapubic  Final Report (12-04-22 @ 20:09):    Few Proteus mirabilis ESBL    Moderate Enterococcus faecalis  Organism: Proteus mirabilis ESBL  Enterococcus faecalis (12-04-22 @ 20:09)  Organism: Enterococcus faecalis (12-04-22 @ 20:09)      -  Ampicillin: S <=2 Predicts results to ampicillin/sulbactam, amoxacillin-clavulanate and  piperacillin-tazobactam.      -  Ciprofloxacin: R >2      -  Levofloxacin: R >4      -  Nitrofurantoin: S <=32 Should not be used to treat pyelonephritis.      -  Tetracycline: R >8      -  Vancomycin: S 1      Method Type: GAURAV  Organism: Proteus mirabilis ESBL (12-04-22 @ 20:09)      -  Amikacin: S <=16      -  Amoxicillin/Clavulanic Acid: S <=8/4      -  Ampicillin: R >16 These ampicillin results predict results for amoxicillin      -  Ampicillin/Sulbactam: I 16/8 Enterobacter, Klebsiella aerogenes, Citrobacter, and Serratia may develop resistance during prolonged therapy (3-4 days)      -  Aztreonam: R <=4      -  Cefazolin: R >16 For uncomplicated UTI with K. pneumoniae, E. coli, or P. mirablis: GAURAV <=16 is sensitive and GAURAV >=32 is resistant. This also predicts results for oral agents cefaclor, cefdinir, cefpodoxime, cefprozil, cefuroxime axetil, cephalexin and locarbef for uncomplicated UTI. Note that some isolates may be susceptible to these agents while testing resistant to cefazolin.      -  Cefepime: R >16      -  Ceftriaxone: R >32 Enterobacter, Klebsiella aerogenes, Citrobacter, and Serratia may develop resistance during prolonged therapy      -  Ciprofloxacin: R >2      -  Ertapenem: S <=0.5      -  Gentamicin: I 8      -  Levofloxacin: R >4      -  Meropenem: S <=1      -  Nitrofurantoin: R >64 Should not be used to treat pyelonephritis      -  Piperacillin/Tazobactam: S <=8      -  Tobramycin: I 8      -  Trimethoprim/Sulfamethoxazole: R >2/38      Method Type: GAURAV    Culture - Blood (collected 12-02-22 @ 06:59)  Source: .Blood Blood  Gram Stain (12-03-22 @ 19:58):    Growth in aerobic bottle: Gram Positive Cocci in Clusters  Final Report (12-05-22 @ 08:59):    Growth in aerobic bottle: Staphylococcus hominis  Organism: Staphylococcus hominis (12-05-22 @ 08:59)  Organism: Staphylococcus hominis (12-05-22 @ 08:59)      -  Ampicillin/Sulbactam: R <=8/4      -  Cefazolin: R <=4      -  Clindamycin: S 0.5      -  Erythromycin: R >4      -  Gentamicin: S <=1 Should not be used as monotherapy      -  Oxacillin: R >2      -  Penicillin: R 8      -  Rifampin: S <=1 Should not be used as monotherapy      -  Tetracycline: S <=1      -  Trimethoprim/Sulfamethoxazole: R >2/38      -  Vancomycin: S 2      Method Type: GAURAV            INFECTIOUS DISEASES TESTING  MRSA PCR Result.: Negative (12-09-22 @ 08:40)  Rapid RVP Result: NotDetec (12-07-22 @ 15:28)  Procalcitonin, Serum: 0.53 (12-03-22 @ 10:53)  Legionella Antigen, Urine: Negative (12-03-22 @ 08:10)  COVID-19 PCR: NotDetec (11-28-22 @ 18:30)      INFLAMMATORY MARKERS      RADIOLOGY & ADDITIONAL TESTS:  I have personally reviewed the last available Chest xray  CXR      CT      CARDIOLOGY TESTING  12 Lead ECG:   Ventricular Rate 93 BPM    Atrial Rate 93 BPM    P-R Interval 117 ms    QRS Duration 79 ms    Q-T Interval 325 ms    QTC Calculation(Bazett) 405 ms    P Axis 57 degrees    R Axis 79 degrees    T Axis 61 degrees    Diagnosis Line Normal sinus rhythm  Septal infarct , age undetermined  Abnormal ECG    Confirmed by Abe Alves ([id number]) on 12/19/2022 12:20:00 AM (12-18-22 @ 05:10)  12 Lead ECG:   Ventricular Rate 107 BPM    Atrial Rate 107 BPM    P-R Interval 108 ms    QRS Duration 79 ms    Q-T Interval 317 ms    QTC Calculation(Bazett) 423 ms    P Axis 79 degrees    R Axis 90 degrees    T Axis 73 degrees    Diagnosis Line Sinus tachycardiawith short OR  Rightward axis  Septal infarct , age undetermined  Abnormal ECG    Confirmed by ROWDY CHAO MD ([id number]) on 12/16/2022 6:58:08 AM (12-16-22 @ 06:04)      MEDICATIONS  acetylcysteine 20% for bronchoscopy 4  albuterol    90 MICROgram(s) HFA Inhaler 1  baclofen 20  cefiderocol IVPB 2000  chlorhexidine 0.12% Liquid 15  chlorhexidine 2% Cloths 1  donepezil 10  doxazosin 2  enoxaparin Injectable 40  fentaNYL   Infusion. 0.5  lactulose Syrup 20  magnesium sulfate  IVPB 2  memantine 10  midodrine 10  norepinephrine Infusion 0.05  pantoprazole   Suspension 40  polyethylene glycol 3350 17  polymyxin B IVPB 990815  propofol Infusion 10  senna 2  zinc oxide 20% Ointment 1      WEIGHT  Weight (kg): 68 (12-02-22 @ 08:20)  Creatinine, Serum: <0.5 mg/dL (12-19-22 @ 05:20)  Creatinine, Serum: <0.5 mg/dL (12-18-22 @ 23:40)      ANTIBIOTICS:  cefiderocol IVPB 2000 milliGRAM(s) IV Intermittent every 8 hours  polymyxin B IVPB 900519 Unit(s) IV Intermittent every 12 hours      All available historical records have been reviewed

## 2022-12-19 NOTE — PROGRESS NOTE ADULT - ASSESSMENT
ASSESSMENT  53 yo male w/ PMH of MS, chronic low back pain, neurogenic bladder? s/p SPC placement and s/p dislodgement, presents for fever. Presented to the ED for SPC dislodgement a few days ago. Was seen by IR and planned for outpatient SPC replacement. Mansfield was placed at [facility]. Today. prior to placement, he was noted to be febrile w/ tachycardia and tachypnea so brought into ED.      IMPRESSION  #Fevers, HAP/ VAP     12/13 bronch   Numerous Mixed gram negative rods including    Moderate Pseudomonas aeruginosa    Numerous Acinetobacter baumannii/nosocomialis group    12/12 Bronch Acinetobacter baumannii/nosocomialis  & CRE Pseudo    12/8 bronch cx     Few Pseudomonas aeruginosa (Carbapenem Resistant) Cefepime: S 8 Piperacillin/Tazobactam: S 16  CXR L white-out, suspect aspiration/mucous plug  #12/14 UCX   >100,000 CFU/ml Candida auris Susceptibility to follow.  #Severe Sepsis on admission T>101F, Pulse>90,WBC>12, lactic acidosis due to acute complicated cystitis    12/4 BCX NGTD     12/3 BCX NGTD     12/2 BCX  Staphylococcus epidermidis, Methicillin resistant: Detec 1/2 bottles ,      12/2 BCX GPC 1/2  Staphylococcus hominis    12/2 BCX  GPC 1/2 Staphylococcus hominis    12/2 UCX   >=3 organisms. Probable collection contamination.    12/2 UCX    Few Proteus mirabilis ESBL    Moderate Enterococcus faecalis    UA Blood: x / Protein: 100 mg/dL / Nitrite: Negative   Leuk Esterase: Large / RBC: 236 /HPF / WBC >720 /HPF   Sq Epi: x / Non Sq Epi: 2 /HPF / Bacteria: Many  #CXR Small left pleural effusion and possible left basilar consolidation.  #MS with neurogenic bladder, dislodged SPC  #Lactic acidosis  #Hypernatremia   QTC Calculation(Bazett) 407 ms  #Severe protein calorie malnutrition  BMI (kg/m2): 17.8    #Abx allergy: penicillin (childhood)  vancomycin (Unknown)    Creatinine, Serum: 0.5 (12-03-22 @ 08:22)    Weight (kg): 68 (12-02-22 @ 08:20)    RECOMMENDATIONS  - D/C polymyxin- both isolates are S to cefiderocol  - 12/14 UCX candida auris- strict isolation precautions, mansfield replaced 12/14. Hold off antifungal therapy, likely colonization. IF spikes or hemodynamic compromise, add caspo 70mg x1 then 50mg q24h IV  - Continued L lung white out likely secondary to mucous plugging  - Cefiderocol 2g q8h IV x 14 days   - Pulm following  - GOC, grave prognosis as colonized with MDROs    If any questions, please call or send a message on [employer]  Please continue to update ID with any pertinent new laboratory or radiographic findings  #[id number]

## 2022-12-19 NOTE — PROGRESS NOTE ADULT - SUBJECTIVE AND OBJECTIVE BOX
KEVIN MEDRANO 52y Male  MRN#: [id number]   Hospital Day: 17d    SUBJECTIVE  Patient is a 52y old Male who presents with a chief complaint of sepsis (19 Dec 2022 11:19)  Currently admitted to medicine with the primary diagnosis of Sepsis      INTERVAL HPI AND OVERNIGHT EVENTS:  Patient was examined and seen at bedside. This morning he is resting comfortably in bed. No issues or overnight events.    OBJECTIVE  PAST MEDICAL & SURGICAL HISTORY  Multiple sclerosis    Chronic back pain    Hypertension    No significant past surgical history      ALLERGIES:  penicillin (Unknown)  vancomycin (Unknown)    MEDICATIONS:  STANDING MEDICATIONS  acetylcysteine 20%  Inhalation 4 milliLiter(s) Inhalation every 6 hours  acetylcysteine 20% for bronchoscopy 4 milliLiter(s) Nebulizer once  albuterol    90 MICROgram(s) HFA Inhaler 1 Puff(s) Inhalation every 4 hours  baclofen 20 milliGRAM(s) Oral every 12 hours  cefiderocol IVPB 2000 milliGRAM(s) IV Intermittent every 8 hours  chlorhexidine 0.12% Liquid 15 milliLiter(s) Oral Mucosa every 12 hours  chlorhexidine 2% Cloths 1 Application(s) Topical daily  dexMEDEtomidine Infusion 0.2 MICROgram(s)/kG/Hr IV Continuous <Continuous>  donepezil 10 milliGRAM(s) Oral at bedtime  doxazosin 2 milliGRAM(s) Oral at bedtime  enoxaparin Injectable 40 milliGRAM(s) SubCutaneous every 24 hours  fentaNYL   Infusion. 0.5 MICROgram(s)/kG/Hr IV Continuous <Continuous>  magnesium sulfate  IVPB 2 Gram(s) IV Intermittent once  magnesium sulfate  IVPB 2 Gram(s) IV Intermittent every 2 hours  memantine 10 milliGRAM(s) Oral two times a day  midodrine 20 milliGRAM(s) Oral every 8 hours  norepinephrine Infusion 0.05 MICROgram(s)/kG/Min IV Continuous <Continuous>  pantoprazole   Suspension 40 milliGRAM(s) Oral daily  polymyxin B IVPB [id number] Unit(s) IV Intermittent every 12 hours  zinc oxide 20% Ointment 1 Application(s) Topical three times a day    PRN MEDICATIONS  acetaminophen     Tablet .. 650 milliGRAM(s) Oral every 6 hours PRN  ibuprofen  Suspension. 600 milliGRAM(s) Oral every 8 hours PRN  melatonin 3 milliGRAM(s) Oral at bedtime PRN      VITAL SIGNS: Last 24 Hours  T(C): 37.6 (19 Dec 2022 08:00), Max: 38 (18 Dec 2022 13:00)  T(F): 99.6 (19 Dec 2022 08:00), Max: 100.4 (18 Dec 2022 13:00)  HR: 88 (19 Dec 2022 11:45) (72 - 120)  BP: 107/61 (19 Dec 2022 11:45) (77/47 - 133/74)  BP(mean): 75 (19 Dec 2022 11:45) (54 - 95)  RR: 22 (19 Dec 2022 11:45) (15 - 25)  SpO2: 100% (19 Dec 2022 11:45) (99% - 100%)    LABS:                        8.6    8.54  )-----------( 560      ( 19 Dec 2022 05:20 )             26.8     12-19    141  |  105  |  4<L>  ----------------------------<  113<H>  3.9   |  28  |  <0.5<L>    Ca    8.6      19 Dec 2022 05:20  Phos  4.2     12-18  Mg     1.6     12-19    TPro  5.6<L>  /  Alb  2.4<L>  /  TBili  <0.2  /  DBili  x   /  AST  13  /  ALT  8   /  AlkPhos  93  12-19        ABG - ( 19 Dec 2022 03:17 )  pH, Arterial: 7.42  pH, Blood: x     /  pCO2: 48    /  pO2: 91    / HCO3: 31    / Base Excess: 5.5   /  SaO2: 98.6                        PHYSICAL EXAM:  GENERAL: NAD, contratced  HEAD:  Atraumatic, Normocephalic  ENMT: intubated  NERVOUS SYSTEM: sedated  CHEST/LUNG: intubated, vented. bilateral breath sounds decreased on the left, crackles, no wheezing  HEART: Regular rate and rhythm. Normal S1/S1. No murmurs  ABDOMEN: Soft, Nontender, Nondistended; Bowel sounds present  EXTREMITIES:no edema, contracted

## 2022-12-19 NOTE — PROGRESS NOTE ADULT - SUBJECTIVE AND OBJECTIVE BOX
HPI:  Pt is nonverbal at baseline. Hx obtained from chart.     51 yo male w/ PMH of MS, chronic low back pain, neurogenic bladder? s/p SPC placement and s/p dislodgement, presents for fever. Presented to the ED for SPC dislodgement a few days ago. Was seen by IR and planned for outpatient SPC replacement. Campuzano was placed at [facility]. Today. prior to placement, he was noted to be febrile w/ tachycardia and tachypnea so brought into ED.  Pt nonverbal at baseline, unable to provide further HPI.    Pt upgraded to ICU sepsis, on multiple pressors. Palliative care consulted, pt w end stage MS at baseline. Poor prognosis.      INTERVAL EVENTS:  12/16: pt appears sedated on ventilator in ICU   12/19: remains intubated, appears comfortable, lung collapse 12/18    ADVANCE DIRECTIVES:    DECISION MAKER(s):  [ ] Health Care Proxy(s)  [ ] Surrogate(s)  [ ] Guardian           Name(s): Phone Number(s):  Answers: Caregiver Name: JAYY MITCHELL,  Answers: Caregiver Relationship: MOTHER, HCP  Answers: Caregiver Contact Number: [phone]  HEALTH CARE PROXY ON FILE IN EMR - REVIEWED   BASELINE (I)ADL(s) (prior to admission):  [place]: [ ]Total  [ ] Moderate [x ]Dependent  Allergies    penicillin (Unknown)  vancomycin (Unknown)    Intolerances    MEDICATIONS  (STANDING):  acetylcysteine 20%  Inhalation 4 milliLiter(s) Inhalation every 6 hours  acetylcysteine 20% for bronchoscopy 4 milliLiter(s) Nebulizer once  albuterol    90 MICROgram(s) HFA Inhaler 1 Puff(s) Inhalation every 4 hours  baclofen 20 milliGRAM(s) Oral every 12 hours  cefiderocol IVPB 2000 milliGRAM(s) IV Intermittent every 8 hours  chlorhexidine 0.12% Liquid 15 milliLiter(s) Oral Mucosa every 12 hours  chlorhexidine 2% Cloths 1 Application(s) Topical daily  dexMEDEtomidine Infusion 0.2 MICROgram(s)/kG/Hr (3.4 mL/Hr) IV Continuous <Continuous>  donepezil 10 milliGRAM(s) Oral at bedtime  doxazosin 2 milliGRAM(s) Oral at bedtime  enoxaparin Injectable 40 milliGRAM(s) SubCutaneous every 24 hours  fentaNYL   Infusion. 0.5 MICROgram(s)/kG/Hr (3.4 mL/Hr) IV Continuous <Continuous>  magnesium sulfate  IVPB 2 Gram(s) IV Intermittent once  memantine 10 milliGRAM(s) Oral two times a day  midodrine 20 milliGRAM(s) Oral every 8 hours  norepinephrine Infusion 0.05 MICROgram(s)/kG/Min (6.38 mL/Hr) IV Continuous <Continuous>  pantoprazole   Suspension 40 milliGRAM(s) Oral daily  propofol Infusion 10 MICROgram(s)/kG/Min (4.08 mL/Hr) IV Continuous <Continuous>  zinc oxide 20% Ointment 1 Application(s) Topical three times a day    MEDICATIONS  (PRN):  acetaminophen     Tablet .. 650 milliGRAM(s) Oral every 6 hours PRN Mild Pain (1 - 3), Moderate Pain (4 - 6)  ibuprofen  Suspension. 600 milliGRAM(s) Oral every 8 hours PRN Temp greater or equal to 38.5C (101.3F)  melatonin 3 milliGRAM(s) Oral at bedtime PRN Insomnia    PRESENT SYMPTOMS: [ x]Unable to obtain due to poor mentation   Source if other than patient:  [ ]Family   [ ]Team     Pain: [ ]yes [ ]no  QOL impact -   Location -                    Aggravating factors -  Quality -  Radiation -  Timing-  Severity (0-10 scale):  Minimal acceptable level (0-10 scale):     Dyspnea:                           [ ]Mild [ ]Moderate [ ]Severe  Anxiety:                             [ ]Mild [ ]Moderate [ ]Severe  Fatigue:                             [ ]Mild [ ]Moderate [ ]Severe  Nausea:                             [ ]Mild [ ]Moderate [ ]Severe  Loss of appetite:              [ ]Mild [ ]Moderate [ ]Severe  Constipation:                    [ ]Mild [ ]Moderate [ ]Severe    Other Symptoms:  [ ]All other review of systems negative     Palliative Performance Status Version 2:         %    http://npcrc.org/files/news/palliative_performance_scale_[email or web address].pdf  PHYSICAL EXAM:  Vital Signs Last 24 Hrs  T(C): 37.8 (19 Dec 2022 14:30), Max: 37.9 (18 Dec 2022 20:00)  T(F): 100 (19 Dec 2022 14:30), Max: 100.2 (18 Dec 2022 20:00)  HR: 66 (19 Dec 2022 16:15) (66 - 120)  BP: 95/62 (19 Dec 2022 16:15) (75/48 - 133/74)  BP(mean): 71 (19 Dec 2022 16:15) (55 - 95)  RR: 22 (19 Dec 2022 16:15) (15 - 25)  SpO2: 100% (19 Dec 2022 16:15) (99% - 100%)    Parameters below as of 19 Dec 2022 07:00  Patient On (Oxygen Delivery Method): ventilator    O2 Concentration (%): 40    GENERAL:  [X ] No acute distress [ ]Lethargic  [ ]Unarousable  [ ]Verbal  [ ]Non-Verbal [ ]Cachexia    BEHAVIORAL/PSYCH:  [ ]Alert and Oriented x  [ ] Anxiety [ ] Delirium [ ] Agitation [ X] Calm   EYES: [ ] No scleral icterus [ ] Scleral icterus [X ] Closed  ENMT:  [ ]Dry mouth  [ ]No external oral lesions [ X] No external ear or nose lesions  CARDIOVASCULAR:  [ ]Regular [ ]Irregular [ ]Tachy [ ]Not Tachy  [ ]Hugo [ ] Edema [ ] No edema  PULMONARY:  [ ]Tachypnea  [ ]Audible excessive secretions [X ] No labored breathing [ ] labored breathing  GASTROINTESTINAL: [ ]Soft  [ ]Distended  [X ]Not distended [ ]Non tender [ ]Tender  MUSCULOSKELETAL: [ ]No clubbing [ ] clubbing  [X ] No cyanosis [ ] cyanosis  NEUROLOGIC: [ ]No focal deficits  [ ]Follows commands  [ ]Does not follow commands  [X ]Cognitive impairment  [ ]Dysphagia  [ ]Dysarthria  [ ]Paresis   SKIN: [ ] Jaundiced [ X] Non-jaundiced [ ]Rash [ ]No Rash [ ] Warm [ ] Dry  MISC/LINES: [X ] ET tube [ ] Trach [ ]NGT/OGT [ x]PEG [x ]Campuzano (SP tube dislodged Campuzano for now)     CRITICAL CARE:  [x ] Shock Present  [x ]Septic [ ]Cardiogenic [ ]Neurologic [ ]Hypovolemic  [x ]  Vasopressors [ ]  Inotropes   [x ]Respiratory failure present [x ]Mechanical ventilation [ ]Non-invasive ventilatory support [ ]High flow  [ ]Acute  [ ]Chronic [ ]Hypoxic  [ ]Hypercarbic [ ]Other  [ ]Other organ failure     LABS:                                   8.6    8.54  )-----------( 560      ( 19 Dec 2022 05:20 )             26.8     12-19    141  |  105  |  4<L>  ----------------------------<  113<H>  3.9   |  28  |  <0.5<L>    Ca    8.6      19 Dec 2022 05:20  Phos  4.2     12-18  Mg     1.6     12-19          RADIOLOGY & ADDITIONAL STUDIES:        REFERRALS:   [ ]Chaplaincy  [ ]Hospice  [ ]Child Life  [ x]Social Work  [x ]Case management [ ]Holistic Therapy     Goals of Care Document:

## 2022-12-19 NOTE — PROGRESS NOTE ADULT - ASSESSMENT
ASSESSMENT  Sepsis POA  Septic shock   Acute hypoxemic respiratory failure/ Left mucus plug / lung atelectasis  Urinary tract infection/ proteus  Bacteremia - MRSE treated   Non verbal at baseline but able to make himself understood  NSTEMI likely Type 2  history of MS/neurogenic bladder  dysphagia on G tube feedings  hypomagnesemia    PLAN  continue with  Osmolite  1.5 at  360 ml over 30-40 min x 3 feeds/d  - local skin care  check bmp/phos/mg and correct phos to > 3.5  once stable will need ot increase caloric load

## 2022-12-19 NOTE — PROGRESS NOTE ADULT - SUBJECTIVE AND OBJECTIVE BOX
NUTRITION SUPPORT TEAM  -  PROGRESS NOTE     Interval Events:        REVIEW OF SYSTEMS:  Negative except as noted above.     VITALS:  T(F): 99.6 (12-19 @ 08:00), Max: 99.8 (12-19 @ 05:00)  HR: 80 (12-19 @ 09:15) (78 - 120)  BP: 96/57 (12-19 @ 09:15) (85/51 - 133/74)  RR: 21 (12-19 @ 09:15) (15 - 25)  SpO2: 100% (12-19 @ 09:15) (99% - 100%)    ABG - ( 19 Dec 2022 03:17 )  pH, Arterial: 7.42  pH, Blood: x     /  pCO2: 48    /  pO2: 91    / HCO3: 31    / Base Excess: 5.5   /  SaO2: 98.6    Mode: AC/ CMV (Assist Control/ Continuous Mandatory Ventilation)  RR (machine): 22  TV (machine): 400  FiO2: 40  PEEP: 8  ITime: 1  MAP: 12  PIP: 25    HEIGHT/WEIGHT/BMI:   Height (cm): 195.6 (12-03)  Weight (kg): 68 (12-02)  BMI (kg/m2): 17.8 (12-03)    12-18-22 @ 07:01  -  12-19-22 @ 07:00  --------------------------------------------------------  IN:    Enteral Tube Flush: 900 mL    FentaNYL: 620 mL    IV PiggyBack: 1199.9 mL    Norepinephrine: 228 mL    Osmolite: 1080 mL    Propofol: 478.8 mL  Total IN: 4506.7 mL    OUT:    Indwelling Catheter - Urethral (mL): 3160 mL  Total OUT: 3160 mL    Total NET: 1346.7 mL        STANDING MEDICATIONS:   acetylcysteine 20% for bronchoscopy 4 milliLiter(s) Nebulizer once  albuterol    90 MICROgram(s) HFA Inhaler 1 Puff(s) Inhalation every 4 hours  baclofen 20 milliGRAM(s) Oral every 12 hours  cefiderocol IVPB 2000 milliGRAM(s) IV Intermittent every 8 hours  chlorhexidine 0.12% Liquid 15 milliLiter(s) Oral Mucosa every 12 hours  chlorhexidine 2% Cloths 1 Application(s) Topical daily  donepezil 10 milliGRAM(s) Oral at bedtime  doxazosin 2 milliGRAM(s) Oral at bedtime  enoxaparin Injectable 40 milliGRAM(s) SubCutaneous every 24 hours  fentaNYL   Infusion. 0.5 MICROgram(s)/kG/Hr IV Continuous <Continuous>  lactulose Syrup 20 Gram(s) Oral every 12 hours  magnesium sulfate  IVPB 2 Gram(s) IV Intermittent once  memantine 10 milliGRAM(s) Oral two times a day  midodrine 10 milliGRAM(s) Oral every 8 hours  norepinephrine Infusion 0.05 MICROgram(s)/kG/Min IV Continuous <Continuous>  pantoprazole   Suspension 40 milliGRAM(s) Oral daily  polyethylene glycol 3350 17 Gram(s) Oral daily  polymyxin B IVPB 316328 Unit(s) IV Intermittent every 12 hours  propofol Infusion 10 MICROgram(s)/kG/Min IV Continuous <Continuous>  senna 2 Tablet(s) Oral at bedtime  zinc oxide 20% Ointment 1 Application(s) Topical three times a day      LABS:                         8.6    8.54  )-----------( 560      ( 19 Dec 2022 05:20 )             26.8     141  |  105  |  4<L>  ----------------------------<  113<H>          (12-19-22 @ 05:20)  3.9   |  28  |  <0.5<L>    Ca    8.6          (12-19-22 @ 05:20)  Phos  4.2         (12-18-22 @ 23:40)  Mg     1.6         (12-19-22 @ 05:20)    TPro  5.6<L>  /  Alb  2.4<L>  /  TBili  <0.2  /  DBili  x   /  AST  13  /  ALT  8   /  AlkPhos  93       12-19-22 @ 05:20  Triglycerides, Serum: 125 mg/dL (12-15 @ 04:55)  Blood Glucose (Past 24 hours):  98 mg/dL (12-19 @ 05:17)  91 mg/dL (12-18 @ 21:35)    DIET:   Diet, NPO with Tube Feed:   Tube Feeding Modality: Gastrostomy  Osmolite 1.5 Bhargav  Total Volume for 24 Hours (mL): 1080  Bolus  Total Volume of Bolus (mL):  360  Total # of Feeds: 3  Tube Feed Frequency: Every 8 hours   Tube Feed Start Time: 17:00  Bolus Feed Rate (mL per Hour): 500   Bolus Feed Duration (in Hours): 0.5  Free Water Flush  Free Water Flush Instructions:  150ml before and after each bolus (12-13-22 @ 13:17) [Active]    RADIOLOGY:   < from: Xray Chest 1 View- PORTABLE-Routine (Xray Chest 1 View- PORTABLE-Routine in AM.) (12.18.22 @ 06:29) >  Impression:  No significant change since the prior exam.   NUTRITION SUPPORT TEAM  -  PROGRESS NOTE   remain vented/sedated  on peg tub feed  abdomen +peg tube in place site c/d/i  on pressor  +diarrhea      REVIEW OF SYSTEMS:  Negative except as noted above.     VITALS:  T(F): 99.6 (12-19 @ 08:00), Max: 99.8 (12-19 @ 05:00)  HR: 80 (12-19 @ 09:15) (78 - 120)  BP: 96/57 (12-19 @ 09:15) (85/51 - 133/74)  RR: 21 (12-19 @ 09:15) (15 - 25)  SpO2: 100% (12-19 @ 09:15) (99% - 100%)    ABG - ( 19 Dec 2022 03:17 )  pH, Arterial: 7.42  pH, Blood: x     /  pCO2: 48    /  pO2: 91    / HCO3: 31    / Base Excess: 5.5   /  SaO2: 98.6    Mode: AC/ CMV (Assist Control/ Continuous Mandatory Ventilation)  RR (machine): 22  TV (machine): 400  FiO2: 40  PEEP: 8  ITime: 1  MAP: 12  PIP: 25    HEIGHT/WEIGHT/BMI:   Height (cm): 195.6 (12-03)  Weight (kg): 68 (12-02)  BMI (kg/m2): 17.8 (12-03)    12-18-22 @ 07:01  -  12-19-22 @ 07:00  --------------------------------------------------------  IN:    Enteral Tube Flush: 900 mL    FentaNYL: 620 mL    IV PiggyBack: 1199.9 mL    Norepinephrine: 228 mL    Osmolite: 1080 mL    Propofol: 478.8 mL  Total IN: 4506.7 mL    OUT:    Indwelling Catheter - Urethral (mL): 3160 mL  Total OUT: 3160 mL  Total NET: 1346.7 mL    STANDING MEDICATIONS:   acetylcysteine 20% for bronchoscopy 4 milliLiter(s) Nebulizer once  albuterol    90 MICROgram(s) HFA Inhaler 1 Puff(s) Inhalation every 4 hours  baclofen 20 milliGRAM(s) Oral every 12 hours  cefiderocol IVPB 2000 milliGRAM(s) IV Intermittent every 8 hours  chlorhexidine 0.12% Liquid 15 milliLiter(s) Oral Mucosa every 12 hours  chlorhexidine 2% Cloths 1 Application(s) Topical daily  donepezil 10 milliGRAM(s) Oral at bedtime  doxazosin 2 milliGRAM(s) Oral at bedtime  enoxaparin Injectable 40 milliGRAM(s) SubCutaneous every 24 hours  fentaNYL   Infusion. 0.5 MICROgram(s)/kG/Hr IV Continuous <Continuous>  lactulose Syrup 20 Gram(s) Oral every 12 hours  magnesium sulfate  IVPB 2 Gram(s) IV Intermittent once  memantine 10 milliGRAM(s) Oral two times a day  midodrine 10 milliGRAM(s) Oral every 8 hours  norepinephrine Infusion 0.05 MICROgram(s)/kG/Min IV Continuous <Continuous>  pantoprazole   Suspension 40 milliGRAM(s) Oral daily  polyethylene glycol 3350 17 Gram(s) Oral daily  polymyxin B IVPB 556492 Unit(s) IV Intermittent every 12 hours  propofol Infusion 10 MICROgram(s)/kG/Min IV Continuous <Continuous>  senna 2 Tablet(s) Oral at bedtime  zinc oxide 20% Ointment 1 Application(s) Topical three times a day      LABS:                         8.6    8.54  )-----------( 560      ( 19 Dec 2022 05:20 )             26.8     141  |  105  |  4<L>  ----------------------------<  113<H>          (12-19-22 @ 05:20)  3.9   |  28  |  <0.5<L>    Ca    8.6          (12-19-22 @ 05:20)  Phos  4.2         (12-18-22 @ 23:40)  Mg     1.6         (12-19-22 @ 05:20)    TPro  5.6<L>  /  Alb  2.4<L>  /  TBili  <0.2  /  DBili  x   /  AST  13  /  ALT  8   /  AlkPhos  93       12-19-22 @ 05:20  Triglycerides, Serum: 125 mg/dL (12-15 @ 04:55)  Blood Glucose (Past 24 hours):  98 mg/dL (12-19 @ 05:17)  91 mg/dL (12-18 @ 21:35)    DIET:   Diet, NPO with Tube Feed:   Tube Feeding Modality: Gastrostomy  Osmolite 1.5 Bhargav  Total Volume for 24 Hours (mL): 1080  Bolus  Total Volume of Bolus (mL):  360  Total # of Feeds: 3  Tube Feed Frequency: Every 8 hours   Tube Feed Start Time: 17:00  Bolus Feed Rate (mL per Hour): 500   Bolus Feed Duration (in Hours): 0.5  Free Water Flush  Free Water Flush Instructions:  150ml before and after each bolus (12-13-22 @ 13:17) [Active]    RADIOLOGY:   < from: Xray Chest 1 View- PORTABLE-Routine (Xray Chest 1 View- PORTABLE-Routine in AM.) (12.18.22 @ 06:29) >  Impression:  No significant change since the prior exam.   NUTRITION SUPPORT TEAM  -  PROGRESS NOTE   remain vented/sedated  on peg tub feed  abdomen +peg tube in place site c/d/i  on pressor  +diarrhea / rectal tube in place    REVIEW OF SYSTEMS:  Negative except as noted above.     VITALS:  T(F): 99.6 (12-19 @ 08:00), Max: 99.8 (12-19 @ 05:00)  HR: 80 (12-19 @ 09:15) (78 - 120)  BP: 96/57 (12-19 @ 09:15) (85/51 - 133/74)  RR: 21 (12-19 @ 09:15) (15 - 25)  SpO2: 100% (12-19 @ 09:15) (99% - 100%)    ABG - ( 19 Dec 2022 03:17 )  pH, Arterial: 7.42  pH, Blood: x     /  pCO2: 48    /  pO2: 91    / HCO3: 31    / Base Excess: 5.5   /  SaO2: 98.6    Mode: AC/ CMV (Assist Control/ Continuous Mandatory Ventilation)  RR (machine): 22  TV (machine): 400  FiO2: 40  PEEP: 8  ITime: 1  MAP: 12  PIP: 25    HEIGHT/WEIGHT/BMI:   Height (cm): 195.6 (12-03)  Weight (kg): 68 (12-02)  BMI (kg/m2): 17.8 (12-03)    12-18-22 @ 07:01  -  12-19-22 @ 07:00  --------------------------------------------------------  IN:    Enteral Tube Flush: 900 mL    FentaNYL: 620 mL    IV PiggyBack: 1199.9 mL    Norepinephrine: 228 mL    Osmolite: 1080 mL    Propofol: 478.8 mL  Total IN: 4506.7 mL    OUT:    Indwelling Catheter - Urethral (mL): 3160 mL  Total OUT: 3160 mL  Total NET: 1346.7 mL    STANDING MEDICATIONS:   acetylcysteine 20% for bronchoscopy 4 milliLiter(s) Nebulizer once  albuterol    90 MICROgram(s) HFA Inhaler 1 Puff(s) Inhalation every 4 hours  baclofen 20 milliGRAM(s) Oral every 12 hours  cefiderocol IVPB 2000 milliGRAM(s) IV Intermittent every 8 hours  chlorhexidine 0.12% Liquid 15 milliLiter(s) Oral Mucosa every 12 hours  chlorhexidine 2% Cloths 1 Application(s) Topical daily  donepezil 10 milliGRAM(s) Oral at bedtime  doxazosin 2 milliGRAM(s) Oral at bedtime  enoxaparin Injectable 40 milliGRAM(s) SubCutaneous every 24 hours  fentaNYL   Infusion. 0.5 MICROgram(s)/kG/Hr IV Continuous <Continuous>  lactulose Syrup 20 Gram(s) Oral every 12 hours  magnesium sulfate  IVPB 2 Gram(s) IV Intermittent once  memantine 10 milliGRAM(s) Oral two times a day  midodrine 10 milliGRAM(s) Oral every 8 hours  norepinephrine Infusion 0.05 MICROgram(s)/kG/Min IV Continuous <Continuous>  pantoprazole   Suspension 40 milliGRAM(s) Oral daily  polyethylene glycol 3350 17 Gram(s) Oral daily  polymyxin B IVPB 190122 Unit(s) IV Intermittent every 12 hours  propofol Infusion 10 MICROgram(s)/kG/Min IV Continuous <Continuous>  senna 2 Tablet(s) Oral at bedtime  zinc oxide 20% Ointment 1 Application(s) Topical three times a day      LABS:                         8.6    8.54  )-----------( 560      ( 19 Dec 2022 05:20 )             26.8     141  |  105  |  4<L>  ----------------------------<  113<H>          (12-19-22 @ 05:20)  3.9   |  28  |  <0.5<L>    Ca    8.6          (12-19-22 @ 05:20)  Phos  4.2         (12-18-22 @ 23:40)  Mg     1.6         (12-19-22 @ 05:20)    TPro  5.6<L>  /  Alb  2.4<L>  /  TBili  <0.2  /  DBili  x   /  AST  13  /  ALT  8   /  AlkPhos  93       12-19-22 @ 05:20  Triglycerides, Serum: 125 mg/dL (12-15 @ 04:55)  Blood Glucose (Past 24 hours):  98 mg/dL (12-19 @ 05:17)  91 mg/dL (12-18 @ 21:35)    DIET:   Diet, NPO with Tube Feed:   Tube Feeding Modality: Gastrostomy  Osmolite 1.5 Bhargav  Total Volume for 24 Hours (mL): 1080  Bolus  Total Volume of Bolus (mL):  360  Total # of Feeds: 3  Tube Feed Frequency: Every 8 hours   Tube Feed Start Time: 17:00  Bolus Feed Rate (mL per Hour): 500   Bolus Feed Duration (in Hours): 0.5  Free Water Flush  Free Water Flush Instructions:  150ml before and after each bolus (12-13-22 @ 13:17) [Active]    RADIOLOGY:   < from: Xray Chest 1 View- PORTABLE-Routine (Xray Chest 1 View- PORTABLE-Routine in AM.) (12.18.22 @ 06:29) >  Impression:  No significant change since the prior exam.

## 2022-12-19 NOTE — PROGRESS NOTE ADULT - PROBLEM SELECTOR PLAN 2
- contracted and bedbound at baseline, sometimes able to communicate per mother, but she feels overall quality of life is poor  - supportive care.

## 2022-12-19 NOTE — PROGRESS NOTE ADULT - SUBJECTIVE AND OBJECTIVE BOX
Patient is a 52y old  Male who presents with a chief complaint of sepsis (19 Dec 2022 09:51)        Over Night Events:  Developed liquid watery diarrhea in past 24hrs. Remains on mechanical ventilation.    Drips  Levo- 0.08  Propofol-50  Fentanyl- 4      ROS:     All pertinent ROS are negative except HPI         PHYSICAL EXAM    ICU Vital Signs Last 24 Hrs  T(C): 37.6 (19 Dec 2022 08:00), Max: 38 (18 Dec 2022 13:00)  T(F): 99.6 (19 Dec 2022 08:00), Max: 100.4 (18 Dec 2022 13:00)  HR: 80 (19 Dec 2022 09:15) (72 - 120)  BP: 96/57 (19 Dec 2022 09:15) (77/47 - 133/74)  BP(mean): 68 (19 Dec 2022 09:15) (54 - 95)  RR: 21 (19 Dec 2022 09:15) (15 - 25)  SpO2: 100% (19 Dec 2022 09:15) (99% - 100%)    O2 Parameters below as of 19 Dec 2022 07:00  Patient On (Oxygen Delivery Method): ventilator    O2 Concentration (%): 40        CONSTITUTIONAL:   Ill appearing.  NAD    ENT:   Airway patent,   Mouth with normal mucosa.   No thrush    EYES:   Pupils equal,   Round and reactive to light.    CARDIAC:   Normal rate,   Regular rhythm.    No edema    RESPIRATORY:   ET+  Absent BS on left sounds  decreased bilateral BS    GASTROINTESTINAL:  Abdomen soft,   Non-tender,   No guarding,   + BS    GENITOURINARY  Campuzano  normal genitalia for sex  no edema    MUSCULOSKELETAL:   Range of motion is not limited,  No clubbing, cyanosis    NEUROLOGICAL:   +gag, +corneal, withdraws to pain  Doesn't follow commands  Contracted    SKIN:   Stage 2 sacral ulcers  Skin normal color for race,   Warm and dry  No evidence of rash.    PSYCHIATRIC:   No apparent risk to self or others.      12-18-22 @ 07:01  -  12-19-22 @ 07:00  --------------------------------------------------------  IN:    Enteral Tube Flush: 900 mL    FentaNYL: 620 mL    IV PiggyBack: 1199.9 mL    Norepinephrine: 228 mL    Osmolite: 1080 mL    Propofol: 478.8 mL  Total IN: 4506.7 mL    OUT:    Indwelling Catheter - Urethral (mL): 3160 mL  Total OUT: 3160 mL    Total NET: 1346.7 mL      12-19-22 @ 07:01  -  12-19-22 @ 11:19  --------------------------------------------------------  IN:    FentaNYL: 54.4 mL    Norepinephrine: 20 mL    Propofol: 40.8 mL  Total IN: 115.2 mL    OUT:    Indwelling Catheter - Urethral (mL): 300 mL  Total OUT: 300 mL    Total NET: -184.8 mL          LABS:                            8.6    8.54  )-----------( 560      ( 19 Dec 2022 05:20 )             26.8                                               12-19    141  |  105  |  4<L>  ----------------------------<  113<H>  3.9   |  28  |  <0.5<L>    Ca    8.6      19 Dec 2022 05:20  Phos  4.2     12-18  Mg     1.6     12-19    TPro  5.6<L>  /  Alb  2.4<L>  /  TBili  <0.2  /  DBili  x   /  AST  13  /  ALT  8   /  AlkPhos  93  12-19                                                                                           LIVER FUNCTIONS - ( 19 Dec 2022 05:20 )  Alb: 2.4 g/dL / Pro: 5.6 g/dL / ALK PHOS: 93 U/L / ALT: 8 U/L / AST: 13 U/L / GGT: x                                                                                               Mode: AC/ CMV (Assist Control/ Continuous Mandatory Ventilation)  RR (machine): 22  TV (machine): 400  FiO2: 40  PEEP: 8  ITime: 1  MAP: 12  PIP: 21                                      ABG - ( 19 Dec 2022 03:17 )  pH, Arterial: 7.42  pH, Blood: x     /  pCO2: 48    /  pO2: 91    / HCO3: 31    / Base Excess: 5.5   /  SaO2: 98.6            MEDICATIONS  (STANDING):  acetylcysteine 20% for bronchoscopy 4 milliLiter(s) Nebulizer once  albuterol    90 MICROgram(s) HFA Inhaler 1 Puff(s) Inhalation every 4 hours  baclofen 20 milliGRAM(s) Oral every 12 hours  cefiderocol IVPB 2000 milliGRAM(s) IV Intermittent every 8 hours  chlorhexidine 0.12% Liquid 15 milliLiter(s) Oral Mucosa every 12 hours  chlorhexidine 2% Cloths 1 Application(s) Topical daily  donepezil 10 milliGRAM(s) Oral at bedtime  doxazosin 2 milliGRAM(s) Oral at bedtime  enoxaparin Injectable 40 milliGRAM(s) SubCutaneous every 24 hours  fentaNYL   Infusion. 0.5 MICROgram(s)/kG/Hr (3.4 mL/Hr) IV Continuous <Continuous>  lactulose Syrup 20 Gram(s) Oral every 12 hours  magnesium sulfate  IVPB 2 Gram(s) IV Intermittent once  memantine 10 milliGRAM(s) Oral two times a day  midodrine 10 milliGRAM(s) Oral every 8 hours  norepinephrine Infusion 0.05 MICROgram(s)/kG/Min (6.38 mL/Hr) IV Continuous <Continuous>  pantoprazole   Suspension 40 milliGRAM(s) Oral daily  polyethylene glycol 3350 17 Gram(s) Oral daily  polymyxin B IVPB 581336 Unit(s) IV Intermittent every 12 hours  propofol Infusion 10 MICROgram(s)/kG/Min (4.08 mL/Hr) IV Continuous <Continuous>  senna 2 Tablet(s) Oral at bedtime  zinc oxide 20% Ointment 1 Application(s) Topical three times a day    MEDICATIONS  (PRN):  acetaminophen     Tablet .. 650 milliGRAM(s) Oral every 6 hours PRN Mild Pain (1 - 3), Moderate Pain (4 - 6)  ibuprofen  Suspension. 600 milliGRAM(s) Oral every 8 hours PRN Temp greater or equal to 38.5C (101.3F)  melatonin 3 milliGRAM(s) Oral at bedtime PRN Insomnia      New X-rays reviewed:                                                                                  ECHO    CXR interpreted by me: left lung hemiopacification, likely from mucus plugging and atelectasis

## 2022-12-19 NOTE — PROGRESS NOTE ADULT - ASSESSMENT
52yMale being evaluated for goals of care and symptom management       MEDD (morphine equivalent daily dose): Fent gtt      See Recs below.    Please call [phone] with questions or concerns 24/7.   We will continue to follow.

## 2022-12-19 NOTE — PROGRESS NOTE ADULT - ASSESSMENT
IMPRESSION:  Sepsis POA  Septic shock, still requiring levophed  Acute hypoxemic respiratory failure/ Left mucus plug / lung atelectasis  Pseudomonas/ acineto in BAL / wash   Urinary tract infection/ proteus  Bacteremia - MRSE treated   Non verbal at baseline  NSTEMI likely Type 2  History of MS/neurogenic bladder    PLAN:      CNS: Add precedex, wean off propofol. SAT today to assess mental status. At baseline nonverbal but follows commands.     HEENT: Oral care.  ET Care     PULMONARY:  Recommend nebs every 4 hours.  Vent changes:   FIO2 to 92- 96%, chest PT. Spoke with mother declined [facility] want palliative care. Chest physiotherapy. Acetylcysteine inhalation q6h. Frequent suctioning. Agressive pulmonary toilet.     CARDIOVASCULAR: avoid volume overload.  Wean Levophed. Target MAP>65.  Increase midodrine to 30mg q8h.     GI: GI prophylaxis. Hold PEG feeds to evaluate diarrhea, osmotic vs secretory.     RENAL:  Follow up lytes.  Correct as needed. IR following for SPC. Replete Mg.     INFECTIOUS DISEASE: ABX per ID.     HEMATOLOGICAL: DVT prophylaxis. On Lovenox.     ENDOCRINE:  Follow up FS. Target -180.     MICU    [facility]    Palliative care following    Poor prognosis     R IJ 12/14   IMPRESSION:  Sepsis POA  Septic shock, still requiring levophed  Acute hypoxemic respiratory failure/ Left mucus plug / lung atelectasis  Pseudomonas/ acineto in BAL / wash   Urinary tract infection/ proteus  Bacteremia - MRSE treated   Non verbal at baseline  NSTEMI likely Type 2  History of MS/neurogenic bladder    PLAN:      CNS: Add precedex, wean off propofol. SAT today to assess mental status. At baseline nonverbal but follows commands.     HEENT: Oral care.  ET Care     PULMONARY:  Recommend nebs every 4 hours.  Vent changes:   FIO2 to 92- 96%, chest PT. Spoke with mother declined bronch want palliative care. Chest physiotherapy. Acetylcysteine inhalation q6h. Frequent suctioning. Agressive pulmonary toilet.     CARDIOVASCULAR: avoid volume overload.  Wean Levophed. Target MAP>65.  Increase midodrine to 30mg q8h.     GI: GI prophylaxis. Hold PEG feeds to evaluate diarrhea, osmotic vs secretory.     RENAL:  Follow up lytes.  Correct as needed. IR following for SPC. Replete Mg.     INFECTIOUS DISEASE: ABX per ID.     HEMATOLOGICAL: DVT prophylaxis. On Lovenox.     ENDOCRINE:  Follow up FS. Target -180.     MICU    [facility]    Palliative care following    Poor prognosis     R IJ 12/14

## 2022-12-20 NOTE — PROGRESS NOTE ADULT - PROBLEM SELECTOR PLAN 5
- DNR  - will follow  - case d/w ICU team  ______________  Kevan Patel MD  Palliative Medicine  [facility]   [facility] Ger[facility] Pall[facility]  [phone]

## 2022-12-20 NOTE — PROGRESS NOTE ADULT - ASSESSMENT
ASSESSMENT  Sepsis POA  Septic shock   Acute hypoxemic respiratory failure/ Left mucus plug / lung atelectasis  Urinary tract infection/ proteus  Bacteremia - MRSE treated   Non verbal at baseline but able to make himself understood  NSTEMI likely Type 2  history of MS/neurogenic bladder  dysphagia on G tube feedings  hypomagnesemia    PLAN  if to remain  on propofol  will  change tube feed to Peptamen 1.5 at 375 ml x3 feeds  - local skin care  check bmp/phos/mg and correct phos to > 3.5

## 2022-12-20 NOTE — PROGRESS NOTE ADULT - ASSESSMENT
53 y/o man with PMH of MS, nonverbal at baseline, chronic low back pain, neurogenic bladder s/p SPC placement and s/p dislodgement was at IR for replacement of SPC when he was found to be in severe sepsis.    #Severe sepsis present on admission  acute pyelonephritis  - L lung atelectasis/Mucus plug   - Bcx : GPC in clusters x3, 1 culture with staph hominis  - blood culture neg since   - bronch sputum culture on >> pseudomonas, and acinetobacter  - previously intubated, bronch  removed secretions/mucus plug/atelectasis - bronchial culture: pseudomonas  - MRSA negative  - dc avycaz per ID on 12/15 and start cefidericol, added polymyxin on  as per ID  - repeat bronchoscopy , culture: mod pseudomonas  - repeat bronchoscopy , gram+ cocci in clusters  - fever on  and , bcx ngtd and repeat  - levo 0.03, propofol, precedex, fentanyl wean levo  - repeat bronch on 12/15  -  left lung collapsed again, no bronch as per mother    #diarrhea on   - NPO for 24 hours  - if diarrhea doesnt improve can send c diff and GI PCR  - better today, start trickle feed today    #Anemia  - decrease in hgb 9.4->8.6  - monitor for now    #Neurogenic bladder  - had SPC that was dislodged - now with [facility], SPC to be replaced but became septic  - on flomax  - recall IR when improved for possible SPC placement    #Elevated dimer   - 734 -> 1149 -> 713  - vascular can’t do duplex bc pt is contracted  - CTA  no PE, complete left main stem occlusion, near complete atelectasis/consolidation  - switch lovenox to ppx only for now, spoke with pt mother who said pt on eliquis at facility for dvt ppx. no need for full AC    #Dysphagia   - PEG feeding  - monitor FS    #MS, nonverbal, bedbound, contracted/functional quadriplegia   - repositioning per protocol, continue baclofen    #SUKHDEV   -likely prerenal, resolved   -continue hydration and monitor     #NSTEMI type 2 due to sepsis   -TTE: EF 58%, G1DD, mild MVR    Diet: Hold PEG feeds for 24 hours due to diarrhea  DVT prophylaxis: Lovenox  GI prophylaxis: Protonix  Code status: DNR made on     Handoff pendin) Mother considering CMO strongly. DC all labs  2) wean sedative, SAT again today

## 2022-12-20 NOTE — PROGRESS NOTE ADULT - SUBJECTIVE AND OBJECTIVE BOX
NUTRITION SUPPORT TEAM  -  PROGRESS NOTE   remain vented  sedated on propofol  on peg tube feed  +rectal tube in place  spoke with ICU team    REVIEW OF SYSTEMS:  Negative except as noted above.     VITALS:  T(F): 97 (12-20 @ 12:00), Max: 97.5 (12-20 @ 04:00)  HR: 58 (12-20 @ 13:45) (42 - 58)  BP: 108/65 (12-20 @ 13:30) (82/54 - 122/70)  RR: 22 (12-20 @ 13:45) (21 - 22)  SpO2: 100% (12-20 @ 13:45) (100% - 100%)    HEIGHT/WEIGHT/BMI:   Height (cm): 195.6 (12-03)  Weight (kg): 68 (12-02)  BMI (kg/m2): 17.8 (12-03)  12-19-22 @ 07:01  -  12-20-22 @ 07:00  --------------------------------------------------------  IN:    Dexmedetomidine: 251.6 mL    Enteral Tube Flush: 400 mL    FentaNYL: 557.6 mL    IV PiggyBack: 299.9 mL    Norepinephrine: 287.8 mL    Propofol: 165 mL    Propofol: 81.6 mL    Propofol: 65.3 mL  Total IN: 2108.7 mL    OUT:    Indwelling Catheter - Urethral (mL): 3190 mL    Rectal Tube (mL): 250 mL  Total OUT: 3440 mL    Total NET: -1331.3 mL  STANDING MEDICATIONS:   acetylcysteine 20%  Inhalation 4 milliLiter(s) Inhalation every 6 hours  acetylcysteine 20% for bronchoscopy 4 milliLiter(s) Nebulizer once  albuterol    90 MICROgram(s) HFA Inhaler 1 Puff(s) Inhalation every 4 hours  baclofen 20 milliGRAM(s) Oral every 12 hours  cefiderocol IVPB 2000 milliGRAM(s) IV Intermittent every 8 hours  chlorhexidine 0.12% Liquid 15 milliLiter(s) Oral Mucosa every 12 hours  chlorhexidine 2% Cloths 1 Application(s) Topical daily  dexMEDEtomidine Infusion 0.2 MICROgram(s)/kG/Hr IV Continuous <Continuous>  donepezil 10 milliGRAM(s) Oral at bedtime  doxazosin 2 milliGRAM(s) Oral at bedtime  enoxaparin Injectable 40 milliGRAM(s) SubCutaneous every 24 hours  fentaNYL   Infusion. 0.5 MICROgram(s)/kG/Hr IV Continuous <Continuous>  magnesium sulfate  IVPB 2 Gram(s) IV Intermittent once  memantine 10 milliGRAM(s) Oral two times a day  midodrine 20 milliGRAM(s) Oral every 8 hours  norepinephrine Infusion 0.05 MICROgram(s)/kG/Min IV Continuous <Continuous>  pantoprazole   Suspension 40 milliGRAM(s) Oral daily  propofol Infusion 10 MICROgram(s)/kG/Min IV Continuous <Continuous>  zinc oxide 20% Ointment 1 Application(s) Topical three times a day      LABS:                         8.6    8.54  )-----------( 560      ( 19 Dec 2022 05:20 )             26.8     141  |  105  |  4<L>  ----------------------------<  113<H>          (12-19-22 @ 05:20)  3.9   |  28  |  <0.5<L>    Ca    8.6          (12-19-22 @ 05:20)  Phos  4.2         (12-18-22 @ 23:40)  Mg     1.6         (12-19-22 @ 05:20)    TPro  5.6<L>  /  Alb  2.4<L>  /  TBili  <0.2  /  DBili  x   /  AST  13  /  ALT  8   /  AlkPhos  93       12-19-22 @ 05:20  Triglycerides, Serum: 125 mg/dL (12-15 @ 04:55)  Blood Glucose (Past 24 hours):  104 mg/dL (12-20 @ 06:19)  105 mg/dL (12-19 @ 15:45)    DIET:   Diet, NPO with Tube Feed:   Tube Feeding Modality: Orogastric  Peptamen A.F. Formula  Total Volume for 24 Hours (mL): 480  Continuous  Starting Tube Feed Rate mL per Hour: 20  Until Goal Tube Feed Rate (mL per Hour): 20  Tube Feed Duration (in Hours): 24  Tube Feed Start Time: 12:00 (12-20-22 @ 10:16) [Active]    RADIOLOGY:   < from: Xray Chest 1 View- PORTABLE-Routine (Xray Chest 1 View- PORTABLE-Routine in AM.) (12.20.22 @ 06:01) >  Impression:  Significant opacification of the left hemithorax. Mild improved aeration   right upper lobe.     NUTRITION SUPPORT TEAM  -  PROGRESS NOTE   remain vented  sedated on propofol  on peg tube feed  +rectal tube in place  spoke with ICU team    REVIEW OF SYSTEMS:  Negative except as noted above.     VITALS:  T(F): 97 (12-20 @ 12:00), Max: 97.5 (12-20 @ 04:00)  HR: 58 (12-20 @ 13:45) (42 - 58)  BP: 108/65 (12-20 @ 13:30) (82/54 - 122/70)  RR: 22 (12-20 @ 13:45) (21 - 22)  SpO2: 100% (12-20 @ 13:45) (100% - 100%)    ABG - ( 20 Dec 2022 03:32 )  pH, Arterial: 7.40  pH, Blood: x     /  pCO2: 51    /  pO2: 70    / HCO3: 32    / Base Excess: 6.0   /  SaO2: 95.5    Mode: AC/ CMV (Assist Control/ Continuous Mandatory Ventilation)  RR (machine): 22  TV (machine): 400  FiO2: 40  PEEP: 8  ITime: 1  MAP: 11  PIP: 19    HEIGHT/WEIGHT/BMI:   Height (cm): 195.6 (12-03)  Weight (kg): 68 (12-02)  BMI (kg/m2): 17.8 (12-03)  12-19-22 @ 07:01  -  12-20-22 @ 07:00  --------------------------------------------------------  IN:    Dexmedetomidine: 251.6 mL    Enteral Tube Flush: 400 mL    FentaNYL: 557.6 mL    IV PiggyBack: 299.9 mL    Norepinephrine: 287.8 mL    Propofol: 165 mL    Propofol: 81.6 mL    Propofol: 65.3 mL  Total IN: 2108.7 mL    OUT:    Indwelling Catheter - Urethral (mL): 3190 mL    Rectal Tube (mL): 250 mL  Total OUT: 3440 mL    Total NET: -1331.3 mL  STANDING MEDICATIONS:   acetylcysteine 20%  Inhalation 4 milliLiter(s) Inhalation every 6 hours  acetylcysteine 20% for bronchoscopy 4 milliLiter(s) Nebulizer once  albuterol    90 MICROgram(s) HFA Inhaler 1 Puff(s) Inhalation every 4 hours  baclofen 20 milliGRAM(s) Oral every 12 hours  cefiderocol IVPB 2000 milliGRAM(s) IV Intermittent every 8 hours  chlorhexidine 0.12% Liquid 15 milliLiter(s) Oral Mucosa every 12 hours  chlorhexidine 2% Cloths 1 Application(s) Topical daily  dexMEDEtomidine Infusion 0.2 MICROgram(s)/kG/Hr IV Continuous <Continuous>  donepezil 10 milliGRAM(s) Oral at bedtime  doxazosin 2 milliGRAM(s) Oral at bedtime  enoxaparin Injectable 40 milliGRAM(s) SubCutaneous every 24 hours  fentaNYL   Infusion. 0.5 MICROgram(s)/kG/Hr IV Continuous <Continuous>  magnesium sulfate  IVPB 2 Gram(s) IV Intermittent once  memantine 10 milliGRAM(s) Oral two times a day  midodrine 20 milliGRAM(s) Oral every 8 hours  norepinephrine Infusion 0.05 MICROgram(s)/kG/Min IV Continuous <Continuous>  pantoprazole   Suspension 40 milliGRAM(s) Oral daily  propofol Infusion 10 MICROgram(s)/kG/Min IV Continuous <Continuous>  zinc oxide 20% Ointment 1 Application(s) Topical three times a day      LABS:                         8.6    8.54  )-----------( 560      ( 19 Dec 2022 05:20 )             26.8     141  |  105  |  4<L>  ----------------------------<  113<H>          (12-19-22 @ 05:20)  3.9   |  28  |  <0.5<L>    Ca    8.6          (12-19-22 @ 05:20)  Phos  4.2         (12-18-22 @ 23:40)  Mg     1.6         (12-19-22 @ 05:20)    TPro  5.6<L>  /  Alb  2.4<L>  /  TBili  <0.2  /  DBili  x   /  AST  13  /  ALT  8   /  AlkPhos  93       12-19-22 @ 05:20  Triglycerides, Serum: 125 mg/dL (12-15 @ 04:55)  Blood Glucose (Past 24 hours):  104 mg/dL (12-20 @ 06:19)  105 mg/dL (12-19 @ 15:45)    DIET:   Diet, NPO with Tube Feed:   Tube Feeding Modality: Orogastric  Peptamen A.F. Formula  Total Volume for 24 Hours (mL): 480  Continuous  Starting Tube Feed Rate mL per Hour: 20  Until Goal Tube Feed Rate (mL per Hour): 20  Tube Feed Duration (in Hours): 24  Tube Feed Start Time: 12:00 (12-20-22 @ 10:16) [Active]    RADIOLOGY:   < from: Xray Chest 1 View- PORTABLE-Routine (Xray Chest 1 View- PORTABLE-Routine in AM.) (12.20.22 @ 06:01) >  Impression:  Significant opacification of the left hemithorax. Mild improved aeration   right upper lobe.

## 2022-12-20 NOTE — PROGRESS NOTE ADULT - SUBJECTIVE AND OBJECTIVE BOX
KEVIN MEDRANO  52y, Male  Allergy: penicillin (Unknown)  vancomycin (Unknown)      LOS  18d    CHIEF COMPLAINT: sepsis (20 Dec 2022 11:29)      INTERVAL EVENTS/HPI  - T(F): , Max: 100.8 (12-19-22 @ 20:00) fever  - WBC Count: 8.54 (12-19-22 @ 05:20)  WBC Count: 6.11 (12-18-22 @ 23:40)     - Creatinine, Serum: <0.5 (12-19-22 @ 05:20)  Creatinine, Serum: <0.5 (12-18-22 @ 23:40)     -   -   -     ROS  cannot obtain secondary to patient's sedation and/or mental status    VITALS:  T(F): 96.6, Max: 100.8 (12-19-22 @ 20:00)  HR: 46  BP: 92/59  RR: 21Vital Signs Last 24 Hrs  T(C): 35.9 (20 Dec 2022 07:30), Max: 38.2 (19 Dec 2022 20:00)  T(F): 96.6 (20 Dec 2022 07:30), Max: 100.8 (19 Dec 2022 20:00)  HR: 46 (20 Dec 2022 11:30) (42 - 108)  BP: 92/59 (20 Dec 2022 11:30) (75/48 - 125/75)  BP(mean): 66 (20 Dec 2022 11:30) (55 - 92)  RR: 21 (20 Dec 2022 11:30) (21 - 23)  SpO2: 100% (20 Dec 2022 11:30) (100% - 100%)    Parameters below as of 20 Dec 2022 07:30  Patient On (Oxygen Delivery Method): ventilator    O2 Concentration (%): 40    PHYSICAL EXAM:  Gen: Intubated  CV: RRR  Lungs: Decreased BS at bases  Abd: Soft  Neuro: Sedated  Lines clean, no phlebitis    FH: Non-contributory  Social Hx: Non-contributory    TESTS & MEASUREMENTS:                        8.6    8.54  )-----------( 560      ( 19 Dec 2022 05:20 )             26.8     12-19    141  |  105  |  4<L>  ----------------------------<  113<H>  3.9   |  28  |  <0.5<L>    Ca    8.6      19 Dec 2022 05:20  Phos  4.2     12-18  Mg     1.6     12-19    TPro  5.6<L>  /  Alb  2.4<L>  /  TBili  <0.2  /  DBili  x   /  AST  13  /  ALT  8   /  AlkPhos  93  12-19      LIVER FUNCTIONS - ( 19 Dec 2022 05:20 )  Alb: 2.4 g/dL / Pro: 5.6 g/dL / ALK PHOS: 93 U/L / ALT: 8 U/L / AST: 13 U/L / GGT: x               Culture - Blood (collected 12-14-22 @ 18:21)  Source: .Blood Blood  Final Report (12-20-22 @ 03:01):    No Growth Final    Culture - Urine (collected 12-14-22 @ 18:20)  Source: Clean Catch Clean Catch (Midstream)  Preliminary Report (12-19-22 @ 13:12):    >100,000 CFU/ml Candida auris    Sent to [facility]    [address] [id number]    for additional susceptibility  Organism: Candida auris (12-19-22 @ 13:11)  Organism: Candida auris (12-19-22 @ 13:11)      -  Amphotericin B: N/I 2 For Candida species, no interpretation is available for any in vitro susceptibility testing.      -  Anidulafungin: N/I 0.12 For this antifungal agent, the data are based largely on experience with non-neutropenic patients with candidemia.  The clinical relevance of this antifungal agent in other settings is uncertain.      -  Caspofungin: N/I 0.12 For this antifungal agent, the data are based largely on experience with non-neutropenic patients with candidemia.  The clinical relevance of this antifungal agent in other settings is uncertain.      -  Fluconazole: N/I >256 Fluconazole: Susceptibility depends on achieving the maximum possible blood level. Doses higher than the standard dosing amount (6mg/kg/day) may be needed in adults with normal renal function and body habitus.  This test was developed and its performance characteristics determined by [facility], [place], [place]  It has not been cleared or approved by the [place]. Food and Drug Administration. S - Susceptible; SDD - Susceptible Dose Dependent; I - Intermediate; R - Resistant; N/I - No Interpretation Available      -  Micafungin: N/I 0.12 For this antifungal agent, the data are based largely on experience with non-neutropenic patients with candidemia.  The clinical relevance of this antifungal agent in other settings is uncertain.      -  Posaconazole: N/I 0.25 For Candida species, no interpretation is available for any in vitro susceptibility testing.      -  Voriconazole: N/I 2 For this antifungal agent, the data are based largely on experience with non-neutropenic patients with candidemia.  The clinical relevance of this antifungal agent in other settings is uncertain.      Method Type: YSTMIC    Culture - Blood (collected 12-14-22 @ 11:24)  Source: .Blood None  Final Report (12-19-22 @ 23:01):    No Growth Final    Culture - Fungal, Bronchial (collected 12-13-22 @ 09:40)  Source: .Bronchial None  Preliminary Report (12-19-22 @ 10:28):    Few Yeast    Culture - Acid Fast - Bronchial w/Smear (collected 12-13-22 @ 09:40)  Source: .Bronchial None  Preliminary Report (12-17-22 @ 15:06):    Culture is being performed.    Culture - Bronchial (collected 12-13-22 @ 09:40)  Source: .Bronchial None  Gram Stain (12-13-22 @ 20:33):    Moderate polymorphonuclear leukocytes per low power field    Rare Squamous epithelial cells per low power field    Few Gram Positive Cocci in Clusters per oil power field  Final Report (12-18-22 @ 22:11):    Numerous Mixed gram negative rods including    Moderate Pseudomonas aeruginosa (Carbapenem Resistant)    Numerous Acinetobacter baumannii/nosocom group (Carbapenem Resistant)    No routine respiratory nadya Isolated  Organism: Pseudomonas aeruginosa (Carbapenem Resistant)  Pseudomonas aeruginosa (Carbapenem Resistant)  Acinetobacter baumannii/nosocom group (Carbapenem Resistant)  Acinetobacter baumannii/nosocom group (Carbapenem Resistant)  Acinetobacter baumannii/nosocom group (Carbapenem Resistant) (12-18-22 @ 20:51)  Organism: Acinetobacter baumannii/nosocom group (Carbapenem Resistant) (12-18-22 @ 20:51)      -  Polymyxin B: I 2      Method Type: ETEST  Organism: Pseudomonas aeruginosa (Carbapenem Resistant) (12-17-22 @ 16:38)      -  Cefiderocol: S      Method Type: KB  Organism: Acinetobacter baumannii/nosocom group (Carbapenem Resistant) (12-16-22 @ 20:19)      -  Cefiderocol: S      -  Minocycline: R      -  Piperacillin/Tazobactam: R      Method Type: KB  Organism: Acinetobacter baumannii/nosocom group (Carbapenem Resistant) (12-16-22 @ 20:19)      -  Amikacin: R >32      -  Ampicillin/Sulbactam: R >16/8      -  Cefepime: R >16      -  Ceftazidime: R >16      -  Ciprofloxacin: R >2      -  Gentamicin: R >8      -  Imipenem: R >8      -  Levofloxacin: R >4      -  Meropenem: R >8      -  Tobramycin: R >8      -  Trimethoprim/Sulfamethoxazole: R >2/38      Method Type: GAURAV  Organism: Pseudomonas aeruginosa (Carbapenem Resistant) (12-16-22 @ 20:19)      -  Amikacin: S <=16      -  Aztreonam: R >16      -  Cefepime: I 16      -  Ceftazidime: S 4      -  Ceftazidime/Avibactam: S <=4      -  Ceftolozane/tazobactam: S <=2      -  Ciprofloxacin: R >2      -  Gentamicin: R >8      -  Imipenem: R >8      -  Levofloxacin: R >4      -  Meropenem: R >8      -  Piperacillin/Tazobactam: S 16      -  Tobramycin: R >8      Method Type: GAURAV    Culture - Blood (collected 12-13-22 @ 01:10)  Source: .Blood None  Final Report (12-18-22 @ 14:00):    No Growth Final    Culture - Blood (collected 12-12-22 @ 20:08)  Source: .Blood Blood  Final Report (12-18-22 @ 02:00):    No Growth Final    Culture - Sputum (collected 12-12-22 @ 11:55)  Source: Trach Asp Tracheal Aspirate  Gram Stain (12-13-22 @ 02:13):    Rare polymorphonuclear leukocytes per low power field    No Squamous epithelial cells per low power field    No organisms seen per oil power field  Final Report (12-15-22 @ 15:53):    Moderate Pseudomonas aeruginosa (Carbapenem Resistant)    Moderate Acinetobacter baumannii/nosocom group (Carbapenem Resistant)  Organism: Acinetobacter baumannii/nosocom group (Carbapenem Resistant)  Acinetobacter baumannii/nosocom group (Carbapenem Resistant)  Pseudomonas aeruginosa (Carbapenem Resistant) (12-15-22 @ 15:53)  Organism: Pseudomonas aeruginosa (Carbapenem Resistant) (12-15-22 @ 15:53)      -  Amikacin: S <=16      -  Aztreonam: R >16      -  Cefepime: I 16      -  Ceftazidime: S 4      -  Ceftazidime/Avibactam: S <=4      -  Ceftolozane/tazobactam: S <=2      -  Ciprofloxacin: R >2      -  Gentamicin: R >8      -  Imipenem: R >8      -  Levofloxacin: R >4      -  Meropenem: R >8      -  Piperacillin/Tazobactam: S 16      -  Tobramycin: R >8      Method Type: GAURAV  Organism: Acinetobacter baumannii/nosocom group (Carbapenem Resistant) (12-15-22 @ 15:53)      -  Piperacillin/Tazobactam: R      Method Type: KB  Organism: Acinetobacter baumannii/nosocom group (Carbapenem Resistant) (12-15-22 @ 15:53)      -  Amikacin: R >32      -  Ampicillin/Sulbactam: I 16/8      -  Cefepime: R >16      -  Ceftazidime: R >16      -  Ciprofloxacin: R >2      -  Gentamicin: R >8      -  Imipenem: R >8      -  Levofloxacin: R >4      -  Meropenem: R >8      -  Polymyxin B: I 2      -  Tobramycin: R >8      -  Trimethoprim/Sulfamethoxazole: R >2/38      Method Type: GAURAV    Culture - Bronchial (collected 12-08-22 @ 15:00)  Source: .Bronchial None  Gram Stain (12-09-22 @ 07:14):    Moderate polymorphonuclear leukocytes seen per oil power field    No squamous epithelial cells seen per oil power field    No organisms seen per oil power field  Final Report (12-11-22 @ 11:48):    Few Pseudomonas aeruginosa (Carbapenem Resistant)    Normal Respiratory Nadya present  Organism: Pseudomonas aeruginosa (Carbapenem Resistant) (12-11-22 @ 11:48)  Organism: Pseudomonas aeruginosa (Carbapenem Resistant) (12-11-22 @ 11:48)      -  Amikacin: S <=16      -  Aztreonam: R >16      -  Cefepime: S 8      -  Ceftazidime: S 4      -  Ceftazidime/Avibactam: S <=4      -  Ceftolozane/tazobactam: S <=2      -  Ciprofloxacin: R >2      -  Gentamicin: R >8      -  Imipenem: R >8      -  Levofloxacin: R >4      -  Meropenem: R >8      -  Piperacillin/Tazobactam: S 16      -  Tobramycin: R >8      Method Type: GAURAV    Culture - Blood (collected 12-07-22 @ 13:11)  Source: .Blood None  Final Report (12-12-22 @ 23:00):    No Growth Final    Culture - Blood (collected 12-04-22 @ 08:30)  Source: .Blood None  Final Report (12-09-22 @ 22:00):    No Growth Final    Culture - Blood (collected 12-03-22 @ 21:51)  Source: .Blood None  Final Report (12-09-22 @ 09:00):    No Growth Final    Culture - Urine (collected 12-02-22 @ 10:34)  Source: Clean Catch Clean Catch (Midstream)  Final Report (12-04-22 @ 09:29):    >=3 organisms. Probable collection contamination.    Culture - Blood (collected 12-02-22 @ 08:21)  Source: .Blood Blood-Peripheral  Gram Stain (12-04-22 @ 01:05):    Growth in aerobic bottle: Gram Positive Cocci in Clusters    Growth in anaerobic bottle: Gram Positive Cocci in Clusters  Final Report (12-04-22 @ 21:59):    Growth in aerobic and anaerobic bottles: Staphylococcus hominis Coag    Negative Staphylococcus    Single set isolate, possible contaminant. Contact    Microbiology if susceptibility testing clinically    indicated.    Growth in aerobic bottle: Staphylococcus epidermidis Coag Negative    Staphylococcus    Single set isolate, possible contaminant. Contact    Microbiology if susceptibility testing clinically    indicated.    ***Blood Panel PCR results on this specimen are available    approximately 3 hours after the Gram stain result.***    Gram stain, PCR, and/or culture results may not always    correspond due to difference in methodologies.    ************************************************************    This PCR assay was performed by multiplex PCR. This    Assay tests for 66 bacterial and resistance gene targets.    Please refer to the [facility] test directory    at [email or web address] for details.  Organism: Blood Culture PCR (12-04-22 @ 21:59)  Organism: Blood Culture PCR (12-04-22 @ 21:59)      -  Staphylococcus epidermidis, Methicillin resistant: Detec      Method Type: PCR    Culture - Blood (collected 12-02-22 @ 08:21)  Source: .Blood Blood-Peripheral  Gram Stain (12-03-22 @ 16:14):    Growth in aerobic bottle: Gram Positive Cocci in Clusters  Final Report (12-05-22 @ 10:49):    Growth in aerobic bottle: Staphylococcus epidermidis  Organism: Staphylococcus epidermidis (12-05-22 @ 10:49)  Organism: Staphylococcus epidermidis (12-05-22 @ 10:49)      -  Ampicillin/Sulbactam: S <=8/4      -  Cefazolin: S <=4      -  Clindamycin: S <=0.25      -  Erythromycin: R >4      -  Gentamicin: S <=1 Should not be used as monotherapy      -  Oxacillin: S <=0.25      -  Penicillin: R 2      -  Rifampin: S <=1 Should not be used as monotherapy      -  Tetracycline: R >8      -  Trimethoprim/Sulfamethoxazole: R >2/38      -  Vancomycin: S 2      Method Type: GAURAV    Culture - Urine (collected 12-02-22 @ 06:59)  Source: Suprapubic Suprapubic  Final Report (12-04-22 @ 20:09):    Few Proteus mirabilis ESBL    Moderate Enterococcus faecalis  Organism: Proteus mirabilis ESBL  Enterococcus faecalis (12-04-22 @ 20:09)  Organism: Enterococcus faecalis (12-04-22 @ 20:09)      -  Ampicillin: S <=2 Predicts results to ampicillin/sulbactam, amoxacillin-clavulanate and  piperacillin-tazobactam.      -  Ciprofloxacin: R >2      -  Levofloxacin: R >4      -  Nitrofurantoin: S <=32 Should not be used to treat pyelonephritis.      -  Tetracycline: R >8      -  Vancomycin: S 1      Method Type: GAURAV  Organism: Proteus mirabilis ESBL (12-04-22 @ 20:09)      -  Amikacin: S <=16      -  Amoxicillin/Clavulanic Acid: S <=8/4      -  Ampicillin: R >16 These ampicillin results predict results for amoxicillin      -  Ampicillin/Sulbactam: I 16/8 Enterobacter, Klebsiella aerogenes, Citrobacter, and Serratia may develop resistance during prolonged therapy (3-4 days)      -  Aztreonam: R <=4      -  Cefazolin: R >16 For uncomplicated UTI with K. pneumoniae, E. coli, or P. mirablis: GAURAV <=16 is sensitive and GAURAV >=32 is resistant. This also predicts results for oral agents cefaclor, cefdinir, cefpodoxime, cefprozil, cefuroxime axetil, cephalexin and locarbef for uncomplicated UTI. Note that some isolates may be susceptible to these agents while testing resistant to cefazolin.      -  Cefepime: R >16      -  Ceftriaxone: R >32 Enterobacter, Klebsiella aerogenes, Citrobacter, and Serratia may develop resistance during prolonged therapy      -  Ciprofloxacin: R >2      -  Ertapenem: S <=0.5      -  Gentamicin: I 8      -  Levofloxacin: R >4      -  Meropenem: S <=1      -  Nitrofurantoin: R >64 Should not be used to treat pyelonephritis      -  Piperacillin/Tazobactam: S <=8      -  Tobramycin: I 8      -  Trimethoprim/Sulfamethoxazole: R >2/38      Method Type: GAURAV    Culture - Blood (collected 12-02-22 @ 06:59)  Source: .Blood Blood  Gram Stain (12-03-22 @ 19:58):    Growth in aerobic bottle: Gram Positive Cocci in Clusters  Final Report (12-05-22 @ 08:59):    Growth in aerobic bottle: Staphylococcus hominis  Organism: Staphylococcus hominis (12-05-22 @ 08:59)  Organism: Staphylococcus hominis (12-05-22 @ 08:59)      -  Ampicillin/Sulbactam: R <=8/4      -  Cefazolin: R <=4      -  Clindamycin: S 0.5      -  Erythromycin: R >4      -  Gentamicin: S <=1 Should not be used as monotherapy      -  Oxacillin: R >2      -  Penicillin: R 8      -  Rifampin: S <=1 Should not be used as monotherapy      -  Tetracycline: S <=1      -  Trimethoprim/Sulfamethoxazole: R >2/38      -  Vancomycin: S 2      Method Type: GAURAV            INFECTIOUS DISEASES TESTING  MRSA PCR Result.: Negative (12-09-22 @ 08:40)  Rapid RVP Result: NotDetec (12-07-22 @ 15:28)  Procalcitonin, Serum: 0.53 (12-03-22 @ 10:53)  Legionella Antigen, Urine: Negative (12-03-22 @ 08:10)  COVID-19 PCR: NotDetec (11-28-22 @ 18:30)      INFLAMMATORY MARKERS      RADIOLOGY & ADDITIONAL TESTS:  I have personally reviewed the last available Chest xray  CXR      CT      CARDIOLOGY TESTING  12 Lead ECG:   Ventricular Rate 93 BPM    Atrial Rate 93 BPM    P-R Interval 117 ms    QRS Duration 79 ms    Q-T Interval 325 ms    QTC Calculation(Bazett) 405 ms    P Axis 57 degrees    R Axis 79 degrees    Confirmed by ROWDY CHAO MD ([id number]) on 12/16/2022 6:58:08 AM (12-16-22 @ 06:04)      MEDICATIONS  acetylcysteine 20%  Inhalation 4  acetylcysteine 20% for bronchoscopy 4  albuterol    90 MICROgram(s) HFA Inhaler 1  baclofen 20  cefiderocol IVPB 2000  chlorhexidine 0.12% Liquid 15  chlorhexidine 2% Cloths 1  dexMEDEtomidine Infusion 0.2  donepezil 10  doxazosin 2  enoxaparin Injectable 40  fentaNYL   Infusion. 0.5  magnesium sulfate  IVPB 2  memantine 10  midodrine 20  norepinephrine Infusion 0.05  pantoprazole   Suspension 40  propofol Infusion 10  zinc oxide 20% Ointment 1      WEIGHT  Weight (kg): 68 (12-02-22 @ 08:20)      ANTIBIOTICS:  cefiderocol IVPB 2000 milliGRAM(s) IV Intermittent every 8 hours      All available historical records have been reviewed

## 2022-12-20 NOTE — PROGRESS NOTE ADULT - ASSESSMENT
IMPRESSION:  Sepsis POA  Septic shock, still requiring levophed  Acute hypoxemic respiratory failure/ Left mucus plug / lung atelectasis  Pseudomonas/ acineto in BAL / wash   Urinary tract infection/ proteus  Bacteremia - MRSE treated   Non verbal at baseline, but follows commands  NSTEMI likely Type 2  History of MS/neurogenic bladder    PLAN:      CNS: SAT today to assess mental status. At baseline nonverbal but follows commands.     HEENT: Oral care.  ET Care     PULMONARY:  Recommend nebs every 4 hours. Vent changes:   FIO2 to 92- 96%, chest PT. Spoke with mother declined bronch want palliative care. Chest physiotherapy. Acetylcysteine inhalation q6h. Frequent suctioning. Aggressive pulmonary toilet.     CARDIOVASCULAR: avoid volume overload.  Wean Levophed. Target MAP>65.  Increase midodrine to 30mg q8h.     GI: GI prophylaxis. Trickle feeds with elemental feeds.     RENAL:  Follow up lytes.  Correct as needed. IR following for SPC, currently has mnasfield.      INFECTIOUS DISEASE: ABX per ID.     HEMATOLOGICAL: DVT prophylaxis. On Lovenox. Family no longer wants blood work.     ENDOCRINE:  Follow up FS. Target -180.     MICU    GOC done, now DNR/DNI, family contemplating CMO    Palliative care following    Poor prognosis     R IJ 12/14   IMPRESSION:  Sepsis POA  Septic shock, still requiring levophed  Acute hypoxemic respiratory failure/ Left mucus plug / lung atelectasis  Pseudomonas/ acineto in BAL / wash   Urinary tract infection/ proteus  Bacteremia - MRSE treated   Non verbal at baseline, but follows commands  NSTEMI likely Type 2  History of MS/neurogenic bladder    PLAN:      CNS: SAT today to assess mental status. At baseline nonverbal but follows commands.     HEENT: Oral care.  ET Care     PULMONARY:  Recommend nebs every 4 hours. Vent changes:   FIO2 to 92- 96%, chest PT. Spoke with mother declined bronch wants palliative care. Chest physiotherapy. Acetylcysteine inhalation q6h. Frequent suctioning. Aggressive pulmonary toilet.     CARDIOVASCULAR: avoid volume overload.  Wean Levophed. Target MAP>65.  Increase midodrine to 30mg q8h.     GI: GI prophylaxis. Trickle feeds with elemental feeds.     RENAL:  Follow up lytes.  Correct as needed. IR following for SPC, currently has mansfield.      INFECTIOUS DISEASE: ABX per ID.     HEMATOLOGICAL: DVT prophylaxis. On Lovenox. Family no longer wants blood work.     ENDOCRINE:  Follow up FS. Target -180.     MICU    GOC done, now DNR/DNI, family contemplating CMO    Palliative care following    Poor prognosis     R IJ 12/14

## 2022-12-20 NOTE — PROGRESS NOTE ADULT - SUBJECTIVE AND OBJECTIVE BOX
HPI:  Pt is nonverbal at baseline. Hx obtained from chart.     51 yo male w/ PMH of MS, chronic low back pain, neurogenic bladder? s/p SPC placement and s/p dislodgement, presents for fever. Presented to the ED for SPC dislodgement a few days ago. Was seen by IR and planned for outpatient SPC replacement. Campuzano was placed at [facility]. Today. prior to placement, he was noted to be febrile w/ tachycardia and tachypnea so brought into ED.  Pt nonverbal at baseline, unable to provide further HPI.    Pt upgraded to ICU sepsis, on multiple pressors. Palliative care consulted, pt w end stage MS at baseline. Poor prognosis.      INTERVAL EVENTS:  12/16: pt appears sedated on ventilator in ICU   12/19: remains intubated, appears comfortable, lung collapse 12/18 12/20: patient appears comfortable    ADVANCE DIRECTIVES:    DECISION MAKER(s):  [ ] Health Care Proxy(s)  [ ] Surrogate(s)  [ ] Guardian           Name(s): Phone Number(s):  Answers: Caregiver Name: JAYY MITCHELL,  Answers: Caregiver Relationship: MOTHER, HCP  Answers: Caregiver Contact Number: [phone]  HEALTH CARE PROXY ON FILE IN EMR - REVIEWED   BASELINE (I)ADL(s) (prior to admission):  [place]: [ ]Total  [ ] Moderate [x ]Dependent  Allergies    penicillin (Unknown)  vancomycin (Unknown)    Intolerances    MEDICATIONS  (STANDING):  acetylcysteine 20%  Inhalation 4 milliLiter(s) Inhalation every 6 hours  acetylcysteine 20% for bronchoscopy 4 milliLiter(s) Nebulizer once  albuterol    90 MICROgram(s) HFA Inhaler 1 Puff(s) Inhalation every 4 hours  baclofen 20 milliGRAM(s) Oral every 12 hours  cefiderocol IVPB 2000 milliGRAM(s) IV Intermittent every 8 hours  chlorhexidine 0.12% Liquid 15 milliLiter(s) Oral Mucosa every 12 hours  chlorhexidine 2% Cloths 1 Application(s) Topical daily  dexMEDEtomidine Infusion 0.2 MICROgram(s)/kG/Hr (3.4 mL/Hr) IV Continuous <Continuous>  donepezil 10 milliGRAM(s) Oral at bedtime  doxazosin 2 milliGRAM(s) Oral at bedtime  enoxaparin Injectable 40 milliGRAM(s) SubCutaneous every 24 hours  fentaNYL   Infusion. 0.5 MICROgram(s)/kG/Hr (3.4 mL/Hr) IV Continuous <Continuous>  magnesium sulfate  IVPB 2 Gram(s) IV Intermittent once  memantine 10 milliGRAM(s) Oral two times a day  midodrine 30 milliGRAM(s) Oral every 8 hours  norepinephrine Infusion 0.05 MICROgram(s)/kG/Min (6.38 mL/Hr) IV Continuous <Continuous>  pantoprazole   Suspension 40 milliGRAM(s) Oral daily  propofol Infusion 10 MICROgram(s)/kG/Min (4.08 mL/Hr) IV Continuous <Continuous>  zinc oxide 20% Ointment 1 Application(s) Topical three times a day    MEDICATIONS  (PRN):  acetaminophen     Tablet .. 650 milliGRAM(s) Oral every 6 hours PRN Temp greater or equal to 38C (100.4F), Mild Pain (1 - 3)  ibuprofen  Suspension. 600 milliGRAM(s) Oral every 8 hours PRN Temp greater or equal to 38.5C (101.3F)  melatonin 3 milliGRAM(s) Oral at bedtime PRN Insomnia    PRESENT SYMPTOMS: [ x]Unable to obtain due to poor mentation   Source if other than patient:  [ ]Family   [ ]Team     Pain: [ ]yes [ ]no  QOL impact -   Location -                    Aggravating factors -  Quality -  Radiation -  Timing-  Severity (0-10 scale):  Minimal acceptable level (0-10 scale):     Dyspnea:                           [ ]Mild [ ]Moderate [ ]Severe  Anxiety:                             [ ]Mild [ ]Moderate [ ]Severe  Fatigue:                             [ ]Mild [ ]Moderate [ ]Severe  Nausea:                             [ ]Mild [ ]Moderate [ ]Severe  Loss of appetite:              [ ]Mild [ ]Moderate [ ]Severe  Constipation:                    [ ]Mild [ ]Moderate [ ]Severe    Other Symptoms:  [ ]All other review of systems negative     Palliative Performance Status Version 2:         %    http://npcrc.org/files/news/palliative_performance_scale_ppsv2.pdf  PHYSICAL EXAM:  Vital Signs Last 24 Hrs  T(C): 36.1 (20 Dec 2022 12:00), Max: 38.2 (19 Dec 2022 20:00)  T(F): 97 (20 Dec 2022 12:00), Max: 100.8 (19 Dec 2022 20:00)  HR: 54 (20 Dec 2022 16:11) (42 - 66)  BP: 114/67 (20 Dec 2022 15:45) (82/54 - 125/75)  BP(mean): 81 (20 Dec 2022 15:45) (59 - 92)  RR: 22 (20 Dec 2022 16:00) (21 - 22)  SpO2: 100% (20 Dec 2022 16:11) (100% - 100%)    Parameters below as of 20 Dec 2022 12:00  Patient On (Oxygen Delivery Method): ventilator    O2 Concentration (%): 40  GENERAL:  [X ] No acute distress [ ]Lethargic  [ ]Unarousable  [ ]Verbal  [ ]Non-Verbal [ ]Cachexia    BEHAVIORAL/PSYCH:  [ ]Alert and Oriented x  [ ] Anxiety [ ] Delirium [ ] Agitation [ X] Calm   EYES: [ ] No scleral icterus [ ] Scleral icterus [X ] Closed  ENMT:  [ ]Dry mouth  [ ]No external oral lesions [ X] No external ear or nose lesions  CARDIOVASCULAR:  [ ]Regular [ ]Irregular [ ]Tachy [ ]Not Tachy  [ ][place] [ ] Edema [ ] No edema  PULMONARY:  [ ]Tachypnea  [ ]Audible excessive secretions [X ] No labored breathing [ ] labored breathing  GASTROINTESTINAL: [ ]Soft  [ ]Distended  [X ]Not distended [ ]Non tender [ ]Tender  MUSCULOSKELETAL: [ ]No clubbing [ ] clubbing  [X ] No cyanosis [ ] cyanosis  NEUROLOGIC: [ ]No focal deficits  [ ]Follows commands  [ ]Does not follow commands  [X ]Cognitive impairment  [ ]Dysphagia  [ ]Dysarthria  [ ]Paresis   SKIN: [ ] Jaundiced [ X] Non-jaundiced [ ]Rash [ ]No Rash [ ] Warm [ ] Dry  MISC/LINES: [X ] ET tube [ ] Trach [ ]NGT/OGT [ x]PEG [x ]Campuzano (SP tube dislodged Campuzano for now)     CRITICAL CARE:  [x ] Shock Present  [x ]Septic [ ]Cardiogenic [ ]Neurologic [ ]Hypovolemic  [x ]  Vasopressors [ ]  Inotropes   [x ]Respiratory failure present [x ]Mechanical ventilation [ ]Non-invasive ventilatory support [ ]High flow  [ ]Acute  [ ]Chronic [ ]Hypoxic  [ ]Hypercarbic [ ]Other  [ ]Other organ failure     LABS:                          8.6    8.54  )-----------( 560      ( 19 Dec 2022 05:20 )             26.8     12-19    141  |  105  |  4<L>  ----------------------------<  113<H>  3.9   |  28  |  <0.5<L>    Ca    8.6      19 Dec 2022 05:20  Phos  4.2     12-18  Mg     1.6     12-19          RADIOLOGY & ADDITIONAL STUDIES:        REFERRALS:   [ ]Chaplaincy  [ ]Hospice  [ ]Child Life  [ x]Social Work  [x ]Case management [ ]Holistic Therapy     Goals of Care Document:

## 2022-12-20 NOTE — PROGRESS NOTE ADULT - ASSESSMENT
ASSESSMENT  53 yo male w/ PMH of MS, chronic low back pain, neurogenic bladder? s/p SPC placement and s/p dislodgement, presents for fever. Presented to the ED for SPC dislodgement a few days ago. Was seen by IR and planned for outpatient SPC replacement. Mansfield was placed at [facility]. Today. prior to placement, he was noted to be febrile w/ tachycardia and tachypnea so brought into ED.      IMPRESSION  #Fevers, HAP/ VAP     12/13 bronch   Numerous Mixed gram negative rods including    Moderate Pseudomonas aeruginosa    Numerous Acinetobacter baumannii/nosocomialis group    12/12 Bronch Acinetobacter baumannii/nosocomialis  & CRE Pseudo    12/8 bronch cx     Few Pseudomonas aeruginosa (Carbapenem Resistant) Cefepime: S 8 Piperacillin/Tazobactam: S 16  CXR L white-out, suspect aspiration/mucous plug  #12/14 UCX   >100,000 CFU/ml Candida auris Susceptibility to follow.  #Severe Sepsis on admission T>101F, Pulse>90,WBC>12, lactic acidosis due to acute complicated cystitis    12/4 BCX NGTD     12/3 BCX NGTD     12/2 BCX  Staphylococcus epidermidis, Methicillin resistant: Detec 1/2 bottles ,      12/2 BCX GPC 1/2  Staphylococcus hominis    12/2 BCX  GPC 1/2 Staphylococcus hominis    12/2 UCX   >=3 organisms. Probable collection contamination.    12/2 UCX    Few Proteus mirabilis ESBL    Moderate Enterococcus faecalis    UA Blood: x / Protein: 100 mg/dL / Nitrite: Negative   Leuk Esterase: Large / RBC: 236 /HPF / WBC >720 /HPF   Sq Epi: x / Non Sq Epi: 2 /HPF / Bacteria: Many  #CXR Small left pleural effusion and possible left basilar consolidation.  #MS with neurogenic bladder, dislodged SPC  #Lactic acidosis  #Hypernatremia   QTC Calculation(Bazett) 407 ms  #Severe protein calorie malnutrition  BMI (kg/m2): 17.8    #Abx allergy: penicillin (childhood)  vancomycin (Unknown)    Creatinine, Serum: 0.5 (12-03-22 @ 08:22)    Weight (kg): 68 (12-02-22 @ 08:20)    RECOMMENDATIONS  - Repeat UA, UCX  - 12/14 UCX candida auris- strict isolation precautions, mansfield replaced 12/14. Hold off antifungal therapy, likely colonization. IF spikes >101 or hemodynamic compromise, add caspo 70mg x1 then 50mg q24h IV  - Continued L lung white out likely secondary to mucous plugging  - Cefiderocol 2g q8h IV x 14 days   - Pulm following  - GOC, grave prognosis as colonized with MDROs    If any questions, please call or send a message on [employer] Teams  Please continue to update ID with any pertinent new laboratory or radiographic findings  #[id number]

## 2022-12-20 NOTE — PROGRESS NOTE ADULT - SUBJECTIVE AND OBJECTIVE BOX
KEVIN MEDRANO 52y Male  MRN#: [id number]   Hospital Day: 18d    SUBJECTIVE  Patient is a 52y old Male who presents with a chief complaint of sepsis (20 Dec 2022 10:10)  Currently admitted to medicine with the primary diagnosis of Sepsis      INTERVAL HPI AND OVERNIGHT EVENTS:  Patient was examined and seen at bedside. This morning he is resting comfortably in bed. No issues or overnight events.    OBJECTIVE  PAST MEDICAL & SURGICAL HISTORY  Multiple sclerosis    Chronic back pain    Hypertension    No significant past surgical history      ALLERGIES:  penicillin (Unknown)  vancomycin (Unknown)    MEDICATIONS:  STANDING MEDICATIONS  acetylcysteine 20%  Inhalation 4 milliLiter(s) Inhalation every 6 hours  acetylcysteine 20% for bronchoscopy 4 milliLiter(s) Nebulizer once  albuterol    90 MICROgram(s) HFA Inhaler 1 Puff(s) Inhalation every 4 hours  baclofen 20 milliGRAM(s) Oral every 12 hours  cefiderocol IVPB 2000 milliGRAM(s) IV Intermittent every 8 hours  chlorhexidine 0.12% Liquid 15 milliLiter(s) Oral Mucosa every 12 hours  chlorhexidine 2% Cloths 1 Application(s) Topical daily  dexMEDEtomidine Infusion 0.2 MICROgram(s)/kG/Hr IV Continuous <Continuous>  donepezil 10 milliGRAM(s) Oral at bedtime  doxazosin 2 milliGRAM(s) Oral at bedtime  enoxaparin Injectable 40 milliGRAM(s) SubCutaneous every 24 hours  fentaNYL   Infusion. 0.5 MICROgram(s)/kG/Hr IV Continuous <Continuous>  magnesium sulfate  IVPB 2 Gram(s) IV Intermittent once  memantine 10 milliGRAM(s) Oral two times a day  midodrine 20 milliGRAM(s) Oral every 8 hours  norepinephrine Infusion 0.05 MICROgram(s)/kG/Min IV Continuous <Continuous>  pantoprazole   Suspension 40 milliGRAM(s) Oral daily  propofol Infusion 10 MICROgram(s)/kG/Min IV Continuous <Continuous>  zinc oxide 20% Ointment 1 Application(s) Topical three times a day    PRN MEDICATIONS  acetaminophen     Tablet .. 650 milliGRAM(s) Oral every 6 hours PRN  ibuprofen  Suspension. 600 milliGRAM(s) Oral every 8 hours PRN  melatonin 3 milliGRAM(s) Oral at bedtime PRN      VITAL SIGNS: Last 24 Hours  T(C): 35.9 (20 Dec 2022 07:30), Max: 38.2 (19 Dec 2022 20:00)  T(F): 96.6 (20 Dec 2022 07:30), Max: 100.8 (19 Dec 2022 20:00)  HR: 46 (20 Dec 2022 11:00) (42 - 108)  BP: 87/57 (20 Dec 2022 11:00) (75/48 - 125/75)  BP(mean): 67 (20 Dec 2022 11:00) (55 - 92)  RR: 21 (20 Dec 2022 11:00) (21 - 23)  SpO2: 100% (20 Dec 2022 11:00) (100% - 100%)    LABS:                        8.6    8.54  )-----------( 560      ( 19 Dec 2022 05:20 )             26.8     12-19    141  |  105  |  4<L>  ----------------------------<  113<H>  3.9   |  28  |  <0.5<L>    Ca    8.6      19 Dec 2022 05:20  Phos  4.2     12-18  Mg     1.6     12-19    TPro  5.6<L>  /  Alb  2.4<L>  /  TBili  <0.2  /  DBili  x   /  AST  13  /  ALT  8   /  AlkPhos  93  12-19        ABG - ( 20 Dec 2022 03:32 )  pH, Arterial: 7.40  pH, Blood: x     /  pCO2: 51    /  pO2: 70    / HCO3: 32    / Base Excess: 6.0   /  SaO2: 95.5                      PHYSICAL EXAM:  GENERAL: NAD, contratced  HEAD:  Atraumatic, Normocephalic  ENMT: intubated  NERVOUS SYSTEM: sedated  CHEST/LUNG: intubated, vented. bilateral breath sounds decreased on the left, crackles, no wheezing  HEART: Regular rate and rhythm. Normal S1/S1. No murmurs  ABDOMEN: Soft, Nontender, Nondistended; Bowel sounds present  EXTREMITIES:no edema, contracted

## 2022-12-20 NOTE — PROGRESS NOTE ADULT - ASSESSMENT
52yMale being evaluated for goals of care and symptom management       MEDD (morphine equivalent daily dose): Fent gtt      See Recs below.    Please call [phone] with questions or concerns 24/7.   We will continue to follow.

## 2022-12-20 NOTE — PROGRESS NOTE ADULT - SUBJECTIVE AND OBJECTIVE BOX
Patient is a 52y old  Male who presents with a chief complaint of sepsis (19 Dec 2022 16:49)        Over Night Events:  Became bradycardic overnight, had to DC propofol.     Drips  Levophed- 0.07  Off precedex  Propofol- 25  Fentanyl-1      ROS:     All pertinent ROS are negative except HPI         PHYSICAL EXAM    ICU Vital Signs Last 24 Hrs  T(C): 35.9 (20 Dec 2022 07:30), Max: 38.2 (19 Dec 2022 20:00)  T(F): 96.6 (20 Dec 2022 07:30), Max: 100.8 (19 Dec 2022 20:00)  HR: 44 (20 Dec 2022 10:00) (42 - 108)  BP: 92/63 (20 Dec 2022 10:00) (75/48 - 125/75)  BP(mean): 70 (20 Dec 2022 10:00) (55 - 92)  RR: 21 (20 Dec 2022 10:00) (21 - 23)  SpO2: 100% (20 Dec 2022 10:00) (100% - 100%)    O2 Parameters below as of 20 Dec 2022 07:30  Patient On (Oxygen Delivery Method): ventilator    O2 Concentration (%): 40        CONSTITUTIONAL:  Ill appearing. NAD    ENT:   Airway patent,   Mouth with normal mucosa.   No thrush    EYES:   Pupils equal,   Round and reactive to light.    CARDIAC:   Normal rate,   Regular rhythm.    No edema    RESPIRATORY:   ET+  No wheezing  Bilateral BS  Normal chest expansion  Not tachypneic,  No use of accessory muscles    GASTROINTESTINAL:  Abdomen soft,   Non-tender,   No guarding,   + BS    GENITOURINARY  Campuazno  normal genitalia for sex  no edema    MUSCULOSKELETAL:   Range of motion is not limited,  No clubbing, cyanosis    NEUROLOGICAL:   Sedated    SKIN:   Stage 2 sacral ulcer, WCN  Skin normal color for race,   Warm and dry  No evidence of rash.    PSYCHIATRIC:    No apparent risk to self or others.      12-19-22 @ 07:01  -  12-20-22 @ 07:00  --------------------------------------------------------  IN:    Dexmedetomidine: 251.6 mL    Enteral Tube Flush: 400 mL    FentaNYL: 557.6 mL    IV PiggyBack: 299.9 mL    Norepinephrine: 287.8 mL    Propofol: 165 mL    Propofol: 81.6 mL    Propofol: 65.3 mL  Total IN: 2108.7 mL    OUT:    Indwelling Catheter - Urethral (mL): 3190 mL    Rectal Tube (mL): 250 mL  Total OUT: 3440 mL    Total NET: -1331.3 mL      12-20-22 @ 07:01  -  12-20-22 @ 10:10  --------------------------------------------------------  IN:    FentaNYL: 27.2 mL    Norepinephrine: 37.7 mL    Propofol: 40.8 mL  Total IN: 105.7 mL    OUT:    Dexmedetomidine: 0 mL  Total OUT: 0 mL    Total NET: 105.7 mL          LABS:                            8.6    8.54  )-----------( 560      ( 19 Dec 2022 05:20 )             26.8                                               12-19    141  |  105  |  4<L>  ----------------------------<  113<H>  3.9   |  28  |  <0.5<L>    Ca    8.6      19 Dec 2022 05:20  Phos  4.2     12-18  Mg     1.6     12-19    TPro  5.6<L>  /  Alb  2.4<L>  /  TBili  <0.2  /  DBili  x   /  AST  13  /  ALT  8   /  AlkPhos  93  12-19                                                                                         LIVER FUNCTIONS - ( 19 Dec 2022 05:20 )  Alb: 2.4 g/dL / Pro: 5.6 g/dL / ALK PHOS: 93 U/L / ALT: 8 U/L / AST: 13 U/L / GGT: x                                                                                               Mode: AC/ CMV (Assist Control/ Continuous Mandatory Ventilation)  RR (machine): 22  TV (machine): 400  FiO2: 40  PEEP: 8  ITime: 1  MAP: 11  PIP: 19                                      ABG - ( 20 Dec 2022 03:32 )  pH, Arterial: 7.40  pH, Blood: x     /  pCO2: 51    /  pO2: 70    / HCO3: 32    / Base Excess: 6.0   /  SaO2: 95.5        MEDICATIONS  (STANDING):  acetylcysteine 20%  Inhalation 4 milliLiter(s) Inhalation every 6 hours  acetylcysteine 20% for bronchoscopy 4 milliLiter(s) Nebulizer once  albuterol    90 MICROgram(s) HFA Inhaler 1 Puff(s) Inhalation every 4 hours  baclofen 20 milliGRAM(s) Oral every 12 hours  cefiderocol IVPB 2000 milliGRAM(s) IV Intermittent every 8 hours  chlorhexidine 0.12% Liquid 15 milliLiter(s) Oral Mucosa every 12 hours  chlorhexidine 2% Cloths 1 Application(s) Topical daily  dexMEDEtomidine Infusion 0.2 MICROgram(s)/kG/Hr (3.4 mL/Hr) IV Continuous <Continuous>  donepezil 10 milliGRAM(s) Oral at bedtime  doxazosin 2 milliGRAM(s) Oral at bedtime  enoxaparin Injectable 40 milliGRAM(s) SubCutaneous every 24 hours  fentaNYL   Infusion. 0.5 MICROgram(s)/kG/Hr (3.4 mL/Hr) IV Continuous <Continuous>  magnesium sulfate  IVPB 2 Gram(s) IV Intermittent once  memantine 10 milliGRAM(s) Oral two times a day  midodrine 20 milliGRAM(s) Oral every 8 hours  norepinephrine Infusion 0.05 MICROgram(s)/kG/Min (6.38 mL/Hr) IV Continuous <Continuous>  pantoprazole   Suspension 40 milliGRAM(s) Oral daily  propofol Infusion 10 MICROgram(s)/kG/Min (4.08 mL/Hr) IV Continuous <Continuous>  zinc oxide 20% Ointment 1 Application(s) Topical three times a day    MEDICATIONS  (PRN):  acetaminophen     Tablet .. 650 milliGRAM(s) Oral every 6 hours PRN Temp greater or equal to 38C (100.4F), Mild Pain (1 - 3)  ibuprofen  Suspension. 600 milliGRAM(s) Oral every 8 hours PRN Temp greater or equal to 38.5C (101.3F)  melatonin 3 milliGRAM(s) Oral at bedtime PRN Insomnia

## 2022-12-21 NOTE — CONSULT NOTE ADULT - CONSULT REQUESTED DATE/TIME
05-Dec-2022 16:07
15-Dec-2022 10:30
21-Dec-2022 12:44
05-Dec-2022
02-Dec-2022 10:42
03-Dec-2022 00:00
04-Dec-2022 09:39

## 2022-12-21 NOTE — PROGRESS NOTE ADULT - ASSESSMENT
51 y/o man with PMH of MS, nonverbal at baseline, chronic low back pain, neurogenic bladder s/p SPC placement and s/p dislodgement was at IR for replacement of SPC when he was found to be in severe sepsis.      #Severe sepsis present on admission 2/2 acute pyelonephriti  #diarrhea on   #Anemia  #Neurogenic bladder  #Elevated dimer   #Dysphagia   #MS, nonverbal, bedbound, contracted/functional quadriplegia   #SUKHDEV   #NSTEMI type 2 due to sepsis     #CMO  pt was made CMO on  11AM, all medications dc'ed except comfort meds  dc all labs, vitals, FS      Handoff pendin) Pt was made CMO today by HCP mother Alena, extubated and all medications dc'ed except comfort meds as per palliative recommendations

## 2022-12-21 NOTE — PROGRESS NOTE ADULT - ASSESSMENT
52yMale being evaluated for goals of care and symptom management       MEDD (morphine equivalent daily dose): Fent gtt      See Recs below.    Please call [phone] with questions or concerns 24/7.   We will continue to follow.

## 2022-12-21 NOTE — CONSULT NOTE ADULT - ASSESSMENT
Assessment:  Patient received in bed ,   Eric score 12, sedated,  currently on enteral feeding.                        Currently admitted to medicine with the primary diagnosis of Sepsis, palliative care consult for comfort measures.                      Mostly bed bound , incontinence to  stool, urethra catheter in place.                       High risk for pressure injury development or progression   Skin assessed- b/l  heel intact dark red skin tissue                         B/L buttock moisture associated dermatitis  with friction combination -                        No pressure injury noted at time of assessment      Plan:  Wound and skin care recs  Discontinue pressure injury perimeter   Continue skin barrier as needed  comfort care   Pressure injury  preventive  measures  skin  and incontinence care   Assess wound and inform primary provider of any changes   Case discussed with primary Rn   Wound/ ostomy specialist  to f/u as needed     Offloading: [x ] Frequent position changes [ ] Devices/Equipment  Cleansing: [ ] Saline [ x] Soap/Water [ ] Other: ______  Topicals: [ x] Barrier Cream [ ] Antimicrobial [ ] Enzymatic Wound Debridement  Dressings: [ ] Dry, sterile [ ] Allevyn  Foam [ ] Absorbant Pads [ ] Collagenase    Other Recs. - Per primary team   #Severe sepsis present on admission 2/2 acute pyelonephriti  #diarrhea on 12/19  #Anemia  #Neurogenic bladder  #Elevated dimer   #Dysphagia   #MS, nonverbal, bedbound, contracted/functional quadriplegia   #SUKHDEV   #NSTEMI type 2 due to sepsis     #CMO  pt was made CMO on 12/21 11AM, all medications dc'ed except comfort meds  dc all labs, vitals, FS       Total time for bedside assessment , review of medical records  and  discussion of plan of care with primary team greater than 35 min

## 2022-12-21 NOTE — PROGRESS NOTE ADULT - SUBJECTIVE AND OBJECTIVE BOX
KEVIN MEDRANO 52y Male  MRN#: [id number]   Hospital Day: 19d    SUBJECTIVE  Patient is a 52y old Male who presents with a chief complaint of sepsis (21 Dec 2022 10:13)  Currently admitted to medicine with the primary diagnosis of Sepsis      INTERVAL HPI AND OVERNIGHT EVENTS:  Patient was examined and seen at bedside. This morning he is resting comfortably in bed. No issues or overnight events.    OBJECTIVE  PAST MEDICAL & SURGICAL HISTORY  Multiple sclerosis    Chronic back pain    Hypertension    No significant past surgical history      ALLERGIES:  penicillin (Unknown)  vancomycin (Unknown)    MEDICATIONS:  STANDING MEDICATIONS  chlorhexidine 0.12% Liquid 15 milliLiter(s) Oral Mucosa every 12 hours  chlorhexidine 2% Cloths 1 Application(s) Topical daily  dexMEDEtomidine Infusion 0.2 MICROgram(s)/kG/Hr IV Continuous <Continuous>  fentaNYL   Infusion. 0.5 MICROgram(s)/kG/Hr IV Continuous <Continuous>    PRN MEDICATIONS  glycopyrrolate Injectable 0.4 milliGRAM(s) IV Push every 6 hours PRN  HYDROmorphone  Injectable 0.5 milliGRAM(s) IV Push every 15 minutes PRN  LORazepam   Injectable 0.5 milliGRAM(s) IV Push every 1 hour PRN      VITAL SIGNS: Last 24 Hours  T(C): 36 (21 Dec 2022 08:00), Max: 37.2 (20 Dec 2022 20:00)  T(F): 96.8 (21 Dec 2022 08:00), Max: 99 (20 Dec 2022 20:00)  HR: 46 (21 Dec 2022 10:00) (44 - 62)  BP: 109/71 (21 Dec 2022 10:00) (85/58 - 125/77)  BP(mean): 83 (21 Dec 2022 10:00) (64 - 98)  RR: 21 (21 Dec 2022 10:00) (21 - 22)  SpO2: 100% (21 Dec 2022 10:00) (100% - 100%)    LABS:              ABG - ( 20 Dec 2022 03:32 )  pH, Arterial: 7.40  pH, Blood: x     /  pCO2: 51    /  pO2: 70    / HCO3: 32    / Base Excess: 6.0   /  SaO2: 95.5                          PHYSICAL EXAM:  GENERAL: NAD, contratced  HEAD:  Atraumatic, Normocephalic  ENMT: extubated  NERVOUS SYSTEM: sedated  ABDOMEN: Soft, Nontender, Nondistended; Bowel sounds present  EXTREMITIES: no edema, contracted

## 2022-12-21 NOTE — CONSULT NOTE ADULT - SUBJECTIVE AND OBJECTIVE BOX
HPI:  Pt is nonverbal at baseline. Hx obtained from chart.   Patient is a 53 yo male w/ PMH of MS, chronic low back pain, neurogenic bladder? s/p SPC placement and s/p dislodgement, presents for fever. Presented to the ED for SPC dislodgement a few days ago. Was seen by IR and planned for outpatient SPC replacement. Campuzano was placed at [facility]. Today.  Prior  to placement, he was noted to be febrile w/ tachycardia and tachypnea so brought into ED.  Pt nonverbal at baseline, unable to provide further HPI.  In the ED:    · BP Systolic	138 mm Hg  · BP Diastolic	92 mm Hg  · Heart Rate	  150 /min  · Respiration Rate (breaths/min)	  60 /min  · Temp (F)	  103.7 Degrees F  · Temp (C)	  39.8 Degrees C  · Temp site	rectal  · SpO2 (%)	98 %  · O2 Delivery/Oxygen Delivery Method	nasal cannula  · Oxygen Therapy Flow (L/min)	2 L/min    Labs notable for WBC 32k, lactate 2.4, Na 147, AG 17, troponin 0.2  UA showing large leuks, WBC, bacteria, RBC  CXR showing worsening opacifications  ECG showing sinus tachy, possible inferior infarct  Given aztreonam, zyvox, fluid bolus  Pt admitted to SDU.  (02 Dec 2022 15:53)      PAST MEDICAL & SURGICAL HISTORY:  Multiple sclerosis  Chronic back pain  Hypertension  No significant past surgical history      REVIEW OF SYSTEMS: Pt unable to offer    MEDICATIONS  (STANDING):  chlorhexidine 0.12% Liquid 15 milliLiter(s) Oral Mucosa every 12 hours  chlorhexidine 2% Cloths 1 Application(s) Topical daily  dexMEDEtomidine Infusion 0.2 MICROgram(s)/kG/Hr (3.4 mL/Hr) IV Continuous <Continuous>  fentaNYL   Infusion. 0.5 MICROgram(s)/kG/Hr (3.4 mL/Hr) IV Continuous <Continuous>    MEDICATIONS  (PRN):  glycopyrrolate Injectable 0.4 milliGRAM(s) IV Push every 6 hours PRN secretions  HYDROmorphone  Injectable 0.5 milliGRAM(s) IV Push every 15 minutes PRN mod-severe pain, dyspnea  LORazepam   Injectable 0.5 milliGRAM(s) IV Push every 1 hour PRN anxiety, agitation    Allergies  penicillin (Unknown)  vancomycin (Unknown)  Intolerances    SOCIAL HISTORY: lives at home     FAMILY HISTORY:  No pertinent family history in first degree relatives       PHYSICAL EXAM:  Vital Signs Last 24 Hrs  T(C): 36 (21 Dec 2022 08:00), Max: 37.2 (20 Dec 2022 20:00)  T(F): 96.8 (21 Dec 2022 08:00), Max: 99 (20 Dec 2022 20:00)  HR: 46 (21 Dec 2022 10:00) (44 - 62)  BP: 109/71 (21 Dec 2022 10:00) (85/58 - 125/77)  BP(mean): 83 (21 Dec 2022 10:00) (64 - 98)  RR: 21 (21 Dec 2022 10:00) (21 - 22)  SpO2: 100% (21 Dec 2022 10:00) (100% - 100%)    Parameters below as of 21 Dec 2022 12:04      O2 Concentration (%): 21  GENERAL: NAD, contracted  HEAD:  Atraumatic, Normocephalic  ENMT: extubated  NERVOUS SYSTEM: sedated  ABDOMEN: Soft, Nontender, Nondistended; Bowel sounds present  EXTREMITIES: no edema, contracted  Skin : Moisture associated dermatitis   Vascular: B/L LE equally  cool,  no cyanosis, clubbing, edema             LABS/ CULTURES/ RADIOLOGY:      141  |  105  |  4   ----------------------------<  113      [12-19-22 @ 05:20]  3.9   |  28  |  <0.5        Ca     8.6     [12-19-22 @ 05:20]      Phos  4.2     [12-18-22 @ 23:40]                Culture - Blood (collected 12-14-22 @ 18:21)  Source: .Blood Blood  Final Report (12-20-22 @ 03:01):    No Growth Final

## 2022-12-21 NOTE — PROGRESS NOTE ADULT - SUBJECTIVE AND OBJECTIVE BOX
Patient is a 52y old  Male who presents with a chief complaint of sepsis (21 Dec 2022 12:43)        Over Night Events:  on MV  Sedated=.  Possible liberation today         ROS:     All ROS are negative except HPI         PHYSICAL EXAM    ICU Vital Signs Last 24 Hrs  T(C): 36 (21 Dec 2022 08:00), Max: 37.2 (20 Dec 2022 20:00)  T(F): 96.8 (21 Dec 2022 08:00), Max: 99 (20 Dec 2022 20:00)  HR: 46 (21 Dec 2022 10:00) (44 - 56)  BP: 109/71 (21 Dec 2022 10:00) (85/58 - 125/77)  BP(mean): 83 (21 Dec 2022 10:00) (66 - 93)  ABP: --  ABP(mean): --  RR: 21 (21 Dec 2022 10:00) (21 - 22)  SpO2: 100% (21 Dec 2022 10:00) (100% - 100%)    O2 Parameters below as of 21 Dec 2022 12:04      O2 Concentration (%): 21        CONSTITUTIONAL:  Ill appearing in NAD    ENT:   Airway patent,   Mouth with normal mucosa.   ET     EYES:   Pupils equal,     CARDIAC:   Normal rate,   Regular rhythm.        RESPIRATORY:   No wheezing  Bilateral BS  Normal chest expansion  Not tachypneic,  No use of accessory muscles    GASTROINTESTINAL:  Abdomen soft,   Non-tender,   No guarding,   + BS    MUSCULOSKELETAL:   No clubbing, cyanosis    NEUROLOGICAL:   Sedated     SKIN:   Skin normal color for race,   No evidence of rash.    PSYCHIATRIC:   No apparent risk to self or others.          12-20-22 @ 07:01  -  12-21-22 @ 07:00  --------------------------------------------------------  IN:    Dexmedetomidine: 102 mL    Dexmedetomidine: 272 mL    Enteral Tube Flush: 50 mL    FentaNYL: 306 mL    IV PiggyBack: 200 mL    Norepinephrine: 114.4 mL    Peptamen A.F.: 340 mL    Propofol: 146.7 mL  Total IN: 1531.1 mL    OUT:    Indwelling Catheter - Urethral (mL): 1315 mL    Rectal Tube (mL): 100 mL  Total OUT: 1415 mL    Total NET: 116.1 mL      12-21-22 @ 07:01  -  12-21-22 @ 19:34  --------------------------------------------------------  IN:    Dexmedetomidine: 299.2 mL    FentaNYL: 160.2 mL    Peptamen A.F.: 60 mL  Total IN: 519.4 mL    OUT:    Indwelling Catheter - Urethral (mL): 1425 mL    Norepinephrine: 0 mL    Propofol: 0 mL  Total OUT: 1425 mL    Total NET: -905.6 mL          LABS:                                                                                                                                                                                                                                     Mode: AC/ CMV (Assist Control/ Continuous Mandatory Ventilation)  RR (machine): 22  TV (machine): 400  FiO2: 40  PEEP: 8  ITime: 0.8  MAP: 11  PIP: 19                                      ABG - ( 20 Dec 2022 03:32 )  pH, Arterial: 7.40  pH, Blood: x     /  pCO2: 51    /  pO2: 70    / HCO3: 32    / Base Excess: 6.0   /  SaO2: 95.5                MEDICATIONS  (STANDING):  chlorhexidine 0.12% Liquid 15 milliLiter(s) Oral Mucosa every 12 hours  chlorhexidine 2% Cloths 1 Application(s) Topical daily  dexMEDEtomidine Infusion 0.2 MICROgram(s)/kG/Hr (3.4 mL/Hr) IV Continuous <Continuous>  fentaNYL   Infusion. 0.5 MICROgram(s)/kG/Hr (3.4 mL/Hr) IV Continuous <Continuous>    MEDICATIONS  (PRN):  glycopyrrolate Injectable 0.4 milliGRAM(s) IV Push every 6 hours PRN secretions  HYDROmorphone  Injectable 0.5 milliGRAM(s) IV Push every 15 minutes PRN mod-severe pain, dyspnea  LORazepam   Injectable 0.5 milliGRAM(s) IV Push every 1 hour PRN anxiety, agitation      New X-rays reviewed:                                                                                  ECHO    CXR interpreted by me:

## 2022-12-21 NOTE — PROGRESS NOTE ADULT - PROBLEM SELECTOR PLAN 5
- DNR/DNI/CMO  - MOLST completed by primary team  - will follow  - case d/w ICU team  ______________  Kevan Patel MD  Palliative Medicine  [facility]  [phone]

## 2022-12-21 NOTE — PROGRESS NOTE ADULT - SUBJECTIVE AND OBJECTIVE BOX
NUTRITION SUPPORT TEAM  -  PROGRESS NOTE   remain vented/sedated  on peg tube feed  +rectal tube in place  REVIEW OF SYSTEMS:  Negative except as noted above.     VITALS:  T(F): 96.8 (12-21 @ 08:00), Max: 97.9 (12-21 @ 00:00)  HR: 48 (12-21 @ 09:00) (44 - 52)  BP: 116/74 (12-21 @ 09:00) (85/58 - 125/77)  RR: 21 (12-21 @ 09:00) (21 - 22)  SpO2: 100% (12-21 @ 09:00) (100% - 100%)    ABG - ( 20 Dec 2022 03:32 )  pH, Arterial: 7.40  pH, Blood: x     /  pCO2: 51    /  pO2: 70    / HCO3: 32    / Base Excess: 6.0   /  SaO2: 95.5    Mode: AC/ CMV (Assist Control/ Continuous Mandatory Ventilation)  RR (machine): 22  TV (machine): 400  FiO2: 40  PEEP: 8  ITime: 0.8  MAP: 11  PIP: 19  HEIGHT/WEIGHT/BMI:   Height (cm): 195.6 (12-03)  Weight (kg): 68 (12-02)  BMI (kg/m2): 17.8 (12-03)    12-20-22 @ 07:01  -  12-21-22 @ 07:00  --------------------------------------------------------  IN:    Dexmedetomidine: 102 mL    Dexmedetomidine: 272 mL    Enteral Tube Flush: 50 mL    FentaNYL: 306 mL    IV PiggyBack: 200 mL    Norepinephrine: 114.4 mL    Peptamen A.F.: 340 mL    Propofol: 146.7 mL  Total IN: 1531.1 mL    OUT:    Indwelling Catheter - Urethral (mL): 1315 mL    Rectal Tube (mL): 100 mL  Total OUT: 1415 mL    Total NET: 116.1 mL        STANDING MEDICATIONS:   acetylcysteine 20%  Inhalation 4 milliLiter(s) Inhalation every 6 hours  acetylcysteine 20% for bronchoscopy 4 milliLiter(s) Nebulizer once  albuterol    90 MICROgram(s) HFA Inhaler 1 Puff(s) Inhalation every 4 hours  baclofen 20 milliGRAM(s) Oral every 12 hours  cefiderocol IVPB 2000 milliGRAM(s) IV Intermittent every 8 hours  chlorhexidine 0.12% Liquid 15 milliLiter(s) Oral Mucosa every 12 hours  chlorhexidine 2% Cloths 1 Application(s) Topical daily  dexMEDEtomidine Infusion 0.2 MICROgram(s)/kG/Hr IV Continuous <Continuous>  donepezil 10 milliGRAM(s) Oral at bedtime  doxazosin 2 milliGRAM(s) Oral at bedtime  enoxaparin Injectable 40 milliGRAM(s) SubCutaneous every 24 hours  fentaNYL   Infusion. 0.5 MICROgram(s)/kG/Hr IV Continuous <Continuous>  magnesium sulfate  IVPB 2 Gram(s) IV Intermittent once  memantine 10 milliGRAM(s) Oral two times a day  midodrine 30 milliGRAM(s) Oral every 8 hours  norepinephrine Infusion 0.05 MICROgram(s)/kG/Min IV Continuous <Continuous>  pantoprazole   Suspension 40 milliGRAM(s) Oral daily  propofol Infusion 10 MICROgram(s)/kG/Min IV Continuous <Continuous>  zinc oxide 20% Ointment 1 Application(s) Topical three times a day      LABS:   Triglycerides, Serum: 125 mg/dL (12-15 @ 04:55)  Blood Glucose (Past 24 hours):  111 mg/dL (12-21 @ 00:39)  99 mg/dL (12-20 @ 15:31)      DIET:   Diet, NPO with Tube Feed:   Tube Feeding Modality: Orogastric  Peptamen A.F. Formula  Total Volume for 24 Hours (mL): 480  Continuous  Starting Tube Feed Rate mL per Hour: 20  Until Goal Tube Feed Rate (mL per Hour): 20  Tube Feed Duration (in Hours): 24  Tube Feed Start Time: 12:00 (12-20-22 @ 10:16) [Active]    RADIOLOGY:  < from: Xray Chest 1 View- PORTABLE-Routine (Xray Chest 1 View- PORTABLE-Routine in AM.) (12.20.22 @ 06:01) >  Impression:  Significant opacification of the left hemithorax. Mild improved aeration   right upper lobe.        NUTRITION SUPPORT TEAM  -  PROGRESS NOTE   remain vented/sedated  palliative f/u  for pos. withdrawal of care today  on peg tube feed  +rectal tube in place  REVIEW OF SYSTEMS:  Negative except as noted above.     VITALS:  T(F): 96.8 (12-21 @ 08:00), Max: 97.9 (12-21 @ 00:00)  HR: 48 (12-21 @ 09:00) (44 - 52)  BP: 116/74 (12-21 @ 09:00) (85/58 - 125/77)  RR: 21 (12-21 @ 09:00) (21 - 22)  SpO2: 100% (12-21 @ 09:00) (100% - 100%)    ABG - ( 20 Dec 2022 03:32 )  pH, Arterial: 7.40  pH, Blood: x     /  pCO2: 51    /  pO2: 70    / HCO3: 32    / Base Excess: 6.0   /  SaO2: 95.5    Mode: AC/ CMV (Assist Control/ Continuous Mandatory Ventilation)  RR (machine): 22  TV (machine): 400  FiO2: 40  PEEP: 8  ITime: 0.8  MAP: 11  PIP: 19  HEIGHT/WEIGHT/BMI:   Height (cm): 195.6 (12-03)  Weight (kg): 68 (12-02)  BMI (kg/m2): 17.8 (12-03)    12-20-22 @ 07:01  -  12-21-22 @ 07:00  --------------------------------------------------------  IN:    Dexmedetomidine: 102 mL    Dexmedetomidine: 272 mL    Enteral Tube Flush: 50 mL    FentaNYL: 306 mL    IV PiggyBack: 200 mL    Norepinephrine: 114.4 mL    Peptamen A.F.: 340 mL    Propofol: 146.7 mL  Total IN: 1531.1 mL    OUT:    Indwelling Catheter - Urethral (mL): 1315 mL    Rectal Tube (mL): 100 mL  Total OUT: 1415 mL    Total NET: 116.1 mL        STANDING MEDICATIONS:   acetylcysteine 20%  Inhalation 4 milliLiter(s) Inhalation every 6 hours  acetylcysteine 20% for bronchoscopy 4 milliLiter(s) Nebulizer once  albuterol    90 MICROgram(s) HFA Inhaler 1 Puff(s) Inhalation every 4 hours  baclofen 20 milliGRAM(s) Oral every 12 hours  cefiderocol IVPB 2000 milliGRAM(s) IV Intermittent every 8 hours  chlorhexidine 0.12% Liquid 15 milliLiter(s) Oral Mucosa every 12 hours  chlorhexidine 2% Cloths 1 Application(s) Topical daily  dexMEDEtomidine Infusion 0.2 MICROgram(s)/kG/Hr IV Continuous <Continuous>  donepezil 10 milliGRAM(s) Oral at bedtime  doxazosin 2 milliGRAM(s) Oral at bedtime  enoxaparin Injectable 40 milliGRAM(s) SubCutaneous every 24 hours  fentaNYL   Infusion. 0.5 MICROgram(s)/kG/Hr IV Continuous <Continuous>  magnesium sulfate  IVPB 2 Gram(s) IV Intermittent once  memantine 10 milliGRAM(s) Oral two times a day  midodrine 30 milliGRAM(s) Oral every 8 hours  norepinephrine Infusion 0.05 MICROgram(s)/kG/Min IV Continuous <Continuous>  pantoprazole   Suspension 40 milliGRAM(s) Oral daily  propofol Infusion 10 MICROgram(s)/kG/Min IV Continuous <Continuous>  zinc oxide 20% Ointment 1 Application(s) Topical three times a day      LABS:   Triglycerides, Serum: 125 mg/dL (12-15 @ 04:55)  Blood Glucose (Past 24 hours):  111 mg/dL (12-21 @ 00:39)  99 mg/dL (12-20 @ 15:31)      DIET:   Diet, NPO with Tube Feed:   Tube Feeding Modality: Orogastric  Peptamen A.F. Formula  Total Volume for 24 Hours (mL): 480  Continuous  Starting Tube Feed Rate mL per Hour: 20  Until Goal Tube Feed Rate (mL per Hour): 20  Tube Feed Duration (in Hours): 24  Tube Feed Start Time: 12:00 (12-20-22 @ 10:16) [Active]    RADIOLOGY:  < from: Xray Chest 1 View- PORTABLE-Routine (Xray Chest 1 View- PORTABLE-Routine in AM.) (12.20.22 @ 06:01) >  Impression:  Significant opacification of the left hemithorax. Mild improved aeration   right upper lobe.        NUTRITION SUPPORT TEAM  -  PROGRESS NOTE   remains vented/sedated  palliative f/u  for pos. withdrawal of care today  on peg tube feed  +rectal tube in place    REVIEW OF SYSTEMS:  Negative except as noted above.     VITALS:  T(F): 96.8 (12-21 @ 08:00), Max: 97.9 (12-21 @ 00:00)  HR: 48 (12-21 @ 09:00) (44 - 52)  BP: 116/74 (12-21 @ 09:00) (85/58 - 125/77)  RR: 21 (12-21 @ 09:00) (21 - 22)  SpO2: 100% (12-21 @ 09:00) (100% - 100%)    ABG - ( 20 Dec 2022 03:32 )  pH, Arterial: 7.40  pH, Blood: x     /  pCO2: 51    /  pO2: 70    / HCO3: 32    / Base Excess: 6.0   /  SaO2: 95.5    Mode: AC/ CMV (Assist Control/ Continuous Mandatory Ventilation)  RR (machine): 22  TV (machine): 400  FiO2: 40  PEEP: 8  ITime: 0.8  MAP: 11  PIP: 19    HEIGHT/WEIGHT/BMI:   Height (cm): 195.6 (12-03)  Weight (kg): 68 (12-02)  BMI (kg/m2): 17.8 (12-03)    12-20-22 @ 07:01  -  12-21-22 @ 07:00  --------------------------------------------------------  IN:    Dexmedetomidine: 102 mL    Dexmedetomidine: 272 mL    Enteral Tube Flush: 50 mL    FentaNYL: 306 mL    IV PiggyBack: 200 mL    Norepinephrine: 114.4 mL    Peptamen A.F.: 340 mL    Propofol: 146.7 mL  Total IN: 1531.1 mL    OUT:    Indwelling Catheter - Urethral (mL): 1315 mL    Rectal Tube (mL): 100 mL  Total OUT: 1415 mL    Total NET: 116.1 mL    STANDING MEDICATIONS:   acetylcysteine 20%  Inhalation 4 milliLiter(s) Inhalation every 6 hours  acetylcysteine 20% for bronchoscopy 4 milliLiter(s) Nebulizer once  albuterol    90 MICROgram(s) HFA Inhaler 1 Puff(s) Inhalation every 4 hours  baclofen 20 milliGRAM(s) Oral every 12 hours  cefiderocol IVPB 2000 milliGRAM(s) IV Intermittent every 8 hours  chlorhexidine 0.12% Liquid 15 milliLiter(s) Oral Mucosa every 12 hours  chlorhexidine 2% Cloths 1 Application(s) Topical daily  dexMEDEtomidine Infusion 0.2 MICROgram(s)/kG/Hr IV Continuous <Continuous>  donepezil 10 milliGRAM(s) Oral at bedtime  doxazosin 2 milliGRAM(s) Oral at bedtime  enoxaparin Injectable 40 milliGRAM(s) SubCutaneous every 24 hours  fentaNYL   Infusion. 0.5 MICROgram(s)/kG/Hr IV Continuous <Continuous>  magnesium sulfate  IVPB 2 Gram(s) IV Intermittent once  memantine 10 milliGRAM(s) Oral two times a day  midodrine 30 milliGRAM(s) Oral every 8 hours  norepinephrine Infusion 0.05 MICROgram(s)/kG/Min IV Continuous <Continuous>  pantoprazole   Suspension 40 milliGRAM(s) Oral daily  propofol Infusion 10 MICROgram(s)/kG/Min IV Continuous <Continuous>  zinc oxide 20% Ointment 1 Application(s) Topical three times a day      LABS:   Triglycerides, Serum: 125 mg/dL (12-15 @ 04:55)  Blood Glucose (Past 24 hours):  111 mg/dL (12-21 @ 00:39)  99 mg/dL (12-20 @ 15:31)      DIET:   Diet, NPO with Tube Feed:   Tube Feeding Modality: Orogastric  Peptamen A.F. Formula  Total Volume for 24 Hours (mL): 480  Continuous  Starting Tube Feed Rate mL per Hour: 20  Until Goal Tube Feed Rate (mL per Hour): 20  Tube Feed Duration (in Hours): 24  Tube Feed Start Time: 12:00 (12-20-22 @ 10:16) [Active]    RADIOLOGY:  < from: Xray Chest 1 View- PORTABLE-Routine (Xray Chest 1 View- PORTABLE-Routine in AM.) (12.20.22 @ 06:01) >  Impression:  Significant opacification of the left hemithorax. Mild improved aeration   right upper lobe.

## 2022-12-21 NOTE — PROGRESS NOTE ADULT - ASSESSMENT
IMPRESSION:  Sepsis POA  Septic shock, still requiring levophed  Acute hypoxemic respiratory failure/ Left mucus plug / lung atelectasis  Pseudomonas/ acineto in BAL / wash   Urinary tract infection/ proteus  Bacteremia - MRSE treated   Non verbal at baseline, but follows commands  NSTEMI likely Type 2  History of MS/neurogenic bladder    PLAN:      CNS: Sedation for comfort      HEENT: Oral care.  ET Care     PULMONARY:  Recommend nebs every 4 hours. Vent changes:  None     CARDIOVASCULAR: avoid volume overload.     GI: GI prophylaxis. NPO if liberated     RENAL:  Follow up lytes.  Correct as needed. IR following for SPC, currently has [facility].      INFECTIOUS DISEASE: ABX per ID.     HEMATOLOGICAL: DVT prophylaxis. On Lovenox. Family no longer wants blood work.     ENDOCRINE:  Follow up FS. Target -180.     MICU    GOC done, now DNR/DNI, family contemplating CMO    Palliative care following    Poor prognosis     R IJ 12/14

## 2022-12-21 NOTE — CONSULT NOTE ADULT - PROVIDER SPECIALTY LIST ADULT
Infectious Disease
Gastroenterology
Critical Care
Intervent Radiology
Palliative Care
Wound Care
Nutrition Support

## 2022-12-21 NOTE — PROGRESS NOTE ADULT - ASSESSMENT
ASSESSMENTSepsis POA  Septic shock   Acute hypoxemic respiratory failure/ Left mucus plug / lung atelectasis  Urinary tract infection/ proteus  Bacteremia - MRSE treated   Non verbal at baseline but able to make himself understood  NSTEMI likely Type 2  history of MS/neurogenic bladder  dysphagia on G tube feedings      PLAN  -increase  tube feed to Peptamen AF  at 375 ml x3 feeds  - local skin care  check bmp/phos/mg and correct phos to > 3.5   ASSESSMENTSepsis POA  Septic shock   Acute hypoxemic respiratory failure/ Left mucus plug / lung atelectasis  Urinary tract infection/ proteus  Bacteremia - MRSE treated   Non verbal at baseline but able to make himself understood  NSTEMI likely Type 2  history of MS/neurogenic bladder  dysphagia on G tube feedings     ASSESSMENT  Sepsis POA  Septic shock   Acute hypoxemic respiratory failure/ Left mucus plug / lung atelectasis  Urinary tract infection/ proteus  Bacteremia - MRSE treated   Non verbal at baseline but able to make himself understood  NSTEMI likely Type 2  history of MS/neurogenic bladder  dysphagia on G tube feedings    f/u with palliative care, possible CMO vs liberation later today  will follow with you

## 2022-12-21 NOTE — PROGRESS NOTE ADULT - SUBJECTIVE AND OBJECTIVE BOX
Full consult to follow shortly. Please call [phone] with any acute concerns.     d/w patient's mother (HCP) and aunt at bedside - goals are for CMO today and withdrawal of care    Recommendations:  dilaudid 0.5mg IV Q15 mins PRN mod-severe pain or dyspnea  ativan 0.5mg IV Q1h PRN anxiety, agitation  robinul 0.4mg IV Q6 PRN secretions    Please give 1 dose of each 10-15 mins prior to extubation    Continue: fentanyl gtt, precedex gtt    Stop: lovenox, IV abx, modidrine, cardura, PPI, baclofen, aricept, melatonin, namenda, pressors, tube feeds, IVF, blood draws, vital signs, VS monitor (in room)    [phone] as needed    d/w team    ______________  Kevan Patel MD  Palliative Medicine  [facility]   of Geriatric [facility] Pall[facility]  [phone] HPI:  Pt is nonverbal at baseline. Hx obtained from chart.     51 yo male w/ PMH of MS, chronic low back pain, neurogenic bladder? s/p SPC placement and s/p dislodgement, presents for fever. Presented to the ED for SPC dislodgement a few days ago. Was seen by IR and planned for outpatient SPC replacement. Campuzano was placed at [facility]. Today. prior to placement, he was noted to be febrile w/ tachycardia and tachypnea so brought into ED.  Pt nonverbal at baseline, unable to provide further HPI.    Pt upgraded to ICU sepsis, on multiple pressors. Palliative care consulted, pt w end stage MS at baseline. Poor prognosis.      INTERVAL EVENTS:  12/16: pt appears sedated on ventilator in ICU   12/19: remains intubated, appears comfortable, lung collapse 12/18 12/20: patient appears comfortable  12/21: patient comfortable, for CMO today    ADVANCE DIRECTIVES:    DECISION MAKER(s):  [ ] Health Care Proxy(s)  [ ] Surrogate(s)  [ ] Guardian           Name(s): Phone Number(s):  Answers: Caregiver Name: JAYY MEDRANO,  Answers: Caregiver Relationship: MOTHER, HCP  Answers: Caregiver Contact Number: [phone]  HEALTH CARE PROXY ON FILE IN EMR - REVIEWED   BASELINE (I)ADL(s) (prior to admission):  [place]: [ ]Total  [ ] Moderate [x ]Dependent  Allergies    penicillin (Unknown)  vancomycin (Unknown)    Intolerances    MEDICATIONS  (STANDING):  chlorhexidine 0.12% Liquid 15 milliLiter(s) Oral Mucosa every 12 hours  chlorhexidine 2% Cloths 1 Application(s) Topical daily  dexMEDEtomidine Infusion 0.2 MICROgram(s)/kG/Hr (3.4 mL/Hr) IV Continuous <Continuous>  fentaNYL   Infusion. 0.5 MICROgram(s)/kG/Hr (3.4 mL/Hr) IV Continuous <Continuous>    MEDICATIONS  (PRN):  glycopyrrolate Injectable 0.4 milliGRAM(s) IV Push every 6 hours PRN secretions  HYDROmorphone  Injectable 0.5 milliGRAM(s) IV Push every 15 minutes PRN mod-severe pain, dyspnea  LORazepam   Injectable 0.5 milliGRAM(s) IV Push every 1 hour PRN anxiety, agitation      PRESENT SYMPTOMS: [ x]Unable to obtain due to poor mentation   Source if other than patient:  [ ]Family   [ ]Team     Pain: [ ]yes [ ]no  QOL impact -   Location -                    Aggravating factors -  Quality -  Radiation -  Timing-  Severity (0-10 scale):  Minimal acceptable level (0-10 scale):     Dyspnea:                           [ ]Mild [ ]Moderate [ ]Severe  Anxiety:                             [ ]Mild [ ]Moderate [ ]Severe  Fatigue:                             [ ]Mild [ ]Moderate [ ]Severe  Nausea:                             [ ]Mild [ ]Moderate [ ]Severe  Loss of appetite:              [ ]Mild [ ]Moderate [ ]Severe  Constipation:                    [ ]Mild [ ]Moderate [ ]Severe    Other Symptoms:  [ ]All other review of systems negative     Palliative Performance Status Version 2:         %    http://npcrc.org/files/news/palliative_performance_scale_ppsv2.pdf  PHYSICAL EXAM:  Vital Signs Last 24 Hrs  T(C): 36 (21 Dec 2022 08:00), Max: 37.2 (20 Dec 2022 20:00)  T(F): 96.8 (21 Dec 2022 08:00), Max: 99 (20 Dec 2022 20:00)  HR: 46 (21 Dec 2022 10:00) (44 - 62)  BP: 109/71 (21 Dec 2022 10:00) (85/58 - 125/77)  BP(mean): 83 (21 Dec 2022 10:00) (66 - 98)  RR: 21 (21 Dec 2022 10:00) (21 - 22)  SpO2: 100% (21 Dec 2022 10:00) (100% - 100%)    Parameters below as of 21 Dec 2022 12:04      GENERAL:  [X ] No acute distress [ ]Lethargic  [ ]Unarousable  [ ]Verbal  [ ]Non-Verbal [ ]Cachexia    BEHAVIORAL/PSYCH:  [ ]Alert and Oriented x  [ ] Anxiety [ ] Delirium [ ] Agitation [ X] Calm   EYES: [ ] No scleral icterus [ ] Scleral icterus [X ] Closed  ENMT:  [ ]Dry mouth  [ ]No external oral lesions [ X] No external ear or nose lesions  CARDIOVASCULAR:  [ ]Regular [ ]Irregular [ ]Tachy [ ]Not Tachy  [ ]Hugo [ ] Edema [ ] No edema  PULMONARY:  [ ]Tachypnea  [ ]Audible excessive secretions [X ] No labored breathing [ ] labored breathing  GASTROINTESTINAL: [ ]Soft  [ ]Distended  [X ]Not distended [ ]Non tender [ ]Tender  MUSCULOSKELETAL: [ ]No clubbing [ ] clubbing  [X ] No cyanosis [ ] cyanosis  NEUROLOGIC: [ ]No focal deficits  [ ]Follows commands  [ ]Does not follow commands  [X ]Cognitive impairment  [ ]Dysphagia  [ ]Dysarthria  [ ]Paresis   SKIN: [ ] Jaundiced [ X] Non-jaundiced [ ]Rash [ ]No Rash [ ] Warm [ ] Dry  MISC/LINES: [X ] ET tube [ ] Trach [ ]NGT/OGT [ x]PEG [x ]Campuzano (SP tube dislodged Campuzano for now)     CRITICAL CARE:  [x ] Shock Present  [x ]Septic [ ]Cardiogenic [ ]Neurologic [ ]Hypovolemic  [x ]  Vasopressors [ ]  Inotropes   [x ]Respiratory failure present [x ]Mechanical ventilation [ ]Non-invasive ventilatory support [ ]High flow  [ ]Acute  [ ]Chronic [ ]Hypoxic  [ ]Hypercarbic [ ]Other  [ ]Other organ failure     LABS: CMO          RADIOLOGY & ADDITIONAL STUDIES: CMO        REFERRALS:   [ ]Chaplaincy  [ ]Hospice  [ ]Child Life  [ x]Social Work  [x ]Case management [ ]Holistic Therapy     Goals of Care Document:

## 2022-12-22 NOTE — PROGRESS NOTE ADULT - PROBLEM SELECTOR PLAN 2
- c/w dilaudid 0.5mg IV Q15 mins PRN mod-severe pain or dyspnea  - c/w ativan 0.5mg IV Q1h PRN anxiety, agitation  - c/w robinul 0.4mg IV Q6 PRN secretions  - team started morphine gtt in attempt to wean off fentanyl and precdex gtt

## 2022-12-22 NOTE — PROGRESS NOTE ADULT - SUBJECTIVE AND OBJECTIVE BOX
HPI:  Pt is nonverbal at baseline. Hx obtained from chart.     53 yo male w/ PMH of MS, chronic low back pain, neurogenic bladder? s/p SPC placement and s/p dislodgement, presents for fever. Presented to the ED for SPC dislodgement a few days ago. Was seen by IR and planned for outpatient SPC replacement. Campuzano was placed at [facility]. Today. prior to placement, he was noted to be febrile w/ tachycardia and tachypnea so brought into ED.  Pt nonverbal at baseline, unable to provide further HPI.    Pt upgraded to ICU sepsis, on multiple pressors. Palliative care consulted, pt w end stage MS at baseline. Poor prognosis.      INTERVAL EVENTS:  12/16: pt appears sedated on ventilator in ICU   12/19: remains intubated, appears comfortable, lung collapse 12/18 12/20: patient appears comfortable  12/21: patient comfortable, for CMO today  12/22: patient comfortable, ativan 0.5mg IV x 2, dilaudid 0.5mg IV x 2    ADVANCE DIRECTIVES:    DECISION MAKER(s):  [ ] Health Care Proxy(s)  [ ] Surrogate(s)  [ ] Guardian           Name(s): Phone Number(s):  Answers: Caregiver Name: JAYY MEDRANO,  Answers: Caregiver Relationship: MOTHER, HCP  Answers: Caregiver Contact Number: [phone]  HEALTH CARE PROXY ON FILE IN EMR - REVIEWED   BASELINE (I)ADL(s) (prior to admission):  [place]: [ ]Total  [ ] Moderate [x ]Dependent  Allergies    penicillin (Unknown)  vancomycin (Unknown)    Intolerances    MEDICATIONS  (STANDING):  chlorhexidine 0.12% Liquid 15 milliLiter(s) Oral Mucosa every 12 hours  chlorhexidine 2% Cloths 1 Application(s) Topical daily  HYDROmorphone  Injectable 0.5 milliGRAM(s) IV Push every 4 hours  morphine  Infusion 1 mG/Hr (1 mL/Hr) IV Continuous <Continuous>    MEDICATIONS  (PRN):  glycopyrrolate Injectable 0.4 milliGRAM(s) IV Push every 6 hours PRN secretions  HYDROmorphone  Injectable 0.5 milliGRAM(s) IV Push every 15 minutes PRN mod-severe pain, dyspnea  LORazepam   Injectable 0.5 milliGRAM(s) IV Push every 1 hour PRN anxiety, agitation    PRESENT SYMPTOMS: [ x]Unable to obtain due to poor mentation   Source if other than patient:  [ ]Family   [ ]Team     Pain: [ ]yes [ ]no  QOL impact -   Location -                    Aggravating factors -  Quality -  Radiation -  Timing-  Severity (0-10 scale):  Minimal acceptable level (0-10 scale):     Dyspnea:                           [ ]Mild [ ]Moderate [ ]Severe  Anxiety:                             [ ]Mild [ ]Moderate [ ]Severe  Fatigue:                             [ ]Mild [ ]Moderate [ ]Severe  Nausea:                             [ ]Mild [ ]Moderate [ ]Severe  Loss of appetite:              [ ]Mild [ ]Moderate [ ]Severe  Constipation:                    [ ]Mild [ ]Moderate [ ]Severe    Other Symptoms:  [ ]All other review of systems negative     Palliative Performance Status Version 2:         %    http://npcrc.org/files/news/palliative_performance_scale_ppsv2.pdf  PHYSICAL EXAM:  Vital Signs Last 24 Hrs  T(C): --  T(F): --  HR: 56 (22 Dec 2022 08:00) (54 - 58)  BP: --  BP(mean): --  RR: 26 (22 Dec 2022 08:00) (18 - 26)  SpO2: 99% (22 Dec 2022 08:00) (99% - 100%)    GENERAL:  [X ] No acute distress [ ]Lethargic  [ ]Unarousable  [ ]Verbal  [ ]Non-Verbal [ ]Cachexia    BEHAVIORAL/PSYCH:  [ ]Alert and Oriented x  [ ] Anxiety [ ] Delirium [ ] Agitation [ X] Calm   EYES: [ ] No scleral icterus [ ] Scleral icterus [X ] Closed  ENMT:  [ ]Dry mouth  [ ]No external oral lesions [ X] No external ear or nose lesions  CARDIOVASCULAR:  [ ]Regular [ ]Irregular [ ]Tachy [ ]Not Tachy  [ ]Hugo [ ] Edema [ ] No edema  PULMONARY:  [ ]Tachypnea  [ ]Audible excessive secretions [X ] No labored breathing [ ] labored breathing  GASTROINTESTINAL: [ ]Soft  [ ]Distended  [X ]Not distended [ ]Non tender [ ]Tender  MUSCULOSKELETAL: [ ]No clubbing [ ] clubbing  [X ] No cyanosis [ ] cyanosis  NEUROLOGIC: [ ]No focal deficits  [ ]Follows commands  [ ]Does not follow commands  [X ]Cognitive impairment  [ ]Dysphagia  [ ]Dysarthria  [ ]Paresis   SKIN: [ ] Jaundiced [ X] Non-jaundiced [ ]Rash [ ]No Rash [ ] Warm [ ] Dry  MISC/LINES: [X ] ET tube [ ] Trach [ ]NGT/OGT [ x]PEG [x ]Campuzano (SP tube dislodged Campuzano for now)     CRITICAL CARE:  [x ] Shock Present  [x ]Septic [ ]Cardiogenic [ ]Neurologic [ ]Hypovolemic  [x ]  Vasopressors [ ]  Inotropes   [x ]Respiratory failure present [x ]Mechanical ventilation [ ]Non-invasive ventilatory support [ ]High flow  [ ]Acute  [ ]Chronic [ ]Hypoxic  [ ]Hypercarbic [ ]Other  [ ]Other organ failure     LABS: CMO    RADIOLOGY & ADDITIONAL STUDIES: CMO    REFERRALS:   [ ]Chaplaincy  [ ]Hospice  [ ]Child Life  [ x]Social Work  [x ]Case management [ ]Holistic Therapy     Goals of Care Document:

## 2022-12-22 NOTE — PROGRESS NOTE ADULT - SUBJECTIVE AND OBJECTIVE BOX
Patient is a 52y old  Male who presents with a chief complaint of sepsis (21 Dec 2022 12:43)        Over Night Events:  Patient is now CMO. Extubated yesterday. No overnight events.       ROS:     All pertinent ROS are negative except HPI         PHYSICAL EXAM    ICU Vital Signs Last 24 Hrs  HR: 56 (22 Dec 2022 08:00) (46 - 58)  BP: 109/71 (21 Dec 2022 10:00) (109/71 - 109/71)  BP(mean): 83 (21 Dec 2022 10:00) (83 - 83)  RR: 26 (22 Dec 2022 08:00) (18 - 26)  SpO2: 99% (22 Dec 2022 08:00) (99% - 100%)    O2 Parameters below as of 21 Dec 2022 12:04      O2 Concentration (%): 21        CONSTITUTIONAL:   Ill appearing.    ENT:   No thrush    CARDIAC:    No edema    RESPIRATORY:   No wheezing    GASTROINTESTINAL:  Abdomen soft,     NEURO:  AOx0    SKIN:   Sacral ulcers        12-21-22 @ 07:01  -  12-22-22 @ 07:00  --------------------------------------------------------  IN:    Dexmedetomidine: 605.2 mL    Enteral Tube Flush: 40 mL    FentaNYL: 323.4 mL    Peptamen A.F.: 60 mL  Total IN: 1028.6 mL    OUT:    Indwelling Catheter - Urethral (mL): 2125 mL    Norepinephrine: 0 mL    Propofol: 0 mL  Total OUT: 2125 mL    Total NET: -1096.4 mL      12-22-22 @ 07:01  -  12-22-22 @ 09:04  --------------------------------------------------------  IN:    Dexmedetomidine: 51 mL    FentaNYL: 27.2 mL  Total IN: 78.2 mL    OUT:    Indwelling Catheter - Urethral (mL): 50 mL  Total OUT: 50 mL    Total NET: 28.2 mL          LABS:                                                                                                                                                                                                                                                                             MEDICATIONS  (STANDING):  chlorhexidine 0.12% Liquid 15 milliLiter(s) Oral Mucosa every 12 hours  chlorhexidine 2% Cloths 1 Application(s) Topical daily  dexMEDEtomidine Infusion 0.2 MICROgram(s)/kG/Hr (3.4 mL/Hr) IV Continuous <Continuous>  fentaNYL   Infusion. 0.5 MICROgram(s)/kG/Hr (3.4 mL/Hr) IV Continuous <Continuous>    MEDICATIONS  (PRN):  glycopyrrolate Injectable 0.4 milliGRAM(s) IV Push every 6 hours PRN secretions  HYDROmorphone  Injectable 0.5 milliGRAM(s) IV Push every 15 minutes PRN mod-severe pain, dyspnea  LORazepam   Injectable 0.5 milliGRAM(s) IV Push every 1 hour PRN anxiety, agitation

## 2022-12-22 NOTE — PROGRESS NOTE ADULT - ASSESSMENT
IMPRESSION:  Sepsis POA  Septic shock, still requiring levophed  Acute hypoxemic respiratory failure/ Left mucus plug / lung atelectasis  Pseudomonas/ acineto in BAL / wash   Urinary tract infection/ proteus  Bacteremia - MRSE treated   Non verbal at baseline, but follows commands  NSTEMI likely Type 2  History of MS/neurogenic bladder    PLAN:    CNS: Sedation for comfort as per palliative    HEENT: Oral care    PULMONARY:  Glycopyrolate as per pallaitive.     CARDIOVASCULAR: avoid volume overload.     GI: GI prophylaxis. NPO    RENAL: DC bloodwork.     INFECTIOUS DISEASE: DC abx.    HEMATOLOGICAL: DC bloodwork.     ENDOCRINE: DC FS.     Downgrade to GMF with TLC and Campuzano    CMO    Palliative care following    Poor prognosis     R IJ 12/14

## 2022-12-22 NOTE — PROGRESS NOTE ADULT - ASSESSMENT
52yMale being evaluated for goals of care and symptom management       MEDD (morphine equivalent daily dose): Fent gtt      See Recs below.    Please call [phone] with questions or concerns 24/7.   We will continue to follow.

## 2022-12-23 NOTE — PROGRESS NOTE ADULT - ASSESSMENT
IMPRESSION:  Sepsis POA  Septic shock, still requiring levophed  Acute hypoxemic respiratory failure/ Left mucus plug / lung atelectasis  Pseudomonas/ acineto in BAL / wash   Urinary tract infection/ proteus  Bacteremia - MRSE treated   Non verbal at baseline, but follows commands  NSTEMI likely Type 2  History of MS/neurogenic bladder    PLAN:    CNS: Sedation for comfort as per palliative    HEENT: Oral care    PULMONARY:  Glycopyrolate as per pallaitive.     CARDIOVASCULAR: avoid volume overload.     GI: GI prophylaxis. NPO    RENAL: No bloodwork.     INFECTIOUS DISEASE: No abx.    HEMATOLOGICAL: No bloodwork.     ENDOCRINE: No FS.     Downgrade to GMF with TLC and Campuzano    CMO    Palliative care following    Poor prognosis     R IJ 12/14

## 2022-12-23 NOTE — PROGRESS NOTE ADULT - ASSESSMENT
52yMale being evaluated for goals of care and symptom management       MEDD (morphine equivalent daily dose): Fent gtt      See Recs below.    Please call [phone] with questions or concerns 24/7.   We will continue to follow.

## 2022-12-23 NOTE — PROGRESS NOTE ADULT - SUBJECTIVE AND OBJECTIVE BOX
HPI:  Pt is nonverbal at baseline. Hx obtained from chart.     53 yo male w/ PMH of MS, chronic low back pain, neurogenic bladder? s/p SPC placement and s/p dislodgement, presents for fever. Presented to the ED for SPC dislodgement a few days ago. Was seen by IR and planned for outpatient SPC replacement. Campuzano was placed at [facility]. Today. prior to placement, he was noted to be febrile w/ tachycardia and tachypnea so brought into ED.  Pt nonverbal at baseline, unable to provide further HPI.    Pt upgraded to ICU sepsis, on multiple pressors. Palliative care consulted, pt w end stage MS at baseline. Poor prognosis.      INTERVAL EVENTS:  12/16: pt appears sedated on ventilator in ICU   12/19: remains intubated, appears comfortable, lung collapse 12/18 12/20: patient appears comfortable  12/21: patient comfortable, for CMO today  12/22: patient comfortable, ativan 0.5mg IV x 2, dilaudid 0.5mg IV x 2  12/23: patient comfortable, used dilaudid 0.5mg IV x 2 - was started on dilaudid 0.5 IV Q4 ATC, with morphine gtt uptitrated to 7mg/h by primary team    ADVANCE DIRECTIVES:    DECISION MAKER(s):  [ ] Health Care Proxy(s)  [ ] Surrogate(s)  [ ] Guardian           Name(s): Phone Number(s):  Answers: Caregiver Name: JAYY MEDRANO,  Answers: Caregiver Relationship: MOTHER, HCP  Answers: Caregiver Contact Number: [phone]  HEALTH CARE PROXY ON FILE IN EMR - REVIEWED   BASELINE (I)ADL(s) (prior to admission):  [place]: [ ]Total  [ ] Moderate [x ]Dependent  Allergies    penicillin (Unknown)  vancomycin (Unknown)    Intolerances    MEDICATIONS  (STANDING):  chlorhexidine 0.12% Liquid 15 milliLiter(s) Oral Mucosa every 12 hours  chlorhexidine 2% Cloths 1 Application(s) Topical daily  HYDROmorphone  Injectable 0.5 milliGRAM(s) IV Push every 4 hours  morphine  Infusion. 1 mG/Hr (1 mL/Hr) IV Continuous <Continuous>    MEDICATIONS  (PRN):  glycopyrrolate Injectable 0.4 milliGRAM(s) IV Push every 6 hours PRN secretions  HYDROmorphone  Injectable 0.5 milliGRAM(s) IV Push every 15 minutes PRN mod-severe pain, dyspnea  LORazepam   Injectable 0.5 milliGRAM(s) IV Push every 1 hour PRN anxiety, agitation      PRESENT SYMPTOMS: [ x]Unable to obtain due to poor mentation   Source if other than patient:  [ ]Family   [ ]Team     Pain: [ ]yes [ ]no  QOL impact -   Location -                    Aggravating factors -  Quality -  Radiation -  Timing-  Severity (0-10 scale):  Minimal acceptable level (0-10 scale):     Dyspnea:                           [ ]Mild [ ]Moderate [ ]Severe  Anxiety:                             [ ]Mild [ ]Moderate [ ]Severe  Fatigue:                             [ ]Mild [ ]Moderate [ ]Severe  Nausea:                             [ ]Mild [ ]Moderate [ ]Severe  Loss of appetite:              [ ]Mild [ ]Moderate [ ]Severe  Constipation:                    [ ]Mild [ ]Moderate [ ]Severe    Other Symptoms:  [ ]All other review of systems negative     Palliative Performance Status Version 2:         %    http://[facility].org/files/news/palliative_performance_scale_[email or web address].pdf  PHYSICAL EXAM:  Vital Signs Last 24 Hrs  T(C): --  T(F): --  HR: 114 (23 Dec 2022 13:00) (114 - 164)  BP: --  BP(mean): --  RR: 13 (23 Dec 2022 13:00) (13 - 26)  SpO2: 100% (23 Dec 2022 13:00) (94% - 100%)    Parameters below as of 23 Dec 2022 08:46  Patient On (Oxygen Delivery Method): room air        GENERAL:  [X ] No acute distress [ ]Lethargic  [ ]Unarousable  [ ]Verbal  [ ]Non-Verbal [ ]Cachexia    BEHAVIORAL/PSYCH:  [ ]Alert and Oriented x  [ ] Anxiety [ ] Delirium [ ] Agitation [ X] Calm   EYES: [ ] No scleral icterus [ ] Scleral icterus [X ] Closed  ENMT:  [ ]Dry mouth  [ ]No external oral lesions [ X] No external ear or nose lesions  CARDIOVASCULAR:  [ ]Regular [ ]Irregular [ ]Tachy [ ]Not Tachy  [ ][place]o [ ] Edema [ ] No edema  PULMONARY:  [ ]Tachypnea  [ ]Audible excessive secretions [X ] No labored breathing [ ] labored breathing  GASTROINTESTINAL: [ ]Soft  [ ]Distended  [X ]Not distended [ ]Non tender [ ]Tender  MUSCULOSKELETAL: [ ]No clubbing [ ] clubbing  [X ] No cyanosis [ ] cyanosis  NEUROLOGIC: [ ]No focal deficits  [ ]Follows commands  [ ]Does not follow commands  [X ]Cognitive impairment  [ ]Dysphagia  [ ]Dysarthria  [ ]Paresis   SKIN: [ ] Jaundiced [ X] Non-jaundiced [ ]Rash [ ]No Rash [ ] Warm [ ] Dry  MISC/LINES: [ ] ET tube [ ] Trach [ ]NGT/OGT [ x]PEG [  ]Campuzano      CRITICAL CARE:  [x ] Shock Present  [x ]Septic [ ]Cardiogenic [ ]Neurologic [ ]Hypovolemic  [x ]  Vasopressors [ ]  Inotropes   [x ]Respiratory failure present [x ]Mechanical ventilation [ ]Non-invasive ventilatory support [ ]High flow  [ ]Acute  [ ]Chronic [ ]Hypoxic  [ ]Hypercarbic [ ]Other  [ ]Other organ failure     LABS: CMO    RADIOLOGY & ADDITIONAL STUDIES: CMO    REFERRALS:   [ ]Chaplaincy  [ ]Hospice  [ ]Child Life  [ x]Social Work  [x ]Case management [ ]Holistic Therapy     Goals of Care Document:

## 2022-12-23 NOTE — PROGRESS NOTE ADULT - PROBLEM SELECTOR PLAN 2
- c/w dilaudid 0.5mg IV Q15 mins PRN mod-severe pain or dyspnea  - unclear why dilaudid 0.5mg IV Q4 ATC started while patient on morphine gtt - would utilize opioid gtt for long-acting regimen  - opioid gtt should be adjusted once steady-state reached, at a minumum at 8-12 hours, likely more 24 hours at end of life - uptitration from 1mg to 7mg in less than 24 hours, would not be recommended; would not increase drip for next 24 hours since last adjustment, and increase PRNs as needed  - c/w ativan 0.5mg IV Q1h PRN anxiety, agitation  - c/w robinul 0.4mg IV Q6 PRN secretions

## 2022-12-23 NOTE — PROGRESS NOTE ADULT - SUBJECTIVE AND OBJECTIVE BOX
Patient is a 52y old  Male who presents with a chief complaint of sepsis (22 Dec 2022 15:06)        Over Night Events:  CMO      ROS:     All pertinent ROS are negative except HPI         PHYSICAL EXAM    ICU Vital Signs Last 24 Hrs  T(C): --  T(F): --  HR: 164 (23 Dec 2022 05:00) (132 - 164)  BP: --  BP(mean): --  ABP: --  ABP(mean): --  RR: 26 (23 Dec 2022 05:00) (16 - 26)  SpO2: 94% (23 Dec 2022 05:00) (94% - 98%)    O2 Parameters below as of 23 Dec 2022 08:46  Patient On (Oxygen Delivery Method): room air            CONSTITUTIONAL:   Ill appearing.      ENT:    No thrush    CARDIAC:     No edema    RESPIRATORY:   No wheezing    GASTROINTESTINAL:  Abdomen soft,         12-22-22 @ 07:01  -  12-23-22 @ 07:00  --------------------------------------------------------  IN:    Dexmedetomidine: 51 mL    FentaNYL: 27.2 mL    Morphine: 83 mL    Morphine: 21 mL  Total IN: 182.2 mL    OUT:    Indwelling Catheter - Urethral (mL): 580 mL  Total OUT: 580 mL    Total NET: -397.8 mL        LABS:                                                                                                  MEDICATIONS  (STANDING):  chlorhexidine 0.12% Liquid 15 milliLiter(s) Oral Mucosa every 12 hours  chlorhexidine 2% Cloths 1 Application(s) Topical daily  HYDROmorphone  Injectable 0.5 milliGRAM(s) IV Push every 4 hours  morphine  Infusion. 1 mG/Hr (1 mL/Hr) IV Continuous <Continuous>    MEDICATIONS  (PRN):  glycopyrrolate Injectable 0.4 milliGRAM(s) IV Push every 6 hours PRN secretions  HYDROmorphone  Injectable 0.5 milliGRAM(s) IV Push every 15 minutes PRN mod-severe pain, dyspnea  LORazepam   Injectable 0.5 milliGRAM(s) IV Push every 1 hour PRN anxiety, agitation

## 2022-12-23 NOTE — PROGRESS NOTE ADULT - SUBJECTIVE AND OBJECTIVE BOX
KEVIN MEDRANO 52y Male  MRN#: [id number]   CODE STATUS:________CMO    Hospital Day: 21d    Pt is currently admitted with the primary diagnosis of sepsis    SUBJECTIVE    No adverse overnight events     Subjective complaints     Patient was examined and seen by the bedside. Non verbal at baseline. Awake non-distressed.                                             OBJECTIVE  PAST MEDICAL & SURGICAL HISTORY  Multiple sclerosis    Chronic back pain    Hypertension    No significant past surgical history                                                ALLERGIES:  penicillin (Unknown)  vancomycin (Unknown)                           HOME MEDICATIONS  Home Medications:  Aricept 10 mg oral tablet: 1 tab(s) orally once a day (02 Dec 2022 17:22)  Avonex 30 mcg intramuscular injection: 30 microgram(s) intramuscular every 7 days (02 Dec 2022 17:)  bacitracin 500 units/g topical ointment: Apply topically to affected area 4 times a day (02 Dec 2022 17:22)  baclofen 20 mg oral tablet: 1 tab(s) orally 3 times a day (02 Dec 2022 17:22)  Calmoseptine 0.44%-20.6% topical ointment: Apply topically to affected area once a day to sacrum (02 Dec 2022 17:22)  Elavil 25 mg oral tablet: 1 tab(s) orally once a day (at bedtime) (02 Dec 2022 17:22)  Eliquis 5 mg oral tablet: 1 tab(s) orally 2 times a day (02 Dec 2022 17:22)  lactulose 10 g/15 mL oral syrup: 15 milliliter(s) orally once a day (02 Dec 2022 17:22)  Multiple Vitamins oral tablet: 1 tab(s) orally once a day (02 Dec 2022 17:22)  Namenda 10 mg oral tablet: 1 tab(s) orally 2 times a day (02 Dec 2022 17:22)  omeprazole 20 mg oral delayed release capsule: 1 cap(s) orally once a day (02 Dec 2022 17:22)  ProAir HFA 90 mcg/inh inhalation aerosol: 2 puff(s) inhaled every 6 hours (02 Dec 2022 17:22)  Rapaflo 8 mg oral capsule: 1 cap(s) orally once a day (02 Dec 2022 17:22)  senna oral tablet: 2 tab(s) orally once a day (at bedtime) (02 Dec 2022 17:22)  sertraline 100 mg oral tablet: 1 tab(s) orally once a day (02 Dec 2022 17:22)  Tylenol 325 mg oral tablet: 2 tab(s) orally every 6 hours, As Needed (02 Dec 2022 17:22)  Vitamin B-100 oral tablet: 1 tab(s) orally once a day (02 Dec 2022 17:22)  Zanaflex 6 mg oral capsule: 1 cap(s) orally 3 times a day (02 Dec 2022 17:22)                           MEDICATIONS:  STANDING MEDICATIONS  chlorhexidine 0.12% Liquid 15 milliLiter(s) Oral Mucosa every 12 hours  chlorhexidine 2% Cloths 1 Application(s) Topical daily  HYDROmorphone  Injectable 0.5 milliGRAM(s) IV Push every 4 hours  morphine  Infusion. 1 mG/Hr IV Continuous <Continuous>    PRN MEDICATIONS  glycopyrrolate Injectable 0.4 milliGRAM(s) IV Push every 6 hours PRN  HYDROmorphone  Injectable 0.5 milliGRAM(s) IV Push every 15 minutes PRN  LORazepam   Injectable 0.5 milliGRAM(s) IV Push every 1 hour PRN                                            ------------------------------------------------------------  VITAL SIGNS: Last 24 Hours  T(C): --  T(F): --  HR: 126 (23 Dec 2022 08:00) (126 - 164)  BP: --  BP(mean): --  RR: 22 (23 Dec 2022 08:00) (16 - 26)  SpO2: 97% (23 Dec 2022 08:00) (94% - 98%)      22 @ 07:01  -  22 @ 07:00  --------------------------------------------------------  IN: 182.2 mL / OUT: 580 mL / NET: -397.8 mL    22 @ 07:01  -  22 @ 10:23  --------------------------------------------------------  IN: 21 mL / OUT: 95 mL / NET: -74 mL                                            RADIOLOGY:        PHYSICAL EXAM:  GENERAL: NAD, Awake, laying in bed contracted  HEAD:  Atraumatic, Normocephalic  EYES: conjunctiva and sclera clear  ENT: Moist mucous membranes  NECK: Supple   CHEST/LUNG: Clear to auscultation bilaterally; Unlabored respirations  HEART: Regular rate and rhythm  ABDOMEN: Soft, nondistended  EXTREMITIES:  No clubbing, cyanosis, or edema  NERVOUS SYSTEM: Non verbal  SKIN: No rashes                                          ASSESSMENT & PLAN    51 y/o man with PMH of MS, nonverbal at baseline, chronic low back pain, neurogenic bladder s/p SPC placement and s/p dislodgement was at IR for replacement of SPC when he was found to be in severe sepsis.    #Severe sepsis present on admission 2/2 acute pyelonephriti  #diarrhea on   #Anemia  #Neurogenic bladder  #Elevated dimer   #Dysphagia   #MS, nonverbal, bedbound, contracted/functional quadriplegia   #SUKHDEV   #NSTEMI type 2 due to sepsis     #CMO  pt was made CMO on  11AM, all medications dc'ed except comfort meds  dc all labs, vitals, FS      Handoff pendin) Pt was made CMO by HCP mother Alena, extubated and all medications dc'ed except comfort meds as per palliative recommendations  2) Downgrade to medical floors  3) F/u pallative                              INTERVAL HPI/OVERNIGHT EVENTS: No adverse overnight events

## 2022-12-24 NOTE — PROGRESS NOTE ADULT - ATTENDING COMMENTS
IMPRESSION:    Sepsis POA  Septic shock   Acute hypoxemic respiratory failure/ Left mucus plug/lung atelectasis  Urinary tract infection/ proteus  Bacteremia - MRSE  non verbal at baseline  NSTEMI likely Type 2  history of MS/neurogenic bladder    Plan as outlined above
IMPRESSION:    Sepsis POA  Septic shock   Acute hypoxemic respiratory failure/ Left mucus plug/lung atelectasis  Urinary tract infection/ proteus  Bacteremia - MRSE  non verbal at baseline  NSTEMI likely Type 2  history of MS/neurogenic bladder    Plan as outlined above
53 y/o man with PMH of MS, nonverbal at baseline, chronic low back pain, neurogenic bladder s/p SPC placement and s/p dislodgement was at IR for replacement of SPC when he was found to be in severe sepsis.    #Severe sepsis present on admission 2/2 acute pyelonephriti  #diarrhea on   #Anemia  #Neurogenic bladder  #Elevated dimer   #Dysphagia   #MS, nonverbal, bedbound, contracted/functional quadriplegia   #SUKHDEV   #NSTEMI type 2 due to sepsis     #CMO  pt was made CMO on  11AM, all medications dc'ed except comfort meds  dc all labs, vitals, FS      Handoff pendin) Pt was made CMO by HCP mother Alena, extubated and all medications dc'ed except comfort meds as per palliative recommendations  3) F/u pallative    Arti Luis MD  Attending Hospitalist
IMPRESSION:    Sepsis POA  Septic shock   Acute hypoxemic respiratory failure/ Left mucus plug/lung atelectasis  Urinary tract infection/ proteus  Bacteremia - MRSE  non verbal at baseline  NSTEMI likely Type 2  history of MS/neurogenic bladder    Plan as outlined above
IMPRESSION:  Sepsis POA  Septic shock, still requiring levophed  Acute hypoxemic respiratory failure/ Left mucus plug / lung atelectasis  Pseudomonas/ acineto in BAL / wash   Urinary tract infection/ proteus  Bacteremia - MRSE treated   Non verbal at baseline  NSTEMI likely Type 2  History of MS/neurogenic bladder    Plan as outlined above
IMPRESSION:  Sepsis POA  Septic shock, still requiring levophed  Acute hypoxemic respiratory failure/ Left mucus plug / lung atelectasis  Pseudomonas/ acineto in BAL / wash   Urinary tract infection/ proteus  Bacteremia - MRSE treated   Non verbal at baseline, but follows commands  NSTEMI likely Type 2  History of MS/neurogenic bladder    Plan as outlined above
My note supersedes all residents notes that I sign, My correction for their notes are in my notes   53 y/o man with PMH of MS, nonverbal at baseline, chronic low back pain, ?neurogenic bladder s/p SPC placement and s/p dislodgement was at IR for replacement of SPC when he was found to be in severe sepsis.    The patient indicated that he feels ok,   On exam General awake, alert, non verbal  NAD, Lungs clear to ausculations b/l on HFNC,  Heart regular ryhthm, Abdomen: soft, non tender non distended + PEG tube , Ext no edema, CONTRACTED      Severe sepsis present on admission 2/2 acute pyelonephritis  L lung etelectasis/Mucus plug   Ucx: Few Proteus Mirabilis/Mod E Faecalis  12/2 BCX  Staphylococcus epidermidis, Methicillin resistant,  12/2 BCX GPC 1/2  Staphylococcus hominis    12/2 UCX    Few Proteus mirabilis ESBL,   Moderate Enterococcus faecalis  On Meropenem   ID following, zyvox discsontinued as per ID   repeat cultures, RVP as per ID   c/w IVF   repeat blood cx, Tylenol for fevers >101  C/w HFNC, alternating with BIPAP, nebs and chest PT. Repeat Chest xray daily  possible bronchoscopy if family in agreement, final plan as per pulm     Neurogenic bladder  had SPC that was dislodged - now with [facility] and SPC was going to be replaced when he became septic   and IR following  monitor urine output  on flomax  recall IR when improved for possible SPC placement     Elevated dimer - 11oo, f/u LE duplex, previously on Eliquis. switched to therapeutic lovenox    Dysphagia - on PEG feeds. Request GI eval for PEG malfunction/leakage tube   S/P PEG tube changes yesterday by GI - downsized to 12 Fr today to allow for stomal healing   NPO , Nutrition eval to consider PPN given pt will need to be NPO for few days to allow for stomal healing   PEG tube site care as per GI note   monitor FS          MS, nonverbal, bedbound, contracted/functional quadriplegia - repositioning per protocol, continue baclofen  frequent turning and positioning and skin care as per protocol     SUKHDEV - likely prerenal, resolved - continue hydration and monitor   Creatinine Trend: <0.5<--, <0.5<--, <0.5<--, 0.5<--, <0.5<--, 0.5<--    Elevated trop  peaked 0.20 - likely NSTEMI type 2 due to sepsis - downtrending, check ECHO, pending     hypokalemia/Hypomagnesemia- replete and monitor levels   iv replacement   daily BMP    FULL CODE, prognosis is guarded, patient requires continuous monitoring in SDU     Follow up: follow XRay, blood cultures, PULM/ID recommendations, daily labs, bronch as per pulm, follow GI, echo, LE duplex
My note supersedes all residents notes that I sign, My correction for their notes are in my notes   the patient indicated that he is ok, later he was intubated and his mother at bedside   on exam: General awake, alert, non verbal  NAD, Lungs clear to ausculations b/l on HFNC,  Heart regular ryhthm, Abdomen: soft, non tender non distended + PEG tube , Ext no edema, CONTRACTED      53 y/o man with PMH of MS, nonverbal at baseline, chronic low back pain, ?neurogenic bladder s/p SPC placement and s/p dislodgement was at IR for replacement of SPC when he was found to be in severe sepsis.    Severe sepsis present on admission 2/2 acute pyelonephritis  L lung etelectasis/Mucus plug   Ucx: Few Proteus Mirabilis/Mod E Faecalis  12/2 BCX  Staphylococcus epidermidis, Methicillin resistant,  12/2 BCX GPC 1/2  Staphylococcus hominis    12/2 UCX    Few Proteus mirabilis ESBL,   Moderate Enterococcus faecalis  On Meropenem   ID following, zyvox discontinued as per ID   repeat cultures, RVP as per ID   c/w IVF   repeat blood cx, Tylenol for fevers >101  C/w HFNC, today the patient got intubated as per pulm cc for bronchoscopy   vent management as per pulm cc , sedation and pain management   check ABG and CXR after intubation   pulm cc on board for possible upgrade  secure 2 large IV access       Neurogenic bladder  had SPC that was dislodged - now with [facility] and SPC was going to be replaced when he became septic   and IR following  monitor urine output  on flomax  recall IR when improved for possible SPC placement     Elevated dimer - 11oo, f/u LE duplex, previously on Eliquis. switched to therapeutic lovenox    Dysphagia - on PEG feeds. Request GI eval for PEG malfunction/leakage tube   S/P PEG tube changes yesterday by GI - downsized to 12 Fr today to allow for stomal healing   need to be NPO for few days to allow for stomal healing   PPN started by nutrition support   PEG tube site care as per GI note   monitor FS          MS, nonverbal, bedbound, contracted/functional quadriplegia - repositioning per protocol, continue baclofen  frequent turning and positioning and skin care as per protocol     SUKHDEV - likely prerenal, resolved - continue hydration and monitor   Creatinine Trend: <0.5<--, <0.5<--, <0.5<--, <0.5<--, <0.5<--, 0.5<--    Elevated trop  peaked 0.20 - likely NSTEMI type 2 due to sepsis - downtrending, check ECHO, pending     hypokalemia/Hypomagnesemia- replete and monitor levels   iv replacement   daily BMP    FULL CODE, prognosis is guarded, patient requires continuous monitoring in SDU or even upgrade to MICU     Follow up: follow XRay, bronchoscopy fluids analysis,  blood cultures, PULM/ID recommendations, daily labs, follow GI, echo, LE duplex .
My note supersedes all residents notes that I sign, My correction for their notes are in my notes   the patient is intubated   on exam: General: mechanical vent, sedated non verbal  NAD, Lungs clear to ausculations b/l on HFNC,  Heart regular ryhthm, Abdomen: soft, non tender non distended + PEG tube , Ext no edema, CONTRACTED      51 y/o man with PMH of MS, nonverbal at baseline, chronic low back pain, ?neurogenic bladder s/p SPC placement and s/p dislodgement was at IR for replacement of SPC when he was found to be in severe sepsis.    Severe sepsis present on admission 2/2 acute pyelonephritis  L lung etelectasis/Mucus plug / s/p Intubation and bronchoscopy 12/8    Ucx: Few Proteus Mirabilis/Mod E Faecalis  12/2 BCX  Staphylococcus epidermidis, Methicillin resistant,  12/2 BCX GPC 1/2  Staphylococcus hominis    12/2 UCX    Few Proteus mirabilis ESBL,   Moderate Enterococcus faecalis  On Meropenem   ID following, zyvox discontinued as per ID -   ID follow up requested - give zyvox one dose TODAY for possible Pneumonia underneath the secreation left side  and get MRSA   c/w IVF   repeat blood cx, Tylenol for fevers >101  C/w HFNC, today the patient got intubated as per pulm cc for bronchoscopy   vent management as per pulm cc , sedation and pain management   monitor ABG, CXRs   pulm cc on board for possible upgrade  secure 2 large IV access   bronchoscopy fluids analysis f/u   Weaning trials as per pulm cc         Neurogenic bladder  had SPC that was dislodged - now with [facility] and SPC was going to be replaced when he became septic   and IR following  monitor urine output  on flomax  recall IR when improved for possible SPC placement     Elevated dimer - 11oo, f/u LE duplex, previously on Eliquis. switched to therapeutic lovenox    Dysphagia - on PEG feeds. Request GI eval for PEG malfunction/leakage tube   S/P PEG tube changes 12/7 by GI - downsized to 12 Fr today to allow for stomal healing -   today GI followed and Tube was replaced with 18 Fr tube today-  PEG tube study today, resume feeding if study confirms appropriate position.  PPN started by nutrition support - hold if PEG tube feeding started   PEG tube site care as per GI note   monitor FS          MS, nonverbal, bedbound, contracted/functional quadriplegia - repositioning per protocol, continue baclofen  frequent turning and positioning and skin care as per protocol     SUKHDEV - likely prerenal, resolved - continue hydration and monitor   Creatinine Trend: <0.5<--, <0.5<--, <0.5<--, <0.5<--, <0.5<--, 0.5<--    Elevated trop  peaked 0.20 - likely NSTEMI type 2 due to sepsis - downtrending, check ECHO, pending     hypokalemia/Hypomagnesemia- replete and monitor levels   iv replacement   daily BMP    FULL CODE, prognosis is guarded, patient requires continuous monitoring in SDU or even upgrade to MICU     Follow up: mecahnical ventilated ,  blood cultures, PULM/ID recommendations, daily labs, follow GI, echo, LE duplex .
IMPRESSION:  Sepsis POA  Septic shock, still requiring levophed  Acute hypoxemic respiratory failure/ Left mucus plug / lung atelectasis  Pseudomonas/ acineto in BAL / wash   Urinary tract infection/ proteus  Bacteremia - MRSE treated   Non verbal at baseline, but follows commands  NSTEMI likely Type 2  History of MS/neurogenic bladder    Plan as outlined above
IMPRESSION:  Sepsis POA  Septic shock, still requiring levophed  Acute hypoxemic respiratory failure/ Left mucus plug / lung atelectasis  Pseudomonas/ acineto in BAL / wash   Urinary tract infection/ proteus  Bacteremia - MRSE treated   Non verbal at baseline, but follows commands  NSTEMI likely Type 2  History of MS/neurogenic bladder    Plan as outlined above
My note supersedes all residents notes that I sign, My correction for their notes are in my notes   the patient is nonverbal and mechanically ventilated    overnight events notes   on exam: General: intubated on mechanical vent, sedated non verbal  NAD, Lungs clear to ausculations b/l on HFNC,  Heart regular ryhthm tachy, Abdomen: soft, non tender non distended + PEG tube , Ext no edema, CONTRACTED     51 y/o man with PMH of MS, nonverbal at baseline, chronic low back pain, ?neurogenic bladder s/p SPC placement and s/p dislodgement was at IR for replacement of SPC when he was found to be in severe sepsis.    Severe sepsis present on admission 2/2 acute pyelonephritis  L lung etelectasis/Mucus plug / s/p Intubation and bronchoscopy 12/8  then 12/12   Ucx: Few Proteus Mirabilis/Mod E Faecalis  12/2 BCX  Staphylococcus epidermidis, Methicillin resistant,  12/2 BCX GPC 1/2  Staphylococcus hominis    12/2 UCX    Few Proteus mirabilis ESBL,   Moderate Enterococcus faecalis  On Meropenem   ID following, zyvox discontinued as per ID -   ID follow up requested -   MRSA negative   repeat blood cx, Tylenol for fevers >101  12/8 -  the patient got intubated as per pulm cc and had a bronchoscopy   vent management as per pulm cc , sedation and pain management   monitor ABG, CXRs   secure 2 large IV access   bronchoscopy fluids analysis f/u   Weaning trials as per pulm cc   sedation and vent management as per Pulm cc , of levo early for tachycardia   ET tube place checks as per pul cc  CXR was improving but today worsening again  today bronchoscopy again by pulm cc - follow up studies   CTA to r/o PE , given tachycardic off levophed and elevated D Dimer before       Neurogenic bladder  had SPC that was dislodged - now with [facility] and SPC was going to be replaced when he became septic   and IR following  monitor urine output  on flomax  recall IR when improved for possible SPC placement     Elevated dimer - 11oo, f/u LE duplex, previously on Eliquis. switched to therapeutic lovenox   CTA done today     Dysphagia - on PEG feeds. Request GI eval for PEG malfunction/leakage tube   S/P PEG tube changes 12/7 by GI - downsized to 12 Fr today to allow for stomal healing -   today GI followed and Tube was replaced with 18 Fr tube today-  resume PEG feeding , and hold PPN feeding   PEG tube site care as per GI note   monitor FS          MS, nonverbal, bedbound, contracted/functional quadriplegia - repositioning per protocol, continue baclofen  frequent turning and positioning and skin care as per protocol     SUKHDEV - likely prerenal, resolved - continue hydration and monitor   Creatinine Trend: <0.5<--, <0.5<--, <0.5<--, <0.5<--, <0.5<--, 0.5<--    Elevated trop  peaked 0.20 - likely NSTEMI type 2 due to sepsis - downtrending, check ECHO, pending     hypokalemia/Hypomagnesemia- replete and monitor levels   iv replacement   daily BMP    FULL CODE, prognosis is guarded, patient  being upgraded to MICU     Follow up: mecahnical ventilated ,  blood cultures, PULM/ID recommendations, daily labs, follow GI, echo, LE duplex .     team updated the family
My note supersedes all residents notes that I sign, My correction for their notes are in my notes   the patient is nonverbal and ventilated on levophed via central line    on exam: General: intubated on mechanical vent, sedated non verbal  NAD, Lungs clear to ausculations b/l on HFNC,  Heart regular ryhthm, Abdomen: soft, non tender non distended + PEG tube , Ext no edema, CONTRACTED     53 y/o man with PMH of MS, nonverbal at baseline, chronic low back pain, ?neurogenic bladder s/p SPC placement and s/p dislodgement was at IR for replacement of SPC when he was found to be in severe sepsis.    Severe sepsis present on admission 2/2 acute pyelonephritis  L lung etelectasis/Mucus plug / s/p Intubation and bronchoscopy 12/8    Ucx: Few Proteus Mirabilis/Mod E Faecalis  12/2 BCX  Staphylococcus epidermidis, Methicillin resistant,  12/2 BCX GPC 1/2  Staphylococcus hominis    12/2 UCX    Few Proteus mirabilis ESBL,   Moderate Enterococcus faecalis  On Meropenem   ID following, zyvox discontinued as per ID -   ID follow up requested -   MRSA negative   repeat blood cx, Tylenol for fevers >101  12/8 -  the patient got intubated as per pulm cc and had a bronchoscopy   vent management as per pulm cc , sedation and pain management   monitor ABG, CXRs   secure 2 large IV access   bronchoscopy fluids analysis f/u   Weaning trials as per pulm cc   sedation and vent management as per Pulm cc ( no on levophed, precedx, fentanyl and versed )   ET tube place checks as per pulm cc  CXR was improving but today worsening again       Neurogenic bladder  had SPC that was dislodged - now with [facility] and SPC was going to be replaced when he became septic   and IR following  monitor urine output  on flomax  recall IR when improved for possible SPC placement     Elevated dimer - 11oo, f/u LE duplex, previously on Eliquis. switched to therapeutic lovenox    Dysphagia - on PEG feeds. Request GI eval for PEG malfunction/leakage tube   S/P PEG tube changes 12/7 by GI - downsized to 12 Fr today to allow for stomal healing -   today GI followed and Tube was replaced with 18 Fr tube today-  resume PEG feeding , and hold PPN feeding   PEG tube site care as per GI note   monitor FS          MS, nonverbal, bedbound, contracted/functional quadriplegia - repositioning per protocol, continue baclofen  frequent turning and positioning and skin care as per protocol     SUKHDEV - likely prerenal, resolved - continue hydration and monitor   Creatinine Trend: <0.5<--, <0.5<--, <0.5<--, <0.5<--, <0.5<--, 0.5<--    Elevated trop  peaked 0.20 - likely NSTEMI type 2 due to sepsis - downtrending, check ECHO, pending     hypokalemia/Hypomagnesemia- replete and monitor levels   iv replacement   daily BMP    FULL CODE, prognosis is guarded, patient  being upgraded to MICU     Follow up: mecahnical ventilated ,  blood cultures, PULM/ID recommendations, daily labs, follow GI, echo, LE duplex .     I discussed with the patient mother in detail 12/10.
I edited the note
I edited the note
events noted, still on bipap, cxr no improvement, need bronc,. plan as above
Events noted, sepsis ( blood cx/ urine noted), l lung atelectasis ( MS), plan as above
IMPRESSION:    Sepsis POA  Acute hypoxemic respiratory failure  Urinary tract infection  non verbal at baseline  NSTEMI likely Type 2  Pulm edema ?   history of MS/neurogenic bladder    Impression and plan are my own
IMPRESSION:    Sepsis POA  Septic shock   Acute hypoxemic respiratory failure/ Left mucus plug/lung atelectasis  Urinary tract infection/ proteus  Bacteremia - MRSE  non verbal at baseline  NSTEMI likely Type 2  history of MS/neurogenic bladder    Plan as outlined above
My note supersedes all residents notes that I sign, My correction for their notes are in my notes   the patient is nonverbal and mechanically ventilated    overnight events notes   on exam: General: intubated on mechanical vent, sedated non verbal  NAD, Lungs clear to ausculations b/l on HFNC,  Heart regular ryhthm tachy, Abdomen: soft, non tender non distended + PEG tube , Ext no edema, CONTRACTED     53 y/o man with PMH of MS, nonverbal at baseline, chronic low back pain, ?neurogenic bladder s/p SPC placement and s/p dislodgement was at IR for replacement of SPC when he was found to be in severe sepsis.    Severe sepsis present on admission 2/2 acute pyelonephritis  L lung etelectasis/Mucus plug / complete left main stem - s/p Intubation  12/8 and bronchoscopy 12/8  then 12/12 and 12/13   Ucx: Few Proteus Mirabilis/Mod E Faecalis  12/2 BCX  Staphylococcus epidermidis, Methicillin resistant,  12/2 BCX GPC 1/2  Staphylococcus hominis    12/2 UCX    Few Proteus mirabilis ESBL,   Moderate Enterococcus faecalis     Meropenem switched to  Avycaz 2.5g q8h IV as per ID Follow up   sputum culture growing pseudomonas CRE   MRSA negative   repeat blood cx, Tylenol for fevers >101  vent management as per pulm cc , sedation and pain management   monitor ABG, CXRs   secure IV access, has left IJ   bronchoscopy fluids analysis f/u   Weaning trials as per pulm cc   sedation and vent management as per Pulm cc , On levophed   ET tube place checks as per pulm cc  CXR was improving but today worsening again  today bronchoscopy again by pulm cc - follow up studies   CTA to r/o PE , given tachycardic off levophed and elevated D Dimer before       Neurogenic bladder  had SPC that was dislodged - now with [facility] and SPC was going to be replaced when he became septic   and IR following  monitor urine output  on flomax  recall IR when improved for possible SPC placement     Elevated dimer - 11oo, f/u LE duplex, previously on Eliquis. switched to therapeutic lovenox discussed with the patient mother today at bedside - she will get his medications list tomorrow to see if he was actually getting Eliquis as outpatient or no   CTA NEGATIVE for PE     Dysphagia - on PEG feeds. Request GI eval for PEG malfunction/leakage tube   S/P PEG tube changes 12/7 by GI - downsized to 12 Fr today to allow for stomal healing -   today GI followed and Tube was replaced with 18 Fr tube today-  resume PEG feeding , and hold PPN feeding   PEG tube site care as per GI note   monitor FS          MS, nonverbal, bedbound, contracted/functional quadriplegia - repositioning per protocol, continue baclofen  frequent turning and positioning and skin care as per protocol     SUKHDEV - likely prerenal, resolved - continue hydration and monitor   Creatinine Trend: <0.5<--, <0.5<--, <0.5<--, <0.5<--, <0.5<--, <0.5<--    Elevated trop  peaked 0.20 - likely NSTEMI type 2 due to sepsis - downtrending, check ECHO, pending     hypokalemia/Hypomagnesemia- replete and monitor levels   iv replacement   daily BMP    FULL CODE, prognosis is guarded, patient  being upgraded to MICU     Follow up: mechanical ventilated ,  blood cultures, PULM/ID recommendations, daily labs, follow GI, echo, LE duplex .     I discussed with the patient mother today 12/13  at bedside - she will get his medications list tomorrow to see if he was actually getting Eliquis as outpatient or no
My note supersedes all residents notes that I sign, My correction for their notes are in my notes   events noted, on levophed and central line placed   on exam: General: intubated on mechanical vent, sedated non verbal  NAD, Lungs clear to ausculations b/l on HFNC,  Heart regular ryhthm, Abdomen: soft, non tender non distended + PEG tube , Ext no edema, CONTRACTED     51 y/o man with PMH of MS, nonverbal at baseline, chronic low back pain, ?neurogenic bladder s/p SPC placement and s/p dislodgement was at IR for replacement of SPC when he was found to be in severe sepsis.    Severe sepsis present on admission 2/2 acute pyelonephritis  L lung etelectasis/Mucus plug / s/p Intubation and bronchoscopy 12/8    Ucx: Few Proteus Mirabilis/Mod E Faecalis  12/2 BCX  Staphylococcus epidermidis, Methicillin resistant,  12/2 BCX GPC 1/2  Staphylococcus hominis    12/2 UCX    Few Proteus mirabilis ESBL,   Moderate Enterococcus faecalis  On Meropenem   ID following, zyvox discontinued as per ID -   ID follow up requested -   MRSA negative   repeat blood cx, Tylenol for fevers >101  12/8 -  the patient got intubated as per pulm cc and had a bronchoscopy   vent management as per pulm cc , sedation and pain management   monitor ABG, CXRs   secure 2 large IV access   bronchoscopy fluids analysis f/u   Weaning trials as per pulm cc   sedation and vent management as per Pulm cc ( no on levophed, precedx, fentanyl and versed )   CXR improving       Neurogenic bladder  had SPC that was dislodged - now with [facility] and SPC was going to be replaced when he became septic   and IR following  monitor urine output  on flomax  recall IR when improved for possible SPC placement     Elevated dimer - 11oo, f/u LE duplex, previously on Eliquis. switched to therapeutic lovenox    Dysphagia - on PEG feeds. Request GI eval for PEG malfunction/leakage tube   S/P PEG tube changes 12/7 by GI - downsized to 12 Fr today to allow for stomal healing -   today GI followed and Tube was replaced with 18 Fr tube today-  resume PEG feeding , and hold PPN feeding   PEG tube site care as per GI note   monitor FS          MS, nonverbal, bedbound, contracted/functional quadriplegia - repositioning per protocol, continue baclofen  frequent turning and positioning and skin care as per protocol     SUKHDEV - likely prerenal, resolved - continue hydration and monitor   Creatinine Trend: <0.5<--, <0.5<--, <0.5<--, <0.5<--, <0.5<--, 0.5<--    Elevated trop  peaked 0.20 - likely NSTEMI type 2 due to sepsis - downtrending, check ECHO, pending     hypokalemia/Hypomagnesemia- replete and monitor levels   iv replacement   daily BMP    FULL CODE, prognosis is guarded, patient requires continuous being upgraded to MICU     Follow up: mecahnical ventilated ,  blood cultures, PULM/ID recommendations, daily labs, follow GI, echo, LE duplex .     I discussed with the patient mother in detail at bedside and I showed her the CXRs

## 2022-12-24 NOTE — PROGRESS NOTE ADULT - ATTENDING SUPERVISION STATEMENT
Fellow
Resident
Fellow
Resident
Fellow
Resident
Resident
Fellow
Resident
Resident

## 2022-12-24 NOTE — PROGRESS NOTE ADULT - SUBJECTIVE AND OBJECTIVE BOX
HPI:  Pt is nonverbal at baseline. Hx obtained from chart.     51 yo male w/ PMH of MS, chronic low back pain, neurogenic bladder? s/p SPC placement and s/p dislodgement, presents for fever. Presented to the ED for SPC dislodgement a few days ago. Was seen by IR and planned for outpatient SPC replacement. Campuzano was placed at [facility]. Today. prior to placement, he was noted to be febrile w/ tachycardia and tachypnea so brought into ED.  Pt nonverbal at baseline, unable to provide further HPI.    In the ED:    · BP Systolic	138 mm Hg  · BP Diastolic	92 mm Hg  · Heart Rate	  150 /min  · Respiration Rate (breaths/min)	  60 /min  · Temp (F)	  103.7 Degrees F  · Temp (C)	  39.8 Degrees C  · Temp site	rectal  · SpO2 (%)	98 %  · O2 Delivery/Oxygen Delivery Method	nasal cannula  · Oxygen Therapy Flow (L/min)	2 L/min    Labs notable for WBC 32k, lactate 2.4, Na 147, AG 17, troponin 0.2  UA showing large leuks, WBC, bacteria, RBC  CXR showing worsening opacifications  ECG showing sinus tachy, possible inferior infarct  Given aztreonam, zyvox, fluid bolus  Pt admitted to SDU.  (02 Dec 2022 15:53)    Currently admitted to medicine with the primary diagnosis of Sepsis       Today is hospital day 22d.     INTERVAL HPI / OVERNIGHT EVENTS:  Patient was examined and seen at bedside. This morning he is resting in bed, non-verbal at baseline. Recent ICU downgrade no CMO. Not monitoring vitals, patient appears comfortable, on morphine ggt, will continue to monitor.      ROS: Otherwise unremarkable     PAST MEDICAL & SURGICAL HISTORY  Multiple sclerosis    Chronic back pain    Hypertension    No significant past surgical history      ALLERGIES  penicillin (Unknown)  vancomycin (Unknown)    MEDICATIONS  STANDING MEDICATIONS  chlorhexidine 0.12% Liquid 15 milliLiter(s) Oral Mucosa every 12 hours  chlorhexidine 2% Cloths 1 Application(s) Topical daily  HYDROmorphone  Injectable 0.5 milliGRAM(s) IV Push every 4 hours  morphine  Infusion. 1 mG/Hr IV Continuous <Continuous>    PRN MEDICATIONS  glycopyrrolate Injectable 0.4 milliGRAM(s) IV Push every 6 hours PRN  HYDROmorphone  Injectable 0.5 milliGRAM(s) IV Push every 15 minutes PRN  LORazepam   Injectable 0.5 milliGRAM(s) IV Push every 1 hour PRN    VITALS:  T(F): --  HR: 110  BP: --  RR: 14  SpO2: 100%    PHYSICAL EXAM  GEN: NAD, calm, Resting comfortably in bed, non-verbal, contracted, quadriplegic   PULM: Not in respiratory distress, no laboured breathing  CVS: not obtained at this time   ABD: non-distended, PEG  EXT: No edema/cyanosis, appears dry  NEURO: Cognitive impairment, non-verbal    LABS                          RADIOLOGY

## 2022-12-25 NOTE — PROGRESS NOTE ADULT - ASSESSMENT
51 y/o man with PMH of MS, nonverbal at baseline, chronic low back pain, neurogenic bladder s/p SPC placement and s/p dislodgement was at IR for replacement of SPC when he was found to be in severe sepsis.    #Severe sepsis present on admission 2/2 acute pyelonephriti  #diarrhea on   #Anemia  #Neurogenic bladder  #Elevated dimer   #Dysphagia   #MS, nonverbal, bedbound, contracted/functional quadriplegia   #SUKHDEV   #NSTEMI type 2 due to sepsis     #CMO  pt was made CMO on  11AM, all medications dc'ed except comfort meds  dc all labs, vitals, FS      Handoff pendin) Pt was made CMO by HCP mother Alena, extubated and all medications dc'ed except comfort meds as per palliative recommendations  3) F/u pallative    Arti Luis MD  Attending Hospitalist .

## 2022-12-25 NOTE — PROGRESS NOTE ADULT - SUBJECTIVE AND OBJECTIVE BOX
SUBJECTIVE:    Patient is a 52y old Male who presents with a chief complaint of sepsis (24 Dec 2022 08:44)    Currently admitted to medicine with the primary diagnosis of Sepsis       Today is hospital day 23d. This morning he is resting comfortably in bed and reports no new issues or overnight events.     PAST MEDICAL & SURGICAL HISTORY  Multiple sclerosis    Chronic back pain    Hypertension    No significant past surgical history      SOCIAL HISTORY:  Negative for smoking/alcohol/drug use.     ALLERGIES:  penicillin (Unknown)  vancomycin (Unknown)    MEDICATIONS:  STANDING MEDICATIONS  chlorhexidine 0.12% Liquid 15 milliLiter(s) Oral Mucosa every 12 hours  chlorhexidine 2% Cloths 1 Application(s) Topical daily  morphine  Infusion. 5 mG/Hr IV Continuous <Continuous>    PRN MEDICATIONS  glycopyrrolate Injectable 0.4 milliGRAM(s) IV Push every 6 hours PRN  LORazepam   Injectable 0.5 milliGRAM(s) IV Push every 1 hour PRN    VITALS:   T(F): --  HR: --  BP: --  RR: --  SpO2: --    PHYSICAL EXAM:  GEN:  NAD       LABS:                            RADIOLOGY:

## 2022-12-26 NOTE — PROGRESS NOTE ADULT - ASSESSMENT
53 y/o man with PMH of MS, nonverbal at baseline, chronic low back pain, neurogenic bladder s/p SPC placement and s/p dislodgement was at IR for replacement of SPC when he was found to be in severe sepsis.    #Severe sepsis present on admission 2/2 acute pyelonephriti  #diarrhea on   #Anemia  #Neurogenic bladder  #Elevated dimer   #Dysphagia   #MS, nonverbal, bedbound, contracted/functional quadriplegia   #SUKHDEV   #NSTEMI type 2 due to sepsis     #CMO  pt was made CMO on  11AM, all medications dc'ed except comfort meds  dc all labs, vitals, FS      Handoff pendin) Pt was made CMO by HCP mother Alena, extubated and all medications dc'ed except comfort meds as per palliative recommendations  3) F/u pallative    Arti Luis MD  Attending Hospitalist .

## 2022-12-26 NOTE — PROGRESS NOTE ADULT - SUBJECTIVE AND OBJECTIVE BOX
SUBJECTIVE:    Patient is a 52y old Male who presents with a chief complaint of sepsis (25 Dec 2022 12:20)    Currently admitted to medicine with the primary diagnosis of Sepsis       Today is hospital day 24d. This morning he is resting comfortably in bed and reports no new issues or overnight events.     PAST MEDICAL & SURGICAL HISTORY  Multiple sclerosis    Chronic back pain    Hypertension    No significant past surgical history      SOCIAL HISTORY:  Negative for smoking/alcohol/drug use.     ALLERGIES:  penicillin (Unknown)  vancomycin (Unknown)    MEDICATIONS:  STANDING MEDICATIONS  chlorhexidine 0.12% Liquid 15 milliLiter(s) Oral Mucosa every 12 hours  chlorhexidine 2% Cloths 1 Application(s) Topical daily  morphine  Infusion. 5 mG/Hr IV Continuous <Continuous>    PRN MEDICATIONS  glycopyrrolate Injectable 0.4 milliGRAM(s) IV Push every 6 hours PRN  HYDROmorphone  Injectable 0.5 milliGRAM(s) IV Push every 1 hour PRN  LORazepam   Injectable 0.5 milliGRAM(s) IV Push every 1 hour PRN    VITALS:   T(F): 101.1  HR: --  BP: --  RR: --  SpO2: --    PHYSICAL EXAM:  GEN pt appears comfortable      LABS:                            RADIOLOGY:

## 2022-12-26 NOTE — PROGRESS NOTE ADULT - ASSESSMENT
52y M pmh MS, nonverbal, chronic low back pain, neurogenic bladder s/p SPC placement and s/p dislodgement at IR for replacement when found to be in severe sepsis.    #Sepsis on admission   #Acute pyelonephritis   #Anemia  #Neurogenic bladder  #Elevated Dimer  #Dysphagia   #MS, nonverbal, bedbound, contracted/functional quadriplegia  #SUKHDEV  #NSTEMI type II  - patient made CMO 12/21 11AM by mother Alena (HCP) with palliative care  - all meds except comfort d/c  - no monitoring of labs/vitals  - c/w dilaudid 0.5mg IV prn qhr   - morphine ggt @5mg/hr  - Ativan 0.5mg IV qhr  - glycopyrrolate 0.4mg IV q6hr

## 2022-12-26 NOTE — PROGRESS NOTE ADULT - SUBJECTIVE AND OBJECTIVE BOX
HPI:  Pt is nonverbal at baseline. Hx obtained from chart.     53 yo male w/ PMH of MS, chronic low back pain, neurogenic bladder? s/p SPC placement and s/p dislodgement, presents for fever. Presented to the ED for SPC dislodgement a few days ago. Was seen by IR and planned for outpatient SPC replacement. Campuzano was placed at [facility]. Today. prior to placement, he was noted to be febrile w/ tachycardia and tachypnea so brought into ED.  Pt nonverbal at baseline, unable to provide further HPI.    In the ED:    · BP Systolic	138 mm Hg  · BP Diastolic	92 mm Hg  · Heart Rate	  150 /min  · Respiration Rate (breaths/min)	  60 /min  · Temp (F)	  103.7 Degrees F  · Temp (C)	  39.8 Degrees C  · Temp site	rectal  · SpO2 (%)	98 %  · O2 Delivery/Oxygen Delivery Method	nasal cannula  · Oxygen Therapy Flow (L/min)	2 L/min    Labs notable for WBC 32k, lactate 2.4, Na 147, AG 17, troponin 0.2  UA showing large leuks, WBC, bacteria, RBC  CXR showing worsening opacifications  ECG showing sinus tachy, possible inferior infarct  Given aztreonam, zyvox, fluid bolus  Pt admitted to SDU.  (02 Dec 2022 15:53)    Currently admitted to medicine with the primary diagnosis of Sepsis       Today is hospital day 24d.     INTERVAL HPI / OVERNIGHT EVENTS:  Patient was seen at bedside. This morning he is resting comfortably in bed and reports no new issues or overnight events. Currently CMO on heparin ggt and PRN for pain control.     ROS: Otherwise unremarkable     PAST MEDICAL & SURGICAL HISTORY  Multiple sclerosis    Chronic back pain    Hypertension    No significant past surgical history      ALLERGIES  penicillin (Unknown)  vancomycin (Unknown)    MEDICATIONS  STANDING MEDICATIONS  chlorhexidine 0.12% Liquid 15 milliLiter(s) Oral Mucosa every 12 hours  chlorhexidine 2% Cloths 1 Application(s) Topical daily  morphine  Infusion. 5 mG/Hr IV Continuous <Continuous>    PRN MEDICATIONS  glycopyrrolate Injectable 0.4 milliGRAM(s) IV Push every 6 hours PRN  HYDROmorphone  Injectable 0.5 milliGRAM(s) IV Push every 1 hour PRN  LORazepam   Injectable 0.5 milliGRAM(s) IV Push every 1 hour PRN    VITALS:  T(F): 101.1  HR: --  BP: --  RR: --  SpO2: --    PHYSICAL EXAM  GEN: NAD, appears comfortable   PULM: Not in distress, breathing not laboured  ABD: No distension   NEURO: Quadriplegic, non-verbal    LABS                          RADIOLOGY

## 2022-12-27 NOTE — PROGRESS NOTE ADULT - ASSESSMENT
51 y/o man with PMH of MS, nonverbal at baseline, chronic low back pain, neurogenic bladder s/p SPC placement and s/p dislodgement was at IR for replacement of SPC when he was found to be in severe sepsis.    Severe sepsis present on admission due acute pyelonephritis  Dysphagia   MS, nonverbal, bedbound, contracted/functional quadriplegia   SUKHDEV   NSTEMI type 2 due to sepsis  fever     pt was made CMO on 12/21   palliative following  IV tylenol given yesterday  continue comfort care  overall very poor prognosis

## 2022-12-27 NOTE — PROGRESS NOTE ADULT - SUBJECTIVE AND OBJECTIVE BOX
KEVIN MEDRANO  52y Male    INTERVAL HPI/OVERNIGHT EVENTS:    pt feels warm  otherwise appears comfortable on morphine drip    T(F): 100.6 (12-26-22 @ 14:28), Max: 102.3 (12-26-22 @ 13:15)  HR: --  BP: --  RR: --  SpO2: --    PHYSICAL EXAM:  GENERAL: NAD  HEAD:  Normocephalic  EYES:  conjunctiva and sclera clear  ENMT: Mouth closed  CHEST/LUNG: CTA b/l; No rales, rhonchi, wheezing  HEART: tachy  ABDOMEN: Soft, +SPC   EXTREMITIES:  contracted  SKIN: warm to touch    Consultant(s) Notes Reviewed:  [x ] YES  [ ] NO  Care Discussed with Consultants/Other Providers [ x] YES  [ ] NO    MEDICATIONS  (STANDING):  chlorhexidine 0.12% Liquid 15 milliLiter(s) Oral Mucosa every 12 hours  chlorhexidine 2% Cloths 1 Application(s) Topical daily  morphine  Infusion. 6 mG/Hr (6 mL/Hr) IV Continuous <Continuous>    MEDICATIONS  (PRN):  glycopyrrolate Injectable 0.4 milliGRAM(s) IV Push every 6 hours PRN secretions  HYDROmorphone  Injectable 0.5 milliGRAM(s) IV Push every 1 hour PRN moderate-severe pain or dyspnea  LORazepam   Injectable 0.5 milliGRAM(s) IV Push every 1 hour PRN anxiety, agitation        Case discussed with residents and RN on rounds today

## 2022-12-27 NOTE — PROGRESS NOTE ADULT - SUBJECTIVE AND OBJECTIVE BOX
HPI:  Pt is nonverbal at baseline. Hx obtained from chart.     53 yo male w/ PMH of MS, chronic low back pain, neurogenic bladder? s/p SPC placement and s/p dislodgement, presents for fever. Presented to the ED for SPC dislodgement a few days ago. Was seen by IR and planned for outpatient SPC replacement. Campuzano was placed at [facility]. Today. prior to placement, he was noted to be febrile w/ tachycardia and tachypnea so brought into ED.  Pt nonverbal at baseline, unable to provide further HPI.    In the ED:    · BP Systolic	138 mm Hg  · BP Diastolic	92 mm Hg  · Heart Rate	  150 /min  · Respiration Rate (breaths/min)	  60 /min  · Temp (F)	  103.7 Degrees F  · Temp (C)	  39.8 Degrees C  · Temp site	rectal  · SpO2 (%)	98 %  · O2 Delivery/Oxygen Delivery Method	nasal cannula  · Oxygen Therapy Flow (L/min)	2 L/min    Labs notable for WBC 32k, lactate 2.4, Na 147, AG 17, troponin 0.2  UA showing large leuks, WBC, bacteria, RBC  CXR showing worsening opacifications  ECG showing sinus tachy, possible inferior infarct  Given aztreonam, zyvox, fluid bolus  Pt admitted to SDU.  (02 Dec 2022 15:53)    Currently admitted to medicine with the primary diagnosis of Sepsis       Today is hospital day 25d.     INTERVAL HPI / OVERNIGHT EVENTS:  Patient was examined and seen at bedside. This morning he is resting comfortably in bed/no events overnight. Palliative made aware for feeling febrile, started on acetaminophen atc. Currently CMO on morphine ggt and PRN pain control.    ROS: Otherwise unremarkable     PAST MEDICAL & SURGICAL HISTORY  Multiple sclerosis    Chronic back pain    Hypertension    No significant past surgical history      ALLERGIES  penicillin (Unknown)  vancomycin (Unknown)    MEDICATIONS  STANDING MEDICATIONS  chlorhexidine 0.12% Liquid 15 milliLiter(s) Oral Mucosa every 12 hours  chlorhexidine 2% Cloths 1 Application(s) Topical daily  morphine  Infusion. 6 mG/Hr IV Continuous <Continuous>    PRN MEDICATIONS  glycopyrrolate Injectable 0.4 milliGRAM(s) IV Push every 6 hours PRN  HYDROmorphone  Injectable 0.5 milliGRAM(s) IV Push every 1 hour PRN  LORazepam   Injectable 0.5 milliGRAM(s) IV Push every 1 hour PRN    VITALS:  T(F): 100.6  HR: --  BP: --  RR: --  SpO2: --    PHYSICAL EXAM  GEN: NAD, appears comfortable   PULM: Not in distress, breathing not laboured  ABD: No distension   NEURO: Quadriplegic, non-verbal    LABS                          RADIOLOGY

## 2022-12-27 NOTE — PROGRESS NOTE ADULT - ASSESSMENT
52yMale being evaluated for goals of care and symptom management, now CMO, palliative following for symptoms.

## 2022-12-27 NOTE — PROGRESS NOTE ADULT - ASSESSMENT
52y M pmh MS, nonverbal, chronic low back pain, neurogenic bladder s/p SPC placement and s/p dislodgement at IR for replacement when found to be in severe sepsis.    #Sepsis on admission   #Acute pyelonephritis   #Anemia  #Neurogenic bladder  #Elevated Dimer  #Dysphagia   #MS, nonverbal, bedbound, contracted/functional quadriplegia  #SUKHDEV  #NSTEMI type II  - patient made CMO 12/21 11AM by mother Alena (HCP) with palliative care  - all meds except comfort d/c  - no monitoring of labs/vitals  - c/w dilaudid 0.5mg IV prn qhr   - added tylenol 1g q6 ATC for 24hrs, cooling blanket  - morphine ggt increased to @6mg/hr  - Ativan 0.5mg IV qhr  - glycopyrrolate 0.4mg IV q6hr    Mich Leach  PGY1 MD

## 2022-12-27 NOTE — PROGRESS NOTE ADULT - SUBJECTIVE AND OBJECTIVE BOX
HPI:  Pt is nonverbal at baseline. Hx obtained from chart.     51 yo male w/ PMH of MS, chronic low back pain, neurogenic bladder? s/p SPC placement and s/p dislodgement, presents for fever. Presented to the ED for SPC dislodgement a few days ago. Was seen by IR and planned for outpatient SPC replacement. Campuzano was placed at [facility]. Today. prior to placement, he was noted to be febrile w/ tachycardia and tachypnea so brought into ED.  Pt nonverbal at baseline, unable to provide further HPI.    Pt upgraded to ICU sepsis, on multiple pressors. Palliative care consulted, pt w end stage MS at baseline. Poor prognosis.      INTERVAL EVENTS:  12/16: pt appears sedated on ventilator in ICU   12/19: remains intubated, appears comfortable, lung collapse 12/18 12/20: patient appears comfortable  12/21: patient comfortable, for CMO today  12/22: patient comfortable, ativan 0.5mg IV x 2, dilaudid 0.5mg IV x 2  12/23: patient comfortable, used dilaudid 0.5mg IV x 2 - was started on dilaudid 0.5 IV Q4 ATC, with morphine gtt uptitrated to 7mg/h by primary team  12/27: patient CMO, with some tachypnea and secretions today    ADVANCE DIRECTIVES:    DECISION MAKER(s):  [ ] Health Care Proxy(s)  [ ] Surrogate(s)  [ ] Guardian           Name(s): Phone Number(s):  Answers: Caregiver Name: JAYY MEDRANO,  Answers: Caregiver Relationship: MOTHER, HCP  Answers: Caregiver Contact Number: [phone]  HEALTH CARE PROXY ON FILE IN EMR - REVIEWED   BASELINE (I)ADL(s) (prior to admission):  [place]: [ ]Total  [ ] Moderate [x ]Dependent  Allergies    penicillin (Unknown)  vancomycin (Unknown)    Intolerances    MEDICATIONS  (STANDING):  chlorhexidine 0.12% Liquid 15 milliLiter(s) Oral Mucosa every 12 hours  chlorhexidine 2% Cloths 1 Application(s) Topical daily  morphine  Infusion. 6 mG/Hr (6 mL/Hr) IV Continuous <Continuous>  scopolamine 1 mG/72 Hr(s) Patch 1 Patch Transdermal every 72 hours    MEDICATIONS  (PRN):  glycopyrrolate Injectable 0.4 milliGRAM(s) IV Push every 6 hours PRN secretions  HYDROmorphone  Injectable 0.5 milliGRAM(s) IV Push every 1 hour PRN moderate-severe pain or dyspnea  LORazepam   Injectable 0.5 milliGRAM(s) IV Push every 1 hour PRN anxiety, agitation      PRESENT SYMPTOMS: [ x]Unable to obtain due to poor mentation   Source if other than patient:  [ ]Family   [ ]Team     Pain: [ ]yes [ ]no  QOL impact -   Location -                    Aggravating factors -  Quality -  Radiation -  Timing-  Severity (0-10 scale):  Minimal acceptable level (0-10 scale):     Dyspnea:                           [ ]Mild [ ]Moderate [ ]Severe  Anxiety:                             [ ]Mild [ ]Moderate [ ]Severe  Fatigue:                             [ ]Mild [ ]Moderate [ ]Severe  Nausea:                             [ ]Mild [ ]Moderate [ ]Severe  Loss of appetite:              [ ]Mild [ ]Moderate [ ]Severe  Constipation:                    [ ]Mild [ ]Moderate [ ]Severe    Other Symptoms:  [ ]All other review of systems negative     Palliative Performance Status Version 2:         %    [email or web address]  PHYSICAL EXAM:  Vital Signs Last 24 Hrs  T(C): --  T(F): --  HR: --  BP: --  BP(mean): --  RR: --  SpO2: --          GENERAL:  [X ] No acute distress [ ]Lethargic  [ ]Unarousable  [ ]Verbal  [ ]Non-Verbal [ ]Cachexia    BEHAVIORAL/PSYCH:  [ ]Alert and Oriented x  [ ] Anxiety [ ] Delirium [ ] Agitation [ X] Calm   EYES: [ ] No scleral icterus [ ] Scleral icterus [X ] Closed  ENMT:  [ ]Dry mouth  [ ]No external oral lesions [ X] No external ear or nose lesions  CARDIOVASCULAR:  [ ]Regular [ ]Irregular [ ]Tachy [ ]Not Tachy  [ ][place] [ ] Edema [ ] No edema  PULMONARY:  [ ]Tachypnea  [ ]Audible excessive secretions [X ] No labored breathing [ ] labored breathing  GASTROINTESTINAL: [ ]Soft  [ ]Distended  [X ]Not distended [ ]Non tender [ ]Tender  MUSCULOSKELETAL: [ ]No clubbing [ ] clubbing  [X ] No cyanosis [ ] cyanosis  NEUROLOGIC: [ ]No focal deficits  [ ]Follows commands  [ ]Does not follow commands  [X ]Cognitive impairment  [ ]Dysphagia  [ ]Dysarthria  [ ]Paresis   SKIN: [ ] Jaundiced [ X] Non-jaundiced [ ]Rash [ ]No Rash [ ] Warm [ ] Dry  MISC/LINES: [ ] ET tube [ ] Trach [ ]NGT/OGT [ x]PEG [  ]Campuzano        LABS: CMO    RADIOLOGY & ADDITIONAL STUDIES: CMO    REFERRALS:   [ ]Chaplaincy  [ ]Hospice  [ ]Child Life  [ x]Social Work  [x ]Case management [ ]Holistic Therapy     Goals of Care Document:

## 2022-12-27 NOTE — PROGRESS NOTE ADULT - PROBLEM SELECTOR PLAN 2
- c/w dilaudid 0.5mg IV Q1 hour  - c/w morphine 6mg/h  - c/w ativan 0.5mg IV Q1h PRN anxiety, agitation  - c/w robinul 0.4mg IV Q6 PRN secretions, will also add scopolamine patch, also try repositioning to optimize secretion drainage

## 2022-12-28 NOTE — PROGRESS NOTE ADULT - PROBLEM SELECTOR PROBLEM 2
Multiple sclerosis
Advanced care planning/counseling discussion
Multiple sclerosis
Advanced care planning/counseling discussion
Multiple sclerosis
Advanced care planning/counseling discussion
Multiple sclerosis
Advanced care planning/counseling discussion

## 2022-12-28 NOTE — PROGRESS NOTE ADULT - PROBLEM SELECTOR PROBLEM 3
Advanced care planning/counseling discussion
Functional quadriplegia
Advanced care planning/counseling discussion
Functional quadriplegia

## 2022-12-28 NOTE — PROGRESS NOTE ADULT - PROBLEM SELECTOR PLAN 2
- increase PRN dilaudid to 1mg IV Q1 hour  - increase morphine infusion to 7mg/h  - c/w ativan 0.5mg IV Q1h PRN anxiety, agitation  - c/w robinul 0.4mg IV Q6 PRN secretions  - continue scopolamine patch

## 2022-12-28 NOTE — PROGRESS NOTE ADULT - PROBLEM SELECTOR PLAN 3
needs full assist with all ADLs.
Mother, who is his HCP, introduced to palliative care, open to further discussion of GOC.
- discussed with mother today  - planning for CMO at 10am tomorrow  - d/w team
needs full assist with all ADLs.
- d/w patient's mother (HCP) and aunt at bedside - goals are for CMO today and withdrawal of care    Recommendations:  dilaudid 0.5mg IV Q15 mins PRN mod-severe pain or dyspnea  ativan 0.5mg IV Q1h PRN anxiety, agitation  robinul 0.4mg IV Q6 PRN secretions    Please give 1 dose of each 10-15 mins prior to extubation    Continue: fentanyl gtt, precedex gtt    Stop: lovenox, IV abx, modidrine, cardura, PPI, baclofen, aricept, melatonin, namenda, pressors, tube feeds, IVF, blood draws, vital signs, VS monitor (in room)    d/w team    ADDENDUM: patient seen after extubation, comfortable, continue above regimen
- discussed with mother and father at bedside  - goals are towards CMO, she understands his prognosis, and feels this is a suboptimal quality of life for him  - discussed process of CMO, she will ensure family who is interested will come to visit, and will let us know regarding proceeding with CMO  - agreed to DNR, and no IV blood draws, d/w resident, ELIZABETH garcia
needs full assist with all ADLs.
needs full assist with all ADLs.

## 2022-12-28 NOTE — PROGRESS NOTE ADULT - SUBJECTIVE AND OBJECTIVE BOX
HPI:  Pt is nonverbal at baseline. Hx obtained from chart.     51 yo male w/ PMH of MS, chronic low back pain, neurogenic bladder? s/p SPC placement and s/p dislodgement, presents for fever. Presented to the ED for SPC dislodgement a few days ago. Was seen by IR and planned for outpatient SPC replacement. Campuzano was placed at [facility]. Today. prior to placement, he was noted to be febrile w/ tachycardia and tachypnea so brought into ED.  Pt nonverbal at baseline, unable to provide further HPI.    Pt upgraded to ICU sepsis, on multiple pressors. Palliative care consulted, pt w end stage MS at baseline. Poor prognosis.      INTERVAL EVENTS:  -seen at bedside  -patient with some tachypnea and increased work of breathing on exam  -patient unable to participate in exam  -Per discussion with nurses, PRN dilaudid has minimal effect on work of breathing    ADVANCE DIRECTIVES:    DECISION MAKER(s):  [ ] Health Care Proxy(s)  [ ] Surrogate(s)  [ ] Guardian           Name(s): Phone Number(s):  Answers: Caregiver Name: JAYY MEDRANO,  Answers: Caregiver Relationship: MOTHER, HCP  Answers: Caregiver Contact Number: [phone]  HEALTH CARE PROXY ON FILE IN EMR - REVIEWED     BASELINE (I)ADL(s) (prior to admission):  [place]: [ ]Total  [ ] Moderate [x ]Dependent  Allergies    penicillin (Unknown)  vancomycin (Unknown)    MEDICATIONS  (STANDING):  chlorhexidine 0.12% Liquid 15 milliLiter(s) Oral Mucosa every 12 hours  chlorhexidine 2% Cloths 1 Application(s) Topical daily  morphine  Infusion. 6 mG/Hr (6 mL/Hr) IV Continuous <Continuous>  scopolamine 1 mG/72 Hr(s) Patch 1 Patch Transdermal every 72 hours    MEDICATIONS  (PRN):  glycopyrrolate Injectable 0.4 milliGRAM(s) IV Push every 6 hours PRN secretions  HYDROmorphone  Injectable 0.5 milliGRAM(s) IV Push every 1 hour PRN moderate-severe pain or dyspnea  LORazepam   Injectable 0.5 milliGRAM(s) IV Push every 1 hour PRN anxiety, agitation      PRESENT SYMPTOMS: [ x]Unable to obtain due to poor mentation   Source if other than patient:  [ ]Family   [ ]Team     Pain: [ ]yes [ ]no  QOL impact -   Location -                    Aggravating factors -  Quality -  Radiation -  Timing-  Severity (0-10 scale):  Minimal acceptable level (0-10 scale):     Dyspnea:                           [ ]Mild [ ]Moderate [ ]Severe  Anxiety:                             [ ]Mild [ ]Moderate [ ]Severe  Fatigue:                             [ ]Mild [ ]Moderate [ ]Severe  Nausea:                             [ ]Mild [ ]Moderate [ ]Severe  Loss of appetite:              [ ]Mild [ ]Moderate [ ]Severe  Constipation:                    [ ]Mild [ ]Moderate [ ]Severe    Other Symptoms:  [ ]All other review of systems negative     Palliative Performance Status Version 2:         10%    http://npcrc.org/files/news/palliative_performance_scale_[email or web address].pdf    PHYSICAL EXAM:  Vital Signs Last 24 Hrs  T(C): --  T(F): --  HR: -- none as comfort measures only  BP: --  BP(mean): --  RR: --  SpO2: --          GENERAL:  [X ] No acute distress [ ]Lethargic  [ ]Unarousable  [ ]Verbal  [ ]Non-Verbal [ ]Cachexia    BEHAVIORAL/PSYCH:  [ ]Alert and Oriented x  [ ] Anxiety [ ] Delirium [ ] Agitation [ X] Calm   EYES: [ ] No scleral icterus [ ] Scleral icterus [X ] Closed  ENMT:  [ ]Dry mouth  [ ]No external oral lesions [ X] No external ear or nose lesions  CARDIOVASCULAR:  [ ]Regular [ ]Irregular [ ]Tachy [ ]Not Tachy  [ ][place]o [ ] Edema [ ] No edema  PULMONARY:  [x ]Tachypnea  [ ]Audible excessive secretions [ ] No labored breathing [ x] labored breathing  GASTROINTESTINAL: [ ]Soft  [ ]Distended  [X ]Not distended [ ]Non tender [ ]Tender  MUSCULOSKELETAL: [ ]No clubbing [ ] clubbing  [X ] No cyanosis [ ] cyanosis  NEUROLOGIC: [ ]No focal deficits  [ ]Follows commands  [ ]Does not follow commands  [X ]Cognitive impairment  [ ]Dysphagia  [ ]Dysarthria  [ ]Paresis   SKIN: [ ] Jaundiced [ X] Non-jaundiced [ ]Rash [ ]No Rash [ ] Warm [ ] Dry  MISC/LINES: [ ] ET tube [ ] Trach [ ]NGT/OGT [ x]PEG [  ]Campuzano        LABS: CMO    RADIOLOGY & ADDITIONAL STUDIES: CMO    REFERRALS:   [ ]Chaplaincy  [ ]Hospice  [ ]Child Life  [ x]Social Work  [x ]Case management [ ]Holistic Therapy

## 2022-12-28 NOTE — PROGRESS NOTE ADULT - SUBJECTIVE AND OBJECTIVE BOX
HPI  Patient is a 52y old Male who presents with a chief complaint of sepsis (27 Dec 2022 16:36)    Currently admitted to medicine with the primary diagnosis of Sepsis       Today is hospital day 26d.     INTERVAL HPI / OVERNIGHT EVENTS:  Patient was seen and examined at bedside  non verbal          PAST MEDICAL & SURGICAL HISTORY  Multiple sclerosis    Chronic back pain    Hypertension    No significant past surgical history      ALLERGIES  penicillin (Unknown)  vancomycin (Unknown)    MEDICATIONS  STANDING MEDICATIONS  chlorhexidine 0.12% Liquid 15 milliLiter(s) Oral Mucosa every 12 hours  chlorhexidine 2% Cloths 1 Application(s) Topical daily  morphine  Infusion 7 mG/Hr IV Continuous <Continuous>  scopolamine 1 mG/72 Hr(s) Patch 1 Patch Transdermal every 72 hours    PRN MEDICATIONS  glycopyrrolate Injectable 0.4 milliGRAM(s) IV Push every 6 hours PRN  HYDROmorphone  Injectable 1 milliGRAM(s) IV Push every 1 hour PRN  LORazepam   Injectable 0.5 milliGRAM(s) IV Push every 1 hour PRN    VITALS:  T(F): --  HR: --  BP: --  RR: --  SpO2: --    PHYSICAL EXAM  GEN: uncomfortable, has jaw breathing  has PEG tube, mansfield catheter  NEURO: non verbal, minimally responsive    LABS                        RADIOLOGY

## 2022-12-28 NOTE — PROGRESS NOTE ADULT - PROBLEM SELECTOR PLAN 1
c/w IV fetroja, high risk, pressors, monitor counts and hemodynamics  - intubated in ICU, vent care per primary team  - c/w fentanyl IV gtt.
- contracted and bedbound at baseline, sometimes able to communicate per mother, but she feels overall quality of life is poor  - supportive care.
- plan for CMO today  - d/c abx
- contracted and bedbound at baseline, sometimes able to communicate per mother, but she feels overall quality of life is poor  - supportive care.
c/w IV fetroja, high risk, pressors, monitor counts and hemodynamics  - intubated in ICU, vent care per primary team  - c/w propofol IV gtt.
- contracted and bedbound at baseline, sometimes able to communicate per mother, but she feels overall quality of life is poor  - supportive care.
c/w IV fetroja, high risk, pressors, monitor counts and hemodynamics  - intubated in ICU, vent care per primary team  - mother opting for CMO tomorrow - would wean off proprofol, and c/w fentanyl and/or precedex
- contracted and bedbound at baseline, sometimes able to communicate per mother, but she feels overall quality of life is poor  - supportive care.

## 2022-12-28 NOTE — PROGRESS NOTE ADULT - PROBLEM SELECTOR PROBLEM 4
Encounter for palliative care
Encounter for palliative care
Functional quadriplegia
Functional quadriplegia
Encounter for palliative care
Functional quadriplegia
Encounter for palliative care
Functional quadriplegia

## 2022-12-28 NOTE — PROGRESS NOTE ADULT - PROBLEM SELECTOR PROBLEM 1
Multiple sclerosis
Septic shock
Multiple sclerosis
Septic shock

## 2022-12-28 NOTE — CHART NOTE - NSCHARTNOTESELECT_GEN_ALL_CORE
Dietitian Limited Note/Event Note
Event Note
Event Note
PallCare/Event Note
Palliative Care SW Initial Assessment/Event Note
Transfer Note
family/Event Note
 PA/Event Note
CMO/Event Note
Event Note
PallCare/Event Note
Palliative Care SW Note/Event Note
Palliative Care SW Note/Event Note
Transfer Note
Woundcare/Event Note
upgrade to ICU/Transfer Note

## 2022-12-28 NOTE — PROGRESS NOTE ADULT - ASSESSMENT
52yMale being evaluated for goals of care and symptom management, now CMO, palliative following for symptoms.     Plan to increase morphine infusion rate and PRN dosage given symptom burden and PRN use.

## 2022-12-28 NOTE — PROGRESS NOTE ADULT - PROVIDER SPECIALTY LIST ADULT
Hospitalist
Infectious Disease
Internal Medicine
MICU
Nutrition Support
Nutrition Support
Palliative Care
Critical Care
Hospitalist
Internal Medicine
MICU
Nutrition Support
Nutrition Support
Palliative Care
Urology
Critical Care
Gastroenterology
Hospitalist
Infectious Disease
Internal Medicine
MICU
Nutrition Support
Pulmonology
Critical Care
Critical Care
Gastroenterology
Hospitalist
Hospitalist
Infectious Disease
Internal Medicine
Nutrition Support
Nutrition Support
Pulmonology
Palliative Care
Pulmonology
Infectious Disease
Palliative Care

## 2022-12-28 NOTE — DISCHARGE NOTE FOR THE EXPIRED PATIENT - HOSPITAL COURSE
53 y/o man with PMH of MS, nonverbal at baseline, chronic low back pain, neurogenic bladder s/p SPC placement and s/p dislodgement was at IR for replacement of SPC when he was found to be in severe sepsis.    He presented to the ED with Severe sepsis due acute pyelonephritis. He also had an SUKHDEV and NSTEMI type 2.   pt was made CMO on . On , pt had no pulse or spontaneous breathing, pronounced . Family was notified and they are on their way.        Pt is nonverbal at baseline. Hx obtained from chart.     51 yo male w/ PMH of MS, chronic low back pain, neurogenic bladder? s/p SPC placement and s/p dislodgement, presents for fever. Presented to the ED for SPC dislodgement a few days ago. Was seen by IR and planned for outpatient SPC replacement. Campuzano was placed at [facility]. Today. prior to placement, he was noted to be febrile w/ tachycardia and tachypnea so brought into ED.  Pt nonverbal at baseline, unable to provide further HPI.      Hospital course:  Pt was admitted for sepsis. During his hospital stay he was found to have resp symptoms, and cxr showed his right lung was collapsed. Pt was intubated and bronchoscopy was done where it was found he had mucus plugs clogging. His condition was still worsening despite on broad spectrum Abx. Pt was then moved to ICU for further care from [facility]. Palliative team was consulted and they were in touch with mother. Mother was consulted for bronchoscopy again but she made pt DNR/DNI and CMO. Palliative extubation was done on 12/21. On 12/21 as pt was made CMO, all meds were dc, and vitals, labs were dc and pt was only on comfort meds. Pt was then moved to 3B. Pt passed on 12/28/2022.

## 2022-12-28 NOTE — PROGRESS NOTE ADULT - REASON FOR ADMISSION
sepsis

## 2022-12-28 NOTE — PROGRESS NOTE ADULT - ASSESSMENT
52y M pmh MS, nonverbal, chronic low back pain, neurogenic bladder s/p SPC placement and s/p dislodgement at IR for replacement when found to be in severe sepsis.    #Sepsis on admission   #Acute pyelonephritis   #Anemia  #Neurogenic bladder  #Elevated Dimer  #Dysphagia   #MS, nonverbal, bedbound, contracted/functional quadriplegia  #SUKHDEV  #NSTEMI type II  - patient made CMO 12/21 11AM by mother Alena (HCP) with palliative care  - all meds except comfort d/c  - no monitoring of labs/vitals  - c/w dilaudid 0.5mg IV prn qhr   - morphine ggt @6mg/hr  - Ativan 0.5mg IV qhr  - glycopyrrolate 0.4mg IV q6hr  - palliative following

## 2022-12-28 NOTE — PROGRESS NOTE ADULT - NUTRITIONAL ASSESSMENT
51 y/o man with PMH of MS, nonverbal at baseline, chronic low back pain, neurogenic bladder s/p SPC placement and s/p dislodgement was at IR for replacement of SPC when he was found to be in severe sepsis.    Severe sepsis present on admission due acute pyelonephritis  Dysphagia   MS, nonverbal, bedbound, contracted/functional quadriplegia   SUKHDEV   NSTEMI type 2 due to sepsis  fever     pt was made CMO on 12/21   palliative following- case discussed; contiune morphine drip and PRN dilaudid  continue comfort care  overall very poor prognosis

## 2022-12-28 NOTE — CHART NOTE - NSCHARTNOTEFT_GEN_A_CORE
called for consultation for patient with neurogenic bladder and hx of chroni SPT which became dislodged 2 weeks ago. Pt was recently here on 11/28 and  made recommendations to follow with IR for replacement of SPT and continue mansfield cath at this time. IR made plans to replace SPT as o/p. Please refer to consults done by  and IR on 11/28 which dictate plans for SPT replacement for this patient.  - Recall  prn [id number]
Called by RN for patient on floor with some distress with breathing, with no PRN opioids ordered; appears to have been discontinued in sunrise - will reorder dilaudid 0.5mg IV Q1 PRN, and c/w robinul, morphine gtt, and ativan PRN. [phone] as needed  ______________  Kevan Patel MD  Palliative Medicine  [facility]   of Geriatric and Palliative Medicine  [phone]
Patient is made CMO today by HCP mother Alena since around 11AM. Pt was palliatively extubated while family was at the bedside. Dc'ed all meds except comfort meds as rec by palliative team. DC all labs and vitals and FS. SIMINST is in charts.
Patient was observed to be gurgling and have labored breathing.  Dr. Patel evaluated the patient and medication was ordered.  Mother was at bedside.  Support rendered.
Patient was observed to be resting comfortably.
Spoke with mother Alena at bedside,          Explained pending consults/treatment plan with Abx and pending CTA.    medical update given, all questions answered.
Transfer Note    Transfer from: SDU  Transfer to:  (  ) Medicine    (  ) Telemetry    (  ) RCU    (  ) Palliative    (  ) Stroke Unit    ( x ) MICU    HOSPITAL COURSE:    51 y/o man with PMH of MS, nonverbal at baseline, chronic low back pain, ?neurogenic bladder s/p SPC placement and s/p dislodgement was at IR for replacement of SPC when he was found to be in severe sepsis.    # Severe sepsis present on admission 2/2 acute pyelonephritis  # L lung etelectasis/Mucus plug   Ucx: Few Proteus Mirabilis/Mod E Faecalis  Blood cx 12/2: GPC in clusters x3, 1 culture with staph hominis  On Meropenem   ID following  c/w IVF   repeat blood cx, Tylenol for fevers >101  C/w HFNC, alternating with BIPAP, nebs and chest PT. Repeat Chest xray daily  Family agreeable for bronchoscopy. Patient is at risk for failure to extubate post bronch. Plan for upgrade to ICU for bronch.    # Neurogenic bladder  had SPC that was dislodged - now with mansfield and SPC was going to be replaced when he became septic   and IR following  monitor urine output  on flomax  recall IR when improved for possible SPC placement     # Elevated dimer - 11oo, f/u LE duplex, previously on Eliquis. switched to therapeutic lovenox today     # Dysphagia - on PEG feeds. Request GI eval for PEG malfunction/leakage     # MS, nonverbal, bedbound, contracted - repositioning per protocol, continue baclofen    # SUKHDEV - likely prerenal, resolved - continue hydration and monitor     # Elevated trop  peaked 0.20 - likely NSTEMI type 2 due to sepsis - downtrending, check ECHO    # hypokalemia/Hypomagnesemia/hypophosphatemia- replete and monitor levels     # FULL CODE, prognosis is guarded, patient requires continuous monitoring in SDU     For Follow-Up:    repeat electrolytes        Vital Signs Last 24 Hrs  T(C): 36.2 (05 Dec 2022 12:00), Max: 38.9 (04 Dec 2022 20:01)  T(F): 97.2 (05 Dec 2022 12:00), Max: 102.1 (04 Dec 2022 20:01)  HR: 96 (05 Dec 2022 12:00) (89 - 117)  BP: 133/85 (05 Dec 2022 12:00) (121/67 - 153/83)  BP(mean): 102 (05 Dec 2022 04:00) (96 - 112)  RR: 20 (05 Dec 2022 12:00) (20 - 20)  SpO2: 98% (05 Dec 2022 12:00) (98% - 100%)    Parameters below as of 05 Dec 2022 12:00  Patient On (Oxygen Delivery Method): BiPAP/CPAP      I&O's Summary    04 Dec 2022 07:01  -  05 Dec 2022 07:00  --------------------------------------------------------  IN: 1300 mL / OUT: 700 mL / NET: 600 mL          MEDICATIONS  (STANDING):  acetylcysteine 10%  Inhalation 4 milliLiter(s) Inhalation three times a day  baclofen 20 milliGRAM(s) Oral every 12 hours  chlorhexidine 2% Cloths 1 Application(s) Topical daily  dextrose 5% + lactated ringers. 1000 milliLiter(s) (100 mL/Hr) IV Continuous <Continuous>  donepezil 10 milliGRAM(s) Oral at bedtime  enoxaparin Injectable 70 milliGRAM(s) SubCutaneous every 12 hours  lactulose Syrup 10 Gram(s) Oral daily  linezolid  IVPB 600 milliGRAM(s) IV Intermittent every 12 hours  memantine 10 milliGRAM(s) Oral two times a day  meropenem  IVPB 1000 milliGRAM(s) IV Intermittent every 8 hours  multivitamin 1 Tablet(s) Oral daily  pantoprazole    Tablet 40 milliGRAM(s) Oral before breakfast  potassium phosphate IVPB 15 milliMole(s) IV Intermittent once  senna 2 Tablet(s) Oral at bedtime  tamsulosin 0.4 milliGRAM(s) Oral at bedtime  thiamine 100 milliGRAM(s) Oral daily    MEDICATIONS  (PRN):  acetaminophen     Tablet .. 650 milliGRAM(s) Oral every 6 hours PRN Temp greater or equal to 38C (100.4F), Mild Pain (1 - 3)  albuterol    0.083% 2.5 milliGRAM(s) Nebulizer every 4 hours PRN Shortness of Breath and/or Wheezing  albuterol    90 MICROgram(s) HFA Inhaler 2 Puff(s) Inhalation every 6 hours PRN Shortness of Breath and/or Wheezing  ibuprofen  Suspension. 600 milliGRAM(s) Oral every 8 hours PRN Temp greater or equal to 38.5C (101.3F)  melatonin 3 milliGRAM(s) Oral at bedtime PRN Insomnia        LABS                                            10.3                  Neurophils% (auto):   87.8   (12-05 @ 06:48):    12.98)-----------(260          Lymphocytes% (auto):  4.4                                           32.2                   Eosinphils% (auto):   0.7      Manual%: Neutrophils x    ; Lymphocytes x    ; Eosinophils x    ; Bands%: x    ; Blasts x                                    143    |  107    |  14                  Calcium: 8.7   / iCa: x      (12-05 @ 06:48)    ----------------------------<  125       Magnesium: 1.7                              3.4     |  24     |  <0.5             Phosphorous: x        TPro  5.6    /  Alb  3.0    /  TBili  0.6    /  DBili  x      /  AST  13     /  ALT  20     /  AlkPhos  106    05 Dec 2022 06:48
Transfer Note: Intensive Care to Medical Floor    Transfer to: Medical Floor    Transfer from:  (  ) CCU    (x) MICU   (  ) Telemetry     (  ) RCU                               (  ) Palliative    (  ) Stroke Unit    (  ) __________________      Follow up:  1) pt is CMO, only comfort meds as per palliative      HPI / HOSPITAL COURSE:  HPI:  Pt is nonverbal at baseline. Hx obtained from chart.     51 yo male w/ PMH of MS, chronic low back pain, neurogenic bladder? s/p SPC placement and s/p dislodgement, presents for fever. Presented to the ED for SPC dislodgement a few days ago. Was seen by IR and planned for outpatient SPC replacement. Campuzano was placed at [facility]. Today. prior to placement, he was noted to be febrile w/ tachycardia and tachypnea so brought into ED.  Pt nonverbal at baseline, unable to provide further HPI.    In the ED:    · BP Systolic	138 mm Hg  · BP Diastolic	92 mm Hg  · Heart Rate	  150 /min  · Respiration Rate (breaths/min)	  60 /min  · Temp (F)	  103.7 Degrees F  · Temp (C)	  39.8 Degrees C  · Temp site	rectal  · SpO2 (%)	98 %  · O2 Delivery/Oxygen Delivery Method	nasal cannula  · Oxygen Therapy Flow (L/min)	2 L/min    Labs notable for WBC 32k, lactate 2.4, Na 147, AG 17, troponin 0.2  UA showing large leuks, WBC, bacteria, RBC  CXR showing worsening opacifications  ECG showing sinus tachy, possible inferior infarct  Given aztreonam, zyvox, fluid bolus  Pt admitted to SDU.  (02 Dec 2022 15:53)    ICU Course:  Pt was first in CEU then moved to ICU. He had bronchoscopy in CEU for collapsed left lung which improved his left lung but was collapsed again. He was on extensive ABx treatment but not much improvement. His condition was not stable and mother decided to make him CMO. Please make sure pt is comfortable, f/u with palliative team if needed.      Vital Signs Last 24 Hrs  T(C): --  T(F): --  HR: 56 (22 Dec 2022 08:00) (54 - 58)  BP: --  BP(mean): --  RR: 26 (22 Dec 2022 08:00) (18 - 26)  SpO2: 99% (22 Dec 2022 08:00) (99% - 100%)    Parameters below as of 21 Dec 2022 12:04      O2 Concentration (%): 21    I&O's Summary    21 Dec 2022 07:01  -  22 Dec 2022 07:00  --------------------------------------------------------  IN: 1028.6 mL / OUT: 2125 mL / NET: -1096.4 mL    22 Dec 2022 07:01  -  22 Dec 2022 11:18  --------------------------------------------------------  IN: 80.2 mL / OUT: 80 mL / NET: 0.2 mL        Physical Exam:   GENERAL: NAD, contratced, AxO=0  HEAD:  Atraumatic, Normocephalic  ENMT: extubated  NERVOUS SYSTEM: sedated  ABDOMEN: Soft, Nontender, Nondistended; Bowel sounds present  EXTREMITIES: no edema, contracted          ASSESSMENT & PLAN:  53 y/o man with PMH of MS, nonverbal at baseline, chronic low back pain, neurogenic bladder s/p SPC placement and s/p dislodgement was at IR for replacement of SPC when he was found to be in severe sepsis.      #Severe sepsis present on admission 2/2 acute pyelonephriti  #diarrhea on   #Anemia  #Neurogenic bladder  #Elevated dimer   #Dysphagia   #MS, nonverbal, bedbound, contracted/functional quadriplegia   #SUKHDEV   #NSTEMI type 2 due to sepsis     #CMO  pt was made CMO on  11AM, all medications dc'ed except comfort meds  dc all labs, vitals, FS      Handoff pendin) Pt was made CMO on 2022 by HCP mother Alena, extubated and all medications dc'ed except comfort meds as per palliative recommendations
Unable to assesses patients pressure injury at this time , per primary rn patient unstable and cannot be turned    Wocn  f/u when patient stable.
Attempted twice to call emergency contact in system for to update on pt's status and for GOC convo. Both time, call went straight to voicemail. Will require follow up tomorrow.
Called by RN for patient with persistent fevers, and mother concerned about his comfort. Will add tylenol 1g Q6 ATC for 24 hours, and cooling blanket. Also based on 24h PRN usage, will increase morphine gtt to 6mg/h from 5mg/h. [phone] as needed  ______________  Kevan Patel MD  Palliative Medicine  [facility]   of Geriatric and Palliative Medicine  [phone]
MICU DOWN GRADE NOTE      Patient is a 52y old  Male who presents with a chief complaint of sepsis (23 Dec 2022 15:40)      HPI:  53 y/o man with PMH of MS, nonverbal at baseline, chronic low back pain, neurogenic bladder s/p SPC placement and s/p dislodgement was at IR for replacement of SPC when he was found to be in severe sepsis.    INTERVAL HPI/OVERNIGHT EVENTS: Patient made CMO        REVIEW OF SYSTEMS:  CONSTITUTIONAL: No fever, chills  HEENT:  No blurry vision No sinus or throat pain  NECK: No pain or stiffness  RESPIRATORY: No cough, wheezing, chills or hemoptysis; No shortness of breath  CARDIOVASCULAR: No chest pain, palpitations  GASTROINTESTINAL: No abdominal pain. No nausea, vomiting, or diarrhea  GENITOURINARY: No dysuria  NEUROLOGICAL: No HA, No focal weakness  SKIN: No itching, burning, rashes, or lesions   MUSCULOSKELETAL: No joint pain or swelling; No muscle, back, or extremity pain    MEDICATIONS:  chlorhexidine 0.12% Liquid 15 milliLiter(s) Oral Mucosa every 12 hours  chlorhexidine 2% Cloths 1 Application(s) Topical daily  glycopyrrolate Injectable 0.4 milliGRAM(s) IV Push every 6 hours PRN  HYDROmorphone  Injectable 0.5 milliGRAM(s) IV Push every 15 minutes PRN  HYDROmorphone  Injectable 0.5 milliGRAM(s) IV Push every 4 hours  LORazepam   Injectable 0.5 milliGRAM(s) IV Push every 1 hour PRN  morphine  Infusion. 1 mG/Hr IV Continuous <Continuous>      T(C): --  HR: 114 (12-23-22 @ 13:00) (114 - 164)  BP: --  RR: 13 (12-23-22 @ 13:00) (13 - 26)  SpO2: 100% (12-23-22 @ 13:00) (94% - 100%)  Wt(kg): --Vital Signs Last 24 Hrs  T(C): --  T(F): --  HR: 114 (23 Dec 2022 13:00) (114 - 164)  BP: --  BP(mean): --  RR: 13 (23 Dec 2022 13:00) (13 - 26)  SpO2: 100% (23 Dec 2022 13:00) (94% - 100%)    Parameters below as of 23 Dec 2022 08:46  Patient On (Oxygen Delivery Method): room air        PHYSICAL EXAM:  GENERAL: NAD, Awake, laying in bed contracted  HEAD:  Atraumatic, Normocephalic  EYES: conjunctiva and sclera clear  ENT: Moist mucous membranes  NECK: Supple   CHEST/LUNG: Clear to auscultation bilaterally; Unlabored respirations  HEART: Regular rate and rhythm  ABDOMEN: Soft, nondistended  EXTREMITIES:  No clubbing, cyanosis, or edema  NERVOUS SYSTEM: Non verbal  SKIN: No rashes                                          ASSESSMENT & PLAN    53 y/o man with PMH of MS, nonverbal at baseline, chronic low back pain, neurogenic bladder s/p SPC placement and s/p dislodgement was at IR for replacement of SPC when he was found to be in severe sepsis.    #Severe sepsis present on admission 2/2 acute pyelonephriti  #diarrhea on 12/19  #Anemia  #Neurogenic bladder  #Elevated dimer   #Dysphagia   #MS, nonverbal, bedbound, contracted/functional quadriplegia   #SUKHDEV   #NSTEMI type 2 due to sepsis     #CMO  pt was made CMO on 12/21 11AM, all medications dc'ed except comfort meds  dc all labs, vitals, FS    F/U  - No Labs, vitals, FS  - Do not increase morphine drip for at least 24 hours
PALLIATIVE MEDICINE INTERDISCIPLINARY TEAM NOTE    Provider:  [   ]Social Work   [   ]          [   ] Initial visit [   ] Follow up    Family or contact name / phone #   Met with: [   ] Patient  [   ] Family  [   ] Other:    Primary Language: [   ] English [   ] Other*:                      *Interpretation provided by:    SUPPORT DIAGNOSES            (Check all that apply)  [   ] Psychosocial spiritual assessment (PSSA)  [   ] EOL issues  [   ] Cultural / spiritual concerns  [   ] Pain / suffering  [   ] Dementia / AMS  [   ] Other:  [   ] AD issues  [   ] Grief / loss / sadness  [   ] Discharge issues  [   ] Distress / coping    PSYCHOSOCIAL ASSESSMENT OF PATIENT         (Check all that apply)  [   ] Initial Assessment            [   ] Reassessment          [   ] Not Applicable this visit    Pain/suffering acuity:  [   ] None to mild (0-3)           [   ] Moderate (4-6)        [   ] High (7-10)    Mental Status:  [   ] Alert/oriented (x3)          [   ] Confused/Altered(x2/x1)         [   ] Non-resp    Functional status:  [   ] Independent w ADLs      [   ] Needs Assistance             [   ] Bedbound/Full Care    Coping:  [   ] Coping well                     [   ] Coping w/difficulty            [   ] Poor coping    Support system:  [   ] Strong                              [   ] Adequate                        [   ] Inadequate    SPIRITUAL ASSESSMENT  Congregation/Spiritual practice: _Christian__________________________    Role of organized Taoist:  [   ] Important                     [ x  ] Some (fam tradition, cultural)               [   ] None    Effects on medical care:  [   ] Yes, _____________________________________                         [  x ] None    Cultural/Islam need:  [   ] Yes, _____________________________________                         [ x  ] None    Refer to Pastoral Care:  [   ] Yes           [   ] No, not at this time    SERVICE PROVIDED  [   ]PSSA                                                                             [   ]Discharge support / facilitation  [   ]AD / goals of care counseling                                  [   ]EOL / death / bereavement counseling  [   ]Counseling / support                                                [   ] Family meeting  [ x  ]Prayer / sacrament / ritual                                      [   ] Referral   [ x  ]Other                                                                       NOTE and Plan of Care (PoC): Pt looked comfortable. Pt's mother was at bedside at the time of visit.  She is having a challenging time processing the idea of losing her son. However she's very realistic. He is her only child. She has community. I will follow up for support.
PALLIATIVE MEDICINE INTERDISCIPLINARY TEAM NOTE    Provider:  [  x ]Social Work   [   ]          [   x] Initial visit [   ] Follow up    Family or contact name / phone #   Met with: [ x  ] Patient:  patient was observed to be resting comfortably [   ] Family  [   ] Other:    Primary Language: [   ] English [   ] Other*:                      *Interpretation provided by:    SUPPORT DIAGNOSES            (Check all that apply)  [   ] Psychosocial spiritual assessment (PSSA)  [   ] EOL issues  [   ] Cultural / spiritual concerns  [  x ] Pain / suffering  [   ] Dementia / AMS  [   ] Other:  [  x ] AD issues  [   ] Grief / loss / sadness  [   ] Discharge issues  [x   ] Distress / coping    PSYCHOSOCIAL ASSESSMENT OF PATIENT         (Check all that apply)  [x   ] Initial Assessment            [   ] Reassessment          [   ] Not Applicable this visit    Pain/suffering acuity:  patient appeared to be comfortable  [   ] None to mild (0-3)           [   ] Moderate (4-6)        [   ] High (7-10)    Mental Status:  [   ] Alert/oriented (x3)          [   ] Confused/Altered(x2/x1)         [x   ] Non-resp    Functional status:  [   ] Independent w ADLs      [   ] Needs Assistance             [  x ] Bedbound/Full Care    Coping:  unable to assess  [   ] Coping well                     [   ] Coping w/difficulty            [   ] Poor coping    Support system:  unable to assess  [   ] Strong                              [   ] Adequate                        [   ] Inadequate      Past history and medications for:     [ ] Anxiety       [ ] Depression    [ ] Sleep disorders     SPIRITUAL ASSESSMENT  Alevism/Spiritual practice: ___________________________    Role of organized Jew:  [   ] Important                     [   ] Some (fam tradition, cultural)               [   ] None    Effects on medical care:  [   ] Yes, _____________________________________                         [   ] None    Cultural/Judaism need:  [   ] Yes, _____________________________________                         [   ] None    Refer to Pastoral Care:  [   ] Yes           [   ] No, not at this time    SERVICE PROVIDED  [   ]PSSA                                                                             [   ]Discharge support / facilitation  [   ]AD / goals of care counseling                                  [   ]EOL / death / bereavement counseling  [   ]Counseling / support                                                [   ] Family meeting  [   ]Prayer / sacrament / ritual                                      [   ] Referral   [   ]Other                                                                       NOTE and Plan of Care (PoC):    Patient is a 52 year old male.  Patient was admitted from [facility] on 12/2/22, dx:  Sepsis, elevated Troponin.    Patient was observed to be resting comfortably.  Dr. Patel will contact family today. Writer will follow-up tomorrow, 12/16/22.
Patient tachycardic to 140  Afebrile, levophed was withheld after I was informed that SBP was 200  EKG showing sinus tachycardia  Patient is awake and agitated.    Fentanyl 25 push ordered and precedex to be uptitrated.   Will follow
Registered Dietitian Limited Note:    c/s for enteral/parenteral nutrition prior to admission. Pt is currently followed by Nutrition Support Team; please defer to Nutrition Support Team assessment for recommendations.

## 2022-12-28 NOTE — PROGRESS NOTE ADULT - PROBLEM SELECTOR PLAN 4
- DNR/DNI/CMO  - MOLST completed  - patient will die in the hospital
needs full assist with all ADLs.
- DNR/DNI/CMO  - MOLST completed  - case d/w ICU team  ______________  Kevan Patel MD  Palliative Medicine  [facility]   of Geriatric and Palliative Medicine  [phone]
- DNR/DNI/CMO  - MOLST completed  - d/w mother, palliative LCSW  ______________  Kevan Patel MD  Palliative Medicine  [facility]   of Geriatric and Palliative Medicine  [phone]
- DNR/DNI/CMO  - MOLST completed  - case d/w ICU team  ______________  Kevan Patel MD  Palliative Medicine  [facility]   of Geriatric and Palliative Medicine  [phone]
needs full assist with all ADLs.

## 2022-12-28 NOTE — PROGRESS NOTE ADULT - SUBJECTIVE AND OBJECTIVE BOX
HPI:  Pt is nonverbal at baseline. Hx obtained from chart.     53 yo male w/ PMH of MS, chronic low back pain, neurogenic bladder? s/p SPC placement and s/p dislodgement, presents for fever. Presented to the ED for SPC dislodgement a few days ago. Was seen by IR and planned for outpatient SPC replacement. Campuzano was placed at [facility]. Today. prior to placement, he was noted to be febrile w/ tachycardia and tachypnea so brought into ED.  Pt nonverbal at baseline, unable to provide further HPI.    In the ED:    · BP Systolic	138 mm Hg  · BP Diastolic	92 mm Hg  · Heart Rate	  150 /min  · Respiration Rate (breaths/min)	  60 /min  · Temp (F)	  103.7 Degrees F  · Temp (C)	  39.8 Degrees C  · Temp site	rectal  · SpO2 (%)	98 %  · O2 Delivery/Oxygen Delivery Method	nasal cannula  · Oxygen Therapy Flow (L/min)	2 L/min    Labs notable for WBC 32k, lactate 2.4, Na 147, AG 17, troponin 0.2  UA showing large leuks, WBC, bacteria, RBC  CXR showing worsening opacifications  ECG showing sinus tachy, possible inferior infarct  Given aztreonam, zyvox, fluid bolus  Pt admitted to SDU.  (02 Dec 2022 15:53)    Currently admitted to medicine with the primary diagnosis of Sepsis       Today is hospital day 25d.     INTERVAL HPI / OVERNIGHT EVENTS:  Patient was seen at bedside. This morning he is resting comfortably in bed and reports no new issues or overnight events. Currently CMO on heparin ggt and PRN for pain control.     ROS: Otherwise unremarkable     PAST MEDICAL & SURGICAL HISTORY  Multiple sclerosis    Chronic back pain    Hypertension    No significant past surgical history      ALLERGIES  penicillin (Unknown)  vancomycin (Unknown)    MEDICATIONS  STANDING MEDICATIONS  chlorhexidine 0.12% Liquid 15 milliLiter(s) Oral Mucosa every 12 hours  chlorhexidine 2% Cloths 1 Application(s) Topical daily  morphine  Infusion. 5 mG/Hr IV Continuous <Continuous>    PRN MEDICATIONS  glycopyrrolate Injectable 0.4 milliGRAM(s) IV Push every 6 hours PRN  HYDROmorphone  Injectable 0.5 milliGRAM(s) IV Push every 1 hour PRN  LORazepam   Injectable 0.5 milliGRAM(s) IV Push every 1 hour PRN    VITALS:  T(F):  HR: --  BP: --  RR: --  SpO2: --    PHYSICAL EXAM  GEN: NAD, appears comfortable   PULM: Not in distress, breathing not laboured  ABD: No distension   NEURO: Quadriplegic, non-verbal    LABS                          RADIOLOGY

## 2023-01-03 NOTE — ED PROVIDER NOTE - PRINCIPAL DIAGNOSIS
Abnormal Test Results     Name: Damaris    Ordering Provider: Dr. Hollis    Test Name: CMP    Test Date: 01/03/23    Test Value: Potassium 2.5    Test Normal Range: 3.4 - 5.1 mmol/L    Caller Information       Type Contact Phone/Fax    01/03/2023 05:15 PM CST Phone (Outgoing) Damaris (Lab)     01/03/2023 05:37 PM CST Phone (Outgoing) Dr. Tracey (On Call Provider)         On call provider Dr. Tracey paged, given pt information of critical lab of potassium of 2.5,   instructions received and given to pt,  Pt is to go to ED for evaluation.    Pt is contacted and notified of instructions from Dr. Tracey, pt will go the ED for evaluation, she has no further questions at this time    Reason for Disposition  • Lab or radiology calling with CRITICAL test results    Protocols used: PCP CALL - NO TRIAGE-A-       Weakness

## 2023-01-04 NOTE — CDI QUERY NOTE - NSCDIOTHERTXTBX_GEN_ALL_CORE_HH
CLINICAL INDICATORS    Previous Encounter [id number]; MRN [id number]; Admit Date: 12/02/22; Discharge Date: 12/02/22; Microbiology Report:     Urine culture; Source: Suprapubic; Collected Date/Time: 12/2/22 0659; Final Report: Few proteus mirabilis ESBL; Moderate Enterococcus faecalis    Blood culture; Collected Date/Time: 12/2/22 0659; Final Report: Growth in aerobic bottle: Staphylococcus hominis; Gram stain: Growth in aerobic bottle: Gram positive cocci in clusters        Current Encounter     12/2 ED Adult Triage Note ([id number]): was sent to IR for insertion of suprapubic catheter, pt became tachycardic, febrile, and tachypneic in IR, rapid response was activated in IR, pt then brought to ED    12/2 ED Provider Note: Physical exam: SKIN: suprapubic catheter tract noted in R pelvic region, no discharge, erythema, or TTP at site. Appears dry. : mansfield catheter in place …     12/2 ER Adult Nurse Reassessment Note (1522): Mansfield was replaced, 16 FR was inserted in ED.    12/2 H&P Adult: … Severe sepsis present on admission - admitted to stepdown unit likely source is : cloudy, thick urine in mansfield - rule out bacteremia, aspiration… ?Neurogenic bladder; had SPC that was dislodged - now with mansfield and SPC was going to be replaced when he became septic    12/3 Consult Note Adult-Infectious Disease Attending: Severe Sepsis on admission …  due to acute complicated cystitis … Mansfield was placed at [facility]    Microbiology Report:    (12/2/22 1034) Urine culture; Specimen source: clean catch; Culture results: >3 organisms. Probable collection contamination.    (12/2/22 0821) Blood culture: GPC 1/2; Staphylococcus epidermidis, methicillin resistant    (12/3/22 2151) Blood culture: No growth    Orders: (12/2) Linezolid 600mg IV x 1 STAT, then Q12H (ind: severe sepsis);  Zatreonam 2G IV x 1 STAT, then Q8H (ind: urosepsis); (12/3) Cefepime 2G IV Q8H (ind: UTI); (12/4) DC Cefeime; Start Meropenem 1G IV Q8H x 7 days (ind: UTI)          Based on your professional judgment and the clinical indicators, please clarify if the diagnosis of sepsis can be further specified as:     - Sepsis due to acute complicated cystitis (due to chronic suprapubic catheter)    - Sepsis due to acute complicated cystitis (unrelated to chronic suprapubic catheter)    - Clinically unable to further specify the diagnosis sepsis    - Other (please specify)    - Clinically unable to determine        Thank you,  Melba GONZALEZ, RN, BSN  [phone] CLINICAL INDICATORS    Previous Encounter [id number]; MRN [id number]; Admit Date: 12/02/22; Discharge Date: 12/02/22; Microbiology Report:     Urine culture; Source: Suprapubic; Collected Date/Time: 12/2/22 0659; Final Report: Few proteus mirabilis ESBL; Moderate Enterococcus faecalis    Blood culture; Collected Date/Time: 12/2/22 0659; Final Report: Growth in aerobic bottle: Staphylococcus hominis; Gram stain: Growth in aerobic bottle: Gram positive cocci in clusters        Current Encounter     12/2 ED Adult Triage Note ([id number]): was sent to IR for insertion of suprapubic catheter, pt became tachycardic, febrile, and tachypneic in IR, rapid response was activated in IR, pt then brought to ED    12/2 ED Provider Note: Physical exam: SKIN: suprapubic catheter tract noted in R pelvic region, no discharge, erythema, or TTP at site. Appears dry. : mansfield catheter in place …     12/2 ER Adult Nurse Reassessment Note ([id number]): Mansfield was replaced, 16 FR was inserted in ED.    12/2 H&P Adult: … Severe sepsis present on admission - admitted to stepdown unit likely source is : cloudy, thick urine in mansfield - rule out bacteremia, aspiration… ?Neurogenic bladder; had SPC that was dislodged - now with mansfield and SPC was going to be replaced when he became septic    12/3 Consult Note Adult-Infectious Disease Attending: Severe Sepsis on admission …  due to acute complicated cystitis … Mansfield was placed at [facility]    Microbiology Report:    (12/2/22 1034) Urine culture; Specimen source: clean catch; Culture results: >3 organisms. Probable collection contamination.    (12/2/22 0821) Blood culture: GPC 1/2; Staphylococcus epidermidis, methicillin resistant    (12/3/22 2151) Blood culture: No growth    Orders: (12/2) Linezolid 600mg IV x 1 STAT, then Q12H (ind: severe sepsis);  Zatreonam 2G IV x 1 STAT, then Q8H (ind: urosepsis); (12/3) Cefepime 2G IV Q8H (ind: UTI); (12/4) DC Cefeime; Start Meropenem 1G IV Q8H x 7 days (ind: UTI)          Based on your professional judgment and the clinical indicators, please clarify if the diagnosis of sepsis can be further specified as:     - Sepsis due to acute complicated cystitis (due to chronic suprapubic catheter)    Sepsis due to acute complicated cystitis (due to indwelling mansfield catheter)    - Sepsis due to acute complicated cystitis (unrelated to chronic suprapubic catheter or indwelling mansfield catheter)    - Clinically unable to further specify the diagnosis sepsis    - Other (please specify)    - Clinically unable to determine        Thank you,  Melba GONZALEZ, RN, BSN  [phone]

## 2023-01-10 LAB
CULTURE RESULTS: SIGNIFICANT CHANGE UP
ORGANISM # SPEC MICROSCOPIC CNT: SIGNIFICANT CHANGE UP
ORGANISM # SPEC MICROSCOPIC CNT: SIGNIFICANT CHANGE UP
SPECIMEN SOURCE: SIGNIFICANT CHANGE UP

## 2023-01-11 DIAGNOSIS — R65.21 SEVERE SEPSIS WITH SEPTIC SHOCK: ICD-10-CM

## 2023-01-11 DIAGNOSIS — E87.20 ACIDOSIS, UNSPECIFIED: ICD-10-CM

## 2023-01-11 DIAGNOSIS — F32.9 MAJOR DEPRESSIVE DISORDER, SINGLE EPISODE, UNSPECIFIED: ICD-10-CM

## 2023-01-11 DIAGNOSIS — G30.0 ALZHEIMER'S DISEASE WITH EARLY ONSET: ICD-10-CM

## 2023-01-11 DIAGNOSIS — F02.80 DEMENTIA IN OTHER DISEASES CLASSIFIED ELSEWHERE, UNSPECIFIED SEVERITY, WITHOUT BEHAVIORAL DISTURBANCE, PSYCHOTIC DISTURBANCE, MOOD DISTURBANCE, AND ANXIETY: ICD-10-CM

## 2023-01-11 DIAGNOSIS — J98.11 ATELECTASIS: ICD-10-CM

## 2023-01-11 DIAGNOSIS — R00.1 BRADYCARDIA, UNSPECIFIED: ICD-10-CM

## 2023-01-11 DIAGNOSIS — J96.01 ACUTE RESPIRATORY FAILURE WITH HYPOXIA: ICD-10-CM

## 2023-01-11 DIAGNOSIS — N10 ACUTE PYELONEPHRITIS: ICD-10-CM

## 2023-01-11 DIAGNOSIS — E43 UNSPECIFIED SEVERE PROTEIN-CALORIE MALNUTRITION: ICD-10-CM

## 2023-01-11 DIAGNOSIS — A41.9 SEPSIS, UNSPECIFIED ORGANISM: ICD-10-CM

## 2023-01-11 DIAGNOSIS — N31.9 NEUROMUSCULAR DYSFUNCTION OF BLADDER, UNSPECIFIED: ICD-10-CM

## 2023-01-11 DIAGNOSIS — E87.6 HYPOKALEMIA: ICD-10-CM

## 2023-01-11 DIAGNOSIS — R53.2 FUNCTIONAL QUADRIPLEGIA: ICD-10-CM

## 2023-01-11 DIAGNOSIS — G35 MULTIPLE SCLEROSIS: ICD-10-CM

## 2023-01-11 DIAGNOSIS — B96.4 PROTEUS (MIRABILIS) (MORGANII) AS THE CAUSE OF DISEASES CLASSIFIED ELSEWHERE: ICD-10-CM

## 2023-01-11 DIAGNOSIS — N30.00 ACUTE CYSTITIS WITHOUT HEMATURIA: ICD-10-CM

## 2023-01-11 DIAGNOSIS — E87.0 HYPEROSMOLALITY AND HYPERNATREMIA: ICD-10-CM

## 2023-01-11 DIAGNOSIS — D64.9 ANEMIA, UNSPECIFIED: ICD-10-CM

## 2023-01-11 DIAGNOSIS — A41.59 OTHER GRAM-NEGATIVE SEPSIS: ICD-10-CM

## 2023-01-11 DIAGNOSIS — N17.9 ACUTE KIDNEY FAILURE, UNSPECIFIED: ICD-10-CM

## 2023-01-11 DIAGNOSIS — Z66 DO NOT RESUSCITATE: ICD-10-CM

## 2023-01-11 DIAGNOSIS — I21.A1 MYOCARDIAL INFARCTION TYPE 2: ICD-10-CM

## 2023-01-11 DIAGNOSIS — F17.200 NICOTINE DEPENDENCE, UNSPECIFIED, UNCOMPLICATED: ICD-10-CM

## 2023-01-11 DIAGNOSIS — Z51.5 ENCOUNTER FOR PALLIATIVE CARE: ICD-10-CM

## 2023-01-11 DIAGNOSIS — E83.42 HYPOMAGNESEMIA: ICD-10-CM

## 2023-01-11 DIAGNOSIS — K94.29 OTHER COMPLICATIONS OF GASTROSTOMY: ICD-10-CM

## 2023-01-11 DIAGNOSIS — Z78.1 PHYSICAL RESTRAINT STATUS: ICD-10-CM

## 2023-01-11 DIAGNOSIS — R19.7 DIARRHEA, UNSPECIFIED: ICD-10-CM

## 2023-01-11 DIAGNOSIS — E83.39 OTHER DISORDERS OF PHOSPHORUS METABOLISM: ICD-10-CM

## 2023-02-13 NOTE — CDI QUERY NOTE - NSCDIOTHERTXTBX_GEN_ALL_CORE_HH
12/12 Procedure Note-Bronchoscopy. PROCEDURE PERFORMED:  Bronchoscopy, washing.  The patient had been previously intubated and was on mechanical ventilatory support. He was on sedation, which was continued and adjusted during the procedure. His FiO2 was increased to 100% during the procedure. The fiberoptic bronchoscope was introduced through an endotracheal tube adaptor and the tip of the endotracheal tube was noted to be in good position above the dionne.    Upon inspection, there were copious amounts of secretions noted on the left side, as much as possible was suctioned. There also some secretions noted on the right side, as much as possible was suctioned.    Suboptimal views were noted throughout 2/2 the copious amounts of secretions/mucous.     The procedure was completed and all samples were submitted for appropriate studies.    After adequate clearing of secretions was accomplished, the bronchoscope was removed from the patient and the procedure was ended. The patient tolerated the procedure well and there were no complications.    Cultures were sent.        Based on your professional judgment and the clinical indicators, please clarify if the bronchoscopy, washing procedure can be further specified as:    •  Bronchoscopy with bilateral BAL and suctioning of copious amounts of liquid secretions that was for both therapeutic and diagnostic purposes    •  Bronchoscopy with bilateral BAL and suctioning of copious amounts of liquid secretions that was only for diagnostic purposes    •  Bronchoscopy with bilateral BAL and suctioning of copious amounts of semisolid secretions that was for both therapeutic and diagnostic purposes    •  Bronchoscopy with bilateral BAL and suctioning of copious amounts of semisolid secretions that was only for diagnostic purposes    •  Other (please specify):      Thank you,  Violet Cosme RN, CCS, CCDS  [phone] CLINICAL INDICATORS    12/12 Procedure Note-Bronchoscopy. PROCEDURE PERFORMED:  Bronchoscopy, washing.  The patient had been previously intubated and was on mechanical ventilatory support. He was on sedation, which was continued and adjusted during the procedure. His FiO2 was increased to 100% during the procedure. The fiberoptic bronchoscope was introduced through an endotracheal tube adaptor and the tip of the endotracheal tube was noted to be in good position above the dionne.    Upon inspection, there were copious amounts of secretions noted on the left side, as much as possible was suctioned. There also some secretions noted on the right side, as much as possible was suctioned.    Suboptimal views were noted throughout 2/2 the copious amounts of secretions/mucous.     The procedure was completed and all samples were submitted for appropriate studies.    After adequate clearing of secretions was accomplished, the bronchoscope was removed from the patient and the procedure was ended. The patient tolerated the procedure well and there were no complications.    Cultures were sent.        Based on your professional judgment and the clinical indicators, please clarify if the bronchoscopy, washing procedure can be further specified as:    •  Bronchoscopy, washing included bilateral BAL and suctioning of copious amounts of liquid secretions that was for both therapeutic and diagnostic purposes    •  Bronchoscopy, washing included bilateral BAL and suctioning of copious amounts of liquid secretions that was only for diagnostic purposes    •  Bronchoscopy, washing did not include BAL; suctioned copious amount of liquid secretions that was for both therapeutic and diagnostic purposes    •  Bronchoscopy, washing did not include BAL; suctioned copious amount of liquid secretions that was only for diagnostic purposes    •  Bronchoscopy, washing included bilateral BAL and suctioning of copious amounts of semisolid secretions that was for both therapeutic and diagnostic purposes    •  Bronchoscopy, washing included bilateral BAL and suctioning of copious amounts of semisolid secretions that was only for diagnostic purposes    •  Bronchoscopy, washing did not include BAL; suctioned copious amount of semisolid secretions that was for both therapeutic and diagnostic purposes    •  Bronchoscopy, washing did not include BAL; suctioned copious amount of semisolid secretions that was only for diagnostic purposes    •  Other (please specify):    Thank you,  Violet Cosme RN, CCS, CCDS  [phone]

## 2023-12-19 NOTE — PATIENT PROFILE ADULT - FUNCTIONAL SCREEN CURRENT LEVEL: COMMUNICATION, MLM
Quality 111:Pneumonia Vaccination Status For Older Adults: Patient received any pneumococcal conjugate or polysaccharide vaccine on or after their 60th birthday and before the end of the measurement period Quality 226: Preventive Care And Screening: Tobacco Use: Screening And Cessation Intervention: Patient screened for tobacco use and is an ex/non-smoker Quality 130: Documentation Of Current Medications In The Medical Record: Current Medications Documented Detail Level: Detailed 0 = understands/communicates without difficulty

## 2024-01-04 NOTE — OCCUPATIONAL THERAPY INITIAL EVALUATION ADULT - FINE MOTOR COORDINATION, RIGHT HAND THUMB/FINGER OPPOSITION SKILLS, OT EVAL
Detail Level: Detailed
Show Aperture Variable?: Yes
Consent: The patient's consent was obtained including but not limited to risks of crusting, scabbing, blistering, scarring, darker or lighter pigmentary change, recurrence, incomplete removal and infection.
Render Note In Bullet Format When Appropriate: No
Post-Care Instructions: I reviewed with the patient in detail post-care instructions. Patient is to wear sunprotection, and avoid picking at any of the treated lesions. Pt may apply Vaseline to crusted or scabbing areas.
Number Of Freeze-Thaw Cycles: 1 freeze-thaw cycle
Duration Of Freeze Thaw-Cycle (Seconds): 3
mild impairment

## 2024-12-12 NOTE — ED PROVIDER NOTE - CARE PLAN
Principal Discharge DX:	Laceration of thigh  Secondary Diagnosis:	Fall Number Of Layers: 1 Detail Level: Simple Consent: Prior to the procedure, written consent was obtained and risks were reviewed, including but not limited to: redness, peeling, blistering, pigmentary change, scarring, infection, and pain. Treatment Number: 0 Chemical Peel: Micropeel Plus 30 Solution Treatment Time (Optional): 10 Prep: The treated area was degreased with pre-peel cleanser. Post Peel Care: After the procedure, a post-peel cream was applied to the treated areas. Sun protection and post-care instructions were reviewed with the patient. Post-Care Instructions: I reviewed with the patient in detail post-care instructions. Patient should avoid sun exposure and wear sun protection.